# Patient Record
Sex: MALE | Race: BLACK OR AFRICAN AMERICAN | NOT HISPANIC OR LATINO | Employment: OTHER | URBAN - METROPOLITAN AREA
[De-identification: names, ages, dates, MRNs, and addresses within clinical notes are randomized per-mention and may not be internally consistent; named-entity substitution may affect disease eponyms.]

---

## 2017-01-10 ENCOUNTER — ALLSCRIPTS OFFICE VISIT (OUTPATIENT)
Dept: OTHER | Facility: OTHER | Age: 67
End: 2017-01-10

## 2017-02-03 ENCOUNTER — GENERIC CONVERSION - ENCOUNTER (OUTPATIENT)
Dept: OTHER | Facility: OTHER | Age: 67
End: 2017-02-03

## 2017-02-06 ENCOUNTER — GENERIC CONVERSION - ENCOUNTER (OUTPATIENT)
Dept: OTHER | Facility: OTHER | Age: 67
End: 2017-02-06

## 2017-02-10 ENCOUNTER — ALLSCRIPTS OFFICE VISIT (OUTPATIENT)
Dept: OTHER | Facility: OTHER | Age: 67
End: 2017-02-10

## 2017-03-23 ENCOUNTER — ALLSCRIPTS OFFICE VISIT (OUTPATIENT)
Dept: OTHER | Facility: OTHER | Age: 67
End: 2017-03-23

## 2017-04-11 ENCOUNTER — GENERIC CONVERSION - ENCOUNTER (OUTPATIENT)
Dept: OTHER | Facility: OTHER | Age: 67
End: 2017-04-11

## 2017-06-06 ENCOUNTER — ALLSCRIPTS OFFICE VISIT (OUTPATIENT)
Dept: OTHER | Facility: OTHER | Age: 67
End: 2017-06-06

## 2017-06-06 ENCOUNTER — TRANSCRIBE ORDERS (OUTPATIENT)
Dept: ADMINISTRATIVE | Facility: HOSPITAL | Age: 67
End: 2017-06-06

## 2017-06-06 DIAGNOSIS — I50.42 CHRONIC COMBINED SYSTOLIC AND DIASTOLIC HEART FAILURE (HCC): Primary | ICD-10-CM

## 2017-06-24 ENCOUNTER — HOSPITAL ENCOUNTER (EMERGENCY)
Facility: HOSPITAL | Age: 67
Discharge: HOME | End: 2017-06-24
Admitting: EMERGENCY MEDICINE
Payer: COMMERCIAL

## 2017-06-24 VITALS
TEMPERATURE: 98.2 F | DIASTOLIC BLOOD PRESSURE: 83 MMHG | BODY MASS INDEX: 35.59 KG/M2 | HEART RATE: 94 BPM | OXYGEN SATURATION: 97 % | RESPIRATION RATE: 20 BRPM | WEIGHT: 241 LBS | SYSTOLIC BLOOD PRESSURE: 160 MMHG

## 2017-06-24 DIAGNOSIS — R21 RASH AND OTHER NONSPECIFIC SKIN ERUPTION: Primary | ICD-10-CM

## 2017-06-24 PROCEDURE — 99282 EMERGENCY DEPT VISIT SF MDM: CPT

## 2017-06-28 DIAGNOSIS — G47.33 OBSTRUCTIVE SLEEP APNEA: ICD-10-CM

## 2017-09-26 ENCOUNTER — ALLSCRIPTS OFFICE VISIT (OUTPATIENT)
Dept: OTHER | Facility: OTHER | Age: 67
End: 2017-09-26

## 2017-10-03 ENCOUNTER — GENERIC CONVERSION - ENCOUNTER (OUTPATIENT)
Dept: OTHER | Facility: OTHER | Age: 67
End: 2017-10-03

## 2017-10-05 ENCOUNTER — HOSPITAL ENCOUNTER (OUTPATIENT)
Dept: RADIOLOGY | Facility: HOSPITAL | Age: 67
Discharge: HOME | End: 2017-10-05
Payer: COMMERCIAL

## 2017-10-05 ENCOUNTER — TRANSCRIBE ORDERS (OUTPATIENT)
Dept: ADMINISTRATIVE | Facility: HOSPITAL | Age: 67
End: 2017-10-05

## 2017-10-05 DIAGNOSIS — M25.561 RIGHT KNEE PAIN, UNSPECIFIED CHRONICITY: Primary | ICD-10-CM

## 2017-10-05 DIAGNOSIS — M25.512 LEFT SHOULDER PAIN, UNSPECIFIED CHRONICITY: ICD-10-CM

## 2017-10-05 DIAGNOSIS — M25.561 RIGHT KNEE PAIN, UNSPECIFIED CHRONICITY: ICD-10-CM

## 2017-10-05 PROCEDURE — 73562 X-RAY EXAM OF KNEE 3: CPT

## 2017-10-05 PROCEDURE — 73030 X-RAY EXAM OF SHOULDER: CPT

## 2017-12-12 ENCOUNTER — ALLSCRIPTS OFFICE VISIT (OUTPATIENT)
Dept: OTHER | Facility: OTHER | Age: 67
End: 2017-12-12

## 2017-12-12 LAB
BILIRUB UR QL STRIP: NORMAL
CLARITY UR: NORMAL
COLOR UR: YELLOW
GLUCOSE (HISTORICAL): 1000
HGB UR QL STRIP.AUTO: NORMAL
KETONES UR STRIP-MCNC: NORMAL MG/DL
LEUKOCYTE ESTERASE UR QL STRIP: NORMAL
NITRITE UR QL STRIP: NORMAL
PH UR STRIP.AUTO: 6 [PH]
PROT UR STRIP-MCNC: NORMAL MG/DL
SP GR UR STRIP.AUTO: 1.01
UROBILINOGEN UR QL STRIP.AUTO: 0.2

## 2017-12-22 ENCOUNTER — ALLSCRIPTS OFFICE VISIT (OUTPATIENT)
Dept: OTHER | Facility: OTHER | Age: 67
End: 2017-12-22

## 2017-12-22 ENCOUNTER — TRANSCRIBE ORDERS (OUTPATIENT)
Dept: ADMINISTRATIVE | Facility: HOSPITAL | Age: 67
End: 2017-12-22

## 2017-12-22 DIAGNOSIS — I50.42 CHRONIC COMBINED SYSTOLIC AND DIASTOLIC HEART FAILURE (HCC): Primary | ICD-10-CM

## 2017-12-23 NOTE — PROGRESS NOTES
Assessment   1  Preop cardiovascular exam (V72 81) (Z01 810)    Plan   1  ECHO COMPLETE WITH CONTRAST IF INDICATED; Status:Need Information - Financial     Authorization; Requested for:[id number];   2  Follow-up visit in 4 Months Evaluation and Treatment  Follow-up  Status: Hold For -     Scheduling  Requested for: [id number]    Discussion/Summary   Cardiology Discussion Summary Free Text Note Form [facility]:    1  Chronic systolic CHF due to NICM, Stage C, EF: 30% with grade 2 diastolic dysfunction decompensation Dec 2016- diuresed 7 lbs in hospital- weight should be around 240 lbs- weighs 254 today in situ- 9/26 Optivol crossed  coreg 25 mg BID, Entresto 97/103 mg BID, isordil 10 mg Q8 40 mg BID HTN- managed well JOE on CPAP at night DM2 CKD III- baseline Cr 1 8- 1 5 in the hospital History of prostate CAncer- s/p prostatectomy anxiety and depression hyperlipidemia- on simvastatin 40 mg    stated that since he is on isordil he should avoid PDE5's for ED for upcoming penile implant surgery  Chief Complaint   Chief Complaint Free Text Note Form: pre op clearance      History of Present Illness   Cardiology HPI Free Text Note [facility]: 79 yo male following up regarding management of chronic systolic congestive heart failure  He was in the hospital with decompensated systolic congestive heart failure in Dec 2016  An echocardiogram was done in the hospital which demonstrated an ejection fraction of 30%  Initially ACE-I removed secondary to chronic kidney disease stage III with baseline creatinine around 1 8  He has comorbidities including chronic kidney disease stage III, hypertension, type 2 diabetes mellitus, obstructive sleep apnea on C Pap at night, prostate cancer status post surgery, asthma     reports no history of MI  He states he had a defibrillator placed a long time ago he says 10-12 years ago but states he never had ICD generator changed  He is still on Benicar      He had fistula surgery for preparation for dialysis about 3 years ago but never needed dialysis  Last visit I increased his Entresto to 97/103 mg  He is taking lasix 40 mg BID  His weight last visit was 254 lbs  Wt today is    His weight is stable, no dyspnea  Has upcoming penile implant in Jan            Review of Systems   Cardiology Male ROS:         Cardiac: has swelling in the BOTH FEET, but-- no chest pain,-- no rhythm problems,-- no fainting/blackouts,-- no heart murmur present-- and-- no palpitations present  Skin: No complaints of nonhealing sores or skin rash  Genitourinary: No complaints of recurrent urinary tract infections, frequent urination at night, difficult urination, blood in urine, kidney stones, loss of bladder control, no kidney or prostate problems, no erectile dysfunction  Psychological: anxiety, but-- No complaints of feeling depressed, anxiety, panic attacks, or difficulty concentrating ,-- no depression-- and-- no palpitations present  General: trouble sleeping, but-- No complaints of trouble sleeping, lack of energy, fatigue, appetite changes, weight changes, fever, frequent infections, or night sweats  ,-- no lack of energy/fatigue-- and-- no night sweats  Respiratory: shortness of breath, but-- No complaints of shortness of breath, cough with sputum, or wheezing ,-- no cough/sputum,-- no wheezing,-- no phlegm-- and-- no hemoptysis--   Improved SOB  HEENT: No complaints of serious problems, hearing problems, nose problems, throat problems, or snoring        Gastrointestinal: No complaints of liver problems, nausea, vomiting, heartburn, constipation, bloody stools, diarrhea, problems swallowing, adbominal pain, or rectal bleeding ,-- no liver problems,-- no nausea,-- no heartburn,-- no diarrhea-- and-- no constipation      Hematologic: excessive bruising, but-- No complaints of bleeding disorders, anemia, blood clots, or excessive brusing -- and-- no anemia      Neurological: No complaints of numbness, tingling, dizziness, weakness, seizures, headaches, syncope or fainting, AM fatigue, daytime sleepiness, no witnessed apnea episodes  ,-- no tingling,-- no weakness,-- no headaches,-- no dizziness-- and-- no diplopia      Musculoskeletal: swelling/pain, but-- No complaints of arthritis, back pain, or painfull swelling ,-- no arthritis-- and-- no back pain-- SWELLING IN BOTH FEET  Active Problems   1  Chronic combined systolic and diastolic CHF, NYHA class 3 (428 42,428 0) (I50 42)   2  Erectile dysfunction of non-organic origin (302 72) (F52 21)   3  HTN (hypertension) (401 9) (I10)   4  Obstructive sleep apnea syndrome (327 23) (G47 33)    Past Medical History   1  History of Chronic systolic heart failure (202 33) (I50 22)   2  History of cardiac disorder (V12 50) (Z86 79)   3  History of hypertension (V12 59) (Z86 79)   4  History of type 2 diabetes mellitus (V12 29) (Z86 39)  Active Problems And Past Medical History Reviewed: The active problems and past medical history were reviewed and updated today  Surgical History   1  History of Cardio-defib Pulse Generator Type   2  History of Hernia Repair   3  History of Shoulder Surgery  Surgical History Reviewed: The surgical history was reviewed and updated today  Family History   Brother    1  Family history of AIDS (V18 8) (Z83 0)  Family History Reviewed: The family history was reviewed and updated today  Social History    · Former smoker (D15 85) (J62 249)   ·    · No drug use   · Social alcohol use (Z78 9)  Social History Reviewed: The social history was reviewed and updated today  Current Meds    1  Adult Aspirin EC Low Strength 81 MG Oral Tablet Delayed Release; Therapy: (Recorded:14Hva6172) to Recorded   2  ALPRAZolam 0 25 MG Oral Tablet; TAKE 1 TABLET 3 TIMES DAILY AS NEEDED; Therapy: 85LBO5176 to Recorded   3  Carvedilol 25 MG Oral Tablet; TAKE 1 TABLET 3 times daily;      Therapy: (Recorded:81Zpx3050) to Recorded   4  Cialis 20 MG Oral Tablet; TAKE AS DIRECTED; Therapy: 57CIX4609 to Recorded   5  Ciprofloxacin HCl - 500 MG Oral Tablet; Take 1 tablet twice daily; Therapy: 33Sgi1381 to (Evaluate:34Xzp5789)  Requested for: 94Ntk2004; Last     Rx:89Vik0200 Ordered   6  Entresto  MG Oral Tablet; TAKE 1 TABLET TWICE DAILY ***CALL MD FOR     APPOINTMENT  NO FURTHER REFILLS UNTIL THEN***;     Therapy: 76FNY9848 to (Evaluate:21Apr2018)  Requested for: 75Pru7779; Last     Rx:92Vvo8333 Ordered   7  Furosemide 40 MG Oral Tablet; take 1 tablet by mouth twice daily; Therapy: 65AMF6638 to (Evaluate:01Jun2018)  Requested for: 71JBF3099; Last     CH:75NEX9203 Ordered   8  Gabapentin 100 MG Oral Capsule; take 1 capsule 4 times daily; Therapy: 45XFC5551 to Recorded   9  Isosorbide Dinitrate 10 MG Oral Tablet; TAKE 1 TABLET Every 8 hours; Therapy: 32LQR2539 to (Evaluate:11Mar2018)  Requested for: 29Kgo9031; Last     Rx:58Nla6167 Ordered   10  Levitra 20 MG Oral Tablet; TAKE AS DIRECTED; Therapy: 80MNC0084 to Recorded   11  MetOLazone 5 MG Oral Tablet; TAKE 1 TABLET BY MOUTH EVERY DAY AS NEEDED      WEIGHT GAIN 3 LBS; Therapy: 49VER2617 to (Evaluate:54Ufo1662)  Requested for: 67Cgf5066; Last      Rx:12Glc8672 Ordered   12  Multi-Day Vitamins TABS; Therapy: (Recorded:06Oct2016) to Recorded   13  Potassium Chloride Kathy ER 20 MEQ Oral Tablet Extended Release; TAKE 1 TABLET      DAILY; Therapy: 22XWE1726 to (Evaluate:02Jan2018); Last Rx:06Jun2017 Ordered   14  Simvastatin 40 MG Oral Tablet; TAKE 1 TABLET DAILY; Therapy: 46BUF5813 to ([facility]) Recorded   15  Toujeo SoloStar SOPN;      Therapy: (Recorded:06Oct2016) to Recorded  Medication List Reviewed: The medication list was reviewed and updated today  Allergies   1  Ibuprofen CAPS   2   Penicillins    Vitals   Signs   Recorded: 22Dec2017 11:33AM   Heart Rate: 75  Pulse Quality: Normal, L Radial  Respiration Quality: Normal  Respiration: 16  Systolic: 007  Diastolic: 70  Height: 5 ft 9 in  Weight: 252 lb   BMI Calculated: 37 21  BSA Calculated: 2 28    Physical Exam        Constitutional      General appearance: No acute distress, well appearing and well nourished  Eyes      Conjunctiva and Sclera examination: Conjunctiva pink, sclera anicteric  Ears, Nose, Mouth, and Throat - Oropharynx: Clear, nares are clear, mucous membranes are moist       Neck      Neck and thyroid: Abnormal  -- JVP 6- 7 cm  Pulmonary      Respiratory effort: No increased work of breathing or signs of respiratory distress  Auscultation of lungs: Clear to auscultation, no rales, no rhonchi, no wheezing, good air movement  Cardiovascular      Auscultation of heart: Normal rate and rhythm, normal S1 and S2, no murmurs  Carotid pulses: Normal, 2+ bilaterally  Peripheral vascular exam: Normal pulses throughout, no tenderness, erythema or swelling  Pedal pulses: Normal, 2+ bilaterally  Examination of extremities for edema and/or varicosities: Abnormal  -- 1+ pitting edema  Abdomen      Abdomen: Non-tender and no distention  Liver and spleen: No hepatomegaly or splenomegaly  Musculoskeletal Gait and station: Normal gait  -- Digits and nails: Normal without clubbing or cyanosis  -- Inspection/palpation of joints, bones, and muscles: Normal, ROM normal        Skin - Skin and subcutaneous tissue: Normal without rashes or lesions  Skin is warm and well perfused, normal turgor  Neurologic - Cranial nerves: II - XII intact  -- Speech: Normal        Psychiatric - Orientation to person, place, and time: Normal -- Mood and affect: Normal       Results/Data   Diagnostic Studies Reviewed Cardio:      Lab Review: June 7 cr  1 7      Echocardiogram/DANIEL: 5/2: 80%, grade 2 diastolic dysfunction  ICD/ Pacer Interpretation Sept: 1% , battery ok   a few SVT episodes detected, normal optivol      Future Appointments      Date/Time Provider Specialty Site   01/15/2018 09:20 AM Wang Rivera DO Cardiology  CARDIOLOGY  [place]   12/27/2017 01:00 PM Cardiology, Device Remote   [address]   03/28/2018 08:00 AM Cardiology, Device Remote   [address]   01/19/2018 07:30 AM Heriberto Rosales MD Urology [address] [place] [address]   01/23/2018 10:00 AM Heriberto Rosales MD Urology [address]   02/15/2018 10:00 AM Heriberto Rosales MD Urology [address] [place] [address]     Signatures    Electronically signed by : Rosy Hunter DO; Dec 22 2017 11:49AM EST                       (Author)

## 2017-12-23 NOTE — PROGRESS NOTES
Surgery Scheduling Form   Pre-Operative Risk Assessment:      Date Last Seen: 12/22/17      Date of Surgery: Jan 19 2018      Hospital: [facility]      Type of Surgery: Penile implant      Surgeon Name: Ritesh Romero Number: [phone]  Pulmonary: No Pulmonary Contraindication for planned surgical procedure      Cardiac: No Cardiac Contraindication for planned surgical procedure      Vascular: No Vascular Contraindication for planned surgical procedure      Physician Comments: stable from HF standpoint      Further Testing Required: No      Anticoagulation: No      Anesthesia: Choice      Patient Approved for Surgery: Yes      Clearing Doctor: Bob SUAREZ        Signatures    Electronically signed by : Bob Novak DO; Dec 22 2017 11:51AM EST                       (Author)

## 2017-12-27 ENCOUNTER — GENERIC CONVERSION - ENCOUNTER (OUTPATIENT)
Dept: OTHER | Facility: OTHER | Age: 67
End: 2017-12-27

## 2017-12-27 ENCOUNTER — ALLSCRIPTS OFFICE VISIT (OUTPATIENT)
Dept: OTHER | Facility: OTHER | Age: 67
End: 2017-12-27

## 2018-01-02 ENCOUNTER — HOSPITAL ENCOUNTER (OUTPATIENT)
Dept: NON INVASIVE DIAGNOSTICS | Facility: CLINIC | Age: 68
Discharge: HOME | End: 2018-01-02
Payer: COMMERCIAL

## 2018-01-02 ENCOUNTER — GENERIC CONVERSION - ENCOUNTER (OUTPATIENT)
Dept: CARDIOLOGY CLINIC | Facility: CLINIC | Age: 68
End: 2018-01-02

## 2018-01-02 DIAGNOSIS — I50.42 CHRONIC COMBINED SYSTOLIC AND DIASTOLIC HEART FAILURE (HCC): ICD-10-CM

## 2018-01-02 PROCEDURE — 93306 TTE W/DOPPLER COMPLETE: CPT

## 2018-01-03 ENCOUNTER — ANESTHESIA EVENT (OUTPATIENT)
Dept: PERIOP | Facility: HOSPITAL | Age: 68
End: 2018-01-03
Payer: COMMERCIAL

## 2018-01-03 RX ORDER — SODIUM CHLORIDE 9 MG/ML
125 INJECTION, SOLUTION INTRAVENOUS CONTINUOUS
Status: CANCELLED | OUTPATIENT
Start: 2018-01-19

## 2018-01-04 ENCOUNTER — GENERIC CONVERSION - ENCOUNTER (OUTPATIENT)
Dept: UROLOGY | Facility: MEDICAL CENTER | Age: 68
End: 2018-01-04

## 2018-01-04 ENCOUNTER — HOSPITAL ENCOUNTER (OUTPATIENT)
Dept: RADIOLOGY | Facility: HOSPITAL | Age: 68
Discharge: HOME | End: 2018-01-04
Attending: UROLOGY
Payer: COMMERCIAL

## 2018-01-04 ENCOUNTER — HOSPITAL ENCOUNTER (OUTPATIENT)
Dept: NON INVASIVE DIAGNOSTICS | Facility: HOSPITAL | Age: 68
Discharge: HOME | End: 2018-01-04
Attending: UROLOGY
Payer: COMMERCIAL

## 2018-01-04 ENCOUNTER — APPOINTMENT (OUTPATIENT)
Dept: LAB | Facility: HOSPITAL | Age: 68
End: 2018-01-04
Attending: UROLOGY
Payer: COMMERCIAL

## 2018-01-04 ENCOUNTER — TRANSCRIBE ORDERS (OUTPATIENT)
Dept: ADMINISTRATIVE | Facility: HOSPITAL | Age: 68
End: 2018-01-04

## 2018-01-04 ENCOUNTER — APPOINTMENT (OUTPATIENT)
Dept: PREADMISSION TESTING | Facility: HOSPITAL | Age: 68
End: 2018-01-04
Payer: COMMERCIAL

## 2018-01-04 VITALS
TEMPERATURE: 97.9 F | WEIGHT: 246.2 LBS | HEIGHT: 69 IN | DIASTOLIC BLOOD PRESSURE: 80 MMHG | HEART RATE: 82 BPM | BODY MASS INDEX: 36.46 KG/M2 | SYSTOLIC BLOOD PRESSURE: 132 MMHG | RESPIRATION RATE: 18 BRPM

## 2018-01-04 DIAGNOSIS — E11.69 ERECTILE DYSFUNCTION ASSOCIATED WITH TYPE 2 DIABETES MELLITUS (HCC): ICD-10-CM

## 2018-01-04 DIAGNOSIS — N52.1 ERECTILE DYSFUNCTION ASSOCIATED WITH TYPE 2 DIABETES MELLITUS (HCC): ICD-10-CM

## 2018-01-04 DIAGNOSIS — Z01.818 PREOP EXAMINATION: ICD-10-CM

## 2018-01-04 DIAGNOSIS — Z01.818 PREOP EXAMINATION: Primary | ICD-10-CM

## 2018-01-04 LAB
ANION GAP SERPL CALCULATED.3IONS-SCNC: 8 MMOL/L (ref 4–13)
APTT PPP: 28 SECONDS (ref 23–35)
ATRIAL RATE: 76 BPM
BASOPHILS # BLD AUTO: 0.03 THOUSANDS/ΜL (ref 0–0.1)
BASOPHILS NFR BLD AUTO: 1 % (ref 0–1)
BUN SERPL-MCNC: 49 MG/DL (ref 5–25)
CALCIUM SERPL-MCNC: 10.1 MG/DL (ref 8.3–10.1)
CHLORIDE SERPL-SCNC: 94 MMOL/L (ref 100–108)
CO2 SERPL-SCNC: 32 MMOL/L (ref 21–32)
CREAT SERPL-MCNC: 2.54 MG/DL (ref 0.6–1.3)
EOSINOPHIL # BLD AUTO: 0.23 THOUSAND/ΜL (ref 0–0.61)
EOSINOPHIL NFR BLD AUTO: 5 % (ref 0–6)
ERYTHROCYTE [DISTWIDTH] IN BLOOD BY AUTOMATED COUNT: 14.1 % (ref 11.6–15.1)
GFR SERPL CREATININE-BSD FRML MDRD: 29 ML/MIN/1.73SQ M
GLUCOSE P FAST SERPL-MCNC: 347 MG/DL (ref 65–99)
HCT VFR BLD AUTO: 39.4 % (ref 36.5–49.3)
HGB BLD-MCNC: 13.6 G/DL (ref 12–17)
INR PPP: 0.99 (ref 0.86–1.16)
LYMPHOCYTES # BLD AUTO: 1.3 THOUSANDS/ΜL (ref 0.6–4.47)
LYMPHOCYTES NFR BLD AUTO: 27 % (ref 14–44)
MCH RBC QN AUTO: 31.5 PG (ref 26.8–34.3)
MCHC RBC AUTO-ENTMCNC: 34.5 G/DL (ref 31.4–37.4)
MCV RBC AUTO: 91 FL (ref 82–98)
MONOCYTES # BLD AUTO: 0.47 THOUSAND/ΜL (ref 0.17–1.22)
MONOCYTES NFR BLD AUTO: 10 % (ref 4–12)
NEUTROPHILS # BLD AUTO: 2.8 THOUSANDS/ΜL (ref 1.85–7.62)
NEUTS SEG NFR BLD AUTO: 57 % (ref 43–75)
NRBC BLD AUTO-RTO: 0 /100 WBCS
P AXIS: 60 DEGREES
PLATELET # BLD AUTO: 156 THOUSANDS/UL (ref 149–390)
PMV BLD AUTO: 11.9 FL (ref 8.9–12.7)
POTASSIUM SERPL-SCNC: 3.6 MMOL/L (ref 3.5–5.3)
PR INTERVAL: 206 MS
PROTHROMBIN TIME: 13.1 SECONDS (ref 12.1–14.4)
PSA SERPL-MCNC: 5.4 NG/ML (ref 0–4)
QRS AXIS: -9 DEGREES
QRSD INTERVAL: 116 MS
QT INTERVAL: 420 MS
QTC INTERVAL: 472 MS
RBC # BLD AUTO: 4.32 MILLION/UL (ref 3.88–5.62)
SODIUM SERPL-SCNC: 134 MMOL/L (ref 136–145)
T WAVE AXIS: 144 DEGREES
VENTRICULAR RATE: 76 BPM
WBC # BLD AUTO: 4.83 THOUSAND/UL (ref 4.31–10.16)

## 2018-01-04 PROCEDURE — 85730 THROMBOPLASTIN TIME PARTIAL: CPT

## 2018-01-04 PROCEDURE — 85025 COMPLETE CBC W/AUTO DIFF WBC: CPT

## 2018-01-04 PROCEDURE — 87147 CULTURE TYPE IMMUNOLOGIC: CPT

## 2018-01-04 PROCEDURE — 36415 COLL VENOUS BLD VENIPUNCTURE: CPT

## 2018-01-04 PROCEDURE — 87086 URINE CULTURE/COLONY COUNT: CPT

## 2018-01-04 PROCEDURE — 85610 PROTHROMBIN TIME: CPT

## 2018-01-04 PROCEDURE — 80048 BASIC METABOLIC PNL TOTAL CA: CPT

## 2018-01-04 PROCEDURE — 71046 X-RAY EXAM CHEST 2 VIEWS: CPT

## 2018-01-04 PROCEDURE — 93005 ELECTROCARDIOGRAM TRACING: CPT

## 2018-01-04 PROCEDURE — G0103 PSA SCREENING: HCPCS

## 2018-01-04 RX ORDER — ISOSORBIDE DINITRATE 10 MG/1
10 TABLET ORAL 3 TIMES DAILY
COMMUNITY
End: 2018-08-01 | Stop reason: SDUPTHER

## 2018-01-04 RX ORDER — MELATONIN
1 DAILY
COMMUNITY
End: 2020-01-01

## 2018-01-04 RX ORDER — METOLAZONE 5 MG/1
5 TABLET ORAL DAILY PRN
COMMUNITY
End: 2018-05-21 | Stop reason: SDUPTHER

## 2018-01-04 RX ORDER — MULTIVITAMIN
1 CAPSULE ORAL DAILY
COMMUNITY
End: 2021-01-01 | Stop reason: HOSPADM

## 2018-01-04 RX ORDER — AMOXICILLIN 500 MG
1 CAPSULE ORAL DAILY
COMMUNITY
End: 2021-01-01 | Stop reason: HOSPADM

## 2018-01-04 RX ORDER — FOLIC ACID 0.8 MG
1 TABLET ORAL DAILY
COMMUNITY
End: 2019-01-09

## 2018-01-04 RX ORDER — GABAPENTIN 400 MG/1
100 CAPSULE ORAL 4 TIMES DAILY
COMMUNITY
End: 2019-01-28 | Stop reason: ALTCHOICE

## 2018-01-04 RX ORDER — ACETAMINOPHEN AND CODEINE PHOSPHATE 120; 12 MG/5ML; MG/5ML
30 SOLUTION ORAL
COMMUNITY
End: 2018-05-10 | Stop reason: ALTCHOICE

## 2018-01-04 NOTE — PRE-PROCEDURE INSTRUCTIONS
Pre-Surgery Instructions:   Medication Instructions    aspirin (ADULT ASPIRIN EC LOW STRENGTH) 81 mg EC tablet Patient was instructed by Physician and understands   carvedilol (COREG) 25 mg tablet Instructed patient per Anesthesia Guidelines   Cholecalciferol (VITAMIN D3) 5000 units CAPS Instructed patient per Anesthesia Guidelines   CINNAMON PO Instructed patient per Anesthesia Guidelines   Coenzyme Q10 (COQ-10) 200 MG CAPS Instructed patient per Anesthesia Guidelines   Famotidine (PEPCID PO) Instructed patient per Anesthesia Guidelines   flurazepam (DALMANE) 30 MG capsule Instructed patient per Anesthesia Guidelines   furosemide (LASIX) 40 mg tablet Instructed patient per Anesthesia Guidelines   gabapentin (NEURONTIN) 400 mg capsule Instructed patient per Anesthesia Guidelines   Insulin Glargine (TOUJEO SOLOSTAR SC) Instructed patient per Anesthesia Guidelines   isosorbide dinitrate (ISORDIL) 10 mg tablet Instructed patient per Anesthesia Guidelines   Magnesium 500 MG CAPS Instructed patient per Anesthesia Guidelines   metolazone (ZAROXOLYN) 5 mg tablet Instructed patient per Anesthesia Guidelines   Multiple Vitamin (MULTIVITAMIN) capsule Instructed patient per Anesthesia Guidelines   Omega-3 Fatty Acids (FISH OIL) 1200 MG CAPS Patient was instructed by Physician and understands   potassium chloride (KLOR-CON) 20 mEq packet Instructed patient per Anesthesia Guidelines   sacubitril-valsartan (ENTRESTO)  MG TABS Instructed patient per Anesthesia Guidelines   simvastatin (ZOCOR) 20 mg tablet Instructed patient per Anesthesia Guidelines  Instructed to  Take carvedilol and entresto am of surgery with sip of water per anesthesia  Can take pepcid am of surgery if needed

## 2018-01-04 NOTE — ANESTHESIA PREPROCEDURE EVALUATION
Review of Systems/Medical History  Patient summary reviewed  Chart reviewed      Cardiovascular  Hyperlipidemia, Hypertension controlled, CHF ,    Pulmonary  COPD moderate- medication dependent , Asthma: , ,        GI/Hepatic  Negative GI/hepatic ROS   GERD well controlled,        Chronic kidney disease stage 3,        Endo/Other  Diabetes well controlled type 2 Insulin,      GYN       Hematology  Negative hematology ROS      Musculoskeletal  Obesity  morbid obesity,        Neurology  Negative neurology ROS   Diabetic neuropathy,    Psychology   Anxiety,            Physical Exam    Airway    Mallampati score: I  TM Distance: >3 FB  Neck ROM: full     Dental   Comment: Only a few teeth in mouth,     Cardiovascular  Rhythm: regular, Rate: normal,     Pulmonary  Pulmonary exam normal Breath sounds clear to auscultation,     Other Findings        Anesthesia Plan  ASA Score- 3     Anesthesia Type- general with ASA Monitors  Additional Monitors:   Airway Plan: ETT  Plan Factors-Patient not instructed to abstain from smoking on day of procedure  Patient did not smoke on day of surgery  Induction- intravenous  Postoperative Plan- Plan for postoperative opioid use  Informed Consent- Anesthetic plan and risks discussed with patient

## 2018-01-06 LAB — BACTERIA UR CULT: ABNORMAL

## 2018-01-11 NOTE — MISCELLANEOUS
History of Present Illness  A call was made to the patient/patient's family  Status Check: today's weight is 131  Patient is experiencing the following symptoms:  The patient is not experiencing signs of heart failure  Counseling provided to the patient  Topics counseled included diet, activities of daily living, medications, need for daily weights and symptoms to report  HFCC Additional Notes: Patient was able to obtain Encarnacion-Pandey today and does realize he will need to stop his other medications and he will begin taking this medication on Monday  Patient is very much engaged in his own self care        Signatures   Electronically signed by : Francie Sheehan, ; May 27 2016  3:24PM EST                       (Author)

## 2018-01-11 NOTE — RESULT NOTES
Message  Mr Dennie Patron check on 9/26/17 showed Opti vol crossed the threshold  I called Mr Annalee Brumfield to evalaute for SS of HF  He did not answer  I left a message to call our office if he is experiencing SOB, weight gain or LE edema        Signatures   Electronically signed by : Yomaira Dang, [facility]; Oct  3 2017  2:36PM EST                       (Author)

## 2018-01-12 VITALS
BODY MASS INDEX: 35.65 KG/M2 | WEIGHT: 241.38 LBS | HEART RATE: 74 BPM | SYSTOLIC BLOOD PRESSURE: 134 MMHG | DIASTOLIC BLOOD PRESSURE: 78 MMHG | RESPIRATION RATE: 19 BRPM

## 2018-01-12 NOTE — MISCELLANEOUS
History of Present Illness  Communication  A message was left on voicemail requesting a return phone call  A call was made to the patient/patient's family        Future Appointments    Date/Time Provider Specialty Site   05/24/2016 02:40 PM Matt Wills DO Cardiology [facility]  [facility]     Signatures   Electronically signed by : Vickie Bates, ; May 11 2016  3:33PM EST                       (Author)

## 2018-01-13 VITALS
HEIGHT: 69 IN | HEART RATE: 88 BPM | DIASTOLIC BLOOD PRESSURE: 88 MMHG | WEIGHT: 254 LBS | BODY MASS INDEX: 37.62 KG/M2 | SYSTOLIC BLOOD PRESSURE: 130 MMHG

## 2018-01-13 NOTE — MISCELLANEOUS
Message  Patient Holly Weiss dropped off a copy of an EKG/PAT from Urologist Dr Karyn Rossi    Patient thought he needed another cardiac clearance and also wanted an appointment  Patient already cleared by Dr Colette Yin in Feb 2017 for penile implant at last ov  Gave patient clearance/ov note and tasked Dr Colette Yin a copy of EKG for review  Active Problems    1  Chronic combined systolic and diastolic CHF, NYHA class 3 (428 42,428 0) (I50 42)   2  Erectile dysfunction of non-organic origin (302 72) (F52 21)   3  HTN (hypertension) (401 9) (I10)   4  Obstructive sleep apnea syndrome (327 23) (G47 33)    Current Meds   1  Adult Aspirin EC Low Strength 81 MG Oral Tablet Delayed Release; Therapy: (Recorded:06Oct2016) to Recorded   2  ALPRAZolam 0 25 MG Oral Tablet; TAKE 1 TABLET 3 TIMES DAILY AS NEEDED; Therapy: 67IUM2315 to Recorded   3  Carvedilol 25 MG Oral Tablet; TAKE 1 TABLET 3 times daily; Therapy: (Recorded:10Feb2017) to Recorded   4  Cyclobenzaprine HCl - 10 MG Oral Tablet; TAKE 1 TABLET 3 TIMES DAILY AS NEEDED; Therapy: 87GTB1477 to Recorded   5  Donepezil HCl - 5 MG Oral Tablet; TAKE 1 TABLET DAILY AS DIRECTED; Therapy: 84BVT7848 to (Evaluate:11May2017) Recorded   6  Entresto  MG Oral Tablet; Take 1 tablet twice daily; Therapy: 05EWM3050 to (Evaluate:27Jun2017)  Requested for: 03IHH2195; Last   Rx:29Mar2017 Ordered   7  Eszopiclone 3 MG Oral Tablet; Therapy: (Recorded:06Oct2016) to Recorded   8  Fluticasone Propionate 50 MCG/ACT Nasal Suspension; USE 2 SPRAYS IN EACH   NOSTRIL DAILY; Therapy: 01YRP4899 to (Evaluate:86Kon2999) Recorded   9  Furosemide 20 MG Oral Tablet; TAKE 1 TABLET Daily Take daily at 2 pm;   Therapy: 83LFV8188 to (Evaluate:23Jun2016)  Requested for: 54ISZ1473; Last   Rx:24May2016 Ordered   10  Furosemide 40 MG Oral Tablet; TAKE 1 TABLET DAILY; Therapy: 92SSF8301 to (Evaluate:67Fxo8117) Recorded   11   Gabapentin 100 MG Oral Capsule; take 1 capsule 4 times daily; Therapy: 34BYB4012 to Recorded   12  Lidocaine 5 % External Patch; Therapy: (Recorded:06Oct2016) to Recorded   13  Lumigan 0 01 % Ophthalmic Solution; INSTILL 1 DROP INTO BOTH EYES ONCE DAILY    IN THE EVENING; Therapy: 58NHL4957 to Recorded   14  Multi-Day Vitamins TABS; Therapy: (Recorded:06Oct2016) to Recorded   15  Potassium Chloride Kathy ER 20 MEQ Oral Tablet Extended Release; TAKE 1 TABLET    DAILY; Therapy: 97YZV0759 to (Evaluate:61Jox1497) Recorded   16  Simvastatin 40 MG Oral Tablet; TAKE 1 TABLET DAILY; Therapy: 44UIY4612 to ([facility]) Recorded   17  Tamsulosin HCl - 0 4 MG Oral Capsule; Therapy: (Recorded:06Oct2016) to Recorded   18  Toujeo SoloStar SOPN;    Therapy: (Recorded:06Oct2016) to Recorded   19  TraZODone HCl 150 MG TB24;    Therapy: (Recorded:06Oct2016) to Recorded    Allergies    1  Ibuprofen CAPS   2   Penicillins    Signatures   Electronically signed by : Conor Carlin, ; Apr 11 2017 10:57AM EST                       (Administrative)

## 2018-01-15 ENCOUNTER — GENERIC CONVERSION - ENCOUNTER (OUTPATIENT)
Dept: OTHER | Facility: OTHER | Age: 68
End: 2018-01-15

## 2018-01-15 NOTE — MISCELLANEOUS
History of Present Illness    The patient is being contacted for follow-up after hospitalization  This was not a readmission  The date of discharge was 5/3/16  He has a moderate risk for readmission  Spoke with the patient  He was discharged home with home health services  Spoke to   The name and phone number of the home health agency is [address] [place] [address] 5/4  The patient's discharge weight was 234  The patient's weight today is 232  The patient is currently asymptomatic  Counseling was provided to the patient  Topics counseled included diet, need for daily weights, activities of daily living and symptoms to report  HF Additional Notes:   CC had met patient in the hospital during patient's admission  He stated he had been seeing Dr Rolo Stapleton as his heart doctor and had been in [facility]  He would like to change his cardiologist to Dr Ryan Jeans  The [address] spoke to the patient and to the home health nurse and he did weigh himself today  HFCC did encourage him to make a f/u appt  with his PCP and [facility] him to make an OV with [facility]        Signatures   Electronically signed by : Maricarmen Gutierrez, ; May  4 2016  2:08PM EST                       (Author)

## 2018-01-15 NOTE — MISCELLANEOUS
History of Present Illness    The patient is being contacted for follow-up after hospitalization  This was not a readmission  The date of discharge was 12/22/16  He has a high risk for readmission  Spoke with the patient  He was discharged home with home health services  Counseling was provided to the patient  Topics counseled included diet, need for daily weights, activities of daily living, medications and symptoms to report  HFCC Additional Notes:   Patient has [facility]  He will need close follow up  HFCC will call home health and patient next Wednesday 12/28  Current Meds   1  Adult Aspirin EC Low Strength 81 MG Oral Tablet Delayed Release; Therapy: (Recorded:06Oct2016) to Recorded   2  ALPRAZolam 0 25 MG Oral Tablet; TAKE 1 TABLET 3 TIMES DAILY AS NEEDED; Therapy: 18CEC3793 to Recorded   3  Benicar 5 MG Oral Tablet (Olmesartan Medoxomil); Therapy: (Recorded:06Oct2016) to Recorded   4  Carvedilol 25 MG Oral Tablet; Therapy: (Recorded:06Oct2016) to Recorded   5  Cyclobenzaprine HCl - 10 MG Oral Tablet; TAKE 1 TABLET 3 TIMES DAILY AS NEEDED; Therapy: 94OJY3804 to Recorded   6  Entresto 49-51 MG Oral Tablet; TAKE 1 TABLET BY MOUTH TWICE A DAY; Therapy: 38DUI2718 to Recorded   7  Eszopiclone 3 MG Oral Tablet; Therapy: (Recorded:06Oct2016) to Recorded   8  Furosemide 20 MG Oral Tablet; TAKE 1 TABLET Daily Take daily at 2 pm;   Therapy: 98VMD4912 to (Evaluate:23Jun2016)  Requested for: 11YPM2288; Last   Rx:42Bng4651 Ordered   9  Furosemide 40 MG Oral Tablet; TAKE 1 TABLET DAILY; Therapy: 78FXL2457 to (Evaluate:00Txd6215) Recorded   10  Lidocaine 5 % External Patch; Therapy: (Recorded:06Oct2016) to Recorded   11  Lumigan 0 01 % Ophthalmic Solution; INSTILL 1 DROP INTO BOTH EYES ONCE DAILY    IN THE EVENING; Therapy: 39KJH0120 to Recorded   12  Lyrica 25 MG Oral Capsule; TAKE 1 CAPSULE DAILY; Therapy: 41ZBU4507 to Recorded   13   MetOLazone 2 5 MG Oral Tablet; TAKE 1 TABLET DAILY AS DIRECTED; Therapy: 64TXK0923 to (Evaluate:65Wcu7389) Recorded   14  Multi-Day Vitamins TABS; Therapy: (Recorded:06Oct2016) to Recorded   15  Potassium Chloride Kathy ER 20 MEQ Oral Tablet Extended Release; TAKE 1 TABLET    DAILY; Therapy: 53KAH2873 to (Evaluate:61Fdq5659) Recorded   16  Simvastatin 20 MG Oral Tablet; Take 1 tablet daily; Therapy: 13IKY4010 to (Evaluate:89Hpz3381) Recorded   17  Tamsulosin HCl - 0 4 MG Oral Capsule; Therapy: (Recorded:06Oct2016) to Recorded   18  Toujeo SoloStar SOPN;    Therapy: (Recorded:06Oct2016) to Recorded   19  TraZODone HCl 150 MG TB24;    Therapy: (Recorded:06Oct2016) to Recorded    Future Appointments    Date/Time Provider Specialty Site   03/23/2017 10:00 AM Cardiology, Device Remote  [facility]     Allergies    1  Ibuprofen CAPS   2   Penicillins    Signatures   Electronically signed by : Vickie Bates, ; Dec 23 2016  2:55PM EST                       (Author)

## 2018-01-17 NOTE — MISCELLANEOUS
History of Present Illness  A call was made to the patient/patient's family  Status Check: today's weight is 232  Patient is experiencing the following symptoms: fatigue and shortness of breath with moderate activity, but no edema  when using steps   Counseling provided to the patient  Topics counseled included diet, activities of daily living, medications, need for daily weights and symptoms to report        Future Appointments    Date/Time Provider Specialty Site   05/24/2016 02:40 PM Fahad Hayes DO Cardiology  CARDIOLOGY  [place]     Signatures   Electronically signed by : Blake Pabon, ; May 16 2016  4:21PM EST                       (Author)

## 2018-01-19 ENCOUNTER — HOSPITAL ENCOUNTER (OUTPATIENT)
Facility: HOSPITAL | Age: 68
Setting detail: OUTPATIENT SURGERY
Discharge: HOME | End: 2018-01-20
Attending: UROLOGY | Admitting: UROLOGY
Payer: COMMERCIAL

## 2018-01-19 ENCOUNTER — ANESTHESIA (OUTPATIENT)
Dept: PERIOP | Facility: HOSPITAL | Age: 68
End: 2018-01-19
Payer: COMMERCIAL

## 2018-01-19 DIAGNOSIS — E11.69 TYPE 2 DIABETES MELLITUS WITH HYPERLIPIDEMIA (HCC): Primary | ICD-10-CM

## 2018-01-19 DIAGNOSIS — E11.69 ERECTILE DYSFUNCTION ASSOCIATED WITH TYPE 2 DIABETES MELLITUS (HCC): ICD-10-CM

## 2018-01-19 DIAGNOSIS — E78.5 TYPE 2 DIABETES MELLITUS WITH HYPERLIPIDEMIA (HCC): Primary | ICD-10-CM

## 2018-01-19 DIAGNOSIS — N52.1 ERECTILE DYSFUNCTION ASSOCIATED WITH TYPE 2 DIABETES MELLITUS (HCC): ICD-10-CM

## 2018-01-19 LAB
GLUCOSE SERPL-MCNC: 174 MG/DL (ref 65–140)
GLUCOSE SERPL-MCNC: 188 MG/DL (ref 65–140)
GLUCOSE SERPL-MCNC: 192 MG/DL (ref 65–140)
GLUCOSE SERPL-MCNC: 201 MG/DL (ref 65–140)
GLUCOSE SERPL-MCNC: 251 MG/DL (ref 65–140)
PLATELET # BLD AUTO: 163 THOUSANDS/UL (ref 149–390)
PMV BLD AUTO: 11.8 FL (ref 8.9–12.7)

## 2018-01-19 PROCEDURE — 85049 AUTOMATED PLATELET COUNT: CPT | Performed by: UROLOGY

## 2018-01-19 PROCEDURE — 94760 N-INVAS EAR/PLS OXIMETRY 1: CPT

## 2018-01-19 PROCEDURE — C1813 PROSTHESIS, PENILE, INFLATAB: HCPCS | Performed by: UROLOGY

## 2018-01-19 PROCEDURE — 94660 CPAP INITIATION&MGMT: CPT

## 2018-01-19 PROCEDURE — 82948 REAGENT STRIP/BLOOD GLUCOSE: CPT

## 2018-01-19 DEVICE — NON-STACKABLE REAR TIP EXTENDERS FOR AMS 700 CX AND LGX CYLINDERS
Type: IMPLANTABLE DEVICE | Site: PENIS | Status: FUNCTIONAL
Brand: REAR TIP EXTENDER

## 2018-01-19 DEVICE — INFLATABLE PENILE PROSTHESIS, INHIBIZONE/PRECONNECTED - PENOSCROTAL 1 PUMP 2 CYLINDERS
Type: IMPLANTABLE DEVICE | Site: PENIS | Status: FUNCTIONAL
Brand: AMS 700 CX MS PUMP

## 2018-01-19 DEVICE — AMS 700 INFLATABLE PENILE PROSTHESIS, 1 SPHERICAL RESERVOIR, INHIBIZONE TREATED
Type: IMPLANTABLE DEVICE | Site: SCROTUM | Status: FUNCTIONAL
Brand: AMS 700 SPHERICAL RESERVOIR

## 2018-01-19 RX ORDER — FUROSEMIDE 40 MG/1
40 TABLET ORAL
Status: DISCONTINUED | OUTPATIENT
Start: 2018-01-20 | End: 2018-01-20 | Stop reason: HOSPADM

## 2018-01-19 RX ORDER — CHLORHEXIDINE GLUCONATE 4 G/100ML
SOLUTION TOPICAL DAILY PRN
Status: DISCONTINUED | OUTPATIENT
Start: 2018-01-19 | End: 2018-01-19

## 2018-01-19 RX ORDER — LIDOCAINE HYDROCHLORIDE 10 MG/ML
INJECTION, SOLUTION INFILTRATION; PERINEURAL AS NEEDED
Status: DISCONTINUED | OUTPATIENT
Start: 2018-01-19 | End: 2018-01-19 | Stop reason: SURG

## 2018-01-19 RX ORDER — FAMOTIDINE 20 MG/1
20 TABLET, FILM COATED ORAL DAILY PRN
Status: DISCONTINUED | OUTPATIENT
Start: 2018-01-19 | End: 2018-01-20 | Stop reason: HOSPADM

## 2018-01-19 RX ORDER — METOLAZONE 5 MG/1
5 TABLET ORAL DAILY PRN
Status: DISCONTINUED | OUTPATIENT
Start: 2018-01-19 | End: 2018-01-20 | Stop reason: HOSPADM

## 2018-01-19 RX ORDER — TEMAZEPAM 15 MG/1
15 CAPSULE ORAL
Status: DISCONTINUED | OUTPATIENT
Start: 2018-01-19 | End: 2018-01-20 | Stop reason: HOSPADM

## 2018-01-19 RX ORDER — CHOLECALCIFEROL (VITAMIN D3) 125 MCG
200 CAPSULE ORAL DAILY
Status: DISCONTINUED | OUTPATIENT
Start: 2018-01-19 | End: 2018-01-20 | Stop reason: HOSPADM

## 2018-01-19 RX ORDER — MORPHINE SULFATE 4 MG/ML
4 INJECTION, SOLUTION INTRAMUSCULAR; INTRAVENOUS EVERY 4 HOURS PRN
Status: DISCONTINUED | OUTPATIENT
Start: 2018-01-19 | End: 2018-01-20 | Stop reason: HOSPADM

## 2018-01-19 RX ORDER — PRAVASTATIN SODIUM 40 MG
40 TABLET ORAL
Status: DISCONTINUED | OUTPATIENT
Start: 2018-01-19 | End: 2018-01-20 | Stop reason: HOSPADM

## 2018-01-19 RX ORDER — LEVOFLOXACIN 500 MG/1
500 TABLET, FILM COATED ORAL EVERY 24 HOURS
Qty: 7 TABLET | Refills: 0 | Status: SHIPPED | OUTPATIENT
Start: 2018-01-19 | End: 2018-01-26

## 2018-01-19 RX ORDER — OXYCODONE HYDROCHLORIDE AND ACETAMINOPHEN 5; 325 MG/1; MG/1
1 TABLET ORAL EVERY 4 HOURS PRN
Status: DISCONTINUED | OUTPATIENT
Start: 2018-01-19 | End: 2018-01-20 | Stop reason: HOSPADM

## 2018-01-19 RX ORDER — HEPARIN SODIUM 5000 [USP'U]/ML
5000 INJECTION, SOLUTION INTRAVENOUS; SUBCUTANEOUS EVERY 12 HOURS SCHEDULED
Status: DISCONTINUED | OUTPATIENT
Start: 2018-01-19 | End: 2018-01-20 | Stop reason: HOSPADM

## 2018-01-19 RX ORDER — FENTANYL CITRATE 50 UG/ML
INJECTION, SOLUTION INTRAMUSCULAR; INTRAVENOUS AS NEEDED
Status: DISCONTINUED | OUTPATIENT
Start: 2018-01-19 | End: 2018-01-19 | Stop reason: SURG

## 2018-01-19 RX ORDER — GLYCOPYRROLATE 0.2 MG/ML
INJECTION INTRAMUSCULAR; INTRAVENOUS AS NEEDED
Status: DISCONTINUED | OUTPATIENT
Start: 2018-01-19 | End: 2018-01-19 | Stop reason: SURG

## 2018-01-19 RX ORDER — LIDOCAINE HYDROCHLORIDE 20 MG/ML
JELLY TOPICAL AS NEEDED
Status: DISCONTINUED | OUTPATIENT
Start: 2018-01-19 | End: 2018-01-19 | Stop reason: HOSPADM

## 2018-01-19 RX ORDER — GENTAMICIN SULFATE 40 MG/ML
INJECTION, SOLUTION INTRAMUSCULAR; INTRAVENOUS AS NEEDED
Status: DISCONTINUED | OUTPATIENT
Start: 2018-01-19 | End: 2018-01-19 | Stop reason: SURG

## 2018-01-19 RX ORDER — ONDANSETRON 2 MG/ML
INJECTION INTRAMUSCULAR; INTRAVENOUS AS NEEDED
Status: DISCONTINUED | OUTPATIENT
Start: 2018-01-19 | End: 2018-01-19 | Stop reason: SURG

## 2018-01-19 RX ORDER — FENTANYL CITRATE/PF 50 MCG/ML
50 SYRINGE (ML) INJECTION
Status: DISCONTINUED | OUTPATIENT
Start: 2018-01-19 | End: 2018-01-19

## 2018-01-19 RX ORDER — CARVEDILOL 25 MG/1
25 TABLET ORAL 2 TIMES DAILY WITH MEALS
Status: DISCONTINUED | OUTPATIENT
Start: 2018-01-19 | End: 2018-01-20 | Stop reason: HOSPADM

## 2018-01-19 RX ORDER — ALBUTEROL SULFATE 2.5 MG/3ML
2.5 SOLUTION RESPIRATORY (INHALATION) ONCE AS NEEDED
Status: DISCONTINUED | OUTPATIENT
Start: 2018-01-19 | End: 2018-01-19

## 2018-01-19 RX ORDER — ISOSORBIDE DINITRATE 10 MG/1
10 TABLET ORAL 3 TIMES DAILY
Status: DISCONTINUED | OUTPATIENT
Start: 2018-01-19 | End: 2018-01-20 | Stop reason: HOSPADM

## 2018-01-19 RX ORDER — INSULIN GLARGINE 100 [IU]/ML
60 INJECTION, SOLUTION SUBCUTANEOUS
Status: DISCONTINUED | OUTPATIENT
Start: 2018-01-19 | End: 2018-01-20 | Stop reason: HOSPADM

## 2018-01-19 RX ORDER — SODIUM CHLORIDE 9 MG/ML
125 INJECTION, SOLUTION INTRAVENOUS CONTINUOUS
Status: DISCONTINUED | OUTPATIENT
Start: 2018-01-19 | End: 2018-01-19

## 2018-01-19 RX ORDER — ROCURONIUM BROMIDE 10 MG/ML
INJECTION, SOLUTION INTRAVENOUS AS NEEDED
Status: DISCONTINUED | OUTPATIENT
Start: 2018-01-19 | End: 2018-01-19 | Stop reason: SURG

## 2018-01-19 RX ORDER — MEPERIDINE HYDROCHLORIDE 50 MG/ML
12.5 INJECTION INTRAMUSCULAR; INTRAVENOUS; SUBCUTANEOUS AS NEEDED
Status: DISCONTINUED | OUTPATIENT
Start: 2018-01-19 | End: 2018-01-19

## 2018-01-19 RX ORDER — PROPOFOL 10 MG/ML
INJECTION, EMULSION INTRAVENOUS AS NEEDED
Status: DISCONTINUED | OUTPATIENT
Start: 2018-01-19 | End: 2018-01-19 | Stop reason: SURG

## 2018-01-19 RX ORDER — SUCCINYLCHOLINE CHLORIDE 20 MG/ML
INJECTION INTRAMUSCULAR; INTRAVENOUS AS NEEDED
Status: DISCONTINUED | OUTPATIENT
Start: 2018-01-19 | End: 2018-01-19 | Stop reason: SURG

## 2018-01-19 RX ORDER — MIDAZOLAM HYDROCHLORIDE 1 MG/ML
INJECTION INTRAMUSCULAR; INTRAVENOUS AS NEEDED
Status: DISCONTINUED | OUTPATIENT
Start: 2018-01-19 | End: 2018-01-19 | Stop reason: SURG

## 2018-01-19 RX ORDER — CIPROFLOXACIN 500 MG/1
500 TABLET, FILM COATED ORAL EVERY 12 HOURS SCHEDULED
Status: DISCONTINUED | OUTPATIENT
Start: 2018-01-20 | End: 2018-01-20 | Stop reason: HOSPADM

## 2018-01-19 RX ORDER — EPHEDRINE SULFATE 50 MG/ML
INJECTION, SOLUTION INTRAVENOUS AS NEEDED
Status: DISCONTINUED | OUTPATIENT
Start: 2018-01-19 | End: 2018-01-19 | Stop reason: SURG

## 2018-01-19 RX ORDER — GABAPENTIN 100 MG/1
100 CAPSULE ORAL 3 TIMES DAILY
Status: DISCONTINUED | OUTPATIENT
Start: 2018-01-19 | End: 2018-01-20 | Stop reason: HOSPADM

## 2018-01-19 RX ORDER — HYDROCODONE BITARTRATE AND ACETAMINOPHEN 5; 325 MG/1; MG/1
1 TABLET ORAL EVERY 6 HOURS PRN
Qty: 20 TABLET | Refills: 0 | Status: SHIPPED | OUTPATIENT
Start: 2018-01-19 | End: 2018-01-24

## 2018-01-19 RX ADMIN — GABAPENTIN 100 MG: 100 CAPSULE ORAL at 17:39

## 2018-01-19 RX ADMIN — SACUBITRIL AND VALSARTAN 2 TABLET: 49; 51 TABLET, FILM COATED ORAL at 14:44

## 2018-01-19 RX ADMIN — INSULIN LISPRO 6 UNITS: 100 INJECTION, SOLUTION INTRAVENOUS; SUBCUTANEOUS at 18:08

## 2018-01-19 RX ADMIN — GABAPENTIN 100 MG: 100 CAPSULE ORAL at 21:10

## 2018-01-19 RX ADMIN — ROCURONIUM BROMIDE 35 MG: 10 INJECTION INTRAVENOUS at 07:53

## 2018-01-19 RX ADMIN — FENTANYL CITRATE 50 MCG: 50 INJECTION INTRAMUSCULAR; INTRAVENOUS at 07:46

## 2018-01-19 RX ADMIN — ONDANSETRON HYDROCHLORIDE 4 MG: 2 INJECTION, SOLUTION INTRAVENOUS at 10:24

## 2018-01-19 RX ADMIN — SODIUM CHLORIDE: 0.9 INJECTION, SOLUTION INTRAVENOUS at 07:39

## 2018-01-19 RX ADMIN — SODIUM CHLORIDE: 0.9 INJECTION, SOLUTION INTRAVENOUS at 10:00

## 2018-01-19 RX ADMIN — FENTANYL CITRATE 50 MCG: 50 INJECTION INTRAMUSCULAR; INTRAVENOUS at 09:30

## 2018-01-19 RX ADMIN — VANCOMYCIN HYDROCHLORIDE 1250 MG: 5 INJECTION, POWDER, LYOPHILIZED, FOR SOLUTION INTRAVENOUS at 07:30

## 2018-01-19 RX ADMIN — Medication 200 MG: at 14:44

## 2018-01-19 RX ADMIN — HEPARIN SODIUM 5000 UNITS: 5000 INJECTION, SOLUTION INTRAVENOUS; SUBCUTANEOUS at 07:10

## 2018-01-19 RX ADMIN — INSULIN LISPRO 5 UNITS: 100 INJECTION, SOLUTION INTRAVENOUS; SUBCUTANEOUS at 18:08

## 2018-01-19 RX ADMIN — GENTAMICIN SULFATE 130 MG: 40 INJECTION, SOLUTION INTRAMUSCULAR; INTRAVENOUS at 07:52

## 2018-01-19 RX ADMIN — INSULIN LISPRO 1 UNITS: 100 INJECTION, SOLUTION INTRAVENOUS; SUBCUTANEOUS at 21:19

## 2018-01-19 RX ADMIN — FENTANYL CITRATE 50 MCG: 50 INJECTION INTRAMUSCULAR; INTRAVENOUS at 09:04

## 2018-01-19 RX ADMIN — MIDAZOLAM HYDROCHLORIDE 2 MG: 1 INJECTION, SOLUTION INTRAMUSCULAR; INTRAVENOUS at 07:43

## 2018-01-19 RX ADMIN — GLYCOPYRROLATE 0.5 MG: 0.2 INJECTION, SOLUTION INTRAMUSCULAR; INTRAVENOUS at 10:38

## 2018-01-19 RX ADMIN — INSULIN GLARGINE 60 UNITS: 100 INJECTION, SOLUTION SUBCUTANEOUS at 21:18

## 2018-01-19 RX ADMIN — SUCCINYLCHOLINE CHLORIDE 120 MG: 20 INJECTION, SOLUTION INTRAMUSCULAR; INTRAVENOUS at 07:47

## 2018-01-19 RX ADMIN — NEOSTIGMINE METHYLSULFATE 3 MG: 1 INJECTION, SOLUTION INTRAMUSCULAR; INTRAVENOUS; SUBCUTANEOUS at 10:39

## 2018-01-19 RX ADMIN — VANCOMYCIN HYDROCHLORIDE 1 G: 1 INJECTION, POWDER, LYOPHILIZED, FOR SOLUTION INTRAVENOUS at 07:48

## 2018-01-19 RX ADMIN — TEMAZEPAM 15 MG: 15 CAPSULE ORAL at 21:10

## 2018-01-19 RX ADMIN — SODIUM CHLORIDE 125 ML/HR: 0.9 INJECTION, SOLUTION INTRAVENOUS at 06:30

## 2018-01-19 RX ADMIN — HEPARIN SODIUM 5000 UNITS: 5000 INJECTION, SOLUTION INTRAVENOUS; SUBCUTANEOUS at 21:10

## 2018-01-19 RX ADMIN — PROPOFOL 200 MG: 10 INJECTION, EMULSION INTRAVENOUS at 07:47

## 2018-01-19 RX ADMIN — LIDOCAINE HYDROCHLORIDE 50 MG: 10 INJECTION, SOLUTION INFILTRATION; PERINEURAL at 07:47

## 2018-01-19 RX ADMIN — VANCOMYCIN HYDROCHLORIDE 1250 MG: 5 INJECTION, POWDER, LYOPHILIZED, FOR SOLUTION INTRAVENOUS at 18:30

## 2018-01-19 RX ADMIN — EPHEDRINE SULFATE 10 MG: 50 INJECTION, SOLUTION INTRAMUSCULAR; INTRAVENOUS; SUBCUTANEOUS at 09:52

## 2018-01-19 RX ADMIN — ROCURONIUM BROMIDE 5 MG: 10 INJECTION INTRAVENOUS at 07:47

## 2018-01-19 RX ADMIN — CARVEDILOL 25 MG: 25 TABLET, FILM COATED ORAL at 17:39

## 2018-01-19 RX ADMIN — EPHEDRINE SULFATE 10 MG: 50 INJECTION, SOLUTION INTRAMUSCULAR; INTRAVENOUS; SUBCUTANEOUS at 08:36

## 2018-01-19 RX ADMIN — PRAVASTATIN SODIUM 40 MG: 40 TABLET ORAL at 17:39

## 2018-01-19 RX ADMIN — ISOSORBIDE DINITRATE 10 MG: 10 TABLET ORAL at 17:39

## 2018-01-19 RX ADMIN — FENTANYL CITRATE 100 MCG: 50 INJECTION INTRAMUSCULAR; INTRAVENOUS at 08:29

## 2018-01-19 NOTE — INTERIM OP NOTE
INSERTION 3 PART PROSTHESIS PENILE  Postoperative Note  PATIENT NAME: Opal Carpio Sr   : 1950  MRN: [id number]  [facility] OR ROOM 04    Surgery Date: 2018    Preop Diagnosis:  Erectile dysfunction due to diseases classified elsewhere [N52 1]    Post-Op Diagnosis Codes:     * Erectile dysfunction due to diseases classified elsewhere [n52 1]  Iatrogenic extraperitoneal bladder laceration    Procedure(s) (LRB):  INSERTION 3 PART PROSTHESIS PENILE (N/A)    Surgeon(s) and Role:     Estefany Padilla MD - Primary    Specimens:  * No specimens in log *    Estimated Blood Loss:   Minimal    Anesthesia Type:   General     Findings:    Normal-appearing male genitalia  Cystourethroscopy performed due to gross hematuria from the Hodge catheter after placement of a right-sided reservoir revealed a normal urethra without stricture lesion or evidence of injury  The prostatic urethra revealed bilobar enlargement of the lateral lobes of the prostate  The urinary bladder was mildly trabeculated without intrinsic lesions  Ureteral orifices were normal position and configuration bilaterally with clear efflux bilaterally  Examination of the dome of the bladder revealed a right-sided small less than 1 cm laceration in the on mucosa of the bladder  There is only minimal venous ooze from that  This obviously had taken place at the time of dissection in placement of the implant reservoir  Please note that the reservoir was removed from the right side of the space of Retzius, a new reservoir was placed on the left side of the space of Retzius and a Hodge catheter will be left in the bladder for over a week to 2 weeks to allow the bladder to heal by straight drainage  At the conclusion of the procedure the implant had been placed was completely functional with an excellent direct and flaccid results    Complications:   Small iatrogenic right anterior bladder laceration identified and treated with Hodge drainage      SIGNATURE: Daniela Jiménez MD   DATE: January 19, 2018   TIME: 7:44 AM

## 2018-01-19 NOTE — DISCHARGE SUMMARY
Discharge Summary - Woody Peña  78 y o  male MRN: [id number]    Unit/Bed#: OR POOL Encounter: [id number]    Admission Date: 1/19/2018     Discharge Date:   01/20/2018  Admitting Diagnosis: Erectile dysfunction due to diseases classified elsewhere [N52 1]    Admitting Provider: Maciel Capps MD    Discharging Provider: Maciel Capps MD    Primary Care Physician at Discharge: Wes Zhang [phone]     HPI:This is a 78 y o  old male presented with Erectile dysfunction due to diseases classified elsewhere [N52 1], the patient has a history of insulin-dependent diabetes mellitus and severe erectile dysfunction with absolutely no erectile activity despite attempted use of PDE 5 inhibitors as well as vaso active injection therapy and vacuum constriction devices  After extensive counseling with the patient understanding risks of infection erosion injury to the urinary tract need for secondary procedures possible pain possible need for explantation and the possibility of mechanical failure he agreed requested for implantation of a 3 part inflatable penile prosthesis for which he was admitted today  Allergies   Allergen Reactions    Ibuprofen Nausea Only    Penicillins Other (See Comments)     unknown       Consults   SLIM          Procedures Performed: No orders of the defined types were placed in this encounter  Hospital Course: The patient tolerated hospitalization well and was discharged in good condition the following day after surgery  The Hodge catheter will be left in place to drain the bladder allowed the bladder to seal because of a identified bladder laceration noted during surgery    The patient and his wife were made aware of this in detail    Significant Findings, Care, Treatment and Services Provided:  Implantation of 3 part inflatable penile prosthesis with cystoscopy    Complications:  Iatrogenic bladder laceration    Discharge Diagnosis:  Organic erectile dysfunction secondary to diabetes mellitus    Condition at Discharge: good     Discharge instructions/Information to patient and family:   See after visit summary for information provided to patient and family  Provisions for Follow-Up Care:  See after visit summary for information related to follow-up care and any pertinent home health orders  Disposition: Home    Planned Readmission: No    Discharge Statement   I spent 45 minutes discharging the patient  This time was spent on the day of discharge  I had direct contact with the patient on the day of discharge  Additional documentation is required if more than 30 minutes were spent on discharge  Discharge Medications:  See after visit summary for reconciled discharge medications provided to patient and family          ?  ?

## 2018-01-19 NOTE — PLAN OF CARE
METABOLIC, FLUID AND ELECTROLYTES - ADULT     Electrolytes maintained within normal limits Progressing     Glucose maintained within target range Progressing        MUSCULOSKELETAL - ADULT     Maintain or return mobility to safest level of function Progressing     Maintain proper alignment of affected body part Progressing        Potential for Falls     Patient will remain free of falls Progressing        RESPIRATORY - ADULT     Achieves optimal ventilation and oxygenation Progressing        SKIN/TISSUE INTEGRITY - ADULT     Skin integrity remains intact Progressing     Incision(s), wounds(s) or drain site(s) healing without S/S of infection Progressing

## 2018-01-19 NOTE — CONSULTS
[address]    Medhi Emerson Sr  78 y o  male   MRN: [id number]   Encounter: [id number]   Unit/Bed: E5 -01    REQUESTING PHYSICIAN:  Maciel Capps MD  PERFORMING CONSULTANT:  Nola Fajardo DO  REASON FOR CONSULTATION: Postoperative management of diabetes    ASSESSMENT  Principal Problem:    Erectile dysfunction associated with type 2 diabetes mellitus ([place] [id number] )  Active Problems:    Type 2 diabetes mellitus with hyperlipidemia ([facility])    Hypertension    COPD (chronic obstructive pulmonary disease) ([facility])    CKD (chronic kidney disease) stage 3, GFR 30-59 ml/min    Anxiety and depression    Systolic CHF, chronic ([place] [address] )      PLAN  1  Erectile dysfunction  Secondary to diabetes mellitus  Status post penile prosthetic and exertion  2  Type 2 diabetes mellitus with hyperlipidemia  Restart glargine but at lower dosing to prevent hypoglycemia  Continue lispro and add sliding scale coverage  3  Cardiomyopathy  Continue Entresto isosorbide and furosemide  4  Essential hypertension  Continue carvedilol  5  COPD without exacerbation  6  Chronic kidney disease stage 3   7  Sleep apnea  Order CPAP 10 cm water    Thank you for this consultation, we will follow along with you during this hospitalization  _____________________________________________________________________________    HPI: Mehdi Emerson Sr  is a 78y o  year old male who presents with erectile dysfunction secondary to diabetes mellitus  The patient underwent penile prosthesis  Postoperatively noted to be hyperglycemic  Denies any chest pain or shortness of breath  No penile discomfort  At home was taking glargine 72 units q h s  and 5 units lispro t i d        ALLERGIES  Allergies   Allergen Reactions    Ibuprofen Nausea Only    Penicillins Other (See Comments)     unknown     HOME MEDICATIONS  Prescriptions Prior to Admission   Medication    insulin aspart (NovoLOG) 100 units/mL injection    aspirin (ADULT ASPIRIN EC LOW STRENGTH) 81 mg EC tablet    carvedilol (COREG) 25 mg tablet    Cholecalciferol (VITAMIN D3) 5000 units CAPS    CINNAMON PO    Coenzyme Q10 (COQ-10) 200 MG CAPS    Famotidine (PEPCID PO)    flurazepam (DALMANE) 30 MG capsule    furosemide (LASIX) 40 mg tablet    gabapentin (NEURONTIN) 400 mg capsule    Insulin Glargine (TOUJEO SOLOSTAR SC)    isosorbide dinitrate (ISORDIL) 10 mg tablet    Magnesium 500 MG CAPS    metolazone (ZAROXOLYN) 5 mg tablet    Multiple Vitamin (MULTIVITAMIN) capsule    Omega-3 Fatty Acids (FISH OIL) 1200 MG CAPS    potassium chloride (KLOR-CON) 20 mEq packet    sacubitril-valsartan (ENTRESTO)  MG TABS    simvastatin (ZOCOR) 20 mg tablet     CURRENT MEDICATIONS  Current Facility-Administered Medications   Medication Dose Route Frequency Provider Last Rate Last Dose    carvedilol (COREG) tablet 25 mg  25 mg Oral BID With Meals Red Thibodeaux MD   25 mg at 01/19/18 1739    [START ON 1/20/2018] ciprofloxacin (CIPRO) tablet 500 mg  500 mg Oral Q12H Montez Mcgowan MD        co-enzyme Q-10 capsule 200 mg  200 mg Oral Daily Red Thibodeaux MD   200 mg at 01/19/18 1444    famotidine (PEPCID) tablet 20 mg  20 mg Oral Daily PRN Red Thibodeaux MD        [START ON 1/20/2018] furosemide (LASIX) tablet 40 mg  40 mg Oral Daily With Breakfast Red Thibodeaux MD        gabapentin (NEURONTIN) capsule 100 mg  100 mg Oral TID Red Thibodeaux MD   100 mg at 01/19/18 1739    heparin (porcine) subcutaneous injection 5,000 Units  5,000 Units Subcutaneous Q12H [address] Red Thibodeaux MD   5,000 Units at 01/19/18 0710    insulin glargine (LANTUS) subcutaneous injection 60 Units  60 Units Subcutaneous HS Nomi Wesley DO        insulin lispro (HumaLOG) 100 units/mL subcutaneous injection 1-6 Units  1-6 Units Subcutaneous HS Nomi Wesley DO        insulin lispro (HumaLOG) 100 units/mL subcutaneous injection 2-12 Units  2-12 Units Subcutaneous TID AC Nomi Wesley, DO   6 Units at 01/19/18 1808    insulin lispro (HumaLOG) 100 units/mL subcutaneous injection 5 Units  5 Units Subcutaneous TID With Meals Olive Ballard, DO   5 Units at 01/19/18 1808    isosorbide dinitrate (ISORDIL) tablet 10 mg  10 mg Oral TID Krystal Dempsey MD   10 mg at 01/19/18 1739    metolazone (ZAROXOLYN) tablet 5 mg  5 mg Oral Daily PRN Krystal Dempsey MD        morphine (PF) 4 mg/mL injection 4 mg  4 mg Intravenous Q4H PRN Krystal Dempsey MD        oxyCODONE-acetaminophen (PERCOCET) 5-325 mg per tablet 1 tablet  1 tablet Oral Q4H PRN Krystal Dempsey MD        pravastatin (PRAVACHOL) tablet 40 mg  40 mg Oral Daily With Doyle Guillen MD   40 mg at 01/19/18 1739    sacubitril-valsartan (ENTRESTO) 49-51 MG per tablet 2 tablet  2 tablet Oral BID Krystal Dempsey MD   2 tablet at 01/19/18 1444    temazepam (RESTORIL) capsule 15 mg  15 mg Oral HS Krystal Dempsey MD        vancomycin (VANCOCIN) 1,250 mg in sodium chloride 0 9 % 250 mL IVPB  15 mg/kg (Adjusted) Intravenous Q12H Krystal Dempsey  7 mL/hr at 01/19/18 0730 1,250 mg at 01/19/18 0730     PAST HISTORY  Past Medical History:   Diagnosis Date    AICD (automatic cardioverter/defibrillator) present     Anxiety and depression     Arthritis     Asthma     pt denies    CKD (chronic kidney disease) stage 3, GFR 30-59 ml/min     COPD (chronic obstructive pulmonary disease) ([facility] [address] )     pt denies    CPAP (continuous positive airway pressure) dependence     Erectile dysfunction     GERD (gastroesophageal reflux disease)     Hypertension     Lumbar herniated disc     low back pain    Neuropathy     bles    Prostate cancer ([address] )     2013- had radiation- had prostatectomy    Sleep apnea     Systolic CHF, chronic (HCC)     Type 2 diabetes mellitus with hyperlipidemia ([facility] [address] )     Use of cane as ambulatory aid     Wears glasses      Past Surgical History:   Procedure Laterality Date    AV FISTULA PLACEMENT      left upper arm and removal    CARDIAC DEFIBRILLATOR PLACEMENT      CATARACT EXTRACTION Bilateral     COLONOSCOPY      INGUINAL HERNIA REPAIR Bilateral     PROSTATECTOMY       SOCIAL HISTORY  Social History     Social History    Marital status: /Civil Union     Spouse name: N/A    Number of children: N/A    Years of education: N/A     Occupational History    DISABLED      Social History Main Topics    Smoking status: Former Smoker     Quit date: 1/4/1978    Smokeless tobacco: Never Used    Alcohol use Yes      Comment: glass wine daily    Drug use: No    Sexual activity: Not on file     Other Topics Concern    Not on file     Social History Narrative    No narrative on file     FAMILY HISTORY  Family History   Problem Relation Age of Onset    Family history unknown: Yes       REVIEW OF SYSTEMS  History obtained from chart review and the patient  General ROS: negative for - chills or fever  Psychological ROS: negative for - anxiety or depression  Ophthalmic ROS: negative for - dry eyes  Respiratory ROS: negative for - cough or shortness of breath  Cardiovascular ROS: negative for - chest pain  Gastrointestinal ROS: negative for - abdominal pain  Genito-Urinary ROS: positive for - erectile dysfunction  Musculoskeletal ROS: negative for - gait disturbance  Neurological ROS: negative for - seizures  Otherwise, all other 12 point review of systems normal     OBJECTIVE  Temp:  [97 5 °F (36 4 °C)-98 9 °F (37 2 °C)] 98 °F (36 7 °C)  HR:  [56-70] 70  Resp:  [15-22] 20  BP: (119-166)/(56-83) 142/63    PHYSICAL EXAM  General appearance: alert, appears stated age and cooperative  Skin: Skin color, texture, turgor normal  No rashes or lesions  Head: Normocephalic, without obvious abnormality, atraumatic  Eyes: conjunctivae/corneas clear  PERRL, EOM's intact    Lungs: diminished breath sounds  Heart: regular rate and rhythm  Abdomen: soft, non-tender; bowel sounds normal; no masses,  no organomegaly  Back: range of motion normal  Extremities: extremities normal, atraumatic, no cyanosis or edema  Neurologic: Grossly normal    Lab Results: I have personally reviewed pertinent reports  Labs:    Results from last 7 days  Lab Units 01/19/18  0628   PLATELETS Thousands/uL 163                          Results from last 7 days  Lab Units 01/19/18  1726 01/19/18  1255 01/19/18  1229 01/19/18  0633   POC GLUCOSE mg/dl 251* 201* 192* 174*       Imaging: I have personally reviewed pertinent films in PACS  Xr Chest Pa & Lateral  Result Date: 1/4/2018  Impression: No active pulmonary disease  Workstation performed: [id number]       Total Time for Visit, including Counseling / Coordination of Care: 35 mins  Greater than 50% of this total time spent on direct patient counseling and coordination of care

## 2018-01-19 NOTE — PERIOPERATIVE NURSING NOTE
Patient placed in holding bay 13  Wife present at bedside  Call bell within reach  Instructed to use call bell for assistance  Awaiting bed assignment

## 2018-01-19 NOTE — OP NOTE
OPERATIVE REPORT  PATIENT NAME: Savannah Michaud Sr     :  1950  MRN: [id number]  Pt Location: [facility] OR ROOM 04    SURGERY DATE: 2018    Surgeon(s) and Role:     * Tree Richmond MD - Primary     * Popeye Aviles PA-C - Assisting    Preop Diagnosis:  Erectile dysfunction due to diseases classified elsewhere [N52 1]    Post-Op Diagnosis Codes:     * Erectile dysfunction due to diseases classified elsewhere [N52 1]  Iatrogenic right anterior bladder laceration   Procedure(s) (LRB):  INSERTION 3 PART PROSTHESIS PENILE (N/A) and intraoperative flexible cystoscopy    Specimen(s):  * No specimens in log *    Estimated Blood Loss:   50 mL    Drains:   Fourteen Urdu Hodge catheter draining urinary bladder per urethra connected to straight drainage which will be left in place for approximately 1-2 weeks  Jimy-Chang draining scrotal contents to self suction through separate stab incision on the left side of the scrotum  Anesthesia Type:   General    Operative Indications:  Erectile dysfunction due to diseases classified elsewhere [N52 1]  Secondary to diabetes mellitus    Operative Findings:  Normal-appearing genitalia  During placement of the implant reservoir gross hematuria was identified after placement and therefore cystourethroscopy was performed  Cystourethroscopy revealed a normal bladder and urethra without stricture lesion or injury  The prostatic urethra revealed bilobar enlargement of the lateral lobes of the prostate with minimal visual occlusion of the bladder outlet  The urinary bladder was free of any intrinsic mass lesions or extrinsic mass compression however a small mucosal rent was identified at the dome on the right consistent with reservoir placement injury  This reservoir was removed and a 2nd reservoir was placed on the left side of the space of Retzius lateral to the bladder  A Hodge catheter will be left in place for bladder drainage   Postoperative pump placement was good within the scrotum postoperative cylinder placement revealed excellent direct and flaccid results  Complications:   Small right anterior bladder laceration during reservoir placement    Procedure and Technique:  The patient and I discussed this procedure and his erectile dysfunction multiple times in the office as well as in the holding area just prior to the procedure  The patient understood the possibility of being unable to adequately placed the implant because of previous abdominal surgery as well as indications for avoiding the procedure  He understood risks of infection erosion injury to the urinary tract need for secondary operations need for explantation possibility of bleeding infection pain deformity loss of size  The patient agreed to proceed with the procedure had taken preoperative ciprofloxacin orally this week and was administered vancomycin and gentamicin prior to this procedure intravenously  The patient was taken to the operating room where he was prepped and draped in the usual sterile fashion after a 15 minutes Hibiclens scrub  A general anesthesia had been induced  The patient was in the supine position  With the surgeon and assistant both with 3 pairs of gloves changing the outer gloves at any time skin contact was made on the [place] retractor was brought into the field and after appropriate time-out a 12 [place] Hodge catheter was placed per urethra into the urinary bladder  The bladder was sucked free of all urine which was clear  The outer pair or 3rd pair of the surgeon and assistant gloves were then removed and skin was maximally covered at that point using sterile towels  A 15 scalpel blade was used to make a median raphae a a midline anterior scrotal incision which was carried down to the ventral aspects of both the left and right corpora bilaterally    Two 0 Vicryl stay sutures were placed medially and laterally in each corpora a using a fresh scalpel blade freed side a 15 scalpel blade was used to make a 1 cm longitudinal ventral corporotomies  Each proximal and distal corpora were then dilated using a 14 mm Vang dilator and total corporal measurement was 21 cm  An 18 cm cylinder set with 3 cm rear-tip extenders was utilized bilaterally  An  CX prosthesis was used with a 1 touch pump and 3 cm bilateral rear tip extenders  After corporotomies were performed and the corpora dilated appropriately the Jessica tool fired Beverly needles through the distal mid glans and both cylinders were placed within the corporotomies which were then closed using the 2 0 Vicryl stay sutures  Blunt and sharp dissection created a subdartos pouch into which the pump was placed  After completion of the placement of the cylinders and pump attention was turned to placement of the reservoir  A 100 cc conceal reservoir was used and with sharp and blunt dissection at the level of the external inguinal ring transversalis fascia was perforated  There was quite a bit of scar tissue in the space of Retzius requiring blunt dissection with the suction tool in the space of Retzius allowing for placement of the reservoir  The reservoir was filled with 100 cc of saline tubing was trimmed and all connections made  At this point urine from the Hodge was checked and there was gross blood  This was read as an indication of potential bladder injury because of scar down pelvis  The Hodge catheter was removed cystourethroscopy performed which confirmed a small rent in the right anterior aspect of the bladder  The reservoir could not be seen through the rent but it was assumed that it was in close proximity  Decision was made to remove the reservoir  Tubing was controlled cut the reservoir emptied and removed  At this point attention was turned to the left external inguinal ring with the incision retracted up to that point    Transversalis fascia was again perforated and a 65 cc round reservoir placed laterally in the space of Retzius on the left well away from the bladder injury  This was filled with 65 cc of saline tubing trimmed and all connections made  Inflation and deflation of the implant went very well with excellent flaccid and erect results  At this point it was felt that treatment of the bladder laceration would be Hodge catheterization and straight drainage  At this point a 15 Portuguese Hodge catheter was placed per urethra into the urinary bladder to straight drainage  The smaller diameter Hodge was chosen because of the placement of the penile implant cylinders which were left 45% inflated with the lock unlocked  Dressings were applied and the patient was transferred to the recovery room having tolerated the procedure well and in good condition  Please note the Jimy-Chang drain was placed draining scrotal contents through a separate stab incision on the left side of the scrotum affixed to the skin using a 3 O chromic suture  Postoperatively the patient and I as well as his wife discussed the iatrogenic injury to the bladder  The patient indicated that Hodge catheter drainage was understood and will take place with hopeful ceiling of the bladder later time     I was present for the entire procedure    Patient Disposition:  PACU     SIGNATURE: Subhash Billingsley MD  DATE: January 19, 2018  TIME: 11:00 AM

## 2018-01-20 VITALS
HEART RATE: 88 BPM | SYSTOLIC BLOOD PRESSURE: 163 MMHG | BODY MASS INDEX: 35.99 KG/M2 | OXYGEN SATURATION: 95 % | HEIGHT: 69 IN | DIASTOLIC BLOOD PRESSURE: 68 MMHG | TEMPERATURE: 99.6 F | WEIGHT: 243 LBS | RESPIRATION RATE: 18 BRPM

## 2018-01-20 LAB — GLUCOSE SERPL-MCNC: 134 MG/DL (ref 65–140)

## 2018-01-20 PROCEDURE — 82948 REAGENT STRIP/BLOOD GLUCOSE: CPT

## 2018-01-20 PROCEDURE — 94660 CPAP INITIATION&MGMT: CPT

## 2018-01-20 RX ADMIN — VANCOMYCIN HYDROCHLORIDE 1250 MG: 5 INJECTION, POWDER, LYOPHILIZED, FOR SOLUTION INTRAVENOUS at 05:23

## 2018-01-20 RX ADMIN — CARVEDILOL 25 MG: 25 TABLET, FILM COATED ORAL at 08:17

## 2018-01-20 RX ADMIN — HEPARIN SODIUM 5000 UNITS: 5000 INJECTION, SOLUTION INTRAVENOUS; SUBCUTANEOUS at 08:19

## 2018-01-20 RX ADMIN — Medication 200 MG: at 08:17

## 2018-01-20 RX ADMIN — GABAPENTIN 100 MG: 100 CAPSULE ORAL at 08:17

## 2018-01-20 RX ADMIN — ISOSORBIDE DINITRATE 10 MG: 10 TABLET ORAL at 08:17

## 2018-01-20 RX ADMIN — FUROSEMIDE 40 MG: 40 TABLET ORAL at 08:17

## 2018-01-20 RX ADMIN — INSULIN LISPRO 5 UNITS: 100 INJECTION, SOLUTION INTRAVENOUS; SUBCUTANEOUS at 08:18

## 2018-01-20 RX ADMIN — SACUBITRIL AND VALSARTAN 2 TABLET: 49; 51 TABLET, FILM COATED ORAL at 08:17

## 2018-01-20 RX ADMIN — FAMOTIDINE 20 MG: 20 TABLET, FILM COATED ORAL at 08:17

## 2018-01-20 RX ADMIN — CIPROFLOXACIN 500 MG: 500 TABLET, FILM COATED ORAL at 08:17

## 2018-01-20 NOTE — PROGRESS NOTES
[facility] Internal Medicine Progress Note  Patient: Ca Jacobs  78 y o  male   MRN: [id number]  PCP: Ana Laura Adair MD  Unit/Bed#: E5 -54 Encounter: [id number]  Date Of Visit: 01/20/18    Assessment  Principal Problem:    Erectile dysfunction associated with type 2 diabetes mellitus ([facility] )  Active Problems:    Type 2 diabetes mellitus with hyperlipidemia ([facility])    Hypertension    COPD (chronic obstructive pulmonary disease) ([facility])    CKD (chronic kidney disease) stage 3, GFR 30-59 ml/min    Anxiety and depression    Systolic CHF, chronic ([facility] )  Resolved Problems:    * No resolved hospital problems  *        Plan  1  Erectile dysfunction associated with type 2 diabetes mellitus  Status post insertion of penile prosthesis  2  Type 2 diabetes mellitus with hyperglycemia  Now stable  Can resume previous dose of glargine and continue lispro 5 units t i d  with meals  3  Chronic kidney disease stage 3   4  Obstructive sleep apnea  Continue CPAP q h s   5  Essential hypertension  Stable on carvedilol  6  Cardiomyopathy  Likely ischemic  Continue Entresto isosorbide and furosemide  No signs of decompensation  7  COPD without exacerbation    VTE Pharmacologic Prophylaxis: Heparin    Patient Centered Rounds: I have performed bedside rounds with nursing staff today  Discussions with Specialists or Other Care Team Provider:  Urologist    Education and Discussions with Family / Patient:     Time Spent for Care: 30 mins  More than 50% of total time spent on counseling and coordination of care as described above  Current Length of Stay: 0 day(s)    Discharge Plan / Estimated Discharge Date:  Patient appears medically stable for discharge  ______________________________________________________________________________    Subjective:   Patient seen and examined  No new complaints  Seems excited to uses penis pump    No chest pain shortness of breath    Objective:   Vitals: Blood pressure 163/68, pulse 88, temperature 99 6 °F (37 6 °C), temperature source Temporal, resp  rate 18, height 5' 9" (1 753 m), weight 110 kg (243 lb), SpO2 95 %  Physical Exam:   General appearance: alert, appears stated age and cooperative  Head: Normocephalic, without obvious abnormality, atraumatic  Lungs: diminished breath sounds  Heart: regular rate and rhythm  Abdomen: Soft nontender nondistended  Back: negative  Extremities: edema +1 lower extremities bilaterally  Neurologic: Grossly normal    Additional Data:   Labs:    Results from last 7 days  Lab Units 01/19/18  0628   PLATELETS Thousands/uL 163                            Results from last 7 days  Lab Units 01/20/18  0801 01/19/18  2115 01/19/18  1726 01/19/18  1255 01/19/18  1229 01/19/18  0633   POC GLUCOSE mg/dl 134 188* 251* 201* 192* 174*             * I Have Reviewed All Lab Data Listed Above      Cultures:           Imaging:  Imaging Reports Reviewed Today Include:       Scheduled Meds:  carvedilol 25 mg Oral BID With Meals   ciprofloxacin 500 mg Oral Q12H [address]   co-enzyme Q-10 200 mg Oral Daily   furosemide 40 mg Oral Daily With Breakfast   gabapentin 100 mg Oral TID   heparin (porcine) 5,000 Units Subcutaneous Q12H [address]   insulin glargine 60 Units Subcutaneous HS   insulin lispro 1-6 Units Subcutaneous HS   insulin lispro 2-12 Units Subcutaneous TID AC   insulin lispro 5 Units Subcutaneous TID With Meals   isosorbide dinitrate 10 mg Oral TID   pravastatin 40 mg Oral Daily With Dinner   sacubitril-valsartan 2 tablet Oral BID   temazepam 15 mg Oral HS     Continuous Infusions:   PRN Meds:  famotidine    metolazone    morphine injection    oxyCODONE-acetaminophen     Kiel Crenshaw, DO

## 2018-01-20 NOTE — PLAN OF CARE
METABOLIC, FLUID AND ELECTROLYTES - ADULT     Electrolytes maintained within normal limits Adequate for Discharge     Glucose maintained within target range Adequate for Discharge        MUSCULOSKELETAL - ADULT     Maintain or return mobility to safest level of function Adequate for Discharge     Maintain proper alignment of affected body part Adequate for Discharge        Potential for Falls     Patient will remain free of falls Adequate for Discharge        RESPIRATORY - ADULT     Achieves optimal ventilation and oxygenation Adequate for Discharge        SKIN/TISSUE INTEGRITY - ADULT     Skin integrity remains intact Adequate for Discharge     Incision(s), wounds(s) or drain site(s) healing without S/S of infection Adequate for Discharge

## 2018-01-20 NOTE — PROGRESS NOTES
Postoperative day 1  Status post insertion of three-piece penile prosthetic complicated with right anterior bladder injury  The reservoir was then repositioned intraoperatively  A Hodge catheter has been left in place for bladder decompression and to facilitate bladder healing  No issues overnight per nursing staff  Hodge catheter draining without issue  Vital signs are stable  T-max 99 6°  Urine output overnight 600 cc   DAFNE drain 50 cc last shift  On examination he is in no acute distress  His abdomen is soft nontender nondistended  Hodge catheter is in place draining clear yellow urine  DAFNE drain in the left lower quadrant of the abdomen  Impression:  Erectile dysfunction status post insertion of three-piece penile prosthetic, bladder injury    Plan:  Hep-Lock IV fluid  Out of bed and ambulate  Hodge catheter with leg bag teaching for discharge  DAFNE drain to bulb suction for discharge secondary to output  Pain medication and antibiotics for discharge  Follow-up in the outpatient setting with Dr Rafita Membreno for drain removal   Maintain Hodge catheter for 10-14 days

## 2018-01-20 NOTE — DISCHARGE INSTRUCTIONS
Call for follow-up with Dr Agapito Almanzar for drain removal on Monday  Maintain Hodge catheter to gravity drainage  Emptied the drainage bag as needed  Empty the plastic bulb drain as needed as instructed by the nursing staff  Penile Prosthesis Implantation   WHAT YOU NEED TO KNOW:   Penile prosthesis implantation is surgery to place a penis implant  The implant makes it possible to have and maintain an erection  DISCHARGE INSTRUCTIONS:   Medicines:   · Medicines  can help decrease pain or prevent an infection  · Take your medicine as directed  Contact your healthcare provider if you think your medicine is not helping or if you have side effects  Tell him or her if you are allergic to any medicine  Keep a list of the medicines, vitamins, and herbs you take  Include the amounts, and when and why you take them  Bring the list or the pill bottles to follow-up visits  Carry your medicine list with you in case of an emergency  Follow up with your healthcare provider as directed: You may need to return to have your stitches, drain, or catheter removed  Write down your questions so you remember to ask them during your visits  Self-care:   · Care for your wound as directed  When you are allowed to bathe, carefully wash the wound with soap and water  Dry the area and put on new, clean bandages as directed  Change your bandages when they get wet or dirty  · Do not wear tight clothes  Ask your healthcare provider if you should wear a scrotal support  You may need to elevate your scrotum with a washcloth or towel  This will help decrease swelling and pain  Bladder care:  Do not let your bladder become too full before you urinate  Set regular times each day to urinate  Urinate as soon as you feel the need  Try to urinate often while awake and do not drink liquids before you go to bed  Urinate before you go to bed for the night    Sexual activity:  You may need to wait up to 6 weeks after surgery before you use the penile prosthesis  Ask your healthcare provider for more information about how to use your prosthesis  Contact your healthcare provider if:   · You have a fever or chills  · Your wound is red, swollen, or draining pus  · You have pain or swelling that does not get better with medicines  · Your urine is cloudy, bloody, or you have trouble urinating  · You have problems making your implant work  · You have questions or concerns about your condition or care  Seek care immediately or call 911 if:   · You have sudden chest pain or trouble breathing  · Blood soaks through your bandage  · You see tubing or part of an implant poking through your skin  · You cannot urinate  · You have severe pain  © 2017 [employer]  Information is for End User's use only and may not be sold, redistributed or otherwise used for commercial purposes  All illustrations and images included in CareNotes® are the copyrighted property of A D A M , Inc  or [employer]  The above information is an  only  It is not intended as medical advice for individual conditions or treatments  Talk to your doctor, nurse or pharmacist before following any medical regimen to see if it is safe and effective for you  Hodge Catheter Placement and Care   WHAT YOU NEED TO KNOW:   A Hodge catheter is a sterile tube that is inserted into your bladder to drain urine  It is also called an indwelling urinary catheter  The tip of the catheter has a small balloon filled with solution that holds the catheter inside your bladder  DISCHARGE INSTRUCTIONS:   Seek care immediately if:   · Your catheter comes out  · You suddenly have material that looks like sand in the tubing or drainage bag  · No urine is draining into the bag and you have checked the system  · You have pain in your hip, back, pelvis, or lower abdomen  · You are confused or cannot think clearly    Contact your healthcare provider if:   · You have a fever  · You have bladder spasms for more than 1 day after the catheter is placed  · You see blood in the tubing or drainage bag  · You have a rash or itching where the catheter tube is secured to your skin  · Urine leaks from or around the catheter, tubing, or drainage bag  · The closed drainage system has accidently come open or apart  · You see a layer of crystals inside the tubing  · You have questions or concerns about your condition or care  Care for your Hodge catheter:   · Clean your genital area 2 times every day  Clean your catheter and the area around where it was inserted  Use soap and water  Clean your anal opening and catheter area after every bowel movement  · Secure the catheter tube  so you do not pull or move the catheter  This helps prevent pain and bladder spasms  Healthcare providers will show you how to use medical tape or a strap to secure the catheter tube to your body  · Keep a closed drainage system  Your Hodge catheter should always be attached to the drainage bag to form a closed system  Do not disconnect any part of the closed system unless you need to change the bag  Care for your drainage bag:   · Ask if a leg bag is right for you  A leg bag can be worn under your clothes  Ask your healthcare provider for more information about a leg bag  · Keep the drainage bag below the level of your waist   This helps stop urine from moving back up the tubing and into your bladder  Do not loop or kink the tubing  This can cause urine to back up and collect in your bladder  Do not let the drainage bag touch or lie on the floor  · Empty the drainage bag when needed  The weight of a full drainage bag can be painful  Empty the drainage bag every 3 to 6 hours or when it is ? full  · Clean and change the drainage bag as directed    Ask your healthcare provider how often you should change the drainage bag and what cleaning solution to use  Wear disposable gloves when you change the bag  Do not allow the end of the catheter or tubing to touch anything  Clean the ends with an alcohol pad before you reconnect them  What to do if problems develop:   · No urine is draining into the bag:      ¨ Check for kinks in the tubing and straighten them out  ¨ Check the tape or strap used to secure the catheter tube to your skin  Make sure it is not blocking the tube  ¨ Make sure you are not sitting or lying on the tubing  ¨ Make sure the urine bag is hanging below the level of your waist     · Urine leaks from or around the catheter, tubing, or drainage bag:  Check if the closed drainage system has accidently come open or apart  Clean the catheter and tubing ends with a new alcohol pad and reconnect them  Prevent an infection:   · Wash your hands often  Wash before and after you touch your catheter, tubing, or drainage bag  Use soap and water  Wear clean disposable gloves when you care for your catheter or disconnect the drainage bag  Wash your hands before you prepare or eat food  · Drink liquids as directed  Ask your healthcare provider how much liquid to drink each day and which liquids are best for you  Liquids will help flush your kidneys and bladder to help prevent infection  Follow up with your healthcare provider as directed:  Write down your questions so you remember to ask them during your visits  © 2017 [employer] Information is for End User's use only and may not be sold, redistributed or otherwise used for commercial purposes  All illustrations and images included in CareNotes® are the copyrighted property of A D A M , Inc  or [employer]  The above information is an  only  It is not intended as medical advice for individual conditions or treatments   Talk to your doctor, nurse or pharmacist before following any medical regimen to see if it is safe and effective for you

## 2018-01-20 NOTE — NURSING NOTE
Patient DCd home no needs  All AVS instructions have been given  Extensive teaching given to wife and patient about DAFNE drain and carroll patient will be sent home with  Follow up instructions given, prescriptions given, supplies given  All questions answered

## 2018-01-22 VITALS
HEART RATE: 75 BPM | BODY MASS INDEX: 37.33 KG/M2 | DIASTOLIC BLOOD PRESSURE: 70 MMHG | HEIGHT: 69 IN | SYSTOLIC BLOOD PRESSURE: 128 MMHG | WEIGHT: 252 LBS | RESPIRATION RATE: 16 BRPM

## 2018-01-22 VITALS
SYSTOLIC BLOOD PRESSURE: 120 MMHG | DIASTOLIC BLOOD PRESSURE: 70 MMHG | WEIGHT: 250 LBS | BODY MASS INDEX: 37.03 KG/M2 | HEIGHT: 69 IN

## 2018-01-23 ENCOUNTER — ALLSCRIPTS OFFICE VISIT (OUTPATIENT)
Dept: OTHER | Facility: OTHER | Age: 68
End: 2018-01-23

## 2018-01-23 ENCOUNTER — TRANSCRIBE ORDERS (OUTPATIENT)
Dept: ADMINISTRATIVE | Facility: HOSPITAL | Age: 68
End: 2018-01-23

## 2018-01-23 DIAGNOSIS — N52.9 ERECTILE DYSFUNCTION OF ORGANIC ORIGIN: Primary | ICD-10-CM

## 2018-01-23 NOTE — MISCELLANEOUS
Message   Recorded as Task   Date: 01/12/2018 03:58 PM, Created By: Elier Dempsey   Task Name: Care Coordination   Assigned To: Ruben DIAL,TEAM   Regarding Patient: Maira Michael, Status: Active   Comment:    Elier Dempsey - 12 Jan 2018 3:58 PM     TASK CREATED  Patient called in stating he received a message from the office asking him about his insurance card, He advised he did not get a name can someone look into this and give the patient a call back please and thank you  Haylee Pressley - 12 Jan 2018 4:03 PM     TASK COMPLETED   Jacquelin Fernandez - 15 Steven 2018 1:08 PM     TASK REACTIVATED   Jacquelin Fernandez - 15 Steven 2018 1:09 PM     TASK EDITED  Patient is looking for a call back from the office  He said a nurse called him? He did not have a name  Please call him back  thank you  Alix Caceres - 15 Steven 2018 2:03 PM     TASK EDITED  MSG RELAYED TO HAYLEE PATEL  SHE WAS IN COMMUNICATION WITH PT RE: COVERAGE FOR SURGERY  Active Problems    1  Chronic combined systolic and diastolic CHF, NYHA class 3 (428 42,428 0) (I50 42)   2  Erectile dysfunction of non-organic origin (302 72) (F52 21)   3  HTN (hypertension) (401 9) (I10)   4  Obstructive sleep apnea syndrome (327 23) (G47 33)   5  Preop cardiovascular exam (V72 81) (Z01 810)    Current Meds   1  Adult Aspirin EC Low Strength 81 MG Oral Tablet Delayed Release; Therapy: (Recorded:15Bep1518) to Recorded   2  ALPRAZolam 0 25 MG Oral Tablet; TAKE 1 TABLET 3 TIMES DAILY AS NEEDED; Therapy: 23UJH5971 to Recorded   3  Carvedilol 25 MG Oral Tablet; TAKE 1 TABLET 3 times daily; Therapy: (Recorded:83Qcf1622) to Recorded   4  Cialis 20 MG Oral Tablet; TAKE AS DIRECTED; Therapy: 26AZP5153 to Recorded   5  Entresto  MG Oral Tablet; TAKE 1 TABLET TWICE DAILY ***CALL MD FOR   APPOINTMENT  NO FURTHER REFILLS UNTIL THEN***;   Therapy: 90QTM1117 to (Evaluate:21Apr2018)  Requested for: 38Gwl0872; Last   Rx:12Vev1165 Ordered   6  Furosemide 40 MG Oral Tablet; take 1 tablet by mouth twice daily; Therapy: 85FVF9833 to (Evaluate:01Jun2018)  Requested for: 34PDK3238; Last   RY:23NOD7141 Ordered   7  Gabapentin 100 MG Oral Capsule; take 1 capsule 4 times daily; Therapy: 38LHS3334 to Recorded   8  Isosorbide Dinitrate 10 MG Oral Tablet; TAKE 1 TABLET Every 8 hours; Therapy: 41UPZ3762 to (Evaluate:28Mar2018)  Requested for: 87Iof5060; Last   Rx:95Hyk9586 Ordered   9  Levitra 20 MG Oral Tablet; TAKE AS DIRECTED; Therapy: 98GAB0996 to Recorded   10  MetOLazone 5 MG Oral Tablet; TAKE 1 TABLET BY MOUTH EVERY DAY AS NEEDED    WEIGHT GAIN 3 LBS; Therapy: 00JJC7575 to (Evaluate:11Dec2017)  Requested for: 22Rfr0167; Last    Rx:70Xvs3340 Ordered   11  Multi-Day Vitamins TABS; Therapy: (Recorded:06Oct2016) to Recorded   12  Potassium Chloride Kathy ER 20 MEQ Oral Tablet Extended Release; TAKE 1 TABLET    DAILY; Therapy: 33ZTW5956 to (Evaluate:02Jan2018); Last Rx:06Jun2017 Ordered   13  Simvastatin 40 MG Oral Tablet; TAKE 1 TABLET DAILY; Therapy: 45ZOK1083 to (Rika Client) Recorded   14  Toujeo SoloStar SOPN;    Therapy: (Recorded:06Oct2016) to Recorded    Allergies    1  Ibuprofen CAPS   2   Penicillins    Signatures   Electronically signed by : Anju Kendall RN; Steven 15 2018  2:03PM EST                       (Author)

## 2018-01-29 ENCOUNTER — HOSPITAL ENCOUNTER (OUTPATIENT)
Dept: RADIOLOGY | Facility: HOSPITAL | Age: 68
Discharge: HOME | End: 2018-01-29
Attending: UROLOGY
Payer: COMMERCIAL

## 2018-01-29 DIAGNOSIS — N52.9 ERECTILE DYSFUNCTION OF ORGANIC ORIGIN: ICD-10-CM

## 2018-01-29 PROCEDURE — 74430 CONTRAST X-RAY BLADDER: CPT

## 2018-01-29 RX ADMIN — IOTHALAMATE MEGLUMINE 185 ML: 172 INJECTION URETERAL at 15:04

## 2018-02-16 DIAGNOSIS — I50.42 CHRONIC COMBINED SYSTOLIC AND DIASTOLIC HEART FAILURE, NYHA CLASS 3 (HCC): Primary | ICD-10-CM

## 2018-02-19 ENCOUNTER — TELEPHONE (OUTPATIENT)
Dept: PULMONOLOGY | Facility: CLINIC | Age: 68
End: 2018-02-19

## 2018-02-19 NOTE — TELEPHONE ENCOUNTER
Patient is out of his flurazepam  Patient was a [facility] patient and the doctor that he saw is no longer working there  Patient has a appointment with dr Yaritza Pruett 3/22  Patient would like to know if he can get a refill on his medication until he is seen   If so please send to the [facility] pharmacy

## 2018-02-27 RX ORDER — TADALAFIL 20 MG/1
TABLET ORAL
COMMUNITY
Start: 2017-12-12 | End: 2018-03-01 | Stop reason: ALTCHOICE

## 2018-02-27 RX ORDER — VARDENAFIL HYDROCHLORIDE 20 MG/1
TABLET ORAL
COMMUNITY
Start: 2017-12-12 | End: 2018-05-10 | Stop reason: ALTCHOICE

## 2018-03-01 ENCOUNTER — OFFICE VISIT (OUTPATIENT)
Dept: UROLOGY | Facility: MEDICAL CENTER | Age: 68
End: 2018-03-01
Payer: COMMERCIAL

## 2018-03-01 VITALS
BODY MASS INDEX: 35.99 KG/M2 | WEIGHT: 243 LBS | SYSTOLIC BLOOD PRESSURE: 122 MMHG | DIASTOLIC BLOOD PRESSURE: 78 MMHG | HEIGHT: 69 IN

## 2018-03-01 DIAGNOSIS — N52.1 ERECTILE DYSFUNCTION ASSOCIATED WITH TYPE 2 DIABETES MELLITUS (HCC): ICD-10-CM

## 2018-03-01 DIAGNOSIS — C61 PROSTATE CANCER (HCC): Primary | ICD-10-CM

## 2018-03-01 DIAGNOSIS — E11.69 ERECTILE DYSFUNCTION ASSOCIATED WITH TYPE 2 DIABETES MELLITUS (HCC): ICD-10-CM

## 2018-03-01 PROCEDURE — 99024 POSTOP FOLLOW-UP VISIT: CPT | Performed by: UROLOGY

## 2018-03-01 NOTE — PROGRESS NOTES
Assessment/Plan:  1  Adenocarcinoma of the prostate  The patient was previously followed by Dr Gildardo Jackson who apparently did a radical prostatectomy on him resulting in the patient's erectile failure and has moderate degree of stress urinary incontinence  The patient apparently had post radical prostatectomy radiation therapy and currently has a PSA of somewhere in the 2 range  The patient is wife and I had spent quite a while discussing potential interpretation of a measurable PSA in a patient having had a radical prostatectomy in the past   I have specifically asked the patient to provide me with pathology report from his radical prostatectomy and from his prostate biopsy as well as a copy of the operative report related to his radical prostatectomy  That will give me enough information of hopefully interpret his present urologic oncologic position  2   Erectile dysfunction status post implantation of a 3 part inflatable penile prosthesis  The patient and his wife were instructed as to inflation and deflation techniques today  They did not that practice this on the actual implant as the patient is still in the process of healing  He has no evidence of infection or fluid collection and there is no evidence of drainage from the incision  There is slight skin dehiscence to a in intact superficial dartos layer  The patient will be followed over the next month to 6 weeks prior to any manipulation of the implant  No problem-specific Assessment & Plan notes found for this encounter  Diagnoses and all orders for this visit:    Prostate cancer ([facility] )  -     PSA Total, Diagnostic; Future    Erectile dysfunction associated with type 2 diabetes mellitus ([facility] )    Other orders  -     tadalafil (CIALIS) 20 MG tablet; Take by mouth  -     vardenafil (LEVITRA) 20 MG tablet; Take by mouth          Subjective:      Patient ID: Jarret Zamora is a 78 y o  male      HPI 60-year-old Afro-American male referred to me for implantation of a penile prosthesis  The patient underwent radical prostatectomy at [facility] noting a pre treatment PSA of over 11  The patient had postoperative stress urinary incontinence of a moderate degree using approximately 3 pads per day protective undergarments  He also experienced erectile failure refractory to oral PDE 5 inhibition vacuum devices with the patient refusing injection therapy  The patient presented for implantation of a 3 part inflatable penile prosthesis which was accomplished  The patient tolerated surgery well without complication and presents today for evaluation and implant teaching  The following portions of the patient's history were reviewed and updated as appropriate: allergies, current medications, past family history, past medical history, past social history, past surgical history and problem list     Review of Systems   Respiratory: Negative  Cardiovascular: Negative  Gastrointestinal: Negative  Genitourinary: Negative  Moderate stress urinary incontinence  The patient and I as well as his wife did discuss artificial urinary sphincters and male slings  Neurological: Negative  Psychiatric/Behavioral: Negative  Objective:      /78   Ht 5' 9" (1 753 m)   Wt 110 kg (243 lb)   BMI 35 88 kg/m²          Physical Exam   Constitutional: He is oriented to person, place, and time  He appears well-nourished  HENT:   Head: Atraumatic  Eyes: EOM are normal    Neck: Neck supple  Pulmonary/Chest: Effort normal  No respiratory distress  Abdominal: Soft  He exhibits no distension  Genitourinary:   Genitourinary Comments: Penis with cylinders in good position with 3rd distal extent at the mid glans position  There is no palpable tenderness of the penis  There are no cutaneous lesions noted  There is a small incision on the penile shaft proximally on the right that is healing and intact      The scrotum is with minimal edema   The skin incision is clean dry and intact except for a superficial skin separation inferiorly that appears to be very superficial without any evidence of implant involvement  The implant pump is in good position without fluid collections around it  Neurological: He is alert and oriented to person, place, and time  Psychiatric: He has a normal mood and affect   His behavior is normal  Judgment and thought content normal

## 2018-03-01 NOTE — LETTER
March 1, 2018     Lori Thorpe, [address]     Patient: Romie Ferrell Sr  YOB: 1950   Date of Visit: 3/1/2018       Dear Dr Ponce Cortez: Thank you for referring Romie Ferrell to me for evaluation  Below are my notes for this consultation  If you have questions, please do not hesitate to call me  I look forward to following your patient along with you  Sincerely,        Munir Garcia MD        CC: No Recipients  Munir Garcia MD  3/1/2018  2:01 PM  Sign at close encounter  Assessment/Plan:  1  Adenocarcinoma of the prostate  The patient was previously followed by Dr Alyse Barrios who apparently did a radical prostatectomy on him resulting in the patient's erectile failure and has moderate degree of stress urinary incontinence  The patient apparently had post radical prostatectomy radiation therapy and currently has a PSA of somewhere in the 2 range  The patient is wife and I had spent quite a while discussing potential interpretation of a measurable PSA in a patient having had a radical prostatectomy in the past   I have specifically asked the patient to provide me with pathology report from his radical prostatectomy and from his prostate biopsy as well as a copy of the operative report related to his radical prostatectomy  That will give me enough information of hopefully interpret his present urologic oncologic position  2   Erectile dysfunction status post implantation of a 3 part inflatable penile prosthesis  The patient and his wife were instructed as to inflation and deflation techniques today  They did not that practice this on the actual implant as the patient is still in the process of healing  He has no evidence of infection or fluid collection and there is no evidence of drainage from the incision  There is slight skin dehiscence to a in intact superficial dartos layer    The patient will be followed over the next month to 6 weeks prior to any manipulation of the implant  No problem-specific Assessment & Plan notes found for this encounter  Diagnoses and all orders for this visit:    Prostate cancer ([facility] [address] )  -     PSA Total, Diagnostic; Future    Erectile dysfunction associated with type 2 diabetes mellitus ([facility] [address] )    Other orders  -     tadalafil (CIALIS) 20 MG tablet; Take by mouth  -     vardenafil (LEVITRA) 20 MG tablet; Take by mouth          Subjective:      Patient ID: Natalia Kim is a 78 y o  male  HPI 15-year-old Afro-American male referred to me for implantation of a penile prosthesis  The patient underwent radical prostatectomy at [facility] noting a pre treatment PSA of over 11  The patient had postoperative stress urinary incontinence of a moderate degree using approximately 3 pads per day protective undergarments  He also experienced erectile failure refractory to oral PDE 5 inhibition vacuum devices with the patient refusing injection therapy  The patient presented for implantation of a 3 part inflatable penile prosthesis which was accomplished  The patient tolerated surgery well without complication and presents today for evaluation and implant teaching  The following portions of the patient's history were reviewed and updated as appropriate: allergies, current medications, past family history, past medical history, past social history, past surgical history and problem list     Review of Systems   Respiratory: Negative  Cardiovascular: Negative  Gastrointestinal: Negative  Genitourinary: Negative  Moderate stress urinary incontinence  The patient and I as well as his wife did discuss artificial urinary sphincters and male slings  Neurological: Negative  Psychiatric/Behavioral: Negative            Objective:      /78   Ht 5' 9" (1 753 m)   Wt 110 kg (243 lb)   BMI 35 88 kg/m²           Physical Exam   Constitutional: He is oriented to person, place, and time  He appears well-nourished  HENT:   Head: Atraumatic  Eyes: EOM are normal    Neck: Neck supple  Pulmonary/Chest: Effort normal  No respiratory distress  Abdominal: Soft  He exhibits no distension  Genitourinary:   Genitourinary Comments: Penis with cylinders in good position with 3rd distal extent at the mid glans position  There is no palpable tenderness of the penis  There are no cutaneous lesions noted  There is a small incision on the penile shaft proximally on the right that is healing and intact  The scrotum is with minimal edema  The skin incision is clean dry and intact except for a superficial skin separation inferiorly that appears to be very superficial without any evidence of implant involvement  The implant pump is in good position without fluid collections around it  Neurological: He is alert and oriented to person, place, and time  Psychiatric: He has a normal mood and affect   His behavior is normal  Judgment and thought content normal

## 2018-03-02 ENCOUNTER — TELEPHONE (OUTPATIENT)
Dept: CARDIOLOGY CLINIC | Facility: CLINIC | Age: 68
End: 2018-03-02

## 2018-03-05 ENCOUNTER — DOCUMENTATION (OUTPATIENT)
Dept: CARDIOLOGY CLINIC | Facility: CLINIC | Age: 68
End: 2018-03-05

## 2018-03-07 NOTE — PROGRESS NOTES
"  Discussion/Summary  Normal device function      Results/Data  Cardiac Device In Clinic [id number] 02:29PM Xenith     Test Name Result Flag Reference   MISCELLANEOUS COMMENT (Report)     DEVICE INTERROGATED IN THE [place] OFFICE  BATTERY VOLTAGE ADEQUATE (2 95V - RRT=2 63V)  ALL LEAD PARAMETERS WITHIN NORMAL LIMITS  15 SVT-WAVELET EPISODES UP TO 5 8 MIN WITH RATES BETWEEN 136-154 BPM  4 AVAILABLE EGRAMS SHOWING PROBABLE ST (ONSET/TERMINATION NOT CAPTURED)  NO OTHER SIGNIFICANT HIGH RATE EPISODES  OPTI-VOL THRESHOLD WAS CROSSED BUT NOW WITHIN NORMAL LIMITS  NO PROGRAMMING CHANGES MADE TO DEVICE PARAMETERS  APPROPRIATELY FUNCTIONING ICD  PT SEEING DR ZARAGOZA TODAY  CP   Cardiac Electrophysiology Report      [id number] [id number] [id number]  pdf   DEVICE TYPE ICD       Cardiac Electrophysiology Report [id number] 02:29PM Xenith     Test Name Result Flag Reference   Cardiac Electrophysiology Report      [id number]  pdf     Signatures   Electronically signed by : Ad Remy, ; Jun 6 2017 10:23AM EST                       (Author)    Electronically signed by : LAWANDA Badillo ; Jun 6 2017 11:57AM EST                       (Author)    "

## 2018-03-07 NOTE — PROGRESS NOTES
"  Discussion/Summary  Normal device function      Results/Data  Cardiac Device Remote [id number] 09:37AM Wen Logan     Test Name Result Flag Reference   MISCELLANEOUS COMMENT      CARELINK TRANSMISSION: BATTERY VOLTAGE ADEQUATE   1%  ALL AVAILABLE LEAD PARAMETERS WITHIN NORMAL LIMITS  2 NSVT EPISODES DETECTED 2 SEC @ 207 BPM  NO THERAPIES GIVEN  OPTI-VOL WITHIN NORMAL LIMITS  NORMAL DEVICE FUNCTION  ---LANE   Cardiac Electrophysiology Report      [id number]_[id number]  pdf   DEVICE TYPE ICD       Cardiac Electrophysiology Report [id number] 09:37AM Wen Logan     Test Name Result Flag Reference   Cardiac Electrophysiology Report      [id number] pdf     Signatures   Electronically signed by : Deepak Sweet, ; Dec 23 2016  1:28PM EST                       (Author)    Electronically signed by : LAWANDA Ly ; Dec 23 2016  1:50PM EST                       (Author)    "

## 2018-03-07 NOTE — PROGRESS NOTES
"  Discussion/Summary  Normal device function      Results/Data  Cardiac Device Remote [id number] 05:24AM Yahaira Jacob     Test Name Result Flag Reference   MISCELLANEOUS COMMENT (Report)     CARELINK TRANSMISSION: BATTERY VOLTAGE ADEQUATE (2 97V-RRT=2 63V)  <0 1%  ALL AVAILABLE LEAD PARAMETERS WITHIN NORMAL LIMITS  18 SVT-WAVELET EPISODES, AVG HR-140-143 BPM, LONGEST 3 8 MINS  PT ON ASA 81MG & CARVEDILOL  OPTI-VOL WITHIN NORMAL LIMITS  NORMAL DEVICE FUNCTION  GV   Cardiac Electrophysiology Report      [id number]_[id number]  pdf   DEVICE TYPE ICD       Cardiac Electrophysiology Report [id number] 05:24AM Yahaira Jacob     Test Name Result Flag Reference   Cardiac Electrophysiology Report      [id number]  pdf     Signatures   Electronically signed by : Shani Baum RN; Mar 24 2017  4:15PM EST                       (Author)    Electronically signed by : Keegan Cole DO;  2017  7:42PM EST                       (Author)    "

## 2018-03-07 NOTE — PROGRESS NOTES
Dear Ritesh Rojo,  My name is Griselda Hung and I am a Registered Nurse and Care Coordinator for [address]  We recently  spoke on the phone regarding your hospital stay and you opted to receive follow-up phone calls from me  Because of the reason that you were in the hospital, Medicare has placed you in a program called [address]  One of  the benefits of the program is that you can have a nurse call regularly to answer any questions or concerns you may have  My phone number is listed below in case you would need to contact me      Sincerely,   Griselda Hung RN  [phone]            Electronically signed Royal Mahmood RN  Feb  3 2017 11:06AM EST Author

## 2018-03-07 NOTE — PROGRESS NOTES
"  Plan  Chronic combined systolic and diastolic CHF, NYHA class 3    · Entresto  MG Oral Tablet; TAKE 1 TABLET TWICE DAILY ***CALL MD FOR  APPOINTMENT  NO FURTHER REFILLS UNTIL THEN***   Rx By: Kian Perez; Dispense: 30 Days ; #:60 Tablet; Refill: 3; For: Chronic combined systolic and diastolic CHF, NYHA class 3; DORA = N; Verified Transmission to [facility] Last Updated By: System, [employer]ers App; 12/22/2017 9:18:14 AM    Discussion/Summary  Normal device function     Brief SVT  Results/Data  Cardiac Device Remote [id number] 06:25AM Marry Francisco     Test Name Result Flag Reference   MISCELLANEOUS COMMENT (Report)     CARELINK TRANSMISSION: BATTERY VOLTAGE ADEQUATE (2 80V-RRT=2 63V)  <0 1%  ALL AVAILABLE LEAD PARAMETERS WITHIN NORMAL LIMITS  1 SVT-WAVELET EPISODE LASTING 26 SEC @ 140 BPM  PT ON ASA 81MG & CARVEDILOL  OPTI-VOL WITHIN NORMAL LIMITS  NORMAL DEVICE FUNCTION  GV   Cardiac Electrophysiology Report      [id number]  pdf   DEVICE TYPE ICD       Cardiac Electrophysiology Report [id number] 06:25AM Marry Francisco     Test Name Result Flag Reference   Cardiac Electrophysiology Report      [id number]  pdf     Signatures   Electronically signed by : Wilbur Mo RN; Dec 27 2017  3:07PM EST                       (Author)    Electronically signed by : LAWANDA Bland ; Dec 31 2017  8:51AM EST                       (Author)    "

## 2018-03-07 NOTE — PROGRESS NOTES
"  Discussion/Summary  Normal device function      Results/Data  Cardiac Device Remote [id number] 08:24AM Parvez Penn     Test Name Result Flag Reference   MISCELLANEOUS COMMENT      CARELINK TRANSMISSION: BATTERY VOLTAGE ADEQUATE (2 88V/RRT=2 63V)   < 0 1%  ALL LEAD PARAMETERS WITHIN NORMAL LIMITS  NO SIGNIFICANT HIGH RATE EPISODES  OPTI-VOL FLUID THRESHOLD CROSSED ~ 9/19/2017  TASKED TO NP   NORMAL DEVICE FUNCTION  RG   Cardiac Electrophysiology Report      [id number]  pdf   DEVICE TYPE ICD       Cardiac Electrophysiology Report [id number] 08:24AM Parvez Penn     Test Name Result Flag Reference   Cardiac Electrophysiology Report      [id number]     Signatures   Electronically signed by : Charly Seth, ; Sep 26 2017 12:30PM EST                       (Author)    Electronically signed by : LAWANDA Victoria ; Sep 30 2017  7:19AM EST                       (Author)    "

## 2018-03-07 NOTE — PROGRESS NOTES
"  Discussion/Summary  Normal device function      Results/Data  Results   Cardiac Device In Clinic [id number] 07:54PM Durward Fuelling     Test Name Result Flag Reference   MISCELLANEOUS COMMENT (Report)     [place]; NP TO CLINIC - TRANSFER FROM [facility]; BATTERY VOLTAGE ADEQUATE (3 03V/RRT = 2 63V);  = <1%; NO NEW SIGNIFICANT HIGH RATE EPISODES RECORDED SINCE CARELINK TRANSMISSION WITH PREVIOUS CLINIC (5/18/16); OLDER EPISODES PRINTED FOR REVIEW; OPTI-VOL FLUID THRESHOLD CROSSED SINCE 1/19/16 & ONGOING; PT SEEN TODAY BY DR April Gonzalez - RESULTS REVIEWED WITH HIM; WILL RECHECK IN 31 DAYS- CARELINK TRANSFER REQUESTED; ALL LEAD PARAMETERS TEST WITHIN NORMAL LIMITS/STABLE; NO PROGRAMMING CHANGES MADE TO DEVICE PARAMETERS; NORMAL DEVICE FUNCTION  eb   Cardiac Electrophysiology Report      [id number] [id number]  pdf   DEVICE TYPE ICD       Cardiac Electrophysiology Report [id number] 07:54PM Durward Fuelling     Test Name Result Flag Reference   Cardiac Electrophysiology Report      [id number]  pdf     Signatures   Electronically signed by : Rosaline Monterroso, ; May 25 2016  8:03AM EST                       (Author)    Electronically signed by : LAWANDA Barlow ; May 25 2016  8:40AM EST                       (Author)    "

## 2018-03-07 NOTE — PROGRESS NOTES
"  Discussion/Summary  Normal device function      Results/Data  Cardiac Device Remote [id number] 07:30PM César Rojas     Test Name Result Flag Reference   MISCELLANEOUS COMMENT      CARELINK TRANSMISSION: [id number] VOLTAGE ADEQUATE   1%  ALL AVAILABLE LEAD PARAMETERS WITHIN NORMAL LIMITS  NO SIGNIFICANT HIGH RATE EPISODES  SVT EPISODES DETECTED  OPTI-VOL WITHIN NORMAL LIMITS  NORMAL DEVICE FUNCTION  ---LANE   Cardiac Electrophysiology Report      [id number]  pdf   DEVICE TYPE ICD       Cardiac Electrophysiology Report [id number] 07:30PM César Rojas     Test Name Result Flag Reference   Cardiac Electrophysiology Report      [id number]  pdf     Signatures   Electronically signed by : Joaquin Quinn, ; Sep 13 2016  3:40PM EST                       (Author)    Electronically signed by : LAWANDA Singleton ; Sep 13 2016  5:59PM EST                       (Author)    "

## 2018-03-08 ENCOUNTER — TELEPHONE (OUTPATIENT)
Dept: UROLOGY | Facility: CLINIC | Age: 68
End: 2018-03-08

## 2018-03-08 NOTE — TELEPHONE ENCOUNTER
Yes get another psa prior to next appt-history of prostate cancer s/p radical prostatectomy with rising psa----also see if we can get copy of his original psa prior to radical prostatectomy, path from rad prostatectomy and copy of op note   Julienne Ken did all of this at [facility]

## 2018-03-09 NOTE — TELEPHONE ENCOUNTER
LMOM for pt to return call  Will also need pt to come in to sign records release to send  Or Pt can contact Dr Baldemar Luevano office to sign release to have sent here

## 2018-03-20 LAB — PROSTATE SPECIFIC ANTIGEN (HISTORICAL): 5.99 NG/ML

## 2018-03-26 ENCOUNTER — TELEPHONE (OUTPATIENT)
Dept: UROLOGY | Facility: AMBULATORY SURGERY CENTER | Age: 68
End: 2018-03-26

## 2018-03-26 NOTE — TELEPHONE ENCOUNTER
Received PSA level, did not receive path or op report  Pt will call Dr Ila Alcazar to have reports sent

## 2018-03-27 RX ORDER — PEN NEEDLE, DIABETIC 32GX 5/32"
NEEDLE, DISPOSABLE MISCELLANEOUS
Refills: 3 | COMMUNITY
Start: 2018-03-12 | End: 2018-09-30 | Stop reason: SDUPTHER

## 2018-03-27 RX ORDER — INSULIN GLARGINE 300 U/ML
INJECTION, SOLUTION SUBCUTANEOUS
Refills: 5 | COMMUNITY
Start: 2018-03-01 | End: 2018-08-15 | Stop reason: SDUPTHER

## 2018-03-27 RX ORDER — HYDROCODONE BITARTRATE AND ACETAMINOPHEN 5; 300 MG/1; MG/1
1 TABLET ORAL
Refills: 0 | COMMUNITY
Start: 2018-03-16 | End: 2018-08-01

## 2018-03-28 ENCOUNTER — CLINICAL SUPPORT (OUTPATIENT)
Dept: CARDIOLOGY CLINIC | Facility: CLINIC | Age: 68
End: 2018-03-28
Payer: COMMERCIAL

## 2018-03-28 ENCOUNTER — TELEPHONE (OUTPATIENT)
Dept: UROLOGY | Facility: MEDICAL CENTER | Age: 68
End: 2018-03-28

## 2018-03-28 DIAGNOSIS — I50.22 CHRONIC SYSTOLIC HEART FAILURE (HCC): Primary | ICD-10-CM

## 2018-03-28 DIAGNOSIS — I47.2 VENTRICULAR TACHYARRHYTHMIA (HCC): ICD-10-CM

## 2018-03-28 DIAGNOSIS — Z95.810 PRESENCE OF AUTOMATIC CARDIOVERTER/DEFIBRILLATOR (AICD): ICD-10-CM

## 2018-03-28 PROCEDURE — 93295 DEV INTERROG REMOTE 1/2/MLT: CPT | Performed by: INTERNAL MEDICINE

## 2018-03-28 PROCEDURE — 93296 REM INTERROG EVL PM/IDS: CPT | Performed by: INTERNAL MEDICINE

## 2018-03-28 PROCEDURE — 93297 REM INTERROG DEV EVAL ICPMS: CPT | Performed by: INTERNAL MEDICINE

## 2018-03-28 NOTE — PROGRESS NOTES
CARELINK TRANSMISSION: (ICD)BATTERY VOLTAGE ADEQUATE   1%  ALL AVAILABLE LEAD PARAMETERS WITHIN NORMAL LIMITS  NO SIGNIFICANT HIGH RATE EPISODES  SVT EPISODES DETECTED  OPTI-VOL FLUID THRESHOLD CROSSED  NP TASKED RECHECK IN ONE MONTH  NORMAL DEVICE FUNCTION   ---LANE

## 2018-03-28 NOTE — TELEPHONE ENCOUNTER
Called office 3/26 requesting OP and Path report  Pt signed release  Called office again and left message on machine re: the same  Requested to call office if they do not have copy of medical records  Informed both times pt has appt 3/29

## 2018-03-29 ENCOUNTER — OFFICE VISIT (OUTPATIENT)
Dept: UROLOGY | Facility: MEDICAL CENTER | Age: 68
End: 2018-03-29
Payer: COMMERCIAL

## 2018-03-29 VITALS
HEIGHT: 69 IN | BODY MASS INDEX: 35.99 KG/M2 | DIASTOLIC BLOOD PRESSURE: 78 MMHG | WEIGHT: 243 LBS | SYSTOLIC BLOOD PRESSURE: 122 MMHG

## 2018-03-29 DIAGNOSIS — C61 PROSTATE CANCER (HCC): Primary | ICD-10-CM

## 2018-03-29 DIAGNOSIS — E11.69 ERECTILE DYSFUNCTION ASSOCIATED WITH TYPE 2 DIABETES MELLITUS (HCC): ICD-10-CM

## 2018-03-29 DIAGNOSIS — N52.1 ERECTILE DYSFUNCTION ASSOCIATED WITH TYPE 2 DIABETES MELLITUS (HCC): ICD-10-CM

## 2018-03-29 PROCEDURE — 99024 POSTOP FOLLOW-UP VISIT: CPT | Performed by: UROLOGY

## 2018-03-29 NOTE — PATIENT INSTRUCTIONS
Penile Prosthesis Implantation   WHAT YOU NEED TO KNOW:   What do I need to know about penile prosthesis implantation? Penile prosthesis implantation is surgery to place a penis implant  The implant makes it possible to have and maintain an erection  How do I prepare for surgery? Your healthcare provider will talk to you about how to prepare for surgery  He may tell you not to eat or drink anything after midnight on the day of your surgery  He will tell you what medicines to take or not take on the day of your surgery  What will happen during surgery? Your surgeon will make an incision in your penis and place the implant into the shaft of your penis  If you have an inflatable implant, the pump will be placed inside your scrotum and attached to the implant  A drain may be placed to remove extra blood or fluid from your surgery area  The incisions will be closed with stitches and covered with a bandage  What are the risks of surgery? You may bleed more than expected or get an infection  Your prostate or bladder may be damaged during surgery  Your penis may not look the way you thought it would  You may have trouble urinating or have swelling or numbness in your penis  The implant may move out of place or not work properly  You may still have problems having an erection  CARE AGREEMENT:   You have the right to help plan your care  Learn about your health condition and how it may be treated  Discuss treatment options with your caregivers to decide what care you want to receive  You always have the right to refuse treatment  The above information is an  only  It is not intended as medical advice for individual conditions or treatments  Talk to your doctor, nurse or pharmacist before following any medical regimen to see if it is safe and effective for you    © 2017 [facility] Information is for End User's use only and may not be sold, redistributed or otherwise used for commercial purposes  All illustrations and images included in CareNotes® are the copyrighted property of A D A M , Inc  or [employer]

## 2018-03-29 NOTE — PROGRESS NOTES
Assessment/Plan:  1  Adenocarcinoma of the prostate  I reviewed the patient's records from [facility] going back to the time of his radical prostatectomy  The patient clearly has biochemical recurrence of his prostate cancer as evidence by his most recent PSA of 5 99 after both radical prostatectomy and postoperative radiation therapy  The patient and his wife were made aware of the potential need for hormone therapy (ADT) in the future but at the present time we will continue to follow his PSA and clinical status  2   Erectile dysfunction secondary to type 2 diabetes mellitus  The patient's penile implant is completely healed at this point with an excellent result  The patient and his wife were educated as to inflation and deflation  He is cleared to use this during intercourse  No problem-specific Assessment & Plan notes found for this encounter  Diagnoses and all orders for this visit:    [facility]  -     PSA Total, Diagnostic; Future  -     Comprehensive metabolic panel; Future    Erectile dysfunction associated with type 2 diabetes mellitus [facility] )    Other orders  -     HYDROcodone-acetaminophen (XODOL) 5-300 MG per tablet; Take 1 tablet by mouth Every 4 to 6 hours as needed for pain  -     TOUJEO SOLOSTAR injection pen 300 units/mL; INJECT BY SUBCUTANEOUS ROUTE 43 UNITS IN THE AM AND 38 UNITS IN THE PM  -     BD PEN NEEDLE MARCELINO U/F 32G X 4 MM MISC; USE THREE TIMES A DAY OR AS DIRECTED BY PHYSICAN  00          Subjective:      Patient ID: Rush Bright   is a 78 y o  male      HPI 81-year-old male status post IPP with a history of adenocarcinoma of the prostate here for review of his current prostate cancer status and for further implant teaching    The following portions of the patient's history were reviewed and updated as appropriate: allergies, current medications, past family history, past medical history, past social history, past surgical history and problem list     Review of Systems   Constitutional: Negative  HENT: Negative  Respiratory: Negative  Cardiovascular: Negative  Objective:      /78   Ht 5' 9" (1 753 m)   Wt 110 kg (243 lb)   BMI 35 88 kg/m²          Physical Exam   Constitutional: He is oriented to person, place, and time  He appears well-nourished  No distress  HENT:   Head: Atraumatic  Eyes: EOM are normal    Neck: Neck supple  Pulmonary/Chest: Effort normal  No respiratory distress  Abdominal: Soft  Genitourinary:   Genitourinary Comments: Penis normal uncircumcised without cutaneous lesions  Meatus patent and normally placed  Penile cylinders in excellent position with all incisions well healed  Inflated and deflated implant gives an excellent erect and flaccid results  Scrotum without cutaneous lesions  Scrotal incision completely healed  Pump is easily palpable without any palpable fluid collections  The pump is fully functional   Patient and wife were given education taught to inflate and deflate the prosthesis   Neurological: He is alert and oriented to person, place, and time  Psychiatric: He has a normal mood and affect   His behavior is normal  Judgment and thought content normal

## 2018-03-29 NOTE — LETTER
March 29, 2018     [place], [address]     Patient: Gerald Johnston Sr  YOB: 1950   Date of Visit: 3/29/2018       Dear Dr Vickey Alanis: Thank you for referring Gerald Johnston to me for evaluation  Below are my notes for this consultation  If you have questions, please do not hesitate to call me  I look forward to following your patient along with you  Sincerely,        Kulwinder Talley MD        CC: No Recipients  Kulwinder Talley MD  3/29/2018  1:32 PM  Sign at close encounter  Assessment/Plan:  1  Adenocarcinoma of the prostate  I reviewed the patient's records from [facility] going back to the time of his radical prostatectomy  The patient clearly has biochemical recurrence of his prostate cancer as evidence by his most recent PSA of 5 99 after both radical prostatectomy and postoperative radiation therapy  The patient and his wife were made aware of the potential need for hormone therapy (ADT) in the future but at the present time we will continue to follow his PSA and clinical status  2   Erectile dysfunction secondary to type 2 diabetes mellitus  The patient's penile implant is completely healed at this point with an excellent result  The patient and his wife were educated as to inflation and deflation  He is cleared to use this during intercourse  No problem-specific Assessment & Plan notes found for this encounter  Diagnoses and all orders for this visit:    Prostate cancer ([address] )  -     PSA Total, Diagnostic; Future  -     Comprehensive metabolic panel; Future    Erectile dysfunction associated with type 2 diabetes mellitus ([address] )    Other orders  -     HYDROcodone-acetaminophen (XODOL) 5-300 MG per tablet;  Take 1 tablet by mouth Every 4 to 6 hours as needed for pain  -     TOUJEO SOLOSTAR injection pen 300 units/mL; INJECT BY SUBCUTANEOUS ROUTE 43 UNITS IN THE AM AND 38 UNITS IN THE PM  -     BD PEN NEEDLE MARCELINO U/F 32G X 4 MM MISC; USE THREE TIMES A DAY OR AS DIRECTED BY PHYSICAN  00          Subjective:      Patient ID: Reba Spencer Sr  is a 78 y o  male  HPI 21-year-old male status post IPP with a history of adenocarcinoma of the prostate here for review of his current prostate cancer status and for further implant teaching    The following portions of the patient's history were reviewed and updated as appropriate: allergies, current medications, past family history, past medical history, past social history, past surgical history and problem list     Review of Systems   Constitutional: Negative  HENT: Negative  Respiratory: Negative  Cardiovascular: Negative  Objective:      /78   Ht 5' 9" (1 753 m)   Wt 110 kg (243 lb)   BMI 35 88 kg/m²           Physical Exam   Constitutional: He is oriented to person, place, and time  He appears well-nourished  No distress  HENT:   Head: Atraumatic  Eyes: EOM are normal    Neck: Neck supple  Pulmonary/Chest: Effort normal  No respiratory distress  Abdominal: Soft  Genitourinary:   Genitourinary Comments: Penis normal uncircumcised without cutaneous lesions  Meatus patent and normally placed  Penile cylinders in excellent position with all incisions well healed  Inflated and deflated implant gives an excellent erect and flaccid results  Scrotum without cutaneous lesions  Scrotal incision completely healed  Pump is easily palpable without any palpable fluid collections  The pump is fully functional   Patient and wife were given education taught to inflate and deflate the prosthesis   Neurological: He is alert and oriented to person, place, and time  Psychiatric: He has a normal mood and affect   His behavior is normal  Judgment and thought content normal

## 2018-04-12 ENCOUNTER — TELEPHONE (OUTPATIENT)
Dept: UROLOGY | Facility: MEDICAL CENTER | Age: 68
End: 2018-04-12

## 2018-04-12 ENCOUNTER — OFFICE VISIT (OUTPATIENT)
Dept: UROLOGY | Facility: MEDICAL CENTER | Age: 68
End: 2018-04-12
Payer: COMMERCIAL

## 2018-04-12 VITALS
DIASTOLIC BLOOD PRESSURE: 84 MMHG | SYSTOLIC BLOOD PRESSURE: 156 MMHG | WEIGHT: 242.4 LBS | HEIGHT: 69 IN | BODY MASS INDEX: 35.9 KG/M2

## 2018-04-12 DIAGNOSIS — N52.31 ERECTILE DYSFUNCTION FOLLOWING RADICAL PROSTATECTOMY: Primary | ICD-10-CM

## 2018-04-12 DIAGNOSIS — C61 PROSTATE CANCER (HCC): ICD-10-CM

## 2018-04-12 PROCEDURE — 99024 POSTOP FOLLOW-UP VISIT: CPT | Performed by: UROLOGY

## 2018-04-12 RX ORDER — FUROSEMIDE 40 MG/1
40 TABLET ORAL 2 TIMES DAILY
Refills: 3 | COMMUNITY
Start: 2018-03-21 | End: 2018-06-24 | Stop reason: SDUPTHER

## 2018-04-12 NOTE — TELEPHONE ENCOUNTER
Spoke to [employer] from [employer]  Pt's IPP will not deflate  appt today at 11:30 with Dr Shubham Mcdonough

## 2018-04-12 NOTE — LETTER
April 12, 2018     [place], [address] [place] [id number] [address]    Patient: Carlos Hughes Sr  YOB: 1950   Date of Visit: 4/12/2018       Dear Dr Marion Deutsch: Thank you for referring Carlos Hughes to me for evaluation  Below are my notes for this consultation  If you have questions, please do not hesitate to call me  I look forward to following your patient along with you  Sincerely,        Mike Langley MD        CC: No Recipients  Mike Langley MD  4/12/2018 12:33 PM  Sign at close encounter  The patient returns to the office noting that he is able to inflate his penile implant  He does complain somewhat of shortening of the penis however on physical examination the implant cylinders are fully inflated and appropriately placed with a good solid straight erection with the distal portion of the cylinders being at the mid gland position  The patient had trouble deflating the prosthesis and was again instructed as to proper deflation technique  The implant was deflated and had an excellent flaccid results as well  The patient was instructed again as to inflation and deflation and will return again if he has any further problem or otherwise be seen as scheduled previously    Time of visit 35 min-face-to-face contact

## 2018-04-12 NOTE — PROGRESS NOTES
The patient returns to the office noting that he is able to inflate his penile implant  He does complain somewhat of shortening of the penis however on physical examination the implant cylinders are fully inflated and appropriately placed with a good solid straight erection with the distal portion of the cylinders being at the mid gland position  The patient had trouble deflating the prosthesis and was again instructed as to proper deflation technique  The implant was deflated and had an excellent flaccid results as well  The patient was instructed again as to inflation and deflation and will return again if he has any further problem or otherwise be seen as scheduled previously    Time of visit 35 min-face-to-face contact

## 2018-04-24 LAB
ABSOL LYMPHOCYTES (HISTORICAL): 1.3 K/UL (ref 0.5–4)
ALBUMIN SERPL BCP-MCNC: 4 G/DL (ref 3–5.2)
ALP SERPL-CCNC: 139 U/L (ref 43–122)
ALT SERPL W P-5'-P-CCNC: 43 U/L (ref 9–52)
ANION GAP SERPL CALCULATED.3IONS-SCNC: 12 MMOL/L (ref 5–14)
AST SERPL W P-5'-P-CCNC: 25 U/L (ref 17–59)
B-NP NT PRO (HISTORICAL): 474 PG/ML
BANDS (HISTORICAL): 2 % (ref 3–11)
BASOPHILS # BLD AUTO: 0 K/UL (ref 0–0.1)
BASOPHILS # BLD AUTO: 1 % (ref 0–1)
BILIRUB SERPL-MCNC: 0.3 MG/DL
BUN SERPL-MCNC: 23 MG/DL (ref 5–25)
CALCIUM SERPL-MCNC: 9.2 MG/DL (ref 8.4–10.2)
CHLORIDE SERPL-SCNC: 98 MEQ/L (ref 97–108)
CHOLEST SERPL-MCNC: 128 MG/DL
CHOLEST/HDLC SERPL: 5.6 {RATIO}
CO2 SERPL-SCNC: 28 MMOL/L (ref 22–30)
COMMENT (HISTORICAL): ABNORMAL
CREATINE, SERUM (HISTORICAL): 1.64 MG/DL (ref 0.7–1.5)
DEPRECATED RDW RBC AUTO: 15.9 %
EGFR (HISTORICAL): 42 ML/MIN/1.73 M2
EOSINOPHIL # BLD AUTO: 0.4 K/UL (ref 0–0.4)
EOSINOPHIL NFR BLD AUTO: 9 % (ref 0–6)
EST. AVERAGE GLUCOSE BLD GHB EST-MCNC: 226 MG/DL
GLUCOSE SERPL-MCNC: 238 MG/DL (ref 70–99)
HBA1C MFR BLD HPLC: 9.5 %
HCT VFR BLD AUTO: 40.1 % (ref 41–53)
HDLC SERPL-MCNC: 23 MG/DL
HGB BLD-MCNC: 13.1 G/DL (ref 13.5–17.5)
LDL/HDL RATIO (HISTORICAL): 2.6
LDLC SERPL CALC-MCNC: 59 MG/DL
LYMPHOCYTES NFR BLD AUTO: 28 % (ref 25–45)
MCH RBC QN AUTO: 28.7 PG (ref 26–34)
MCHC RBC AUTO-ENTMCNC: 32.5 % (ref 31–36)
MCV RBC AUTO: 88 FL (ref 80–100)
MONOCYTES # BLD AUTO: 0.8 K/UL (ref 0.2–0.9)
MONOCYTES NFR BLD AUTO: 17 % (ref 1–10)
NEUTROPHILS ABS COUNT (HISTORICAL): 2.1 K/UL (ref 1.8–7.8)
NEUTS SEG NFR BLD AUTO: 43 % (ref 45–65)
PLATELET # BLD AUTO: 157 K/MCL (ref 150–450)
POTASSIUM SERPL-SCNC: 3.7 MEQ/L (ref 3.6–5)
PSA (HISTORICAL): 5.35 NG/ML
RBC # BLD AUTO: 4.54 M/MCL (ref 4.5–5.9)
RBC MORPHOLOGY (HISTORICAL): ABNORMAL
SODIUM SERPL-SCNC: 137 MEQ/L (ref 137–147)
TOTAL PROTEIN (HISTORICAL): 6.9 G/DL (ref 5.9–8.4)
TRIGL SERPL-MCNC: 231 MG/DL
TSH SERPL DL<=0.05 MIU/L-ACNC: 1.55 UIU/ML (ref 0.47–4.68)
VLDLC SERPL CALC-MCNC: 46 MG/DL (ref 0–40)
WBC # BLD AUTO: 4.6 K/MCL (ref 4.5–11)

## 2018-05-10 ENCOUNTER — OFFICE VISIT (OUTPATIENT)
Dept: PULMONOLOGY | Facility: CLINIC | Age: 68
End: 2018-05-10
Payer: COMMERCIAL

## 2018-05-10 ENCOUNTER — TELEPHONE (OUTPATIENT)
Dept: PULMONOLOGY | Facility: CLINIC | Age: 68
End: 2018-05-10

## 2018-05-10 VITALS
HEART RATE: 74 BPM | WEIGHT: 240.2 LBS | HEIGHT: 69 IN | DIASTOLIC BLOOD PRESSURE: 60 MMHG | OXYGEN SATURATION: 97 % | SYSTOLIC BLOOD PRESSURE: 116 MMHG | TEMPERATURE: 98.3 F | RESPIRATION RATE: 18 BRPM | BODY MASS INDEX: 35.58 KG/M2

## 2018-05-10 DIAGNOSIS — N18.30 CKD (CHRONIC KIDNEY DISEASE) STAGE 3, GFR 30-59 ML/MIN (HCC): ICD-10-CM

## 2018-05-10 DIAGNOSIS — Z99.89 OSA ON CPAP: Primary | ICD-10-CM

## 2018-05-10 DIAGNOSIS — I50.42 CHRONIC COMBINED SYSTOLIC AND DIASTOLIC CONGESTIVE HEART FAILURE (HCC): ICD-10-CM

## 2018-05-10 DIAGNOSIS — G47.33 OSA ON CPAP: Primary | ICD-10-CM

## 2018-05-10 DIAGNOSIS — G47.09 OTHER INSOMNIA: ICD-10-CM

## 2018-05-10 PROCEDURE — 3066F NEPHROPATHY DOC TX: CPT | Performed by: INTERNAL MEDICINE

## 2018-05-10 PROCEDURE — 99204 OFFICE O/P NEW MOD 45 MIN: CPT | Performed by: INTERNAL MEDICINE

## 2018-05-10 RX ORDER — TEMAZEPAM 15 MG/1
15 CAPSULE ORAL
Qty: 30 CAPSULE | Refills: 0 | Status: SHIPPED | OUTPATIENT
Start: 2018-05-10 | End: 2018-08-01

## 2018-05-10 RX ORDER — SIMVASTATIN 40 MG
TABLET ORAL
Refills: 5 | COMMUNITY
Start: 2018-04-05 | End: 2019-01-29 | Stop reason: SDUPTHER

## 2018-05-10 RX ORDER — ALPRAZOLAM 0.25 MG/1
0.25 TABLET ORAL 4 TIMES DAILY PRN
COMMUNITY
End: 2018-05-10

## 2018-05-10 NOTE — TELEPHONE ENCOUNTER
Patient's cpap machine is not working properly  I called his [employer] company and I was told he may qualify for a new machine  I LM to patient to contact his doctor that ordered the machine  Dr Grullon is aware

## 2018-05-10 NOTE — ASSESSMENT & PLAN NOTE
Patient has CKD stage 3 with some worsening of kidney function and the most recent labs few months ago, he states having blood work recently and was not told that his kidney function is worse  Follow with primary care physician

## 2018-05-10 NOTE — ASSESSMENT & PLAN NOTE
Patient needs to continue CPAP, for now we will contact his providing company to go over issues with his current CPAP machine and replace it if needed

## 2018-05-10 NOTE — PROGRESS NOTES
Consultation - Pulmonary Medicine   Queta Escobedo  80 y o  male MRN: [id number]        Physician Requesting Consult: Deborah Robles MD  Reason for Consult:   JOE on CPAP    JOE on CPAP  Patient needs to continue CPAP, for now we will contact his providing company to go over issues with his current CPAP machine and replace it if needed    CHF (congestive heart failure) ([address] )  Currently compensated, he is on Lasix, planning to follow-up with his cardiologist in the near future  CKD (chronic kidney disease) stage 3, GFR 30-59 ml/min  Patient has CKD stage 3 with some worsening of kidney function and the most recent labs few months ago, he states having blood work recently and was not told that his kidney function is worse  Follow with primary care physician  Other insomnia  Apart from his obstructive sleep apnea patient has chronic insomnia for more than 25 years and he has been on multiple medications in the past   As much as I did not want to give him any sleeping aids but he insists that he cannot sleep at all without temazepam that was prescribed for the past year by his previous pulmonologist Dr Cookie Soliman  I will prescribe temazepam 15 mg q h s  p r n     I explained to patient that he has to take it on p r n  basis and will try to wean him off if possible otherwise he may be dependent on sleeping in the future  We also discussed other sleep hygiene measures although I felt it does not apply to this patient         ______________________________________________________________________    HPI:    Queta Escobedo  is a 80 y o  male who presents for follow-up on sleep apnea on CPAP  Patient has chronic systolic/diastolic CHF with defibrillator and follows with Cardiology Dr Abraham Nobles, and also has chronic kidney disease currently on Lasix    He has chronic dyspnea on exertion but denies any orthopnea or chest pain, he has chronic legs edema, he has intermittent nonproductive cough sometimes, denies wheezing, denies any fever or chills or night sweats, his weight is fluctuating up and down but within reasonable range, denies any history of hemoptysis  Patient has chronic insomnia for about 25 years, he has to be on Ambien for 15 years then stop working for am, he tried other medications including melatonin and other medicines that he cannot remember, currently is been on temazepam of unknown dose for the past year that was prescribed by his previous pulmonologist Dr Sherie Sheldon  He states that he goes to bed between 9 and 10 and he cannot sleep without the sleeping 8, when he takes the temazepam he sleeps until 3-4 a m  and he wakes up and then he cannot go back to sleep again  He denies taking naps during the day, he eats his dinner around 6:00 p m  He denies orthopnea or PND  He also was diagnosed with sleep apnea of unknown severity, he has been on CPAP for few years at 10 cm H2O and he is usually compliant except for the past few weeks because his machine is been condemned sating and leaking water through the mask  Patient smoked cigarettes in the past for about 10 years, about 1 pack per week, total 2-3 pack year history  He quit at age 33  He denies any occupational exposures in the past several years, 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 other chemicals  He lives at home with his wife  Review of Systems:  Review of Systems   Constitutional: Negative  HENT: Negative  Eyes: Negative  Respiratory:        As HPI   Cardiovascular:        As HPI   Gastrointestinal: Negative  Endocrine: Negative  Genitourinary: Negative  Musculoskeletal: Negative  Skin: Negative  Allergic/Immunologic: Negative  Neurological: Negative  Hematological: Negative  Psychiatric/Behavioral: Negative            Historical Information   Past Medical History:   Diagnosis Date    AICD (automatic cardioverter/defibrillator) present     Anxiety and depression  Arthritis     Asthma     pt denies    CKD (chronic kidney disease) stage 3, GFR 30-59 ml/min     COPD (chronic obstructive pulmonary disease) (HCC)     pt denies    CPAP (continuous positive airway pressure) dependence     Erectile dysfunction     GERD (gastroesophageal reflux disease)     Hypertension     Lumbar herniated disc     low back pain    Neuropathy     bles    Prostate cancer ([facility] )     2013- had radiation- had prostatectomy    Sleep apnea     Systolic CHF, chronic (HCC)     Type 2 diabetes mellitus with hyperlipidemia (HCC)     Use of cane as ambulatory aid     Wears glasses      Past Surgical History:   Procedure Laterality Date    AV FISTULA PLACEMENT      left upper arm and removal    CARDIAC DEFIBRILLATOR PLACEMENT      CATARACT EXTRACTION Bilateral     COLONOSCOPY      INGUINAL HERNIA REPAIR Bilateral     NV INSERT,INFLATABLE PENILE PROSTHESIS N/A 1/19/2018    Procedure: INSERTION 3 PART PROSTHESIS PENILE;  Surgeon: Sarah Bhardwaj MD;  Location: [facility];  Service: Urology    PROSTATECTOMY       Social History   History   Smoking Status    Former Smoker    Packs/day: 3 00    Years: 20 00    Quit date: 1976   Smokeless Tobacco    Never Used       Occupational history:  As HPI    Family History:   Family History   Problem Relation Age of Onset    Family history unknown: Yes         PhysicalExamination:  Vitals:   /60   Pulse 74   Temp 98 3 °F (36 8 °C)   Resp 18   Ht 5' 9" (1 753 m)   Wt 109 kg (240 lb 3 2 oz)   SpO2 97%   BMI 35 47 kg/m²     General: alert, not in acute distress  HEENT: PERRL, no icteric sclera or cyanosis, no thrush  Neck:  Supple, no lymphadenopathy or thyromegaly, no JVD  Lungs:  Equal breath sounds and clear auscultations bilaterally, no wheezing, minimal crackles at bases  Heart: S1S2 regular, no murmures or gallops  Abdomen: soft, non-tender, bowel sounds  present  Extrimities:  + mild pitting edema in lower extremities bilaterally, no clubbing or cyanosis  Neuro: Alert and oriented x 3, no focal neurodeficits   Skin: intact, no rashes      Diagnostic Data:  Labs:   I personally reviewed the most recent laboratory data pertinent to today's visit    Lab Results   Component Value Date    WBC 4 83 01/04/2018    HGB 13 6 01/04/2018    HCT 39 4 01/04/2018    MCV 91 01/04/2018     01/19/2018     Lab Results   Component Value Date    GLUCOSE 127 12/22/2016    CALCIUM 10 1 01/04/2018     (L) 01/04/2018    K 3 6 01/04/2018    CO2 32 01/04/2018    CL 94 (L) 01/04/2018    BUN 49 (H) 01/04/2018    CREATININE 2 54 (H) 01/04/2018     No results found for: IGE  Lab Results   Component Value Date    ALT 27 05/02/2016    AST 14 05/02/2016    ALKPHOS 95 05/02/2016    BILITOT 0 40 05/02/2016       Imaging:  I personally reviewed the images on the [facility] system pertinent to today's visit  I reviewed multiple chest x-rays on PACs, the most recent from January 2018 showed clear lungs, prior to that some of the chest x-rays showed volume overload/CHF    Other studies:  Echocardiogram:  LVEF 40%, mild to moderate increased wall thickness, mildly to moderately dilated LA, normal RV      Chelly Diaz MD

## 2018-05-10 NOTE — ASSESSMENT & PLAN NOTE
Apart from his obstructive sleep apnea patient has chronic insomnia for more than 25 years and he has been on multiple medications in the past   As much as I did not want to give him any sleeping aids but he insists that he cannot sleep at all without temazepam that was prescribed for the past year by his previous pulmonologist Dr Scarlett Pantoja  I will prescribe temazepam 15 mg q h s  p r n     I explained to patient that he has to take it on p r n  basis and will try to wean him off if possible otherwise he may be dependent on sleeping in the future  We also discussed other sleep hygiene measures although I felt it does not apply to this patient

## 2018-05-10 NOTE — ASSESSMENT & PLAN NOTE
Currently compensated, he is on Lasix, planning to follow-up with his cardiologist in the near future

## 2018-05-21 DIAGNOSIS — I50.22 CHRONIC SYSTOLIC CHF (CONGESTIVE HEART FAILURE), NYHA CLASS 2 (HCC): Primary | ICD-10-CM

## 2018-05-21 RX ORDER — METOLAZONE 5 MG/1
TABLET ORAL
Qty: 30 TABLET | Refills: 2 | Status: SHIPPED | OUTPATIENT
Start: 2018-05-21 | End: 2019-01-26 | Stop reason: SDUPTHER

## 2018-06-13 ENCOUNTER — OFFICE VISIT (OUTPATIENT)
Dept: UROLOGY | Facility: MEDICAL CENTER | Age: 68
End: 2018-06-13
Payer: COMMERCIAL

## 2018-06-13 VITALS
BODY MASS INDEX: 34.07 KG/M2 | HEIGHT: 69 IN | DIASTOLIC BLOOD PRESSURE: 78 MMHG | WEIGHT: 230 LBS | SYSTOLIC BLOOD PRESSURE: 138 MMHG

## 2018-06-13 DIAGNOSIS — C61 MALIGNANT NEOPLASM OF PROSTATE (HCC): Primary | ICD-10-CM

## 2018-06-13 DIAGNOSIS — N52.31 ERECTILE DYSFUNCTION FOLLOWING RADICAL PROSTATECTOMY: ICD-10-CM

## 2018-06-13 LAB
SL AMB  POCT GLUCOSE, UA: 100
SL AMB LEUKOCYTE ESTERASE,UA: ABNORMAL
SL AMB POCT BILIRUBIN,UA: ABNORMAL
SL AMB POCT BLOOD,UA: ABNORMAL
SL AMB POCT CLARITY,UA: CLEAR
SL AMB POCT COLOR,UA: YELLOW
SL AMB POCT KETONES,UA: ABNORMAL
SL AMB POCT NITRITE,UA: ABNORMAL
SL AMB POCT PH,UA: 6
SL AMB POCT SPECIFIC GRAVITY,UA: 1.01
SL AMB POCT URINE PROTEIN: ABNORMAL
SL AMB POCT UROBILINOGEN: 0.2

## 2018-06-13 PROCEDURE — 81003 URINALYSIS AUTO W/O SCOPE: CPT | Performed by: UROLOGY

## 2018-06-13 PROCEDURE — 99214 OFFICE O/P EST MOD 30 MIN: CPT | Performed by: UROLOGY

## 2018-06-13 RX ORDER — ACETAMINOPHEN AND CODEINE PHOSPHATE 120; 12 MG/5ML; MG/5ML
30 SOLUTION ORAL
COMMUNITY
End: 2018-11-20 | Stop reason: SDUPTHER

## 2018-06-13 NOTE — LETTER
June 13, 2018     Jareth Hammonds, [address] [place] [address]    Patient: Joanne Mauricio Sr  YOB: 1950   Date of Visit: 6/13/2018       Dear Dr Helder Chavira: Thank you for referring Joanne Mauricio to me for evaluation  Below are my notes for this consultation  If you have questions, please do not hesitate to call me  I look forward to following your patient along with you  Sincerely,        Fercho Du MD        CC: No Recipients  Fercho Du MD  6/13/2018  1:51 PM  Sign at close encounter  Assessment/Plan:  1  Adenocarcinoma of the prostate  The patient underwent radical prostatectomy through a retropubic approach by Dr Candy Medley according to the patient about 3 or 4 years ago  The patient also notes that he had IMRT afterwards  More than likely the patient either had margin positive disease or a detectable PSA after radical prostatectomy that prompted his radiation therapy  The patient has been followed at [facility] up until now for his prostate cancer  The patient requests that I take over care of his prostate cancer and I agreed  The patient does have a slowly rising PSA from 3 79 in February of 2017 to 5 4 in January of 2018 consistent with persistent or recurrent prostate cancer  The patient and I spent quite a while discussing options for treatment including continued surveillance which I recommend as well as androgen deprivation therapy which more than likely will be and inevitable treatment  At the present time however I feel that the patient is asymptomatic and should hold off intervention in this regard  Repeat PSA will be performed in about a month  2   Erectile dysfunction status post radical prostatectomy and radiation therapy  The patient underwent implantation of a 3 part inflatable penile prosthesis in January of 2018    The patient has had some difficulty learning how to inflate and deflate the prosthesis and presented somewhat worried about a red area on the glans penis and occasional episodes of discomfort in that region  Physical examination did not identify any worrisome areas on the glans penis with the implant both fully inflated and deflated in normal position without any tenderness pain or signs of erosion  The patient I did discuss the fact that he is diabetic and at times symptoms of erosion or low-grade infection may be difficult to identify and certainly at the present time the patient has very minimal discomfort which is only episodic and not related to implant function  The patient will continue to be observed  No intervention at the present time is recommended  I did inflate and deflate is implant and he has an excellent erect and flaccid results with the Hodge functioning pump  There is no palpable signs of infection or erosion  No problem-specific Assessment & Plan notes found for this encounter  Diagnoses and all orders for this visit:    Malignant neoplasm of prostate ([facility] )  -     POCT urine dip auto non-scope  -     PSA Total, Diagnostic; Future  -     Comprehensive metabolic panel; Future  -     Testosterone; Future    Erectile dysfunction following radical prostatectomy    Other orders  -     flurazepam (DALMANE) 30 MG capsule; Take 30 mg by mouth daily at bedtime as needed for sleep          Subjective:      Patient ID: Queta Escobedo  is a 80 y o  male  HPI 41-year-old Afro-American male status post implantation of a 3 part penile prosthesis in January of 2008 presents noting a reddened area on the glans penis as well as occasional episodes of discomfort at the glans penis  The patient denies chronic or persistent discomfort notes that he has had only a few episodes that were spontaneously resolved without treatment  The pain was not related inflation or deflation  He denies fever pain around the pump or change in the quality of the inflated erection      The patient also had questions concerning an elevated PSA after radiation therapy and radical prostatectomy  He currently voids adequately without obstructive symptoms but does have stress urinary incontinence using 3-4 pads per day  The following portions of the patient's history were reviewed and updated as appropriate: allergies, current medications, past family history, past medical history, past social history, past surgical history and problem list     Review of Systems   Constitutional: Negative  HENT: Negative  Eyes: Negative  Respiratory: Negative  Cardiovascular: Negative  Gastrointestinal: Negative  Endocrine: Negative  Genitourinary: Positive for penile pain  Musculoskeletal: Negative  Allergic/Immunologic: Negative  Neurological: Negative  Psychiatric/Behavioral: Negative  Objective:      /78   Ht 5' 9" (1 753 m)   Wt 104 kg (230 lb)   BMI 33 97 kg/m²           Physical Exam   Constitutional: He is oriented to person, place, and time  He appears well-developed and well-nourished  HENT:   Head: Atraumatic  Eyes: Conjunctivae and EOM are normal    Neck: Neck supple  Pulmonary/Chest: Effort normal  No respiratory distress  Abdominal: Soft  He exhibits no distension  Genitourinary:   Genitourinary Comments: Phallus uncircumcised without cutaneous lesions  The meatus is patent and normally placed without any expressible discharge or blood  There is no evidence of any intra meatal erosion  Penile cylinders both flaccid and fully inflated are distally in the mid glans position  There is no evidence of any crossover and there is a good solid erection once inflated  The scrotum is within normal limits with a well-healed incision no palpable fluid collections no tenderness with regard to the pump with the pump in good position in the midline in mid scrotal position  The pump is fully functional both with inflation and deflation    The patient has no penile or pump related pain with inflation or deflation  Glans penis is normal in pigmentation and there is no sign of erythema induration or signs of impending erosion   Neurological: He is alert and oriented to person, place, and time  Psychiatric: He has a normal mood and affect   His behavior is normal  Judgment and thought content normal

## 2018-06-13 NOTE — PROGRESS NOTES
Assessment/Plan:  1  Adenocarcinoma of the prostate  The patient underwent radical prostatectomy through a retropubic approach by Dr Joe Reina according to the patient about 3 or 4 years ago  The patient also notes that he had IMRT afterwards  More than likely the patient either had margin positive disease or a detectable PSA after radical prostatectomy that prompted his radiation therapy  The patient has been followed at [facility] up until now for his prostate cancer  The patient requests that I take over care of his prostate cancer and I agreed  The patient does have a slowly rising PSA from 3 79 in February of 2017 to 5 4 in January of 2018 consistent with persistent or recurrent prostate cancer  The patient and I spent quite a while discussing options for treatment including continued surveillance which I recommend as well as androgen deprivation therapy which more than likely will be and inevitable treatment  At the present time however I feel that the patient is asymptomatic and should hold off intervention in this regard  Repeat PSA will be performed in about a month  2   Erectile dysfunction status post radical prostatectomy and radiation therapy  The patient underwent implantation of a 3 part inflatable penile prosthesis in January of 2018  The patient has had some difficulty learning how to inflate and deflate the prosthesis and presented somewhat worried about a red area on the glans penis and occasional episodes of discomfort in that region  Physical examination did not identify any worrisome areas on the glans penis with the implant both fully inflated and deflated in normal position without any tenderness pain or signs of erosion    The patient I did discuss the fact that he is diabetic and at times symptoms of erosion or low-grade infection may be difficult to identify and certainly at the present time the patient has very minimal discomfort which is only episodic and not related to implant function  The patient will continue to be observed  No intervention at the present time is recommended  I did inflate and deflate is implant and he has an excellent erect and flaccid results with the Hodge functioning pump  There is no palpable signs of infection or erosion  No problem-specific Assessment & Plan notes found for this encounter  Diagnoses and all orders for this visit:    Malignant neoplasm of prostate ([address] )  -     POCT urine dip auto non-scope  -     PSA Total, Diagnostic; Future  -     Comprehensive metabolic panel; Future  -     Testosterone; Future    Erectile dysfunction following radical prostatectomy    Other orders  -     flurazepam (DALMANE) 30 MG capsule; Take 30 mg by mouth daily at bedtime as needed for sleep          Subjective:      Patient ID: Seun Vazquez  is a 80 y o  male  HPI 36-year-old Afro-American male status post implantation of a 3 part penile prosthesis in January of 2008 presents noting a reddened area on the glans penis as well as occasional episodes of discomfort at the glans penis  The patient denies chronic or persistent discomfort notes that he has had only a few episodes that were spontaneously resolved without treatment  The pain was not related inflation or deflation  He denies fever pain around the pump or change in the quality of the inflated erection  The patient also had questions concerning an elevated PSA after radiation therapy and radical prostatectomy  He currently voids adequately without obstructive symptoms but does have stress urinary incontinence using 3-4 pads per day  The following portions of the patient's history were reviewed and updated as appropriate: allergies, current medications, past family history, past medical history, past social history, past surgical history and problem list     Review of Systems   Constitutional: Negative  HENT: Negative  Eyes: Negative  Respiratory: Negative  Cardiovascular: Negative  Gastrointestinal: Negative  Endocrine: Negative  Genitourinary: Positive for penile pain  Musculoskeletal: Negative  Allergic/Immunologic: Negative  Neurological: Negative  Psychiatric/Behavioral: Negative  Objective:      /78   Ht 5' 9" (1 753 m)   Wt 104 kg (230 lb)   BMI 33 97 kg/m²          Physical Exam   Constitutional: He is oriented to person, place, and time  He appears well-developed and well-nourished  HENT:   Head: Atraumatic  Eyes: Conjunctivae and EOM are normal    Neck: Neck supple  Pulmonary/Chest: Effort normal  No respiratory distress  Abdominal: Soft  He exhibits no distension  Genitourinary:   Genitourinary Comments: Phallus uncircumcised without cutaneous lesions  The meatus is patent and normally placed without any expressible discharge or blood  There is no evidence of any intra meatal erosion  Penile cylinders both flaccid and fully inflated are distally in the mid glans position  There is no evidence of any crossover and there is a good solid erection once inflated  The scrotum is within normal limits with a well-healed incision no palpable fluid collections no tenderness with regard to the pump with the pump in good position in the midline in mid scrotal position  The pump is fully functional both with inflation and deflation  The patient has no penile or pump related pain with inflation or deflation  Glans penis is normal in pigmentation and there is no sign of erythema induration or signs of impending erosion   Neurological: He is alert and oriented to person, place, and time  Psychiatric: He has a normal mood and affect   His behavior is normal  Judgment and thought content normal

## 2018-06-24 DIAGNOSIS — I50.22 CHRONIC SYSTOLIC CHF (CONGESTIVE HEART FAILURE), NYHA CLASS 2 (HCC): Primary | ICD-10-CM

## 2018-06-24 RX ORDER — FUROSEMIDE 40 MG/1
TABLET ORAL
Qty: 180 TABLET | Refills: 3 | Status: SHIPPED | OUTPATIENT
Start: 2018-06-24 | End: 2018-10-03 | Stop reason: SDUPTHER

## 2018-07-10 ENCOUNTER — APPOINTMENT (OUTPATIENT)
Dept: LAB | Facility: HOSPITAL | Age: 68
End: 2018-07-10
Payer: COMMERCIAL

## 2018-07-10 ENCOUNTER — TRANSCRIBE ORDERS (OUTPATIENT)
Dept: ADMINISTRATIVE | Facility: HOSPITAL | Age: 68
End: 2018-07-10

## 2018-07-10 DIAGNOSIS — E11.8 UNCONTROLLED TYPE 2 DIABETES MELLITUS WITH COMPLICATION, UNSPECIFIED WHETHER LONG TERM INSULIN USE: Primary | ICD-10-CM

## 2018-07-10 DIAGNOSIS — E11.8 UNCONTROLLED TYPE 2 DIABETES MELLITUS WITH COMPLICATION, UNSPECIFIED WHETHER LONG TERM INSULIN USE: ICD-10-CM

## 2018-07-10 DIAGNOSIS — I10 ESSENTIAL HYPERTENSION, MALIGNANT: ICD-10-CM

## 2018-07-10 DIAGNOSIS — C61 MALIGNANT NEOPLASM OF PROSTATE (HCC): ICD-10-CM

## 2018-07-10 DIAGNOSIS — E11.65 UNCONTROLLED TYPE 2 DIABETES MELLITUS WITH COMPLICATION, UNSPECIFIED WHETHER LONG TERM INSULIN USE: ICD-10-CM

## 2018-07-10 DIAGNOSIS — R97.21 INCREASING PROSTATE SPECIFIC ANTIGEN (PSA) LEVEL AFTER TREATMENT FOR MALIGNANT NEOPLASM OF PROSTATE: ICD-10-CM

## 2018-07-10 DIAGNOSIS — E11.65 UNCONTROLLED TYPE 2 DIABETES MELLITUS WITH COMPLICATION, UNSPECIFIED WHETHER LONG TERM INSULIN USE: Primary | ICD-10-CM

## 2018-07-10 LAB
ALBUMIN SERPL BCP-MCNC: 3.9 G/DL (ref 3–5.2)
ALP SERPL-CCNC: 146 U/L (ref 43–122)
ALT SERPL W P-5'-P-CCNC: 32 U/L (ref 9–52)
ANION GAP SERPL CALCULATED.3IONS-SCNC: 9 MMOL/L (ref 5–14)
AST SERPL W P-5'-P-CCNC: 20 U/L (ref 17–59)
BILIRUB SERPL-MCNC: 0.3 MG/DL
BUN SERPL-MCNC: 39 MG/DL (ref 5–25)
CALCIUM SERPL-MCNC: 9.3 MG/DL (ref 8.4–10.2)
CHLORIDE SERPL-SCNC: 96 MMOL/L (ref 97–108)
CHOLEST SERPL-MCNC: 126 MG/DL
CO2 SERPL-SCNC: 33 MMOL/L (ref 22–30)
CREAT SERPL-MCNC: 1.94 MG/DL (ref 0.7–1.5)
EOSINOPHIL # BLD AUTO: 0.28 THOUSAND/UL (ref 0–0.4)
EOSINOPHIL NFR BLD MANUAL: 6 % (ref 0–6)
ERYTHROCYTE [DISTWIDTH] IN BLOOD BY AUTOMATED COUNT: 15.2 %
GFR SERPL CREATININE-BSD FRML MDRD: 40 ML/MIN/1.73SQ M
GLUCOSE P FAST SERPL-MCNC: 269 MG/DL (ref 70–99)
HCT VFR BLD AUTO: 40.6 % (ref 41–53)
HDLC SERPL-MCNC: 22 MG/DL (ref 40–59)
HGB BLD-MCNC: 13.4 G/DL (ref 13.5–17.5)
LDLC SERPL CALC-MCNC: 45 MG/DL
LYMPHOCYTES # BLD AUTO: 1.6 THOUSAND/UL (ref 0.5–4)
LYMPHOCYTES # BLD AUTO: 34 % (ref 20–50)
MCH RBC QN AUTO: 29.1 PG (ref 26–34)
MCHC RBC AUTO-ENTMCNC: 33 G/DL (ref 31–36)
MCV RBC AUTO: 88 FL (ref 80–100)
MONOCYTES # BLD AUTO: 0.42 THOUSAND/UL (ref 0.2–0.9)
MONOCYTES NFR BLD AUTO: 9 % (ref 1–10)
NEUTS SEG # BLD: 2.4 THOUSAND/UL (ref 1.8–7.8)
NEUTS SEG NFR BLD AUTO: 51 %
NONHDLC SERPL-MCNC: 104 MG/DL
PLATELET # BLD AUTO: 149 THOUSANDS/UL (ref 150–450)
PLATELET BLD QL SMEAR: ABNORMAL
PMV BLD AUTO: 9.9 FL (ref 8.9–12.7)
POTASSIUM SERPL-SCNC: 3.9 MMOL/L (ref 3.6–5)
PROT SERPL-MCNC: 7.5 G/DL (ref 5.9–8.4)
PSA SERPL-MCNC: 5 NG/ML (ref 0–4)
RBC # BLD AUTO: 4.61 MILLION/UL (ref 4.5–5.9)
RBC MORPH BLD: NORMAL
SODIUM SERPL-SCNC: 138 MMOL/L (ref 137–147)
TESTOST SERPL-MCNC: 141 NG/DL (ref 95–948)
TOTAL CELLS COUNTED SPEC: 100
TRIGL SERPL-MCNC: 294 MG/DL
TSH SERPL DL<=0.05 MIU/L-ACNC: 1.8 UIU/ML (ref 0.47–4.68)
WBC # BLD AUTO: 4.7 THOUSAND/UL (ref 4.5–11)

## 2018-07-10 PROCEDURE — 84153 ASSAY OF PSA TOTAL: CPT

## 2018-07-10 PROCEDURE — 36415 COLL VENOUS BLD VENIPUNCTURE: CPT | Performed by: FAMILY MEDICINE

## 2018-07-10 PROCEDURE — 84403 ASSAY OF TOTAL TESTOSTERONE: CPT

## 2018-07-10 PROCEDURE — 83036 HEMOGLOBIN GLYCOSYLATED A1C: CPT | Performed by: FAMILY MEDICINE

## 2018-07-10 PROCEDURE — 85027 COMPLETE CBC AUTOMATED: CPT

## 2018-07-10 PROCEDURE — 80053 COMPREHEN METABOLIC PANEL: CPT

## 2018-07-10 PROCEDURE — 80061 LIPID PANEL: CPT

## 2018-07-10 PROCEDURE — 84443 ASSAY THYROID STIM HORMONE: CPT

## 2018-07-10 PROCEDURE — 85007 BL SMEAR W/DIFF WBC COUNT: CPT

## 2018-07-11 LAB
EST. AVERAGE GLUCOSE BLD GHB EST-MCNC: 275 MG/DL
HBA1C MFR BLD: 11.2 % (ref 4.2–6.3)

## 2018-07-13 ENCOUNTER — IN-CLINIC DEVICE VISIT (OUTPATIENT)
Dept: CARDIOLOGY CLINIC | Facility: CLINIC | Age: 68
End: 2018-07-13
Payer: COMMERCIAL

## 2018-07-13 DIAGNOSIS — Z95.810 PRESENCE OF AUTOMATIC CARDIOVERTER/DEFIBRILLATOR (AICD): ICD-10-CM

## 2018-07-13 DIAGNOSIS — I42.9 CARDIOMYOPATHY, UNSPECIFIED TYPE (HCC): ICD-10-CM

## 2018-07-13 DIAGNOSIS — I47.2 VENTRICULAR TACHYCARDIA (HCC): ICD-10-CM

## 2018-07-13 DIAGNOSIS — I50.22 CHRONIC SYSTOLIC CONGESTIVE HEART FAILURE (HCC): Primary | ICD-10-CM

## 2018-07-13 DIAGNOSIS — I50.42 CHRONIC COMBINED SYSTOLIC AND DIASTOLIC CONGESTIVE HEART FAILURE (HCC): Primary | ICD-10-CM

## 2018-07-13 PROCEDURE — 93282 PRGRMG EVAL IMPLANTABLE DFB: CPT | Performed by: INTERNAL MEDICINE

## 2018-07-13 NOTE — TELEPHONE ENCOUNTER
Patient called and left a voice message for a refill request on Carbedilol 25mg  He stated that he is completely out of the medication, however he NOT leave a pharmacy name  Please advise the patient [phone]

## 2018-07-13 NOTE — PROGRESS NOTES
Results for orders placed or performed in visit on 07/13/18   Cardiac EP device report    Narrative    MDT SINGLE ICD  DEVICE INTERROGATED IN THE [place] OFFICE  BATTERY VOLTAGE ADEQUATE (2 69V - RRT=2 63V) CHARGE TIME 13 5 SECS  : <0 1%  ALL LEAD PARAMETERS WITHIN NORMAL LIMITS  NO SIGNIFICANT HIGH RATE EPISODES  OPTI-VOL WITHIN NORMAL LIMITS  INCREASE MADE TO AMPLITUDE TO PROVIDE ADEQUATE SAFETY MARGIN  NO OTHER PROGRAMMING CHANGES MADE TO DEVICE PARAMETERS  APPROPRIATELY FUNCTIONING ICD   [address] [place] [address]

## 2018-07-16 ENCOUNTER — TELEPHONE (OUTPATIENT)
Dept: FAMILY MEDICINE CLINIC | Facility: CLINIC | Age: 68
End: 2018-07-16

## 2018-07-16 NOTE — TELEPHONE ENCOUNTER
Patient left message on voice mail requesting a refill on Carveliol 25mg (take 2 tablets by mouth 2 times a day with meals) called into [facility] [phone]

## 2018-07-17 RX ORDER — CARVEDILOL 25 MG/1
TABLET ORAL
Qty: 60 TABLET | Refills: 5 | OUTPATIENT
Start: 2018-07-17

## 2018-07-17 RX ORDER — CARVEDILOL 25 MG/1
25 TABLET ORAL 2 TIMES DAILY WITH MEALS
Qty: 60 TABLET | Refills: 3 | Status: SHIPPED | OUTPATIENT
Start: 2018-07-17 | End: 2018-07-18 | Stop reason: SDUPTHER

## 2018-07-18 ENCOUNTER — OFFICE VISIT (OUTPATIENT)
Dept: FAMILY MEDICINE CLINIC | Facility: CLINIC | Age: 68
End: 2018-07-18
Payer: COMMERCIAL

## 2018-07-18 VITALS
OXYGEN SATURATION: 98 % | SYSTOLIC BLOOD PRESSURE: 134 MMHG | WEIGHT: 232 LBS | RESPIRATION RATE: 18 BRPM | BODY MASS INDEX: 34.36 KG/M2 | HEART RATE: 78 BPM | TEMPERATURE: 96.4 F | HEIGHT: 69 IN | DIASTOLIC BLOOD PRESSURE: 76 MMHG

## 2018-07-18 DIAGNOSIS — Z79.4 TYPE 2 DIABETES MELLITUS WITH HYPERGLYCEMIA, WITH LONG-TERM CURRENT USE OF INSULIN (HCC): ICD-10-CM

## 2018-07-18 DIAGNOSIS — I10 ESSENTIAL HYPERTENSION: ICD-10-CM

## 2018-07-18 DIAGNOSIS — N18.30 CHRONIC RENAL INSUFFICIENCY, STAGE III (MODERATE) (HCC): ICD-10-CM

## 2018-07-18 DIAGNOSIS — I50.42 CHRONIC COMBINED SYSTOLIC AND DIASTOLIC CONGESTIVE HEART FAILURE (HCC): ICD-10-CM

## 2018-07-18 DIAGNOSIS — IMO0001 UNCONTROLLED TYPE 2 DIABETES MELLITUS WITHOUT COMPLICATION, WITH LONG-TERM CURRENT USE OF INSULIN: Primary | ICD-10-CM

## 2018-07-18 DIAGNOSIS — E11.65 TYPE 2 DIABETES MELLITUS WITH HYPERGLYCEMIA, WITH LONG-TERM CURRENT USE OF INSULIN (HCC): ICD-10-CM

## 2018-07-18 PROBLEM — G47.00 PERSISTENT INSOMNIA: Status: ACTIVE | Noted: 2018-05-10

## 2018-07-18 PROCEDURE — 99214 OFFICE O/P EST MOD 30 MIN: CPT | Performed by: FAMILY MEDICINE

## 2018-07-18 RX ORDER — CARVEDILOL 25 MG/1
25 TABLET ORAL 2 TIMES DAILY WITH MEALS
Qty: 60 TABLET | Refills: 3 | Status: SHIPPED | OUTPATIENT
Start: 2018-07-18 | End: 2018-11-09 | Stop reason: SDUPTHER

## 2018-07-18 NOTE — ASSESSMENT & PLAN NOTE
Lab Results   Component Value Date    HGBA1C 11 2 (H) 07/10/2018     Poorly controlled  Patient states he was on Victoza in the past and had done well without any side effects    He is interested in restarting this medication

## 2018-07-18 NOTE — PATIENT INSTRUCTIONS
Foot Care for People with Diabetes   AMBULATORY CARE:   What you need to know about foot care:   · Foot care helps protect your feet and prevent foot ulcers or sores  Long-term high blood sugar levels can damage the blood vessels and nerves in your legs and feet  This damage makes it hard to feel pressure, pain, temperature, and touch  You may not be able to feel a cut or sore, or shoes that are too tight  Foot care is needed to prevent serious problems, such as an infection or amputation  · Diabetes may cause your toes to become crooked or curved under  These changes may affect the way you walk and can lead to increased pressure on your foot  The pressure can decrease blood flow to your feet  Lack of blood flow increases your risk for a foot ulcer  Do not ignore small problems, such as dry skin or small wounds  These can become life-threatening over time without proper care  Contact your healthcare provider if:   · Your feet become numb, weak, or hard to move  · You have pus draining from a sore on your foot  · You have a wound on your foot that gets bigger, deeper, or does not heal      · You see blisters, cuts, scratches, calluses, or sores on your foot  · You have a fever, and your feet become red, warm, and swollen  · Your toenails become thick, curled, or yellow  · You find it hard to check your feet because your vision is poor  · You have questions or concerns about your condition or care  How to care for your feet:   · Check your feet each day  Look at your whole foot, including the bottom, and between and under your toes  Check for wounds, corns, and calluses  Use a mirror to see the bottom of your feet  The skin on your feet may be shiny, tight, or darker than normal  Your feet may also be cold and pale  Feel your feet by running your hands along the tops, bottoms, sides, and between your toes   Redness, swelling, and warmth are signs of blood flow problems that can lead to a foot ulcer  Do not try to remove corns or calluses yourself  · Wash your feet each day with soap and warm water  Do not use hot water, because this can injure your foot  Dry your feet gently with a towel after you wash them  Dry between and under your toes  · Apply lotion or a moisturizer on your dry feet  Ask your healthcare provider what lotions are best to use  Do not put lotion or moisturizer between your toes  · Cut your toenails correctly  File or cut your toenails straight across  Use a soft brush to clean around your toenails  If your toenails are very thick, you may need to have a healthcare provider or specialist cut them  · Protect your feet  Do not walk barefoot or wear your shoes without socks  Check your shoes for rocks or other objects that can hurt your feet  Wear cotton socks to help keep your feet dry  Wear socks without toe seams, or wear them with the seams inside out  Change your socks each day  Do not wear socks that are dirty or damp  · Wear shoes that fit well  Wear shoes that do not rub against any area of your feet  Your shoes should be ½ to ¾ inch (1 to 2 centimeters) longer than your feet  Your shoes should also have extra space around the widest part of your feet  Walking or athletic shoes with laces or straps that adjust are best  Ask your healthcare provider for help to choose shoes that fit you best  Ask him if you need to wear an insert, orthotic, or bandage on your feet  · Go to your follow-up visits  Your healthcare provider will do a foot exam at least once a year  You may need a foot exam more often if you have nerve damage, foot deformities, or ulcers  He will check for nerve damage and how well you can feel your feet  He will check your shoes to see if they fit well  Follow up with your healthcare provider or foot specialist as directed: You will need to have your feet checked at least once a year   You may need a foot exam more often if you have nerve damage, foot deformities, or ulcers  Write down your questions so you remember to ask them during your visits  © 2017 [employer] Information is for End User's use only and may not be sold, redistributed or otherwise used for commercial purposes  All illustrations and images included in CareNotes® are the copyrighted property of [employer]  or [employer]  The above information is an  only  It is not intended as medical advice for individual conditions or treatments  Talk to your doctor, nurse or pharmacist before following any medical regimen to see if it is safe and effective for you

## 2018-07-18 NOTE — ASSESSMENT & PLAN NOTE
Will monitor labs  Discussed importance of controlling his blood sugars and hypertension to prevent worsening renal function

## 2018-07-18 NOTE — ASSESSMENT & PLAN NOTE
The current medical regimen is effective;  continue present plan and medications    F/u with cardiology

## 2018-07-18 NOTE — PROGRESS NOTES
Assessment/Plan:    1  Start Victoza at 0 6 mg daily x1 week and increase by 0 6 mg weekly until reaching 1 8 mg   Side effects of medication were reviewed extensively and he is to call me if he develops any nausea or vomiting  2   Discussed importance of keeping a blood glucose log and bring this in at his next visit in 4 weeks  3   He will continue to show as prescribed  4   Advised he should follow up with his cardiologist since he has not seen him in almost 1 year  5   Follow-up with ophthalmologist for yearly retinal exams and Podiatry every 8 weeks for foot checks  6   Discussed importance of adequate hydration, blood glucose regulation, blood pressure management and prevention worsening renal disease  7   Obtain a basic metabolic panel 1 week prior to his next visit    No problem-specific Assessment & Plan notes found for this encounter  Diagnoses and all orders for this visit:    Uncontrolled type 2 diabetes mellitus without complication, with long-term current use of insulin (HCC)  -     liraglutide (VICTOZA) injection; Inject 0 3 mL (1 8 mg total) under the skin daily    Chronic combined systolic and diastolic congestive heart failure (HCC)  -     carvedilol (COREG) 25 mg tablet; Take 1 tablet (25 mg total) by mouth 2 (two) times a day with meals  -     Basic metabolic panel; Future    Type 2 diabetes mellitus with hyperglycemia, with long-term current use of insulin (HCC)    Chronic renal insufficiency, stage III (moderate)    Essential hypertension          Subjective:      Patient ID: Celine Avery   is a 80 y o  male  52-year-old male with past medical history of congestive heart failure, hypertension, CAD, CKD stage 3, type 2 diabetes with complications presents today with concerns of his elevated blood sugars          The following portions of the patient's history were reviewed and updated as appropriate: allergies, current medications, past family history, past medical history, past social history, past surgical history and problem list     Review of Systems   Constitutional: Negative for appetite change and unexpected weight change  Eyes: Negative for visual disturbance  Respiratory: Negative for chest tightness and shortness of breath  Cardiovascular: Negative for chest pain, palpitations and leg swelling  Genitourinary: Negative for frequency  Skin: Negative for color change  Neurological: Negative for dizziness  Objective:      /76 (BP Location: Right arm, Patient Position: Sitting, Cuff Size: Standard)   Pulse 78   Temp (!) 96 4 °F (35 8 °C)   Resp 18   Ht 5' 9" (1 753 m)   Wt 105 kg (232 lb)   SpO2 98%   BMI 34 26 kg/m²          Physical Exam   Constitutional: He appears well-developed and well-nourished  No distress  HENT:   Head: Normocephalic and atraumatic  Neck: Normal range of motion  Neck supple  Carotid bruit is not present  No edema present  Cardiovascular: Normal rate, regular rhythm and normal heart sounds  Pulmonary/Chest: Effort normal and breath sounds normal  He has no wheezes  He has no rales  Neurological: He is alert  Psychiatric: He has a normal mood and affect   His behavior is normal  Judgment and thought content normal

## 2018-07-23 ENCOUNTER — TELEPHONE (OUTPATIENT)
Dept: FAMILY MEDICINE CLINIC | Facility: CLINIC | Age: 68
End: 2018-07-23

## 2018-07-23 DIAGNOSIS — Z79.4 TYPE 2 DIABETES MELLITUS WITH HYPERGLYCEMIA, WITH LONG-TERM CURRENT USE OF INSULIN (HCC): Primary | ICD-10-CM

## 2018-07-23 DIAGNOSIS — E11.65 TYPE 2 DIABETES MELLITUS WITH HYPERGLYCEMIA, WITH LONG-TERM CURRENT USE OF INSULIN (HCC): Primary | ICD-10-CM

## 2018-07-23 NOTE — TELEPHONE ENCOUNTER
Insurance will not cover victoza  Please inform  I added on tradjenta 5mg tab once a day and eprescribed this to [facility]  Also suggest a referral to endocrinology for uncontrolled diabetes  Please help schedule this appt within 2-3 weeks   thanks

## 2018-07-24 NOTE — TELEPHONE ENCOUNTER
I called and informed the wife as Srinath Garnett was sleeping  I informed her about the medication change and that he needs to see endocrine for dm  I offered to make appt with [facility] for her, and she declined stated that she see's endocrine herself and she will just make appt at same place

## 2018-07-25 ENCOUNTER — OFFICE VISIT (OUTPATIENT)
Dept: UROLOGY | Facility: MEDICAL CENTER | Age: 68
End: 2018-07-25
Payer: COMMERCIAL

## 2018-07-25 ENCOUNTER — TELEPHONE (OUTPATIENT)
Dept: FAMILY MEDICINE CLINIC | Facility: CLINIC | Age: 68
End: 2018-07-25

## 2018-07-25 VITALS
SYSTOLIC BLOOD PRESSURE: 130 MMHG | BODY MASS INDEX: 34.36 KG/M2 | WEIGHT: 232 LBS | HEIGHT: 69 IN | DIASTOLIC BLOOD PRESSURE: 76 MMHG

## 2018-07-25 DIAGNOSIS — C61 PROSTATE CA (HCC): Primary | ICD-10-CM

## 2018-07-25 DIAGNOSIS — N52.31 ERECTILE DYSFUNCTION AFTER RADICAL PROSTATECTOMY: ICD-10-CM

## 2018-07-25 LAB
SL AMB  POCT GLUCOSE, UA: ABNORMAL
SL AMB LEUKOCYTE ESTERASE,UA: ABNORMAL
SL AMB POCT BILIRUBIN,UA: ABNORMAL
SL AMB POCT BLOOD,UA: ABNORMAL
SL AMB POCT CLARITY,UA: CLEAR
SL AMB POCT COLOR,UA: YELLOW
SL AMB POCT KETONES,UA: ABNORMAL
SL AMB POCT NITRITE,UA: ABNORMAL
SL AMB POCT PH,UA: 5
SL AMB POCT SPECIFIC GRAVITY,UA: 1.01
SL AMB POCT URINE PROTEIN: ABNORMAL
SL AMB POCT UROBILINOGEN: 0.2

## 2018-07-25 PROCEDURE — 99214 OFFICE O/P EST MOD 30 MIN: CPT | Performed by: UROLOGY

## 2018-07-25 PROCEDURE — 81003 URINALYSIS AUTO W/O SCOPE: CPT | Performed by: UROLOGY

## 2018-07-25 NOTE — TELEPHONE ENCOUNTER
[facility] pharmacy called and stated the patient medication   Victoza needs a prior authorization from his insurance  Please advise the pharmacy at [phone]

## 2018-07-25 NOTE — LETTER
July 25, 2018     Deborah Robles, [address] [place] [address]    Patient: Eli Yarbrough Sr  YOB: 1950   Date of Visit: 7/25/2018       Dear Dr Abraham Nobles: Thank you for referring Eli Yarbrough to me for evaluation  Below are my notes for this consultation  If you have questions, please do not hesitate to call me  I look forward to following your patient along with you  Sincerely,        Lew Nair MD        CC: No Recipients  Lew Nair MD  7/25/2018 11:18 AM  Sign at close encounter  Assessment/Plan:  1  Adenocarcinoma of the prostate  -repeat PSA in 6 months along with comprehensive metabolic panel CBC  2   Erectile dysfunction  -IPP working adequately  Diagnoses and all orders for this visit:    Prostate CA ([address] )  -     POCT urine dip auto non-scope  -     PSA Total, Diagnostic; Future  -     Comprehensive metabolic panel; Future  -     CBC and differential; Future    Erectile dysfunction after radical prostatectomy          Subjective:     Patient ID: Eli Yarbrough Sr  is a 80 y o  male  Chief complaint:  Prostate cancer-my PSA went down    History of present illness:  27-year-old male well known to me with a history of radical prostatectomy and radiation therapy with a detectable PSA that jill to over 5 in April and now the patient presents for follow-up  He currently notes that he is voiding well without obstructive irritative symptoms gross hematuria dysuria frequency urgency or significant incontinence  Patient's PSA is now down to 5 slightly lower than prior  In addition the patient notes that he has trouble with penile sensation consistent with diabetic neuropathy  His penile implant is functioning adequately however because of this neuropathy he finds intercourse less than satisfactory  He however has no problems related to his implant  Review of Systems   Constitutional: Positive for activity change     HENT: Negative  Respiratory: Negative  Cardiovascular: Positive for leg swelling  Gastrointestinal: Negative  Genitourinary: Negative  Neurological: Negative  Psychiatric/Behavioral: Negative  Objective:     Physical Exam   Constitutional: He is oriented to person, place, and time  He appears well-nourished  No distress  HENT:   Head: Atraumatic  Eyes: EOM are normal    Neck: Neck supple  Pulmonary/Chest: Effort normal  No respiratory distress  Abdominal: Soft  He exhibits no distension  Neurological: He is alert and oriented to person, place, and time  Psychiatric: He has a normal mood and affect  His behavior is normal  Judgment and thought content normal    Vitals reviewed

## 2018-07-25 NOTE — PROGRESS NOTES
Assessment/Plan:  1  Adenocarcinoma of the prostate  -repeat PSA in 6 months along with comprehensive metabolic panel CBC  2   Erectile dysfunction  -IPP working adequately  Diagnoses and all orders for this visit:    Prostate CA ([address] )  -     POCT urine dip auto non-scope  -     PSA Total, Diagnostic; Future  -     Comprehensive metabolic panel; Future  -     CBC and differential; Future    Erectile dysfunction after radical prostatectomy          Subjective:     Patient ID: Barbara Reich Sr  is a 80 y o  male  Chief complaint:  Prostate cancer-my PSA went down    History of present illness:  60-year-old male well known to me with a history of radical prostatectomy and radiation therapy with a detectable PSA that jill to over 5 in April and now the patient presents for follow-up  He currently notes that he is voiding well without obstructive irritative symptoms gross hematuria dysuria frequency urgency or significant incontinence  Patient's PSA is now down to 5 slightly lower than prior  In addition the patient notes that he has trouble with penile sensation consistent with diabetic neuropathy  His penile implant is functioning adequately however because of this neuropathy he finds intercourse less than satisfactory  He however has no problems related to his implant  Review of Systems   Constitutional: Positive for activity change  HENT: Negative  Respiratory: Negative  Cardiovascular: Positive for leg swelling  Gastrointestinal: Negative  Genitourinary: Negative  Neurological: Negative  Psychiatric/Behavioral: Negative  Objective:     Physical Exam   Constitutional: He is oriented to person, place, and time  He appears well-nourished  No distress  HENT:   Head: Atraumatic  Eyes: EOM are normal    Neck: Neck supple  Pulmonary/Chest: Effort normal  No respiratory distress  Abdominal: Soft  He exhibits no distension     Neurological: He is alert and oriented to person, place, and time  Psychiatric: He has a normal mood and affect  His behavior is normal  Judgment and thought content normal    Vitals reviewed

## 2018-07-26 DIAGNOSIS — E87.6 HYPOKALEMIA: Primary | ICD-10-CM

## 2018-07-26 RX ORDER — POTASSIUM CHLORIDE 1.5 G/1.77G
20 POWDER, FOR SOLUTION ORAL DAILY
Qty: 90 TABLET | Refills: 3 | Status: SHIPPED | OUTPATIENT
Start: 2018-07-26 | End: 2018-10-03 | Stop reason: SDUPTHER

## 2018-07-26 NOTE — TELEPHONE ENCOUNTER
The pt called because he said Dr Slim Castle was suppose to start him on Victoza but he is not sure what dose and I do not see it on the med list     Also, he will need precert for that medication  I will do precert after you tell me mg and starting dose  Thanks!

## 2018-07-26 NOTE — TELEPHONE ENCOUNTER
I called the pt and left a voice mail to call us back  Re the pharmacy says he needs Victoza precert but he is not on Victoza on his chart  I have a feeling his endocrinologist ordered the meds  Please clarify if he calls back

## 2018-07-26 NOTE — TELEPHONE ENCOUNTER
Yes I spoke to the wife and read Ever Randall that message when he called, but he was insistent that his wife said he was suppose to take the pills with the [facility], which is why he was confused  I explained that it is just the pills, and he understood

## 2018-07-26 NOTE — TELEPHONE ENCOUNTER
Please see my message regarding this on July 23rd at 5:26 p m  You actually spoke to this patient's wife on July 24th at 10:26 a m  Did you tell Lin Luh or his wife that I called in tradjenta per my message?

## 2018-08-01 ENCOUNTER — OFFICE VISIT (OUTPATIENT)
Dept: CARDIOLOGY CLINIC | Facility: CLINIC | Age: 68
End: 2018-08-01
Payer: COMMERCIAL

## 2018-08-01 VITALS
DIASTOLIC BLOOD PRESSURE: 68 MMHG | OXYGEN SATURATION: 97 % | BODY MASS INDEX: 19.55 KG/M2 | WEIGHT: 132 LBS | HEART RATE: 64 BPM | SYSTOLIC BLOOD PRESSURE: 136 MMHG | HEIGHT: 69 IN

## 2018-08-01 DIAGNOSIS — I42.8 NICM (NONISCHEMIC CARDIOMYOPATHY) (HCC): Primary | ICD-10-CM

## 2018-08-01 DIAGNOSIS — I10 HTN (HYPERTENSION), BENIGN: ICD-10-CM

## 2018-08-01 DIAGNOSIS — E78.00 PURE HYPERCHOLESTEROLEMIA: ICD-10-CM

## 2018-08-01 PROCEDURE — 99214 OFFICE O/P EST MOD 30 MIN: CPT | Performed by: INTERNAL MEDICINE

## 2018-08-01 RX ORDER — ISOSORBIDE DINITRATE 10 MG/1
10 TABLET ORAL 3 TIMES DAILY
Qty: 270 TABLET | Refills: 3 | Status: SHIPPED | OUTPATIENT
Start: 2018-08-01 | End: 2019-09-14 | Stop reason: SDUPTHER

## 2018-08-01 NOTE — PROGRESS NOTES
Heart Failure Outpatient Progress Note - Gustavo Pandey Sr  80 y o  male MRN: [id number]    @ Encounter: [id number]      Assessment/Plan:    Patient Active Problem List    Diagnosis Date Noted    JOE (obstructive sleep apnea) 05/10/2018    Persistent insomnia 05/10/2018    Erectile dysfunction following radical prostatectomy 04/12/2018    Erectile dysfunction of non-organic origin 10/06/2016    Acute on chronic combined systolic and diastolic CHF (congestive heart failure) ([facility] [address] ) 05/01/2016    Type 2 diabetes mellitus (HCC)     Essential hypertension     Chronic obstructive pulmonary disease (HCC)     CHF (congestive heart failure) ([facility])     Chronic renal insufficiency, stage III (moderate)     Prostate cancer ([facility])     Anxiety and depression     Chronic combined systolic and diastolic CHF, NYHA class 3 ([facility] )     Coronary atherosclerosis 12/19/2014    Peripheral neuropathy 12/19/2014    Obesity with body mass index 30 or greater 10/15/2014    Generalized anxiety disorder 08/12/2014    Hyperlipidemia 08/12/2014     # Chronic Systolic CHF, Stage C  Etiology: NIC  Weight: 232 lbs- at basline    TODAY'S PLAN:  No change in medical therapy  --2g sodium diet  - Daily weights    Echocardiogram 1/2/18  LVEF: 40%, high filling pressures  LVIDd: 6 8 cm  RV:  MR: mild  PASP:  RVOT:   Other:    Neurohormonal Blockade:  --Beta-Blocker: coreg 25 mg BID  --ACEi, ARB or ARNi: Entresto 97/103 mg BID, isordil 10 mg TID,     (or SVR reduction)  --Aldosterone Receptor Blocker:  --Diuretic: lasix 40 mg BID with metolazone prn    Sudden Cardiac Death Risk Reduction:  --ICD:   Interrogation: MDT single ICD,7/13/18: no events    Electrical Resynchronization:  --  Interrogation:     Advanced Therapies (If appropriate):   --Inotrope:  --LVAD/Transplant Candidacy:    # HTN- managed well  # JOE on CPAP at night  # DM2  # CKD3, cr 1 94 on labs 7/10/18  # Hx of prostate CA  # anxiety and depression  # hyperlipidemia- 7/10/18: LDL 45, HDL 22, Tri 294  Rx: simvastatin 40 mg daily  # ED- has penile prosthesis, regrets as he lost feeling    HPI:   81 yo male following up regarding management of chronic systolic congestive heart failure  He was in the hospital with decompensated systolic congestive heart failure in Dec 2016  An echocardiogram was done in the hospital which demonstrated an ejection fraction of 30%  Initially ACE-I removed secondary to chronic kidney disease stage III with baseline creatinine around 1 8  He has comorbidities including chronic kidney disease stage III, hypertension, type 2 diabetes mellitus, obstructive sleep apnea on C Pap at night, prostate cancer status post surgery, asthma     reports no history of MI  He states he had a defibrillator placed a long time ago he says 10-12 years ago but states he never had ICD generator changed  He is still on Benicar  He had fistula surgery for preparation for dialysis about 3 years ago but never needed dialysis  Previously I increased his Entresto to 97/103 mg  He is taking lasix 40 mg BID  His weight last visit was 254 lbs  Wt today is    His weight is stable, no dyspnea   Has upcoming penile implant in Jan       Interval History:  Had penis pump  Stair lift put in house  No hospitalizations    Past Medical History:   Diagnosis Date    AICD (automatic cardioverter/defibrillator) present     Anxiety and depression     Arthritis     Asthma     pt denies    CAD (coronary artery disease) 10/10/2014    CKD (chronic kidney disease) stage 3, GFR 30-59 ml/min     COPD [facility] pulmonary [facility]) [facility])     pt denies    CPAP (continuous positive airway pressure) dependence     Depression (emotion)     affective disorder    Erectile dysfunction     GERD (gastroesophageal reflux disease)     Hypertension     Iron deficiency anemia     Latent tuberculosis     Lumbar herniated disc     low back pain    Neuropathy     bles    Prostate cancer ([facility] [address] ) 2013- had radiation- had prostatectomy    Sleep apnea     Systolic CHF, chronic (HCC)     Type 2 diabetes mellitus with hyperlipidemia (HCC)     Use of cane as ambulatory aid     Vitamin D deficiency     Wears glasses        Review of Systems - Negative except no dyspnea, no chest pain, no edema    Allergies   Allergen Reactions    Ibuprofen Nausea Only    Penicillins Other (See Comments)     unknown           Current Outpatient Prescriptions:     aspirin (ADULT ASPIRIN EC LOW STRENGTH) 81 mg EC tablet, Take 81 mg by mouth daily  , Disp: , Rfl:     BD PEN NEEDLE MARCELINO U/F 32G X 4 MM MISC, USE THREE TIMES A DAY OR AS DIRECTED BY PHYSICAN  00, Disp: , Rfl: 3    carvedilol (COREG) 25 mg tablet, Take 1 tablet (25 mg total) by mouth 2 (two) times a day with meals, Disp: 60 tablet, Rfl: 3    Cholecalciferol (VITAMIN D3) 5000 units CAPS, Take 1 capsule by mouth daily, Disp: , Rfl:     CINNAMON PO, Take 500 mg by mouth daily, Disp: , Rfl:     Coenzyme Q10 (COQ-10) 200 MG CAPS, Take 1 capsule by mouth daily  , Disp: , Rfl:     flurazepam (DALMANE) 30 MG capsule, Take 30 mg by mouth daily at bedtime as needed for sleep, Disp: , Rfl:     furosemide (LASIX) 40 mg tablet, TAKE 1 TABLET BY MOUTH TWICE DAILY, Disp: 180 tablet, Rfl: 3    gabapentin (NEURONTIN) 400 mg capsule, Take 100 mg by mouth 4 (four) times a day, Disp: , Rfl:     isosorbide dinitrate (ISORDIL) 10 mg tablet, Take 10 mg by mouth 3 (three) times a day, Disp: , Rfl:     Magnesium 500 MG CAPS, Take 1 capsule by mouth daily, Disp: , Rfl:     metolazone (ZAROXOLYN) 5 mg tablet, TAKE 1 TABLET BY MOUTH EVERY DAY AS NEEDED WEIGHT GAIN 3 LBS, Disp: 30 tablet, Rfl: 2    Multiple Vitamin (MULTIVITAMIN) capsule, Take 1 capsule by mouth daily  , Disp: , Rfl:     Omega-3 Fatty Acids (FISH OIL) 1200 MG CAPS, Take 1 capsule by mouth daily, Disp: , Rfl:     potassium chloride (KLOR-CON) 20 mEq packet, Take 20 mEq by mouth daily, Disp: 90 tablet, Rfl: 3    sacubitril-valsartan (ENTRESTO)  MG TABS, Take 1 tablet by mouth 2 (two) times a day, Disp: 60 tablet, Rfl: 4    simvastatin (ZOCOR) 40 mg tablet, TAKE 1 TABLET BY ORAL ROUTE EVERY DAY IN THE EVENING, Disp: , Rfl: 5    TOUJEO SOLOSTAR injection pen 300 units/mL, INJECT BY SUBCUTANEOUS ROUTE 43 UNITS IN THE AM AND 38 UNITS IN THE PM, Disp: , Rfl: 5    Social History     Social History    Marital status: /Civil Union     Spouse name: N/A    Number of children: N/A    Years of education: N/A     Occupational History    DISABLED      Social History Main Topics    Smoking status: Former Smoker     Packs/day: 3 00     Years: 20 00     Quit date: 1976    Smokeless tobacco: Never Used      Comment: never a smoker as per [employer]    Alcohol use Yes      Comment: glass wine daily    Drug use: No    Sexual activity: Not on file     Other Topics Concern    Not on file     Social History Narrative    No narrative on file       Family History   Problem Relation Age of Onset    Dementia Mother        Physical Exam:    Vitals:   Vitals:    08/01/18 1007   BP: 136/68   Pulse: 64   SpO2: 97%     Wt Readings from Last 3 Encounters:   08/01/18 59 9 kg (132 lb)   07/25/18 105 kg (232 lb)   07/18/18 105 kg (232 lb)     Physical Exam:    GEN: Derek Keene Sr  appears well, alert and oriented x 3, pleasant and cooperative   HEENT: pupils equal, round, and reactive to light; extraocular muscles intact  NECK: supple, no carotid bruits   HEART: regular rhythm, normal S1 and S2, no murmurs, clicks, gallops or rubs, JVP is  down  LUNGS: clear to auscultation bilaterally; no wheezes, rales, or rhonchi   ABDOMEN: normal bowel sounds, soft, no tenderness, no distention  EXTREMITIES: peripheral pulses normal; no clubbing, cyanosis, trace edema  NEURO: no focal findings   SKIN: normal without suspicious lesions on exposed skin    Labs & Results:    Lab Results   Component Value Date    GLUCOSE 238 (H) 04/24/2018 CALCIUM 9 3 07/10/2018     07/10/2018    K 3 9 07/10/2018    CO2 33 (H) 07/10/2018    CL 96 (L) 07/10/2018    BUN 39 (H) 07/10/2018    CREATININE 1 94 (H) 07/10/2018     Lab Results   Component Value Date    WBC 4 70 07/10/2018    HGB 13 4 (L) 07/10/2018    HCT 40 6 (L) 07/10/2018    MCV 88 07/10/2018     (L) 07/10/2018     Lab Results   Component Value Date    NTBNP 2,701 (H) 12/20/2016      Lab Results   Component Value Date    CHOL 126 07/10/2018    CHOL 128 04/24/2018    CHOL 192 12/21/2016     Lab Results   Component Value Date    HDL 22 (L) 07/10/2018    HDL 23 (L) 04/24/2018    HDL 48 12/21/2016     Lab Results   Component Value Date    LDLCALC 45 07/10/2018    LDLCALC 59 04/24/2018    LDLCALC 103 (H) 12/21/2016     Lab Results   Component Value Date    TRIG 294 (H) 07/10/2018    TRIG 231 (H) 04/24/2018    TRIG 203 (H) 12/21/2016     No components found for: CHOLHDL    EKG personally reviewed by Odalis Jean Baptiste, DO  Counseling / Coordination of Care  Total floor / unit time spent today 25 minutes  Greater than 50% of total time was spent with the patient and / or family counseling and / or coordination of care  A description of the counseling / coordination of care: 15      Thank you for the opportunity to participate in the care of this patient    REYNOLD Ward

## 2018-08-10 DIAGNOSIS — I50.42 CHRONIC COMBINED SYSTOLIC AND DIASTOLIC HEART FAILURE, NYHA CLASS 3 (HCC): ICD-10-CM

## 2018-08-10 RX ORDER — SACUBITRIL AND VALSARTAN 97; 103 MG/1; MG/1
1 TABLET, FILM COATED ORAL 2 TIMES DAILY
Qty: 60 TABLET | Refills: 4 | Status: SHIPPED | OUTPATIENT
Start: 2018-08-10 | End: 2019-03-24 | Stop reason: SDUPTHER

## 2018-08-15 ENCOUNTER — APPOINTMENT (OUTPATIENT)
Dept: LAB | Facility: HOSPITAL | Age: 68
End: 2018-08-15
Payer: COMMERCIAL

## 2018-08-15 ENCOUNTER — TRANSCRIBE ORDERS (OUTPATIENT)
Dept: ADMINISTRATIVE | Facility: HOSPITAL | Age: 68
End: 2018-08-15

## 2018-08-15 DIAGNOSIS — E11.9 TYPE 2 DIABETES MELLITUS WITHOUT COMPLICATION, WITH LONG-TERM CURRENT USE OF INSULIN (HCC): Primary | ICD-10-CM

## 2018-08-15 DIAGNOSIS — Z79.4 TYPE 2 DIABETES MELLITUS WITHOUT COMPLICATION, WITH LONG-TERM CURRENT USE OF INSULIN (HCC): Primary | ICD-10-CM

## 2018-08-15 DIAGNOSIS — C61 PROSTATE CA (HCC): ICD-10-CM

## 2018-08-15 DIAGNOSIS — I50.42 CHRONIC COMBINED SYSTOLIC AND DIASTOLIC CONGESTIVE HEART FAILURE (HCC): ICD-10-CM

## 2018-08-15 LAB
ALBUMIN SERPL BCP-MCNC: 3.9 G/DL (ref 3–5.2)
ALP SERPL-CCNC: 129 U/L (ref 43–122)
ALT SERPL W P-5'-P-CCNC: 38 U/L (ref 9–52)
ANION GAP SERPL CALCULATED.3IONS-SCNC: 12 MMOL/L (ref 5–14)
AST SERPL W P-5'-P-CCNC: 27 U/L (ref 17–59)
BILIRUB SERPL-MCNC: 0.3 MG/DL
BUN SERPL-MCNC: 28 MG/DL (ref 5–25)
CALCIUM SERPL-MCNC: 9 MG/DL (ref 8.4–10.2)
CHLORIDE SERPL-SCNC: 97 MMOL/L (ref 97–108)
CO2 SERPL-SCNC: 32 MMOL/L (ref 22–30)
CREAT SERPL-MCNC: 1.97 MG/DL (ref 0.7–1.5)
EOSINOPHIL # BLD AUTO: 0.2 THOUSAND/UL (ref 0–0.4)
EOSINOPHIL NFR BLD MANUAL: 4 % (ref 0–6)
ERYTHROCYTE [DISTWIDTH] IN BLOOD BY AUTOMATED COUNT: 16.2 %
GFR SERPL CREATININE-BSD FRML MDRD: 39 ML/MIN/1.73SQ M
GLUCOSE P FAST SERPL-MCNC: 209 MG/DL (ref 70–99)
HCT VFR BLD AUTO: 38.7 % (ref 41–53)
HGB BLD-MCNC: 12.9 G/DL (ref 13.5–17.5)
LYMPHOCYTES # BLD AUTO: 0.5 THOUSAND/UL (ref 0.5–4)
LYMPHOCYTES # BLD AUTO: 10 % (ref 20–50)
MCH RBC QN AUTO: 30.2 PG (ref 26–34)
MCHC RBC AUTO-ENTMCNC: 33.3 G/DL (ref 31–36)
MCV RBC AUTO: 91 FL (ref 80–100)
MONOCYTES # BLD AUTO: 0.5 THOUSAND/UL (ref 0.2–0.9)
MONOCYTES NFR BLD AUTO: 10 % (ref 1–10)
NEUTS BAND NFR BLD MANUAL: 2 % (ref 0–8)
NEUTS SEG # BLD: 3.8 THOUSAND/UL (ref 1.8–7.8)
NEUTS SEG NFR BLD AUTO: 74 %
PLATELET # BLD AUTO: 144 THOUSANDS/UL (ref 150–450)
PLATELET BLD QL SMEAR: ABNORMAL
PMV BLD AUTO: 9.9 FL (ref 8.9–12.7)
POTASSIUM SERPL-SCNC: 3.7 MMOL/L (ref 3.6–5)
PROT SERPL-MCNC: 7.6 G/DL (ref 5.9–8.4)
PSA SERPL-MCNC: 6.3 NG/ML (ref 0–4)
RBC # BLD AUTO: 4.26 MILLION/UL (ref 4.5–5.9)
RBC MORPH BLD: NORMAL
SODIUM SERPL-SCNC: 141 MMOL/L (ref 137–147)
TOTAL CELLS COUNTED SPEC: 100
WBC # BLD AUTO: 5 THOUSAND/UL (ref 4.5–11)

## 2018-08-15 PROCEDURE — 36415 COLL VENOUS BLD VENIPUNCTURE: CPT

## 2018-08-15 PROCEDURE — 80053 COMPREHEN METABOLIC PANEL: CPT

## 2018-08-15 PROCEDURE — 85027 COMPLETE CBC AUTOMATED: CPT

## 2018-08-15 PROCEDURE — 84153 ASSAY OF PSA TOTAL: CPT

## 2018-08-15 PROCEDURE — 85007 BL SMEAR W/DIFF WBC COUNT: CPT

## 2018-08-15 RX ORDER — INSULIN GLARGINE 300 U/ML
INJECTION, SOLUTION SUBCUTANEOUS
Qty: 9 PEN | Refills: 5 | Status: SHIPPED | OUTPATIENT
Start: 2018-08-15 | End: 2019-04-03 | Stop reason: SDUPTHER

## 2018-08-20 DIAGNOSIS — E11.8 TYPE 2 DIABETES MELLITUS WITH COMPLICATION, WITH LONG-TERM CURRENT USE OF INSULIN (HCC): Primary | ICD-10-CM

## 2018-08-20 DIAGNOSIS — Z79.4 TYPE 2 DIABETES MELLITUS WITH COMPLICATION, WITH LONG-TERM CURRENT USE OF INSULIN (HCC): Primary | ICD-10-CM

## 2018-08-27 DIAGNOSIS — E11.8 TYPE 2 DIABETES MELLITUS WITH COMPLICATION, WITH LONG-TERM CURRENT USE OF INSULIN (HCC): ICD-10-CM

## 2018-08-27 DIAGNOSIS — Z79.4 TYPE 2 DIABETES MELLITUS WITH COMPLICATION, WITH LONG-TERM CURRENT USE OF INSULIN (HCC): ICD-10-CM

## 2018-08-27 NOTE — TELEPHONE ENCOUNTER
Patient's wife called and requested a medication refill for One Touch Ultra Test Strips and she would like them called into [facility] on [address] [phone]  He needs to test 3 times a day

## 2018-09-07 ENCOUNTER — OFFICE VISIT (OUTPATIENT)
Dept: FAMILY MEDICINE CLINIC | Facility: CLINIC | Age: 68
End: 2018-09-07
Payer: COMMERCIAL

## 2018-09-07 VITALS
WEIGHT: 141.2 LBS | DIASTOLIC BLOOD PRESSURE: 72 MMHG | HEIGHT: 69 IN | SYSTOLIC BLOOD PRESSURE: 132 MMHG | BODY MASS INDEX: 20.91 KG/M2

## 2018-09-07 DIAGNOSIS — E11.65 TYPE 2 DIABETES MELLITUS WITH HYPERGLYCEMIA, WITH LONG-TERM CURRENT USE OF INSULIN (HCC): Chronic | ICD-10-CM

## 2018-09-07 DIAGNOSIS — I50.22 CHRONIC SYSTOLIC CONGESTIVE HEART FAILURE (HCC): ICD-10-CM

## 2018-09-07 DIAGNOSIS — E78.2 MIXED HYPERLIPIDEMIA: Chronic | ICD-10-CM

## 2018-09-07 DIAGNOSIS — Z79.4 TYPE 2 DIABETES MELLITUS WITH HYPERGLYCEMIA, WITH LONG-TERM CURRENT USE OF INSULIN (HCC): Chronic | ICD-10-CM

## 2018-09-07 DIAGNOSIS — I10 ESSENTIAL HYPERTENSION: Chronic | ICD-10-CM

## 2018-09-07 DIAGNOSIS — N18.30 CHRONIC RENAL INSUFFICIENCY, STAGE III (MODERATE) (HCC): Primary | Chronic | ICD-10-CM

## 2018-09-07 PROCEDURE — 3008F BODY MASS INDEX DOCD: CPT | Performed by: FAMILY MEDICINE

## 2018-09-07 PROCEDURE — 99214 OFFICE O/P EST MOD 30 MIN: CPT | Performed by: FAMILY MEDICINE

## 2018-09-10 PROBLEM — I10 BENIGN HYPERTENSION: Status: ACTIVE | Noted: 2017-05-01

## 2018-09-10 PROBLEM — M19.019 LOCALIZED, PRIMARY OSTEOARTHRITIS OF SHOULDER REGION: Status: ACTIVE | Noted: 2018-08-13

## 2018-09-10 PROBLEM — M25.512 PAIN IN JOINT OF LEFT SHOULDER: Status: ACTIVE | Noted: 2018-08-13

## 2018-09-10 NOTE — PROGRESS NOTES
Assessment/Plan:    CHF (congestive heart failure) ([facility] [address] )  The current medical regimen is effective;  continue present plan and medications  F/u with cardiology    Hyperlipidemia  The current medical regimen is effective;  continue present plan and medications  Problem List Items Addressed This Visit     Type 2 diabetes mellitus (HCC)    Relevant Medications    Linagliptin (TRADJENTA) 5 MG TABS    Other Relevant Orders    Hemoglobin A1C    Comprehensive metabolic panel    Essential hypertension    Relevant Orders    Comprehensive metabolic panel    Lipid panel    CBC and differential    CHF (congestive heart failure) (HCC)     The current medical regimen is effective;  continue present plan and medications  F/u with cardiology         Chronic renal insufficiency, stage III (moderate) - Primary    Relevant Orders    Ambulatory referral to Nephrology    CBC and differential    Hyperlipidemia (Chronic)     The current medical regimen is effective;  continue present plan and medications  Subjective:      Patient ID: Magaly Gordon  is a 80 y o  male  71-year-old male with past medical history of type 2 diabetes requiring insulin (uncontrolled), coronary artery disease, hypertension, chronic systolic heart failure, CKD stage 3, and dyslipidemia presents today for follow-up of his chronic conditions  Patient states that he has been overall feeling well  His diabetes numbers are improving since the addition of Tradjenta  He is trying to eat better  He denies any recent hospitalizations or emergency room visits  He overall has been feeling well and has not had any chest pain, shortness of breath, palpitations, dizziness, vision change, lower extremity edema, nausea, abdominal pain, bowel changes, rashes          The following portions of the patient's history were reviewed and updated as appropriate: allergies, current medications, past family history, past medical history, past social history, past surgical history and problem list     Review of Systems   Constitutional: Negative for appetite change and unexpected weight change  Eyes: Negative for visual disturbance  Respiratory: Negative for chest tightness and shortness of breath  Cardiovascular: Negative for chest pain, palpitations and leg swelling  Genitourinary: Negative for frequency  Skin: Negative for color change  Neurological: Negative for dizziness  Objective:      /72 (BP Location: Right arm, Patient Position: Sitting, Cuff Size: Standard)   Ht 5' 9" (1 753 m)   Wt 64 kg (141 lb 3 2 oz)   BMI 20 85 kg/m²          Physical Exam   Constitutional: He appears well-developed and well-nourished  No distress  HENT:   Head: Normocephalic and atraumatic  Neck: Normal range of motion  Neck supple  Carotid bruit is not present  No edema present  Cardiovascular: Normal rate, regular rhythm and normal heart sounds  Pulmonary/Chest: Effort normal and breath sounds normal  He has no wheezes  He has no rales  Neurological: He is alert  Psychiatric: He has a normal mood and affect   His behavior is normal  Judgment and thought content normal

## 2018-09-26 ENCOUNTER — TELEPHONE (OUTPATIENT)
Dept: FAMILY MEDICINE CLINIC | Facility: CLINIC | Age: 68
End: 2018-09-26

## 2018-09-26 NOTE — TELEPHONE ENCOUNTER
The process that I recommend for patient to qualify for a scooter is to schedule an appt with the following and they will document the necessity and best type of assistive device for his mobility  Marlena Shen for [address] [address] Emely Starr [address] [facility]  For more information on the [employer] [facility] [employer] [facility] contact us today, call [phone]

## 2018-09-26 NOTE — TELEPHONE ENCOUNTER
Patient called and would like to know if Dr Noni Sarabia could do a letter for Cynthia Jay to get a scooter to get around with  He said that he could get the scooter at [address] [place] [address]   Please advise patient [phone]

## 2018-09-30 DIAGNOSIS — E11.65 TYPE 2 DIABETES MELLITUS WITH HYPERGLYCEMIA, WITH LONG-TERM CURRENT USE OF INSULIN (HCC): Primary | ICD-10-CM

## 2018-09-30 DIAGNOSIS — Z79.4 TYPE 2 DIABETES MELLITUS WITH HYPERGLYCEMIA, WITH LONG-TERM CURRENT USE OF INSULIN (HCC): Primary | ICD-10-CM

## 2018-10-01 RX ORDER — PEN NEEDLE, DIABETIC 32GX 5/32"
NEEDLE, DISPOSABLE MISCELLANEOUS
Qty: 100 EACH | Refills: 3 | Status: SHIPPED | OUTPATIENT
Start: 2018-10-01 | End: 2019-03-04 | Stop reason: SDUPTHER

## 2018-10-02 ENCOUNTER — TELEPHONE (OUTPATIENT)
Dept: FAMILY MEDICINE CLINIC | Facility: CLINIC | Age: 68
End: 2018-10-02

## 2018-10-02 ENCOUNTER — PATIENT OUTREACH (OUTPATIENT)
Dept: FAMILY MEDICINE CLINIC | Facility: CLINIC | Age: 68
End: 2018-10-02

## 2018-10-02 NOTE — TELEPHONE ENCOUNTER
Patient called and stated that Dr Charles Og needs to do a script for the patient to take to [facility] for his scooter  He asked if the script could be put in the mail for him  If there are any questions the patient can be reached at [phone]

## 2018-10-02 NOTE — PROGRESS NOTES
Outpatient Care Management Note:  The following is the care coordination plan of care for the patient  Please review and sign off within 2 weeks  Disclaimer: Failure to reply within 2 weeks will be considered an automatic sign off of the plan of care by the primary care provider

## 2018-10-03 ENCOUNTER — OFFICE VISIT (OUTPATIENT)
Dept: NEPHROLOGY | Facility: CLINIC | Age: 68
End: 2018-10-03
Payer: COMMERCIAL

## 2018-10-03 ENCOUNTER — TELEPHONE (OUTPATIENT)
Dept: FAMILY MEDICINE CLINIC | Facility: CLINIC | Age: 68
End: 2018-10-03

## 2018-10-03 VITALS
WEIGHT: 147.2 LBS | SYSTOLIC BLOOD PRESSURE: 170 MMHG | BODY MASS INDEX: 21.8 KG/M2 | HEIGHT: 69 IN | HEART RATE: 68 BPM | DIASTOLIC BLOOD PRESSURE: 96 MMHG

## 2018-10-03 DIAGNOSIS — Z79.4 TYPE 2 DIABETES MELLITUS WITH COMPLICATION, WITH LONG-TERM CURRENT USE OF INSULIN (HCC): Primary | ICD-10-CM

## 2018-10-03 DIAGNOSIS — G63 POLYNEUROPATHY ASSOCIATED WITH UNDERLYING DISEASE (HCC): ICD-10-CM

## 2018-10-03 DIAGNOSIS — C61 PROSTATE CANCER (HCC): ICD-10-CM

## 2018-10-03 DIAGNOSIS — N18.30 CHRONIC RENAL INSUFFICIENCY, STAGE III (MODERATE) (HCC): Primary | Chronic | ICD-10-CM

## 2018-10-03 DIAGNOSIS — M19.90 ARTHRITIS: ICD-10-CM

## 2018-10-03 DIAGNOSIS — E87.6 HYPOKALEMIA: ICD-10-CM

## 2018-10-03 DIAGNOSIS — G47.33 OSA (OBSTRUCTIVE SLEEP APNEA): ICD-10-CM

## 2018-10-03 DIAGNOSIS — E11.21 TYPE 2 DIABETES MELLITUS WITH DIABETIC NEPHROPATHY, WITH LONG-TERM CURRENT USE OF INSULIN (HCC): ICD-10-CM

## 2018-10-03 DIAGNOSIS — I50.22 CHRONIC SYSTOLIC CONGESTIVE HEART FAILURE (HCC): ICD-10-CM

## 2018-10-03 DIAGNOSIS — I25.10 ATHEROSCLEROSIS OF NATIVE CORONARY ARTERY OF NATIVE HEART WITHOUT ANGINA PECTORIS: ICD-10-CM

## 2018-10-03 DIAGNOSIS — R26.9 ABNORMALITY OF GAIT: ICD-10-CM

## 2018-10-03 DIAGNOSIS — Z79.4 TYPE 2 DIABETES MELLITUS WITH DIABETIC NEPHROPATHY, WITH LONG-TERM CURRENT USE OF INSULIN (HCC): ICD-10-CM

## 2018-10-03 DIAGNOSIS — I10 ESSENTIAL HYPERTENSION: ICD-10-CM

## 2018-10-03 DIAGNOSIS — I50.22 CHRONIC SYSTOLIC CHF (CONGESTIVE HEART FAILURE), NYHA CLASS 2 (HCC): ICD-10-CM

## 2018-10-03 DIAGNOSIS — E11.8 TYPE 2 DIABETES MELLITUS WITH COMPLICATION, WITH LONG-TERM CURRENT USE OF INSULIN (HCC): Primary | ICD-10-CM

## 2018-10-03 PROCEDURE — 99204 OFFICE O/P NEW MOD 45 MIN: CPT | Performed by: INTERNAL MEDICINE

## 2018-10-03 RX ORDER — FUROSEMIDE 40 MG/1
60 TABLET ORAL 2 TIMES DAILY
Qty: 270 TABLET | Refills: 3 | Status: SHIPPED | OUTPATIENT
Start: 2018-10-03 | End: 2019-11-23 | Stop reason: SDUPTHER

## 2018-10-03 RX ORDER — POTASSIUM CHLORIDE 1.5 G/1.77G
20 POWDER, FOR SOLUTION ORAL 2 TIMES DAILY
Qty: 180 TABLET | Refills: 3 | Status: SHIPPED | OUTPATIENT
Start: 2018-10-03 | End: 2019-07-30 | Stop reason: SDUPTHER

## 2018-10-03 NOTE — PROGRESS NOTES
Nephrology Initial Consultation  Amarjit Love Sr  80 y o  male MRN: [id number]            REASON FOR CONSULTATION:  Ca Jacobs  is a 80 y o male who was referred by [facility] for evaluation of Chronic Kidney Disease    ASSESSMENT / PLAN:   80 y o   male with pmh of multiple co-morbidities including HTN, DM and CKD stage IIIB presented to the clinic for evaluation and management of abnormal renal parameters  1  CKD stage III B:  Patient has a baseline creatinine of 1 8-1 9mg/dL  Most recent labs show a Creatinine of 1 97mg/dL  Renal function remains stable  Likely has underlying CKD secondary to diabetic nodularo glomerular sclerosis plus hypertensive nephrosclerosis plus cardiorenal syndrome plus age-related nephron loss  Will still rule out paraproteinemia and cystic disease  Spep and renal ultrasound ordered  Will check renal ultrasound to evaluate kidney size, echogenicity and r/o cysts  Proteinuria - Will check UA, spot urine protein and creatinine  Acid base and lytes stable except borderline hypokalemia patient remains of potassium supplementation of 20 mEq p o  Q day, on magnesium supplements no recent magnesium level present  Magnesium level ordered with next blood draw  Clinically the patient appears to be minimally hypervolemic-will adjust diuretics as below  Recommend to avoid use of NSAIDs, nephrotoxins  Caution advised with regards to exposure to IV contrast dye  Patient has nonfunctional AV fistula in place in the left arm    2  Hypertension:  Patient is on Coreg 25 mg p o  B i d ,entresto 97/103mg p o  Q day, Lasix 40 mg p o  B i d , Isordil 10 mg p o  T i d , metolazone 5 mg p o  P r n (2 times per week), K-Dur 20 meq p o  Q day, magnesium supplements  Change Lasix to 60 mg p o  B i d , and K-Dur to 20 mEq p o  B i d   Blood work to be done in about 1 week  Patient advised to call our message with blood pressure and volume status in about 10 days    Goal BP of < 140/90 based on his comorbidities  Instructed to follow low sodium (2gm)diet  Advised to hold ACEI/ARBs if patient suffers from dehydration due to gastrointestinal losses due to risk of GAGE secondary to failure to autoregulate  If patient's blood pressure remains elevated may consider stopping potassium supplementation and starting patient on Aldactone as it may help  3  Hemoglobin:  Goal Hb of 10-12 g/dL  Most recent labs suggestive of 12 9gm/dL  No role for iron supplementation at this time  Check CBC with next visit  4 CKD-MBD(Mineral Bone Disease):  Based on patients CKD stage following is the goal of therapy  Maintain calcium phosphorus product of < 55  Stage 3 CKD - Goal Ca 8 5-10 mg/dL , goal Phos 2 7-4 6 mg/dL  , goal iPTH 30-70 pg/mL  Continue patient on vitamin-D 5000 units p o  Q day  Vitamin D intact PTH levels ordered with next blood draw    5  Lipids: On fish oil, Zocor  Goal LDL less than 70  Management as per PCP    6  Nutrition:  Encouraged patient to follow a renal diet comprising of moderate potassium, low phosphorus and protein restriction to 0 8gm/kg  7  DM:  Advised patient of tight glycemic control to obtain A1c closer to 7%  This is needed to help decrease progression of CKD  Remains own tried mindi and Yady Novak  Also on Neurontin for neuropathy  Dose of Neurontin may need to be adjusted as renal function declines over the time    8  Prostate cancer:  Status post prostatectomy  Patient has an appointment for follow-up with Urology for recent elevated PSA levels  9  Followup:  Patient is to follow-up in 3 months, with lab work to be performed a few days prior to the visit    Blood work and urine testing to be done in about a week    Renato Hayes MD, [facility], 10/3/2018, 10:39 AM             HISTORY OF PRESENT ILLNESS: 80 y o  male with past medical history of hypertension (x15yrs), diabetes (x25yrs, no DR), obesity, CHF systolic dysfunction, CAD status post AICD/pacemaker, obstructive sleep apnea on CPAP, prostate cancer status post surgical removal and CKD stage 3 (baseline creatinine 1 8-1 9mg/dl) presented to the clinic for evaluation and management of abnormal renal parameters  Patient reports she is well currently has no acute complaints reports that his blood pressure is usually around 135-145 range  Does not consume excessive salt or preservatives in his diet  Patient reports no new medication changes in the last 2-3 months  Patient denies any recent hospitalizations  Patient reports that few years ago Um he was told that he may end up needing dialysis hence an AV fistula was placed in his left arm  He denies ever seeing a nephrologist   He reports AV fistula was placed by a physician at [facility]   Patient reports that he does not feel any thrill in his AV fistula  Patient denies ever having received a dialysis treatment  Patient reports that his recent PSA level was elevated and he has to follow up with Urology on October 5th  Patient reports that he has been on and Crest 0 for about 2 years and is stable on it  Patient reports he had 2 ends up taking metolazone twice a week usually patient reports he is aware that his last hemoglobin A1c was elevated at 11 5%  Patient is aware that he needs to control his diabetes better however he has a hard time with his diet  Patient is happy with all the information that he has received today and all the care that he has gotten  ROS: Denies headache, chest pain, shortness of breath, abdominal pain, nausea, vomiting, diarrhea, constipation, hematuria, dysuria, change in weight, NSAID use  No recent hospitalizations  All other systems reviewed and negative      PAST MEDICAL HISTORY:  Past Medical History:   Diagnosis Date    AICD (automatic cardioverter/defibrillator) present     Anxiety and depression     Arthritis     Asthma     pt denies    CAD (coronary artery disease) 10/10/2014    CKD (chronic kidney disease) stage 3, GFR 30-59 ml/min [facility]     COPD (chronic obstructive pulmonary disease) ([facility]     pt denies    CPAP (continuous positive airway pressure) dependence     Depression (emotion)     affective disorder    Diabetic nephropathy (HCC)     Erectile dysfunction     GERD (gastroesophageal reflux disease)     Hypertension     Iron deficiency anemia     Latent tuberculosis     Lumbar herniated disc     low back pain    Neuropathy     bles    Prostate cancer ([facility] )     2013- had radiation- had prostatectomy    Sleep apnea     Systolic CHF, chronic ([facility])     Type 2 diabetes mellitus with hyperlipidemia ([facility])     Use of cane as ambulatory aid     Vitamin D deficiency     Wears glasses        PROBLEM LIST    Patient Active Problem List   Diagnosis    Type 2 diabetes mellitus ([facility] )    Essential hypertension    Chronic obstructive pulmonary disease ([facility]    CHF (congestive heart failure) ([facility]    Chronic renal insufficiency, stage III (moderate) ([facility])    Prostate cancer ([facility])    Anxiety and depression    Chronic combined systolic and diastolic CHF, NYHA class 3 ([facility])    Acute on chronic combined systolic and diastolic CHF (congestive heart failure) ([place] [address] )    Erectile dysfunction following radical prostatectomy    JOE (obstructive sleep apnea)    Persistent insomnia    Obesity with body mass index 30 or greater    Coronary atherosclerosis    Erectile dysfunction of non-organic origin    Generalized anxiety disorder    Hyperlipidemia    Peripheral neuropathy    Benign hypertension    Localized, primary osteoarthritis of shoulder region    Pain in joint of left shoulder       PAST SURGICAL HISTORY:  Past Surgical History:   Procedure Laterality Date    AV FISTULA PLACEMENT      left upper arm and removal    CARDIAC DEFIBRILLATOR PLACEMENT      CATARACT EXTRACTION Bilateral 10/09/2014    COLONOSCOPY      INGUINAL HERNIA REPAIR Bilateral     OTHER SURGICAL HISTORY Left 10/10/2014 AV Shunt    OH INSERT,INFLATABLE PENILE PROSTHESIS N/A 1/19/2018    Procedure: INSERTION 3 PART PROSTHESIS PENILE;  Surgeon: Rde Thibodeaux MD;  Location: [facility];  Service: Urology    PROSTATECTOMY         SOCIAL HISTORY :   reports that he quit smoking about 42 years ago  He has a 60 00 pack-year smoking history  He has never used smokeless tobacco  He reports that he drinks alcohol  He reports that he does not use drugs  FAMILY HISTORY:  Family History   Problem Relation Age of Onset    Dementia Mother     Does not know any history of fall about his father    ALLERGIES:  Allergies   Allergen Reactions    Ibuprofen Nausea Only    Penicillins Other (See Comments)     unknown           PHYSICAL EXAM:  Vitals:    10/03/18 0943   BP: 170/96   BP Location: Right arm   Patient Position: Sitting   Cuff Size: Standard   Pulse: 68   Weight: 66 8 kg (147 lb 3 2 oz)   Height: 5' 9" (1 753 m)     Body mass index is 21 74 kg/m²  Repeat blood pressure 144/84  General:  NAD, AAOx3, male, obese,   HEENT:  EOMI, MMM, No pallor, no icterus  NECK: No increased JVD  HEART: Normal S1 and S2  Regular rhythm  No murmurs or rubs  +LACW aicd/pm  LUNGS: Clear to auscultation bilateral  No wheezes, rales, or rhonchi  ABDOMEN: +BS, Soft, nontender and nondistended  No rebound or guarding present  : No CVA tenderness  EXTREMITIES: + 1 bilateral lower extremity edema  No cyanosis or clubbing  No asterixis, + left arm AV fistula with no thrill or bruit  NEURO: moving all extremities     SKIN: no obvious rashes    LABORATORY DATA:       WBC   Date Value Ref Range Status   08/15/2018 5 00 4 50 - 11 00 Thousand/uL Final   07/10/2018 4 70 4 50 - 11 00 Thousand/uL Final   04/24/2018 4 6 4 5 - 11 0 K/MCL Final     Hemoglobin   Date Value Ref Range Status   08/15/2018 12 9 (L) 13 5 - 17 5 g/dL Final   07/10/2018 13 4 (L) 13 5 - 17 5 g/dL Final   04/24/2018 13 1 (L) 13 5 - 17 5 G/DL Final     Sodium   Date Value Ref Range Status   08/15/2018 141 137 - 147 mmol/L Final   07/10/2018 138 137 - 147 mmol/L Final   04/24/2018 137 137 - 147 MEQ/L Final     Potassium   Date Value Ref Range Status   08/15/2018 3 7 3 6 - 5 0 mmol/L Final   07/10/2018 3 9 3 6 - 5 0 mmol/L Final   04/24/2018 3 7 3 6 - 5 0 MEQ/L Final     BUN   Date Value Ref Range Status   08/15/2018 28 (H) 5 - 25 mg/dL Final   07/10/2018 39 (H) 5 - 25 mg/dL Final   04/24/2018 23 5 - 25 MG/DL Final     Creatinine   Date Value Ref Range Status   08/15/2018 1 97 (H) 0 70 - 1 50 mg/dL Final     Comment:     Standardized to IDMS reference method   07/10/2018 1 94 (H) 0 70 - 1 50 mg/dL Final     Comment:     Standardized to IDMS reference method   01/04/2018 2 54 (H) 0 60 - 1 30 mg/dL Final     Comment:     Standardized to IDMS reference method      rest all reviewed    RADIOLOGY:  US retroperitoneal complete    (Results Pending)     Rest all reviewed        MEDICATIONS:    Current Outpatient Prescriptions:     aspirin (ADULT ASPIRIN EC LOW STRENGTH) 81 mg EC tablet, Take 81 mg by mouth daily  , Disp: , Rfl:     BD PEN NEEDLE MARCELINO U/F 32G X 4 MM MISC, USE THREE TIMES A DAY OR AS DIRECTED BY PHYSICAN  00, Disp: 100 each, Rfl: 3    carvedilol (COREG) 25 mg tablet, Take 1 tablet (25 mg total) by mouth 2 (two) times a day with meals, Disp: 60 tablet, Rfl: 3    Cholecalciferol (VITAMIN D3) 5000 units CAPS, Take 1 capsule by mouth daily, Disp: , Rfl:     CINNAMON PO, Take 500 mg by mouth daily, Disp: , Rfl:     Coenzyme Q10 (COQ-10) 200 MG CAPS, Take 1 capsule by mouth daily  , Disp: , Rfl:     ENTRESTO  MG TABS, TAKE 1 TABLET BY MOUTH 2 (TWO) TIMES A DAY, Disp: 60 tablet, Rfl: 4    flurazepam (DALMANE) 30 MG capsule, Take 30 mg by mouth daily at bedtime as needed for sleep, Disp: , Rfl:     furosemide (LASIX) 40 mg tablet, Take 1 5 tablets (60 mg total) by mouth 2 (two) times a day, Disp: 270 tablet, Rfl: 3    gabapentin (NEURONTIN) 400 mg capsule, Take 100 mg by mouth 4 (four) times a day, Disp: , Rfl:     glucose blood (ONE TOUCH ULTRA TEST) test strip, 1 each by Other route 3 (three) times a day Use as instructed, Disp: 100 each, Rfl: 11    isosorbide dinitrate (ISORDIL) 10 mg tablet, Take 1 tablet (10 mg total) by mouth 3 (three) times a day, Disp: 270 tablet, Rfl: 3    Linagliptin (TRADJENTA) 5 MG TABS, Take 5 mg by mouth daily, Disp: 30 tablet, Rfl: 5    Magnesium 500 MG CAPS, Take 1 capsule by mouth daily, Disp: , Rfl:     metolazone (ZAROXOLYN) 5 mg tablet, TAKE 1 TABLET BY MOUTH EVERY DAY AS NEEDED WEIGHT GAIN 3 LBS, Disp: 30 tablet, Rfl: 2    Multiple Vitamin (MULTIVITAMIN) capsule, Take 1 capsule by mouth daily  , Disp: , Rfl:     Omega-3 Fatty Acids (FISH OIL) 1200 MG CAPS, Take 1 capsule by mouth daily, Disp: , Rfl:     potassium chloride (KLOR-CON) 20 mEq packet, Take 20 mEq by mouth 2 (two) times a day, Disp: 180 tablet, Rfl: 3    simvastatin (ZOCOR) 40 mg tablet, TAKE 1 TABLET BY ORAL ROUTE EVERY DAY IN THE EVENING, Disp: , Rfl: 5    TOUJEO SOLOSTAR 300 units/mL CONCETRATED U-300 injection pen, INJECT BY SUBCUTANEOUS ROUTE 43 UNITS IN THE AM AND 38 UNITS IN THE PM, Disp: 9 pen, Rfl: 5        Portions of the record may have been created with voice recognition software  Occasional wrong word or "sound a like" substitutions may have occurred due to the inherent limitations of voice recognition software  Read the chart carefully and recognize, using context, where substitutions have occurred  If you have any questions, please contact the dictating provider

## 2018-10-03 NOTE — PATIENT INSTRUCTIONS
Change lasix to 60mg twice a day  Change potassium to 20meq twice a day   Get blood work and urine test in 1 week  Get blood work before next visit  Call or message me in 7-10 days about blood pressure and swelling  Try to talk to PCP and work to control diabetes and bring A1c down closer to 7%    ~ Please call if the blood pressure top number is greater than 150 or less than 110 consistently   ~ Please call if you are gaining more than 2lbs in 2 days for adjustment of water pills  Things to do to reduce your blood pressure include working with all your physician to do the following:  ~ stop smoking if you smoke  ~ increase cardiovascular exercise like walking and swimming    ~ modify your diet to decrease fat and salt intake  ~ reduce your weight if you are overweight or obese   ~ increase the consumption of fruits, vegetables and whole grains  ~ decrease alcohol consumption if you consume alcohol    ~ try to minimize stress in your life with lifestyle modifications  ~ be compliant with your anti-hypertensive medications  ~ adjust your medications to help improve your vascular stiffness and decrease risks for heart attacks and strokes  Phosphorus diet  Follow a low phosphorus diet      Avoid these higher phosphorus foods: Choose these lower phosphorus foods:   Milk, pudding or yogurt (from animals and from many soy varieties) Rice milk (unfortified), nondairy creamer (if it doesn't have terms in the ingredients list that contain the letters "phos")   Hard cheeses, ricotta or cottage cheese, fat-free cream cheese Regular and low-fat cream cheese   Ice cream or frozen yogurt Sherbet or frozen fruit pops   Soups made with higher phosphorus ingredients (milk, dried peas, beans, lentils) Soups made with lower phosphorus ingredients (broth- or water-based with other lower phosphorus ingredients)   Whole grains, including whole-grain breads, crackers, cereal, rice and pasta Refined grains, including white bread, crackers, cereals, rice and pasta   Quick breads, biscuits, cornbread, muffins, pancakes or waffles Homemade refined (white) dinner rolls, bagels or English muffins   Dried peas (split, black-eyed), beans (black, garbanzo, lima, kidney, navy, lopez) or lentils Green peas (canned, frozen), green beans or wax beans   Organ meats, walleye, pollock or sardines Lean beef, pork, lamb, poultry or other fish   Nuts and seeds Popcorn   Peanut butter and other nut butters Jam, jelly or honey   Chocolate, including chocolate drinks Carob (chocolate-flavored) candy, hard candy or gumdrops   Stephanie and pepper-type sodas, flavored montilla, bottled teas (if a term in the ingredients list contains the letters "phos") Lemon-lime soda, ginger ale or root beer, plain water     Follow a low potassium diet         ~ Please AVOID the following pain medications    LIST OF NSAIDS (NONSTEROIDAL ANTI-INFLAMMATORY DRUGS) AND REYES-2 INHIBITORS    DIFLUNISAL (DOLOBID)  IBUPROFEN (MOTRIN, ADVIL)  FLURBIPROFEN (ANSAID)  KETOPROFEN (ORUDIS, ORUVAIL)  FENOPROFEN (NALFON)  NABUMETONE (RELAFEN)  PIROXICAM (FELDENE)  NAPROXEN (ALEVE, NAPROSYN, NAPRELAN, ANAPROX)  DICLOFENAC (VOLTAREN, CATAFLAM)  INDOMETHACIN (INDOCIN)  SULINDAC (CLINORIL)  TOLMETIN (TOLETIN)  ETODOLAC (LODINE)  MELOXICAM (MOBIC)  KETOROLAC (TORADOL)  OXAPROZIN (DAYPRO)  CELECOXIB (CELEBREX)

## 2018-10-03 NOTE — TELEPHONE ENCOUNTER
Pt called requesting a script for a scooter  Pt states that orders were sent for dr Natalia Angeles to sign   pls call pt

## 2018-10-03 NOTE — TELEPHONE ENCOUNTER
Wants Rx for Scooter  [facility] Rx by the 24th Dx Arthritis, DM Type 2    Please mail to the pt's home

## 2018-10-03 NOTE — LETTER
October 3, 2018     Iván Sanchez, [address]    Patient: Charisse Eckert Sr  YOB: 1950   Date of Visit: 10/3/2018       Dear Dr Marquita Rangel: Thank you for referring Charisse Eckert to me for evaluation  Below are my notes for this consultation  If you have questions, please do not hesitate to call me  I look forward to following your patient along with you  Sincerely,        Walker Rainey MD        CC: No Recipients  Walker Rainey MD  10/3/2018 10:50 AM  Incomplete    Nephrology Initial Consultation  Charisse Eckert Sr  80 y o  male MRN: [id number]            REASON FOR CONSULTATION:  Oscar Dyer  is a 80 y o male who was referred by Kourtney Oshea for evaluation of Chronic Kidney Disease    ASSESSMENT / PLAN:   80 y o   male with pmh of multiple co-morbidities including HTN, DM and CKD stage IIIB presented to the clinic for evaluation and management of abnormal renal parameters  1  CKD stage III B:  Patient has a baseline creatinine of 1 8-1 9mg/dL  Most recent labs show a Creatinine of 1 97mg/dL  Renal function remains stable  Likely has underlying CKD secondary to diabetic nodularo glomerular sclerosis plus hypertensive nephrosclerosis plus cardiorenal syndrome plus age-related nephron loss  Will still rule out paraproteinemia and cystic disease  Spep and renal ultrasound ordered  Will check renal ultrasound to evaluate kidney size, echogenicity and r/o cysts  Proteinuria - Will check UA, spot urine protein and creatinine  Acid base and lytes stable except borderline hypokalemia patient remains of potassium supplementation of 20 mEq p o  Q day, on magnesium supplements no recent magnesium level present  Magnesium level ordered with next blood draw  Clinically the patient appears to be minimally hypervolemic-will adjust diuretics as below  Recommend to avoid use of NSAIDs, nephrotoxins   Caution advised with regards to exposure to IV contrast dye    Patient has nonfunctional AV fistula in place in the left arm    2  Hypertension:  Patient is on Coreg 25 mg p o  B i d ,entresto 97/103mg p o  Q day, Lasix 40 mg p o  B i d , Isordil 10 mg p o  T i d , metolazone 5 mg p o  P r n (2 times per week), K-Dur 20 meq p o  Q day, magnesium supplements  Change Lasix to 60 mg p o  B i d , and K-Dur to 20 mEq p o  B i d   Blood work to be done in about 1 week  Patient advised to call our message with blood pressure and volume status in about 10 days  Goal BP of < 140/90 based on his comorbidities  Instructed to follow low sodium (2gm)diet  Advised to hold ACEI/ARBs if patient suffers from dehydration due to gastrointestinal losses due to risk of GAGE secondary to failure to autoregulate  If patient's blood pressure remains elevated may consider  3  Hemoglobin:  Goal Hb of 10-12 g/dL  Most recent labs suggestive of 12 9gm/dL  No role for iron supplementation at this time  Check CBC with next visit  4 CKD-MBD(Mineral Bone Disease):  Based on patients CKD stage following is the goal of therapy  Maintain calcium phosphorus product of < 55  Stage 3 CKD - Goal Ca 8 5-10 mg/dL , goal Phos 2 7-4 6 mg/dL  , goal iPTH 30-70 pg/mL  Continue patient on vitamin-D 5000 units p o  Q day  Vitamin D intact PTH levels ordered with next blood draw    5  Lipids: On fish oil, Zocor  Goal LDL less than 70  Management as per PCP    6  Nutrition:  Encouraged patient to follow a renal diet comprising of moderate potassium, low phosphorus and protein restriction to 0 8gm/kg  7  DM:  Advised patient of tight glycemic control to obtain A1c closer to 7%  This is needed to help decrease progression of CKD  Remains own tried mindi and Melissa Peter  Also on Neurontin for neuropathy  Dose of Neurontin may need to be adjusted as renal function declines over the time    8  Followup:  Patient is to follow-up in 3 months, with lab work to be performed a few days prior to the visit    Blood work and urine testing to be done in about a week    Miguel Payan MD, [facility], 10/3/2018, 10:39 AM             HISTORY OF PRESENT ILLNESS: 80 y o  male with past medical history of hypertension (x15yrs), diabetes (x25yrs, no DR), obesity, CHF systolic dysfunction, CAD status post AICD/pacemaker, obstructive sleep apnea on CPAP, prostate cancer status post surgical removal and CKD stage 3 (baseline creatinine 1 8-1 9mg/dl) presented to the clinic for evaluation and management of abnormal renal parameters  Patient reports she is well currently has no acute complaints reports that his blood pressure is usually around 135-145 range  Does not consume excessive salt or preservatives in his diet  Patient reports no new medication changes in the last 2-3 months  Patient denies any recent hospitalizations  Patient reports that few years ago Um he was told that he may end up needing dialysis hence an AV fistula was placed in his left arm  He denies ever seeing a nephrologist   He reports AV fistula was placed by a physician at [facility]   Patient reports that he does not feel any thrill in his AV fistula  Patient denies ever having received a dialysis treatment  Patient reports that his recent PSA level was elevated and he has to follow up with Urology on October 5th  Patient reports that he has been on and Crest 0 for about 2 years and is stable on it  Patient reports he had 2 ends up taking metolazone twice a week usually patient reports he is aware that his last hemoglobin A1c was elevated at 11 5%  Patient is aware that he needs to control his diabetes better however he has a hard time with his diet  Patient is happy with all the information that he has received today and all the care that he has gotten  ROS: Denies headache, chest pain, shortness of breath, abdominal pain, nausea, vomiting, diarrhea, constipation, hematuria, dysuria, change in weight, NSAID use  No recent hospitalizations    All other systems reviewed and negative      PAST MEDICAL HISTORY:  Past Medical History:   Diagnosis Date    AICD (automatic cardioverter/defibrillator) present     Anxiety and depression     Arthritis     Asthma     pt denies    CAD (coronary artery disease) 10/10/2014    CKD (chronic kidney disease) stage 3, GFR 30-59 ml/min ([facility])     COPD (chronic obstructive pulmonary disease) ([facility])     pt denies    CPAP (continuous positive airway pressure) dependence     Depression (emotion)     affective disorder    Diabetic nephropathy ([facility])     Erectile dysfunction     GERD (gastroesophageal reflux disease)     Hypertension     Iron deficiency anemia     Latent tuberculosis     Lumbar herniated disc     low back pain    Neuropathy     bles    Prostate cancer ([facility] )     2013- had radiation- had prostatectomy    Sleep apnea     Systolic CHF, chronic ([facility])     Type 2 diabetes mellitus with hyperlipidemia ([facility])     Use of cane as ambulatory aid     Vitamin D deficiency     Wears glasses        PROBLEM LIST    Patient Active Problem List   Diagnosis    Type 2 diabetes mellitus ([facility] )    Essential hypertension    Chronic obstructive pulmonary disease ([facility])    CHF (congestive heart failure) ([facility])    Chronic renal insufficiency, stage III (moderate) ([facility])    Prostate cancer ([facility])    Anxiety and depression    Chronic combined systolic and diastolic CHF, NYHA class 3 ([facility])    Acute on chronic combined systolic and diastolic CHF (congestive heart failure) [facility] )    Erectile dysfunction following radical prostatectomy    JOE (obstructive sleep apnea)    Persistent insomnia    Obesity with body mass index 30 or greater    Coronary atherosclerosis    Erectile dysfunction of non-organic origin    Generalized anxiety disorder    Hyperlipidemia    Peripheral neuropathy    Benign hypertension    Localized, primary osteoarthritis of shoulder region    Pain in joint of left shoulder       PAST SURGICAL HISTORY:  Past Surgical History:   Procedure Laterality Date    AV FISTULA PLACEMENT      left upper arm and removal    CARDIAC DEFIBRILLATOR PLACEMENT      CATARACT EXTRACTION Bilateral 10/09/2014    COLONOSCOPY      INGUINAL HERNIA REPAIR Bilateral     OTHER SURGICAL HISTORY Left 10/10/2014    AV Shunt    HI INSERT,INFLATABLE PENILE PROSTHESIS N/A 1/19/2018    Procedure: INSERTION 3 PART PROSTHESIS PENILE;  Surgeon: Parisa Nieto MD;  [facility];  Service: Urology    PROSTATECTOMY         SOCIAL HISTORY :   reports that he quit smoking about 42 years ago  He has a 60 00 pack-year smoking history  He has never used smokeless tobacco  He reports that he drinks alcohol  He reports that he does not use drugs  FAMILY HISTORY:  Family History   Problem Relation Age of Onset    Dementia Mother     Does not know any history of fall about his father    ALLERGIES:  Allergies   Allergen Reactions    Ibuprofen Nausea Only    Penicillins Other (See Comments)     unknown           PHYSICAL EXAM:  Vitals:    10/03/18 0943   BP: 170/96   BP Location: Right arm   Patient Position: Sitting   Cuff Size: Standard   Pulse: 68   Weight: 66 8 kg (147 lb 3 2 oz)   Height: 5' 9" (1 753 m)     Body mass index is 21 74 kg/m²  General:  NAD, AAOx3, male, obese,   HEENT:  EOMI, MMM, No pallor, no icterus  NECK: No increased JVD  HEART: Normal S1 and S2  Regular rhythm  No murmurs or rubs  +LACW aicd/pm  LUNGS: Clear to auscultation bilateral  No wheezes, rales, or rhonchi  ABDOMEN: +BS, Soft, nontender and nondistended  No rebound or guarding present  : No CVA tenderness  EXTREMITIES: + 1 bilateral lower extremity edema  No cyanosis or clubbing  No asterixis, + left arm AV fistula with no thrill or bruit  NEURO: moving all extremities     SKIN: no obvious rashes    LABORATORY DATA:       WBC   Date Value Ref Range Status   08/15/2018 5 00 4 50 - 11 00 Thousand/uL Final   07/10/2018 4 70 4 50 - 11 00 Thousand/uL Final   04/24/2018 4 6 4 5 - 11 0 K/MCL Final     Hemoglobin   Date Value Ref Range Status   08/15/2018 12 9 (L) 13 5 - 17 5 g/dL Final   07/10/2018 13 4 (L) 13 5 - 17 5 g/dL Final   04/24/2018 13 1 (L) 13 5 - 17 5 G/DL Final     Sodium   Date Value Ref Range Status   08/15/2018 141 137 - 147 mmol/L Final   07/10/2018 138 137 - 147 mmol/L Final   04/24/2018 137 137 - 147 MEQ/L Final     Potassium   Date Value Ref Range Status   08/15/2018 3 7 3 6 - 5 0 mmol/L Final   07/10/2018 3 9 3 6 - 5 0 mmol/L Final   04/24/2018 3 7 3 6 - 5 0 MEQ/L Final     BUN   Date Value Ref Range Status   08/15/2018 28 (H) 5 - 25 mg/dL Final   07/10/2018 39 (H) 5 - 25 mg/dL Final   04/24/2018 23 5 - 25 MG/DL Final     Creatinine   Date Value Ref Range Status   08/15/2018 1 97 (H) 0 70 - 1 50 mg/dL Final     Comment:     Standardized to IDMS reference method   07/10/2018 1 94 (H) 0 70 - 1 50 mg/dL Final     Comment:     Standardized to IDMS reference method   01/04/2018 2 54 (H) 0 60 - 1 30 mg/dL Final     Comment:     Standardized to IDMS reference method      rest all reviewed    RADIOLOGY:  US retroperitoneal complete    (Results Pending)     Rest all reviewed        MEDICATIONS:    Current Outpatient Prescriptions:     aspirin (ADULT ASPIRIN EC LOW STRENGTH) 81 mg EC tablet, Take 81 mg by mouth daily  , Disp: , Rfl:     BD PEN NEEDLE MARCELINO U/F 32G X 4 MM MISC, USE THREE TIMES A DAY OR AS DIRECTED BY PHYSICAN  00, Disp: 100 each, Rfl: 3    carvedilol (COREG) 25 mg tablet, Take 1 tablet (25 mg total) by mouth 2 (two) times a day with meals, Disp: 60 tablet, Rfl: 3    Cholecalciferol (VITAMIN D3) 5000 units CAPS, Take 1 capsule by mouth daily, Disp: , Rfl:     CINNAMON PO, Take 500 mg by mouth daily, Disp: , Rfl:     Coenzyme Q10 (COQ-10) 200 MG CAPS, Take 1 capsule by mouth daily  , Disp: , Rfl:     ENTRESTO  MG TABS, TAKE 1 TABLET BY MOUTH 2 (TWO) TIMES A DAY, Disp: 60 tablet, Rfl: 4   flurazepam (DALMANE) 30 MG capsule, Take 30 mg by mouth daily at bedtime as needed for sleep, Disp: , Rfl:     furosemide (LASIX) 40 mg tablet, Take 1 5 tablets (60 mg total) by mouth 2 (two) times a day, Disp: 270 tablet, Rfl: 3    gabapentin (NEURONTIN) 400 mg capsule, Take 100 mg by mouth 4 (four) times a day, Disp: , Rfl:     glucose blood (ONE TOUCH ULTRA TEST) test strip, 1 each by Other route 3 (three) times a day Use as instructed, Disp: 100 each, Rfl: 11    isosorbide dinitrate (ISORDIL) 10 mg tablet, Take 1 tablet (10 mg total) by mouth 3 (three) times a day, Disp: 270 tablet, Rfl: 3    Linagliptin (TRADJENTA) 5 MG TABS, Take 5 mg by mouth daily, Disp: 30 tablet, Rfl: 5    Magnesium 500 MG CAPS, Take 1 capsule by mouth daily, Disp: , Rfl:     metolazone (ZAROXOLYN) 5 mg tablet, TAKE 1 TABLET BY MOUTH EVERY DAY AS NEEDED WEIGHT GAIN 3 LBS, Disp: 30 tablet, Rfl: 2    Multiple Vitamin (MULTIVITAMIN) capsule, Take 1 capsule by mouth daily  , Disp: , Rfl:     Omega-3 Fatty Acids (FISH OIL) 1200 MG CAPS, Take 1 capsule by mouth daily, Disp: , Rfl:     potassium chloride (KLOR-CON) 20 mEq packet, Take 20 mEq by mouth 2 (two) times a day, Disp: 180 tablet, Rfl: 3    simvastatin (ZOCOR) 40 mg tablet, TAKE 1 TABLET BY ORAL ROUTE EVERY DAY IN THE EVENING, Disp: , Rfl: 5    TOUJEO SOLOSTAR 300 units/mL CONCETRATED U-300 injection pen, INJECT BY SUBCUTANEOUS ROUTE 43 UNITS IN THE AM AND 38 UNITS IN THE PM, Disp: 9 pen, Rfl: 5        Portions of the record may have been created with voice recognition software  Occasional wrong word or "sound a like" substitutions may have occurred due to the inherent limitations of voice recognition software  Read the chart carefully and recognize, using context, where substitutions have occurred  If you have any questions, please contact the dictating provider            Walker Rainey MD  10/3/2018  9:39 AM  Sign at close encounter

## 2018-10-09 ENCOUNTER — TELEPHONE (OUTPATIENT)
Dept: FAMILY MEDICINE CLINIC | Facility: CLINIC | Age: 68
End: 2018-10-09

## 2018-10-10 ENCOUNTER — APPOINTMENT (OUTPATIENT)
Dept: LAB | Facility: HOSPITAL | Age: 68
End: 2018-10-10
Payer: COMMERCIAL

## 2018-10-10 DIAGNOSIS — Z79.4 TYPE 2 DIABETES MELLITUS WITH HYPERGLYCEMIA, WITH LONG-TERM CURRENT USE OF INSULIN (HCC): Chronic | ICD-10-CM

## 2018-10-10 DIAGNOSIS — I10 ESSENTIAL HYPERTENSION: ICD-10-CM

## 2018-10-10 DIAGNOSIS — E11.65 TYPE 2 DIABETES MELLITUS WITH HYPERGLYCEMIA, WITH LONG-TERM CURRENT USE OF INSULIN (HCC): Chronic | ICD-10-CM

## 2018-10-10 DIAGNOSIS — N18.30 CHRONIC RENAL INSUFFICIENCY, STAGE III (MODERATE) (HCC): ICD-10-CM

## 2018-10-10 DIAGNOSIS — C61 PROSTATE CANCER (HCC): ICD-10-CM

## 2018-10-10 LAB
25(OH)D3 SERPL-MCNC: 43.5 NG/ML (ref 30–100)
ALBUMIN SERPL BCP-MCNC: 4.1 G/DL (ref 3–5.2)
ALP SERPL-CCNC: 111 U/L (ref 43–122)
ALT SERPL W P-5'-P-CCNC: 26 U/L (ref 9–52)
ANION GAP SERPL CALCULATED.3IONS-SCNC: 11 MMOL/L (ref 5–14)
AST SERPL W P-5'-P-CCNC: 52 U/L (ref 17–59)
BILIRUB SERPL-MCNC: 0.5 MG/DL
BUN SERPL-MCNC: 23 MG/DL (ref 5–25)
CALCIUM SERPL-MCNC: 9.1 MG/DL (ref 8.4–10.2)
CHLORIDE SERPL-SCNC: 95 MMOL/L (ref 97–108)
CHOLEST SERPL-MCNC: 111 MG/DL
CO2 SERPL-SCNC: 33 MMOL/L (ref 22–30)
CREAT SERPL-MCNC: 1.81 MG/DL (ref 0.7–1.5)
CREAT UR-MCNC: 66.9 MG/DL
EOSINOPHIL # BLD AUTO: 0.16 THOUSAND/UL (ref 0–0.4)
EOSINOPHIL NFR BLD MANUAL: 4 % (ref 0–6)
ERYTHROCYTE [DISTWIDTH] IN BLOOD BY AUTOMATED COUNT: 15.7 %
EST. AVERAGE GLUCOSE BLD GHB EST-MCNC: 217 MG/DL
GFR SERPL CREATININE-BSD FRML MDRD: 44 ML/MIN/1.73SQ M
GLUCOSE P FAST SERPL-MCNC: 141 MG/DL (ref 70–99)
HBA1C MFR BLD: 9.2 % (ref 4.2–6.3)
HCT VFR BLD AUTO: 38.4 % (ref 41–53)
HDLC SERPL-MCNC: 21 MG/DL (ref 40–59)
HGB BLD-MCNC: 12.7 G/DL (ref 13.5–17.5)
LDLC SERPL CALC-MCNC: 48 MG/DL
LYMPHOCYTES # BLD AUTO: 1.68 THOUSAND/UL (ref 0.5–4)
LYMPHOCYTES # BLD AUTO: 42 % (ref 20–50)
MAGNESIUM SERPL-MCNC: 1.9 MG/DL (ref 1.6–2.3)
MCH RBC QN AUTO: 30.5 PG (ref 26–34)
MCHC RBC AUTO-ENTMCNC: 33.2 G/DL (ref 31–36)
MCV RBC AUTO: 92 FL (ref 80–100)
MONOCYTES # BLD AUTO: 0.24 THOUSAND/UL (ref 0.2–0.9)
MONOCYTES NFR BLD AUTO: 6 % (ref 1–10)
MYELOCYTES NFR BLD MANUAL: 4 % (ref 0–1)
NEUTS BAND NFR BLD MANUAL: 2 % (ref 0–8)
NEUTS SEG # BLD: 1.76 THOUSAND/UL (ref 1.8–7.8)
NEUTS SEG NFR BLD AUTO: 42 %
NONHDLC SERPL-MCNC: 90 MG/DL
PHOSPHATE SERPL-MCNC: 3.9 MG/DL (ref 2.5–4.8)
PLATELET # BLD AUTO: 154 THOUSANDS/UL (ref 150–450)
PLATELET BLD QL SMEAR: ADEQUATE
PMV BLD AUTO: 9.8 FL (ref 8.9–12.7)
POTASSIUM SERPL-SCNC: 4 MMOL/L (ref 3.6–5)
PROT SERPL-MCNC: 7.5 G/DL (ref 5.9–8.4)
PROT UR-MCNC: 19 MG/DL
PROT/CREAT UR: 0.28 MG/G{CREAT} (ref 0–0.1)
PSA SERPL-MCNC: 4.9 NG/ML (ref 0–4)
PTH-INTACT SERPL-MCNC: 121.6 PG/ML (ref 16.7–78.9)
RBC # BLD AUTO: 4.18 MILLION/UL (ref 4.5–5.9)
RBC MORPH BLD: NORMAL
SODIUM SERPL-SCNC: 139 MMOL/L (ref 137–147)
TOTAL CELLS COUNTED SPEC: 100
TRIGL SERPL-MCNC: 208 MG/DL
WBC # BLD AUTO: 4 THOUSAND/UL (ref 4.5–11)

## 2018-10-10 PROCEDURE — 82306 VITAMIN D 25 HYDROXY: CPT

## 2018-10-10 PROCEDURE — 82570 ASSAY OF URINE CREATININE: CPT | Performed by: INTERNAL MEDICINE

## 2018-10-10 PROCEDURE — 85007 BL SMEAR W/DIFF WBC COUNT: CPT

## 2018-10-10 PROCEDURE — 85027 COMPLETE CBC AUTOMATED: CPT

## 2018-10-10 PROCEDURE — 80061 LIPID PANEL: CPT

## 2018-10-10 PROCEDURE — 83036 HEMOGLOBIN GLYCOSYLATED A1C: CPT

## 2018-10-10 PROCEDURE — 83735 ASSAY OF MAGNESIUM: CPT | Performed by: INTERNAL MEDICINE

## 2018-10-10 PROCEDURE — 36415 COLL VENOUS BLD VENIPUNCTURE: CPT

## 2018-10-10 PROCEDURE — 84153 ASSAY OF PSA TOTAL: CPT

## 2018-10-10 PROCEDURE — 84156 ASSAY OF PROTEIN URINE: CPT | Performed by: INTERNAL MEDICINE

## 2018-10-10 PROCEDURE — 84100 ASSAY OF PHOSPHORUS: CPT | Performed by: INTERNAL MEDICINE

## 2018-10-10 PROCEDURE — 83970 ASSAY OF PARATHORMONE: CPT | Performed by: INTERNAL MEDICINE

## 2018-10-10 PROCEDURE — 80053 COMPREHEN METABOLIC PANEL: CPT

## 2018-10-15 ENCOUNTER — OFFICE VISIT (OUTPATIENT)
Dept: FAMILY MEDICINE CLINIC | Facility: CLINIC | Age: 68
End: 2018-10-15
Payer: COMMERCIAL

## 2018-10-15 VITALS
OXYGEN SATURATION: 98 % | DIASTOLIC BLOOD PRESSURE: 70 MMHG | TEMPERATURE: 96.8 F | SYSTOLIC BLOOD PRESSURE: 126 MMHG | RESPIRATION RATE: 16 BRPM | HEART RATE: 68 BPM | WEIGHT: 248 LBS | BODY MASS INDEX: 36.73 KG/M2 | HEIGHT: 69 IN

## 2018-10-15 DIAGNOSIS — I25.10 ATHEROSCLEROSIS OF NATIVE CORONARY ARTERY OF NATIVE HEART WITHOUT ANGINA PECTORIS: ICD-10-CM

## 2018-10-15 DIAGNOSIS — E11.21 TYPE 2 DIABETES MELLITUS WITH DIABETIC NEPHROPATHY, WITH LONG-TERM CURRENT USE OF INSULIN (HCC): Primary | ICD-10-CM

## 2018-10-15 DIAGNOSIS — Z79.4 TYPE 2 DIABETES MELLITUS WITH DIABETIC NEPHROPATHY, WITH LONG-TERM CURRENT USE OF INSULIN (HCC): Primary | ICD-10-CM

## 2018-10-15 DIAGNOSIS — I50.42 CHRONIC COMBINED SYSTOLIC AND DIASTOLIC CHF, NYHA CLASS 3 (HCC): ICD-10-CM

## 2018-10-15 DIAGNOSIS — J44.1 CHRONIC OBSTRUCTIVE PULMONARY DISEASE WITH ACUTE EXACERBATION (HCC): ICD-10-CM

## 2018-10-15 DIAGNOSIS — I10 ESSENTIAL HYPERTENSION: ICD-10-CM

## 2018-10-15 PROCEDURE — 3008F BODY MASS INDEX DOCD: CPT | Performed by: FAMILY MEDICINE

## 2018-10-15 PROCEDURE — 1036F TOBACCO NON-USER: CPT | Performed by: FAMILY MEDICINE

## 2018-10-15 PROCEDURE — 99214 OFFICE O/P EST MOD 30 MIN: CPT | Performed by: FAMILY MEDICINE

## 2018-10-15 PROCEDURE — 3078F DIAST BP <80 MM HG: CPT | Performed by: FAMILY MEDICINE

## 2018-10-15 PROCEDURE — 3074F SYST BP LT 130 MM HG: CPT | Performed by: FAMILY MEDICINE

## 2018-10-15 NOTE — PATIENT INSTRUCTIONS
DASH Eating Plan   AMBULATORY CARE:   The DASH (Dietary Approaches to Stop Hypertension) Eating Plan  is designed to help prevent or lower high blood pressure  It can also help to lower LDL (bad) cholesterol and decrease your risk for heart disease  The plan is low in sodium, sugar, unhealthy fats, and total fat  It is high in potassium, calcium, magnesium, and fiber  These nutrients are added when you eat more fruits, vegetables, and whole grains  Your sodium limit each day: Your dietitian will tell you how much sodium is safe for you to have each day  People with high blood pressure should have no more than 1,500 to 2,300 mg of sodium in a day  A teaspoon (tsp) of salt has 2,300 mg of sodium  This may seem like a difficult goal, but small changes to the foods you eat can make a big difference  Your healthcare provider or dietitian can help you create a meal plan that follows your sodium limit  How to limit sodium:   · Read food labels  Food labels can help you choose foods that are low in sodium  The amount of sodium is listed in milligrams (mg)  The % Daily Value (DV) column tells you how much of your daily needs are met by 1 serving of the food for each nutrient listed  Choose foods that have less than 5% of the DV of sodium  These foods are considered low in sodium  Foods that have 20% or more of the DV of sodium are considered high in sodium  Avoid foods that have more than 300 mg of sodium in each serving  Choose foods that say low-sodium, reduced-sodium, or no salt added on the food label  · Avoid salt  Do not salt food at the table, and add very little salt to foods during cooking  Use herbs and spices, such as onions, garlic, and salt-free seasonings to add flavor to foods  Try lemon or lime juice or vinegar to give foods a tart flavor  Use hot peppers or a small amount of hot pepper sauce to add a spicy flavor to foods  · Ask about salt substitutes    Ask your healthcare provider if you may use salt substitutes  Some salt substitutes have ingredients that can be harmful if you have certain health conditions  · Choose foods carefully at restaurants  Meals from restaurants, especially fast food restaurants, are often high in sodium  Some restaurants have nutrition information that tells you the amount of sodium in their foods  Ask to have your food prepared with less, or no salt  What you need to know about fats:   · Include healthy fats  Examples are unsaturated fats and omega-3 fatty acids  Unsaturated fats are found in soybean, canola, olive, or sunflower oil, and liquid and soft tub margarines  Omega-3 fatty acids are found in fatty fish, such as salmon, tuna, mackerel, and sardines  It is also found in flaxseed oil and ground flaxseed  · Avoid unhealthy fats  Do not eat unhealthy fats, such as saturated fats and trans fats  Saturated fats are found in foods that contain fat from animals  Examples are fatty meats, whole milk, butter, cream, and other dairy foods  It is also found in shortening, stick margarine, palm oil, and coconut oil  Trans fats are found in fried foods, crackers, chips, and baked goods made with margarine or shortening  Foods to include: With the DASH eating plan, you need to eat a certain number of servings from each food group  This will help you get enough of certain nutrients and limit others  The amount of servings you should eat depends on how many calories you need  Your dietitian can tell you how many calories you need  The number of servings listed next to the food groups below are for people who need about 2,000 calories each day    · Grains:  6 to 8 servings (3 of these servings should be whole-grain foods)    ¨ 1 slice of whole-grain bread     ¨ 1 ounce of dry cereal    ¨ ½ cup of cooked cereal, pasta, or brown rice    · Vegetables and fruits:  4 to 5 servings of fruits and 4 to 5 servings of vegetables    ¨ 1 medium fruit    ¨ ½ cup of frozen, canned (no added salt), or chopped fresh vegetables     ¨ ½ cup of fresh, frozen, dried, or canned fruit (canned in light syrup or fruit juice)    ¨ ½ cup of vegetable or fruit juice    · Dairy:  2 to 3 servings    ¨ 1 cup of nonfat (skim) or 1% milk    ¨ 1½ ounces of fat-free or low-fat cheese    ¨ 6 ounces of nonfat or low-fat yogurt    · Lean meat, poultry, and fish:  6 ounces or less    [employer] (chicken, turkey) with no skin    ¨ Fish (especially fatty fish, such as salmon, fresh tuna, or mackerel)    ¨ Lean beef and pork (loin, round, extra lean hamburger)    ¨ Egg whites and egg substitutes    · Nuts, seeds, and legumes:  4 to 5 servings each week    ¨ ½ cup of cooked beans and peas    ¨ 1½ ounces of unsalted nuts    ¨ 2 tablespoons of peanut butter or seeds    · Sweets and added sugars:  5 or less each week    ¨ 1 tablespoon of sugar, jelly, or jam    ¨ ½ cup of sorbet or gelatin    ¨ 1 cup of lemonade    · Fats:  2 to 3 servings each week    ¨ 1 teaspoon of soft margarine or vegetable oil    ¨ 1 tablespoon of mayonnaise    ¨ 2 tablespoons of salad dressing  Foods to avoid:   · Grains:      [employer], such as doughnuts, pastries, cookies, and biscuits (high in fat and sugar)    ¨ Mixes for cornbread and biscuits, packaged foods, such as bread stuffing, rice and pasta mixes, macaroni and cheese, and instant cereals (high in sodium)    · Fruits and vegetables:      ¨ Regular, canned vegetables (high in sodium)    ¨ Sauerkraut, pickled vegetables, and other foods prepared in brine (high in sodium)    ¨ Fried vegetables or vegetables in butter or high-fat sauces    ¨ Fruit in cream or butter sauce (high in fat)    · Dairy:      ¨ Whole milk, 2% milk, and cream (high in fat)    ¨ Regular cheese and processed cheese (high in fat and sodium)    · Meats and protein foods:      ¨ Smoked or cured meat, such as corned beef, brand, ham, hot dogs, and sausage (high in fat and sodium)    ¨ Canned beans and canned meats or spreads, such as potted meats, sardines, anchovies, and imitation seafood (high in sodium)    ¨ Deli or lunch meats, such as bologna, ham, turkey, and roast beef (high in sodium)    ¨ High-fat meat (T-bone steak, regular hamburger, and ribs)    ¨ Whole eggs and egg yolks (high in fat)    · Other:      ¨ Seasonings made with salt, such as garlic salt, celery salt, onion salt, seasoned salt, meat tenderizers, and monosodium glutamate (MSG)    ¨ Miso soup and canned or dried soup mixes (high in sodium)    ¨ Regular soy sauce, barbecue sauce, teriyaki sauce, steak sauce, Worcestershire sauce, and most flavored vinegars (high in sodium)    ¨ Regular condiments, such as mustard, ketchup, and salad dressings (high in sodium)    ¨ Gravy and sauces, such as Olman or cheese sauces (high in sodium and fat)    ¨ Drinks high in sugar, such as soda or fruit drinks    [employer] foods, such as salted chips, popcorn, pretzels, pork rinds, salted crackers, and salted nuts    ¨ Frozen foods, such as dinners, entrees, vegetables with sauces, and breaded meats (high in sodium)  Other guidelines to follow:   · Maintain a healthy weight  Your risk for heart disease is higher if you are overweight  Your healthcare provider may suggest that you lose weight if you are overweight  You can lose weight by eating fewer calories and foods that have added sugars and fat  The DASH meal plan can help you do this  Decrease calories by eating smaller portions at each meal and fewer snacks  Ask your healthcare provider for more information about how to lose weight  · Exercise regularly  Regular exercise can help you reach or maintain a healthy weight  Regular exercise can also help decrease your blood pressure and improve your cholesterol levels  Get 30 minutes or more of moderate exercise each day of the week  To lose weight, get at least 60 minutes of exercise  Talk to your healthcare provider about the best exercise program for you      · Limit alcohol  Women should limit alcohol to 1 drink a day  Men should limit alcohol to 2 drinks a day  A drink of alcohol is 12 ounces of beer, 5 ounces of wine, or 1½ ounces of liquor  © 2017 [address] Information is for End User's use only and may not be sold, redistributed or otherwise used for commercial purposes  All illustrations and images included in CareNotes® are the copyrighted property of [employer] , [employer]  or [employer]ston  The above information is an  only  It is not intended as medical advice for individual conditions or treatments  Talk to your doctor, nurse or pharmacist before following any medical regimen to see if it is safe and effective for you

## 2018-10-15 NOTE — PROGRESS NOTES
Assessment/Plan:    1  Overall patient's labs are improving  I encouraged him to continue to work on his diet and monitor his blood sugars  He should follow up with a new PCP in 6 weeks  2   Continue all his present medications as prescribed  3   Continue close follow-up with Cardiology and Nephrology  4   Patient respectfully declines flu and pneumonia vaccines despite my recommendations on its benefits  Patient verbalized understanding of the risks and benefits of the vaccine but still decline  Problem List Items Addressed This Visit     Type 2 diabetes mellitus ([facility] [address] ) - Primary    Essential hypertension    Chronic obstructive pulmonary disease (HCC)    Chronic combined systolic and diastolic CHF, NYHA class 3 (HCC)    Coronary atherosclerosis            Subjective:      Patient ID: Citlalli Myers Sr  is a 80 y o  male  13-year-old male with past medical history of uncontrolled type 2 diabetes on insulin, CKD stage 4, hypertension, congestive heart failure (class 3), COPD, coronary artery disease, and prostate cancer history history presents today for follow-up of his chronic conditions  Patient states that he has been trying much harder with controlling his blood sugar and he is getting readings around 120-140 fasting  He is taking his medications as prescribed  He also follows with his cardiologist, nephrologist, pulmonologist, and urologist         The following portions of the patient's history were reviewed and updated as appropriate: allergies, current medications, past family history, past medical history, past social history, past surgical history and problem list     Review of Systems   Constitutional: Negative for appetite change and unexpected weight change  Eyes: Negative for visual disturbance  Respiratory: Negative for chest tightness and shortness of breath  Cardiovascular: Negative for chest pain, palpitations and leg swelling  Genitourinary: Negative for frequency  Skin: Negative for color change  Neurological: Negative for dizziness  Objective:      /70 (Cuff Size: Large)   Pulse 68   Temp (!) 96 8 °F (36 °C) (Tympanic)   Resp 16   Ht 5' 9" (1 753 m)   Wt 112 kg (248 lb)   SpO2 98%   BMI 36 62 kg/m²          Physical Exam   Constitutional: He appears well-developed and well-nourished  No distress  HENT:   Head: Normocephalic and atraumatic  Neck: Normal range of motion  Neck supple  Carotid bruit is not present  No edema present  Cardiovascular: Normal rate and regular rhythm  Murmur heard  Pulmonary/Chest: Effort normal and breath sounds normal  He has no wheezes  He has no rales  Neurological: He is alert  Psychiatric: He has a normal mood and affect   His behavior is normal  Judgment and thought content normal

## 2018-10-16 ENCOUNTER — REMOTE DEVICE CLINIC VISIT (OUTPATIENT)
Dept: CARDIOLOGY CLINIC | Facility: CLINIC | Age: 68
End: 2018-10-16
Payer: COMMERCIAL

## 2018-10-16 ENCOUNTER — APPOINTMENT (OUTPATIENT)
Dept: LAB | Facility: HOSPITAL | Age: 68
End: 2018-10-16
Attending: INTERNAL MEDICINE
Payer: COMMERCIAL

## 2018-10-16 ENCOUNTER — HOSPITAL ENCOUNTER (OUTPATIENT)
Dept: ULTRASOUND IMAGING | Facility: HOSPITAL | Age: 68
Discharge: HOME | End: 2018-10-16
Attending: INTERNAL MEDICINE
Payer: COMMERCIAL

## 2018-10-16 DIAGNOSIS — N18.30 CHRONIC RENAL INSUFFICIENCY, STAGE III (MODERATE) (HCC): Chronic | ICD-10-CM

## 2018-10-16 DIAGNOSIS — Z79.4 TYPE 2 DIABETES MELLITUS WITH DIABETIC NEPHROPATHY, WITH LONG-TERM CURRENT USE OF INSULIN (HCC): ICD-10-CM

## 2018-10-16 DIAGNOSIS — I10 ESSENTIAL HYPERTENSION: ICD-10-CM

## 2018-10-16 DIAGNOSIS — I42.8 OTHER CARDIOMYOPATHY (HCC): ICD-10-CM

## 2018-10-16 DIAGNOSIS — E87.6 HYPOKALEMIA: ICD-10-CM

## 2018-10-16 DIAGNOSIS — Z95.810 PRESENCE OF AUTOMATIC CARDIOVERTER/DEFIBRILLATOR (AICD): ICD-10-CM

## 2018-10-16 DIAGNOSIS — E11.21 TYPE 2 DIABETES MELLITUS WITH DIABETIC NEPHROPATHY, WITH LONG-TERM CURRENT USE OF INSULIN (HCC): ICD-10-CM

## 2018-10-16 DIAGNOSIS — G47.33 OSA (OBSTRUCTIVE SLEEP APNEA): ICD-10-CM

## 2018-10-16 DIAGNOSIS — I47.2 VENTRICULAR TACHYCARDIA (HCC): ICD-10-CM

## 2018-10-16 DIAGNOSIS — I50.22 CHRONIC SYSTOLIC CONGESTIVE HEART FAILURE (HCC): Primary | ICD-10-CM

## 2018-10-16 LAB
25(OH)D3 SERPL-MCNC: 40.3 NG/ML (ref 30–100)
ANION GAP SERPL CALCULATED.3IONS-SCNC: 8 MMOL/L (ref 4–13)
BUN SERPL-MCNC: 23 MG/DL (ref 5–25)
CALCIUM SERPL-MCNC: 8.7 MG/DL (ref 8.3–10.1)
CHLORIDE SERPL-SCNC: 98 MMOL/L (ref 100–108)
CO2 SERPL-SCNC: 31 MMOL/L (ref 21–32)
CREAT SERPL-MCNC: 1.98 MG/DL (ref 0.6–1.3)
CREAT UR-MCNC: 49.1 MG/DL
ERYTHROCYTE [DISTWIDTH] IN BLOOD BY AUTOMATED COUNT: 14.2 % (ref 11.6–15.1)
GFR SERPL CREATININE-BSD FRML MDRD: 39 ML/MIN/1.73SQ M
GLUCOSE P FAST SERPL-MCNC: 108 MG/DL (ref 65–99)
HCT VFR BLD AUTO: 39.8 % (ref 36.5–49.3)
HGB BLD-MCNC: 13.1 G/DL (ref 12–17)
MAGNESIUM SERPL-MCNC: 2.3 MG/DL (ref 1.6–2.6)
MCH RBC QN AUTO: 30.4 PG (ref 26.8–34.3)
MCHC RBC AUTO-ENTMCNC: 32.9 G/DL (ref 31.4–37.4)
MCV RBC AUTO: 92 FL (ref 82–98)
PHOSPHATE SERPL-MCNC: 3.6 MG/DL (ref 2.3–4.1)
PLATELET # BLD AUTO: 158 THOUSANDS/UL (ref 149–390)
PMV BLD AUTO: 11.2 FL (ref 8.9–12.7)
POTASSIUM SERPL-SCNC: 3.9 MMOL/L (ref 3.5–5.3)
PROT UR-MCNC: 25 MG/DL
PROT/CREAT UR: 0.51 MG/G{CREAT} (ref 0–0.1)
PTH-INTACT SERPL-MCNC: 186 PG/ML (ref 18.4–80.1)
RBC # BLD AUTO: 4.31 MILLION/UL (ref 3.88–5.62)
SODIUM SERPL-SCNC: 137 MMOL/L (ref 136–145)
WBC # BLD AUTO: 4.62 THOUSAND/UL (ref 4.31–10.16)

## 2018-10-16 PROCEDURE — 85027 COMPLETE CBC AUTOMATED: CPT

## 2018-10-16 PROCEDURE — 84165 PROTEIN E-PHORESIS SERUM: CPT | Performed by: PATHOLOGY

## 2018-10-16 PROCEDURE — 84165 PROTEIN E-PHORESIS SERUM: CPT

## 2018-10-16 PROCEDURE — 82570 ASSAY OF URINE CREATININE: CPT

## 2018-10-16 PROCEDURE — 84100 ASSAY OF PHOSPHORUS: CPT

## 2018-10-16 PROCEDURE — 83735 ASSAY OF MAGNESIUM: CPT

## 2018-10-16 PROCEDURE — 80048 BASIC METABOLIC PNL TOTAL CA: CPT

## 2018-10-16 PROCEDURE — 93295 DEV INTERROG REMOTE 1/2/MLT: CPT | Performed by: INTERNAL MEDICINE

## 2018-10-16 PROCEDURE — 83970 ASSAY OF PARATHORMONE: CPT

## 2018-10-16 PROCEDURE — 93296 REM INTERROG EVL PM/IDS: CPT | Performed by: INTERNAL MEDICINE

## 2018-10-16 PROCEDURE — 36415 COLL VENOUS BLD VENIPUNCTURE: CPT

## 2018-10-16 PROCEDURE — 76770 US EXAM ABDO BACK WALL COMP: CPT

## 2018-10-16 PROCEDURE — 84156 ASSAY OF PROTEIN URINE: CPT

## 2018-10-16 PROCEDURE — 82306 VITAMIN D 25 HYDROXY: CPT

## 2018-10-16 NOTE — PROGRESS NOTES
Results for orders placed or performed in visit on 10/16/18   Cardiac EP device report    Narrative    MDT SINGLE ICD  CARELINK TRANSMISSION: BATTERY VOLTAGE ADEQUATE (2 65V RRT 2 63V CHARGE TIME 13 5 SEC)  : 0 2%  ALL AVAILABLE LEAD PARAMETERS WITHIN NORMAL LIMITS  NO SIGNIFICANT HIGH RATE EPISODES  PACEMAKER FUNCTIONING APPROPRIATELY   [address] [place] [address]

## 2018-10-17 ENCOUNTER — TELEPHONE (OUTPATIENT)
Dept: NEPHROLOGY | Facility: CLINIC | Age: 68
End: 2018-10-17

## 2018-10-17 LAB
ALBUMIN SERPL ELPH-MCNC: 4.42 G/DL (ref 3.5–5)
ALBUMIN SERPL ELPH-MCNC: 58.1 % (ref 52–65)
ALPHA1 GLOB SERPL ELPH-MCNC: 0.3 G/DL (ref 0.1–0.4)
ALPHA1 GLOB SERPL ELPH-MCNC: 3.9 % (ref 2.5–5)
ALPHA2 GLOB SERPL ELPH-MCNC: 0.74 G/DL (ref 0.4–1.2)
ALPHA2 GLOB SERPL ELPH-MCNC: 9.7 % (ref 7–13)
BETA GLOB ABNORMAL SERPL ELPH-MCNC: 0.42 G/DL (ref 0.4–0.8)
BETA1 GLOB SERPL ELPH-MCNC: 5.5 % (ref 5–13)
BETA2 GLOB SERPL ELPH-MCNC: 5.4 % (ref 2–8)
BETA2+GAMMA GLOB SERPL ELPH-MCNC: 0.41 G/DL (ref 0.2–0.5)
GAMMA GLOB ABNORMAL SERPL ELPH-MCNC: 1.32 G/DL (ref 0.5–1.6)
GAMMA GLOB SERPL ELPH-MCNC: 17.4 % (ref 12–22)
IGG/ALB SER: 1.39 {RATIO} (ref 1.1–1.8)
PROT PATTERN SERPL ELPH-IMP: NORMAL
PROT SERPL-MCNC: 7.6 G/DL (ref 6.4–8.2)

## 2018-10-18 ENCOUNTER — TELEPHONE (OUTPATIENT)
Dept: NEPHROLOGY | Facility: CLINIC | Age: 68
End: 2018-10-18

## 2018-10-18 NOTE — TELEPHONE ENCOUNTER
Patient would like to know what is the procedure to follow up on the cyst and what is to be done  ?        ----- Message from Andre Moncada MD sent at 10/18/2018  1:25 PM EDT -----  Please call the patient and inform him that the renal ultrasound was normal  Had a simple cyst which is normal for his age     Thanks  Dr jane cade

## 2018-10-19 NOTE — TELEPHONE ENCOUNTER
Called and spoke with the patient and discussed that the cysts are benign and dont need further follow up or evaluation

## 2018-10-29 ENCOUNTER — TELEPHONE (OUTPATIENT)
Dept: UROLOGY | Facility: MEDICAL CENTER | Age: 68
End: 2018-10-29

## 2018-10-29 NOTE — TELEPHONE ENCOUNTER
Spoke to pt  Stated he wanted to speak to the Dr  Directly  It's "a personal matter"  Pt did not want to disclose issue  Aware Dr Jimena Bundy will return to the office 11/5  Pt instructed to call Tues if he has not heard from the office

## 2018-11-06 ENCOUNTER — TELEPHONE (OUTPATIENT)
Dept: FAMILY MEDICINE CLINIC | Facility: CLINIC | Age: 68
End: 2018-11-06

## 2018-11-06 NOTE — TELEPHONE ENCOUNTER
Quincy Everett from [facility] for to speak to Emilyashokbrii Granadojosé miguel regarding patient and getting his scooter  She said that they need office notes for the patient regarding why he need this scooter  Please call Quincy Everett at [phone]

## 2018-11-08 NOTE — TELEPHONE ENCOUNTER
You need to call the Co and find out what info you need to document for him to qualify and send the appropriate notes from chart or have him come for appt and document what they need  Thanks

## 2018-11-09 DIAGNOSIS — I50.42 CHRONIC COMBINED SYSTOLIC AND DIASTOLIC CONGESTIVE HEART FAILURE (HCC): ICD-10-CM

## 2018-11-09 RX ORDER — CARVEDILOL 25 MG/1
25 TABLET ORAL 2 TIMES DAILY WITH MEALS
Qty: 180 TABLET | Refills: 3 | Status: SHIPPED | OUTPATIENT
Start: 2018-11-09 | End: 2019-11-22 | Stop reason: SDUPTHER

## 2018-11-09 NOTE — TELEPHONE ENCOUNTER
Please let Pt know he needs an appointment with Dr Becky Waggoner to establish care and go over medical history on why he needs this scooter

## 2018-11-14 ENCOUNTER — TELEPHONE (OUTPATIENT)
Dept: CARDIOLOGY CLINIC | Facility: CLINIC | Age: 68
End: 2018-11-14

## 2018-11-14 ENCOUNTER — TRANSCRIBE ORDERS (OUTPATIENT)
Dept: ADMINISTRATIVE | Facility: HOSPITAL | Age: 68
End: 2018-11-14

## 2018-11-14 DIAGNOSIS — E11.49 OTHER DIABETIC NEUROLOGICAL COMPLICATION ASSOCIATED WITH TYPE 2 DIABETES MELLITUS (HCC): Primary | ICD-10-CM

## 2018-11-14 DIAGNOSIS — L97.519 ULCER OF RIGHT FOOT, UNSPECIFIED ULCER STAGE (HCC): ICD-10-CM

## 2018-11-14 NOTE — TELEPHONE ENCOUNTER
Received a call from Dr Saint Clair, a podiatrist in [facility], who saw patient in her office today  Dr Saint Clair wanted to let you know that Patricia Ye has a few dark discolored dark spots on the bottom of his right foot  No open sores but dark spots  She is concerned that these may be caused by thromboemboli  She ordered arterial dopplers and wanted him seen by you  Only goes to [facility] office and pt was given an appt there on 12/3      FYI

## 2018-11-20 ENCOUNTER — OFFICE VISIT (OUTPATIENT)
Dept: FAMILY MEDICINE CLINIC | Facility: CLINIC | Age: 68
End: 2018-11-20
Payer: COMMERCIAL

## 2018-11-20 ENCOUNTER — TELEPHONE (OUTPATIENT)
Dept: OTHER | Facility: OTHER | Age: 68
End: 2018-11-20

## 2018-11-20 VITALS
HEART RATE: 74 BPM | TEMPERATURE: 97.6 F | OXYGEN SATURATION: 98 % | SYSTOLIC BLOOD PRESSURE: 150 MMHG | BODY MASS INDEX: 36.92 KG/M2 | DIASTOLIC BLOOD PRESSURE: 92 MMHG | RESPIRATION RATE: 18 BRPM | WEIGHT: 250 LBS

## 2018-11-20 DIAGNOSIS — J44.1 CHRONIC OBSTRUCTIVE PULMONARY DISEASE WITH ACUTE EXACERBATION (HCC): ICD-10-CM

## 2018-11-20 DIAGNOSIS — G47.00 INSOMNIA, UNSPECIFIED TYPE: ICD-10-CM

## 2018-11-20 DIAGNOSIS — I50.42 CHRONIC COMBINED SYSTOLIC AND DIASTOLIC CHF, NYHA CLASS 3 (HCC): Primary | ICD-10-CM

## 2018-11-20 DIAGNOSIS — E11.21 TYPE 2 DIABETES MELLITUS WITH DIABETIC NEPHROPATHY, WITH LONG-TERM CURRENT USE OF INSULIN (HCC): ICD-10-CM

## 2018-11-20 DIAGNOSIS — Z79.4 TYPE 2 DIABETES MELLITUS WITH DIABETIC NEPHROPATHY, WITH LONG-TERM CURRENT USE OF INSULIN (HCC): ICD-10-CM

## 2018-11-20 DIAGNOSIS — M17.0 PRIMARY OSTEOARTHRITIS OF BOTH KNEES: ICD-10-CM

## 2018-11-20 PROCEDURE — 3725F SCREEN DEPRESSION PERFORMED: CPT | Performed by: FAMILY MEDICINE

## 2018-11-20 PROCEDURE — 99214 OFFICE O/P EST MOD 30 MIN: CPT | Performed by: FAMILY MEDICINE

## 2018-11-20 PROCEDURE — 1160F RVW MEDS BY RX/DR IN RCRD: CPT | Performed by: FAMILY MEDICINE

## 2018-11-20 RX ORDER — ACETAMINOPHEN AND CODEINE PHOSPHATE 120; 12 MG/5ML; MG/5ML
30 SOLUTION ORAL
Qty: 30 CAPSULE | Refills: 0 | Status: SHIPPED | OUTPATIENT
Start: 2018-11-20 | End: 2019-01-09 | Stop reason: SDUPTHER

## 2018-11-20 NOTE — PROGRESS NOTES
Assessment/Plan:    60-year-old male with:  CHF, COPD, bilateral knee osteoarthritis, type 2 diabetes and insomnia  Discussed supportive care return parameters  Continue current medications  Will send office note to evaluate for getting a scooter  Patient declines adjusting his insulin or his blood pressure medicine or other medications at this time being but advised him to closely watch his parameters and follow-up in 3 months and if not improving that we should adjust his medicines  No problem-specific Assessment & Plan notes found for this encounter  Diagnoses and all orders for this visit:    Chronic combined systolic and diastolic CHF, NYHA class 3 (HCC)    Chronic obstructive pulmonary disease with acute exacerbation ([facility])    Primary osteoarthritis of both knees    Insomnia, unspecified type  -     flurazepam (DALMANE) 30 MG capsule; Take 1 capsule (30 mg total) by mouth daily at bedtime as needed for sleep    Type 2 diabetes mellitus with diabetic nephropathy, with long-term current use of insulin ([facility])          Subjective:   Chief Complaint   Patient presents with    Diabetes     chronic    Medication Problem     discuss meds, wants scooter          Patient ID: Fabi Fuentes  is a 80 y o  male  Patient is a 60-year-old male who presents for follow-up on CHF, COPD, bilateral knee osteoarthritis, type 2 diabetes and insomnia  Patient admits being stable his medications and denies new complaints at this time  No fevers chills nausea vomiting  No chest pain or shortness of breath worse than his baseline  Patient declines adjusting the medications at the time being except he would like to continue his sleeping pill        Diabetes         The following portions of the patient's history were reviewed and updated as appropriate: allergies, current medications, past family history, past medical history, past social history, past surgical history and problem list     Review of Systems Constitutional: Negative  HENT: Negative  Eyes: Negative  Respiratory: Negative  Cardiovascular: Negative  Gastrointestinal: Negative  Endocrine: Negative  Genitourinary: Negative  Musculoskeletal: Positive for arthralgias  Allergic/Immunologic: Negative  Neurological: Negative  Hematological: Negative  Psychiatric/Behavioral: Negative  All other systems reviewed and are negative  Objective:      /92   Pulse 74   Temp 97 6 °F (36 4 °C) (Tympanic)   Resp 18   Wt 113 kg (250 lb)   SpO2 98%   BMI 36 92 kg/m²          Physical Exam   Constitutional: He is oriented to person, place, and time  He appears well-developed and well-nourished  HENT:   Head: Atraumatic  Right Ear: External ear normal    Left Ear: External ear normal    Eyes: Pupils are equal, round, and reactive to light  Conjunctivae and EOM are normal    Neck: Normal range of motion  Cardiovascular: Normal rate, regular rhythm and normal heart sounds  Pulmonary/Chest: Effort normal and breath sounds normal  No respiratory distress  Abdominal: Soft  He exhibits no distension  There is no tenderness  There is no rebound and no guarding  Musculoskeletal: Normal range of motion  Neurological: He is alert and oriented to person, place, and time  No cranial nerve deficit  Skin: Skin is warm and dry  Psychiatric: He has a normal mood and affect   His behavior is normal  Judgment and thought content normal

## 2018-11-21 ENCOUNTER — TELEPHONE (OUTPATIENT)
Dept: FAMILY MEDICINE CLINIC | Facility: CLINIC | Age: 68
End: 2018-11-21

## 2018-11-21 ENCOUNTER — TELEPHONE (OUTPATIENT)
Dept: OTHER | Facility: OTHER | Age: 68
End: 2018-11-21

## 2018-11-21 NOTE — TELEPHONE ENCOUNTER
Leona Biggs wants you to hold the letters until he come in in the morning he would liked them faxed instead and he has that information

## 2018-11-21 NOTE — TELEPHONE ENCOUNTER
Please fax a letter for lenak that you are working on to [employer] at [phone] with [employer]  Please let me know if I can help

## 2018-11-23 NOTE — TELEPHONE ENCOUNTER
Patient told me that he just needed his office note sent in to the company  Please send them the most recent office note and if he needs anything else let me know   Thanks

## 2018-11-27 ENCOUNTER — TELEPHONE (OUTPATIENT)
Dept: UROLOGY | Facility: MEDICAL CENTER | Age: 68
End: 2018-11-27

## 2018-11-27 ENCOUNTER — HOSPITAL ENCOUNTER (OUTPATIENT)
Dept: NON INVASIVE DIAGNOSTICS | Facility: HOSPITAL | Age: 68
Discharge: HOME | End: 2018-11-27
Attending: PODIATRIST
Payer: COMMERCIAL

## 2018-11-27 DIAGNOSIS — L97.519 ULCER OF RIGHT FOOT, UNSPECIFIED ULCER STAGE (HCC): ICD-10-CM

## 2018-11-27 DIAGNOSIS — E11.49 OTHER DIABETIC NEUROLOGICAL COMPLICATION ASSOCIATED WITH TYPE 2 DIABETES MELLITUS (HCC): ICD-10-CM

## 2018-11-27 PROCEDURE — 93922 UPR/L XTREMITY ART 2 LEVELS: CPT | Performed by: SURGERY

## 2018-11-27 PROCEDURE — 93925 LOWER EXTREMITY STUDY: CPT

## 2018-11-27 PROCEDURE — 93923 UPR/LXTR ART STDY 3+ LVLS: CPT

## 2018-11-27 PROCEDURE — 93925 LOWER EXTREMITY STUDY: CPT | Performed by: SURGERY

## 2018-11-27 NOTE — TELEPHONE ENCOUNTER
Per Dr Janell Millard pt to keep 1/28/19 appt  Inability to climax may be due to other health issues or medications  Pt notified

## 2018-11-27 NOTE — TELEPHONE ENCOUNTER
Spoke to pt  Concerned because he has no sensation of having orgasm  Questioning if this is normal with implant  No other problems with IPP  Will direct to Dr Edna De Oliveira

## 2018-11-27 NOTE — TELEPHONE ENCOUNTER
Patient wants to speak to doctor about his penile pump  Did not wish to speak to nurse, only doctor  Please call him at [phone]

## 2018-12-04 ENCOUNTER — PATIENT OUTREACH (OUTPATIENT)
Dept: OTHER | Facility: HOSPITAL | Age: 68
End: 2018-12-04

## 2018-12-06 ENCOUNTER — OFFICE VISIT (OUTPATIENT)
Dept: FAMILY MEDICINE CLINIC | Facility: CLINIC | Age: 68
End: 2018-12-06
Payer: COMMERCIAL

## 2018-12-06 VITALS
OXYGEN SATURATION: 99 % | HEART RATE: 70 BPM | BODY MASS INDEX: 37.07 KG/M2 | RESPIRATION RATE: 18 BRPM | SYSTOLIC BLOOD PRESSURE: 150 MMHG | TEMPERATURE: 98.2 F | DIASTOLIC BLOOD PRESSURE: 84 MMHG | WEIGHT: 251 LBS

## 2018-12-06 DIAGNOSIS — G89.29 CHRONIC LOW BACK PAIN, UNSPECIFIED BACK PAIN LATERALITY, WITH SCIATICA PRESENCE UNSPECIFIED: Primary | ICD-10-CM

## 2018-12-06 DIAGNOSIS — M54.5 CHRONIC LOW BACK PAIN, UNSPECIFIED BACK PAIN LATERALITY, WITH SCIATICA PRESENCE UNSPECIFIED: Primary | ICD-10-CM

## 2018-12-06 PROCEDURE — 99213 OFFICE O/P EST LOW 20 MIN: CPT | Performed by: FAMILY MEDICINE

## 2018-12-06 PROCEDURE — 1160F RVW MEDS BY RX/DR IN RCRD: CPT | Performed by: FAMILY MEDICINE

## 2018-12-06 RX ORDER — GABAPENTIN 300 MG/1
300 CAPSULE ORAL 2 TIMES DAILY
Qty: 180 CAPSULE | Refills: 1 | Status: SHIPPED | OUTPATIENT
Start: 2018-12-06 | End: 2019-05-25 | Stop reason: SDUPTHER

## 2018-12-07 ENCOUNTER — TELEPHONE (OUTPATIENT)
Dept: FAMILY MEDICINE CLINIC | Facility: CLINIC | Age: 68
End: 2018-12-07

## 2018-12-07 NOTE — PROGRESS NOTES
Assessment/Plan:    27-year-old male with:  Chronic low back pain  Discussed workup and treatment options with risks and benefits  Will increase patient's gabapentin refer to physical therapy  Advised if he is not improving that he should consider pain management  No problem-specific Assessment & Plan notes found for this encounter  Diagnoses and all orders for this visit:    Chronic low back pain, unspecified back pain laterality, with sciatica presence unspecified  -     gabapentin (NEURONTIN) 300 mg capsule; Take 1 capsule (300 mg total) by mouth 2 (two) times a day  -     Ambulatory referral to Pain Management; Future  -     Ambulatory referral to Physical Therapy; Future          Subjective:     Chief Complaint   Patient presents with    Back Pain     wants to discuss laser surgery options        Patient ID: Silvia Burgos Sr  is a 80 y o  male  Patient is a 27-year-old male who presents for follow-up on chronic low back pain which has been flare over the past several weeks  No weakness numbness or tingling  No loss of bowel or bladder control  Patient has been using heat and cold and stretching and has not improved  Back Pain         The following portions of the patient's history were reviewed and updated as appropriate: allergies, current medications, past family history, past medical history, past social history, past surgical history and problem list     Review of Systems   Constitutional: Negative  HENT: Negative  Eyes: Negative  Respiratory: Negative  Cardiovascular: Negative  Gastrointestinal: Negative  Endocrine: Negative  Genitourinary: Negative  Musculoskeletal: Positive for back pain  Allergic/Immunologic: Negative  Neurological: Negative  Hematological: Negative  Psychiatric/Behavioral: Negative  All other systems reviewed and are negative          Objective:      /84   Pulse 70   Temp 98 2 °F (36 8 °C) (Tympanic)   Resp 18   Wt 114 kg (251 lb)   SpO2 99%   BMI 37 07 kg/m²          Physical Exam   Constitutional: He is oriented to person, place, and time  He appears well-developed and well-nourished  HENT:   Head: Atraumatic  Right Ear: External ear normal    Left Ear: External ear normal    Eyes: Pupils are equal, round, and reactive to light  Conjunctivae and EOM are normal    Neck: Normal range of motion  Cardiovascular: Normal rate, regular rhythm and normal heart sounds  Pulmonary/Chest: Effort normal and breath sounds normal  No respiratory distress  Abdominal: Soft  He exhibits no distension  There is no tenderness  There is no rebound and no guarding  Musculoskeletal: Normal range of motion  Neurological: He is alert and oriented to person, place, and time  No cranial nerve deficit  Skin: Skin is warm and dry  Psychiatric: He has a normal mood and affect   His behavior is normal  Judgment and thought content normal

## 2018-12-28 DIAGNOSIS — E11.8 TYPE 2 DIABETES MELLITUS WITH COMPLICATION, UNSPECIFIED WHETHER LONG TERM INSULIN USE: Primary | ICD-10-CM

## 2018-12-28 RX ORDER — PEN NEEDLE, DIABETIC 31 GX5/16"
NEEDLE, DISPOSABLE MISCELLANEOUS
Qty: 200 EACH | Refills: 0 | Status: SHIPPED | OUTPATIENT
Start: 2018-12-28

## 2019-01-03 ENCOUNTER — TELEPHONE (OUTPATIENT)
Dept: NEPHROLOGY | Facility: CLINIC | Age: 69
End: 2019-01-03

## 2019-01-03 NOTE — TELEPHONE ENCOUNTER
Left message to remind pt about blood work that he will need to have done before his appt next week

## 2019-01-04 ENCOUNTER — TRANSCRIBE ORDERS (OUTPATIENT)
Dept: ADMINISTRATIVE | Facility: HOSPITAL | Age: 69
End: 2019-01-04

## 2019-01-04 ENCOUNTER — APPOINTMENT (OUTPATIENT)
Dept: LAB | Facility: HOSPITAL | Age: 69
End: 2019-01-04
Attending: INTERNAL MEDICINE
Payer: COMMERCIAL

## 2019-01-04 DIAGNOSIS — N18.30 CHRONIC RENAL INSUFFICIENCY, STAGE III (MODERATE) (HCC): Chronic | ICD-10-CM

## 2019-01-04 DIAGNOSIS — I10 ESSENTIAL HYPERTENSION: ICD-10-CM

## 2019-01-04 DIAGNOSIS — E11.21 TYPE 2 DIABETES MELLITUS WITH DIABETIC NEPHROPATHY, WITH LONG-TERM CURRENT USE OF INSULIN (HCC): ICD-10-CM

## 2019-01-04 DIAGNOSIS — E87.6 HYPOKALEMIA: ICD-10-CM

## 2019-01-04 DIAGNOSIS — Z79.4 TYPE 2 DIABETES MELLITUS WITH DIABETIC NEPHROPATHY, WITH LONG-TERM CURRENT USE OF INSULIN (HCC): ICD-10-CM

## 2019-01-04 DIAGNOSIS — G47.33 OSA (OBSTRUCTIVE SLEEP APNEA): ICD-10-CM

## 2019-01-04 LAB
25(OH)D3 SERPL-MCNC: 41 NG/ML (ref 30–100)
ANION GAP SERPL CALCULATED.3IONS-SCNC: 10 MMOL/L (ref 5–14)
BUN SERPL-MCNC: 28 MG/DL (ref 5–25)
CALCIUM SERPL-MCNC: 8.7 MG/DL (ref 8.4–10.2)
CHLORIDE SERPL-SCNC: 101 MMOL/L (ref 97–108)
CO2 SERPL-SCNC: 30 MMOL/L (ref 22–30)
CREAT SERPL-MCNC: 1.6 MG/DL (ref 0.7–1.5)
CREAT UR-MCNC: 99.3 MG/DL
ERYTHROCYTE [DISTWIDTH] IN BLOOD BY AUTOMATED COUNT: 15.4 %
GFR SERPL CREATININE-BSD FRML MDRD: 51 ML/MIN/1.73SQ M
GLUCOSE P FAST SERPL-MCNC: 113 MG/DL (ref 70–99)
HCT VFR BLD AUTO: 41.8 % (ref 41–53)
HGB BLD-MCNC: 13.5 G/DL (ref 13.5–17.5)
MAGNESIUM SERPL-MCNC: 2.9 MG/DL (ref 1.6–2.3)
MCH RBC QN AUTO: 30 PG (ref 26–34)
MCHC RBC AUTO-ENTMCNC: 32.3 G/DL (ref 31–36)
MCV RBC AUTO: 93 FL (ref 80–100)
PHOSPHATE SERPL-MCNC: 3.3 MG/DL (ref 2.5–4.8)
PLATELET # BLD AUTO: 153 THOUSANDS/UL (ref 150–450)
PMV BLD AUTO: 9.8 FL (ref 8.9–12.7)
POTASSIUM SERPL-SCNC: 4.2 MMOL/L (ref 3.6–5)
PROT UR-MCNC: 62 MG/DL
PROT/CREAT UR: 0.62 MG/G{CREAT} (ref 0–0.1)
PTH-INTACT SERPL-MCNC: 283 PG/ML (ref 16.7–78.9)
RBC # BLD AUTO: 4.49 MILLION/UL (ref 4.5–5.9)
SODIUM SERPL-SCNC: 141 MMOL/L (ref 137–147)
WBC # BLD AUTO: 5.7 THOUSAND/UL (ref 4.5–11)

## 2019-01-04 PROCEDURE — 82306 VITAMIN D 25 HYDROXY: CPT

## 2019-01-04 PROCEDURE — 80048 BASIC METABOLIC PNL TOTAL CA: CPT

## 2019-01-04 PROCEDURE — 83970 ASSAY OF PARATHORMONE: CPT

## 2019-01-04 PROCEDURE — 82570 ASSAY OF URINE CREATININE: CPT

## 2019-01-04 PROCEDURE — 84100 ASSAY OF PHOSPHORUS: CPT

## 2019-01-04 PROCEDURE — 84156 ASSAY OF PROTEIN URINE: CPT

## 2019-01-04 PROCEDURE — 36415 COLL VENOUS BLD VENIPUNCTURE: CPT

## 2019-01-04 PROCEDURE — 83735 ASSAY OF MAGNESIUM: CPT

## 2019-01-04 PROCEDURE — 85027 COMPLETE CBC AUTOMATED: CPT

## 2019-01-06 DIAGNOSIS — G47.09 OTHER INSOMNIA: ICD-10-CM

## 2019-01-07 ENCOUNTER — TELEPHONE (OUTPATIENT)
Dept: FAMILY MEDICINE CLINIC | Facility: CLINIC | Age: 69
End: 2019-01-07

## 2019-01-07 RX ORDER — TEMAZEPAM 15 MG/1
15 CAPSULE ORAL
Qty: 30 CAPSULE | OUTPATIENT
Start: 2019-01-07

## 2019-01-07 NOTE — TELEPHONE ENCOUNTER
Two calls from [facility] about prior auth for this patient and his Dalmane  Was not able to give much information from his last note    She said they will "consider" this and get back to us

## 2019-01-09 ENCOUNTER — OFFICE VISIT (OUTPATIENT)
Dept: NEPHROLOGY | Facility: CLINIC | Age: 69
End: 2019-01-09
Payer: COMMERCIAL

## 2019-01-09 VITALS
WEIGHT: 250 LBS | BODY MASS INDEX: 37.03 KG/M2 | HEART RATE: 82 BPM | HEIGHT: 69 IN | DIASTOLIC BLOOD PRESSURE: 72 MMHG | SYSTOLIC BLOOD PRESSURE: 142 MMHG

## 2019-01-09 DIAGNOSIS — I50.42 CHRONIC COMBINED SYSTOLIC AND DIASTOLIC CHF, NYHA CLASS 3 (HCC): ICD-10-CM

## 2019-01-09 DIAGNOSIS — G47.00 INSOMNIA, UNSPECIFIED TYPE: ICD-10-CM

## 2019-01-09 DIAGNOSIS — E11.21 TYPE 2 DIABETES MELLITUS WITH DIABETIC NEPHROPATHY, WITH LONG-TERM CURRENT USE OF INSULIN (HCC): ICD-10-CM

## 2019-01-09 DIAGNOSIS — N25.81 SECONDARY HYPERPARATHYROIDISM OF RENAL ORIGIN (HCC): ICD-10-CM

## 2019-01-09 DIAGNOSIS — G63 POLYNEUROPATHY ASSOCIATED WITH UNDERLYING DISEASE (HCC): ICD-10-CM

## 2019-01-09 DIAGNOSIS — J44.1 CHRONIC OBSTRUCTIVE PULMONARY DISEASE WITH ACUTE EXACERBATION (HCC): ICD-10-CM

## 2019-01-09 DIAGNOSIS — Z79.4 TYPE 2 DIABETES MELLITUS WITH DIABETIC NEPHROPATHY, WITH LONG-TERM CURRENT USE OF INSULIN (HCC): ICD-10-CM

## 2019-01-09 DIAGNOSIS — I10 ESSENTIAL HYPERTENSION: ICD-10-CM

## 2019-01-09 DIAGNOSIS — I25.10 ATHEROSCLEROSIS OF NATIVE CORONARY ARTERY OF NATIVE HEART WITHOUT ANGINA PECTORIS: ICD-10-CM

## 2019-01-09 DIAGNOSIS — N18.30 CHRONIC RENAL INSUFFICIENCY, STAGE III (MODERATE) (HCC): Primary | ICD-10-CM

## 2019-01-09 PROCEDURE — 99214 OFFICE O/P EST MOD 30 MIN: CPT | Performed by: INTERNAL MEDICINE

## 2019-01-09 RX ORDER — ACETAMINOPHEN AND CODEINE PHOSPHATE 120; 12 MG/5ML; MG/5ML
30 SOLUTION ORAL
Qty: 30 CAPSULE | Refills: 0 | Status: SHIPPED | OUTPATIENT
Start: 2019-01-09 | End: 2019-02-04 | Stop reason: SDUPTHER

## 2019-01-09 RX ORDER — CALCITRIOL 0.5 UG/1
0.5 CAPSULE, LIQUID FILLED ORAL DAILY
Qty: 60 CAPSULE | Refills: 4 | Status: SHIPPED | OUTPATIENT
Start: 2019-01-09 | End: 2019-05-02 | Stop reason: SDUPTHER

## 2019-01-09 NOTE — PATIENT INSTRUCTIONS
Stop magnesium pills  Start calcitriol 0 5mcg once a day  ~ blood work prior to the next visit   ~ Please call if the blood pressure top number is greater than 150 or less than 110 consistently   ~ Please call if you are gaining more than 2lbs in 2 days for adjustment of water pills   ~ Please AVOID the following pain medications  LIST OF NSAIDS (NONSTEROIDAL ANTI-INFLAMMATORY DRUGS) AND REYES-2 INHIBITORS    DIFLUNISAL (DOLOBID)  IBUPROFEN (MOTRIN, ADVIL)  FLURBIPROFEN (ANSAID)  KETOPROFEN (ORUDIS, ORUVAIL)  FENOPROFEN (NALFON)  NABUMETONE (RELAFEN)  PIROXICAM (FELDENE)  NAPROXEN (ALEVE, NAPROSYN, NAPRELAN, ANAPROX)  DICLOFENAC (VOLTAREN, CATAFLAM)  INDOMETHACIN (INDOCIN)  SULINDAC (CLINORIL)  TOLMETIN (TOLETIN)  ETODOLAC (LODINE)  MELOXICAM (MOBIC)  KETOROLAC (TORADOL)  OXAPROZIN (DAYPRO)  CELECOXIB (CELEBREX)    Follow a moderate potassium diet  Phosphorus diet  Follow a low phosphorus diet      Avoid these higher phosphorus foods: Choose these lower phosphorus foods:   Milk, pudding or yogurt (from animals and from many soy varieties) Rice milk (unfortified), nondairy creamer (if it doesn't have terms in the ingredients list that contain the letters "phos")   Hard cheeses, ricotta or cottage cheese, fat-free cream cheese Regular and low-fat cream cheese   Ice cream or frozen yogurt Sherbet or frozen fruit pops   Soups made with higher phosphorus ingredients (milk, dried peas, beans, lentils) Soups made with lower phosphorus ingredients (broth- or water-based with other lower phosphorus ingredients)   Whole grains, including whole-grain breads, crackers, cereal, rice and pasta Refined grains, including white bread, crackers, cereals, rice and pasta   Quick breads, biscuits, cornbread, muffins, pancakes or waffles Homemade refined (white) dinner rolls, bagels or English muffins   Dried peas (split, black-eyed), beans (black, garbanzo, lima, kidney, navy, lopez) or lentils Green peas (canned, frozen), green beans or wax beans   Organ meats, walleye, pollock or sardines Lean beef, pork, lamb, poultry or other fish   Nuts and seeds Popcorn   Peanut butter and other nut butters Jam, jelly or honey   Chocolate, including chocolate drinks Carob (chocolate-flavored) candy, hard candy or gumdrops   Stephanie and pepper-type sodas, flavored montilla, bottled teas (if a term in the ingredients list contains the letters "phos") Lemon-lime soda, ginger ale or root beer, plain water

## 2019-01-09 NOTE — PROGRESS NOTES
Nephrology Follow up Consultation  Elin Jean Baptiste  80 y o  male MRN: [id number]            BACKGROUND:  Elin Jean Baptiste  is a 80 y o male who was referred by Ana Matute MD for evaluation of Chronic Kidney Disease and Hypertension    ASSESSMENT / PLAN:   80 y o   male with pmh of multiple co-morbidities including hypertension (x15yrs), diabetes (x25yrs, no DR), obesity, CHF systolic dysfunction, CAD status post AICD/pacemaker, obstructive sleep apnea on CPAP, prostate cancer status post surgical removal  and CKD stage IIIB presented to the office for routine follow-up  1  CKD stage III B:  -Patient has a baseline creatinine of 1 8-1 9mg/dL  Most recent labs show a Creatinine of 1 60mg/dL  Renal function remains stable  - Likely has underlying CKD secondary to diabetic nodularo glomerular sclerosis plus hypertensive nephrosclerosis plus cardiorenal syndrome plus age-related nephron loss  - SPEP negative  - renal ultrasound from October 16, 2018 shows right kidney 10 7 cm left kidney 11 cm, few simple cysts on the right kidney  - Proteinuria - PCr of 62mg  - Acid base and lytes stable  Except hypermagnesemia- stop magnesium supplements for now  - Clinically the patient appears to be euvoelmic- cont current lasix dose  - Recommend to avoid use of NSAIDs, nephrotoxins  Caution advised with regards to exposure to IV contrast dye    - Patient has nonfunctional AV fistula in place in the left arm that as per him was placed many years ago due to concern for needing dialysis  - Discussed with the patient in depth his renal status, including the possible etiologies for CKD  - Advised the patient that when his GFR is close to 20mL/min then will start discussing about RRT(renal replacement therapy) options such as renal transplant, peritoneal dialysis and hemodialysis  - Informed the patient about the various options for Renal Replacement therapy    - Discussed with the patient how we need to work together to delay the progression of CKD with optimal BP control based on their age and co-morbidities, optimal BS control with HbA1c of <7% and trying to reduce proteinuria by the use of anti-proteinuric agents  2  Hypertension:  - Patient is on Coreg 25 mg p o  B i d ,entresto 97/103mg p o  Q day, Lasix 60 mg p o  B i d , Isordil 10 mg p o  T i d , metolazone 5 mg p o  P r n (2 times per week), K-Dur 20 meq p o  Q day, magnesium supplements  - will stop the magnesium   - Goal BP of < 140/90 based on his comorbidities  - Instructed to follow low sodium (2gm)diet   - Advised to hold ACEI/ARBs if patient suffers from dehydration due to gastrointestinal losses due to risk of GAGE secondary to failure to autoregulate  - if patient's blood pressure remains elevated may consider stopping potassium supplementation and starting patient on Aldactone as it may help  3  Hemoglobin:  - Goal Hb of 10-12 g/dL  - Most recent labs suggestive of 13 5gm/dL  - No role for iron supplementation at this time  4 CKD-MBD(Mineral Bone Disease): - Based on patients CKD stage following is the goal of therapy  - Maintain calcium phosphorus product of < 55   - Stage 3 CKD - Goal Ca 8 5-10 mg/dL , goal Phos 2 7-4 6 mg/dL  , goal iPTH 30-70 pg/mL  - Continue patient on vitamin-D 5000 units p o  Q day  - most recent vitamin-D level of 41, intact PTH level of 283  - start on calcitriol 0 5mcg po Q24  - check intact PTH vitamin-D a next visit    5  Lipids:  - On fish oil, Zocor  - Goal LDL less than 70  - Management as per PCP    6  Nutrition:  - Encouraged patient to follow a renal diet comprising of moderate potassium, low phosphorus and protein restriction to 0 8gm/kg  7  DM:  - Advised patient of tight glycemic control to obtain A1c closer to 7%  - This is needed to help decrease progression of CKD    - Also on Neurontin for neuropathy   - Dose of Neurontin may need to be adjusted as renal function declines over the time  - on toujeo only    8  Prostate cancer:  - Status post prostatectomy  - Patient has an appointment for follow-up with Urology for recent elevated PSA levels  9  Followup:  - Patient is to follow-up in 4 months, with lab work to be performed a few days prior to the visit  Andre Moncada MD, [facility], 1/9/2019, 10:26 AM             SUBJECTIVE: 80 y o  male seen in the office, reports no hospitalizations since last visit, has been doing well, feels well, not checking BP at home, but at the podiatrist initially was elevated but on recheck was normal  BS are about 110-175 range  Happy with the care he is getting, reports edema is resolved  Still taking metolazone 2 times a week      Review of Systems   Constitutional: Negative for chills, fatigue and fever  HENT: Negative for congestion and sore throat  Respiratory: Negative for cough, shortness of breath and wheezing  Cardiovascular: Negative for chest pain, palpitations and leg swelling  Gastrointestinal: Negative for abdominal pain, constipation, diarrhea, nausea and vomiting  Genitourinary: Negative for difficulty urinating, flank pain and hematuria  Musculoskeletal: Negative for back pain  Skin: Negative for pallor  Neurological: Negative for dizziness and headaches  Psychiatric/Behavioral: Negative for agitation and confusion  All other systems reviewed and are negative        PAST MEDICAL HISTORY:  Past Medical History:   Diagnosis Date    AICD (automatic cardioverter/defibrillator) present     Anxiety and depression     Arthritis     Asthma     pt denies    CAD (coronary artery disease) 10/10/2014    CKD (chronic kidney disease) stage 3, GFR 30-59 ml/min ([facility])     COPD (chronic obstructive pulmonary disease) ([facility])     pt denies    CPAP (continuous positive airway pressure) dependence     Depression (emotion)     affective disorder    Diabetic nephropathy ([facility])     Erectile dysfunction     GERD (gastroesophageal reflux disease)     Hypertension     Iron deficiency anemia     Latent tuberculosis     Lumbar herniated disc     low back pain    Neuropathy     bles    Prostate cancer ([facility] )     2013- had radiation- had prostatectomy    Sleep apnea     Systolic CHF, chronic (HCC)     Type 2 diabetes mellitus with hyperlipidemia ([facility])     Use of cane as ambulatory aid     Vitamin D deficiency     Wears glasses        PROBLEM LIST    Patient Active Problem List   Diagnosis    Type 2 diabetes mellitus ([facility] )    Essential hypertension    Chronic obstructive pulmonary disease (HCC)    Chronic renal insufficiency, stage III (moderate) ([facility])    Prostate cancer ([facility])    Anxiety and depression    Chronic combined systolic and diastolic CHF, NYHA class 3 (HCC)    Acute on chronic combined systolic and diastolic CHF (congestive heart failure) ([facility] )    Erectile dysfunction following radical prostatectomy    JOE (obstructive sleep apnea)    Persistent insomnia    Obesity with body mass index 30 or greater    Coronary atherosclerosis    Erectile dysfunction of non-organic origin    Generalized anxiety disorder    Hyperlipidemia    Peripheral neuropathy    Localized, primary osteoarthritis of shoulder region    Pain in joint of left shoulder    Primary osteoarthritis of both knees       PAST SURGICAL HISTORY:  Past Surgical History:   Procedure Laterality Date    AV FISTULA PLACEMENT      left upper arm and removal    CARDIAC DEFIBRILLATOR PLACEMENT      CATARACT EXTRACTION Bilateral 10/09/2014    COLONOSCOPY      INGUINAL HERNIA REPAIR Bilateral     OTHER SURGICAL HISTORY Left 10/10/2014    AV Shunt    AR INSERT,INFLATABLE PENILE PROSTHESIS N/A 1/19/2018    Procedure: INSERTION 3 PART PROSTHESIS PENILE;  Surgeon: Krystal Dempsey MD;  Location: [facility];  Service: Urology    PROSTATECTOMY         SOCIAL HISTORY :   reports that he quit smoking about 43 years ago  He has a 60 00 pack-year smoking history   He has never used smokeless tobacco  He reports that he drinks alcohol  He reports that he does not use drugs  FAMILY HISTORY:  Family History   Problem Relation Age of Onset    Dementia Mother        ALLERGIES:  Allergies   Allergen Reactions    Ibuprofen Nausea Only    Penicillins Other (See Comments)     unknown           PHYSICAL EXAM:  Vitals:    01/09/19 0923   BP: 142/72   Pulse: 82   Weight: 113 kg (250 lb)   Height: 5' 9" (1 753 m)     Body mass index is 36 92 kg/m²  Physical Exam   Constitutional: He is oriented to person, place, and time  He appears well-developed and well-nourished  No distress  HENT:   Head: Normocephalic and atraumatic  Mouth/Throat: Oropharynx is clear and moist  No oropharyngeal exudate  Eyes: No scleral icterus  Neck: Neck supple  No JVD present  No tracheal deviation present  No thyromegaly present  Cardiovascular: Normal rate and normal heart sounds  Exam reveals no friction rub  No murmur heard  Pulmonary/Chest: He has no wheezes  He has no rales  Abdominal: Soft  He exhibits no mass  There is no tenderness  There is no rebound  Musculoskeletal: He exhibits no edema  Arm AVF with no thrill or bruit   Neurological: He is alert and oriented to person, place, and time  Skin: Skin is warm  No rash noted  He is not diaphoretic  No erythema  Psychiatric: He has a normal mood and affect  His behavior is normal    Vitals reviewed        LABORATORY DATA:       Results from last 6 Months  Lab Units 01/04/19  0656 10/16/18  1056 10/10/18  0820   WBC Thousand/uL 5 70 4 62 4 00*   HEMOGLOBIN g/dL 13 5 13 1 12 7*   HEMATOCRIT % 41 8 39 8 38 4*   PLATELETS Thousands/uL 153 158 154   POTASSIUM mmol/L 4 2 3 9 4 0   CHLORIDE mmol/L 101 98* 95*   CO2 mmol/L 30 31 33*   BUN mg/dL 28* 23 23   CREATININE mg/dL 1 60* 1 98* 1 81*   CALCIUM mg/dL 8 7 8 7 9 1   MAGNESIUM mg/dL 2 9* 2 3 1 9   PHOSPHORUS mg/dL 3 3 3 6 3 9        rest all reviewed    RADIOLOGY:  No orders to display     Rest all reviewed        MEDICATIONS:    Current Outpatient Prescriptions:     Alcohol Swabs (ALCOHOL PREP) PADS, by Does not apply route 2 (two) times a day, Disp: 200 each, Rfl: 0    aspirin (ADULT ASPIRIN EC LOW STRENGTH) 81 mg EC tablet, Take 81 mg by mouth daily  , Disp: , Rfl:     BD PEN NEEDLE MARCELINO U/F 32G X 4 MM MISC, USE THREE TIMES A DAY OR AS DIRECTED BY PHYSICAN  00, Disp: 100 each, Rfl: 3    carvedilol (COREG) 25 mg tablet, Take 1 tablet (25 mg total) by mouth 2 (two) times a day with meals, Disp: 180 tablet, Rfl: 3    Cholecalciferol (VITAMIN D3) 5000 units CAPS, Take 1 capsule by mouth daily, Disp: , Rfl:     CINNAMON PO, Take 500 mg by mouth daily, Disp: , Rfl:     Coenzyme Q10 (COQ-10) 200 MG CAPS, Take 1 capsule by mouth daily  , Disp: , Rfl:     ENTRESTO  MG TABS, TAKE 1 TABLET BY MOUTH 2 (TWO) TIMES A DAY, Disp: 60 tablet, Rfl: 4    flurazepam (DALMANE) 30 MG capsule, Take 1 capsule (30 mg total) by mouth daily at bedtime as needed for sleep, Disp: 30 capsule, Rfl: 0    furosemide (LASIX) 40 mg tablet, Take 1 5 tablets (60 mg total) by mouth 2 (two) times a day, Disp: 270 tablet, Rfl: 3    gabapentin (NEURONTIN) 400 mg capsule, Take 100 mg by mouth 4 (four) times a day, Disp: , Rfl:     glucose blood (ONE TOUCH ULTRA TEST) test strip, 1 each by Other route 3 (three) times a day Use as instructed, Disp: 100 each, Rfl: 11    isosorbide dinitrate (ISORDIL) 10 mg tablet, Take 1 tablet (10 mg total) by mouth 3 (three) times a day, Disp: 270 tablet, Rfl: 3    metolazone (ZAROXOLYN) 5 mg tablet, TAKE 1 TABLET BY MOUTH EVERY DAY AS NEEDED WEIGHT GAIN 3 LBS, Disp: 30 tablet, Rfl: 2    Multiple Vitamin (MULTIVITAMIN) capsule, Take 1 capsule by mouth daily  , Disp: , Rfl:     Omega-3 Fatty Acids (FISH OIL) 1200 MG CAPS, Take 1 capsule by mouth daily, Disp: , Rfl:     potassium chloride (KLOR-CON) 20 mEq packet, Take 20 mEq by mouth 2 (two) times a day, Disp: 180 tablet, Rfl: 3    simvastatin (ZOCOR) 40 mg tablet, TAKE 1 TABLET BY ORAL ROUTE EVERY DAY IN THE EVENING, Disp: , Rfl: 5    TOUJEO SOLOSTAR 300 units/mL CONCETRATED U-300 injection pen, INJECT BY SUBCUTANEOUS ROUTE 43 UNITS IN THE AM AND 38 UNITS IN THE PM, Disp: 9 pen, Rfl: 5    calcitriol (ROCALTROL) 0 5 MCG capsule, Take 1 capsule (0 5 mcg total) by mouth daily, Disp: 60 capsule, Rfl: 4    gabapentin (NEURONTIN) 300 mg capsule, Take 1 capsule (300 mg total) by mouth 2 (two) times a day (Patient not taking: Reported on 1/9/2019 ), Disp: 180 capsule, Rfl: 1          Portions of the record may have been created with voice recognition software  Occasional wrong word or "sound a like" substitutions may have occurred due to the inherent limitations of voice recognition software  Read the chart carefully and recognize, using context, where substitutions have occurred  If you have any questions, please contact the dictating provider

## 2019-01-09 NOTE — LETTER
January 9, 2019     Ana Matute MD  [facility] [address]  [place] [address]    Patient: Olga Benavides Sr  YOB: 1950   Date of Visit: 1/9/2019       Dear Dr Christian Leger: Thank you for referring Olga Benavides to me for evaluation  Below are my notes for this consultation  If you have questions, please do not hesitate to call me  I look forward to following your patient along with you  Sincerely,        Tanya Whittington MD        CC: No Recipients  Tanya Whittington MD  1/9/2019 10:33 AM  Sign at close encounter  Nephrology Follow up Consultation  Elin Jean Baptiste  80 y o  male MRN: [id number]            BACKGROUND:  Elin Jean Baptiste  is a 80 y o male who was referred by Ana Matute MD for evaluation of Chronic Kidney Disease and Hypertension    ASSESSMENT / PLAN:   80 y o   male with pmh of multiple co-morbidities including hypertension (x15yrs), diabetes (x25yrs, no DR), obesity, CHF systolic dysfunction, CAD status post AICD/pacemaker, obstructive sleep apnea on CPAP, prostate cancer status post surgical removal  and CKD stage IIIB presented to the office for routine follow-up  1  CKD stage III B:  -Patient has a baseline creatinine of 1 8-1 9mg/dL  Most recent labs show a Creatinine of 1 60mg/dL  Renal function remains stable  - Likely has underlying CKD secondary to diabetic nodularo glomerular sclerosis plus hypertensive nephrosclerosis plus cardiorenal syndrome plus age-related nephron loss  - SPEP negative  - renal ultrasound from October 16, 2018 shows right kidney 10 7 cm left kidney 11 cm, few simple cysts on the right kidney  - Proteinuria - PCr of 62mg  - Acid base and lytes stable  Except hypermagnesemia- stop magnesium supplements for now  - Clinically the patient appears to be euvoelmic- cont current lasix dose  - Recommend to avoid use of NSAIDs, nephrotoxins   Caution advised with regards to exposure to IV contrast dye    - Patient has nonfunctional AV fistula in place in the left arm that as per him was placed many years ago due to concern for needing dialysis  - Discussed with the patient in depth his renal status, including the possible etiologies for CKD  - Advised the patient that when his GFR is close to 20mL/min then will start discussing about RRT(renal replacement therapy) options such as renal transplant, peritoneal dialysis and hemodialysis  - Informed the patient about the various options for Renal Replacement therapy  - Discussed with the patient how we need to work together to delay the progression of CKD with optimal BP control based on their age and co-morbidities, optimal BS control with HbA1c of <7% and trying to reduce proteinuria by the use of anti-proteinuric agents  2  Hypertension:  - Patient is on Coreg 25 mg p o  B i d ,entresto 97/103mg p o  Q day, Lasix 60 mg p o  B i d , Isordil 10 mg p o  T i d , metolazone 5 mg p o  P r n (2 times per week), K-Dur 20 meq p o  Q day, magnesium supplements  - will stop the magnesium   - Goal BP of < 140/90 based on his comorbidities  - Instructed to follow low sodium (2gm)diet   - Advised to hold ACEI/ARBs if patient suffers from dehydration due to gastrointestinal losses due to risk of GAGE secondary to failure to autoregulate  - if patient's blood pressure remains elevated may consider stopping potassium supplementation and starting patient on Aldactone as it may help  3  Hemoglobin:  - Goal Hb of 10-12 g/dL  - Most recent labs suggestive of 13 5gm/dL  - No role for iron supplementation at this time  4 CKD-MBD(Mineral Bone Disease): - Based on patients CKD stage following is the goal of therapy  - Maintain calcium phosphorus product of < 55   - Stage 3 CKD - Goal Ca 8 5-10 mg/dL , goal Phos 2 7-4 6 mg/dL  , goal iPTH 30-70 pg/mL  - Continue patient on vitamin-D 5000 units p o   Q day  - most recent vitamin-D level of 41, intact PTH level of 283  - start on calcitriol 0 5mcg po Q24  - check intact PTH vitamin-D a next visit    5  Lipids:  - On fish oil, Zocor  - Goal LDL less than 70  - Management as per PCP    6  Nutrition:  - Encouraged patient to follow a renal diet comprising of moderate potassium, low phosphorus and protein restriction to 0 8gm/kg  7  DM:  - Advised patient of tight glycemic control to obtain A1c closer to 7%  - This is needed to help decrease progression of CKD  - Also on Neurontin for neuropathy   - Dose of Neurontin may need to be adjusted as renal function declines over the time  - on toujeo only    8  Prostate cancer:  - Status post prostatectomy  - Patient has an appointment for follow-up with Urology for recent elevated PSA levels  9  Followup:  - Patient is to follow-up in 4 months, with lab work to be performed a few days prior to the visit  Quique Luque MD, [facility], 1/9/2019, 10:26 AM             SUBJECTIVE: 80 y o  male seen in the office, reports no hospitalizations since last visit, has been doing well, feels well, not checking BP at home, but at the podiatrist initially was elevated but on recheck was normal  BS are about 110-175 range  Happy with the care he is getting, reports edema is resolved  Still taking metolazone 2 times a week      Review of Systems   Constitutional: Negative for chills, fatigue and fever  HENT: Negative for congestion and sore throat  Respiratory: Negative for cough, shortness of breath and wheezing  Cardiovascular: Negative for chest pain, palpitations and leg swelling  Gastrointestinal: Negative for abdominal pain, constipation, diarrhea, nausea and vomiting  Genitourinary: Negative for difficulty urinating, flank pain and hematuria  Musculoskeletal: Negative for back pain  Skin: Negative for pallor  Neurological: Negative for dizziness and headaches  Psychiatric/Behavioral: Negative for agitation and confusion  All other systems reviewed and are negative        PAST MEDICAL HISTORY:  Past Medical History:   Diagnosis Date    AICD (automatic cardioverter/defibrillator) present     Anxiety and depression     Arthritis     Asthma     pt denies    CAD (coronary artery disease) 10/10/2014    CKD (chronic kidney disease) stage 3, GFR 30-59 ml/min ([facility])     COPD (chronic obstructive pulmonary disease) ([facility])     pt denies    CPAP (continuous positive airway pressure) dependence     Depression (emotion)     affective disorder    Diabetic nephropathy ([facility])     Erectile dysfunction     GERD (gastroesophageal reflux disease)     Hypertension     Iron deficiency anemia     Latent tuberculosis     Lumbar herniated disc     low back pain    Neuropathy     bles    Prostate cancer ([facility] )     2013- had radiation- had prostatectomy    Sleep apnea     Systolic CHF, chronic ([facility])     Type 2 diabetes mellitus with hyperlipidemia ([facility])     Use of cane as ambulatory aid     Vitamin D deficiency     Wears glasses        PROBLEM LIST    Patient Active Problem List   Diagnosis    Type 2 diabetes mellitus ([facility] )    Essential hypertension    Chronic obstructive pulmonary disease ([facility])    Chronic renal insufficiency, stage III (moderate) ([facility])    Prostate cancer ([facility])    Anxiety and depression    Chronic combined systolic and diastolic CHF, NYHA class 3 ([facility])    Acute on chronic combined systolic and diastolic CHF (congestive heart failure) ([facility] )    Erectile dysfunction following radical prostatectomy    JOE (obstructive sleep apnea)    Persistent insomnia    Obesity with body mass index 30 or greater    Coronary atherosclerosis    Erectile dysfunction of non-organic origin    Generalized anxiety disorder    Hyperlipidemia    Peripheral neuropathy    Localized, primary osteoarthritis of shoulder region    Pain in joint of left shoulder    Primary osteoarthritis of both knees       PAST SURGICAL HISTORY:  Past Surgical History:   Procedure Laterality Date    AV FISTULA PLACEMENT      left upper arm and removal    CARDIAC DEFIBRILLATOR PLACEMENT      CATARACT EXTRACTION Bilateral 10/09/2014    COLONOSCOPY      INGUINAL HERNIA REPAIR Bilateral     OTHER SURGICAL HISTORY Left 10/10/2014    AV Shunt    KY INSERT,INFLATABLE PENILE PROSTHESIS N/A 1/19/2018    Procedure: INSERTION 3 PART PROSTHESIS PENILE;  Surgeon: Krystal Dempsey MD;  Location[facility];  Service: Urology    PROSTATECTOMY         SOCIAL HISTORY :   reports that he quit smoking about 43 years ago  He has a 60 00 pack-year smoking history  He has never used smokeless tobacco  He reports that he drinks alcohol  He reports that he does not use drugs  FAMILY HISTORY:  Family History   Problem Relation Age of Onset    Dementia Mother        ALLERGIES:  Allergies   Allergen Reactions    Ibuprofen Nausea Only    Penicillins Other (See Comments)     unknown           PHYSICAL EXAM:  Vitals:    01/09/19 0923   BP: 142/72   Pulse: 82   Weight: 113 kg (250 lb)   Height: 5' 9" (1 753 m)     Body mass index is 36 92 kg/m²  Physical Exam   Constitutional: He is oriented to person, place, and time  He appears well-developed and well-nourished  No distress  HENT:   Head: Normocephalic and atraumatic  Mouth/Throat: Oropharynx is clear and moist  No oropharyngeal exudate  Eyes: No scleral icterus  Neck: Neck supple  No JVD present  No tracheal deviation present  No thyromegaly present  Cardiovascular: Normal rate and normal heart sounds  Exam reveals no friction rub  No murmur heard  Pulmonary/Chest: He has no wheezes  He has no rales  Abdominal: Soft  He exhibits no mass  There is no tenderness  There is no rebound  Musculoskeletal: He exhibits no edema  Arm AVF with no thrill or bruit   Neurological: He is alert and oriented to person, place, and time  Skin: Skin is warm  No rash noted  He is not diaphoretic  No erythema  Psychiatric: He has a normal mood and affect   His behavior is normal    Vitals reviewed        LABORATORY DATA:       Results from last 6 Months  Lab Units 01/04/19  0656 10/16/18  1056 10/10/18  0820   WBC Thousand/uL 5 70 4 62 4 00*   HEMOGLOBIN g/dL 13 5 13 1 12 7*   HEMATOCRIT % 41 8 39 8 38 4*   PLATELETS Thousands/uL 153 158 154   POTASSIUM mmol/L 4 2 3 9 4 0   CHLORIDE mmol/L 101 98* 95*   CO2 mmol/L 30 31 33*   BUN mg/dL 28* 23 23   CREATININE mg/dL 1 60* 1 98* 1 81*   CALCIUM mg/dL 8 7 8 7 9 1   MAGNESIUM mg/dL 2 9* 2 3 1 9   PHOSPHORUS mg/dL 3 3 3 6 3 9        rest all reviewed    RADIOLOGY:  No orders to display     Rest all reviewed        MEDICATIONS:    Current Outpatient Prescriptions:     Alcohol Swabs (ALCOHOL PREP) PADS, by Does not apply route 2 (two) times a day, Disp: 200 each, Rfl: 0    aspirin (ADULT ASPIRIN EC LOW STRENGTH) 81 mg EC tablet, Take 81 mg by mouth daily  , Disp: , Rfl:     BD PEN NEEDLE MARCELINO U/F 32G X 4 MM MISC, USE THREE TIMES A DAY OR AS DIRECTED BY PHYSICAN  00, Disp: 100 each, Rfl: 3    carvedilol (COREG) 25 mg tablet, Take 1 tablet (25 mg total) by mouth 2 (two) times a day with meals, Disp: 180 tablet, Rfl: 3    Cholecalciferol (VITAMIN D3) 5000 units CAPS, Take 1 capsule by mouth daily, Disp: , Rfl:     CINNAMON PO, Take 500 mg by mouth daily, Disp: , Rfl:     Coenzyme Q10 (COQ-10) 200 MG CAPS, Take 1 capsule by mouth daily  , Disp: , Rfl:     ENTRESTO  MG TABS, TAKE 1 TABLET BY MOUTH 2 (TWO) TIMES A DAY, Disp: 60 tablet, Rfl: 4    flurazepam (DALMANE) 30 MG capsule, Take 1 capsule (30 mg total) by mouth daily at bedtime as needed for sleep, Disp: 30 capsule, Rfl: 0    furosemide (LASIX) 40 mg tablet, Take 1 5 tablets (60 mg total) by mouth 2 (two) times a day, Disp: 270 tablet, Rfl: 3    gabapentin (NEURONTIN) 400 mg capsule, Take 100 mg by mouth 4 (four) times a day, Disp: , Rfl:     glucose blood (ONE TOUCH ULTRA TEST) test strip, 1 each by Other route 3 (three) times a day Use as instructed, Disp: 100 each, Rfl: 11    isosorbide dinitrate (ISORDIL) 10 mg tablet, Take 1 tablet (10 mg total) by mouth 3 (three) times a day, Disp: 270 tablet, Rfl: 3    metolazone (ZAROXOLYN) 5 mg tablet, TAKE 1 TABLET BY MOUTH EVERY DAY AS NEEDED WEIGHT GAIN 3 LBS, Disp: 30 tablet, Rfl: 2    Multiple Vitamin (MULTIVITAMIN) capsule, Take 1 capsule by mouth daily  , Disp: , Rfl:     Omega-3 Fatty Acids (FISH OIL) 1200 MG CAPS, Take 1 capsule by mouth daily, Disp: , Rfl:     potassium chloride (KLOR-CON) 20 mEq packet, Take 20 mEq by mouth 2 (two) times a day, Disp: 180 tablet, Rfl: 3    simvastatin (ZOCOR) 40 mg tablet, TAKE 1 TABLET BY ORAL ROUTE EVERY DAY IN THE EVENING, Disp: , Rfl: 5    TOUJEO SOLOSTAR 300 units/mL CONCETRATED U-300 injection pen, INJECT BY SUBCUTANEOUS ROUTE 43 UNITS IN THE AM AND 38 UNITS IN THE PM, Disp: 9 pen, Rfl: 5    calcitriol (ROCALTROL) 0 5 MCG capsule, Take 1 capsule (0 5 mcg total) by mouth daily, Disp: 60 capsule, Rfl: 4    gabapentin (NEURONTIN) 300 mg capsule, Take 1 capsule (300 mg total) by mouth 2 (two) times a day (Patient not taking: Reported on 1/9/2019 ), Disp: 180 capsule, Rfl: 1          Portions of the record may have been created with voice recognition software  Occasional wrong word or "sound a like" substitutions may have occurred due to the inherent limitations of voice recognition software  Read the chart carefully and recognize, using context, where substitutions have occurred  If you have any questions, please contact the dictating provider

## 2019-01-15 ENCOUNTER — REMOTE DEVICE CLINIC VISIT (OUTPATIENT)
Dept: CARDIOLOGY CLINIC | Facility: CLINIC | Age: 69
End: 2019-01-15
Payer: COMMERCIAL

## 2019-01-15 DIAGNOSIS — I47.2 VENTRICULAR TACHYCARDIA (HCC): ICD-10-CM

## 2019-01-15 DIAGNOSIS — Z95.810 ICD (IMPLANTABLE CARDIOVERTER-DEFIBRILLATOR) IN PLACE: ICD-10-CM

## 2019-01-15 DIAGNOSIS — I50.42 CHRONIC COMBINED SYSTOLIC AND DIASTOLIC CONGESTIVE HEART FAILURE (HCC): Primary | ICD-10-CM

## 2019-01-15 DIAGNOSIS — I42.9 CARDIOMYOPATHY, UNSPECIFIED TYPE (HCC): ICD-10-CM

## 2019-01-15 PROCEDURE — 93295 DEV INTERROG REMOTE 1/2/MLT: CPT | Performed by: INTERNAL MEDICINE

## 2019-01-15 PROCEDURE — 93296 REM INTERROG EVL PM/IDS: CPT | Performed by: INTERNAL MEDICINE

## 2019-01-15 NOTE — PROGRESS NOTES
Results for orders placed or performed in visit on 01/15/19   Cardiac EP device report    Narrative    MDT SINGLE ICD  CARELINK TRANSMISSION: BATTERY VOLTAGE NEARING JUAQUIN  (2 64V/RRT-2 63V)  WILL SCHEDULE MONTHLY BATTERY CHECKS  - 00 1%  ALL AVAILABLE LEAD PARAMETERS WITHIN NORMAL LIMITS  NO SIGNIFICANT HIGH RATE EPISODES  NORMAL DEVICE FUNCTION      EB

## 2019-01-22 RX ORDER — CALCITRIOL 0.5 UG/1
CAPSULE, LIQUID FILLED ORAL
Refills: 4 | COMMUNITY
Start: 2019-01-09 | End: 2019-05-29

## 2019-01-26 DIAGNOSIS — I50.22 CHRONIC SYSTOLIC CHF (CONGESTIVE HEART FAILURE), NYHA CLASS 2 (HCC): ICD-10-CM

## 2019-01-26 RX ORDER — METOLAZONE 5 MG/1
TABLET ORAL
Qty: 30 TABLET | Refills: 2 | Status: SHIPPED | OUTPATIENT
Start: 2019-01-26 | End: 2020-01-01 | Stop reason: SDUPTHER

## 2019-01-27 DIAGNOSIS — E78.5 HYPERLIPIDEMIA, UNSPECIFIED HYPERLIPIDEMIA TYPE: Primary | ICD-10-CM

## 2019-01-28 ENCOUNTER — OFFICE VISIT (OUTPATIENT)
Dept: UROLOGY | Facility: MEDICAL CENTER | Age: 69
End: 2019-01-28
Payer: COMMERCIAL

## 2019-01-28 VITALS
HEART RATE: 85 BPM | DIASTOLIC BLOOD PRESSURE: 70 MMHG | HEIGHT: 69 IN | WEIGHT: 250 LBS | BODY MASS INDEX: 37.03 KG/M2 | SYSTOLIC BLOOD PRESSURE: 122 MMHG

## 2019-01-28 DIAGNOSIS — C61 PROSTATE CANCER (HCC): Primary | ICD-10-CM

## 2019-01-28 LAB
SL AMB  POCT GLUCOSE, UA: ABNORMAL
SL AMB LEUKOCYTE ESTERASE,UA: ABNORMAL
SL AMB POCT BILIRUBIN,UA: ABNORMAL
SL AMB POCT BLOOD,UA: ABNORMAL
SL AMB POCT CLARITY,UA: CLEAR
SL AMB POCT COLOR,UA: YELLOW
SL AMB POCT KETONES,UA: ABNORMAL
SL AMB POCT NITRITE,UA: ABNORMAL
SL AMB POCT PH,UA: 6.5
SL AMB POCT SPECIFIC GRAVITY,UA: 1.01
SL AMB POCT URINE PROTEIN: ABNORMAL
SL AMB POCT UROBILINOGEN: 0.2

## 2019-01-28 PROCEDURE — 81003 URINALYSIS AUTO W/O SCOPE: CPT | Performed by: UROLOGY

## 2019-01-28 PROCEDURE — 99214 OFFICE O/P EST MOD 30 MIN: CPT | Performed by: UROLOGY

## 2019-01-28 RX ORDER — SIMVASTATIN 40 MG
TABLET ORAL
Qty: 30 TABLET | Refills: 5 | OUTPATIENT
Start: 2019-01-28

## 2019-01-28 NOTE — LETTER
January 28, 2019     Rah Duncan MD  [address] [address]  [place] [address]    Patient: Melonie Vergara Sr  YOB: 1950   Date of Visit: 1/28/2019       Dear Dr Dagoberto Sears: Thank you for referring Melonie Vergara to me for evaluation  Below are my notes for this consultation  If you have questions, please do not hesitate to call me  I look forward to following your patient along with you  Sincerely,        Casey Lee MD        CC: No Recipients  Casey Lee MD  1/28/2019 10:18 AM  Sign at close encounter  Assessment/Plan:  1  Adenocarcinoma of the prostate  The patient underwent radical prostatectomy through a retropubic approach by Dr Pastor Riddle according to the patient about 3 or 4 years ago  The patient also notes that he had IMRT afterwards  More than likely the patient either had margin positive disease or a detectable PSA after radical prostatectomy that prompted his radiation therapy  The patient has been followed at [facility] up until now for his prostate cancer  The patient requests that I take over care of his prostate cancer and I agreed  The patient does have a slowly rising PSA from 3 79 in February of 2017 to 5 4 in January of 2018 consistent with persistent or recurrent prostate cancer  The patient's PSA then went to 5 99 in March of 2018, 5 0 in July of 2018, up to 6 3 in August of 2018 and most recently 4 9 in October of 2018  The patient is asymptomatic and voiding adequately  He has no bony pain complaints and no spontaneous loss of weight  Continued surveillance is recommended  If PSA continues to rise androgen deprivation therapy will be considered  2  Erectile dysfunction secondary to radical prostatectomy and radiation therapy  The patient's penile implant is functioning adequately however the patient notes that he is disappointed that he has less than a satisfactory orgasm    No problem-specific Assessment & Plan notes found for this encounter  There are no diagnoses linked to this encounter  Subjective:      Patient ID: Kaylah Bertrand  is a 80 y o  male  Chief complaint:  Adenocarcinoma of the prostate, erectile dysfunction with penile implant    History of present illness:  67-year-old male who underwent radical prostatectomy followed by radiation therapy for adenocarcinoma of the prostate with a measurable detectable PSA that had been going up until recently when the last value had come down somewhat  The patient is asymptomatic in regard to voiding denying dysuria frequency urgency incontinence or gross hematuria  He denies bony pain  The patient notes that his penile implant works to give him an adequate erection however he feels as if his lack of climax and a lack of orgasm and ejaculation is inhibitory to enjoyment  The following portions of the patient's history were reviewed and updated as appropriate: allergies, current medications, past family history, past medical history, past social history, past surgical history and problem list     Review of Systems   Constitutional: Negative  HENT: Negative  Respiratory: Negative  Cardiovascular: Negative  Gastrointestinal: Negative  Musculoskeletal: Negative  Neurological: Negative  Psychiatric/Behavioral: Negative  Objective:      /70   Pulse 85   Ht 5' 9" (1 753 m)   Wt 113 kg (250 lb)   BMI 36 92 kg/m²           Physical Exam   Constitutional: He is oriented to person, place, and time  He appears well-developed and well-nourished  HENT:   Head: Normocephalic and atraumatic  Eyes: Conjunctivae and EOM are normal    Pulmonary/Chest: Effort normal    Abdominal: Soft  He exhibits no distension  Genitourinary:   Genitourinary Comments: Phallus normal with penile implant cylinders inflated and deflated without problem and with excellent results both erect and flaccid    Scrotal pump testes and adnexa all within normal limits fully functional    Neurological: He is alert and oriented to person, place, and time  Psychiatric: He has a normal mood and affect  His behavior is normal  Judgment and thought content normal    Vitals reviewed

## 2019-01-28 NOTE — PROGRESS NOTES
Assessment/Plan:  1  Adenocarcinoma of the prostate  The patient underwent radical prostatectomy through a retropubic approach by Dr Sheila Karimi according to the patient about 3 or 4 years ago  The patient also notes that he had IMRT afterwards  More than likely the patient either had margin positive disease or a detectable PSA after radical prostatectomy that prompted his radiation therapy  The patient has been followed at [facility] up until now for his prostate cancer  The patient requests that I take over care of his prostate cancer and I agreed  The patient does have a slowly rising PSA from 3 79 in February of 2017 to 5 4 in January of 2018 consistent with persistent or recurrent prostate cancer  The patient's PSA then went to 5 99 in March of 2018, 5 0 in July of 2018, up to 6 3 in August of 2018 and most recently 4 9 in October of 2018  The patient is asymptomatic and voiding adequately  He has no bony pain complaints and no spontaneous loss of weight  Continued surveillance is recommended  If PSA continues to rise androgen deprivation therapy will be considered  2  Erectile dysfunction secondary to radical prostatectomy and radiation therapy  The patient's penile implant is functioning adequately however the patient notes that he is disappointed that he has less than a satisfactory orgasm  No problem-specific Assessment & Plan notes found for this encounter  There are no diagnoses linked to this encounter  Subjective:      Patient ID: Jimmy Pierce  is a 80 y o  male  Chief complaint:  Adenocarcinoma of the prostate, erectile dysfunction with penile implant    History of present illness:  52-year-old male who underwent radical prostatectomy followed by radiation therapy for adenocarcinoma of the prostate with a measurable detectable PSA that had been going up until recently when the last value had come down somewhat    The patient is asymptomatic in regard to voiding denying dysuria frequency urgency incontinence or gross hematuria  He denies bony pain  The patient notes that his penile implant works to give him an adequate erection however he feels as if his lack of climax and a lack of orgasm and ejaculation is inhibitory to enjoyment  The following portions of the patient's history were reviewed and updated as appropriate: allergies, current medications, past family history, past medical history, past social history, past surgical history and problem list     Review of Systems   Constitutional: Negative  HENT: Negative  Respiratory: Negative  Cardiovascular: Negative  Gastrointestinal: Negative  Musculoskeletal: Negative  Neurological: Negative  Psychiatric/Behavioral: Negative  Objective:      /70   Pulse 85   Ht 5' 9" (1 753 m)   Wt 113 kg (250 lb)   BMI 36 92 kg/m²          Physical Exam   Constitutional: He is oriented to person, place, and time  He appears well-developed and well-nourished  HENT:   Head: Normocephalic and atraumatic  Eyes: Conjunctivae and EOM are normal    Pulmonary/Chest: Effort normal    Abdominal: Soft  He exhibits no distension  Genitourinary:   Genitourinary Comments: Phallus normal with penile implant cylinders inflated and deflated without problem and with excellent results both erect and flaccid  Scrotal pump testes and adnexa all within normal limits fully functional    Neurological: He is alert and oriented to person, place, and time  Psychiatric: He has a normal mood and affect  His behavior is normal  Judgment and thought content normal    Vitals reviewed

## 2019-01-29 RX ORDER — SIMVASTATIN 40 MG
40 TABLET ORAL EVERY EVENING
Qty: 30 TABLET | Refills: 5 | Status: SHIPPED | OUTPATIENT
Start: 2019-01-29 | End: 2019-06-16 | Stop reason: SDUPTHER

## 2019-02-04 ENCOUNTER — OFFICE VISIT (OUTPATIENT)
Dept: OBGYN CLINIC | Facility: MEDICAL CENTER | Age: 69
End: 2019-02-04
Payer: COMMERCIAL

## 2019-02-04 ENCOUNTER — APPOINTMENT (OUTPATIENT)
Dept: RADIOLOGY | Facility: CLINIC | Age: 69
End: 2019-02-04
Payer: COMMERCIAL

## 2019-02-04 VITALS
WEIGHT: 252 LBS | HEART RATE: 76 BPM | BODY MASS INDEX: 38.19 KG/M2 | HEIGHT: 68 IN | DIASTOLIC BLOOD PRESSURE: 75 MMHG | SYSTOLIC BLOOD PRESSURE: 159 MMHG

## 2019-02-04 DIAGNOSIS — M54.5 LOW BACK PAIN, UNSPECIFIED BACK PAIN LATERALITY, UNSPECIFIED CHRONICITY, WITH SCIATICA PRESENCE UNSPECIFIED: ICD-10-CM

## 2019-02-04 DIAGNOSIS — G47.00 INSOMNIA, UNSPECIFIED TYPE: ICD-10-CM

## 2019-02-04 DIAGNOSIS — G89.29 CHRONIC MIDLINE LOW BACK PAIN WITHOUT SCIATICA: ICD-10-CM

## 2019-02-04 DIAGNOSIS — M54.5 LOW BACK PAIN, UNSPECIFIED BACK PAIN LATERALITY, UNSPECIFIED CHRONICITY, WITH SCIATICA PRESENCE UNSPECIFIED: Primary | ICD-10-CM

## 2019-02-04 DIAGNOSIS — M54.50 CHRONIC MIDLINE LOW BACK PAIN WITHOUT SCIATICA: ICD-10-CM

## 2019-02-04 PROCEDURE — 99204 OFFICE O/P NEW MOD 45 MIN: CPT | Performed by: FAMILY MEDICINE

## 2019-02-04 PROCEDURE — 72100 X-RAY EXAM L-S SPINE 2/3 VWS: CPT

## 2019-02-04 NOTE — TELEPHONE ENCOUNTER
Patient called and wanted to know if his paperwork for his scooter was sent to [employer]  Please call the patient to advise [phone]    Patient also needs a refills flurazepam (DALMANE) 30 MG capsule-  TAKE 1 CAPSULE (30 MG TOTAL) BY MOUTH DAILY AT BEDTIME AS NEEDED FOR SLEEP #30    He would like this called into [facility]   [phone]

## 2019-02-04 NOTE — PATIENT INSTRUCTIONS
Explained the patient I recommend advanced imaging with CT scan of his lumbar vertebra to evaluate for possible evidence of prostate cancer due to his history of prostate cancer status post radical prostatectomy  Patient does tell me that 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 and is contraindicated for MR I  I explained that MRI would be better and he is to confirm that he is not able to have MRI with his primary care physician  The interim will order CT scan of his lumbar vertebra  I recommend he continue physical therapy

## 2019-02-04 NOTE — PROGRESS NOTES
1  Chronic midline low back pain without sciatica  CT spine lumbar wo contrast     Orders Placed This Encounter   Procedures    CT spine lumbar wo contrast        Imaging Studies (I personally reviewed images in PACS and report):  X-ray lumbar vertebra 02/04/2019:  No acute osseous abnormality  Diffuse degenerative disc disease worst L4-L5-L5-S1    Past diagnostics:  CT lumbar spine August 2016:  Conclusion:  1   Discogenic  narrowing,  spurring,  endplate  erosions  with  a  vacuum  cleft  and  minimal  retrolisthesis  at  L4-5   These  findings  result  in  mild  to  moderate  spinal  stenosis   The  differential  diagnosis  includes  degenerative  changes  versus  discitis   Advise  a  MRI  exam  of  the  lumbar  spine  pre-and  post  contrast  administration-  for  further  evaluation  2   Slight  narrowing  of  bilateral  foramen  at  L5-S1  3   Mild  annular  bulges  at  L2-3,  L3-4  and  L5-S1        IMPRESSION:  Axial low back pain non radicular chronic worsening without injury    PMH:    Prostate cancer status post radical prostatectomy  Pacemaker defibrillator-place 2010- avoid MRI    SOC:   disability for mental illness    Return for Follow-up after CT scan lumbar vertebra  Patient Instructions   Explained the patient I recommend advanced imaging with CT scan of his lumbar vertebra to evaluate for possible evidence of prostate cancer due to his history of prostate cancer status post radical prostatectomy  Patient does tell me that 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 and is contraindicated for MR I  I explained that MRI would be better and he is to confirm that he is not able to have MRI with his primary care physician  The interim will order CT scan of his lumbar vertebra  I recommend he continue physical therapy            CHIEF COMPLAINT:  Low back pain    HPI:  Jarvis Perez Sr  is a 80 y o  male  who presents for   Past medical history significant for prostate cancer status post radical prostatectomy, mental illness, social cure disability for mental illness    Visit 02/04/2019:  Evaluation of chronic back pain ongoing intermittently for years in worse in the last 1-2 months  Patient denies any recent injury  He tells me is currently disabled from a psychiatric disorder for approximately 20-25 years and does not work  He does tell me he has strenuous occupation the past prior to going on social cure disability  Recently he did not have any injury and cannot remember any strenuous activity performed that caused worsening of his back pain  Patient describes his pain as being located midline at the beltline in just below  Denies any tailbone pain  Throbbing intermittent mild to moderate intensity pain  He denies any radiation of pain down his legs  He denies any numbness or tingling  He tells me that he uses a cane to ambulate due to lack of balance chronically for 2-3 years now  Patient did see chiropractor which did not offer any significant relief  He has adverse reaction ibuprofen including nausea  Patient evaluation by Urology 01/28/2019 for follow-up of his prostate cancer which did reveal slowly rising PSA from 3 79 in February 2017 up to 5 4 in January 2018  Recently his PSA was noted to be 4 9 October of 2018  Review of note 12/06/2018 reveals that patient was referred by primary care office to orthopedic and pain management for further evaluation  Patient was started on gabapentin at that time and also given prescription for physical therapy  Patient tells me that he did not see pain management  Review of Systems   Constitutional: Negative for chills, diaphoresis, fever and unexpected weight change  HENT: Negative for hearing loss  Eyes: Negative for visual disturbance  Respiratory: Negative for shortness of breath  Cardiovascular: Negative for chest pain  Gastrointestinal: Negative for abdominal pain, constipation and diarrhea  Genitourinary: Negative for difficulty urinating, dysuria and urgency  Neurological: Negative for weakness and numbness  Psychiatric/Behavioral: Negative for suicidal ideas           Following history reviewed and update:    Past Medical History:   Diagnosis Date    AICD (automatic cardioverter/defibrillator) present     Anxiety and depression     Arthritis     Asthma     pt denies    CAD (coronary artery disease) 10/10/2014    CKD (chronic kidney disease) stage 3, GFR 30-59 ml/min (HCC)     COPD (chronic obstructive pulmonary disease) (HCC)     pt denies    CPAP (continuous positive airway pressure) dependence     Depression (emotion)     affective disorder    Diabetic nephropathy ([facility])     Erectile dysfunction     GERD (gastroesophageal reflux disease)     Hypertension     Iron deficiency anemia     Latent tuberculosis     Lumbar herniated disc     low back pain    Neuropathy     bles    Prostate cancer ([facility] [address] )     2013- had radiation- had prostatectomy    Sleep apnea     Systolic CHF, chronic ([facility])     Type 2 diabetes mellitus with hyperlipidemia ([facility])     Use of cane as ambulatory aid     Vitamin D deficiency     Wears glasses      Past Surgical History:   Procedure Laterality Date    AV FISTULA PLACEMENT      left upper arm and removal    CARDIAC DEFIBRILLATOR PLACEMENT      CATARACT EXTRACTION Bilateral 10/09/2014    COLONOSCOPY      INGUINAL HERNIA REPAIR Bilateral     OTHER SURGICAL HISTORY Left 10/10/2014    AV Shunt    OH INSERT,INFLATABLE PENILE PROSTHESIS N/A 1/19/2018    Procedure: INSERTION 3 PART PROSTHESIS PENILE;  Surgeon: Tatyana Ritter MD;  Location: [facility];  Service: Urology    PROSTATECTOMY       Social History   History   Alcohol Use    Yes     Comment: glass wine daily     History   Drug Use No     History   Smoking Status    Former Smoker    Packs/day: 3 00    Years: 20 00    Quit date: 1976   Smokeless Tobacco    Never Used     Comment: never a smoker as per [employer]     Family History   Problem Relation Age of Onset    Dementia Mother      Allergies   Allergen Reactions    Ibuprofen Nausea Only    Penicillins Other (See Comments)     unknown          Physical Exam  /75   Pulse 76   Ht 5' 8" (1 727 m)   Wt 114 kg (252 lb)   BMI 38 32 kg/m²     Constitutional:  see vital signs  Gen: well-developed, normocephalic/atraumatic, well-groomed  Eyes: No inflammation or discharge of conjunctiva or lids; sclera clear   Pharynx: no inflammation, lesion, or mass of lips  Neck: supple, no masses, non-distended  MSK: no inflammation, lesion, mass, or clubbing of nails and digits except for other than mentioned below  SKIN: no visible rashes or skin lesions  Pulmonary/Chest: Effort normal  No respiratory distress     NEURO: cranial nerves grossly intact  PSYCH:  Alert and oriented to person, place, and time; recent and remote memory intact; mood normal, no depression, anxiety, or agitation, judgment and insight good and intact     Ortho Exam    BACK EXAM:  Gait: normal, no trendelenberg gait, no antalgic gait    BACK TENDERNESS:  Spinous Processes: + midline lower lumbar reproduces patient's chief complaint of pain  Paraspinal Muscles: + left lower lumbar  SI Joint: no  Sacrum: no    ROM:  Flexion: intact  Extension: intact  Sidebending: intact    DERMATOMAL SENSATION:  L1: normal   L2: normal   L3: normal   L4: normal   L5: normal   S1: normal    STRENGTH (bilateral):  Knee Extension: 5/5  Knee Flexion: 5/5  Foot Dorsiflexion: 5/5  Great Toe Extension: 5/5  Foot Plantarflexion: 5/5  Hip Flexion: 5/5  Hip Abduction: 5/5    REFLEXES:  Patellar: 2+ bilateral  Achilles: 2+ bilateral  Clonus: negative bilateral    BACK:   SUPINE STRAIGHT LEG: negative  PRONE STRAIGHT LEG:  SLUMP: negative    HIP:  LOG ROLL: negative  ELIJAH: negative  FADIR: negative    Procedures

## 2019-02-05 RX ORDER — ACETAMINOPHEN AND CODEINE PHOSPHATE 120; 12 MG/5ML; MG/5ML
30 SOLUTION ORAL
Qty: 30 CAPSULE | Refills: 0 | Status: SHIPPED | OUTPATIENT
Start: 2019-02-05 | End: 2019-02-26 | Stop reason: SDUPTHER

## 2019-02-09 ENCOUNTER — HOSPITAL ENCOUNTER (OUTPATIENT)
Dept: CT IMAGING | Facility: HOSPITAL | Age: 69
Discharge: HOME | End: 2019-02-09
Attending: FAMILY MEDICINE
Payer: COMMERCIAL

## 2019-02-09 DIAGNOSIS — M54.50 CHRONIC MIDLINE LOW BACK PAIN WITHOUT SCIATICA: ICD-10-CM

## 2019-02-09 DIAGNOSIS — G89.29 CHRONIC MIDLINE LOW BACK PAIN WITHOUT SCIATICA: ICD-10-CM

## 2019-02-09 PROCEDURE — 72131 CT LUMBAR SPINE W/O DYE: CPT

## 2019-02-14 DIAGNOSIS — N39.45 CONTINUOUS URINE LEAKAGE: Primary | ICD-10-CM

## 2019-02-14 PROBLEM — I50.42 HYPERTENSIVE HEART AND KIDNEY DISEASE WITH CHRONIC COMBINED SYSTOLIC AND DIASTOLIC CONGESTIVE HEART FAILURE AND STAGE 3 CHRONIC KIDNEY DISEASE (HCC): Status: ACTIVE | Noted: 2019-02-14

## 2019-02-14 PROBLEM — N18.30 HYPERTENSIVE HEART AND KIDNEY DISEASE WITH CHRONIC COMBINED SYSTOLIC AND DIASTOLIC CONGESTIVE HEART FAILURE AND STAGE 3 CHRONIC KIDNEY DISEASE (HCC): Status: ACTIVE | Noted: 2019-02-14

## 2019-02-14 PROBLEM — I13.0 HYPERTENSIVE HEART AND KIDNEY DISEASE WITH CHRONIC COMBINED SYSTOLIC AND DIASTOLIC CONGESTIVE HEART FAILURE AND STAGE 3 CHRONIC KIDNEY DISEASE (HCC): Status: ACTIVE | Noted: 2019-02-14

## 2019-02-14 RX ORDER — BROMPHENIRAMIN/PSEUDOEPHEDRINE 1-15MG/5ML
LIQUID (ML) ORAL 4 TIMES DAILY
Qty: 98 EACH | Refills: 3 | Status: SHIPPED | OUTPATIENT
Start: 2019-02-14 | End: 2019-09-19 | Stop reason: SDUPTHER

## 2019-02-14 NOTE — TELEPHONE ENCOUNTER
Adult pull up 4 per day qty [id number] [facility] , -F , requires Dr Char Mars  on Rx    Spoke with  pharmacy Mira

## 2019-02-15 ENCOUNTER — REMOTE DEVICE CLINIC VISIT (OUTPATIENT)
Dept: CARDIOLOGY CLINIC | Facility: CLINIC | Age: 69
End: 2019-02-15

## 2019-02-15 DIAGNOSIS — Z95.810 PRESENCE OF AUTOMATIC CARDIOVERTER/DEFIBRILLATOR (AICD): ICD-10-CM

## 2019-02-15 DIAGNOSIS — I47.2 VENTRICULAR TACHYCARDIA (HCC): Primary | ICD-10-CM

## 2019-02-15 PROCEDURE — 99024 POSTOP FOLLOW-UP VISIT: CPT | Performed by: INTERNAL MEDICINE

## 2019-02-15 NOTE — PROGRESS NOTES
Results for orders placed or performed in visit on 02/15/19   Cardiac EP device report    Narrative    MDT-SINGLE [facility] ICD  CARELINK TRANSMISSION: BATTERY CHECK  BATTERY VOLTAGE NEARING JUAQUIN (2 63V/RRT=2 63V-INDICATOR NOT TRIGGERED YET)  WILL SCHEDULE MONTHLY BATTERY CHECKS  ALL OTHER TESTING/TRENDS ARE STABLE & ADEQUATE  NO EPISODES  NORMAL DEVICE FUNCTION   PL

## 2019-02-18 ENCOUNTER — OFFICE VISIT (OUTPATIENT)
Dept: OBGYN CLINIC | Facility: MEDICAL CENTER | Age: 69
End: 2019-02-18
Payer: COMMERCIAL

## 2019-02-18 VITALS
HEART RATE: 78 BPM | WEIGHT: 239 LBS | BODY MASS INDEX: 36.22 KG/M2 | HEIGHT: 68 IN | SYSTOLIC BLOOD PRESSURE: 153 MMHG | DIASTOLIC BLOOD PRESSURE: 84 MMHG

## 2019-02-18 DIAGNOSIS — M54.50 CHRONIC MIDLINE LOW BACK PAIN WITHOUT SCIATICA: Primary | ICD-10-CM

## 2019-02-18 DIAGNOSIS — G89.29 CHRONIC MIDLINE LOW BACK PAIN WITHOUT SCIATICA: Primary | ICD-10-CM

## 2019-02-18 PROCEDURE — 99213 OFFICE O/P EST LOW 20 MIN: CPT | Performed by: FAMILY MEDICINE

## 2019-02-18 NOTE — PROGRESS NOTES
1  Chronic midline low back pain without sciatica   Physical Therapy     Orders Placed This Encounter   Procedures     Physical Therapy        Imaging Studies (I personally reviewed images in PACS and report):  CT lumbar spine 02/09/2019:  Lumbar degenerative disc disease most pronounced at the L4-5 level where there are chronic endplate changes resulting in mild canal stenosis and moderate bilateral foraminal narrowing, unchanged    Mild degenerative change at the L3-4 and L5-S1 levels  These appear unchanged as well  Past diagnostics:  X-ray lumbar vertebra 02/04/2019:  No acute osseous abnormality  Diffuse degenerative disc disease worst L4-L5-L5-S1  CT lumbar spine August 2016:  Conclusion:  1   Discogenic  narrowing,  spurring,  endplate  erosions  with  a  vacuum  cleft  and  minimal  retrolisthesis  at  L4-5   These  findings  result  in  mild  to  moderate  spinal  stenosis   The  differential  diagnosis  includes  degenerative  changes  versus  discitis   Advise  a  MRI  exam  of  the  lumbar  spine  pre-and  post  contrast  administration-  for  further  evaluation  2   Slight  narrowing  of  bilateral  foramen  at  L5-S1  3   Mild  annular  bulges  at  L2-3,  L3-4  and  L5-S1        IMPRESSION:  Axial low back pain non radicular chronic worsening without injury  CT old lumbar 02/09/2019 consistent with degenerative disc disease, disc bulging, mild foraminal narrowing    PMH:    Prostate cancer status post radical prostatectomy  Pacemaker defibrillator-place 2010- avoid MRI    SOC:   disability for mental illness    Return in about 6 weeks (around 4/1/2019)        Patient Instructions   Back pain can often be caused by a spectrum of issues ranging from back strain which is a muscle tear in the back and heals with time to spasm associated with chronic back arthritis (degenerative disc disease) to Sciatica which is radiation of pain down the leg with or without numbness and tingling that is caused by a pinch of a nerve in the spine from bony arthritis or herniated disc or pinching of a nerve due to buttock muscle spasm known as piriformis syndrome  Mainstay of treatment is physical therapy coupled with pharmacologic management of pain  Bed rest is no longer recommended as it results in stiffness and delayed recovery  Physical therapy to toleration at first is recommended and proceeded by progression to strengthening  Drugs often used to treat back pain are anti-inflammatories (NSAIDs), Tylenol (acetaminophen), muscle relaxers  In recent years, study have shown that Tylenol does not offer much relief however it is used along with nsaids for increased relief especially when patients cannot take other medicines such as muscle relaxers  Occassionally, for refractory pain, steroid packs such as Medrol are used but studies show that these medications do not offer long term relief  Opiods/pain killers (Percocet, oxycodone, hydrocodone) carry a significant addictive and dependence risk and as such are used only in cases of severe pain and per the CDC limited to a few days of use to avoid dependence  Mris are reserved for patients with red flags including fevers, chills, unintentional weight loss, dropped foot or severe weakness, bowel or bladder incontinence which are signs of medical emergencies including cancer, infection, severely pinched nerve, and a medical complication known as cauda equina syndrome  If you ever have any of these symptoms then please notify physician immediately and present to the ER  If you continue to have symptoms then we will pursue MRI of your back after failing 4 weeks of physical therapy  Unless there are red flags as above, most insurances do not cover MRIs to be performed until after a trial of physical therapy  This is because most back pain resolves within 1-3 months and rarely requires invasive intervention       If back pain fails to improve with conservative measures (therapy, medicine, time) then I will order MRI and if appropriate refer to pain management to consider injections and other invasive intervention  Surgery is rarely warranted early on in back pain unless there is a significant red flag  I recommend against taking anti-inflammatory medications also known NSAIDs due to your medical problems unless directed by your primary care physician  These medications include but not limited to Motrin, ibuprofen, Advil, Aleve, naproxen, meloxicam   These medications should not be used in the setting of known high blood pressure as well as kidney issues, stomach ulcers, gastrointestinal bleeding from the rectum or the stomach, or simultaneously with blood thinners  Risks of taking NSAIDs include severely elevated blood pressure that can lead to stroke and heart attack, kidney failure, and severe internal bleeding  If you have no liver problems, you may take Tylenol (also known as acetaminophen) at a  maximum of 1,000  Mg per dose every 6 hours as needed for pain but no more 3 doses or 3,000 mg per day  If you are taking other medications which include tylenol (acetaminophen) such as hydrocodone-acetaminophen then you must factor in the amount of tylenol (acetamionphen) into your 3,000mg maximum dose  Patient expressed understanding and agreed to plan  CHIEF COMPLAINT:  Follow-up Low back pain    HPI:  Blas Mao Sr  is a 80 y o  male  who presents for   Past medical history significant for prostate cancer status post radical prostatectomy, mental illness, social cure disability for mental illness    Visit 02/04/2019:  Evaluation of chronic back pain ongoing intermittently for years in worse in the last 1-2 months  Patient denies any recent injury  He tells me is currently disabled from a psychiatric disorder for approximately 20-25 years and does not work  He does tell me he has strenuous occupation the past prior to going on social cure disability  Recently he did not have any injury and cannot remember any strenuous activity performed that caused worsening of his back pain  Patient describes his pain as being located midline at the beltline in just below  Denies any tailbone pain  Throbbing intermittent mild to moderate intensity pain  He denies any radiation of pain down his legs  He denies any numbness or tingling  He tells me that he uses a cane to ambulate due to lack of balance chronically for 2-3 years now  Patient did see chiropractor which did not offer any significant relief  He has adverse reaction ibuprofen including nausea  Patient evaluation by Urology 01/28/2019 for follow-up of his prostate cancer which did reveal slowly rising PSA from 3 79 in February 2017 up to 5 4 in January 2018  Recently his PSA was noted to be 4 9 October of 2018  Review of note 12/06/2018 reveals that patient was referred by primary care office to orthopedic and pain management for further evaluation  Patient was started on gabapentin at that time and also given prescription for physical therapy  Patient tells me that he did not see pain management  02/18/2019: Follow-up low back pain:  Controlled  Overall patient feels 25% of his normal baseline  Last visit he had CT scan ordered due to history of prostate cancer  CT scan did show arthritis degenerative disc disease as well as mild to moderate bilateral foraminal narrowing  Patient denies any pain radiating down his legs  Denies any numbness or tingling  Denies any weakness  Today reveals that he has tried injections in the past which did not result in persistent significant relief  He inquires about laser physical therapy today  Review of Systems   Constitutional: Negative for chills, diaphoresis, fever and unexpected weight change  Gastrointestinal: Negative for constipation and diarrhea  Genitourinary: Negative for difficulty urinating and urgency     Neurological: Negative for weakness           Following history reviewed and update:    Past Medical History:   Diagnosis Date    AICD (automatic cardioverter/defibrillator) present     Anxiety and depression     Arthritis     Asthma     pt denies    CAD (coronary artery disease) 10/10/2014    CKD (chronic kidney disease) stage 3, GFR 30-59 ml/min ([facility]     COPD (chronic obstructive pulmonary disease) ([facility])     pt denies    CPAP (continuous positive airway pressure) dependence     Depression (emotion)     affective disorder    Diabetic nephropathy (HCC)     Erectile dysfunction     GERD (gastroesophageal reflux disease)     Hypertension     Iron deficiency anemia     Latent tuberculosis     Lumbar herniated disc     low back pain    Neuropathy     bles    Prostate cancer ([facility] [address] )     2013- had radiation- had prostatectomy    Sleep apnea     Systolic CHF, chronic (HCC)     Type 2 diabetes mellitus with hyperlipidemia ([facility])     Use of cane as ambulatory aid     Vitamin D deficiency     Wears glasses      Past Surgical History:   Procedure Laterality Date    AV FISTULA PLACEMENT      left upper arm and removal    CARDIAC DEFIBRILLATOR PLACEMENT      CATARACT EXTRACTION Bilateral 10/09/2014    COLONOSCOPY      INGUINAL HERNIA REPAIR Bilateral     OTHER SURGICAL HISTORY Left 10/10/2014    AV Shunt    [place] INSERT,INFLATABLE PENILE PROSTHESIS N/A 2018    Procedure: INSERTION 3 PART PROSTHESIS PENILE;  Surgeon: Batsheva Mcneil MD;  Location: [facility] OR;  Service: Urology    PROSTATECTOMY       Social History   Social History     Substance and Sexual Activity   Alcohol Use Yes    Comment: glass wine daily     Social History     Substance and Sexual Activity   Drug Use No     Social History     Tobacco Use   Smoking Status Former Smoker    Packs/day: 3 00    Years: 20 00    Pack years: 60 00    Last attempt to quit: [employer] Years since quittin 1   Smokeless Tobacco Never Used   Tobacco Comment    never a smoker as per [employer]     Family History   Problem Relation Age of Onset    Dementia Mother      Allergies   Allergen Reactions    Ibuprofen Nausea Only    Penicillins Other (See Comments)     unknown          Physical Exam  /84   Pulse 78   Ht 5' 8" (1 727 m)   Wt 108 kg (239 lb)   BMI 36 34 kg/m²     Constitutional:  see vital signs  Gen: well-developed, normocephalic/atraumatic, well-groomed  Eyes: No inflammation or discharge of conjunctiva or lids; sclera clear   Pharynx: no inflammation, lesion, or mass of lips  Neck: supple, no masses, non-distended  MSK: no inflammation, lesion, mass, or clubbing of nails and digits except for other than mentioned below  SKIN: no visible rashes or skin lesions  Pulmonary/Chest: Effort normal  No respiratory distress     NEURO: cranial nerves grossly intact  PSYCH:  Alert and oriented to person, place, and time; recent and remote memory intact; mood normal, no depression, anxiety, or agitation, judgment and insight good and intact        Ortho Exam     BACK EXAM:  Gait:  Ambulates with a cane without difficulty    BACK TENDERNESS:  Spinous Processes: no  Paraspinal Muscles: no  SI Joint: no  Sacrum: no    DERMATOMAL SENSATION:  L1: normal   L2: normal   L3: normal   L4: normal   L5: normal   S1: normal    STRENGTH (bilateral):  Knee Extension: 5/5  Knee Flexion: 5/5  Foot Dorsiflexion: 5/5  Great Toe Extension: 5/5  Foot Plantarflexion: 5/5  Hip Flexion: 5/5  Hip Abduction: 5/5    REFLEXES:  Patellar: 2+ bilateral  Achilles: 2+ bilateral    BACK:   SLUMP: negative       Procedures

## 2019-02-18 NOTE — PATIENT INSTRUCTIONS
Back pain can often be caused by a spectrum of issues ranging from back strain which is a muscle tear in the back and heals with time to spasm associated with chronic back arthritis (degenerative disc disease) to Sciatica which is radiation of pain down the leg with or without numbness and tingling that is caused by a pinch of a nerve in the spine from bony arthritis or herniated disc or pinching of a nerve due to buttock muscle spasm known as piriformis syndrome  Mainstay of treatment is physical therapy coupled with pharmacologic management of pain  Bed rest is no longer recommended as it results in stiffness and delayed recovery  Physical therapy to toleration at first is recommended and proceeded by progression to strengthening  Drugs often used to treat back pain are anti-inflammatories (NSAIDs), Tylenol (acetaminophen), muscle relaxers  In recent years, study have shown that Tylenol does not offer much relief however it is used along with nsaids for increased relief especially when patients cannot take other medicines such as muscle relaxers  Occassionally, for refractory pain, steroid packs such as Medrol are used but studies show that these medications do not offer long term relief  Opiods/pain killers (Percocet, oxycodone, hydrocodone) carry a significant addictive and dependence risk and as such are used only in cases of severe pain and per the CDC limited to a few days of use to avoid dependence  Mris are reserved for patients with red flags including fevers, chills, unintentional weight loss, dropped foot or severe weakness, bowel or bladder incontinence which are signs of medical emergencies including cancer, infection, severely pinched nerve, and a medical complication known as cauda equina syndrome  If you ever have any of these symptoms then please notify physician immediately and present to the ER    If you continue to have symptoms then we will pursue MRI of your back after failing 4 weeks of physical therapy  Unless there are red flags as above, most insurances do not cover MRIs to be performed until after a trial of physical therapy  This is because most back pain resolves within 1-3 months and rarely requires invasive intervention  If back pain fails to improve with conservative measures (therapy, medicine, time) then I will order MRI and if appropriate refer to pain management to consider injections and other invasive intervention  Surgery is rarely warranted early on in back pain unless there is a significant red flag  I recommend against taking anti-inflammatory medications also known NSAIDs due to your medical problems unless directed by your primary care physician  These medications include but not limited to Motrin, ibuprofen, Advil, Aleve, naproxen, meloxicam   These medications should not be used in the setting of known high blood pressure as well as kidney issues, stomach ulcers, gastrointestinal bleeding from the rectum or the stomach, or simultaneously with blood thinners  Risks of taking NSAIDs include severely elevated blood pressure that can lead to stroke and heart attack, kidney failure, and severe internal bleeding  If you have no liver problems, you may take Tylenol (also known as acetaminophen) at a  maximum of 1,000  Mg per dose every 6 hours as needed for pain but no more 3 doses or 3,000 mg per day  If you are taking other medications which include tylenol (acetaminophen) such as hydrocodone-acetaminophen then you must factor in the amount of tylenol (acetamionphen) into your 3,000mg maximum dose  Patient expressed understanding and agreed to plan

## 2019-02-21 ENCOUNTER — TELEPHONE (OUTPATIENT)
Dept: FAMILY MEDICINE CLINIC | Facility: CLINIC | Age: 69
End: 2019-02-21

## 2019-02-21 ENCOUNTER — PATIENT OUTREACH (OUTPATIENT)
Dept: FAMILY MEDICINE CLINIC | Facility: CLINIC | Age: 69
End: 2019-02-21

## 2019-02-21 NOTE — TELEPHONE ENCOUNTER
Mira from [facility] called and requested an updated script for adult pull-ups  The patient uses 4 per day for a total of 98, Mira stated that this is 2nd request for this  Please fax [phone], any other questions you can reach Mira [phone]

## 2019-02-22 ENCOUNTER — TELEPHONE (OUTPATIENT)
Dept: FAMILY MEDICINE CLINIC | Facility: CLINIC | Age: 69
End: 2019-02-22

## 2019-02-22 DIAGNOSIS — M17.0 PRIMARY OSTEOARTHRITIS OF BOTH KNEES: Primary | ICD-10-CM

## 2019-02-26 ENCOUNTER — OFFICE VISIT (OUTPATIENT)
Dept: FAMILY MEDICINE CLINIC | Facility: CLINIC | Age: 69
End: 2019-02-26
Payer: COMMERCIAL

## 2019-02-26 VITALS
SYSTOLIC BLOOD PRESSURE: 146 MMHG | HEART RATE: 81 BPM | BODY MASS INDEX: 38.62 KG/M2 | OXYGEN SATURATION: 96 % | TEMPERATURE: 97.6 F | DIASTOLIC BLOOD PRESSURE: 84 MMHG | WEIGHT: 254 LBS

## 2019-02-26 DIAGNOSIS — Z79.4 TYPE 2 DIABETES MELLITUS WITH STAGE 3 CHRONIC KIDNEY DISEASE, WITH LONG-TERM CURRENT USE OF INSULIN (HCC): ICD-10-CM

## 2019-02-26 DIAGNOSIS — G47.00 INSOMNIA, UNSPECIFIED TYPE: ICD-10-CM

## 2019-02-26 DIAGNOSIS — J06.9 ACUTE UPPER RESPIRATORY INFECTION: ICD-10-CM

## 2019-02-26 DIAGNOSIS — E66.9 OBESITY WITH BODY MASS INDEX 30 OR GREATER: ICD-10-CM

## 2019-02-26 DIAGNOSIS — N18.30 TYPE 2 DIABETES MELLITUS WITH STAGE 3 CHRONIC KIDNEY DISEASE, WITH LONG-TERM CURRENT USE OF INSULIN (HCC): ICD-10-CM

## 2019-02-26 DIAGNOSIS — C61 PROSTATE CANCER (HCC): ICD-10-CM

## 2019-02-26 DIAGNOSIS — Z23 ENCOUNTER FOR VACCINATION: Primary | ICD-10-CM

## 2019-02-26 DIAGNOSIS — E11.22 TYPE 2 DIABETES MELLITUS WITH STAGE 3 CHRONIC KIDNEY DISEASE, WITH LONG-TERM CURRENT USE OF INSULIN (HCC): ICD-10-CM

## 2019-02-26 DIAGNOSIS — Z12.11 COLON CANCER SCREENING: ICD-10-CM

## 2019-02-26 DIAGNOSIS — Z11.59 NEED FOR HEPATITIS C SCREENING TEST: ICD-10-CM

## 2019-02-26 LAB — SL AMB POCT HEMOGLOBIN AIC: 7.8 (ref ?–6.5)

## 2019-02-26 PROCEDURE — 3045F PR MOST RECENT HEMOGLOBIN A1C LEVEL 7.0-9.0%: CPT | Performed by: FAMILY MEDICINE

## 2019-02-26 PROCEDURE — 99214 OFFICE O/P EST MOD 30 MIN: CPT | Performed by: FAMILY MEDICINE

## 2019-02-26 RX ORDER — ACETAMINOPHEN AND CODEINE PHOSPHATE 120; 12 MG/5ML; MG/5ML
30 SOLUTION ORAL
Qty: 30 CAPSULE | Refills: 1 | Status: SHIPPED | OUTPATIENT
Start: 2019-02-26 | End: 2019-04-16 | Stop reason: SDUPTHER

## 2019-02-26 RX ORDER — CLINDAMYCIN HYDROCHLORIDE 300 MG/1
300 CAPSULE ORAL 3 TIMES DAILY
Qty: 21 CAPSULE | Refills: 0 | Status: SHIPPED | OUTPATIENT
Start: 2019-02-26 | End: 2019-03-06

## 2019-02-26 NOTE — PROGRESS NOTES
Assessment/Plan:    78-year-old male with:  Type 2 diabetes, acute URI, low back pain, prostate cancer and insomnia  Discussed workup and treatment options with risks and benefits  Encouraged follow-up with pain management, physical therapy, Urology  Discussed the results of patient's A1c and encouraged titrating up his insulin  Patient declines this for the time being but plans to closely watch his diet and his sugars and follow-up in 3 months  Continue medications otherwise  Discussed supportive care return parameters  No problem-specific Assessment & Plan notes found for this encounter  Diagnoses and all orders for this visit:    Encounter for vaccination  -     PNEUMOCOCCAL CONJUGATE VACCINE 13-VALENT GREATER THAN 6 MONTHS    Need for hepatitis C screening test  -     Hepatitis C antibody; Future    Colon cancer screening  -     Ambulatory referral to Gastroenterology; Future  -     Occult Blood, Fecal Immunochemical; Future    Type 2 diabetes mellitus with stage 3 chronic kidney disease, with long-term current use of insulin (HCC)  -     Microalbumin / creatinine urine ratio  -     POCT hemoglobin A1c    Acute upper respiratory infection  -     clindamycin (CLEOCIN) 300 MG capsule; Take 1 capsule (300 mg total) by mouth 3 (three) times a day for 7 days    Insomnia, unspecified type  -     flurazepam (DALMANE) 30 MG capsule; Take 1 capsule (30 mg total) by mouth daily at bedtime as needed for sleep          Subjective:     Chief Complaint   Patient presents with    Back Pain    Hypertension    Diabetes        Patient ID: Eladia Dc  is a 80 y o  male  Patient is a 78-year-old male who presents for follow-up on several issues  He admits that he sugars have been fairly stable with regards to his diabetes  He sees his podiatrist   He admits that his back pain has been stable as well and continues to follow with pain doctor and is doing physical therapy    No fevers chills nausea vomiting  He is admitting coughing congestion for several weeks that is not improving  Patient also has prostate cancer and continues to follow with Urology and admits that he is stable on his medication for insomnia  No other complaints at this time      The following portions of the patient's history were reviewed and updated as appropriate: allergies, current medications, past family history, past medical history, past social history, past surgical history and problem list     Review of Systems   Constitutional: Negative  HENT: Positive for congestion  Eyes: Negative  Respiratory: Positive for cough  Cardiovascular: Negative  Gastrointestinal: Negative  Endocrine: Negative  Genitourinary: Negative  Musculoskeletal: Positive for back pain  Allergic/Immunologic: Negative  Neurological: Negative  Hematological: Negative  Psychiatric/Behavioral: Negative  All other systems reviewed and are negative  Objective:      /84   Pulse 81   Temp 97 6 °F (36 4 °C) (Tympanic)   Wt 115 kg (254 lb)   SpO2 96%   BMI 38 62 kg/m²          Physical Exam   Constitutional: He is oriented to person, place, and time  He appears well-developed and well-nourished  HENT:   Head: Atraumatic  Right Ear: External ear normal    Left Ear: External ear normal    Eyes: Pupils are equal, round, and reactive to light  Conjunctivae and EOM are normal    Neck: Normal range of motion  Cardiovascular: Normal rate, regular rhythm and normal heart sounds  Pulses are no weak pulses  Pulses:       Dorsalis pedis pulses are 2+ on the right side, and 2+ on the left side  Posterior tibial pulses are 2+ on the right side, and 2+ on the left side  Pulmonary/Chest: Effort normal and breath sounds normal  No respiratory distress  Abdominal: Soft  He exhibits no distension  There is no tenderness  There is no rebound and no guarding  Musculoskeletal: Normal range of motion     Feet: Right Foot:   Skin Integrity: Negative for ulcer, skin breakdown, erythema, warmth, callus or dry skin  Left Foot:   Skin Integrity: Negative for ulcer, skin breakdown, erythema, warmth, callus or dry skin  Neurological: He is alert and oriented to person, place, and time  No cranial nerve deficit  Skin: Skin is warm and dry  Psychiatric: He has a normal mood and affect  His behavior is normal  Judgment and thought content normal        Patient's shoes and socks removed  Right Foot/Ankle   Right Foot Inspection  Skin Exam: skin normal and skin intact no dry skin, no warmth, no callus, no erythema, no maceration, no abnormal color, no pre-ulcer, no ulcer and no callus                          Toe Exam: ROM and strength within normal limits  Sensory       Monofilament testing: intact  Vascular    The right DP pulse is 2+  The right PT pulse is 2+  Left Foot/Ankle  Left Foot Inspection  Skin Exam: skin normal and skin intactno dry skin, no warmth, no erythema, no maceration, normal color, no pre-ulcer, no ulcer and no callus                         Toe Exam: ROM and strength within normal limits                   Sensory       Monofilament: intact  Vascular    The left DP pulse is 2+  The left PT pulse is 2+  Assign Risk Category:  No deformity present; No loss of protective sensation;  No weak pulses       Risk: 0

## 2019-03-01 ENCOUNTER — PATIENT OUTREACH (OUTPATIENT)
Dept: FAMILY MEDICINE CLINIC | Facility: CLINIC | Age: 69
End: 2019-03-01

## 2019-03-04 DIAGNOSIS — Z79.4 TYPE 2 DIABETES MELLITUS WITH HYPERGLYCEMIA, WITH LONG-TERM CURRENT USE OF INSULIN (HCC): ICD-10-CM

## 2019-03-04 DIAGNOSIS — E11.65 TYPE 2 DIABETES MELLITUS WITH HYPERGLYCEMIA, WITH LONG-TERM CURRENT USE OF INSULIN (HCC): ICD-10-CM

## 2019-03-04 NOTE — TELEPHONE ENCOUNTER
Patient called and [facility] [id number] to call his BD PEN NEEDLE MARCELINO U/F 32G X 4 MM MISC -use three times a day or as directed by physican #[id number] with 3 refills   Please call this into [phone]

## 2019-03-05 RX ORDER — PEN NEEDLE, DIABETIC 32GX 5/32"
NEEDLE, DISPOSABLE MISCELLANEOUS
Qty: 100 EACH | Refills: 3 | Status: SHIPPED | OUTPATIENT
Start: 2019-03-05 | End: 2019-04-01 | Stop reason: SDUPTHER

## 2019-03-06 ENCOUNTER — TELEPHONE (OUTPATIENT)
Dept: FAMILY MEDICINE CLINIC | Facility: CLINIC | Age: 69
End: 2019-03-06

## 2019-03-06 ENCOUNTER — OFFICE VISIT (OUTPATIENT)
Dept: FAMILY MEDICINE CLINIC | Facility: CLINIC | Age: 69
End: 2019-03-06
Payer: COMMERCIAL

## 2019-03-06 VITALS
BODY MASS INDEX: 38.19 KG/M2 | HEART RATE: 78 BPM | WEIGHT: 252 LBS | SYSTOLIC BLOOD PRESSURE: 124 MMHG | DIASTOLIC BLOOD PRESSURE: 72 MMHG | TEMPERATURE: 98 F | OXYGEN SATURATION: 98 % | HEIGHT: 68 IN | RESPIRATION RATE: 20 BRPM

## 2019-03-06 DIAGNOSIS — R10.9 FLANK PAIN: Primary | ICD-10-CM

## 2019-03-06 PROBLEM — M54.50 LUMBAR PAIN: Status: ACTIVE | Noted: 2019-03-06

## 2019-03-06 LAB
SL AMB  POCT GLUCOSE, UA: NORMAL
SL AMB LEUKOCYTE ESTERASE,UA: NORMAL
SL AMB POCT BILIRUBIN,UA: NORMAL
SL AMB POCT BLOOD,UA: NORMAL
SL AMB POCT CLARITY,UA: CLEAR
SL AMB POCT COLOR,UA: YELLOW
SL AMB POCT KETONES,UA: NORMAL
SL AMB POCT NITRITE,UA: NORMAL
SL AMB POCT PH,UA: 1.02
SL AMB POCT SPECIFIC GRAVITY,UA: 1
SL AMB POCT URINE PROTEIN: NORMAL
SL AMB POCT UROBILINOGEN: NORMAL

## 2019-03-06 PROCEDURE — 1160F RVW MEDS BY RX/DR IN RCRD: CPT | Performed by: FAMILY MEDICINE

## 2019-03-06 PROCEDURE — 99213 OFFICE O/P EST LOW 20 MIN: CPT | Performed by: FAMILY MEDICINE

## 2019-03-06 PROCEDURE — 81002 URINALYSIS NONAUTO W/O SCOPE: CPT | Performed by: FAMILY MEDICINE

## 2019-03-06 PROCEDURE — 1036F TOBACCO NON-USER: CPT | Performed by: FAMILY MEDICINE

## 2019-03-06 PROCEDURE — 87086 URINE CULTURE/COLONY COUNT: CPT | Performed by: FAMILY MEDICINE

## 2019-03-06 PROCEDURE — 3008F BODY MASS INDEX DOCD: CPT | Performed by: FAMILY MEDICINE

## 2019-03-06 RX ORDER — METHOCARBAMOL 500 MG/1
500 TABLET, FILM COATED ORAL 3 TIMES DAILY
Qty: 30 TABLET | Refills: 0 | Status: SHIPPED | OUTPATIENT
Start: 2019-03-06 | End: 2019-05-07 | Stop reason: ALTCHOICE

## 2019-03-06 NOTE — TELEPHONE ENCOUNTER
Patient having kidney pain and wants to see dr Eunice Hearn is at other location so I gave phone number to try and get a same day with dr Eunice Hearn

## 2019-03-06 NOTE — PROGRESS NOTES
Assessment/Plan:  Labs reviewed and urine dip looked at  Patient use ice 10 minutes at a time 3 times a day 1  Patient use Tylenol as directed  Patient will use  Robaxin 3 times daily as needed     Diagnoses and all orders for this visit:    Flank pain  -     POCT urine dip          Subjective:      Patient ID: Marilyn Wynn Sr  is a 80 y o  male  Patient is here with bilateral flank pain beginning last night  No dysuria or hematuria  No fevers or vomiting  No abdominal pain  No trauma noted  Patient did use Tylenol  The following portions of the patient's history were reviewed and updated as appropriate: allergies, current medications, past family history, past medical history, past social history, past surgical history and problem list     Review of Systems   Constitutional: Negative  HENT: Negative  Eyes: Negative  Respiratory: Negative  Cardiovascular: Negative  Gastrointestinal: Negative  Endocrine: Negative  Genitourinary: Negative  Musculoskeletal: Positive for arthralgias and back pain  Skin: Negative  Allergic/Immunologic: Negative  Neurological: Negative  Hematological: Negative  Psychiatric/Behavioral: Negative  Objective:      /72 (BP Location: Right arm, Patient Position: Sitting, Cuff Size: Adult)   Pulse 78   Temp 98 °F (36 7 °C) (Tympanic)   Resp 20   Ht 5' 8" (1 727 m)   Wt 114 kg (252 lb)   SpO2 98%   BMI 38 32 kg/m²          Physical Exam   Constitutional: He appears well-developed and well-nourished  Cardiovascular: Normal rate, regular rhythm and normal heart sounds  Pulmonary/Chest: Effort normal and breath sounds normal    Musculoskeletal: He exhibits tenderness  Pain with palpation over the lower lumbar spine midline  No CVA tenderness  Nursing note and vitals reviewed

## 2019-03-07 LAB — BACTERIA UR CULT: NORMAL

## 2019-03-12 ENCOUNTER — PATIENT OUTREACH (OUTPATIENT)
Dept: FAMILY MEDICINE CLINIC | Facility: CLINIC | Age: 69
End: 2019-03-12

## 2019-03-12 NOTE — PROGRESS NOTES
Dunia Harkins is managing at home and denies additional caregiver assistance  His wife assists at home when needed  He is doing glucometer checks twice daily and his blood sugar today was 125  He was checking right after eating and I instructed him to check before he eats or 2 hours following a meal   He is taking Toujeo 43 units in am and 38 units in pm   He denies any hypoglycemic episodes  He is watching his diet closely  Weight today was 239 and has remained stable  We reviewed weight parameters to call cardiology  He is taking metolozone daily  I instructed him to take the medication only when he experiences a 3 lb weight gain in a day  He verbalized understanding  He denies chest pain, sob or lower extremity edema  Follow-up appointments are scheduled  Nutritional intake adequate  I provided him with my contact information and he is agreeable to further outreach

## 2019-03-18 ENCOUNTER — REMOTE DEVICE CLINIC VISIT (OUTPATIENT)
Dept: CARDIOLOGY CLINIC | Facility: CLINIC | Age: 69
End: 2019-03-18
Payer: COMMERCIAL

## 2019-03-18 DIAGNOSIS — Z79.4 TYPE 2 DIABETES MELLITUS WITH COMPLICATION, WITH LONG-TERM CURRENT USE OF INSULIN (HCC): ICD-10-CM

## 2019-03-18 DIAGNOSIS — Z95.810 PRESENCE OF AUTOMATIC CARDIOVERTER/DEFIBRILLATOR (AICD): ICD-10-CM

## 2019-03-18 DIAGNOSIS — I47.2 VENTRICULAR TACHYCARDIA (HCC): ICD-10-CM

## 2019-03-18 DIAGNOSIS — E11.8 TYPE 2 DIABETES MELLITUS WITH COMPLICATION, WITH LONG-TERM CURRENT USE OF INSULIN (HCC): ICD-10-CM

## 2019-03-18 DIAGNOSIS — I50.22 CHRONIC SYSTOLIC CONGESTIVE HEART FAILURE (HCC): Primary | ICD-10-CM

## 2019-03-18 DIAGNOSIS — I42.9 CARDIOMYOPATHY, UNSPECIFIED TYPE (HCC): ICD-10-CM

## 2019-03-18 PROCEDURE — 93296 REM INTERROG EVL PM/IDS: CPT | Performed by: INTERNAL MEDICINE

## 2019-03-18 PROCEDURE — 99024 POSTOP FOLLOW-UP VISIT: CPT | Performed by: INTERNAL MEDICINE

## 2019-03-18 NOTE — PROGRESS NOTES
Results for orders placed or performed in visit on 03/18/19   Cardiac EP device report    Narrative    MDT-SINGLE CHAMBER ICD  CARELINK TRANSMISSION: 1 MO BATTERY STATUS  BATTERY VOLTAGE NEARING JUAQUIN 2 63V/RRT 2 63V CHARGE TIME 14 3 SECS, HAS NOT TRIGGERED JUAQUIN @ THIS TIME)  WILL SCHEDULE MONTHLY BATTERY CHECKS  : <0 1%  ALL AVAILABLE LEAD PARAMETERS WITHIN NORMAL LIMITS  2 VT-NS EPISODES W  EGRAMS SHOWING NSVT 4 BEATS @ 182 BPM & 5 BEATS @ 240 BPM  PT TAKES COREG, ASA 81MG  EF: 40% (ECHO 01/02/18)  TASK TO DR Diego Naranjo   OPTI-VOL WITHIN NORMAL LIMITS  APPROPRIATELY FUNCTIONING ICD    [address]

## 2019-03-19 ENCOUNTER — TELEPHONE (OUTPATIENT)
Dept: FAMILY MEDICINE CLINIC | Facility: CLINIC | Age: 69
End: 2019-03-19

## 2019-03-19 DIAGNOSIS — G47.00 INSOMNIA, UNSPECIFIED TYPE: Primary | ICD-10-CM

## 2019-03-19 RX ORDER — CLONAZEPAM 0.5 MG/1
0.5 TABLET ORAL
Qty: 30 TABLET | Refills: 0 | Status: SHIPPED | OUTPATIENT
Start: 2019-03-19 | End: 2020-01-01

## 2019-03-19 NOTE — TELEPHONE ENCOUNTER
I called Katelin Mercado, he said it is fine and he is aware they do not cover this medication, he said they cover nothing, so he pays cash for this  I asked him if he wanted to try the Clonazepam, and he declined, he states it works very well for him,so he will continue to pay cash  Just fyi

## 2019-03-19 NOTE — TELEPHONE ENCOUNTER
Insurance will not cover the Flurazepam  Medicare Part D denied the script    Either they can pay cash or we cant ry changing the medication? They can try Loyda Aburto

## 2019-03-19 NOTE — TELEPHONE ENCOUNTER
Please call patient    I am worried about abruptly stopping the flurazepam   I can send Clonazepam to see if that will be covered

## 2019-03-20 ENCOUNTER — PATIENT OUTREACH (OUTPATIENT)
Dept: FAMILY MEDICINE CLINIC | Facility: CLINIC | Age: 69
End: 2019-03-20

## 2019-03-20 NOTE — PROGRESS NOTES
Received call from Chandan Crane concerned that he is bringing up green mucus  He said it started over a week ago, he denies fever, chills, body aches  He denies a cough, states he just brings up mucus which is sometimes green other times clear  I instructed him to monitor it and if he develops any fever, chills or he notices an increase in the amount  to come in for appointment  He was agreeable  His nutritional intake is adequate  Blood sugars are in the 120 range  His weight was down to 234 today  He stopped taking metolazone daily and will only take when he has a weight gain of 3 lbs in a day  He is ordered Lasix 40 mg BID but is only taking daily due to it keeps him up all night urinating  I encouraged him to discuss this with his cardiologist at his next visit and to monitor his weight closely  He is watching his sodium intake and aware of s/s to report  He is agreeable to further outreach

## 2019-03-22 LAB
LEFT EYE DIABETIC RETINOPATHY: NORMAL
RIGHT EYE DIABETIC RETINOPATHY: NORMAL

## 2019-03-22 PROCEDURE — 2022F DILAT RTA XM EVC RTNOPTHY: CPT | Performed by: FAMILY MEDICINE

## 2019-03-24 DIAGNOSIS — I50.42 CHRONIC COMBINED SYSTOLIC AND DIASTOLIC HEART FAILURE, NYHA CLASS 3 (HCC): ICD-10-CM

## 2019-03-24 RX ORDER — SACUBITRIL AND VALSARTAN 97; 103 MG/1; MG/1
TABLET, FILM COATED ORAL
Qty: 60 TABLET | Refills: 4 | Status: SHIPPED | OUTPATIENT
Start: 2019-03-24 | End: 2019-08-10 | Stop reason: SDUPTHER

## 2019-03-26 ENCOUNTER — TELEPHONE (OUTPATIENT)
Dept: NEPHROLOGY | Facility: CLINIC | Age: 69
End: 2019-03-26

## 2019-04-01 DIAGNOSIS — Z79.4 TYPE 2 DIABETES MELLITUS WITH HYPERGLYCEMIA, WITH LONG-TERM CURRENT USE OF INSULIN (HCC): ICD-10-CM

## 2019-04-01 DIAGNOSIS — E11.65 TYPE 2 DIABETES MELLITUS WITH HYPERGLYCEMIA, WITH LONG-TERM CURRENT USE OF INSULIN (HCC): ICD-10-CM

## 2019-04-03 ENCOUNTER — PATIENT OUTREACH (OUTPATIENT)
Dept: FAMILY MEDICINE CLINIC | Facility: CLINIC | Age: 69
End: 2019-04-03

## 2019-04-03 DIAGNOSIS — E11.9 TYPE 2 DIABETES MELLITUS WITHOUT COMPLICATION, WITH LONG-TERM CURRENT USE OF INSULIN (HCC): ICD-10-CM

## 2019-04-03 DIAGNOSIS — Z79.4 TYPE 2 DIABETES MELLITUS WITHOUT COMPLICATION, WITH LONG-TERM CURRENT USE OF INSULIN (HCC): ICD-10-CM

## 2019-04-10 ENCOUNTER — TELEPHONE (OUTPATIENT)
Dept: FAMILY MEDICINE CLINIC | Facility: CLINIC | Age: 69
End: 2019-04-10

## 2019-04-16 ENCOUNTER — REMOTE DEVICE CLINIC VISIT (OUTPATIENT)
Dept: CARDIOLOGY CLINIC | Facility: CLINIC | Age: 69
End: 2019-04-16
Payer: COMMERCIAL

## 2019-04-16 DIAGNOSIS — G47.00 INSOMNIA, UNSPECIFIED TYPE: ICD-10-CM

## 2019-04-16 DIAGNOSIS — Z95.810 PRESENCE OF AUTOMATIC CARDIOVERTER/DEFIBRILLATOR (AICD): ICD-10-CM

## 2019-04-16 DIAGNOSIS — I49.5 SSS (SICK SINUS SYNDROME) (HCC): Primary | ICD-10-CM

## 2019-04-16 DIAGNOSIS — I50.42 CHRONIC COMBINED SYSTOLIC AND DIASTOLIC CHF, NYHA CLASS 3 (HCC): ICD-10-CM

## 2019-04-16 PROCEDURE — 93296 REM INTERROG EVL PM/IDS: CPT | Performed by: INTERNAL MEDICINE

## 2019-04-16 PROCEDURE — 93297 REM INTERROG DEV EVAL ICPMS: CPT | Performed by: INTERNAL MEDICINE

## 2019-04-16 PROCEDURE — 93295 DEV INTERROG REMOTE 1/2/MLT: CPT | Performed by: INTERNAL MEDICINE

## 2019-04-16 RX ORDER — ACETAMINOPHEN AND CODEINE PHOSPHATE 120; 12 MG/5ML; MG/5ML
30 SOLUTION ORAL
Qty: 30 CAPSULE | Refills: 1 | Status: SHIPPED | OUTPATIENT
Start: 2019-04-16 | End: 2019-06-07 | Stop reason: SDUPTHER

## 2019-05-07 ENCOUNTER — OFFICE VISIT (OUTPATIENT)
Dept: FAMILY MEDICINE CLINIC | Facility: CLINIC | Age: 69
End: 2019-05-07
Payer: COMMERCIAL

## 2019-05-07 VITALS
RESPIRATION RATE: 16 BRPM | BODY MASS INDEX: 38.25 KG/M2 | DIASTOLIC BLOOD PRESSURE: 62 MMHG | HEIGHT: 68 IN | HEART RATE: 65 BPM | OXYGEN SATURATION: 96 % | WEIGHT: 252.4 LBS | TEMPERATURE: 96 F | SYSTOLIC BLOOD PRESSURE: 140 MMHG

## 2019-05-07 DIAGNOSIS — I87.2 VENOUS INSUFFICIENCY: ICD-10-CM

## 2019-05-07 DIAGNOSIS — Z23 ENCOUNTER FOR VACCINATION: Primary | ICD-10-CM

## 2019-05-07 DIAGNOSIS — I50.42 HYPERTENSIVE HEART AND KIDNEY DISEASE WITH CHRONIC COMBINED SYSTOLIC AND DIASTOLIC CONGESTIVE HEART FAILURE AND STAGE 3 CHRONIC KIDNEY DISEASE (HCC): ICD-10-CM

## 2019-05-07 DIAGNOSIS — N18.30 HYPERTENSIVE HEART AND KIDNEY DISEASE WITH CHRONIC COMBINED SYSTOLIC AND DIASTOLIC CONGESTIVE HEART FAILURE AND STAGE 3 CHRONIC KIDNEY DISEASE (HCC): ICD-10-CM

## 2019-05-07 DIAGNOSIS — E11.43 TYPE 2 DIABETES MELLITUS WITH DIABETIC AUTONOMIC NEUROPATHY, WITHOUT LONG-TERM CURRENT USE OF INSULIN (HCC): ICD-10-CM

## 2019-05-07 DIAGNOSIS — I13.0 HYPERTENSIVE HEART AND KIDNEY DISEASE WITH CHRONIC COMBINED SYSTOLIC AND DIASTOLIC CONGESTIVE HEART FAILURE AND STAGE 3 CHRONIC KIDNEY DISEASE (HCC): ICD-10-CM

## 2019-05-07 LAB
CREAT UR-MCNC: 96.7 MG/DL
MICROALBUMIN UR-MCNC: 198 MG/L (ref 0–20)
MICROALBUMIN/CREAT 24H UR: 205 MG/G CREATININE (ref 0–30)

## 2019-05-07 PROCEDURE — 99214 OFFICE O/P EST MOD 30 MIN: CPT | Performed by: FAMILY MEDICINE

## 2019-05-07 PROCEDURE — 82043 UR ALBUMIN QUANTITATIVE: CPT | Performed by: FAMILY MEDICINE

## 2019-05-07 PROCEDURE — 82570 ASSAY OF URINE CREATININE: CPT | Performed by: FAMILY MEDICINE

## 2019-05-07 PROCEDURE — 3060F POS MICROALBUMINURIA REV: CPT | Performed by: FAMILY MEDICINE

## 2019-05-08 ENCOUNTER — OFFICE VISIT (OUTPATIENT)
Dept: UROLOGY | Facility: MEDICAL CENTER | Age: 69
End: 2019-05-08
Payer: COMMERCIAL

## 2019-05-08 VITALS
SYSTOLIC BLOOD PRESSURE: 118 MMHG | BODY MASS INDEX: 36.53 KG/M2 | WEIGHT: 241 LBS | HEIGHT: 68 IN | DIASTOLIC BLOOD PRESSURE: 62 MMHG

## 2019-05-08 DIAGNOSIS — C61 PROSTATE CANCER (HCC): Primary | ICD-10-CM

## 2019-05-08 LAB
SL AMB  POCT GLUCOSE, UA: ABNORMAL
SL AMB LEUKOCYTE ESTERASE,UA: ABNORMAL
SL AMB POCT BILIRUBIN,UA: ABNORMAL
SL AMB POCT BLOOD,UA: ABNORMAL
SL AMB POCT CLARITY,UA: ABNORMAL
SL AMB POCT COLOR,UA: ABNORMAL
SL AMB POCT KETONES,UA: ABNORMAL
SL AMB POCT NITRITE,UA: ABNORMAL
SL AMB POCT PH,UA: 5.5
SL AMB POCT SPECIFIC GRAVITY,UA: 1.02
SL AMB POCT URINE PROTEIN: >=300
SL AMB POCT UROBILINOGEN: 0.2

## 2019-05-08 PROCEDURE — 81003 URINALYSIS AUTO W/O SCOPE: CPT | Performed by: UROLOGY

## 2019-05-08 PROCEDURE — 99214 OFFICE O/P EST MOD 30 MIN: CPT | Performed by: UROLOGY

## 2019-05-08 RX ORDER — TEMAZEPAM 30 MG/1
15 CAPSULE ORAL
COMMUNITY
End: 2019-09-06 | Stop reason: ALTCHOICE

## 2019-05-08 RX ORDER — ALPRAZOLAM 0.25 MG/1
0.25 TABLET ORAL
COMMUNITY
End: 2019-09-06 | Stop reason: ALTCHOICE

## 2019-05-14 ENCOUNTER — PATIENT OUTREACH (OUTPATIENT)
Dept: FAMILY MEDICINE CLINIC | Facility: CLINIC | Age: 69
End: 2019-05-14

## 2019-05-14 ENCOUNTER — TELEPHONE (OUTPATIENT)
Dept: NEPHROLOGY | Facility: CLINIC | Age: 69
End: 2019-05-14

## 2019-05-15 LAB
LEFT EYE DIABETIC RETINOPATHY: NORMAL
RIGHT EYE DIABETIC RETINOPATHY: NORMAL

## 2019-05-15 PROCEDURE — 2022F DILAT RTA XM EVC RTNOPTHY: CPT | Performed by: FAMILY MEDICINE

## 2019-05-21 ENCOUNTER — REMOTE DEVICE CLINIC VISIT (OUTPATIENT)
Dept: CARDIOLOGY CLINIC | Facility: CLINIC | Age: 69
End: 2019-05-21
Payer: COMMERCIAL

## 2019-05-21 DIAGNOSIS — Z95.810 ICD (IMPLANTABLE CARDIOVERTER-DEFIBRILLATOR) IN PLACE: Primary | ICD-10-CM

## 2019-05-21 PROCEDURE — 93299 PR REM INTERROG ICPMS/SCRMS <30 D TECH REVIEW: CPT | Performed by: INTERNAL MEDICINE

## 2019-05-21 PROCEDURE — 93297 REM INTERROG DEV EVAL ICPMS: CPT | Performed by: INTERNAL MEDICINE

## 2019-05-23 ENCOUNTER — APPOINTMENT (OUTPATIENT)
Dept: LAB | Facility: HOSPITAL | Age: 69
End: 2019-05-23
Attending: INTERNAL MEDICINE
Payer: COMMERCIAL

## 2019-05-23 ENCOUNTER — TELEPHONE (OUTPATIENT)
Dept: NEPHROLOGY | Facility: CLINIC | Age: 69
End: 2019-05-23

## 2019-05-23 ENCOUNTER — APPOINTMENT (OUTPATIENT)
Dept: LAB | Facility: HOSPITAL | Age: 69
End: 2019-05-23
Payer: COMMERCIAL

## 2019-05-23 DIAGNOSIS — C61 PROSTATE CANCER (HCC): ICD-10-CM

## 2019-05-23 DIAGNOSIS — Z79.4 TYPE 2 DIABETES MELLITUS WITH DIABETIC NEPHROPATHY, WITH LONG-TERM CURRENT USE OF INSULIN (HCC): ICD-10-CM

## 2019-05-23 DIAGNOSIS — I10 ESSENTIAL HYPERTENSION: ICD-10-CM

## 2019-05-23 DIAGNOSIS — Z11.59 NEED FOR HEPATITIS C SCREENING TEST: ICD-10-CM

## 2019-05-23 DIAGNOSIS — N18.30 CHRONIC RENAL INSUFFICIENCY, STAGE III (MODERATE) (HCC): ICD-10-CM

## 2019-05-23 DIAGNOSIS — E11.21 TYPE 2 DIABETES MELLITUS WITH DIABETIC NEPHROPATHY, WITH LONG-TERM CURRENT USE OF INSULIN (HCC): ICD-10-CM

## 2019-05-23 DIAGNOSIS — E11.21 TYPE 2 DIABETES MELLITUS WITH DIABETIC NEPHROPATHY, WITH LONG-TERM CURRENT USE OF INSULIN (HCC): Primary | ICD-10-CM

## 2019-05-23 DIAGNOSIS — Z79.4 TYPE 2 DIABETES MELLITUS WITH DIABETIC NEPHROPATHY, WITH LONG-TERM CURRENT USE OF INSULIN (HCC): Primary | ICD-10-CM

## 2019-05-23 LAB
25(OH)D3 SERPL-MCNC: 41.3 NG/ML (ref 30–100)
ALBUMIN SERPL BCP-MCNC: 4.1 G/DL (ref 3–5.2)
ALP SERPL-CCNC: 102 U/L (ref 43–122)
ALT SERPL W P-5'-P-CCNC: 30 U/L (ref 9–52)
ANION GAP SERPL CALCULATED.3IONS-SCNC: 8 MMOL/L (ref 5–14)
AST SERPL W P-5'-P-CCNC: 28 U/L (ref 17–59)
BASOPHILS # BLD AUTO: 0.05 THOUSAND/UL (ref 0–0.1)
BASOPHILS NFR MAR MANUAL: 1 % (ref 0–1)
BILIRUB SERPL-MCNC: 0.3 MG/DL
BUN SERPL-MCNC: 19 MG/DL (ref 5–25)
CALCIUM SERPL-MCNC: 9.6 MG/DL (ref 8.4–10.2)
CHLORIDE SERPL-SCNC: 99 MMOL/L (ref 97–108)
CO2 SERPL-SCNC: 29 MMOL/L (ref 22–30)
CREAT SERPL-MCNC: 1.52 MG/DL (ref 0.7–1.5)
CREAT UR-MCNC: 34.6 MG/DL
EOSINOPHIL # BLD AUTO: 0.28 THOUSAND/UL (ref 0–0.4)
EOSINOPHIL NFR BLD MANUAL: 6 % (ref 0–6)
ERYTHROCYTE [DISTWIDTH] IN BLOOD BY AUTOMATED COUNT: 15.6 %
GFR SERPL CREATININE-BSD FRML MDRD: 54 ML/MIN/1.73SQ M
GLUCOSE SERPL-MCNC: 196 MG/DL (ref 70–99)
HCT VFR BLD AUTO: 38.3 % (ref 41–53)
HGB BLD-MCNC: 12.8 G/DL (ref 13.5–17.5)
LYMPHOCYTES # BLD AUTO: 1.56 THOUSAND/UL (ref 0.5–4)
LYMPHOCYTES # BLD AUTO: 34 % (ref 25–45)
MAGNESIUM SERPL-MCNC: 1.7 MG/DL (ref 1.6–2.3)
MCH RBC QN AUTO: 31.6 PG (ref 26–34)
MCHC RBC AUTO-ENTMCNC: 33.5 G/DL (ref 31–36)
MCV RBC AUTO: 94 FL (ref 80–100)
MICROALBUMIN UR-MCNC: 157 MG/L (ref 0–20)
MICROALBUMIN/CREAT 24H UR: 454 MG/G CREATININE (ref 0–30)
MONOCYTES # BLD AUTO: 0.64 THOUSAND/UL (ref 0.2–0.9)
MONOCYTES NFR BLD AUTO: 14 % (ref 1–10)
NEUTS SEG # BLD: 2.07 THOUSAND/UL (ref 1.8–7.8)
NEUTS SEG NFR BLD AUTO: 45 %
PHOSPHATE SERPL-MCNC: 3.5 MG/DL (ref 2.5–4.8)
PLATELET # BLD AUTO: 134 THOUSANDS/UL (ref 150–450)
PLATELET BLD QL SMEAR: ADEQUATE
PMV BLD AUTO: 9.6 FL (ref 8.9–12.7)
POTASSIUM SERPL-SCNC: 4 MMOL/L (ref 3.6–5)
PROT SERPL-MCNC: 7.3 G/DL (ref 5.9–8.4)
PSA SERPL-MCNC: 6.9 NG/ML (ref 0–4)
PTH-INTACT SERPL-MCNC: 43.9 PG/ML (ref 16.7–78.9)
RBC # BLD AUTO: 4.06 MILLION/UL (ref 4.5–5.9)
RBC MORPH BLD: NORMAL
SODIUM SERPL-SCNC: 136 MMOL/L (ref 137–147)
TOTAL CELLS COUNTED SPEC: 100
WBC # BLD AUTO: 4.6 THOUSAND/UL (ref 4.5–11)

## 2019-05-23 PROCEDURE — 83970 ASSAY OF PARATHORMONE: CPT | Performed by: INTERNAL MEDICINE

## 2019-05-23 PROCEDURE — 36415 COLL VENOUS BLD VENIPUNCTURE: CPT | Performed by: INTERNAL MEDICINE

## 2019-05-23 PROCEDURE — 85007 BL SMEAR W/DIFF WBC COUNT: CPT

## 2019-05-23 PROCEDURE — 82570 ASSAY OF URINE CREATININE: CPT | Performed by: FAMILY MEDICINE

## 2019-05-23 PROCEDURE — 82043 UR ALBUMIN QUANTITATIVE: CPT | Performed by: FAMILY MEDICINE

## 2019-05-23 PROCEDURE — 86803 HEPATITIS C AB TEST: CPT

## 2019-05-23 PROCEDURE — 85027 COMPLETE CBC AUTOMATED: CPT

## 2019-05-23 PROCEDURE — 83735 ASSAY OF MAGNESIUM: CPT | Performed by: INTERNAL MEDICINE

## 2019-05-23 PROCEDURE — 82306 VITAMIN D 25 HYDROXY: CPT

## 2019-05-23 PROCEDURE — 3062F POS MACROALBUMINURIA REV: CPT | Performed by: FAMILY MEDICINE

## 2019-05-23 PROCEDURE — 80053 COMPREHEN METABOLIC PANEL: CPT

## 2019-05-23 PROCEDURE — 84153 ASSAY OF PSA TOTAL: CPT

## 2019-05-23 PROCEDURE — 84100 ASSAY OF PHOSPHORUS: CPT | Performed by: INTERNAL MEDICINE

## 2019-05-24 ENCOUNTER — TELEPHONE (OUTPATIENT)
Dept: NEPHROLOGY | Facility: CLINIC | Age: 69
End: 2019-05-24

## 2019-05-24 LAB — HCV AB SER QL: NORMAL

## 2019-05-25 DIAGNOSIS — G89.29 CHRONIC LOW BACK PAIN, UNSPECIFIED BACK PAIN LATERALITY, WITH SCIATICA PRESENCE UNSPECIFIED: ICD-10-CM

## 2019-05-25 DIAGNOSIS — M54.5 CHRONIC LOW BACK PAIN, UNSPECIFIED BACK PAIN LATERALITY, WITH SCIATICA PRESENCE UNSPECIFIED: ICD-10-CM

## 2019-05-28 RX ORDER — GABAPENTIN 400 MG/1
400 CAPSULE ORAL 4 TIMES DAILY
Qty: 180 CAPSULE | Refills: 1 | Status: SHIPPED | OUTPATIENT
Start: 2019-05-28 | End: 2020-01-16

## 2019-05-29 ENCOUNTER — OFFICE VISIT (OUTPATIENT)
Dept: NEPHROLOGY | Facility: CLINIC | Age: 69
End: 2019-05-29
Payer: COMMERCIAL

## 2019-05-29 VITALS
DIASTOLIC BLOOD PRESSURE: 78 MMHG | HEART RATE: 72 BPM | SYSTOLIC BLOOD PRESSURE: 162 MMHG | WEIGHT: 250 LBS | HEIGHT: 68 IN | RESPIRATION RATE: 18 BRPM | BODY MASS INDEX: 37.89 KG/M2

## 2019-05-29 DIAGNOSIS — E11.21 TYPE 2 DIABETES MELLITUS WITH DIABETIC NEPHROPATHY, WITH LONG-TERM CURRENT USE OF INSULIN (HCC): ICD-10-CM

## 2019-05-29 DIAGNOSIS — C61 PROSTATE CANCER (HCC): ICD-10-CM

## 2019-05-29 DIAGNOSIS — I50.42 HYPERTENSIVE HEART AND KIDNEY DISEASE WITH CHRONIC COMBINED SYSTOLIC AND DIASTOLIC CONGESTIVE HEART FAILURE AND STAGE 3 CHRONIC KIDNEY DISEASE (HCC): ICD-10-CM

## 2019-05-29 DIAGNOSIS — E78.2 MIXED HYPERLIPIDEMIA: Chronic | ICD-10-CM

## 2019-05-29 DIAGNOSIS — N18.30 HYPERTENSIVE HEART AND KIDNEY DISEASE WITH CHRONIC COMBINED SYSTOLIC AND DIASTOLIC CONGESTIVE HEART FAILURE AND STAGE 3 CHRONIC KIDNEY DISEASE (HCC): ICD-10-CM

## 2019-05-29 DIAGNOSIS — Z79.4 TYPE 2 DIABETES MELLITUS WITH DIABETIC NEPHROPATHY, WITH LONG-TERM CURRENT USE OF INSULIN (HCC): ICD-10-CM

## 2019-05-29 DIAGNOSIS — G47.33 OSA (OBSTRUCTIVE SLEEP APNEA): ICD-10-CM

## 2019-05-29 DIAGNOSIS — I13.0 HYPERTENSIVE HEART AND KIDNEY DISEASE WITH CHRONIC COMBINED SYSTOLIC AND DIASTOLIC CONGESTIVE HEART FAILURE AND STAGE 3 CHRONIC KIDNEY DISEASE (HCC): ICD-10-CM

## 2019-05-29 DIAGNOSIS — I25.10 ATHEROSCLEROSIS OF NATIVE CORONARY ARTERY OF NATIVE HEART WITHOUT ANGINA PECTORIS: ICD-10-CM

## 2019-05-29 DIAGNOSIS — I50.43 ACUTE ON CHRONIC COMBINED SYSTOLIC AND DIASTOLIC CHF (CONGESTIVE HEART FAILURE) (HCC): ICD-10-CM

## 2019-05-29 DIAGNOSIS — N18.30 CHRONIC RENAL INSUFFICIENCY, STAGE III (MODERATE) (HCC): Primary | ICD-10-CM

## 2019-05-29 DIAGNOSIS — I10 ESSENTIAL HYPERTENSION: ICD-10-CM

## 2019-05-29 DIAGNOSIS — E66.9 OBESITY WITH BODY MASS INDEX 30 OR GREATER: ICD-10-CM

## 2019-05-29 PROCEDURE — 99214 OFFICE O/P EST MOD 30 MIN: CPT | Performed by: INTERNAL MEDICINE

## 2019-06-06 ENCOUNTER — OFFICE VISIT (OUTPATIENT)
Dept: CARDIOLOGY CLINIC | Facility: CLINIC | Age: 69
End: 2019-06-06
Payer: COMMERCIAL

## 2019-06-06 VITALS
SYSTOLIC BLOOD PRESSURE: 110 MMHG | HEIGHT: 68 IN | WEIGHT: 249.8 LBS | HEART RATE: 78 BPM | DIASTOLIC BLOOD PRESSURE: 62 MMHG | OXYGEN SATURATION: 96 % | BODY MASS INDEX: 37.86 KG/M2

## 2019-06-06 DIAGNOSIS — I10 HTN (HYPERTENSION), BENIGN: ICD-10-CM

## 2019-06-06 DIAGNOSIS — I42.8 NICM (NONISCHEMIC CARDIOMYOPATHY) (HCC): ICD-10-CM

## 2019-06-06 DIAGNOSIS — I50.22 CHRONIC SYSTOLIC CHF (CONGESTIVE HEART FAILURE), NYHA CLASS 2 (HCC): Primary | ICD-10-CM

## 2019-06-06 DIAGNOSIS — Z99.89 OSA ON CPAP: ICD-10-CM

## 2019-06-06 DIAGNOSIS — G47.33 OSA ON CPAP: ICD-10-CM

## 2019-06-06 PROCEDURE — 99214 OFFICE O/P EST MOD 30 MIN: CPT | Performed by: INTERNAL MEDICINE

## 2019-06-07 ENCOUNTER — PATIENT OUTREACH (OUTPATIENT)
Dept: FAMILY MEDICINE CLINIC | Facility: CLINIC | Age: 69
End: 2019-06-07

## 2019-06-07 DIAGNOSIS — G47.00 INSOMNIA, UNSPECIFIED TYPE: ICD-10-CM

## 2019-06-07 DIAGNOSIS — E11.8 TYPE 2 DIABETES MELLITUS WITH COMPLICATION, WITH LONG-TERM CURRENT USE OF INSULIN (HCC): ICD-10-CM

## 2019-06-07 DIAGNOSIS — Z79.4 TYPE 2 DIABETES MELLITUS WITH COMPLICATION, WITH LONG-TERM CURRENT USE OF INSULIN (HCC): ICD-10-CM

## 2019-06-07 RX ORDER — ACETAMINOPHEN AND CODEINE PHOSPHATE 120; 12 MG/5ML; MG/5ML
30 SOLUTION ORAL
Qty: 30 CAPSULE | Refills: 1 | Status: SHIPPED | OUTPATIENT
Start: 2019-06-07 | End: 2019-07-09 | Stop reason: SDUPTHER

## 2019-06-16 DIAGNOSIS — E78.5 HYPERLIPIDEMIA, UNSPECIFIED HYPERLIPIDEMIA TYPE: ICD-10-CM

## 2019-06-17 ENCOUNTER — OFFICE VISIT (OUTPATIENT)
Dept: UROLOGY | Facility: MEDICAL CENTER | Age: 69
End: 2019-06-17
Payer: COMMERCIAL

## 2019-06-17 VITALS
BODY MASS INDEX: 37.74 KG/M2 | SYSTOLIC BLOOD PRESSURE: 120 MMHG | WEIGHT: 249 LBS | DIASTOLIC BLOOD PRESSURE: 70 MMHG | HEART RATE: 72 BPM | HEIGHT: 68 IN

## 2019-06-17 DIAGNOSIS — C61 PROSTATE CANCER (HCC): Primary | ICD-10-CM

## 2019-06-17 PROCEDURE — 99214 OFFICE O/P EST MOD 30 MIN: CPT | Performed by: UROLOGY

## 2019-06-18 RX ORDER — SIMVASTATIN 40 MG
TABLET ORAL
Qty: 30 TABLET | Refills: 4 | Status: SHIPPED | OUTPATIENT
Start: 2019-06-18 | End: 2019-10-07 | Stop reason: SDUPTHER

## 2019-06-21 ENCOUNTER — REMOTE DEVICE CLINIC VISIT (OUTPATIENT)
Dept: CARDIOLOGY CLINIC | Facility: CLINIC | Age: 69
End: 2019-06-21
Payer: COMMERCIAL

## 2019-06-21 ENCOUNTER — TELEPHONE (OUTPATIENT)
Dept: UROLOGY | Facility: MEDICAL CENTER | Age: 69
End: 2019-06-21

## 2019-06-21 DIAGNOSIS — Z95.810 AICD (AUTOMATIC CARDIOVERTER/DEFIBRILLATOR) PRESENT: Primary | ICD-10-CM

## 2019-06-21 PROCEDURE — 93299 PR REM INTERROG ICPMS/SCRMS <30 D TECH REVIEW: CPT | Performed by: INTERNAL MEDICINE

## 2019-06-21 PROCEDURE — 93297 REM INTERROG DEV EVAL ICPMS: CPT | Performed by: INTERNAL MEDICINE

## 2019-06-26 ENCOUNTER — TELEPHONE (OUTPATIENT)
Dept: FAMILY MEDICINE CLINIC | Facility: CLINIC | Age: 69
End: 2019-06-26

## 2019-06-27 ENCOUNTER — TRANSCRIBE ORDERS (OUTPATIENT)
Dept: ADMINISTRATIVE | Facility: HOSPITAL | Age: 69
End: 2019-06-27

## 2019-06-27 DIAGNOSIS — C61 PROSTATE CA (HCC): Primary | ICD-10-CM

## 2019-07-01 ENCOUNTER — TELEPHONE (OUTPATIENT)
Dept: NEPHROLOGY | Facility: CLINIC | Age: 69
End: 2019-07-01

## 2019-07-08 ENCOUNTER — PATIENT OUTREACH (OUTPATIENT)
Dept: FAMILY MEDICINE CLINIC | Facility: CLINIC | Age: 69
End: 2019-07-08

## 2019-07-08 DIAGNOSIS — G89.29 CHRONIC LOW BACK PAIN, UNSPECIFIED BACK PAIN LATERALITY, WITH SCIATICA PRESENCE UNSPECIFIED: ICD-10-CM

## 2019-07-08 DIAGNOSIS — G47.00 INSOMNIA, UNSPECIFIED TYPE: ICD-10-CM

## 2019-07-08 DIAGNOSIS — M54.5 CHRONIC LOW BACK PAIN, UNSPECIFIED BACK PAIN LATERALITY, WITH SCIATICA PRESENCE UNSPECIFIED: ICD-10-CM

## 2019-07-08 NOTE — PROGRESS NOTES
Augie Awan contacted me to inform his PSA was elevated and he is scheduled for bone scan and ultrasound on Wednesday  He denies any issues with voiding  N/c pain  He did not weigh today  He denies chest pain, sob or LE edema  He was told he needs to lose weight and we discussed portion control and to make healthy food choices  He is doing glucometer checks daily but is checking immediately after eating breakfast   Explained he should check a fasting before breakfast or wait 2 hrs after meal   He verbalized understanding  He is taking all medications as prescribed  Follow up appointments scheduled  He is scheduled for PCP follow up tomorrow  Will meet with him following his appointment

## 2019-07-08 NOTE — TELEPHONE ENCOUNTER
Patient called and requested a medication refill for flurazepam (DALMANE) 30 MG capsule-take 1 capsule (30 mg total) by mouth daily at bedtime as needed for sleep #30    Please call this request into [facility] [phone]

## 2019-07-09 ENCOUNTER — OFFICE VISIT (OUTPATIENT)
Dept: FAMILY MEDICINE CLINIC | Facility: CLINIC | Age: 69
End: 2019-07-09
Payer: COMMERCIAL

## 2019-07-09 ENCOUNTER — PATIENT OUTREACH (OUTPATIENT)
Dept: FAMILY MEDICINE CLINIC | Facility: CLINIC | Age: 69
End: 2019-07-09

## 2019-07-09 VITALS
TEMPERATURE: 97.6 F | BODY MASS INDEX: 37.28 KG/M2 | HEART RATE: 84 BPM | HEIGHT: 68 IN | WEIGHT: 246 LBS | OXYGEN SATURATION: 99 % | DIASTOLIC BLOOD PRESSURE: 82 MMHG | SYSTOLIC BLOOD PRESSURE: 140 MMHG

## 2019-07-09 DIAGNOSIS — G47.00 INSOMNIA, UNSPECIFIED TYPE: ICD-10-CM

## 2019-07-09 DIAGNOSIS — C61 PROSTATE CANCER (HCC): ICD-10-CM

## 2019-07-09 DIAGNOSIS — E66.9 OBESITY (BMI 30-39.9): ICD-10-CM

## 2019-07-09 DIAGNOSIS — E11.9 TYPE 2 DIABETES MELLITUS WITHOUT COMPLICATION, WITH LONG-TERM CURRENT USE OF INSULIN (HCC): ICD-10-CM

## 2019-07-09 DIAGNOSIS — Z79.4 TYPE 2 DIABETES MELLITUS WITHOUT COMPLICATION, WITH LONG-TERM CURRENT USE OF INSULIN (HCC): ICD-10-CM

## 2019-07-09 DIAGNOSIS — Z00.00 MEDICARE ANNUAL WELLNESS VISIT, SUBSEQUENT: ICD-10-CM

## 2019-07-09 DIAGNOSIS — Z79.4 TYPE 2 DIABETES MELLITUS WITH COMPLICATION, WITH LONG-TERM CURRENT USE OF INSULIN (HCC): Primary | ICD-10-CM

## 2019-07-09 DIAGNOSIS — E11.8 TYPE 2 DIABETES MELLITUS WITH COMPLICATION, WITH LONG-TERM CURRENT USE OF INSULIN (HCC): Primary | ICD-10-CM

## 2019-07-09 LAB — SL AMB POCT HEMOGLOBIN AIC: 8.4 (ref ?–6.5)

## 2019-07-09 PROCEDURE — 99214 OFFICE O/P EST MOD 30 MIN: CPT | Performed by: FAMILY MEDICINE

## 2019-07-09 PROCEDURE — G0439 PPPS, SUBSEQ VISIT: HCPCS | Performed by: FAMILY MEDICINE

## 2019-07-09 PROCEDURE — 1125F AMNT PAIN NOTED PAIN PRSNT: CPT | Performed by: FAMILY MEDICINE

## 2019-07-09 PROCEDURE — 83036 HEMOGLOBIN GLYCOSYLATED A1C: CPT | Performed by: FAMILY MEDICINE

## 2019-07-09 PROCEDURE — 1170F FXNL STATUS ASSESSED: CPT | Performed by: FAMILY MEDICINE

## 2019-07-09 RX ORDER — ACETAMINOPHEN AND CODEINE PHOSPHATE 120; 12 MG/5ML; MG/5ML
30 SOLUTION ORAL
Qty: 30 CAPSULE | Refills: 2 | Status: SHIPPED | OUTPATIENT
Start: 2019-07-09 | End: 2019-08-06

## 2019-07-09 RX ORDER — ACETAMINOPHEN AND CODEINE PHOSPHATE 120; 12 MG/5ML; MG/5ML
30 SOLUTION ORAL
Qty: 30 CAPSULE | Refills: 1 | Status: SHIPPED | OUTPATIENT
Start: 2019-07-09 | End: 2020-01-16

## 2019-07-09 RX ORDER — GABAPENTIN 400 MG/1
400 CAPSULE ORAL 4 TIMES DAILY
Qty: 180 CAPSULE | Refills: 1 | Status: SHIPPED | OUTPATIENT
Start: 2019-07-09 | End: 2019-08-06

## 2019-07-09 NOTE — PROGRESS NOTES
Assessment and Plan:     Problem List Items Addressed This Visit        Endocrine    Type 2 diabetes mellitus ([facility] ) - Primary    Relevant Medications    Insulin Glargine (TOUJEO SOLOSTAR) 300 units/mL CONCETRATED U-300 injection pen    Other Relevant Orders    POCT hemoglobin A1c (Completed)       Genitourinary    Prostate cancer (HCC)       Other    Insomnia    Relevant Medications    flurazepam (DALMANE) 30 MG capsule    Obesity (BMI 30-39  9)    Medicare annual wellness visit, subsequent         History of Present Illness:     Patient presents for Welcome to Medicare visit  Patient Care Team:  Rangel Davis MD as PCP - General (Family Medicine)  MD Sonja Verde MD Meriel Dupont, DO Bianca Naegeli RN as Lead OP Care Mgr (Care Coordination)     Review of Systems:     Review of Systems   Constitutional: Negative  HENT: Negative  Eyes: Negative  Respiratory: Negative  Cardiovascular: Negative  Gastrointestinal: Negative  Negative for bowel incontinence  Endocrine: Negative  Genitourinary: Negative  Musculoskeletal: Positive for arthralgias, back pain, gait problem and joint swelling  Skin: Negative  Allergic/Immunologic: Negative  Hematological: Negative  Psychiatric/Behavioral: Negative  The patient is not nervous/anxious  All other systems reviewed and are negative         Problem List:     Patient Active Problem List   Diagnosis    Type 2 diabetes mellitus ([facility]    Essential hypertension    Chronic obstructive pulmonary disease (HCC)    Chronic renal insufficiency, stage III (moderate) (HCC)    Prostate cancer (HCC)    Anxiety and depression    Chronic combined systolic and diastolic CHF, NYHA class 3 ([facility] )    Acute on chronic combined systolic and diastolic CHF (congestive heart failure) (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 stage 3 chronic kidney disease (HCC)    Acute upper respiratory infection    Lumbar pain    Venous insufficiency    Obesity (BMI 30-39  9)    Medicare annual wellness visit, subsequent      Past Medical and Surgical History:     Past Medical History:   Diagnosis Date    AICD (automatic cardioverter/defibrillator) present     Anxiety and depression     Arthritis     Asthma     pt denies    CAD (coronary artery disease) 10/10/2014    CKD (chronic kidney disease) stage 3, GFR 30-59 ml/min ([facility])     COPD (chronic obstructive pulmonary disease) ([facility] )     pt denies    CPAP (continuous positive airway pressure) dependence     Depression (emotion)     affective disorder    Diabetic nephropathy ([facility])     Erectile dysfunction     GERD (gastroesophageal reflux disease)     Hypertension     Iron deficiency anemia     Latent tuberculosis     Lumbar herniated disc     low back pain    Neuropathy     bles    Obesity (BMI 30-39  9) 7/9/2019    Prostate cancer ([facility] )     2013- had radiation- had prostatectomy    Sleep apnea     Systolic CHF, chronic ([facility])     Type 2 diabetes mellitus with hyperlipidemia ([facility])     Use of cane as ambulatory aid     Vitamin D deficiency     Wears glasses      Past Surgical History:   Procedure Laterality Date    AV FISTULA PLACEMENT      left upper arm and removal    CARDIAC DEFIBRILLATOR PLACEMENT      CATARACT EXTRACTION Bilateral 10/09/2014    COLONOSCOPY      INGUINAL HERNIA REPAIR Bilateral     OTHER SURGICAL HISTORY Left 10/10/2014    AV Shunt    MN INSERT,INFLATABLE PENILE PROSTHESIS N/A 2018    Procedure: INSERTION 3 PART PROSTHESIS PENILE;  Surgeon: Julien Quintanilla MD;  Location: [facility];  Service: Urology    PROSTATECTOMY        Family History:     Family History   Problem Relation Age of Onset    Dementia Mother       Social History:     Social History     Tobacco Use   Smoking Status Former Smoker    Packs/day: 3 00    Years: 20 00    Pack years: 60 00    Last attempt to quit:     Years since quittin 5   Smokeless Tobacco Never Used   Tobacco Comment    never a smoker as per [employer]     Social History     Substance and Sexual Activity   Alcohol Use Yes    Comment: glass wine daily     Social History     Substance and Sexual Activity   Drug Use No      Medications and Allergies:     Current Outpatient Medications   Medication Sig Dispense Refill    Alcohol Swabs (ALCOHOL PREP) PADS by Does not apply route 2 (two) times a day 200 each 0    ALPRAZolam (XANAX) 0 25 mg tablet Take by mouth daily at bedtime as needed for anxiety      aspirin (ADULT ASPIRIN EC LOW STRENGTH) 81 mg EC tablet Take 81 mg by mouth daily        carvedilol (COREG) 25 mg tablet Take 1 tablet (25 mg total) by mouth 2 (two) times a day with meals 180 tablet 3    Cholecalciferol (VITAMIN D3) 5000 units CAPS Take 1 capsule by mouth daily      CINNAMON PO Take 500 mg by mouth daily      clonazePAM (KlonoPIN) 0 5 mg tablet Take 1 tablet (0 5 mg total) by mouth daily at bedtime 30 tablet 0    Coenzyme Q10 (COQ-10) 200 MG CAPS Take 1 capsule by mouth daily        ENTRESTO  MG TABS TAKE 1 TABLET BY MOUTH TWICE A DAY 60 tablet 4    flurazepam (DALMANE) 30 MG capsule Take 1 capsule (30 mg total) by mouth daily at bedtime as needed for sleep 30 capsule 2    furosemide (LASIX) 40 mg tablet Take 1 5 tablets (60 mg total) by mouth 2 (two) times a day 270 tablet 3    gabapentin (NEURONTIN) 400 mg capsule Take 1 capsule (400 mg total) by mouth 4 (four) times a day 180 capsule 1    gabapentin (NEURONTIN) 400 mg capsule Take 1 capsule (400 mg total) by mouth 4 (four) times a day 180 capsule 1    glucose blood (ONE TOUCH ULTRA TEST) test strip 1 each by Other route 3 (three) times a day Test 100 each 11    Incontinence Supply Disposable (BRIEF OVERNIGHT LARGE) MISC by Does not apply route 4 (four) times a day 98 each 3    Insulin Glargine (TOUJEO SOLOSTAR) 300 units/mL CONCETRATED U-300 injection pen INJECT BY SUBCUTANEOUS ROUTE 45 UNITS BID 9 pen 5    Insulin Pen Needle (BD PEN NEEDLE MARCELINO U/F) 32G X 4 MM MISC Use to inject Insulin  each 3    isosorbide dinitrate (ISORDIL) 10 mg tablet Take 1 tablet (10 mg total) by mouth 3 (three) times a day 270 tablet 3    metolazone (ZAROXOLYN) 5 mg tablet TAKE 1 TABLET BY MOUTH EVERY DAY AS NEEDED WEIGHT GAIN 3 LBS 30 tablet 2    Multiple Vitamin (MULTIVITAMIN) capsule Take 1 capsule by mouth daily        Omega-3 Fatty Acids (FISH OIL) 1200 MG CAPS Take 1 capsule by mouth daily      potassium chloride (KLOR-CON) 20 mEq packet Take 20 mEq by mouth 2 (two) times a day 180 tablet 3    simvastatin (ZOCOR) 40 mg tablet TAKE 1 TABLET BY MOUTH EVERY DAY IN THE EVENING 30 tablet 4    temazepam (RESTORIL) 30 mg capsule Take 15 mg by mouth daily at bedtime      flurazepam (DALMANE) 30 MG capsule Take 1 capsule (30 mg total) by mouth daily at bedtime as needed for sleep 30 capsule 1     No current facility-administered medications for this visit  Allergies   Allergen Reactions    Ibuprofen Nausea Only    Penicillins Other (See Comments)     unknown      Immunizations: There is no immunization history for the selected administration types on file for this patient  Medicare Screening Tests and Risk Assessments:     Sera Mckay is here for his Subsequent Wellness visit  Health Risk Assessment:  Patient rates overall health as good  Patient feels that their physical health rating is Same  Eyesight was rated as Same  Hearing was rated as Same  Patient feels that their emotional and mental health rating is Same  Pain experienced by patient in the last 7 days has been A lot  Patient's pain rating has been 7/10  Patient states that he has experienced no weight loss or gain in last 6 months  (Additional comments: Health diet and exercise as tolerated)    Emotional/Mental Health:  Patient has not been feeling nervous/anxious  PHQ-9 Depression Screening:    Frequency of the following problems over the past two weeks:      1  Little interest or pleasure in doing things: 0 - not at all      2  Feeling down, depressed, or hopeless: 0 - not at all  PHQ-2 Score: 0          Broken Bones/Falls: Fall Risk Assessment:    In the past year, patient has experienced: No history of falling in past year          Bladder/Bowel:  Patient has not leaked urine accidently in the last six months  Patient reports no loss of bowel control  (Additional Comments: Uses cane for assistance)    Immunizations:  Patient has not had a flu vaccination within the last year  Patient has not received a pneumonia shot  Patient has not received a shingles shot  Patient has not received tetanus/diphtheria shot  (Additional Comments: Does not want vaccines)    Home Safety:  Patient does not have trouble with stairs inside or outside of their home  Patient currently reports that there are no safety hazards present in home, working smoke alarms, working carbon monoxide detectors    (Additional Comments: Has  in shower and bath tub with stool)    Preventative Screenings:   prostate cancer screen performed, no colon cancer screen completed, cholesterol screen completed, glaucoma eye exam completed, (Additional Comments: Eye dr last exam April 2019)    Nutrition:  Current diet: Diabetic, Limited junk food, Low Cholesterol, Low Saturated Fat, Low Carb and No Added Salt with servings of the following:    Medications:  Patient is currently taking over-the-counter supplements  Patient is able to manage medications  Lifestyle Choices:  Patient reports no tobacco use  Patient has smoked or used tobacco in the past   Patient has stopped his tobacco use  Tobacco use quit date: June 1st 1976  Patient reports no alcohol use  Patient drives a vehicle  Patient wears seat belt  Current level of exercise of physical activity described by patient as: sedentary  (Additional Comments: Increase exercise as tolerated)    Activities of Daily Living:  Can get out of bed by his or her self, able to dress self, able to make own meals, able to do own shopping, able to bathe self, can do own laundry/housekeeping, can manage own money, pay bills and track expensesAdditional Comments: Lives wife and grandson    Previous Hospitalizations:  No hospitalization or ED visit in past 12 months        Advanced Directives:  Patient has not decided on power of   Patient has not completed advanced directive  Preventative Screening/Counseling:      Cardiovascular:      General: Risks and Benefits Discussed and Screening Current      Counseling: Healthy Diet, Healthy Weight and Improve Blood Pressure          Diabetes:      General: Risks and Benefits Discussed and Screening Current          Colorectal Cancer:      General: Risks and Benefits Discussed and Screening Current          Prostate Cancer:      General: Risks and Benefits Discussed and Screening Current          Osteoporosis:      General: Screening Not Indicated          AAA:      General: Screening Not Indicated          Glaucoma:      General: Screening Not Indicated          HIV:      General: Screening Not Indicated          Hepatitis C:      General: Screening Not Indicated        Advanced Directives:   Patient has no living will for healthcare, does not have durable POA for healthcare, patient does not have an advanced directive         No exam data present     Physical Exam:     /82   Pulse 84   Temp 97 6 °F (36 4 °C)   Ht 5' 8" (1 727 m)   Wt 112 kg (246 lb)   SpO2 99%   BMI 37 40 kg/m²     Physical Exam

## 2019-07-09 NOTE — PROGRESS NOTES
Met with patient following office visit  His A1c was 8 4  Toujeo was increased to 45 units BID  He is doing glucometer checks twice daily, fasting and bedtime  We discussed making changes to his diet and he should record what he is eating for a few days so we can review  He is interested in receiving diabetic diet sheets, will send him information in the mail  He does not exercise, states he has pain to both knees which makes it difficult to walk  We discussed  doing exercises while seated on a chair and he was agreeable  Will follow up with him in the next 1-2 weeks

## 2019-07-09 NOTE — PROGRESS NOTES
Assessment/Plan:    71-year-old male with: type 2 diabetes, insomnia, obesity and prostate cancer  Discussed supportive care return parameters  Continue current medication but will titrate up his to skilled nursing to 45 units twice daily  Discussed healthy diet exercise and weight loss strategies  Continue medications otherwise and follow-up in 3 months  No problem-specific Assessment & Plan notes found for this encounter  Diagnoses and all orders for this visit:    Type 2 diabetes mellitus with complication, with long-term current use of insulin ([address] )  -     POCT hemoglobin A1c    Insomnia, unspecified type  -     flurazepam (DALMANE) 30 MG capsule; Take 1 capsule (30 mg total) by mouth daily at bedtime as needed for sleep    Obesity (BMI 30-39  9)    Type 2 diabetes mellitus without complication, with long-term current use of insulin ([facility])  -     Insulin Glargine (TOUJEO SOLOSTAR) 300 units/mL CONCETRATED U-300 injection pen; INJECT BY SUBCUTANEOUS ROUTE 45 UNITS BID    Prostate cancer ([facility] )          Subjective:     Chief Complaint   Patient presents with    Medicare Wellness Visit     AWV        Patient ID: Kisha Stapleton  is a 77 y o  male  Patient is a 71-year-old male who presents for follow-up on type 2 diabetes, insomnia, obesity and prostate cancer  He admits being stable his medications and denies acute complaints at this time  No fevers chills nausea vomiting  Tolerating p o  intake  No other complaints at this time      The following portions of the patient's history were reviewed and updated as appropriate: allergies, current medications, past family history, past medical history, past social history, past surgical history and problem list     Review of Systems   Constitutional: Negative  HENT: Negative  Eyes: Negative  Respiratory: Negative  Cardiovascular: Negative  Gastrointestinal: Negative  Endocrine: Negative  Genitourinary: Negative      Musculoskeletal: Negative  Allergic/Immunologic: Negative  Neurological: Negative  Hematological: Negative  Psychiatric/Behavioral: Negative  All other systems reviewed and are negative  Objective:      /82   Pulse 84   Temp 97 6 °F (36 4 °C)   Ht 5' 8" (1 727 m)   Wt 112 kg (246 lb)   SpO2 99%   BMI 37 40 kg/m²          Physical Exam   Constitutional: He is oriented to person, place, and time  He appears well-developed and well-nourished  HENT:   Head: Atraumatic  Right Ear: External ear normal    Left Ear: External ear normal    Eyes: Pupils are equal, round, and reactive to light  Conjunctivae and EOM are normal    Neck: Normal range of motion  Cardiovascular: Normal rate, regular rhythm and normal heart sounds  Pulmonary/Chest: Effort normal and breath sounds normal  No respiratory distress  Abdominal: Soft  He exhibits no distension  There is no tenderness  There is no rebound and no guarding  Musculoskeletal: Normal range of motion  Neurological: He is alert and oriented to person, place, and time  No cranial nerve deficit  Skin: Skin is warm and dry  Psychiatric: He has a normal mood and affect  His behavior is normal  Judgment and thought content normal          BMI Counseling: Body mass index is 37 4 kg/m²  Discussed the patient's BMI with him  The BMI is above average  BMI counseling and education was provided to the patient  Nutrition recommendations include 3-5 servings of fruits/vegetables daily  Exercise recommendations include moderate aerobic physical activity for 150 minutes/week

## 2019-07-10 ENCOUNTER — HOSPITAL ENCOUNTER (OUTPATIENT)
Dept: NUCLEAR MEDICINE | Facility: HOSPITAL | Age: 69
Discharge: HOME | End: 2019-07-10
Payer: COMMERCIAL

## 2019-07-10 ENCOUNTER — HOSPITAL ENCOUNTER (OUTPATIENT)
Dept: ULTRASOUND IMAGING | Facility: HOSPITAL | Age: 69
Discharge: HOME | End: 2019-07-10
Payer: COMMERCIAL

## 2019-07-10 DIAGNOSIS — C61 PROSTATE CA (HCC): ICD-10-CM

## 2019-07-10 PROCEDURE — 76700 US EXAM ABDOM COMPLETE: CPT

## 2019-07-10 PROCEDURE — A9503 TC99M MEDRONATE: HCPCS

## 2019-07-10 PROCEDURE — 78306 BONE IMAGING WHOLE BODY: CPT

## 2019-07-22 ENCOUNTER — REMOTE DEVICE CLINIC VISIT (OUTPATIENT)
Dept: CARDIOLOGY CLINIC | Facility: CLINIC | Age: 69
End: 2019-07-22
Payer: COMMERCIAL

## 2019-07-22 DIAGNOSIS — M54.5 CHRONIC LOW BACK PAIN, UNSPECIFIED BACK PAIN LATERALITY, WITH SCIATICA PRESENCE UNSPECIFIED: Primary | ICD-10-CM

## 2019-07-22 DIAGNOSIS — G89.29 CHRONIC LOW BACK PAIN, UNSPECIFIED BACK PAIN LATERALITY, WITH SCIATICA PRESENCE UNSPECIFIED: Primary | ICD-10-CM

## 2019-07-22 DIAGNOSIS — Z95.810 ICD (IMPLANTABLE CARDIOVERTER-DEFIBRILLATOR) IN PLACE: Primary | ICD-10-CM

## 2019-07-22 PROCEDURE — 93296 REM INTERROG EVL PM/IDS: CPT | Performed by: INTERNAL MEDICINE

## 2019-07-22 PROCEDURE — 93295 DEV INTERROG REMOTE 1/2/MLT: CPT | Performed by: INTERNAL MEDICINE

## 2019-07-22 NOTE — PROGRESS NOTES
Results for orders placed or performed in visit on 07/22/19   Cardiac EP device report    Narrative    MDT-SINGLE CHAMBER ICD  CARELINK TRANSMISSION: BATTERY VOLTAGE @ JUAQUIN BUT NOT YET INDICATED BY DEVICE (2 61V/ RRT - 2 63V)  WILL CONTINUE MONTHLY BATTERY CHECKS   - 2%  ALL AVAILABLE LEAD PARAMETERS WITHIN NORMAL LIMITS  NO SIGNIFICANT HIGH RATE EPISODES  OPTI-VOL WITHIN NORMAL LIMITS  NORMAL DEVICE FUNCTION     EB

## 2019-07-23 RX ORDER — METHOCARBAMOL 500 MG/1
500 TABLET, FILM COATED ORAL 3 TIMES DAILY
Qty: 90 TABLET | Refills: 0 | Status: SHIPPED | OUTPATIENT
Start: 2019-07-23 | End: 2019-08-18 | Stop reason: SDUPTHER

## 2019-07-29 ENCOUNTER — TELEPHONE (OUTPATIENT)
Dept: CARDIOLOGY CLINIC | Facility: CLINIC | Age: 69
End: 2019-07-29

## 2019-07-29 ENCOUNTER — PATIENT OUTREACH (OUTPATIENT)
Dept: FAMILY MEDICINE CLINIC | Facility: CLINIC | Age: 69
End: 2019-07-29

## 2019-07-29 NOTE — PROGRESS NOTES
Fabi Roldan is having a reoccurance with L flank pain  He takes methocarbamol three times daily  He states the pain started 4 months ago and was managed but recently pain has worsened  He states he was out at a store over the weekend and lost his balance and fell against a window  He did not fall to the floor  He states he hit his R side  He uses a cane for ambulation and states it's been harder for him to walk  I suggested maybe he would feel more secure using a walker  He plans to schedule appointment with PCP to have pain evaluated and discuss other options for assistive device  He does glucometer checks twice daily and blood sugars are in the range of 137-187  He denies hypoglycemic episodes  He has been taking the wrong dose of Toujeo  He is to be on 45 units and he is taking 43 units  Instructed him to increase to 45 units and keep record of blood sugar  He verbalized understanding  No respiratory issues present  He weighs daily and weight today was 247  He states his weight last week was 236  I instructed him to take metolazone daily when he has a 3 lb weight gain and we reviewed weight parameters  He denies chest pain, sob or LE edema  Nutritional intake adequate, he is actively trying to lose weight by decreasing portion size and making healthier choices  He has difficulty exercising due to bilateral knee arthritis  He is taking all medications and follow up appointments scheduled  He is agreeable to further outreach

## 2019-07-29 NOTE — TELEPHONE ENCOUNTER
Phone call from patient, states he was told to call to report stinging left sided chest pain at defibrillator site  This only lasts seconds, comes  and goes over the past 2 weeks  Non radiating pain  Not associated with sob, sweating, radiation, nausea, palpations, or lightheadedness or passing out  Told patient, will contact device clinic to check remote testing on device, if pain gets worse or becomes more frequent or associated with any of the above symptoms , he should go to er  Pt agreed,  [facility]ed to [facility], spoke with lloyd, she will contact patient   Will notify dr Michelle Garcia

## 2019-07-30 DIAGNOSIS — E87.6 HYPOKALEMIA: ICD-10-CM

## 2019-07-30 RX ORDER — POTASSIUM CHLORIDE 1.5 G/1.77G
20 POWDER, FOR SOLUTION ORAL 2 TIMES DAILY
Qty: 150 PACKET | Refills: 4 | Status: SHIPPED | OUTPATIENT
Start: 2019-07-30 | End: 2020-01-01

## 2019-07-30 NOTE — TELEPHONE ENCOUNTER
Phone call back from patient, relayed message from dr Regine Canchola, he understood same  Pt will call if any further problems

## 2019-08-06 ENCOUNTER — OFFICE VISIT (OUTPATIENT)
Dept: FAMILY MEDICINE CLINIC | Facility: CLINIC | Age: 69
End: 2019-08-06
Payer: COMMERCIAL

## 2019-08-06 VITALS
WEIGHT: 245 LBS | OXYGEN SATURATION: 98 % | SYSTOLIC BLOOD PRESSURE: 140 MMHG | DIASTOLIC BLOOD PRESSURE: 80 MMHG | HEART RATE: 74 BPM | TEMPERATURE: 98.2 F | BODY MASS INDEX: 37.25 KG/M2

## 2019-08-06 DIAGNOSIS — M25.552 BILATERAL HIP PAIN: Primary | ICD-10-CM

## 2019-08-06 DIAGNOSIS — R10.9 ABDOMINAL PAIN, UNSPECIFIED ABDOMINAL LOCATION: ICD-10-CM

## 2019-08-06 DIAGNOSIS — M25.551 BILATERAL HIP PAIN: Primary | ICD-10-CM

## 2019-08-06 DIAGNOSIS — M17.0 PRIMARY OSTEOARTHRITIS OF BOTH KNEES: ICD-10-CM

## 2019-08-06 PROCEDURE — 99214 OFFICE O/P EST MOD 30 MIN: CPT | Performed by: FAMILY MEDICINE

## 2019-08-06 NOTE — PROGRESS NOTES
Assessment/Plan:    78 yo male with: Bilateral hip and knee pain along with abdominal pain and ambulatory dysfunction  Discussed workup and treatment options with risks and benefits  Will refer to GI and PT and check Xrays and give script for walker  Discussed supportive care and return parameters  No problem-specific Assessment & Plan notes found for this encounter  Diagnoses and all orders for this visit:    Bilateral hip pain  -     Ambulatory referral to Physical Therapy; Future  -     XR hips bilateral 5+ w pelvis if performed; Future  -     Walker    Abdominal pain, unspecified abdominal location  -     Ambulatory referral to Gastroenterology; Future    Primary osteoarthritis of both knees          Subjective:     Chief Complaint   Patient presents with    Abdominal Pain     both sides of abdomen, about 3 weeks        Patient ID: Magaly Higgins  is a 77 y o  male  Patient is a 56-year-old male who presents for follow-up on arthritis of his knees and balance issues along with abdominal pain/hip pain that is not improving over the past several months  He admits that it is generally constant throughout the day  No nausea vomiting  No effective food  No constipation or diarrhea or blood in his stool  No unexplained weight loss or night sweats  Tolerating P o  intake  No other complaints at this time points at this time and tolerating p o  intake  The following portions of the patient's history were reviewed and updated as appropriate: allergies, current medications, past family history, past medical history, past social history, past surgical history and problem list     Review of Systems   Constitutional: Negative  HENT: Negative  Eyes: Negative  Respiratory: Negative  Cardiovascular: Negative  Gastrointestinal: Positive for abdominal pain  Endocrine: Negative  Genitourinary: Negative  Musculoskeletal: Positive for arthralgias and myalgias  Allergic/Immunologic: Negative  Neurological: Negative  Hematological: Negative  Psychiatric/Behavioral: Negative  All other systems reviewed and are negative  Objective:      /80   Pulse 74   Temp 98 2 °F (36 8 °C)   Wt 111 kg (245 lb)   SpO2 98%   BMI 37 25 kg/m²          Physical Exam   Constitutional: He is oriented to person, place, and time  He appears well-developed and well-nourished  HENT:   Head: Atraumatic  Right Ear: External ear normal    Left Ear: External ear normal    Eyes: Pupils are equal, round, and reactive to light  Conjunctivae and EOM are normal    Neck: Normal range of motion  Cardiovascular: Normal rate, regular rhythm and normal heart sounds  Pulmonary/Chest: Effort normal and breath sounds normal  No respiratory distress  Abdominal: Soft  Bowel sounds are normal  He exhibits no distension  There is no tenderness  There is no rebound and no guarding  Musculoskeletal: Normal range of motion  Decreased flexion at the hips bilaterally   Neurological: He is alert and oriented to person, place, and time  No cranial nerve deficit  Skin: Skin is warm and dry  Psychiatric: He has a normal mood and affect   His behavior is normal  Judgment and thought content normal

## 2019-08-10 DIAGNOSIS — I50.42 CHRONIC COMBINED SYSTOLIC AND DIASTOLIC HEART FAILURE, NYHA CLASS 3 (HCC): ICD-10-CM

## 2019-08-10 RX ORDER — SACUBITRIL AND VALSARTAN 97; 103 MG/1; MG/1
TABLET, FILM COATED ORAL
Qty: 60 TABLET | Refills: 4 | Status: SHIPPED | OUTPATIENT
Start: 2019-08-10 | End: 2019-12-17 | Stop reason: SDUPTHER

## 2019-08-13 ENCOUNTER — OFFICE VISIT (OUTPATIENT)
Dept: CARDIOLOGY CLINIC | Facility: CLINIC | Age: 69
End: 2019-08-13
Payer: COMMERCIAL

## 2019-08-13 ENCOUNTER — PATIENT OUTREACH (OUTPATIENT)
Dept: FAMILY MEDICINE CLINIC | Facility: CLINIC | Age: 69
End: 2019-08-13

## 2019-08-13 VITALS
WEIGHT: 244 LBS | HEIGHT: 68 IN | DIASTOLIC BLOOD PRESSURE: 70 MMHG | BODY MASS INDEX: 36.98 KG/M2 | SYSTOLIC BLOOD PRESSURE: 144 MMHG | HEART RATE: 63 BPM

## 2019-08-13 DIAGNOSIS — E66.9 OBESITY (BMI 30-39.9): ICD-10-CM

## 2019-08-13 DIAGNOSIS — Z95.810 ICD (IMPLANTABLE CARDIOVERTER-DEFIBRILLATOR), DUAL, IN SITU: ICD-10-CM

## 2019-08-13 DIAGNOSIS — I50.43 ACUTE ON CHRONIC COMBINED SYSTOLIC AND DIASTOLIC CHF (CONGESTIVE HEART FAILURE) (HCC): ICD-10-CM

## 2019-08-13 DIAGNOSIS — I48.91 ATRIAL FIBRILLATION, UNSPECIFIED TYPE (HCC): ICD-10-CM

## 2019-08-13 DIAGNOSIS — I50.9 CONGESTIVE HEART FAILURE, UNSPECIFIED HF CHRONICITY, UNSPECIFIED HEART FAILURE TYPE (HCC): Primary | ICD-10-CM

## 2019-08-13 DIAGNOSIS — I50.42 CHRONIC COMBINED SYSTOLIC AND DIASTOLIC CHF, NYHA CLASS 3 (HCC): ICD-10-CM

## 2019-08-13 DIAGNOSIS — I25.10 ATHEROSCLEROSIS OF NATIVE CORONARY ARTERY OF NATIVE HEART WITHOUT ANGINA PECTORIS: ICD-10-CM

## 2019-08-13 DIAGNOSIS — N18.30 CHRONIC RENAL INSUFFICIENCY, STAGE III (MODERATE) (HCC): ICD-10-CM

## 2019-08-13 PROCEDURE — 99204 OFFICE O/P NEW MOD 45 MIN: CPT | Performed by: INTERNAL MEDICINE

## 2019-08-13 PROCEDURE — 3066F NEPHROPATHY DOC TX: CPT | Performed by: INTERNAL MEDICINE

## 2019-08-13 PROCEDURE — 93000 ELECTROCARDIOGRAM COMPLETE: CPT | Performed by: INTERNAL MEDICINE

## 2019-08-13 NOTE — PROGRESS NOTES
EPS Progress Note - Holly Weiss Sr  77 y o  male MRN: [id number]           ASSESSMENT:  1  Congestive heart failure, unspecified HF chronicity, unspecified heart failure type ([facility] )  POCT ECG   2  Atrial fibrillation, unspecified type ([facility] )  CANCELED: POCT ECG   3  Chronic combined systolic and diastolic CHF, NYHA class 3 ([facility] )     4  Acute on chronic combined systolic and diastolic CHF (congestive heart failure) ([facility] )     5  Atherosclerosis of native coronary artery of native heart without angina pectoris     6  Obesity (BMI 30-39 9)     7  ICD (implantable cardioverter-defibrillator), dual, in situ     8  Chronic renal insufficiency, stage III (moderate) ([facility])             PLAN:  1) ICD JUAQUIN explained risk of infection  This will be increased given comorbidities  2) ischemic CM on appropriate neurohormonal blockade beta blocker and entresto  3) atherosclerosis - on stating  4) CKD stage III will increase risk of infection with icd gen change  5) IDDM also will increase risk of infection with gen change HGAIC 9 2   6) arthritis functional class severely limited by back and knee arthritis walks with a cane    HPI:   Interim history presents for H and P for ICD generator change  Previously placed by Dr Kirstin Pickard [facility]  Followed by Dr Colette Yin of CHF           ROS: positive for severe knee and back pain, SOB with exertion no chest pain or palps  Positive for ED  All other 12 point ROS negative       Objective:     Vitals: Blood pressure 144/70, pulse 63, height 5' 8" (1 727 m), weight 111 kg (244 lb)  , Body mass index is 37 1 kg/m²  ,        Physical Exam:    GEN: Holly Weiss Sr  appears well, alert and oriented x 3, pleasant and cooperative   HEENT: pupils equal, round, and reactive to light; extraocular muscles intact  NECK: supple, no carotid bruits   HEART: regular rhythm, normal S1 and S2, no murmurs, clicks, gallops or rubs   LUNGS: clear to auscultation bilaterally; no wheezes, rales, or rhonchi   ABDOMEN: normal bowel sounds, soft, no tenderness, no distention  EXTREMITIES: peripheral pulses normal; no clubbing, cyanosis, or edema  NEURO: no focal findings   SKIN: normal without suspicious lesions on exposed skin    Medications:      Current Outpatient Medications:     ALPRAZolam (XANAX) 0 25 mg tablet, Take by mouth daily at bedtime as needed for anxiety, Disp: , Rfl:     aspirin (ADULT ASPIRIN EC LOW STRENGTH) 81 mg EC tablet, Take 81 mg by mouth daily  , Disp: , Rfl:     carvedilol (COREG) 25 mg tablet, Take 1 tablet (25 mg total) by mouth 2 (two) times a day with meals, Disp: 180 tablet, Rfl: 3    Cholecalciferol (VITAMIN D3) 5000 units CAPS, Take 1 capsule by mouth daily, Disp: , Rfl:     CINNAMON PO, Take 500 mg by mouth daily, Disp: , Rfl:     clonazePAM (KlonoPIN) 0 5 mg tablet, Take 1 tablet (0 5 mg total) by mouth daily at bedtime, Disp: 30 tablet, Rfl: 0    Coenzyme Q10 (COQ-10) 200 MG CAPS, Take 1 capsule by mouth daily  , Disp: , Rfl:     ENTRESTO  MG TABS, TAKE 1 TABLET BY MOUTH TWICE A DAY, Disp: 60 tablet, Rfl: 4    flurazepam (DALMANE) 30 MG capsule, Take 1 capsule (30 mg total) by mouth daily at bedtime as needed for sleep, Disp: 30 capsule, Rfl: 1    furosemide (LASIX) 40 mg tablet, Take 1 5 tablets (60 mg total) by mouth 2 (two) times a day (Patient taking differently: Take 40 mg by mouth 2 (two) times a day ), Disp: 270 tablet, Rfl: 3    gabapentin (NEURONTIN) 400 mg capsule, Take 1 capsule (400 mg total) by mouth 4 (four) times a day, Disp: 180 capsule, Rfl: 1    Incontinence Supply Disposable (BRIEF OVERNIGHT LARGE) MISC, by Does not apply route 4 (four) times a day, Disp: 98 each, Rfl: 3    Insulin Glargine (TOUJEO SOLOSTAR) 300 units/mL CONCETRATED U-300 injection pen, INJECT BY SUBCUTANEOUS ROUTE 45 UNITS BID, Disp: 9 pen, Rfl: 5    isosorbide dinitrate (ISORDIL) 10 mg tablet, Take 1 tablet (10 mg total) by mouth 3 (three) times a day, Disp: 270 tablet, Rfl: 3   metolazone (ZAROXOLYN) 5 mg tablet, TAKE 1 TABLET BY MOUTH EVERY DAY AS NEEDED WEIGHT GAIN 3 LBS, Disp: 30 tablet, Rfl: 2    Multiple Vitamin (MULTIVITAMIN) capsule, Take 1 capsule by mouth daily  , Disp: , Rfl:     Omega-3 Fatty Acids (FISH OIL) 1200 MG CAPS, Take 1 capsule by mouth daily, Disp: , Rfl:     potassium chloride (KLOR-CON) 20 mEq packet, TAKE 20 MEQ BY MOUTH 2 (TWO) TIMES A DAY, Disp: 150 packet, Rfl: 4    simvastatin (ZOCOR) 40 mg tablet, TAKE 1 TABLET BY MOUTH EVERY DAY IN THE EVENING, Disp: 30 tablet, Rfl: 4    Alcohol Swabs (ALCOHOL PREP) PADS, by Does not apply route 2 (two) times a day, Disp: 200 each, Rfl: 0    glucose blood (ONE TOUCH ULTRA TEST) test strip, 1 each by Other route 3 (three) times a day Test, Disp: 100 each, Rfl: 11    Insulin Pen Needle (BD PEN NEEDLE MARCELINO U/F) 32G X 4 MM MISC, Use to inject Insulin TID, Disp: 100 each, Rfl: 3    methocarbamol (ROBAXIN) 500 mg tablet, Take 1 tablet (500 mg total) by mouth 3 (three) times a day, Disp: 90 tablet, Rfl: 0    temazepam (RESTORIL) 30 mg capsule, Take 15 mg by mouth daily at bedtime, Disp: , Rfl:      Family History   Problem Relation Age of Onset    Dementia Mother      Social History     Socioeconomic History    Marital status: /Civil Union     Spouse name: Not on file    Number of children: Not on file    Years of education: Not on file    Highest education level: Not on file   Occupational History    Occupation: DISABLED   Social Needs    Financial resource strain: Not on file    Food insecurity:     Worry: Not on file     Inability: Not on file    Transportation needs:     Medical: Not on file     Non-medical: Not on file   Tobacco Use    Smoking status: Former Smoker     Packs/day: 3 00     Years: 20 00     Pack years: 60 00     Last attempt to quit:      Years since quittin 6    Smokeless tobacco: Never Used    Tobacco comment: never a smoker as per [employer]   Substance and Sexual Activity    Alcohol use: Yes     Comment: glass wine daily    Drug use: No    Sexual activity: Not on file   Lifestyle    Physical activity:     Days per week: Not on file     Minutes per session: Not on file    Stress: Not on file   Relationships    Social connections:     Talks on phone: Not on file     Gets together: Not on file     Attends Church service: Not on file     Active member of club or organization: Not on file     Attends meetings of clubs or organizations: Not on file     Relationship status: Not on file    Intimate partner violence:     Fear of current or ex partner: Not on file     Emotionally abused: Not on file     Physically abused: Not on file     Forced sexual activity: Not on file   Other Topics Concern    Not on file   Social History Narrative    Not on file     Social History     Tobacco Use   Smoking Status Former Smoker    Packs/day: 3 00    Years: 20 00    Pack years: 60 00    Last attempt to quit: Noman Jose Years since quittin 6   Smokeless Tobacco Never Used   Tobacco Comment    never a smoker as per [employer]     Social History     Substance and Sexual Activity   Alcohol Use Yes    Comment: glass wine daily       Labs & Results:  Below is the patient's most recent value for Albumin, ALT, AST, BUN, Calcium, Chloride, Cholesterol, CO2, Creatinine, GFR, Glucose, HDL, Hematocrit, Hemoglobin, Hemoglobin A1C, LDL, Magnesium, Phosphorus, Platelets, Potassium, PSA, Sodium, Triglycerides, and WBC     Lab Results   Component Value Date    ALT 30 2019    AST 28 2019    BUN 19 2019    CALCIUM 9 6 2019    CL 99 2019    CHOL 128 2018    CO2 29 2019    CREATININE 1 52 (H) 2019    HDL 21 (L) 10/10/2018    HCT 38 3 (L) 2019    HGB 12 8 (L) 2019    HGBA1C 8 4 (A) 2019    MG 1 7 2019    PHOS 3 5 2019     (L) 2019    K 4 0 2019    PSA 6 9 (H) 2019     2018    TRIG 208 (H) 10/10/2018 WBC 4 60 2019     Note: for a comprehensive list of the patient's lab results, access the Results Review activity  Cardiac testing:   Results for orders placed in visit on 18   Echo complete with contrast if indicated    Narrative [address] [place] [address] 35  [place], [address]   [phone]     Transthoracic Echocardiogram   2D, M-mode, Doppler, and Color Doppler     Study date:  2018     Patient: Jamesetta Romberg   MR number: [id number]   Account number: [de-identified]   : 1950   Age: 78 years   Gender: Male   Status: Outpatient   Location: [address] [place] [address] [facility] [place]   Height: 69 in   Weight: 252 lb   BP: 128/ 70 mmHg     Indications: Assess congestive heart failure  Diagnoses: I50 9 - Heart failure, unspecified     Sonographer:  ANTHONY Lucas   Primary Physician:  Ashely Harkins MD   Referring Physician:  Hero Richter DO   Group:  [id number]   Interpreting Physician:  Alexa Friend MD     SUMMARY     LEFT VENTRICLE:   The ventricle was moderately to markedly dilated  Systolic function was mildly to moderately reduced  Ejection fraction was estimated to be 40 %  There was mild to moderate diffuse hypokinesis  Wall thickness was mildly to moderately increased  Doppler parameters were consistent with high ventricular filling pressure  LEFT ATRIUM:   The atrium was mildly to moderately dilated  MITRAL VALVE:   There was mild annular calcification  TRICUSPID VALVE:   There was mild regurgitation  PULMONIC VALVE:   There was mild regurgitation  HISTORY: PRIOR HISTORY: depression/ anxiety Congestive heart failure  Nonischemic cardiomyopathy  Risk factors: hypertension, diabetes, renal failure, and medication-treated hypercholesterolemia  Chronic lung disease  PRIOR PROCEDURES: ICD   implantation  PROCEDURE: The study was performed in the [address] [place] [address]   This was a routine study  The transthoracic approach was used  The study included complete 2D imaging, M-mode, complete spectral Doppler, and color Doppler  The   heart rate was 65 bpm, at the start of the study  Echocardiographic views were limited due to poor acoustic window availability, decreased penetration, and lung interference  This was a technically difficult study  LEFT VENTRICLE: The ventricle was moderately to markedly dilated  Systolic function was mildly to moderately reduced  Ejection fraction was estimated to be 40 %  There was mild to moderate diffuse hypokinesis  Wall thickness was mildly to   moderately increased  DOPPLER: The ratio of early ventricular filling to atrial contraction velocities was within the normal range  The deceleration time of the early transmitral flow velocity was increased  Doppler parameters were   consistent with high ventricular filling pressure  RIGHT VENTRICLE: The size was normal  Systolic function was normal  Wall thickness was normal      LEFT ATRIUM: The atrium was mildly to moderately dilated  RIGHT ATRIUM: Size was at the upper limits of normal  A pacing wire was present  MITRAL VALVE: There was mild annular calcification  DOPPLER: There was no evidence for stenosis  There was no regurgitation  AORTIC VALVE: The valve was trileaflet  Leaflets exhibited mildly increased thickness  DOPPLER: There was no evidence for stenosis  There was no regurgitation  TRICUSPID VALVE: The valve structure was normal  There was normal leaflet separation  DOPPLER: The transtricuspid velocity was within the normal range  There was no evidence for stenosis  There was mild regurgitation  The tricuspid jet   envelope definition was inadequate for estimation of RV systolic pressure  PULMONIC VALVE: Leaflets exhibited normal thickness, no calcification, and normal cuspal separation  DOPPLER: The transpulmonic velocity was within the normal range   There was mild regurgitation  PERICARDIUM: There was no pericardial effusion  The pericardium was normal in appearance  AORTA: The root exhibited normal size  SYSTEMIC VEINS: IVC: The inferior vena cava was normal in size  Respirophasic changes were normal      SYSTEM MEASUREMENT TABLES     2D   Ao Diam: 3 2 cm   IVSd: 1 3 cm   LA Diam: 4 5 cm   LVIDd: 6 8 cm   LVIDs: 5 4 cm   LVPWd: 1 3 cm     PW   E': 0 m/s   E/E': 18 4   MV A Jack: 0 8 m/s   MV Dec [place]: 2 4 m/s2   MV DecT: 229 6 ms   MV E Jack: 0 6 m/s   MV E/A Ratio: 0 7     [employer] Accredited Echocardiography Laboratory     Prepared and electronically signed by     Vanessa Nava MD   Signed 02-Jan-2018 17:12:30     No results found for this or any previous visit  No results found for this or any previous visit  No results found for this or any previous visit  MDT-SINGLE CHAMBER ICD  NON-BILLABLE CARELINK TRANSMISSION: BATTERY VOLTAGE REACHED ERT ON 8/3/19 (2 61/RRT-2 63V)  WILL SCHEDULE H&P  CHARGE TIME - 16 5 SECS   - 1 1%  ALL AVAILABLE LEAD PARAMETERS WITHIN NORMAL LIMITS  NO SIGNIFICANT HIGH RATE EPISODES  OPTI-VOL WITHIN NORMAL LIMITS   NORMAL DEVICE FUNCTION @ JUAQUIN BATTERY STATUS   EB

## 2019-08-14 ENCOUNTER — TELEPHONE (OUTPATIENT)
Dept: CARDIOLOGY CLINIC | Facility: CLINIC | Age: 69
End: 2019-08-14

## 2019-08-18 DIAGNOSIS — G89.29 CHRONIC LOW BACK PAIN, UNSPECIFIED BACK PAIN LATERALITY, WITH SCIATICA PRESENCE UNSPECIFIED: ICD-10-CM

## 2019-08-18 DIAGNOSIS — M54.5 CHRONIC LOW BACK PAIN, UNSPECIFIED BACK PAIN LATERALITY, WITH SCIATICA PRESENCE UNSPECIFIED: ICD-10-CM

## 2019-08-19 ENCOUNTER — PATIENT OUTREACH (OUTPATIENT)
Dept: FAMILY MEDICINE CLINIC | Facility: CLINIC | Age: 69
End: 2019-08-19

## 2019-08-19 NOTE — PROGRESS NOTES
Received voicemail from patient, returned call  Julia Birmingham reported he developed dime size sore to bilateral legs  He states they are above his ankles and he does not remember bumping his legs  They did drain initially but now are open to air without drainage  He states there is some redness around the sore but no discomfort  His wife applied antibiotic ointment  His blood sugars have been elevated for the last week, ranging from 200 up to 400  Today FBS was 223  He states his diet has remained the same so he is concerned that his sugars are running so high  I suggested he  schedule appointment with PCP to discuss blood sugars and to have sores evaluated  He was agreeable  Weight has remained consistent  He denies chest pain, sob  He does report some ankle swelling  Nutritional intake adequate  He is taking all medications as prescribed  He is agreeable to further outreach

## 2019-08-20 ENCOUNTER — OFFICE VISIT (OUTPATIENT)
Dept: FAMILY MEDICINE CLINIC | Facility: CLINIC | Age: 69
End: 2019-08-20
Payer: COMMERCIAL

## 2019-08-20 VITALS
DIASTOLIC BLOOD PRESSURE: 70 MMHG | HEART RATE: 74 BPM | WEIGHT: 243 LBS | BODY MASS INDEX: 36.95 KG/M2 | TEMPERATURE: 97.6 F | SYSTOLIC BLOOD PRESSURE: 130 MMHG | OXYGEN SATURATION: 98 %

## 2019-08-20 DIAGNOSIS — E11.9 TYPE 2 DIABETES MELLITUS WITHOUT COMPLICATION, WITH LONG-TERM CURRENT USE OF INSULIN (HCC): ICD-10-CM

## 2019-08-20 DIAGNOSIS — T14.8XXA SKIN ABRASION: ICD-10-CM

## 2019-08-20 DIAGNOSIS — R10.13 EPIGASTRIC PAIN: Primary | ICD-10-CM

## 2019-08-20 DIAGNOSIS — Z79.4 TYPE 2 DIABETES MELLITUS WITHOUT COMPLICATION, WITH LONG-TERM CURRENT USE OF INSULIN (HCC): ICD-10-CM

## 2019-08-20 PROCEDURE — 1160F RVW MEDS BY RX/DR IN RCRD: CPT | Performed by: FAMILY MEDICINE

## 2019-08-20 PROCEDURE — 1036F TOBACCO NON-USER: CPT | Performed by: FAMILY MEDICINE

## 2019-08-20 PROCEDURE — 99214 OFFICE O/P EST MOD 30 MIN: CPT | Performed by: FAMILY MEDICINE

## 2019-08-20 RX ORDER — METHOCARBAMOL 500 MG/1
TABLET, FILM COATED ORAL
Qty: 90 TABLET | Refills: 0 | Status: SHIPPED | OUTPATIENT
Start: 2019-08-20 | End: 2019-09-06 | Stop reason: ALTCHOICE

## 2019-08-20 NOTE — PROGRESS NOTES
Assessment/Plan:    23-year-old male with:  Epigastric pain, type 2 diabetes and skin abrasion  Discussed workup and treatment options with risks and benefits  Will titrate up Toujeo and discussed local care to his abrasion and if it is not improving in a week or 2 he should call back and we can refer him to the [address]  Encouraged him to call back and let us know which acid medicine he is on and we can either try increasing the dose or switching to something stronger  Discussed supportive care return parameters otherwise and follow-up when due for his next A1c    No problem-specific Assessment & Plan notes found for this encounter  Diagnoses and all orders for this visit:    Epigastric pain    Type 2 diabetes mellitus without complication, with long-term current use of insulin (HCC)  -     Insulin Glargine (TOUJEO SOLOSTAR) 300 units/mL CONCETRATED U-300 injection pen; INJECT BY SUBCUTANEOUS ROUTE 50 UNITS BID    Skin abrasion          Subjective:     Chief Complaint   Patient presents with    Foot Pain     right food pain, irritated, new shoes    Diabetes     blood sugar high,329        Patient ID: Yue Fernandez Sr  is a 77 y o  male  Patient is a 23-year-old male who presents for follow-up on several issues  He admits that his sugars are particularly high  Patient also admits a wound his right leg and he would like looked at  No fevers chills nausea vomiting  He denies specific trauma to the area  He also has some breakthrough epigastric pain but it is helped by his over-the-counter acid medicine      The following portions of the patient's history were reviewed and updated as appropriate: allergies, current medications, past family history, past medical history, past social history, past surgical history and problem list     Review of Systems   Constitutional: Negative  HENT: Negative  Eyes: Negative  Respiratory: Negative  Cardiovascular: Negative  Gastrointestinal: Negative  Endocrine: Negative  Genitourinary: Negative  Musculoskeletal: Negative  Skin: Positive for wound  Allergic/Immunologic: Negative  Neurological: Negative  Hematological: Negative  Psychiatric/Behavioral: Negative  All other systems reviewed and are negative  Objective:      /70   Pulse 74   Temp 97 6 °F (36 4 °C)   Wt 110 kg (243 lb)   SpO2 98%   BMI 36 95 kg/m²          Physical Exam   Constitutional: He is oriented to person, place, and time  He appears well-developed and well-nourished  HENT:   Head: Atraumatic  Right Ear: External ear normal    Left Ear: External ear normal    Eyes: Pupils are equal, round, and reactive to light  Conjunctivae and EOM are normal    Neck: Normal range of motion  Cardiovascular: Normal rate, regular rhythm and normal heart sounds  Pulmonary/Chest: Effort normal and breath sounds normal  No respiratory distress  Abdominal: Soft  He exhibits no distension  There is no tenderness  There is no rebound and no guarding  Musculoskeletal: Normal range of motion  Neurological: He is alert and oriented to person, place, and time  No cranial nerve deficit  Skin: Skin is warm and dry  1 cm skin abrasion the lateral edge of the right ankle   Psychiatric: He has a normal mood and affect   His behavior is normal  Judgment and thought content normal

## 2019-08-22 ENCOUNTER — TELEPHONE (OUTPATIENT)
Dept: FAMILY MEDICINE CLINIC | Facility: CLINIC | Age: 69
End: 2019-08-22

## 2019-08-22 NOTE — TELEPHONE ENCOUNTER
Patient returned call to be advised on any recommendations  Insulin toujeo upped Tuesday after Monday appointment with PCP 50 morning and 50 at night  Establishing with Endo end of September

## 2019-08-22 NOTE — TELEPHONE ENCOUNTER
Patient called just to let Dr Cece Ricks know his fasting BS was 449  He reported having fish cakes, 1/2 cup of brown rice, string beans for dinner last night

## 2019-08-28 ENCOUNTER — TELEPHONE (OUTPATIENT)
Dept: CARDIOLOGY CLINIC | Facility: CLINIC | Age: 69
End: 2019-08-28

## 2019-08-28 NOTE — TELEPHONE ENCOUNTER
Pt is scheduled on 09/06/19 for a ICD Gen change at [facility] with Dr Gael Holter  Pt aware of instructions  Can we get preauth please

## 2019-08-29 ENCOUNTER — TELEPHONE (OUTPATIENT)
Dept: CARDIOLOGY CLINIC | Facility: CLINIC | Age: 69
End: 2019-08-29

## 2019-08-29 NOTE — TELEPHONE ENCOUNTER
ICD Gen change - no auth required, covered service based on medical necessity per [facility] ref# [id number]   [phone]

## 2019-09-03 ENCOUNTER — HOSPITAL ENCOUNTER (OUTPATIENT)
Dept: RADIOLOGY | Facility: HOSPITAL | Age: 69
Discharge: HOME | End: 2019-09-03
Payer: COMMERCIAL

## 2019-09-03 ENCOUNTER — APPOINTMENT (OUTPATIENT)
Dept: LAB | Facility: HOSPITAL | Age: 69
End: 2019-09-03
Attending: INTERNAL MEDICINE
Payer: COMMERCIAL

## 2019-09-03 ENCOUNTER — TELEPHONE (OUTPATIENT)
Dept: NEPHROLOGY | Facility: CLINIC | Age: 69
End: 2019-09-03

## 2019-09-03 ENCOUNTER — TELEPHONE (OUTPATIENT)
Dept: CARDIOLOGY CLINIC | Facility: CLINIC | Age: 69
End: 2019-09-03

## 2019-09-03 DIAGNOSIS — I10 ESSENTIAL HYPERTENSION: Primary | ICD-10-CM

## 2019-09-03 DIAGNOSIS — M25.551 BILATERAL HIP PAIN: ICD-10-CM

## 2019-09-03 DIAGNOSIS — N18.30 CHRONIC KIDNEY DISEASE, STAGE III (MODERATE) (HCC): ICD-10-CM

## 2019-09-03 DIAGNOSIS — E11.21 DIABETIC GLOMERULOPATHY (HCC): ICD-10-CM

## 2019-09-03 DIAGNOSIS — N18.30 CHRONIC RENAL INSUFFICIENCY, STAGE III (MODERATE) (HCC): ICD-10-CM

## 2019-09-03 DIAGNOSIS — Z95.810 ICD (IMPLANTABLE CARDIOVERTER-DEFIBRILLATOR), DUAL, IN SITU: Primary | ICD-10-CM

## 2019-09-03 DIAGNOSIS — M25.552 BILATERAL HIP PAIN: ICD-10-CM

## 2019-09-03 LAB
25(OH)D3 SERPL-MCNC: 42.5 NG/ML (ref 30–100)
ALBUMIN SERPL BCP-MCNC: 3.8 G/DL (ref 3–5.2)
ALP SERPL-CCNC: 90 U/L (ref 43–122)
ALT SERPL W P-5'-P-CCNC: 26 U/L (ref 9–52)
ANION GAP SERPL CALCULATED.3IONS-SCNC: 8 MMOL/L (ref 5–14)
ANISOCYTOSIS BLD QL SMEAR: PRESENT
AST SERPL W P-5'-P-CCNC: 23 U/L (ref 17–59)
BILIRUB SERPL-MCNC: 0.3 MG/DL
BUN SERPL-MCNC: 21 MG/DL (ref 5–25)
CALCIUM SERPL-MCNC: 9.3 MG/DL (ref 8.4–10.2)
CHLORIDE SERPL-SCNC: 99 MMOL/L (ref 97–108)
CO2 SERPL-SCNC: 31 MMOL/L (ref 22–30)
CREAT SERPL-MCNC: 1.53 MG/DL (ref 0.7–1.5)
EOSINOPHIL # BLD AUTO: 0.09 THOUSAND/UL (ref 0–0.4)
EOSINOPHIL NFR BLD MANUAL: 2 % (ref 0–6)
ERYTHROCYTE [DISTWIDTH] IN BLOOD BY AUTOMATED COUNT: 16 %
GFR SERPL CREATININE-BSD FRML MDRD: 53 ML/MIN/1.73SQ M
GLUCOSE SERPL-MCNC: 221 MG/DL (ref 70–99)
HCT VFR BLD AUTO: 37.3 % (ref 41–53)
HGB BLD-MCNC: 12.6 G/DL (ref 13.5–17.5)
INR PPP: 0.95 (ref 0.91–1.09)
LYMPHOCYTES # BLD AUTO: 1.38 THOUSAND/UL (ref 0.5–4)
LYMPHOCYTES # BLD AUTO: 30 % (ref 25–45)
MCH RBC QN AUTO: 31.6 PG (ref 26–34)
MCHC RBC AUTO-ENTMCNC: 33.8 G/DL (ref 31–36)
MCV RBC AUTO: 94 FL (ref 80–100)
MONOCYTES # BLD AUTO: 0.64 THOUSAND/UL (ref 0.2–0.9)
MONOCYTES NFR BLD AUTO: 14 % (ref 1–10)
NEUTS SEG # BLD: 2.48 THOUSAND/UL (ref 1.8–7.8)
NEUTS SEG NFR BLD AUTO: 54 %
PLATELET # BLD AUTO: 147 THOUSANDS/UL (ref 150–450)
PLATELET BLD QL SMEAR: ABNORMAL
PMV BLD AUTO: 10.6 FL (ref 8.9–12.7)
POTASSIUM SERPL-SCNC: 4.2 MMOL/L (ref 3.6–5)
PROT SERPL-MCNC: 6.9 G/DL (ref 5.9–8.4)
PROTHROMBIN TIME: 10.5 SECONDS (ref 9.8–12)
RBC # BLD AUTO: 3.99 MILLION/UL (ref 4.5–5.9)
RBC MORPH BLD: ABNORMAL
SODIUM SERPL-SCNC: 138 MMOL/L (ref 137–147)
TOTAL CELLS COUNTED SPEC: 100
WBC # BLD AUTO: 4.6 THOUSAND/UL (ref 4.5–11)

## 2019-09-03 PROCEDURE — 85007 BL SMEAR W/DIFF WBC COUNT: CPT

## 2019-09-03 PROCEDURE — 85027 COMPLETE CBC AUTOMATED: CPT

## 2019-09-03 PROCEDURE — 36415 COLL VENOUS BLD VENIPUNCTURE: CPT

## 2019-09-03 PROCEDURE — 73523 X-RAY EXAM HIPS BI 5/> VIEWS: CPT

## 2019-09-03 PROCEDURE — 82306 VITAMIN D 25 HYDROXY: CPT

## 2019-09-03 PROCEDURE — 80053 COMPREHEN METABOLIC PANEL: CPT

## 2019-09-03 PROCEDURE — 85610 PROTHROMBIN TIME: CPT

## 2019-09-03 NOTE — TELEPHONE ENCOUNTER
Attempted to call pt- no answer and I was not able to leave a message  Will try again at a later time

## 2019-09-03 NOTE — TELEPHONE ENCOUNTER
Spoke with patient giving him a courtesy call regarding lab work needed for his upcoming procedure scheduled with Dr Chris You on September 6, 2019  Patient stated he is able to go this afternoon around noon at our [facility] heart location

## 2019-09-03 NOTE — TELEPHONE ENCOUNTER
----- Message from Souleymane Alatorre MD sent at 9/3/2019  4:09 PM EDT -----  Please let patient know creatinine stable at baseline with results today  Will discuss further at his upcoming visit    Thanks

## 2019-09-05 ENCOUNTER — TELEPHONE (OUTPATIENT)
Dept: SURGERY | Facility: HOSPITAL | Age: 69
End: 2019-09-05

## 2019-09-05 RX ORDER — SODIUM CHLORIDE 9 MG/ML
75 INJECTION, SOLUTION INTRAVENOUS ONCE
Status: CANCELLED | OUTPATIENT
Start: 2019-09-06 | End: 2019-09-06

## 2019-09-06 ENCOUNTER — ANESTHESIA (OUTPATIENT)
Dept: NON INVASIVE DIAGNOSTICS | Facility: HOSPITAL | Age: 69
End: 2019-09-06

## 2019-09-06 ENCOUNTER — HOSPITAL ENCOUNTER (OUTPATIENT)
Dept: NON INVASIVE DIAGNOSTICS | Facility: HOSPITAL | Age: 69
Discharge: HOME | End: 2019-09-06
Attending: INTERNAL MEDICINE | Admitting: INTERNAL MEDICINE
Payer: COMMERCIAL

## 2019-09-06 ENCOUNTER — ANESTHESIA EVENT (OUTPATIENT)
Dept: NON INVASIVE DIAGNOSTICS | Facility: HOSPITAL | Age: 69
End: 2019-09-06

## 2019-09-06 VITALS
HEART RATE: 61 BPM | TEMPERATURE: 97.3 F | OXYGEN SATURATION: 98 % | DIASTOLIC BLOOD PRESSURE: 63 MMHG | RESPIRATION RATE: 16 BRPM | SYSTOLIC BLOOD PRESSURE: 136 MMHG

## 2019-09-06 DIAGNOSIS — G89.18 POST-OPERATIVE PAIN: Primary | ICD-10-CM

## 2019-09-06 DIAGNOSIS — I50.9 HEART FAILURE, UNSPECIFIED (HCC): ICD-10-CM

## 2019-09-06 LAB
GLUCOSE SERPL-MCNC: 61 MG/DL (ref 65–140)
GLUCOSE SERPL-MCNC: 98 MG/DL (ref 65–140)

## 2019-09-06 PROCEDURE — C1722 AICD, SINGLE CHAMBER: HCPCS

## 2019-09-06 PROCEDURE — 33262 RMVL& REPLC PULSE GEN 1 LEAD: CPT | Performed by: INTERNAL MEDICINE

## 2019-09-06 PROCEDURE — 82948 REAGENT STRIP/BLOOD GLUCOSE: CPT

## 2019-09-06 RX ORDER — NEOSTIGMINE METHYLSULFATE 1 MG/ML
INJECTION INTRAVENOUS AS NEEDED
Status: DISCONTINUED | OUTPATIENT
Start: 2019-09-06 | End: 2019-09-06 | Stop reason: SURG

## 2019-09-06 RX ORDER — MEPERIDINE HYDROCHLORIDE 50 MG/ML
12.5 INJECTION INTRAMUSCULAR; INTRAVENOUS; SUBCUTANEOUS ONCE AS NEEDED
Status: DISCONTINUED | OUTPATIENT
Start: 2019-09-06 | End: 2019-09-06 | Stop reason: HOSPADM

## 2019-09-06 RX ORDER — OXYCODONE HYDROCHLORIDE AND ACETAMINOPHEN 5; 325 MG/1; MG/1
1 TABLET ORAL EVERY 6 HOURS PRN
Qty: 20 TABLET | Refills: 0
Start: 2019-09-06 | End: 2019-09-16

## 2019-09-06 RX ORDER — MIDAZOLAM HYDROCHLORIDE 1 MG/ML
INJECTION INTRAMUSCULAR; INTRAVENOUS AS NEEDED
Status: DISCONTINUED | OUTPATIENT
Start: 2019-09-06 | End: 2019-09-06 | Stop reason: SURG

## 2019-09-06 RX ORDER — ONDANSETRON 2 MG/ML
INJECTION INTRAMUSCULAR; INTRAVENOUS AS NEEDED
Status: DISCONTINUED | OUTPATIENT
Start: 2019-09-06 | End: 2019-09-06 | Stop reason: SURG

## 2019-09-06 RX ORDER — EPHEDRINE SULFATE 50 MG/ML
INJECTION INTRAVENOUS AS NEEDED
Status: DISCONTINUED | OUTPATIENT
Start: 2019-09-06 | End: 2019-09-06 | Stop reason: SURG

## 2019-09-06 RX ORDER — GLYCOPYRROLATE 0.2 MG/ML
INJECTION INTRAMUSCULAR; INTRAVENOUS AS NEEDED
Status: DISCONTINUED | OUTPATIENT
Start: 2019-09-06 | End: 2019-09-06 | Stop reason: SURG

## 2019-09-06 RX ORDER — PROPOFOL 10 MG/ML
INJECTION, EMULSION INTRAVENOUS AS NEEDED
Status: DISCONTINUED | OUTPATIENT
Start: 2019-09-06 | End: 2019-09-06 | Stop reason: SURG

## 2019-09-06 RX ORDER — LIDOCAINE HYDROCHLORIDE 10 MG/ML
INJECTION, SOLUTION INFILTRATION; PERINEURAL CODE/TRAUMA/SEDATION MEDICATION
Status: COMPLETED | OUTPATIENT
Start: 2019-09-06 | End: 2019-09-06

## 2019-09-06 RX ORDER — SODIUM CHLORIDE 9 MG/ML
75 INJECTION, SOLUTION INTRAVENOUS ONCE
Status: COMPLETED | OUTPATIENT
Start: 2019-09-06 | End: 2019-09-06

## 2019-09-06 RX ORDER — HYDROMORPHONE HCL/PF 1 MG/ML
0.5 SYRINGE (ML) INJECTION
Status: DISCONTINUED | OUTPATIENT
Start: 2019-09-06 | End: 2019-09-06 | Stop reason: HOSPADM

## 2019-09-06 RX ORDER — DEXAMETHASONE SODIUM PHOSPHATE 4 MG/ML
INJECTION, SOLUTION INTRA-ARTICULAR; INTRALESIONAL; INTRAMUSCULAR; INTRAVENOUS; SOFT TISSUE AS NEEDED
Status: DISCONTINUED | OUTPATIENT
Start: 2019-09-06 | End: 2019-09-06 | Stop reason: SURG

## 2019-09-06 RX ORDER — FENTANYL CITRATE 50 UG/ML
INJECTION, SOLUTION INTRAMUSCULAR; INTRAVENOUS AS NEEDED
Status: DISCONTINUED | OUTPATIENT
Start: 2019-09-06 | End: 2019-09-06 | Stop reason: SURG

## 2019-09-06 RX ORDER — ONDANSETRON 2 MG/ML
4 INJECTION INTRAMUSCULAR; INTRAVENOUS ONCE AS NEEDED
Status: DISCONTINUED | OUTPATIENT
Start: 2019-09-06 | End: 2019-09-06 | Stop reason: HOSPADM

## 2019-09-06 RX ORDER — ROCURONIUM BROMIDE 10 MG/ML
INJECTION, SOLUTION INTRAVENOUS AS NEEDED
Status: DISCONTINUED | OUTPATIENT
Start: 2019-09-06 | End: 2019-09-06 | Stop reason: SURG

## 2019-09-06 RX ORDER — FENTANYL CITRATE/PF 50 MCG/ML
50 SYRINGE (ML) INJECTION
Status: DISCONTINUED | OUTPATIENT
Start: 2019-09-06 | End: 2019-09-06 | Stop reason: HOSPADM

## 2019-09-06 RX ORDER — GENTAMICIN SULFATE 40 MG/ML
INJECTION, SOLUTION INTRAMUSCULAR; INTRAVENOUS CODE/TRAUMA/SEDATION MEDICATION
Status: COMPLETED | OUTPATIENT
Start: 2019-09-06 | End: 2019-09-06

## 2019-09-06 RX ADMIN — PROPOFOL 130 MG: 10 INJECTION, EMULSION INTRAVENOUS at 09:39

## 2019-09-06 RX ADMIN — FENTANYL CITRATE 100 MCG: 50 INJECTION, SOLUTION INTRAMUSCULAR; INTRAVENOUS at 09:38

## 2019-09-06 RX ADMIN — EPHEDRINE SULFATE 10 MG: 50 INJECTION, SOLUTION INTRAVENOUS at 10:16

## 2019-09-06 RX ADMIN — ONDANSETRON 4 MG: 2 INJECTION INTRAMUSCULAR; INTRAVENOUS at 10:26

## 2019-09-06 RX ADMIN — DEXAMETHASONE SODIUM PHOSPHATE 4 MG: 4 INJECTION, SOLUTION INTRAMUSCULAR; INTRAVENOUS at 09:49

## 2019-09-06 RX ADMIN — LIDOCAINE HYDROCHLORIDE 50 MG: 20 INJECTION, SOLUTION INTRAVENOUS at 09:39

## 2019-09-06 RX ADMIN — LIDOCAINE HYDROCHLORIDE 20 ML: 10 INJECTION, SOLUTION INFILTRATION; PERINEURAL at 09:54

## 2019-09-06 RX ADMIN — ROCURONIUM BROMIDE 30 MG: 50 INJECTION, SOLUTION INTRAVENOUS at 09:40

## 2019-09-06 RX ADMIN — GLYCOPYRROLATE 0.4 MG: 0.2 INJECTION INTRAMUSCULAR; INTRAVENOUS at 10:26

## 2019-09-06 RX ADMIN — VANCOMYCIN HYDROCHLORIDE 1750 MG: 5 INJECTION, POWDER, LYOPHILIZED, FOR SOLUTION INTRAVENOUS at 09:19

## 2019-09-06 RX ADMIN — PHENYLEPHRINE HYDROCHLORIDE 100 MCG: 10 INJECTION INTRAVENOUS at 10:16

## 2019-09-06 RX ADMIN — NEOSTIGMINE METHYLSULFATE 3 MG: 1 INJECTION, SOLUTION INTRAVENOUS at 10:26

## 2019-09-06 RX ADMIN — GENTAMICIN SULFATE 80 MG: 40 INJECTION, SOLUTION INTRAMUSCULAR; INTRAVENOUS at 10:08

## 2019-09-06 RX ADMIN — MIDAZOLAM 2 MG: 1 INJECTION INTRAMUSCULAR; INTRAVENOUS at 09:27

## 2019-09-06 RX ADMIN — SODIUM CHLORIDE 75 ML/HR: 0.9 INJECTION, SOLUTION INTRAVENOUS at 09:16

## 2019-09-06 NOTE — H&P
Addendum:  Patient presents for his ICD generator changes there are no changes in his history physical exam assessment and plan from below    EPS Progress Note - Sergio Herrmann Sr  77 y o  male MRN: [id number]              ASSESSMENT:  1  Congestive heart failure, unspecified HF chronicity, unspecified heart failure type ([address] )  POCT ECG   2  Atrial fibrillation, unspecified type ([address] )  CANCELED: POCT ECG   3  Chronic combined systolic and diastolic CHF, NYHA class 3 ([facility])      4  Acute on chronic combined systolic and diastolic CHF (congestive heart failure) ([facility])      5  Atherosclerosis of native coronary artery of native heart without angina pectoris      6  Obesity (BMI 30-39  9)      7  ICD (implantable cardioverter-defibrillator), dual, in situ      8  Chronic renal insufficiency, stage III (moderate) ([facility])                  PLAN:  1) ICD JUAQUIN explained risk of infection  This will be increased given comorbidities  2) ischemic CM on appropriate neurohormonal blockade beta blocker and entresto  3) atherosclerosis - on stating  4) CKD stage III will increase risk of infection with icd gen change  5) IDDM also will increase risk of infection with gen change HGAIC 9 2   6) arthritis functional class severely limited by back and knee arthritis walks with a cane     HPI:   Interim history presents for H and P for ICD generator change  Previously placed by Dr Josseline Gamble [facility]  Followed by Dr Bishop Lopez of [facility]           ROS: positive for severe knee and back pain, SOB with exertion no chest pain or palps  Positive for ED  All other 12 point ROS negative        Objective:      Vitals: Blood pressure 144/70, pulse 63, height 5' 8" (1 727 m), weight 111 kg (244 lb)  , Body mass index is 37 1 kg/m²  ,         Physical Exam:     GEN: Sergio Herrmann Sr  appears well, alert and oriented x 3, pleasant and cooperative   HEENT: pupils equal, round, and reactive to light; extraocular muscles intact  NECK: supple, no carotid bruits HEART: regular rhythm, normal S1 and S2, no murmurs, clicks, gallops or rubs   LUNGS: clear to auscultation bilaterally; no wheezes, rales, or rhonchi   ABDOMEN: normal bowel sounds, soft, no tenderness, no distention  EXTREMITIES: peripheral pulses normal; no clubbing, cyanosis, or edema  NEURO: no focal findings   SKIN: normal without suspicious lesions on exposed skin     Medications:       Current Outpatient Medications:     ALPRAZolam (XANAX) 0 25 mg tablet, Take by mouth daily at bedtime as needed for anxiety, Disp: , Rfl:     aspirin (ADULT ASPIRIN EC LOW STRENGTH) 81 mg EC tablet, Take 81 mg by mouth daily  , Disp: , Rfl:     carvedilol (COREG) 25 mg tablet, Take 1 tablet (25 mg total) by mouth 2 (two) times a day with meals, Disp: 180 tablet, Rfl: 3    Cholecalciferol (VITAMIN D3) 5000 units CAPS, Take 1 capsule by mouth daily, Disp: , Rfl:     CINNAMON PO, Take 500 mg by mouth daily, Disp: , Rfl:     clonazePAM (KlonoPIN) 0 5 mg tablet, Take 1 tablet (0 5 mg total) by mouth daily at bedtime, Disp: 30 tablet, Rfl: 0    Coenzyme Q10 (COQ-10) 200 MG CAPS, Take 1 capsule by mouth daily  , Disp: , Rfl:     ENTRESTO  MG TABS, TAKE 1 TABLET BY MOUTH TWICE A DAY, Disp: 60 tablet, Rfl: 4    flurazepam (DALMANE) 30 MG capsule, Take 1 capsule (30 mg total) by mouth daily at bedtime as needed for sleep, Disp: 30 capsule, Rfl: 1    furosemide (LASIX) 40 mg tablet, Take 1 5 tablets (60 mg total) by mouth 2 (two) times a day (Patient taking differently: Take 40 mg by mouth 2 (two) times a day ), Disp: 270 tablet, Rfl: 3    gabapentin (NEURONTIN) 400 mg capsule, Take 1 capsule (400 mg total) by mouth 4 (four) times a day, Disp: 180 capsule, Rfl: 1    Incontinence Supply Disposable (BRIEF OVERNIGHT LARGE) MISC, by Does not apply route 4 (four) times a day, Disp: 98 each, Rfl: 3    Insulin Glargine (TOUJEO SOLOSTAR) 300 units/mL CONCETRATED U-300 injection pen, INJECT BY SUBCUTANEOUS ROUTE 45 UNITS BID, Disp: 9 pen, Rfl: 5    isosorbide dinitrate (ISORDIL) 10 mg tablet, Take 1 tablet (10 mg total) by mouth 3 (three) times a day, Disp: 270 tablet, Rfl: 3    metolazone (ZAROXOLYN) 5 mg tablet, TAKE 1 TABLET BY MOUTH EVERY DAY AS NEEDED WEIGHT GAIN 3 LBS, Disp: 30 tablet, Rfl: 2    Multiple Vitamin (MULTIVITAMIN) capsule, Take 1 capsule by mouth daily  , Disp: , Rfl:     Omega-3 Fatty Acids (FISH OIL) 1200 MG CAPS, Take 1 capsule by mouth daily, Disp: , Rfl:     potassium chloride (KLOR-CON) 20 mEq packet, TAKE 20 MEQ BY MOUTH 2 (TWO) TIMES A DAY, Disp: 150 packet, Rfl: 4    simvastatin (ZOCOR) 40 mg tablet, TAKE 1 TABLET BY MOUTH EVERY DAY IN THE EVENING, Disp: 30 tablet, Rfl: 4    Alcohol Swabs (ALCOHOL PREP) PADS, by Does not apply route 2 (two) times a day, Disp: 200 each, Rfl: 0    glucose blood (ONE TOUCH ULTRA TEST) test strip, 1 each by Other route 3 (three) times a day Test, Disp: 100 each, Rfl: 11    Insulin Pen Needle (BD PEN NEEDLE MARCELINO U/F) 32G X 4 MM MISC, Use to inject Insulin TID, Disp: 100 each, Rfl: 3    methocarbamol (ROBAXIN) 500 mg tablet, Take 1 tablet (500 mg total) by mouth 3 (three) times a day, Disp: 90 tablet, Rfl: 0    temazepam (RESTORIL) 30 mg capsule, Take 15 mg by mouth daily at bedtime, Disp: , Rfl:             Family History   Problem Relation Age of Onset    Dementia Mother        Social History               Socioeconomic History    Marital status: /Civil Union       Spouse name: Not on file    Number of children: Not on file    Years of education: Not on file    Highest education level: Not on file   Occupational History    Occupation: DISABLED   Social Needs    Financial resource strain: Not on file    Food insecurity:       Worry: Not on file       Inability: Not on file    Transportation needs:       Medical: Not on file       Non-medical: Not on file   Tobacco Use    Smoking status: Former Smoker       Packs/day: 3 00       Years: 20 00       Pack years: 60 00       Last attempt to quit:        Years since quittin 6    Smokeless tobacco: Never Used    Tobacco comment: never a smoker as per [employer]   Substance and Sexual Activity    Alcohol use: Yes       Comment: glass wine daily    Drug use: No    Sexual activity: Not on file   Lifestyle    Physical activity:       Days per week: Not on file       Minutes per session: Not on file    Stress: Not on file   Relationships    Social connections:       Talks on phone: Not on file       Gets together: Not on file       Attends [employer] service: Not on file       Active member of club or organization: Not on file       Attends meetings of clubs or organizations: Not on file       Relationship status: Not on file    Intimate partner violence:       Fear of current or ex partner: Not on file       Emotionally abused: Not on file       Physically abused: Not on file       Forced sexual activity: Not on file   Other Topics Concern    Not on file   Social History Narrative    Not on file         Social History           Tobacco Use   Smoking Status Former Smoker    Packs/day: 3 00    Years: 20 00    Pack years: 60 00    Last attempt to quit: Arvin Woodall Years since quittin 6   Smokeless Tobacco Never Used   Tobacco Comment     never a smoker as per [employer]      Social History           Substance and Sexual Activity   Alcohol Use Yes     Comment: glass wine daily         Labs & Results:  Below is the patient's most recent value for Albumin, ALT, AST, BUN, Calcium, Chloride, Cholesterol, CO2, Creatinine, GFR, Glucose, HDL, Hematocrit, Hemoglobin, Hemoglobin A1C, LDL, Magnesium, Phosphorus, Platelets, Potassium, PSA, Sodium, Triglycerides, and WBC     Lab Results   Component Value Date     ALT 30 2019     AST 28 2019     BUN 19 2019     CALCIUM 9 6 2019     CL 99 2019     CHOL 128 2018     CO2 29 2019     CREATININE 1 52 (H) 2019     HDL 21 (L) 10/10/2018     HCT 38 3 (L) 2019     HGB 12 8 (L) 2019     HGBA1C 8 4 (A) 2019     MG 1 7 2019     PHOS 3 5 2019      (L) 2019     K 4 0 2019     PSA 6 9 (H) 2019      2018     TRIG 208 (H) 10/10/2018     WBC 4 60 2019      Note: for a comprehensive list of the patient's lab results, access the Results Review activity  Cardiac testing:        Results for orders placed in visit on 18   Echo complete with contrast if indicated     Narrative [address] [place] [address] 35  [facility], [address]   [phone]      Transthoracic Echocardiogram   2D, M-mode, Doppler, and Color Doppler      Study date:  2018      Patient: Esha Hein   MR number: [id number]   Account number: [de-identified]   : 1950   Age: 78 years   Gender: Male   Status: Outpatient   Location: [facility]   Height: 69 in   Weight: 252 lb   BP: 128/ 70 mmHg      Indications: Assess congestive heart failure       Diagnoses: I50 9 - Heart failure, unspecified      Sonographer:  ANTHONY Wong   Primary Physician:  Vincenzo Wilson MD   Referring Physician:  Trisha Duncan DO   Group:  [facility]   Interpreting Physician:  Lupe Zambrano MD      SUMMARY      LEFT VENTRICLE:   The ventricle was moderately to markedly dilated  Systolic function was mildly to moderately reduced  Ejection fraction was estimated to be 40 %  There was mild to moderate diffuse hypokinesis  Wall thickness was mildly to moderately increased     Doppler parameters were consistent with high ventricular filling pressure       LEFT ATRIUM:   The atrium was mildly to moderately dilated       MITRAL VALVE:   There was mild annular calcification       TRICUSPID VALVE:   There was mild regurgitation       PULMONIC VALVE:   There was mild regurgitation       HISTORY: PRIOR HISTORY: depression/ anxiety Congestive heart failure  Nonischemic cardiomyopathy  Risk factors: hypertension, diabetes, renal failure, and medication-treated hypercholesterolemia  Chronic lung disease  PRIOR PROCEDURES: ICD   implantation       PROCEDURE: The study was performed in the [address] [place] [address] [facility] [place]  This was a routine study  The transthoracic approach was used  The study included complete 2D imaging, M-mode, complete spectral Doppler, and color Doppler  The   heart rate was 65 bpm, at the start of the study  Echocardiographic views were limited due to poor acoustic window availability, decreased penetration, and lung interference  This was a technically difficult study       LEFT VENTRICLE: The ventricle was moderately to markedly dilated  Systolic function was mildly to moderately reduced  Ejection fraction was estimated to be 40 %  There was mild to moderate diffuse hypokinesis  Wall thickness was mildly to   moderately increased  DOPPLER: The ratio of early ventricular filling to atrial contraction velocities was within the normal range  The deceleration time of the early transmitral flow velocity was increased  Doppler parameters were   consistent with high ventricular filling pressure       RIGHT VENTRICLE: The size was normal  Systolic function was normal  Wall thickness was normal       LEFT ATRIUM: The atrium was mildly to moderately dilated       RIGHT ATRIUM: Size was at the upper limits of normal  A pacing wire was present       MITRAL VALVE: There was mild annular calcification  DOPPLER: There was no evidence for stenosis  There was no regurgitation       AORTIC VALVE: The valve was trileaflet  Leaflets exhibited mildly increased thickness  DOPPLER: There was no evidence for stenosis  There was no regurgitation       TRICUSPID VALVE: The valve structure was normal  There was normal leaflet separation  DOPPLER: The transtricuspid velocity was within the normal range  There was no evidence for stenosis   There was mild regurgitation  The tricuspid jet   envelope definition was inadequate for estimation of RV systolic pressure       PULMONIC VALVE: Leaflets exhibited normal thickness, no calcification, and normal cuspal separation  DOPPLER: The transpulmonic velocity was within the normal range  There was mild regurgitation       PERICARDIUM: There was no pericardial effusion  The pericardium was normal in appearance       AORTA: The root exhibited normal size       SYSTEMIC VEINS: IVC: The inferior vena cava was normal in size  Respirophasic changes were normal       SYSTEM MEASUREMENT TABLES      2D   Ao Diam: 3 2 cm   IVSd: 1 3 cm   LA Diam: 4 5 cm   LVIDd: 6 8 cm   LVIDs: 5 4 cm   LVPWd: 1 3 cm      PW   E': 0 m/s   E/E': 18 4   MV A Jack: 0 8 m/s   MV Dec Nicol[place]: 2 4 m/s2   MV DecT: 229 6 ms   MV E Jack: 0 6 m/s   MV E/A Ratio: 0 7      [employer] Accredited Echocardiography Laboratory      Prepared and electronically signed by  Helio Medrano MD   Signed 02-Jan-2018 17:12:30      No results found for this or any previous visit  No results found for this or any previous visit  No results found for this or any previous visit          MDT-SINGLE CHAMBER ICD  NON-BILLABLE CARELINK TRANSMISSION: BATTERY VOLTAGE REACHED ERT ON 8/3/19 (2 61/RRT-2 63V)  WILL SCHEDULE H&P  CHARGE TIME - 16 5 SECS   - 1 1%  ALL AVAILABLE LEAD PARAMETERS WITHIN NORMAL LIMITS  NO SIGNIFICANT HIGH RATE EPISODES  OPTI-VOL WITHIN NORMAL LIMITS   NORMAL DEVICE FUNCTION @ JUAQUIN BATTERY STATUS   EB

## 2019-09-06 NOTE — ANESTHESIA PREPROCEDURE EVALUATION
Review of Systems/Medical History  Patient summary reviewed  Chart reviewed  History of anesthetic complications (intubated for urologic procedure) difficult airway    Cardiovascular  Pacemaker/AICD, Hyperlipidemia, Hypertension controlled, CHF ,    Pulmonary  COPD moderate- medication dependent , Asthma , Sleep apnea ,        GI/Hepatic  Negative GI/hepatic ROS   GERD well controlled,        Chronic kidney disease stage 3,        Endo/Other  Diabetes well controlled type 2 Insulin,   Obesity  morbid obesity   GYN       Hematology  Negative hematology ROS      Musculoskeletal    Arthritis     Neurology  Negative neurology ROS   Diabetic neuropathy,    Psychology   Anxiety,              Physical Exam    Airway    Mallampati score: I  TM Distance: >3 FB  Neck ROM: full     Dental   Comment: Only a few teeth in mouth,     Cardiovascular  Rhythm: regular, Rate: normal,     Pulmonary  Pulmonary exam normal Breath sounds clear to auscultation,     Other Findings        Anesthesia Plan  ASA Score- 3     Anesthesia Type- general with ASA Monitors  Additional Monitors:   Airway Plan: ETT  Plan Factors-    Induction- intravenous  Postoperative Plan- Plan for postoperative opioid use  Informed Consent- Anesthetic plan and risks discussed with patient  I personally reviewed this patient with the CRNA  Discussed and agreed on the Anesthesia Plan with the CRNA  Maxine Lopez

## 2019-09-06 NOTE — ANESTHESIA POSTPROCEDURE EVALUATION
Post-Op Assessment Note    CV Status:  Stable    Pain management: adequate     Mental Status:  Alert and awake   Hydration Status:  Euvolemic   PONV Controlled:  Controlled   Airway Patency:  Patent   Post Op Vitals Reviewed: Yes      Staff: Anesthesiologist           /65 (09/06/19 1112)    Temp      Pulse 58 (09/06/19 1112)   Resp 19 (09/06/19 1112)    SpO2 92 % (09/06/19 1112)

## 2019-09-06 NOTE — DISCHARGE INSTRUCTIONS
-Leave outer bandage in place for 1 week - it is water proof, and as long as it is fully adhered to your skin you may shower with it  If it appears as though the bandage is coming off and/or there is any communication to the area of the incision, please then keep the whole area dry for the remaining week  -Blot dry or use blow dryer after bathing    -After 1 week, please remove by pulling all edges away from the center of the bandage (see below)  -Do not use lotions/powders/creams on incision  Please call the office if you notice redness, swelling, bleeding, or drainage from incision or if you develop fevers  If you have any questions, please call [phone] to speak with a nurse (8:30am-4pm, or [phone] after hours)  To remove your dressin  Wash your hands with soap and water  2  Gently press down on the corner of the [address] [place] [address] dressing with one hand  3  Use the other hand to slowly lift up an edge of the dressing  4  Stretch the edge of the dressing down and out to break the seal between your skin and the tape of the dressing  Do not pull the dressing up  5  Slowly work your way around the dressing, repeating steps 2 to 4, until the dressing is loose  Remove the dressing

## 2019-09-06 NOTE — TELEPHONE ENCOUNTER
Left detailed message on pt's voicemail regarding results  Advised him to call if any questions/concerns

## 2019-09-06 NOTE — OP NOTE
ELECTROPHYSIOLOGY  OPERATIVE REPORT  PATIENT NAME: Derek Keene Sr   :  1950  MRN: [id number]  Date of surgery: 19  Surgeon: Owen Rodrigez DO Pt Location: Cath Lab    PROCEDURE PERFORMED:   1) single-chamber AICD generator change         Preoperative Medications:  Vancomycin  ANESTHESIA:  General anesthesia by anesthesia service    PREOPERATIVE DIAGNOSIS:   ICD battery depletion  Ischemic cardiomyopathy with history of left ventricular ejection fraction as low as 30%  [place] heart Association class three congestive heart failure symptoms        POSTOPERATIVE DIAGNOSIS: Successful single-chamber AICD generator change    NCDR ICD REGISTRY INFO    Heart Failure: Yes  NYHA Functional Classification: Class III  Ischemic Cardiomyopathy: Yes  Timeframe: 0 >= 3 Months  Guideline Directed Medical Therapy Maximum Dose - Yes (for 3 Months)  Non-Ischemic Cardiomyopathy: No   Ventricular Tachycardia: No   Indications for Permanent Pacemaker: No   ICD Indication: Primary Prevention  A formal shared decision making encounter has occurred with the patient using an evidence-based decision tool on ICDs prior to initial ICD implantation: Yes  Generator changes only- Patients clinical condition is unchanged and the LVEF will not be assessed: Yes    Informed Consent: Risks, benefits, and alternatives discussed with patient and any family present  He understands risks, which include but are not limited to life threatening  bleeding, infection, air around lungs, blood around the heart and reoperation dislodged or malfunctioning device  Procedure Description:  After informed consent was obtained, the patient was brought to the electrophysiology laboratory and was NPO  A time out was called and the patient was properly identified  The patient was pre-medicated as above  The left infraclavicular area was prepped and draped in the usual sterile fashion   After local anesthetic infiltration with 1% lidocaine, an incision was made below above the ICD and the generator was removed with blunt dissection and electocautery  There was significant amount of scar tissue over the device with significant calcification of the scar tissue  The device was difficult to removed from the body however it was successfully removed the lead was tested and working appropriately  Lead was connected to the new device  Device and antibiotic pouch were placed into the pocket  A partial capsulectomy was performed  Bleeding was well controlled with electrocautery  The lead and pulse generator were placed in the pre-pectoral pocket  The pocket was then closed with 3 layers of 2-0, 3-0, 4-0 -Vicryl suture was used to close the skin  Staples were used and then Aquacel dressing  A Medtronic absorbable Antibiotic Pouch was used to prevent infection  EBL: Minimal  Complications: None    Findings:  Heavily calcified and scarred an ICD pocket  Generator change successful    The patient tolerated the procedure well  Plan: Routine postoperative care  Follow-up in 2 weeks with incision check and interrogation

## 2019-09-09 ENCOUNTER — PATIENT OUTREACH (OUTPATIENT)
Dept: FAMILY MEDICINE CLINIC | Facility: CLINIC | Age: 69
End: 2019-09-09

## 2019-09-10 ENCOUNTER — TELEPHONE (OUTPATIENT)
Dept: CARDIOLOGY CLINIC | Facility: CLINIC | Age: 69
End: 2019-09-10

## 2019-09-10 NOTE — TELEPHONE ENCOUNTER
Pt calling, recently had ICD Generator change  Pt states he has blood/ drainage coming down through surgical dressings, and also hears a "bubbling sound"  He does has some concerns  Pt is currently at home by himself and in bed  Pt has not removed the dressing  He would like to speak with someone from device clinic

## 2019-09-10 NOTE — TELEPHONE ENCOUNTER
S/W pt and wife  Aquacel is still on  Wife claims their is what appears to be dry bld around bandage  Swelling is status quo  Pt claims he hears "bubbling" from area  Advised to do transmission  Offered to be seen in [facility] however no transportation  Told him should he feel bad or drainage is noted from area go to ER or call us back

## 2019-09-11 ENCOUNTER — OFFICE VISIT (OUTPATIENT)
Dept: NEPHROLOGY | Facility: CLINIC | Age: 69
End: 2019-09-11
Payer: COMMERCIAL

## 2019-09-11 VITALS
RESPIRATION RATE: 18 BRPM | SYSTOLIC BLOOD PRESSURE: 144 MMHG | DIASTOLIC BLOOD PRESSURE: 62 MMHG | WEIGHT: 248 LBS | BODY MASS INDEX: 37.59 KG/M2 | HEART RATE: 70 BPM | HEIGHT: 68 IN

## 2019-09-11 DIAGNOSIS — Z95.810 ICD (IMPLANTABLE CARDIOVERTER-DEFIBRILLATOR), DUAL, IN SITU: ICD-10-CM

## 2019-09-11 DIAGNOSIS — I25.10 ATHEROSCLEROSIS OF NATIVE CORONARY ARTERY OF NATIVE HEART WITHOUT ANGINA PECTORIS: ICD-10-CM

## 2019-09-11 DIAGNOSIS — I10 ESSENTIAL HYPERTENSION: ICD-10-CM

## 2019-09-11 DIAGNOSIS — E66.9 OBESITY (BMI 30-39.9): ICD-10-CM

## 2019-09-11 DIAGNOSIS — I50.42 CHRONIC COMBINED SYSTOLIC AND DIASTOLIC CHF, NYHA CLASS 3 (HCC): ICD-10-CM

## 2019-09-11 DIAGNOSIS — E11.21 TYPE 2 DIABETES MELLITUS WITH DIABETIC NEPHROPATHY, WITH LONG-TERM CURRENT USE OF INSULIN (HCC): ICD-10-CM

## 2019-09-11 DIAGNOSIS — N18.32 CKD STAGE G3B/A3, GFR 30-44 AND ALBUMIN CREATININE RATIO >300 MG/G (HCC): Primary | ICD-10-CM

## 2019-09-11 DIAGNOSIS — Z79.4 TYPE 2 DIABETES MELLITUS WITH DIABETIC NEPHROPATHY, WITH LONG-TERM CURRENT USE OF INSULIN (HCC): ICD-10-CM

## 2019-09-11 PROCEDURE — 99214 OFFICE O/P EST MOD 30 MIN: CPT | Performed by: INTERNAL MEDICINE

## 2019-09-11 NOTE — PATIENT INSTRUCTIONS
- restart lasix at 60mg twice a day   - get blood work in 2 weeks and call me with updates and see the cardiologist  - change vit D to 1000 units daily    - Please call me in 10 days after having your blood work done to review the results if you do not hear back from me or my office, as I may have not received the results  - please remember to perform blood work prior to the next visit  - Please call if the blood pressure top number is greater than 150 or less than 110 consistently  - Please call if you are gaining more than 2lbs in 2 days for adjustment of water pills   ~ Please AVOID the following pain medications  LIST OF NSAIDS (NONSTEROIDAL ANTI-INFLAMMATORY DRUGS) AND REYES-2 INHIBITORS    DIFLUNISAL (DOLOBID)  IBUPROFEN (MOTRIN, ADVIL)  FLURBIPROFEN (ANSAID)  KETOPROFEN (ORUDIS, ORUVAIL)  FENOPROFEN (NALFON)  NABUMETONE (RELAFEN)  PIROXICAM (FELDENE)  NAPROXEN (ALEVE, NAPROSYN, NAPRELAN, ANAPROX)  DICLOFENAC (VOLTAREN, CATAFLAM)  INDOMETHACIN (INDOCIN)  SULINDAC (CLINORIL)  TOLMETIN (TOLETIN)  ETODOLAC (LODINE)  MELOXICAM (MOBIC)  KETOROLAC (TORADOL)  OXAPROZIN (DAYPRO)  CELECOXIB (CELEBREX)    Phosphorus diet  Follow a low phosphorus diet      Avoid these higher phosphorus foods: Choose these lower phosphorus foods:   Milk, pudding or yogurt (from animals and from many soy varieties) Rice milk (unfortified), nondairy creamer (if it doesn't have terms in the ingredients list that contain the letters "phos")   Hard cheeses, ricotta or cottage cheese, fat-free cream cheese Regular and low-fat cream cheese   Ice cream or frozen yogurt Sherbet or frozen fruit pops   Soups made with higher phosphorus ingredients (milk, dried peas, beans, lentils) Soups made with lower phosphorus ingredients (broth- or water-based with other lower phosphorus ingredients)   Whole grains, including whole-grain breads, crackers, cereal, rice and pasta Refined grains, including white bread, crackers, cereals, rice and pasta   Quick breads, biscuits, cornbread, muffins, pancakes or waffles Homemade refined (white) dinner rolls, bagels or English muffins   Dried peas (split, black-eyed), beans (black, garbanzo, lima, kidney, navy, lopez) or lentils Green peas (canned, frozen), green beans or wax beans   Organ meats, walleye, pollock or sardines Lean beef, pork, lamb, poultry or other fish   Nuts and seeds Popcorn   Peanut butter and other nut butters Jam, jelly or honey   Chocolate, including chocolate drinks Carob (chocolate-flavored) candy, hard candy or gumdrops   Stephanie and pepper-type sodas, flavored montilla, bottled teas (if a term in the ingredients list contains the letters "phos") Lemon-lime soda, ginger ale or root beer, plain water     Follow a moderate potassium diet

## 2019-09-11 NOTE — PROGRESS NOTES
Nephrology Follow up Consultation  Krystle Post  77 y o  male MRN: [id number]            BACKGROUND:  Krystle Post  is a 77 y o male who was referred by Austin Merino MD for evaluation of Follow-up and Chronic Kidney Disease    ASSESSMENT / PLAN:   77 y o   male with pmh of multiple co-morbidities including hypertension (x15yrs), diabetes (x25yrs, no DR), obesity, CHF systolic dysfunction, CAD status post AICD/pacemaker, obstructive sleep apnea on CPAP, prostate cancer status post surgical removal  and CKD stage IIIB presented to the office for routine follow-up  1  CKD stage III BA3:  -Patient has a baseline creatinine of 1 8-1 9mg/dL  Most recent labs show a Creatinine of 1 53mg/dL  Renal function remains stable  Slightly below baseline   - Likely has underlying CKD secondary to diabetic nodularo glomerular sclerosis plus hypertensive nephrosclerosis plus cardiorenal syndrome plus age-related nephron loss  - SPEP negative  - renal ultrasound from October 16, 2018 shows right kidney 10 7 cm left kidney 11 cm, few simple cysts on the right kidney  - Proteinuria -microalbumin to creatinine ratio of 454 mg  - Acid base and lytes stable  - Clinically the patient appears to be minimally hypervolemic- advised patient to go back to Lasix 60 mg p o  B i d  And continue with the metolazone p r n  Weekly check BMP in 2 weeks at which time we will review blood work and also he will be seen by Cardiology who could help assess his volume status to see if his diuretics need adjustments, advised him that he increases more than 2 1-1/2 lb in 2 days then he needs to take an additional metolazone  There is also concern slight edema could be secondary to his worsening A1c  However will watch his renal parameters and weights after a increasing Lasix dose to his usual   - Recommend to avoid use of NSAIDs, nephrotoxins   Caution advised with regards to exposure to IV contrast dye    - Patient has nonfunctional AV fistula in place in the left arm that as per him was placed many years ago due to concern for needing dialysis by his previous nephrologist   - Discussed with the patient in depth his renal status, including the possible etiologies for CKD  - Advised the patient that when his GFR is close to 20mL/min then will start discussing about RRT(renal replacement therapy) options such as renal transplant, peritoneal dialysis and hemodialysis  - Informed the patient about the various options for Renal Replacement therapy  - Discussed with the patient how we need to work together to delay the progression of CKD with optimal BP control based on their age and co-morbidities, optimal BS control with HbA1c of <7% and trying to reduce proteinuria by the use of anti-proteinuric agents  2  Hypertension:  - Patient is on Coreg 25 mg p o  B i d ,entresto 97/103mg p o  Q day, Lasix 40 mg p o  B i d , Isordil 10 mg p o  T i d , metolazone 5 mg p o  P r n (1 time per week), K-Dur 20 meq p o  Q 12,  - advised patient to increase his Lasix back to 60 mg p o  B i d  Check blood work in 2 weeks and call me with updates in be seen by Cardiology so that we can reassess his volume status and his labs  Slight degree of edema could be due to his diabetes  He may need to take his metolazone twice weekly if he continues to gain weight   - Goal BP of < 140/90 based on his comorbidities  - Instructed to follow low sodium (2gm)diet   - Advised to hold ACEI/ARBs if patient suffers from dehydration due to gastrointestinal losses due to risk of GAGE secondary to failure to autoregulate  - if patient's blood pressure remains elevated may consider stopping potassium supplementation and starting patient on Aldactone as it may help  3  Hemoglobin:  - Goal Hb of 10-12 g/dL  - Most recent labs suggestive of 12 6gm/dL  - No role for iron supplementation at this time  4 CKD-MBD(Mineral Bone Disease):   - Based on patients CKD stage following is the goal of therapy  - Maintain calcium phosphorus product of < 55   - Stage 3 CKD - Goal Ca 8 5-10 mg/dL , goal Phos 2 7-4 6 mg/dL  , goal iPTH 30-70 pg/mL  -  patient on vitamin-D 5000 units p o  Q day change to 1000 units po Q24  - currently patient is at goal  - most recent vitamin-D level of 42 5, intact PTH level of 46 1 improved  - check intact PTH vitamin-D at next visit    5  Lipids:  - On fish oil, Zocor  - Goal LDL less than 70  - Management as per PCP    6  Nutrition:  - Encouraged patient to follow a renal diet comprising of moderate potassium, low phosphorus and protein restriction to 0 8gm/kg  7  DM:  - Advised patient of tight glycemic control to obtain A1c closer to 7%  - This is needed to help decrease progression of CKD  - Also on Neurontin for neuropathy   - Dose of Neurontin may need to be adjusted as renal function declines over the time  - on toujeo only  - A1c of 8 4% in July 2019 higher than before  8  Prostate cancer:  - Status post prostatectomy  - last seen by Urology on 06/17/2019 and to start on androgen deprivation therapy if his PSA starts to climb more rapidly or goes over 10 as per their note  9  Followup:  - Patient is to follow-up in 4 months, with lab work to be performed a few days prior to the visit  Advised patient to call me in 10 days to review the results if they do not hear back from me, as I may have not received the results  Ceferino Hooker MD, [facility], 9/11/2019, 10:51 AM             SUBJECTIVE: 77 y o  male seen in the office for routine follow-up  Since last visit he was seen by Cardiology for ICD generator change as well as by his PCP  New ICD placed last week  Now only doing metolazone once a Week and lasix was decreased by cardio in June to 40mg po bid  Reports for a while now he has been having edema of the bilateral lower extremities    His agreeable to increasing the Lasix dose and giving it a trial   Reports he is working on his diet since he is aware that his A1c is elevated  Has no complaints at this time has an appointment with Cardiology in 2 weeks  Review of Systems   Constitutional: Negative for appetite change, fatigue and fever  HENT: Negative for congestion and sore throat  Respiratory: Negative for cough, shortness of breath and wheezing  Cardiovascular: Positive for leg swelling  Negative for chest pain and palpitations  Gastrointestinal: Negative for abdominal pain, constipation, diarrhea, nausea and vomiting  Genitourinary: Negative for difficulty urinating, dysuria and hematuria  Musculoskeletal: Negative for back pain  Skin: Negative for rash  Neurological: Negative for dizziness, light-headedness and headaches  Psychiatric/Behavioral: Negative for confusion  All other systems reviewed and are negative  PAST MEDICAL HISTORY:  Past Medical History:   Diagnosis Date    AICD (automatic cardioverter/defibrillator) present     Anxiety and depression     Arthritis     Asthma     pt denies    CAD (coronary artery disease) 10/10/2014    CKD (chronic kidney disease) stage 3, GFR 30-59 ml/min ([facility])     COPD (chronic obstructive pulmonary disease) ([facility])     pt denies    CPAP (continuous positive airway pressure) dependence     Depression (emotion)     affective disorder    Diabetic nephropathy ([facility])     Erectile dysfunction     GERD (gastroesophageal reflux disease)     Hypertension     Iron deficiency anemia     Latent tuberculosis     Lumbar herniated disc     low back pain    Neuropathy     bles    Obesity (BMI 30-39  9) 7/9/2019    Prostate cancer ([facility] )     2013- had radiation- had prostatectomy    Sleep apnea     Systolic CHF, chronic ([facility])     Type 2 diabetes mellitus with hyperlipidemia ([facility])     Use of cane as ambulatory aid     Vitamin D deficiency     Wears glasses        PROBLEM LIST    Patient Active Problem List   Diagnosis    Type 2 diabetes mellitus ([facility] )    Essential hypertension    Chronic obstructive pulmonary disease (HCC)    Chronic renal insufficiency, stage III (moderate) (HCC)    Prostate cancer (HCC)    Anxiety and depression    Chronic combined systolic and diastolic CHF, NYHA class 3 (HCC)    Acute on chronic combined systolic and diastolic CHF (congestive heart failure) ([address]    Erectile dysfunction following radical prostatectomy    JOE (obstructive sleep apnea)    Insomnia    Obesity with body mass index 30 or greater    Coronary atherosclerosis    Erectile dysfunction of non-organic origin    Generalized anxiety disorder    Hyperlipidemia    Peripheral neuropathy    Localized, primary osteoarthritis of shoulder region    Pain in joint of left shoulder    Primary osteoarthritis of both knees    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 stage 3 chronic kidney disease (HCC)    Acute upper respiratory infection    Lumbar pain    Venous insufficiency    Obesity (BMI 30-39  9)    Medicare annual wellness visit, subsequent    Bilateral hip pain    ICD (implantable cardioverter-defibrillator), dual, in situ    Skin abrasion    Epigastric pain       PAST SURGICAL HISTORY:  Past Surgical History:   Procedure Laterality Date    AV FISTULA PLACEMENT      left upper arm and removal    CARDIAC DEFIBRILLATOR PLACEMENT      CARDIAC ICD GENERATOR CHANGE  9/6/2019    CATARACT EXTRACTION Bilateral 10/09/2014    COLONOSCOPY      INGUINAL HERNIA REPAIR Bilateral     OTHER SURGICAL HISTORY Left 10/10/2014    AV Shunt    LA INSERT,INFLATABLE PENILE PROSTHESIS N/A 1/19/2018    Procedure: INSERTION 3 PART PROSTHESIS PENILE;  Surgeon: Zain Madrid MD;  Location: [facility];  Service: Urology    PROSTATECTOMY         SOCIAL HISTORY :   reports that he quit smoking about 43 years ago  He has a 60 00 pack-year smoking history   He has never used smokeless tobacco  He reports that he drinks alcohol  He reports that he does not use drugs  FAMILY HISTORY:  Family History   Problem Relation Age of Onset    Dementia Mother        ALLERGIES:  Allergies   Allergen Reactions    Ibuprofen Nausea Only    Penicillins Other (See Comments)     Pt unsure of reaction           PHYSICAL EXAM:  Vitals:    09/11/19 1003   BP: 144/62   BP Location: Right arm   Patient Position: Sitting   Cuff Size: Large   Pulse: 70   Resp: 18   Weight: 112 kg (248 lb)   Height: 5' 8" (1 727 m)     Body mass index is 37 71 kg/m²  Physical Exam   Constitutional: He is oriented to person, place, and time  He appears well-developed and well-nourished  No distress  Obese male   HENT:   Mouth/Throat: Oropharynx is clear and moist  No oropharyngeal exudate  Eyes: No scleral icterus  Neck: Neck supple  No JVD present  Positive defibrillator   Cardiovascular: Exam reveals no friction rub  No murmur heard  Pulmonary/Chest: He has no wheezes  He has no rales  Abdominal: Soft  There is no tenderness  There is no rebound  Musculoskeletal: He exhibits edema  +1 edema bilateral lower extremity   Neurological: He is alert and oriented to person, place, and time  Skin: Skin is warm  No rash noted  He is not diaphoretic  Psychiatric: He has a normal mood and affect  His behavior is normal    Vitals reviewed        LABORATORY DATA:     Results from last 6 Months   Lab Units 09/03/19  1213 05/23/19  1313   WBC Thousand/uL 4 60 4 60   HEMOGLOBIN g/dL 12 6* 12 8*   HEMATOCRIT % 37 3* 38 3*   PLATELETS Thousands/uL 147* 134*   POTASSIUM mmol/L 4 2 4 0   CHLORIDE mmol/L 99 99   CO2 mmol/L 31* 29   BUN mg/dL 21 19   CREATININE mg/dL 1 53* 1 52*   CALCIUM mg/dL 9 3 9 6   MAGNESIUM mg/dL 1 9 1 7   PHOSPHORUS mg/dL 4 2 3 5        rest all reviewed    RADIOLOGY:  No orders to display     Rest all reviewed        MEDICATIONS:    Current Outpatient Medications:     Alcohol Swabs (ALCOHOL PREP) PADS, by Does not apply route 2 (two) times a day, Disp: 200 each, Rfl: 0    aspirin (ADULT ASPIRIN EC LOW STRENGTH) 81 mg EC tablet, Take 81 mg by mouth daily  , Disp: , Rfl:     carvedilol (COREG) 25 mg tablet, Take 1 tablet (25 mg total) by mouth 2 (two) times a day with meals, Disp: 180 tablet, Rfl: 3    Cholecalciferol (VITAMIN D3) 5000 units CAPS, Take 1 capsule by mouth daily, Disp: , Rfl:     CINNAMON PO, Take 500 mg by mouth daily, Disp: , Rfl:     clonazePAM (KlonoPIN) 0 5 mg tablet, Take 1 tablet (0 5 mg total) by mouth daily at bedtime, Disp: 30 tablet, Rfl: 0    Coenzyme Q10 (COQ-10) 200 MG CAPS, Take 1 capsule by mouth daily  , Disp: , Rfl:     ENTRESTO  MG TABS, TAKE 1 TABLET BY MOUTH TWICE A DAY, Disp: 60 tablet, Rfl: 4    flurazepam (DALMANE) 30 MG capsule, Take 1 capsule (30 mg total) by mouth daily at bedtime as needed for sleep, Disp: 30 capsule, Rfl: 1    furosemide (LASIX) 40 mg tablet, Take 1 5 tablets (60 mg total) by mouth 2 (two) times a day (Patient taking differently: Take 40 mg by mouth 2 (two) times a day ), Disp: 270 tablet, Rfl: 3    gabapentin (NEURONTIN) 400 mg capsule, Take 1 capsule (400 mg total) by mouth 4 (four) times a day, Disp: 180 capsule, Rfl: 1    glucose blood (ONE TOUCH ULTRA TEST) test strip, 1 each by Other route 3 (three) times a day Test, Disp: 100 each, Rfl: 11    Incontinence Supply Disposable (BRIEF OVERNIGHT LARGE) MISC, by Does not apply route 4 (four) times a day, Disp: 98 each, Rfl: 3    Insulin Glargine (TOUJEO SOLOSTAR) 300 units/mL CONCETRATED U-300 injection pen, INJECT BY SUBCUTANEOUS ROUTE 50 UNITS BID (Patient taking differently: Inject 30 Units under the skin every 12 (twelve) hours INJECT BY SUBCUTANEOUS ROUTE 50 UNITS BID), Disp: 9 pen, Rfl: 5    Insulin Pen Needle (BD PEN NEEDLE MARCELINO U/F) 32G X 4 MM MISC, Use to inject Insulin TID, Disp: 100 each, Rfl: 3    isosorbide dinitrate (ISORDIL) 10 mg tablet, Take 1 tablet (10 mg total) by mouth 3 (three) times a day, Disp: 270 tablet, Rfl: 3    metolazone (ZAROXOLYN) 5 mg tablet, TAKE 1 TABLET BY MOUTH EVERY DAY AS NEEDED WEIGHT GAIN 3 LBS, Disp: 30 tablet, Rfl: 2    Multiple Vitamin (MULTIVITAMIN) capsule, Take 1 capsule by mouth daily  , Disp: , Rfl:     Omega-3 Fatty Acids (FISH OIL) 1200 MG CAPS, Take 1 capsule by mouth daily, Disp: , Rfl:     oxyCODONE-acetaminophen (PERCOCET) 5-325 mg per tablet, Take 1 tablet by mouth every 6 (six) hours as needed for moderate pain for up to 10 daysMax Daily Amount: 4 tablets, Disp: 20 tablet, Rfl: 0    potassium chloride (KLOR-CON) 20 mEq packet, TAKE 20 MEQ BY MOUTH 2 (TWO) TIMES A DAY, Disp: 150 packet, Rfl: 4    simvastatin (ZOCOR) 40 mg tablet, TAKE 1 TABLET BY MOUTH EVERY DAY IN THE EVENING, Disp: 30 tablet, Rfl: 4          Portions of the record may have been created with voice recognition software  Occasional wrong word or "sound a like" substitutions may have occurred due to the inherent limitations of voice recognition software  Read the chart carefully and recognize, using context, where substitutions have occurred  If you have any questions, please contact the dictating provider

## 2019-09-13 ENCOUNTER — IN-CLINIC DEVICE VISIT (OUTPATIENT)
Dept: CARDIOLOGY CLINIC | Facility: CLINIC | Age: 69
End: 2019-09-13

## 2019-09-13 DIAGNOSIS — I50.42 CHRONIC COMBINED SYSTOLIC AND DIASTOLIC CHF, NYHA CLASS 3 (HCC): Primary | ICD-10-CM

## 2019-09-13 DIAGNOSIS — I42.8 NICM (NONISCHEMIC CARDIOMYOPATHY) (HCC): ICD-10-CM

## 2019-09-13 DIAGNOSIS — Z95.810 ICD (IMPLANTABLE CARDIOVERTER-DEFIBRILLATOR) IN PLACE: ICD-10-CM

## 2019-09-13 DIAGNOSIS — I47.2 VENTRICULAR TACHYCARDIA (HCC): ICD-10-CM

## 2019-09-13 PROCEDURE — 99024 POSTOP FOLLOW-UP VISIT: CPT | Performed by: INTERNAL MEDICINE

## 2019-09-13 NOTE — PROGRESS NOTES
Results for orders placed or performed in visit on 09/13/19   Cardiac EP device report    Narrative    MDT-SINGLE CHAMBER ICD  NON-BILLABLE; WOUND CHECK/DEVICE INTERROGATED IN THE [place] OFFICE PER PATIENT CALL /DR LAROSE  AQUACEL DRESSING REMOVED  INCISION CLEAN AND DRY WITH EDGES APPROXIMATED; JULISA INTACT; WOUND CARE AND RESTRICTIONS REVIEWED WITH PATIENT  BATTERY VOLTAGE ADEQUATE (WILMER 11 3 YRS)   - 1 2%  ALL LEAD PARAMETERS WITHIN NORMAL LIMITS  ALL OTHER TESTING WITHIN NORMAL LIMITS  NO SIGNIFICANT HIGH RATE EPISODES  NO PROGRAMMING CHANGES MADE TO DEVICE PARAMETERS  DR Pati Orr NOTIFIED VIA TIGER TEXT  [address]  9/20/19 FOR STAPLE REMOVAL, REPEAT DEVICE TESTING  APPROPRIATELY FUNCTIONING ICD     EB

## 2019-09-14 DIAGNOSIS — I42.8 NICM (NONISCHEMIC CARDIOMYOPATHY) (HCC): ICD-10-CM

## 2019-09-14 RX ORDER — ISOSORBIDE DINITRATE 10 MG/1
TABLET ORAL
Qty: 270 TABLET | Refills: 3 | Status: SHIPPED | OUTPATIENT
Start: 2019-09-14 | End: 2019-10-24 | Stop reason: SDUPTHER

## 2019-09-17 ENCOUNTER — TELEPHONE (OUTPATIENT)
Dept: OTHER | Facility: HOSPITAL | Age: 69
End: 2019-09-17

## 2019-09-17 NOTE — TELEPHONE ENCOUNTER
FYI    Patient called and canceled appointment for today at 3pm  Patient advised that he had been urinating fine now

## 2019-09-17 NOTE — TELEPHONE ENCOUNTER
Spoke to pt  Having issues with voiding  At times feels he has to go and is unable  Eventually then voids throughout the day  appt today at 3 pm with Dr Danny Mcelroy to be evaluated

## 2019-09-17 NOTE — TELEPHONE ENCOUNTER
Crispin Frazier called with reported issues voiding  Feels he needs to urinate in the morning, but unable to void  Voids throughout the day, but has trouble urinating in the morning  His urine fluctuates with feeling like he can't void  Feels the need to void urgently, but then cannot void  Voids throughout the day  Is trying to stay hydrated and drink water  No fever, no hematuria or dysuria  No suprapubic pain or flank pain  Reports compliance with his flomax  Reports regular bowel movements  Getting normal amount of activity, which is limited  Is scheduled to see Dr Jesus Rivera 12/17/2019  Will request a sooner appointment for evaluation for voiding dysfunction  Reviewed ER precautions  Patient "feels his PSA is rising again" in the presence of hx of adenocarcinoma of the prostate s/p retropubic radical prostatectomy by Dr Chucho Garcia in 2012  Last PSA was 6 9 in 5/2019  Ordered and due for repeat PSA in November 2019  Plan for possible ADT if PSA increases sharply or over 10 on recheck  Can we schedule him for evaluation for retention sooner? Sounds intermittent and is able to void throughout the day, but with some occasional retention in the morning  Can be with a provider other than Dr Jesus Rivera, if he is away       Ana Rivera PA-C  Date: 9/17/2019 Time: 8:25 AM

## 2019-09-19 DIAGNOSIS — N39.45 CONTINUOUS URINE LEAKAGE: ICD-10-CM

## 2019-09-19 RX ORDER — BROMPHENIRAMIN/PSEUDOEPHEDRINE 1-15MG/5ML
LIQUID (ML) ORAL 4 TIMES DAILY
Qty: 98 EACH | Refills: 3 | Status: SHIPPED | OUTPATIENT
Start: 2019-09-19

## 2019-09-19 NOTE — TELEPHONE ENCOUNTER
Insurance company called requesting patient have a script sent to [facility] for Devin Dunham   Patient sign and II will fax over to [facility]

## 2019-09-20 ENCOUNTER — IN-CLINIC DEVICE VISIT (OUTPATIENT)
Dept: CARDIOLOGY CLINIC | Facility: CLINIC | Age: 69
End: 2019-09-20

## 2019-09-20 DIAGNOSIS — Z95.810 PRESENCE OF AUTOMATIC CARDIOVERTER/DEFIBRILLATOR (AICD): ICD-10-CM

## 2019-09-20 DIAGNOSIS — I47.2 VENTRICULAR TACHYCARDIA (HCC): Primary | ICD-10-CM

## 2019-09-20 PROCEDURE — 99024 POSTOP FOLLOW-UP VISIT: CPT | Performed by: INTERNAL MEDICINE

## 2019-09-20 NOTE — PROGRESS NOTES
Results for orders placed or performed in visit on 09/20/19   Cardiac EP device report    Narrative    MDT-SINGLE CHAMBER ICD  DEVICE INTERROGATED IN THE [facility] OFFICE  WOUND CHECK: INCISION CLEAN AND DRY WITH EDGES APPROXIMATED; STAPLES REMOVED; WOUND CARE AND RESTRICTIONS REVIEWED WITH PATIENT  BATTERY VOLTAGE ADEQUATE (11 3 YRS)  : <0 1%  ALL LEAD PARAMETERS WITHIN NORMAL LIMITS  NO SIGNIFICANT HIGH RATE EPISODES  NO PROGRAMMING CHANGES MADE TO DEVICE PARAMETERS  APPROPRIATELY FUNCTIONING ICD    [address] [place] [address]

## 2019-09-25 ENCOUNTER — PATIENT OUTREACH (OUTPATIENT)
Dept: FAMILY MEDICINE CLINIC | Facility: CLINIC | Age: 69
End: 2019-09-25

## 2019-09-25 NOTE — PROGRESS NOTES
Spoke with Candida Reynoso at the office and left message for Suze Travis to call me back regarding script for depends

## 2019-09-25 NOTE — PROGRESS NOTES
Radha Kraft is managing at home and denies needing additional assistance  He had his ICD changed on 9/6  No current issues  Weight has remained stable, we reviewed weight parameters to report and to take metolazone for weight gain greater than 3 lbs  He denies chest pain, sob or swelling to LE  Nutritional intake adequate  He does glucometer checks daily, today's FBS was 270  He states his readings have been in the 200's  He does admit to some dietary indiscretions  He is taking all medications as prescribed  He is ambulatory with walker  Follow up appointments scheduled  He ran out of depends and needs a script faxed to pharmacy  One was sent on 9/19 but he called pharmacy and they stated they did not receive script  Will contact office to follow up  He is agreeable to further outreach

## 2019-09-26 ENCOUNTER — TELEPHONE (OUTPATIENT)
Dept: FAMILY MEDICINE CLINIC | Facility: CLINIC | Age: 69
End: 2019-09-26

## 2019-09-26 NOTE — TELEPHONE ENCOUNTER
----- Message from Henrietta Amaro, RN sent at 9/25/2019  4:05 PM EDT -----  Regarding: depends  Thom Dunlap,  I spoke with patient today and he contacted pharmacy and they said they did not receive script for depends  Could you check on it and re fax if necessary?   Thank you,  Brian Morales

## 2019-09-26 NOTE — TELEPHONE ENCOUNTER
Julia Birmingham is not a patient of dr Yamil Kenny but I did faxed this over again   I spoke with the pharmacist the other day and gave a verbal

## 2019-09-27 ENCOUNTER — PATIENT OUTREACH (OUTPATIENT)
Dept: FAMILY MEDICINE CLINIC | Facility: CLINIC | Age: 69
End: 2019-09-27

## 2019-09-27 NOTE — TELEPHONE ENCOUNTER
I sent this to you because I saw in the chart you had faxed over the initial prescription  Thank you for sending it again

## 2019-10-01 ENCOUNTER — PATIENT OUTREACH (OUTPATIENT)
Dept: FAMILY MEDICINE CLINIC | Facility: CLINIC | Age: 69
End: 2019-10-01

## 2019-10-01 LAB
LEFT EYE DIABETIC RETINOPATHY: NORMAL
RIGHT EYE DIABETIC RETINOPATHY: NORMAL

## 2019-10-01 NOTE — PROGRESS NOTES
Received vm message from patient requesting call back  Returned call and left vm message with contact information

## 2019-10-01 NOTE — PROGRESS NOTES
Received call from Omid  He had initial appointment at endocrinology yesterday and will be starting diabetic classes next Monday  His blood sugars have been high, last week his FBS one morning was in the 400's  Today's FBS was 271  He will now be doing glucometer checks 4 times a day and keep a log  He is scheduled to see ophthalmologist today due to blurred vision in R eye  He reports a good nutritional intake, following a diabetic diet and actively trying to lose weigh  Weight today was 236 and he has lost a few pounds  No c/o chest pain, sob or LE edema  He has issues with pain to bilateral knees making it difficult to exercise or walk long distances  He is taking all medications as prescribed  Follow up with PCP next week  He is agreeable to further outreach

## 2019-10-02 ENCOUNTER — APPOINTMENT (OUTPATIENT)
Dept: LAB | Facility: HOSPITAL | Age: 69
End: 2019-10-02
Attending: INTERNAL MEDICINE
Payer: COMMERCIAL

## 2019-10-02 ENCOUNTER — TRANSCRIBE ORDERS (OUTPATIENT)
Dept: ADMINISTRATIVE | Facility: HOSPITAL | Age: 69
End: 2019-10-02

## 2019-10-02 DIAGNOSIS — E11.22 TYPE 2 DIABETES MELLITUS WITH ESRD (END-STAGE RENAL DISEASE) (HCC): Primary | ICD-10-CM

## 2019-10-02 DIAGNOSIS — N18.6 TYPE 2 DIABETES MELLITUS WITH ESRD (END-STAGE RENAL DISEASE) (HCC): ICD-10-CM

## 2019-10-02 DIAGNOSIS — Z79.4 ENCOUNTER FOR LONG-TERM (CURRENT) USE OF INSULIN (HCC): ICD-10-CM

## 2019-10-02 DIAGNOSIS — E11.22 TYPE 2 DIABETES MELLITUS WITH ESRD (END-STAGE RENAL DISEASE) (HCC): ICD-10-CM

## 2019-10-02 DIAGNOSIS — Z79.4 TYPE 2 DIABETES MELLITUS WITH DIABETIC NEPHROPATHY, WITH LONG-TERM CURRENT USE OF INSULIN (HCC): ICD-10-CM

## 2019-10-02 DIAGNOSIS — N18.30 CHRONIC KIDNEY DISEASE, STAGE III (MODERATE) (HCC): ICD-10-CM

## 2019-10-02 DIAGNOSIS — N18.6 TYPE 2 DIABETES MELLITUS WITH ESRD (END-STAGE RENAL DISEASE) (HCC): Primary | ICD-10-CM

## 2019-10-02 DIAGNOSIS — E66.9 OBESITY (BMI 30-39.9): ICD-10-CM

## 2019-10-02 DIAGNOSIS — N18.32 CKD STAGE G3B/A3, GFR 30-44 AND ALBUMIN CREATININE RATIO >300 MG/G (HCC): ICD-10-CM

## 2019-10-02 DIAGNOSIS — E11.21 TYPE 2 DIABETES MELLITUS WITH DIABETIC NEPHROPATHY, WITH LONG-TERM CURRENT USE OF INSULIN (HCC): ICD-10-CM

## 2019-10-02 LAB
ANION GAP SERPL CALCULATED.3IONS-SCNC: 8 MMOL/L (ref 5–14)
BUN SERPL-MCNC: 30 MG/DL (ref 5–25)
CALCIUM SERPL-MCNC: 10 MG/DL (ref 8.4–10.2)
CHLORIDE SERPL-SCNC: 98 MMOL/L (ref 97–108)
CO2 SERPL-SCNC: 32 MMOL/L (ref 22–30)
CREAT SERPL-MCNC: 1.85 MG/DL (ref 0.7–1.5)
EST. AVERAGE GLUCOSE BLD GHB EST-MCNC: 197 MG/DL
GFR SERPL CREATININE-BSD FRML MDRD: 42 ML/MIN/1.73SQ M
GLUCOSE SERPL-MCNC: 160 MG/DL (ref 70–99)
HBA1C MFR BLD: 8.5 % (ref 4.2–6.3)
POTASSIUM SERPL-SCNC: 3.7 MMOL/L (ref 3.6–5)
SODIUM SERPL-SCNC: 138 MMOL/L (ref 137–147)

## 2019-10-02 PROCEDURE — 80048 BASIC METABOLIC PNL TOTAL CA: CPT

## 2019-10-02 PROCEDURE — 83036 HEMOGLOBIN GLYCOSYLATED A1C: CPT | Performed by: INTERNAL MEDICINE

## 2019-10-02 PROCEDURE — 82985 ASSAY OF GLYCATED PROTEIN: CPT

## 2019-10-02 PROCEDURE — 36415 COLL VENOUS BLD VENIPUNCTURE: CPT | Performed by: INTERNAL MEDICINE

## 2019-10-03 LAB — FRUCTOSAMINE SERPL-SCNC: 382 UMOL/L (ref 0–285)

## 2019-10-04 ENCOUNTER — TELEPHONE (OUTPATIENT)
Dept: FAMILY MEDICINE CLINIC | Facility: CLINIC | Age: 69
End: 2019-10-04

## 2019-10-07 DIAGNOSIS — E78.5 HYPERLIPIDEMIA, UNSPECIFIED HYPERLIPIDEMIA TYPE: ICD-10-CM

## 2019-10-08 RX ORDER — SIMVASTATIN 40 MG
TABLET ORAL
Qty: 90 TABLET | Refills: 1 | Status: SHIPPED | OUTPATIENT
Start: 2019-10-08 | End: 2020-03-31

## 2019-10-09 ENCOUNTER — TELEPHONE (OUTPATIENT)
Dept: UROLOGY | Facility: MEDICAL CENTER | Age: 69
End: 2019-10-09

## 2019-10-09 DIAGNOSIS — C61 PROSTATE CANCER (HCC): Primary | ICD-10-CM

## 2019-10-09 NOTE — TELEPHONE ENCOUNTER
Spoke to pt  Has history of Cap and rising PSA  Very anxious and wants to move appt up to discuss  Appt 10/30 with Dr Derrick Stewart  New PSA order placed to be done prior to appt

## 2019-10-11 DIAGNOSIS — Z79.4 TYPE 2 DIABETES MELLITUS WITH HYPERGLYCEMIA, WITH LONG-TERM CURRENT USE OF INSULIN (HCC): ICD-10-CM

## 2019-10-11 DIAGNOSIS — E11.65 TYPE 2 DIABETES MELLITUS WITH HYPERGLYCEMIA, WITH LONG-TERM CURRENT USE OF INSULIN (HCC): ICD-10-CM

## 2019-10-15 ENCOUNTER — PATIENT OUTREACH (OUTPATIENT)
Dept: FAMILY MEDICINE CLINIC | Facility: CLINIC | Age: 69
End: 2019-10-15

## 2019-10-15 DIAGNOSIS — G89.29 CHRONIC LOW BACK PAIN: ICD-10-CM

## 2019-10-15 DIAGNOSIS — M54.50 CHRONIC LOW BACK PAIN: ICD-10-CM

## 2019-10-15 RX ORDER — METHOCARBAMOL 500 MG/1
TABLET, FILM COATED ORAL
Qty: 90 TABLET | Refills: 0 | Status: SHIPPED | OUTPATIENT
Start: 2019-10-15 | End: 2021-01-01 | Stop reason: HOSPADM

## 2019-10-15 NOTE — PROGRESS NOTES
Rik Olguin is monitoring his blood sugars three times a day and keeping a log to send to endocrinology  He remains on Toujeo 50 units BID  His fasting blood sugars are in the range of  and before meals 150-240  No hypoglycemic episodes noted  Weight has remained stable at 238  He denies chest pain, sob or LE edema  Nutritional intake adequate, he is actively trying to lose weight  He is ambulatory with cane, no falls reported  He is having issues with blurry vision in his eye  He cancelled diabetic classes and will reschedule when his vision issues have resolved  I reminded him to reschedule PCP appointment for November  Other follow up appointments scheduled  He is taking all medications as prescribed  He denies needing any additional assistance at home  He has my contact information and is agreeable to further outreach

## 2019-10-23 ENCOUNTER — APPOINTMENT (OUTPATIENT)
Dept: LAB | Facility: HOSPITAL | Age: 69
End: 2019-10-23
Payer: COMMERCIAL

## 2019-10-23 ENCOUNTER — TELEPHONE (OUTPATIENT)
Dept: FAMILY MEDICINE CLINIC | Facility: CLINIC | Age: 69
End: 2019-10-23

## 2019-10-23 DIAGNOSIS — C61 PROSTATE CANCER (HCC): ICD-10-CM

## 2019-10-23 LAB — PSA SERPL-MCNC: 7.3 NG/ML (ref 0–4)

## 2019-10-23 PROCEDURE — 36415 COLL VENOUS BLD VENIPUNCTURE: CPT

## 2019-10-23 PROCEDURE — 84153 ASSAY OF PSA TOTAL: CPT

## 2019-10-24 ENCOUNTER — OFFICE VISIT (OUTPATIENT)
Dept: CARDIOLOGY CLINIC | Facility: CLINIC | Age: 69
End: 2019-10-24
Payer: COMMERCIAL

## 2019-10-24 VITALS
BODY MASS INDEX: 38.23 KG/M2 | SYSTOLIC BLOOD PRESSURE: 150 MMHG | WEIGHT: 251.4 LBS | HEART RATE: 65 BPM | DIASTOLIC BLOOD PRESSURE: 70 MMHG

## 2019-10-24 DIAGNOSIS — I10 HTN (HYPERTENSION), BENIGN: ICD-10-CM

## 2019-10-24 DIAGNOSIS — I50.22 CHRONIC SYSTOLIC CHF (CONGESTIVE HEART FAILURE), NYHA CLASS 2 (HCC): Primary | ICD-10-CM

## 2019-10-24 DIAGNOSIS — I42.8 NICM (NONISCHEMIC CARDIOMYOPATHY) (HCC): ICD-10-CM

## 2019-10-24 DIAGNOSIS — Z99.89 OSA ON CPAP: ICD-10-CM

## 2019-10-24 DIAGNOSIS — G47.33 OSA ON CPAP: ICD-10-CM

## 2019-10-24 PROCEDURE — 99214 OFFICE O/P EST MOD 30 MIN: CPT | Performed by: INTERNAL MEDICINE

## 2019-10-24 RX ORDER — ISOSORBIDE DINITRATE 20 MG/1
20 TABLET ORAL 3 TIMES DAILY
Qty: 90 TABLET | Refills: 3 | Status: SHIPPED | OUTPATIENT
Start: 2019-10-24 | End: 2020-01-17

## 2019-10-24 NOTE — PROGRESS NOTES
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 stage 3 chronic kidney disease ([facility] ) 02/14/2019    Primary osteoarthritis of both knees 11/20/2018    Localized, primary osteoarthritis of shoulder region 08/13/2018    Pain in joint of left shoulder 08/13/2018    JOE (obstructive sleep apnea) 05/10/2018    Insomnia 05/10/2018    Erectile dysfunction following radical prostatectomy 04/12/2018    Erectile dysfunction of non-organic origin 10/06/2016    Acute on chronic combined systolic and diastolic CHF (congestive heart failure) ([facility] ) 05/01/2016    Type 2 diabetes mellitus (HCC)     Essential hypertension     Chronic obstructive pulmonary disease (HCC)     Chronic renal insufficiency, stage III (moderate) (HCC)     Prostate cancer ([facility] )     Anxiety and depression     Chronic combined systolic and diastolic CH[facility], NYHA class 3 [facility]     Coronary atherosclerosis 12/19/2014    Peripheral neuropathy 12/19/2014    Obesity with body mass index 30 or greater 10/15/2014    Generalized anxiety disorder 08/12/2014    Hyperlipidemia 08/12/2014     # Chronic Systolic CHF, Stage C  Etiology: NICM  Weight: 249 lbs- this is up from 232 lbs last time I saw him    Echocardiogram 1/2/18  LVEF: 40%, high filling pressures  LVIDd: 6 8 cm  RV: normal  MR: mild  PASP:      Neurohormonal Blockade:  --Beta-Blocker: coreg 25 mg BID  --ACEi, ARB or ARNi: Entresto 97/103 mg BID, isordil 10 mg TID,     (or SVR reduction)  --Aldosterone Receptor Blocker:  --Diuretic: lasix 40 mg BID with metolazone prn    Sudden Cardiac Death Risk Reduction:  --ICD: MDT single Chamber ICD- Generator change 9/6/19  Interrogation: 9/20/19:  < 0 1%    Electrical Resynchronization:  --narrow QRS    Advanced Therapies (If appropriate): --Inotrope:  --LVAD/Transplant Candidacy:    # HTN- managed well  # JOE on CPAP at night  # DM2- HgA1C 8 5%  On 10/2/19  # CKD3- diabetic glumerolar sclerosis and HTN, cr 1 85 on labs 10/2/19  # Hx of prostate CA  # anxiety and depression  # hyperlipidemia- 7/10/18: LDL 45, HDL 22, Tri 294  Rx: simvastatin 40 mg daily  # ED- has penile prosthesis, regrets as he lost feeling    TODAY'S PLAN:  Increase isordil to 20 mg TID  --2g sodium diet  - Daily weights    HPI:   76 yo male following up regarding management of chronic systolic congestive heart failure  He was in the hospital with decompensated systolic congestive heart failure in Dec 2016  An echocardiogram was done in the hospital which demonstrated an ejection fraction of 30%  Initially ACE-I removed secondary to chronic kidney disease stage III with baseline creatinine around 1 8  He has comorbidities including chronic kidney disease stage III, hypertension, type 2 diabetes mellitus, obstructive sleep apnea on C Pap at night, prostate cancer status post surgery, asthma     reports no history of MI  He states he had a defibrillator placed a long time ago he says 10-12 years ago but states he never had ICD generator changed  He is still on Benicar  He had fistula surgery for preparation for dialysis about 3 years ago but never needed dialysis  Previously I increased his Entresto to 97/103 mg  He is taking lasix 40 mg BID  His weight last visit was 254 lbs  Wt today is    His weight is stable, no dyspnea   Has upcoming penile implant in [place]       Interval History:  ICD Generator change 9/6/19- Darren Smoke  No new symptoms  Chicken noodle soup    Review of Systems   Constitutional: Positive for unexpected weight change (weight is up 17 lbs from my last visit)  Negative for activity change, appetite change and fatigue  HENT: Negative for congestion and nosebleeds  Eyes: Negative  Respiratory: Negative for cough, chest tightness and shortness of breath  Cardiovascular: Negative for chest pain, palpitations and leg swelling  Gastrointestinal: Negative for abdominal distention  Endocrine: Negative  Genitourinary: Negative  Musculoskeletal: Negative  Skin: Negative  Neurological: Negative for dizziness, syncope and weakness  Hematological: Negative  Psychiatric/Behavioral: Negative  Past Medical History:   Diagnosis Date    AICD (automatic cardioverter/defibrillator) present     Anxiety and depression     Arthritis     Asthma     pt denies    CAD (coronary artery disease) 10/10/2014    CKD (chronic kidney disease) stage 3, GFR 30-59 ml/min ([facility])     COPD (chronic obstructive pulmonary disease) ([facility])     pt denies    CPAP (continuous positive airway pressure) dependence     Depression (emotion)     affective disorder    Diabetic nephropathy ([facility])     Erectile dysfunction     GERD (gastroesophageal reflux disease)     Hypertension     Iron deficiency anemia     Latent tuberculosis     Lumbar herniated disc     low back pain    Neuropathy     bles    Obesity (BMI 30-39  9) 7/9/2019    Prostate cancer ([facility] [address] )     2013- had radiation- had prostatectomy    Sleep apnea     Systolic CHF, chronic (HCC)     Type 2 diabetes mellitus with hyperlipidemia ([facility])     Use of cane as ambulatory aid     Vitamin D deficiency     Wears glasses        Allergies   Allergen Reactions    Ibuprofen Nausea Only    Penicillins Other (See Comments)     Pt unsure of reaction           Current Outpatient Medications:     aspirin (ADULT ASPIRIN EC LOW STRENGTH) 81 mg EC tablet, Take 81 mg by mouth daily  , Disp: , Rfl:     carvedilol (COREG) 25 mg tablet, Take 1 tablet (25 mg total) by mouth 2 (two) times a day with meals, Disp: 180 tablet, Rfl: 3    cholecalciferol (VITAMIN D3) 1,000 units tablet, Take 1 capsule by mouth daily , Disp: , Rfl:     CINNAMON PO, Take 500 mg by mouth daily, Disp: , Rfl:     clonazePAM (KlonoPIN) 0 5 mg tablet, Take 1 tablet (0 5 mg total) by mouth daily at bedtime, Disp: 30 tablet, Rfl: 0    Coenzyme Q10 (COQ-10) 200 MG CAPS, Take 1 capsule by mouth daily  , Disp: , Rfl:     ENTRESTO  MG TABS, TAKE 1 TABLET BY MOUTH TWICE A DAY, Disp: 60 tablet, Rfl: 4    furosemide (LASIX) 40 mg tablet, Take 1 5 tablets (60 mg total) by mouth 2 (two) times a day (Patient taking differently: Take 40 mg by mouth 2 (two) times a day ), Disp: 270 tablet, Rfl: 3    gabapentin (NEURONTIN) 400 mg capsule, Take 1 capsule (400 mg total) by mouth 4 (four) times a day, Disp: 180 capsule, Rfl: 1    Insulin Glargine (TOUJEO SOLOSTAR) 300 units/mL CONCETRATED U-300 injection pen, INJECT BY SUBCUTANEOUS ROUTE 50 UNITS BID (Patient taking differently: Inject 30 Units under the skin every 12 (twelve) hours INJECT BY SUBCUTANEOUS ROUTE 50 UNITS BID), Disp: 9 pen, Rfl: 5    isosorbide dinitrate (ISORDIL) 10 mg tablet, TAKE 1 TABLET BY MOUTH THREE TIMES A DAY, Disp: 270 tablet, Rfl: 3    methocarbamol (ROBAXIN) 500 mg tablet, TAKE 1 TABLET BY MOUTH THREE TIMES A DAY, Disp: 90 tablet, Rfl: 0    metolazone (ZAROXOLYN) 5 mg tablet, TAKE 1 TABLET BY MOUTH EVERY DAY AS NEEDED WEIGHT GAIN 3 LBS, Disp: 30 tablet, Rfl: 2    Multiple Vitamin (MULTIVITAMIN) capsule, Take 1 capsule by mouth daily  , Disp: , Rfl:     Omega-3 Fatty Acids (FISH OIL) 1200 MG CAPS, Take 1 capsule by mouth daily, Disp: , Rfl:     potassium chloride (KLOR-CON) 20 mEq packet, TAKE 20 MEQ BY MOUTH 2 (TWO) TIMES A DAY, Disp: 150 packet, Rfl: 4    simvastatin (ZOCOR) 40 mg tablet, TAKE 1 TABLET BY MOUTH EVERY DAY IN THE EVENING, Disp: 90 tablet, Rfl: 1    Alcohol Swabs (ALCOHOL PREP) PADS, by Does not apply route 2 (two) times a day, Disp: 200 each, Rfl: 0    flurazepam (DALMANE) 30 MG capsule, Take 1 capsule (30 mg total) by mouth daily at bedtime as needed for sleep (Patient not taking: Reported on 10/24/2019), Disp: 30 capsule, Rfl: 1    glucose blood (ONE TOUCH ULTRA TEST) test strip, 1 each by Other route 3 (three) times a day Test, Disp: 100 each, Rfl: 11    Incontinence Supply Disposable (BRIEF OVERNIGHT LARGE) MISC, by Does not apply route 4 (four) times a day, Disp: 98 each, Rfl: 3    Insulin Pen Needle (BD PEN NEEDLE MARCELINO U/F) 32G X 4 MM MISC, USE TO INJECT INSULIN 3 TIMES DAILY, Disp: 100 each, Rfl: 3    Social History     Socioeconomic History    Marital status: /Civil Union     Spouse name: Not on file    Number of children: Not on file    Years of education: Not on file    Highest education level: Not on file   Occupational History    Occupation: DISABLED   Social Needs    Financial resource strain: Not on file    Food insecurity:     Worry: Not on file     Inability: Not on file    Transportation needs:     Medical: Not on file     Non-medical: Not on file   Tobacco Use    Smoking status: Former Smoker     Packs/day: 3 00     Years: 20 00     Pack years: 60 00     Last attempt to quit:      Years since quittin 8    Smokeless tobacco: Never Used    Tobacco comment: never a smoker as per [employer]   Substance and Sexual Activity    Alcohol use: Yes     Comment: glass wine daily    Drug use: No    Sexual activity: Not on file   Lifestyle    Physical activity:     Days per week: Not on file     Minutes per session: Not on file    Stress: Not on file   Relationships    Social connections:     Talks on phone: Not on file     Gets together: Not on file     Attends Holiness service: Not on file     Active member of club or organization: Not on file     Attends meetings of clubs or organizations: Not on file     Relationship status: Not on file    Intimate partner violence:     Fear of current or ex partner: Not on file     Emotionally abused: Not on file     Physically abused: Not on file     Forced sexual activity: Not on file   Other Topics Concern    Not on file   Social History Narrative    Not on file       Family History   Problem Relation Age of Onset    Dementia Mother        Physical Exam:    Vitals:   Vitals:    10/24/19 1432   BP: 150/70   Pulse: 65     Wt Readings from Last 3 Encounters:   10/24/19 114 kg (251 lb 6 4 oz)   09/11/19 112 kg (248 lb)   08/20/19 110 kg (243 lb)     Physical Exam:    Physical Exam   Constitutional: He is oriented to person, place, and time  He appears well-developed and well-nourished  HENT:   Head: Normocephalic and atraumatic  Eyes: Pupils are equal, round, and reactive to light  EOM are normal    Neck: Normal range of motion  No JVD present  Cardiovascular: Normal rate, regular rhythm and normal heart sounds  No murmur heard  Pulmonary/Chest: Effort normal and breath sounds normal  He has no rales  Abdominal: Soft  Bowel sounds are normal  He exhibits no distension  Musculoskeletal: Normal range of motion  He exhibits no edema  Neurological: He is alert and oriented to person, place, and time  Skin: Skin is warm and dry  He is not diaphoretic  Psychiatric: He has a normal mood and affect         Labs & Results:    Lab Results   Component Value Date    GLUCOSE 238 (H) 04/24/2018    CALCIUM 10 0 10/02/2019     04/24/2018    K 3 7 10/02/2019    CO2 32 (H) 10/02/2019    CL 98 10/02/2019    BUN 30 (H) 10/02/2019    CREATININE 1 85 (H) 10/02/2019     Lab Results   Component Value Date    WBC 4 60 09/03/2019    HGB 12 6 (L) 09/03/2019    HCT 37 3 (L) 09/03/2019    MCV 94 09/03/2019     (L) 09/03/2019     Lab Results   Component Value Date    NTBNP 2,701 (H) 12/20/2016      Lab Results   Component Value Date    CHOL 128 04/24/2018     Lab Results   Component Value Date    HDL 21 (L) 10/10/2018    HDL 22 (L) 07/10/2018    HDL 23 (L) 04/24/2018     Lab Results   Component Value Date    LDLCALC 48 10/10/2018    LDLCALC 45 07/10/2018    LDLCALC 59 04/24/2018     Lab Results   Component Value Date    TRIG 208 (H) 10/10/2018    TRIG 294 (H) 07/10/2018    TRIG 231 (H) 04/24/2018     No results found for: CHOLHDL    EKG personally reviewed by Vidal Shelton, DO  Counseling / Coordination of Care  Total floor / unit time spent today 25 minutes  Greater than 50% of total time was spent with the patient and / or family counseling and / or coordination of care  A description of the counseling / coordination of care: 15      Thank you for the opportunity to participate in the care of this patient    REYNOLD ZARAGOZA [address]

## 2019-10-24 NOTE — PATIENT INSTRUCTIONS
Increase isordil to 20 mg three times daily    Please check daily weights and contact the heart failure program at  if you gain 3 lb in one day or 5 lb in one week  Please limit daily sodium intake to 2000 mg daily  Please limit daily fluid intake to 2000 ml daily  Bring complete list of medications to your follow up appointment

## 2019-10-30 ENCOUNTER — OFFICE VISIT (OUTPATIENT)
Dept: UROLOGY | Facility: MEDICAL CENTER | Age: 69
End: 2019-10-30
Payer: COMMERCIAL

## 2019-10-30 VITALS
DIASTOLIC BLOOD PRESSURE: 56 MMHG | HEIGHT: 68 IN | SYSTOLIC BLOOD PRESSURE: 140 MMHG | BODY MASS INDEX: 36.83 KG/M2 | WEIGHT: 243 LBS | HEART RATE: 64 BPM

## 2019-10-30 DIAGNOSIS — C61 PROSTATE CANCER (HCC): Primary | ICD-10-CM

## 2019-10-30 LAB
SL AMB  POCT GLUCOSE, UA: ABNORMAL
SL AMB LEUKOCYTE ESTERASE,UA: ABNORMAL
SL AMB POCT BILIRUBIN,UA: ABNORMAL
SL AMB POCT BLOOD,UA: ABNORMAL
SL AMB POCT CLARITY,UA: CLEAR
SL AMB POCT COLOR,UA: YELLOW
SL AMB POCT KETONES,UA: ABNORMAL
SL AMB POCT NITRITE,UA: ABNORMAL
SL AMB POCT PH,UA: 5.5
SL AMB POCT SPECIFIC GRAVITY,UA: 1.01
SL AMB POCT URINE PROTEIN: ABNORMAL
SL AMB POCT UROBILINOGEN: 0.2

## 2019-10-30 PROCEDURE — 99214 OFFICE O/P EST MOD 30 MIN: CPT | Performed by: UROLOGY

## 2019-10-30 PROCEDURE — 81003 URINALYSIS AUTO W/O SCOPE: CPT | Performed by: UROLOGY

## 2019-10-30 NOTE — PROGRESS NOTES
Progress Notes          Assessment/Plan:  1  Adenocarcinoma of the prostate   The patient underwent radical prostatectomy through a retropubic approach by Dr Joao Og in June of 2012 with margin positive disease being identified and Andrés pattern score 8 of 10 being noted with bilateral disease   The patient also notes that he had IMRT afterwards      The patient has been followed at [facility] up until now for his prostate cancer   The patient requests that I take over care of his prostate cancer and I agreed  Nga Edward patient does have a slowly rising PSA from 3 79 in February of 2017 to 5 4 in January of 2018 consistent with persistent or recurrent prostate cancer   The patient's PSA then went to 5 99 in March of 2018, 5 0 in July of 2018, up to 6 3 in August of 2018 and   4 9 in October of 2018  The patient is asymptomatic and voiding adequately  Melba Hagen has no bony pain complaints and no spontaneous loss of weight   Continued surveillance is recommended   If PSA continues to rise androgen deprivation therapy will be considered  The PSA dated 05/23/2019 is 6 9 and current PSA is up to 7 3  Repeat PSA in 6 months will take place and the patient will start androgen deprivation therapy if his PSA starts to climb more rapidly or goes over 10     2  Erectile dysfunction secondary to radical prostatectomy   The patient has a penile implant that functions well   The patient notes that his wife gets sexual satisfaction from intercourse however he does not the lack of ejaculation which is secondary to his radical prostatectomy  History of present illness    77year-old male who underwent radical prostatectomy followed by radiation therapy for adenocarcinoma of the prostate with a measurable detectable PSA that had been going up until recently when the last value had come down somewhat   The patient is asymptomatic in regard to voiding denying dysuria frequency urgency incontinence or gross hematuria   He denies bony pain  Allergies medications past medical and surgical history social history and family history unchanged from last visit and were reviewed     Review of Systems   Constitutional: Negative  HENT: Negative  Respiratory: Negative  Cardiovascular: Negative  Gastrointestinal: Negative  Genitourinary: Negative  Musculoskeletal: Positive for arthralgias, gait problem and myalgias  Psychiatric/Behavioral: Negative  Objective  Physical Exam   Constitutional: He is oriented to person, place, and time  He appears well-developed and well-nourished  HENT:   Head: Normocephalic and atraumatic  Eyes: EOM are normal    Neck: Neck supple  Pulmonary/Chest: Effort normal    Abdominal: Soft  Neurological: He is alert and oriented to person, place, and time  Psychiatric: He has a normal mood and affect  His behavior is normal  Judgment and thought content normal    Vitals reviewed

## 2019-11-05 ENCOUNTER — TELEPHONE (OUTPATIENT)
Dept: NEPHROLOGY | Facility: CLINIC | Age: 69
End: 2019-11-05

## 2019-11-08 ENCOUNTER — TELEPHONE (OUTPATIENT)
Dept: FAMILY MEDICINE CLINIC | Facility: CLINIC | Age: 69
End: 2019-11-08

## 2019-11-08 NOTE — TELEPHONE ENCOUNTER
Okay with me to send the script    Patient should return for follow-up visit to discuss other options for sleep

## 2019-11-08 NOTE — TELEPHONE ENCOUNTER
Pharmacy called re    Patient is requesting a script for a Rollator Macie Erickson be faxed to [facility]  [phone]  Second,  [address] called patient has been out of Flurazepam due to being on back order, and they do not know if they will be restocking it at all  He will need another sleep medication provided  Please send to [facility]  Thanks!

## 2019-11-11 DIAGNOSIS — J44.9 CHRONIC OBSTRUCTIVE PULMONARY DISEASE, UNSPECIFIED COPD TYPE (HCC): ICD-10-CM

## 2019-11-11 DIAGNOSIS — M54.50 CHRONIC LOW BACK PAIN, UNSPECIFIED BACK PAIN LATERALITY, UNSPECIFIED WHETHER SCIATICA PRESENT: ICD-10-CM

## 2019-11-11 DIAGNOSIS — G89.29 CHRONIC LOW BACK PAIN, UNSPECIFIED BACK PAIN LATERALITY, UNSPECIFIED WHETHER SCIATICA PRESENT: ICD-10-CM

## 2019-11-11 DIAGNOSIS — I50.9 CONGESTIVE HEART FAILURE, UNSPECIFIED HF CHRONICITY, UNSPECIFIED HEART FAILURE TYPE (HCC): Primary | ICD-10-CM

## 2019-11-14 ENCOUNTER — OFFICE VISIT (OUTPATIENT)
Dept: FAMILY MEDICINE CLINIC | Facility: CLINIC | Age: 69
End: 2019-11-14
Payer: COMMERCIAL

## 2019-11-14 VITALS
HEIGHT: 68 IN | OXYGEN SATURATION: 97 % | HEART RATE: 63 BPM | TEMPERATURE: 97 F | RESPIRATION RATE: 18 BRPM | SYSTOLIC BLOOD PRESSURE: 140 MMHG | BODY MASS INDEX: 37.68 KG/M2 | DIASTOLIC BLOOD PRESSURE: 70 MMHG | WEIGHT: 248.6 LBS

## 2019-11-14 DIAGNOSIS — I50.42 HYPERTENSIVE HEART AND KIDNEY DISEASE WITH CHRONIC COMBINED SYSTOLIC AND DIASTOLIC CONGESTIVE HEART FAILURE AND STAGE 3 CHRONIC KIDNEY DISEASE (HCC): ICD-10-CM

## 2019-11-14 DIAGNOSIS — M17.0 PRIMARY OSTEOARTHRITIS OF BOTH KNEES: ICD-10-CM

## 2019-11-14 DIAGNOSIS — I13.0 HYPERTENSIVE HEART AND KIDNEY DISEASE WITH CHRONIC COMBINED SYSTOLIC AND DIASTOLIC CONGESTIVE HEART FAILURE AND STAGE 3 CHRONIC KIDNEY DISEASE (HCC): ICD-10-CM

## 2019-11-14 DIAGNOSIS — E11.21 TYPE 2 DIABETES MELLITUS WITH DIABETIC NEPHROPATHY, WITH LONG-TERM CURRENT USE OF INSULIN (HCC): ICD-10-CM

## 2019-11-14 DIAGNOSIS — C61 PROSTATE CANCER (HCC): ICD-10-CM

## 2019-11-14 DIAGNOSIS — N18.30 HYPERTENSIVE HEART AND KIDNEY DISEASE WITH CHRONIC COMBINED SYSTOLIC AND DIASTOLIC CONGESTIVE HEART FAILURE AND STAGE 3 CHRONIC KIDNEY DISEASE (HCC): ICD-10-CM

## 2019-11-14 DIAGNOSIS — Z79.4 TYPE 2 DIABETES MELLITUS WITH DIABETIC NEPHROPATHY, WITH LONG-TERM CURRENT USE OF INSULIN (HCC): ICD-10-CM

## 2019-11-14 DIAGNOSIS — G47.00 INSOMNIA, UNSPECIFIED TYPE: Primary | ICD-10-CM

## 2019-11-14 DIAGNOSIS — I74.3 EMBOLISM AND THROMBOSIS OF ARTERIES OF THE LOWER EXTREMITIES (HCC): ICD-10-CM

## 2019-11-14 PROBLEM — L97.519 ULCER OF RIGHT FOOT (HCC): Status: ACTIVE | Noted: 2019-11-14

## 2019-11-14 PROCEDURE — 99214 OFFICE O/P EST MOD 30 MIN: CPT | Performed by: FAMILY MEDICINE

## 2019-11-14 PROCEDURE — 1160F RVW MEDS BY RX/DR IN RCRD: CPT | Performed by: FAMILY MEDICINE

## 2019-11-14 PROCEDURE — 1036F TOBACCO NON-USER: CPT | Performed by: FAMILY MEDICINE

## 2019-11-14 RX ORDER — TRAZODONE HYDROCHLORIDE 50 MG/1
50 TABLET ORAL
Qty: 30 TABLET | Refills: 0 | Status: SHIPPED | OUTPATIENT
Start: 2019-11-14 | End: 2019-12-11 | Stop reason: SDUPTHER

## 2019-11-14 NOTE — PROGRESS NOTES
Assessment/Plan:    43-year-old male with: type 2 diabetes, hypertension, CKD 3, prostate cancer, history of lower extremity thrombosis and ulcers related to peripheral vascular disease and insomnia  Discussed workup and treatment options at length with risks and benefits  Will continue current medications and discussed follow-up closely with rest of his specialist   Will begin trial of trazodone but discuss the patient should slowly wean down his dose of temazepam to ensure that he is not having side effects  Discuss that he should not stop abruptly  Discussed his recent blood work and additional supportive care return parameters and will reassess in 3-4 months  No problem-specific Assessment & Plan notes found for this encounter  Diagnoses and all orders for this visit:    Insomnia, unspecified type  -     traZODone (DESYREL) 50 mg tablet; Take 1 tablet (50 mg total) by mouth daily at bedtime    Ulcer of right foot, unspecified ulcer stage (HCC)    Embolism and thrombosis of arteries of the lower extremities (HCC)    Type 2 diabetes mellitus with diabetic nephropathy, with long-term current use of insulin (HCC)    Prostate cancer (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 stage 3 chronic kidney disease (HCC)          Subjective:     Chief Complaint   Patient presents with    Follow-up        Patient ID: Branden Blount Sr  is a 77 y o  male  Patient is a 43-year-old male who presents for follow-up on type 2 diabetes, hypertension, CKD 3, prostate cancer, history of lower extremity thrombosis and ulcers related to peripheral vascular disease and insomnia  Patient admits that his biggest concern is his insomnia which is not improving  He admits that he is unable to get the flurazepam that he was previously prescribed and the other medications prescribed have not been effective  No fevers chills nausea vomiting    No chest pain or shortness of breath  No dizziness lightheadedness or near-syncope tolerating p o  intake  Patient continues to follow with Cardiology, pulmonology, Nephrology and Endocrinology  No other complaints at this time      The following portions of the patient's history were reviewed and updated as appropriate: allergies, current medications, past family history, past medical history, past social history, past surgical history and problem list     Review of Systems   Constitutional: Negative  HENT: Negative  Eyes: Negative  Respiratory: Negative  Cardiovascular: Negative  Gastrointestinal: Negative  Endocrine: Negative  Genitourinary: Negative  Musculoskeletal: Negative  Allergic/Immunologic: Negative  Neurological: Negative  Hematological: Negative  Psychiatric/Behavioral: Negative  All other systems reviewed and are negative  Objective:      /70   Pulse 63   Temp (!) 97 °F (36 1 °C)   Resp 18   Ht 5' 8" (1 727 m)   Wt 113 kg (248 lb 9 6 oz)   SpO2 97%   BMI 37 80 kg/m²          Physical Exam   Constitutional: He is oriented to person, place, and time  He appears well-developed and well-nourished  HENT:   Head: Atraumatic  Right Ear: External ear normal    Left Ear: External ear normal    Eyes: Pupils are equal, round, and reactive to light  Conjunctivae and EOM are normal    Neck: Normal range of motion  Cardiovascular: Normal rate, regular rhythm and normal heart sounds  Pulmonary/Chest: Effort normal and breath sounds normal  No respiratory distress  Abdominal: Soft  Bowel sounds are normal  He exhibits no distension  There is no tenderness  There is no rebound and no guarding  Musculoskeletal: Normal range of motion  Neurological: He is alert and oriented to person, place, and time  No cranial nerve deficit  Skin: Skin is warm and dry  Psychiatric: He has a normal mood and affect   His behavior is normal  Judgment and thought content normal

## 2019-11-15 ENCOUNTER — PATIENT OUTREACH (OUTPATIENT)
Dept: FAMILY MEDICINE CLINIC | Facility: CLINIC | Age: 69
End: 2019-11-15

## 2019-11-15 ENCOUNTER — TELEPHONE (OUTPATIENT)
Dept: FAMILY MEDICINE CLINIC | Facility: CLINIC | Age: 69
End: 2019-11-15

## 2019-11-15 NOTE — PROGRESS NOTES
Received call from patient  He contacted [facility] and will receive his walker on Tuesday  He asked about diabetic socks and they do not supply them  He called [address] [place] [address] and they carry the socks  He is requesting script be sent and it needs to include the level of compression  Will contact office to have script sent

## 2019-11-15 NOTE — PROGRESS NOTES
Darcy Merino is managing well at home and denies needing additional assistance  He has issues with insomnia and is currently taking temazeapam which is not effective  He will start tapering off temazepam and then will start trazadone  Reviewed side effects to watch for and reminded him to not abruptly stop medication  He verbalized understanding  Weight has remained consistent  He denies chest pain, sob or LE edema  Nutritional intake adequate  Glucometer checks performed four times daily  Blood sugar ranges: fasting , before lunch , before dinner 140-270, bedtime 212-270  He denies hypoglycemic episodes  He has not received walker, script sent to [facility] last week  Instructed him to contact [facility] and let me know if there are issues  He is taking all medications as prescribed  Follow up appointments scheduled  Will continue to follow

## 2019-11-18 ENCOUNTER — PATIENT OUTREACH (OUTPATIENT)
Dept: FAMILY MEDICINE CLINIC | Facility: CLINIC | Age: 69
End: 2019-11-18

## 2019-11-18 NOTE — PROGRESS NOTES
Received voicemail message from patient  Returned call  Rik Olguin was concerned that his legs were more swollen today after waking up  He did not show a weight gain this morning and he denies chest pain, sob or abdominal fullness  Encouraged him to elevate his legs and avoid salty foods  Instructed him to monitor the edema and call if he see it's worsening or he is having other symptoms  Informed him the script for diabetic socks was approved by PCP and will be faxed to [facility]

## 2019-11-19 DIAGNOSIS — M54.50 CHRONIC LOW BACK PAIN: ICD-10-CM

## 2019-11-19 DIAGNOSIS — G89.29 CHRONIC LOW BACK PAIN: ICD-10-CM

## 2019-11-19 RX ORDER — GABAPENTIN 400 MG/1
400 CAPSULE ORAL 4 TIMES DAILY
Qty: 180 CAPSULE | Refills: 1 | Status: SHIPPED | OUTPATIENT
Start: 2019-11-19 | End: 2020-02-11

## 2019-11-20 DIAGNOSIS — I87.2 VENOUS INSUFFICIENCY: ICD-10-CM

## 2019-11-20 DIAGNOSIS — E11.21 TYPE 2 DIABETES MELLITUS WITH DIABETIC NEPHROPATHY, WITH LONG-TERM CURRENT USE OF INSULIN (HCC): Primary | ICD-10-CM

## 2019-11-20 DIAGNOSIS — Z79.4 TYPE 2 DIABETES MELLITUS WITH DIABETIC NEPHROPATHY, WITH LONG-TERM CURRENT USE OF INSULIN (HCC): Primary | ICD-10-CM

## 2019-11-22 DIAGNOSIS — I50.42 CHRONIC COMBINED SYSTOLIC AND DIASTOLIC CONGESTIVE HEART FAILURE (HCC): ICD-10-CM

## 2019-11-22 RX ORDER — CARVEDILOL 25 MG/1
TABLET ORAL
Qty: 180 TABLET | Refills: 3 | Status: SHIPPED | OUTPATIENT
Start: 2019-11-22 | End: 2020-01-01

## 2019-11-23 DIAGNOSIS — I50.22 CHRONIC SYSTOLIC CHF (CONGESTIVE HEART FAILURE), NYHA CLASS 2 (HCC): ICD-10-CM

## 2019-11-25 RX ORDER — FUROSEMIDE 40 MG/1
TABLET ORAL
Qty: 270 TABLET | Refills: 3 | Status: SHIPPED | OUTPATIENT
Start: 2019-11-25 | End: 2020-01-01

## 2019-12-02 ENCOUNTER — TELEPHONE (OUTPATIENT)
Dept: NEPHROLOGY | Facility: CLINIC | Age: 69
End: 2019-12-02

## 2019-12-02 NOTE — TELEPHONE ENCOUNTER
Maryjane Mcelroy called in wanting to know if you would like for him to continue taking Calcitrol 0 5mg daily  I did not see this medication on his med list  I do see per office visit in May, he was not taking Calcitriol at that time  Please advise - thanks!

## 2019-12-03 LAB
LEFT EYE DIABETIC RETINOPATHY: NORMAL
RIGHT EYE DIABETIC RETINOPATHY: NORMAL

## 2019-12-03 PROCEDURE — 2022F DILAT RTA XM EVC RTNOPTHY: CPT | Performed by: FAMILY MEDICINE

## 2019-12-03 NOTE — TELEPHONE ENCOUNTER
Please call and let him know not to take it for now  We will repeat blood work prior to his next visit and then decide whether he needs it

## 2019-12-04 ENCOUNTER — TRANSCRIBE ORDERS (OUTPATIENT)
Dept: ADMINISTRATIVE | Facility: HOSPITAL | Age: 69
End: 2019-12-04

## 2019-12-04 ENCOUNTER — OFFICE VISIT (OUTPATIENT)
Dept: FAMILY MEDICINE CLINIC | Facility: CLINIC | Age: 69
End: 2019-12-04
Payer: COMMERCIAL

## 2019-12-04 ENCOUNTER — TELEPHONE (OUTPATIENT)
Dept: NEPHROLOGY | Facility: CLINIC | Age: 69
End: 2019-12-04

## 2019-12-04 ENCOUNTER — APPOINTMENT (OUTPATIENT)
Dept: LAB | Facility: HOSPITAL | Age: 69
End: 2019-12-04
Attending: OPHTHALMOLOGY
Payer: COMMERCIAL

## 2019-12-04 VITALS
TEMPERATURE: 98.4 F | WEIGHT: 244 LBS | BODY MASS INDEX: 36.98 KG/M2 | SYSTOLIC BLOOD PRESSURE: 142 MMHG | HEIGHT: 68 IN | DIASTOLIC BLOOD PRESSURE: 78 MMHG

## 2019-12-04 DIAGNOSIS — E11.3551 STABLE PROLIFERATIVE DIABETIC RETINOPATHY OF RIGHT EYE ASSOCIATED WITH TYPE 2 DIABETES MELLITUS (HCC): ICD-10-CM

## 2019-12-04 DIAGNOSIS — Z79.4 TYPE 2 DIABETES MELLITUS WITH STABLE PROLIFERATIVE RETINOPATHY OF RIGHT EYE, WITH LONG-TERM CURRENT USE OF INSULIN (HCC): Primary | ICD-10-CM

## 2019-12-04 DIAGNOSIS — Z01.818 PREOPERATIVE CLEARANCE: ICD-10-CM

## 2019-12-04 DIAGNOSIS — E11.3551 TYPE 2 DIABETES MELLITUS WITH STABLE PROLIFERATIVE RETINOPATHY OF RIGHT EYE, WITH LONG-TERM CURRENT USE OF INSULIN (HCC): Primary | ICD-10-CM

## 2019-12-04 DIAGNOSIS — E13.69 OTHER SPECIFIED DIABETES MELLITUS WITH OTHER SPECIFIED COMPLICATION, UNSPECIFIED WHETHER LONG TERM INSULIN USE (HCC): Primary | ICD-10-CM

## 2019-12-04 DIAGNOSIS — E13.69 OTHER SPECIFIED DIABETES MELLITUS WITH OTHER SPECIFIED COMPLICATION, UNSPECIFIED WHETHER LONG TERM INSULIN USE (HCC): ICD-10-CM

## 2019-12-04 LAB
ALBUMIN SERPL BCP-MCNC: 4.2 G/DL (ref 3–5.2)
ANION GAP SERPL CALCULATED.3IONS-SCNC: 8 MMOL/L (ref 5–14)
ANION GAP SERPL CALCULATED.3IONS-SCNC: 9 MMOL/L (ref 5–14)
BUN SERPL-MCNC: 21 MG/DL (ref 5–25)
BUN SERPL-MCNC: 21 MG/DL (ref 5–25)
CALCIUM SERPL-MCNC: 9.4 MG/DL (ref 8.4–10.2)
CALCIUM SERPL-MCNC: 9.4 MG/DL (ref 8.4–10.2)
CHLORIDE SERPL-SCNC: 102 MMOL/L (ref 97–108)
CHLORIDE SERPL-SCNC: 102 MMOL/L (ref 97–108)
CO2 SERPL-SCNC: 30 MMOL/L (ref 22–30)
CO2 SERPL-SCNC: 31 MMOL/L (ref 22–30)
CREAT SERPL-MCNC: 1.54 MG/DL (ref 0.7–1.5)
CREAT SERPL-MCNC: 1.55 MG/DL (ref 0.7–1.5)
GFR SERPL CREATININE-BSD FRML MDRD: 52 ML/MIN/1.73SQ M
GFR SERPL CREATININE-BSD FRML MDRD: 53 ML/MIN/1.73SQ M
GLUCOSE P FAST SERPL-MCNC: 83 MG/DL (ref 70–99)
GLUCOSE P FAST SERPL-MCNC: 84 MG/DL (ref 70–99)
MAGNESIUM SERPL-MCNC: 2.3 MG/DL (ref 1.6–2.3)
PHOSPHATE SERPL-MCNC: 3.2 MG/DL (ref 2.5–4.8)
POTASSIUM SERPL-SCNC: 3.7 MMOL/L (ref 3.6–5)
POTASSIUM SERPL-SCNC: 3.8 MMOL/L (ref 3.6–5)
SODIUM SERPL-SCNC: 141 MMOL/L (ref 137–147)
SODIUM SERPL-SCNC: 141 MMOL/L (ref 137–147)

## 2019-12-04 PROCEDURE — 36415 COLL VENOUS BLD VENIPUNCTURE: CPT

## 2019-12-04 PROCEDURE — 83735 ASSAY OF MAGNESIUM: CPT

## 2019-12-04 PROCEDURE — 80069 RENAL FUNCTION PANEL: CPT

## 2019-12-04 PROCEDURE — 99213 OFFICE O/P EST LOW 20 MIN: CPT | Performed by: FAMILY MEDICINE

## 2019-12-04 PROCEDURE — 1160F RVW MEDS BY RX/DR IN RCRD: CPT | Performed by: FAMILY MEDICINE

## 2019-12-04 PROCEDURE — 3008F BODY MASS INDEX DOCD: CPT | Performed by: FAMILY MEDICINE

## 2019-12-04 NOTE — TELEPHONE ENCOUNTER
----- Message from Arielle Sanchez MD sent at 12/4/2019 10:34 AM EST -----  Please let the patient know that most recent lab work results are stable  Will discuss further at the upcoming visit, let me know if they have any questions or concerns      Thanks

## 2019-12-05 PROBLEM — Z01.818 PREOPERATIVE CLEARANCE: Status: ACTIVE | Noted: 2019-12-05

## 2019-12-05 PROBLEM — E11.3599 PROLIFERATIVE DIABETIC RETINOPATHY ASSOCIATED WITH TYPE 2 DIABETES MELLITUS (HCC): Status: ACTIVE | Noted: 2019-12-05

## 2019-12-05 PROBLEM — E11.3551 STABLE PROLIFERATIVE DIABETIC RETINOPATHY OF RIGHT EYE ASSOCIATED WITH TYPE 2 DIABETES MELLITUS (HCC): Status: ACTIVE | Noted: 2019-12-05

## 2019-12-05 NOTE — PROGRESS NOTES
Assessment/Plan:    81-year-old male with:  Type 2 diabetes with diabetic retinopathy being planned for upcoming eye procedure on the right side  Procedures low risk and discuss the risks and benefits and patient may proceed to the OR with acceptable risk and encouraged close follow-up with his cardiologist as well  Discussed supportive care return parameters otherwise  No problem-specific Assessment & Plan notes found for this encounter  Diagnoses and all orders for this visit:    Type 2 diabetes mellitus with stable proliferative retinopathy of right eye, with long-term current use of insulin ([facility])    Stable proliferative diabetic retinopathy of right eye associated with type 2 diabetes mellitus ([address] )    Preoperative clearance          Subjective:     Chief Complaint   Patient presents with    Pre-op Exam     with EKG    Abnormal ECG        Patient ID: Denisha Rich Sr  is a 77 y o  male  Patient is a 81-year-old male who presents for preop clearance for upcoming eye procedure  Patient has diabetic retinopathy is being planned for laser procedure in his right eye with some conscious sedation  No fevers chills nausea vomiting  Tolerating p o  intake  No chest pain shortness of breath  Patient admits good functional capacity no other complaints at this time      The following portions of the patient's history were reviewed and updated as appropriate: allergies, current medications, past family history, past medical history, past social history, past surgical history and problem list     Review of Systems   Constitutional: Negative  HENT: Negative  Eyes: Negative  Respiratory: Negative  Cardiovascular: Negative  Gastrointestinal: Negative  Endocrine: Negative  Genitourinary: Negative  Musculoskeletal: Negative  Allergic/Immunologic: Negative  Neurological: Negative  Hematological: Negative  Psychiatric/Behavioral: Negative      All other systems reviewed and are negative  Objective:      /78 (BP Location: Left arm, Patient Position: Sitting, Cuff Size: Large)   Temp 98 4 °F (36 9 °C) (Tympanic)   Ht 5' 8" (1 727 m)   Wt 111 kg (244 lb)   BMI 37 10 kg/m²          Physical Exam   Constitutional: He is oriented to person, place, and time  He appears well-developed and well-nourished  HENT:   Head: Atraumatic  Right Ear: External ear normal    Left Ear: External ear normal    Eyes: Pupils are equal, round, and reactive to light  Conjunctivae and EOM are normal    Neck: Normal range of motion  Cardiovascular: Normal rate, regular rhythm and normal heart sounds  Pulmonary/Chest: Effort normal and breath sounds normal  No respiratory distress  Abdominal: Soft  He exhibits no distension  There is no tenderness  There is no rebound and no guarding  Musculoskeletal: Normal range of motion  Neurological: He is alert and oriented to person, place, and time  No cranial nerve deficit  Skin: Skin is warm and dry  Psychiatric: He has a normal mood and affect   His behavior is normal  Judgment and thought content normal

## 2019-12-06 ENCOUNTER — PATIENT OUTREACH (OUTPATIENT)
Dept: FAMILY MEDICINE CLINIC | Facility: CLINIC | Age: 69
End: 2019-12-06

## 2019-12-06 NOTE — PROGRESS NOTES
Maryjane Mcelroy had laser procedure to R eye yesterday and is currently wearing patch  He denies pain or discomfort and has f/u appointment today with eye surgeon  He reports edema to LE has diminished  Insurance denied covering compression stockings  He is currently wearing diabetic socks and looking into affordable options for compression stockings  He does elevate LE when able  Weight today 234 which is down  He is actively trying to lose weight  He denies chest pain, sob or LE edema  Nutritional intake adequate  He performs glucometer checks four times a day  Blood sugars remain elevated ( in the 200's) after lunch and at bedtime  He is keeping log and sending to endocrinology  Next endo appointment 12/30  He denies hypoglycemic episodes  He is taking all medications as prescribed  Follow up appointments scheduled  He denies needing additional assistance at home  He is agreeable to further outreach

## 2019-12-11 DIAGNOSIS — G47.00 INSOMNIA, UNSPECIFIED TYPE: ICD-10-CM

## 2019-12-11 RX ORDER — TRAZODONE HYDROCHLORIDE 50 MG/1
TABLET ORAL
Qty: 30 TABLET | Refills: 0 | Status: SHIPPED | OUTPATIENT
Start: 2019-12-11 | End: 2020-01-03

## 2019-12-16 DIAGNOSIS — E11.3551 TYPE 2 DIABETES MELLITUS WITH STABLE PROLIFERATIVE RETINOPATHY OF RIGHT EYE, WITH LONG-TERM CURRENT USE OF INSULIN (HCC): Primary | ICD-10-CM

## 2019-12-16 DIAGNOSIS — I87.2 VENOUS INSUFFICIENCY: ICD-10-CM

## 2019-12-16 DIAGNOSIS — Z79.4 TYPE 2 DIABETES MELLITUS WITH STABLE PROLIFERATIVE RETINOPATHY OF RIGHT EYE, WITH LONG-TERM CURRENT USE OF INSULIN (HCC): Primary | ICD-10-CM

## 2019-12-16 DIAGNOSIS — E11.3551 STABLE PROLIFERATIVE DIABETIC RETINOPATHY OF RIGHT EYE ASSOCIATED WITH TYPE 2 DIABETES MELLITUS (HCC): ICD-10-CM

## 2019-12-16 DIAGNOSIS — I74.3 EMBOLISM AND THROMBOSIS OF ARTERIES OF THE LOWER EXTREMITIES (HCC): ICD-10-CM

## 2019-12-17 DIAGNOSIS — I50.42 CHRONIC COMBINED SYSTOLIC AND DIASTOLIC HEART FAILURE, NYHA CLASS 3 (HCC): ICD-10-CM

## 2019-12-17 RX ORDER — SACUBITRIL AND VALSARTAN 97; 103 MG/1; MG/1
TABLET, FILM COATED ORAL
Qty: 60 TABLET | Refills: 4 | Status: SHIPPED | OUTPATIENT
Start: 2019-12-17 | End: 2020-05-17

## 2019-12-30 ENCOUNTER — PATIENT OUTREACH (OUTPATIENT)
Dept: FAMILY MEDICINE CLINIC | Facility: CLINIC | Age: 69
End: 2019-12-30

## 2019-12-30 NOTE — PROGRESS NOTES
Rose Tidwell is managing well at home and denies needing additional assistance at this time  Weight has been stable and he denies chest pain, sob  He does at times have LE edema  He plans to purchase compression stockings  He does elevate when able  Nutritional intake adequate, he is watching sodium intake and following a diabetic diet  He performs glucometer checks four times per day  Blood sugars are below 200 at fasting and post prandial   His bedtime blood sugars are usually over 200, no hypoglycemic episodes noted  He is currently taking toujeo 50 units in am and 25 units in pm   He has f/u appointment with deb in January  Other f/u appointments scheduled  He is taking all medications as prescribed  Informed him he will now be followed by [facility]  He has my contact information for any issues or concerns  Will close to case management

## 2020-01-01 ENCOUNTER — PATIENT OUTREACH (OUTPATIENT)
Dept: FAMILY MEDICINE CLINIC | Facility: CLINIC | Age: 70
End: 2020-01-01

## 2020-01-01 ENCOUNTER — TRANSCRIBE ORDERS (OUTPATIENT)
Dept: LAB | Facility: HOSPITAL | Age: 70
End: 2020-01-01

## 2020-01-01 ENCOUNTER — OFFICE VISIT (OUTPATIENT)
Dept: NEPHROLOGY | Facility: CLINIC | Age: 70
End: 2020-01-01
Payer: COMMERCIAL

## 2020-01-01 ENCOUNTER — TELEPHONE (OUTPATIENT)
Dept: FAMILY MEDICINE CLINIC | Facility: CLINIC | Age: 70
End: 2020-01-01

## 2020-01-01 ENCOUNTER — REMOTE DEVICE CLINIC VISIT (OUTPATIENT)
Dept: CARDIOLOGY CLINIC | Facility: CLINIC | Age: 70
End: 2020-01-01
Payer: COMMERCIAL

## 2020-01-01 ENCOUNTER — APPOINTMENT (OUTPATIENT)
Dept: LAB | Facility: HOSPITAL | Age: 70
End: 2020-01-01
Payer: COMMERCIAL

## 2020-01-01 ENCOUNTER — TELEPHONE (OUTPATIENT)
Dept: NEPHROLOGY | Facility: CLINIC | Age: 70
End: 2020-01-01

## 2020-01-01 ENCOUNTER — OFFICE VISIT (OUTPATIENT)
Dept: FAMILY MEDICINE CLINIC | Facility: CLINIC | Age: 70
End: 2020-01-01
Payer: COMMERCIAL

## 2020-01-01 ENCOUNTER — HOSPITAL ENCOUNTER (OUTPATIENT)
Dept: RADIOLOGY | Facility: HOSPITAL | Age: 70
Discharge: HOME | End: 2020-06-17
Payer: COMMERCIAL

## 2020-01-01 ENCOUNTER — APPOINTMENT (OUTPATIENT)
Dept: LAB | Facility: HOSPITAL | Age: 70
End: 2020-01-01
Attending: INTERNAL MEDICINE
Payer: COMMERCIAL

## 2020-01-01 ENCOUNTER — IN-CLINIC DEVICE VISIT (OUTPATIENT)
Dept: CARDIOLOGY CLINIC | Facility: CLINIC | Age: 70
End: 2020-01-01
Payer: COMMERCIAL

## 2020-01-01 ENCOUNTER — TELEPHONE (OUTPATIENT)
Dept: DERMATOLOGY | Facility: CLINIC | Age: 70
End: 2020-01-01

## 2020-01-01 ENCOUNTER — TELEPHONE (OUTPATIENT)
Dept: CARDIOLOGY CLINIC | Facility: CLINIC | Age: 70
End: 2020-01-01

## 2020-01-01 VITALS
SYSTOLIC BLOOD PRESSURE: 150 MMHG | WEIGHT: 256 LBS | BODY MASS INDEX: 38.8 KG/M2 | HEIGHT: 68 IN | HEART RATE: 84 BPM | TEMPERATURE: 98.4 F | DIASTOLIC BLOOD PRESSURE: 82 MMHG

## 2020-01-01 VITALS
BODY MASS INDEX: 38.19 KG/M2 | WEIGHT: 252 LBS | DIASTOLIC BLOOD PRESSURE: 70 MMHG | HEART RATE: 74 BPM | SYSTOLIC BLOOD PRESSURE: 118 MMHG | TEMPERATURE: 98.3 F | OXYGEN SATURATION: 96 % | HEIGHT: 68 IN

## 2020-01-01 VITALS
SYSTOLIC BLOOD PRESSURE: 122 MMHG | BODY MASS INDEX: 38.34 KG/M2 | HEIGHT: 68 IN | DIASTOLIC BLOOD PRESSURE: 64 MMHG | HEART RATE: 70 BPM | TEMPERATURE: 97.6 F | WEIGHT: 253 LBS

## 2020-01-01 VITALS
BODY MASS INDEX: 38.49 KG/M2 | HEART RATE: 96 BPM | HEIGHT: 68 IN | OXYGEN SATURATION: 88 % | SYSTOLIC BLOOD PRESSURE: 142 MMHG | TEMPERATURE: 97.4 F | WEIGHT: 254 LBS | DIASTOLIC BLOOD PRESSURE: 70 MMHG

## 2020-01-01 VITALS
TEMPERATURE: 97.4 F | HEIGHT: 68 IN | BODY MASS INDEX: 39.71 KG/M2 | SYSTOLIC BLOOD PRESSURE: 140 MMHG | DIASTOLIC BLOOD PRESSURE: 74 MMHG | WEIGHT: 262 LBS

## 2020-01-01 VITALS
OXYGEN SATURATION: 98 % | HEIGHT: 68 IN | DIASTOLIC BLOOD PRESSURE: 80 MMHG | SYSTOLIC BLOOD PRESSURE: 130 MMHG | BODY MASS INDEX: 38.95 KG/M2 | TEMPERATURE: 97.2 F | HEART RATE: 72 BPM | WEIGHT: 257 LBS

## 2020-01-01 DIAGNOSIS — E11.69 TYPE 2 DIABETES MELLITUS WITH OTHER SPECIFIED COMPLICATION, UNSPECIFIED WHETHER LONG TERM INSULIN USE (HCC): ICD-10-CM

## 2020-01-01 DIAGNOSIS — Z00.00 MEDICARE ANNUAL WELLNESS VISIT, SUBSEQUENT: ICD-10-CM

## 2020-01-01 DIAGNOSIS — E87.6 HYPOKALEMIA: ICD-10-CM

## 2020-01-01 DIAGNOSIS — I50.42 CHRONIC COMBINED SYSTOLIC AND DIASTOLIC CHF, NYHA CLASS 3 (HCC): ICD-10-CM

## 2020-01-01 DIAGNOSIS — E66.9 OBESITY (BMI 30-39.9): ICD-10-CM

## 2020-01-01 DIAGNOSIS — G47.00 INSOMNIA, UNSPECIFIED TYPE: ICD-10-CM

## 2020-01-01 DIAGNOSIS — R06.02 SOB (SHORTNESS OF BREATH): ICD-10-CM

## 2020-01-01 DIAGNOSIS — E11.8 TYPE 2 DIABETES MELLITUS WITH COMPLICATION, WITH LONG-TERM CURRENT USE OF INSULIN (HCC): ICD-10-CM

## 2020-01-01 DIAGNOSIS — J30.1 ALLERGIC RHINITIS DUE TO POLLEN, UNSPECIFIED SEASONALITY: ICD-10-CM

## 2020-01-01 DIAGNOSIS — R10.13 EPIGASTRIC PAIN: ICD-10-CM

## 2020-01-01 DIAGNOSIS — I50.43 ACUTE ON CHRONIC COMBINED SYSTOLIC AND DIASTOLIC CHF (CONGESTIVE HEART FAILURE) (HCC): ICD-10-CM

## 2020-01-01 DIAGNOSIS — I50.22 CHRONIC SYSTOLIC CHF (CONGESTIVE HEART FAILURE), NYHA CLASS 2 (HCC): ICD-10-CM

## 2020-01-01 DIAGNOSIS — E11.22 TYPE 2 DIABETES MELLITUS WITH ESRD (END-STAGE RENAL DISEASE) (HCC): ICD-10-CM

## 2020-01-01 DIAGNOSIS — N18.6 TYPE 2 DIABETES MELLITUS WITH ESRD (END-STAGE RENAL DISEASE) (HCC): ICD-10-CM

## 2020-01-01 DIAGNOSIS — C61 PROSTATE CANCER (HCC): ICD-10-CM

## 2020-01-01 DIAGNOSIS — E87.6 HYPOKALEMIA: Primary | ICD-10-CM

## 2020-01-01 DIAGNOSIS — N18.6 TYPE 2 DIABETES MELLITUS WITH ESRD (END-STAGE RENAL DISEASE) (HCC): Primary | ICD-10-CM

## 2020-01-01 DIAGNOSIS — N17.9 AKI (ACUTE KIDNEY INJURY) (HCC): Primary | ICD-10-CM

## 2020-01-01 DIAGNOSIS — I50.42 CHRONIC COMBINED SYSTOLIC AND DIASTOLIC CONGESTIVE HEART FAILURE (HCC): ICD-10-CM

## 2020-01-01 DIAGNOSIS — N18.32 CKD STAGE G3B/A3, GFR 30-44 AND ALBUMIN CREATININE RATIO >300 MG/G (HCC): ICD-10-CM

## 2020-01-01 DIAGNOSIS — Z79.4 TYPE 2 DIABETES MELLITUS WITH COMPLICATION, WITH LONG-TERM CURRENT USE OF INSULIN (HCC): ICD-10-CM

## 2020-01-01 DIAGNOSIS — Z79.4 ENCOUNTER FOR LONG-TERM (CURRENT) USE OF INSULIN (HCC): ICD-10-CM

## 2020-01-01 DIAGNOSIS — Z23 ENCOUNTER FOR IMMUNIZATION: ICD-10-CM

## 2020-01-01 DIAGNOSIS — Z79.4 TYPE 2 DIABETES MELLITUS WITHOUT COMPLICATION, WITH LONG-TERM CURRENT USE OF INSULIN (HCC): ICD-10-CM

## 2020-01-01 DIAGNOSIS — E78.5 HYPERLIPIDEMIA, UNSPECIFIED HYPERLIPIDEMIA TYPE: ICD-10-CM

## 2020-01-01 DIAGNOSIS — E66.9 OBESITY WITH BODY MASS INDEX 30 OR GREATER: ICD-10-CM

## 2020-01-01 DIAGNOSIS — E11.22 TYPE 2 DIABETES MELLITUS WITH ESRD (END-STAGE RENAL DISEASE) (HCC): Primary | ICD-10-CM

## 2020-01-01 DIAGNOSIS — I10 ESSENTIAL HYPERTENSION: ICD-10-CM

## 2020-01-01 DIAGNOSIS — E11.9 TYPE 2 DIABETES MELLITUS WITHOUT COMPLICATION, WITH LONG-TERM CURRENT USE OF INSULIN (HCC): ICD-10-CM

## 2020-01-01 DIAGNOSIS — E78.00 PURE HYPERCHOLESTEROLEMIA: Chronic | ICD-10-CM

## 2020-01-01 DIAGNOSIS — Z79.4 TYPE 2 DIABETES MELLITUS WITH STABLE PROLIFERATIVE RETINOPATHY OF RIGHT EYE, WITH LONG-TERM CURRENT USE OF INSULIN (HCC): ICD-10-CM

## 2020-01-01 DIAGNOSIS — E11.8 TYPE 2 DIABETES MELLITUS WITH COMPLICATION, WITH LONG-TERM CURRENT USE OF INSULIN (HCC): Primary | ICD-10-CM

## 2020-01-01 DIAGNOSIS — L30.9 DERMATITIS: Primary | ICD-10-CM

## 2020-01-01 DIAGNOSIS — E55.9 VITAMIN D DEFICIENCY: ICD-10-CM

## 2020-01-01 DIAGNOSIS — E66.01 OBESITY, MORBID (HCC): ICD-10-CM

## 2020-01-01 DIAGNOSIS — Z12.12 SCREENING FOR COLORECTAL CANCER: ICD-10-CM

## 2020-01-01 DIAGNOSIS — N18.32 CKD STAGE G3B/A3, GFR 30-44 AND ALBUMIN CREATININE RATIO >300 MG/G (HCC): Primary | ICD-10-CM

## 2020-01-01 DIAGNOSIS — M54.50 CHRONIC LOW BACK PAIN: ICD-10-CM

## 2020-01-01 DIAGNOSIS — Z95.810 PRESENCE OF AUTOMATIC CARDIOVERTER/DEFIBRILLATOR (AICD): Primary | ICD-10-CM

## 2020-01-01 DIAGNOSIS — D69.6 PLATELETS DECREASED (HCC): ICD-10-CM

## 2020-01-01 DIAGNOSIS — J44.1 CHRONIC OBSTRUCTIVE PULMONARY DISEASE WITH ACUTE EXACERBATION (HCC): ICD-10-CM

## 2020-01-01 DIAGNOSIS — I50.42 CHRONIC COMBINED SYSTOLIC AND DIASTOLIC HEART FAILURE, NYHA CLASS 3 (HCC): ICD-10-CM

## 2020-01-01 DIAGNOSIS — G89.29 CHRONIC LOW BACK PAIN: ICD-10-CM

## 2020-01-01 DIAGNOSIS — Z95.810 AICD (AUTOMATIC CARDIOVERTER/DEFIBRILLATOR) PRESENT: Primary | ICD-10-CM

## 2020-01-01 DIAGNOSIS — N25.81 SECONDARY HYPERPARATHYROIDISM OF RENAL ORIGIN (HCC): ICD-10-CM

## 2020-01-01 DIAGNOSIS — Z12.11 SCREENING FOR COLORECTAL CANCER: ICD-10-CM

## 2020-01-01 DIAGNOSIS — Z79.4 TYPE 2 DIABETES MELLITUS WITH COMPLICATION, WITH LONG-TERM CURRENT USE OF INSULIN (HCC): Primary | ICD-10-CM

## 2020-01-01 DIAGNOSIS — N18.30 CHRONIC KIDNEY DISEASE, STAGE III (MODERATE) (HCC): ICD-10-CM

## 2020-01-01 DIAGNOSIS — E11.3551 TYPE 2 DIABETES MELLITUS WITH STABLE PROLIFERATIVE RETINOPATHY OF RIGHT EYE, WITH LONG-TERM CURRENT USE OF INSULIN (HCC): ICD-10-CM

## 2020-01-01 DIAGNOSIS — N17.9 AKI (ACUTE KIDNEY INJURY) (HCC): ICD-10-CM

## 2020-01-01 DIAGNOSIS — Z12.11 COLON CANCER SCREENING: Primary | ICD-10-CM

## 2020-01-01 DIAGNOSIS — G63 POLYNEUROPATHY ASSOCIATED WITH UNDERLYING DISEASE (HCC): ICD-10-CM

## 2020-01-01 LAB
25(OH)D3 SERPL-MCNC: 34.9 NG/ML (ref 30–100)
25(OH)D3 SERPL-MCNC: 41.2 NG/ML (ref 30–100)
25(OH)D3 SERPL-MCNC: 54 NG/ML (ref 30–100)
ALBUMIN SERPL BCP-MCNC: 4 G/DL (ref 3–5.2)
ALBUMIN SERPL BCP-MCNC: 4.2 G/DL (ref 3–5.2)
ALP SERPL-CCNC: 141 U/L (ref 43–122)
ALP SERPL-CCNC: 96 U/L (ref 43–122)
ALT SERPL W P-5'-P-CCNC: 17 U/L (ref 9–52)
ALT SERPL W P-5'-P-CCNC: 23 U/L (ref 9–52)
ANION GAP SERPL CALCULATED.3IONS-SCNC: 11 MMOL/L (ref 5–14)
ANION GAP SERPL CALCULATED.3IONS-SCNC: 13 MMOL/L (ref 5–14)
ANION GAP SERPL CALCULATED.3IONS-SCNC: 7 MMOL/L (ref 5–14)
ANION GAP SERPL CALCULATED.3IONS-SCNC: 7 MMOL/L (ref 5–14)
AST SERPL W P-5'-P-CCNC: 23 U/L (ref 17–59)
AST SERPL W P-5'-P-CCNC: 26 U/L (ref 17–59)
BILIRUB SERPL-MCNC: 0.5 MG/DL
BILIRUB SERPL-MCNC: 0.9 MG/DL
BUN SERPL-MCNC: 20 MG/DL (ref 5–25)
BUN SERPL-MCNC: 30 MG/DL (ref 5–25)
BUN SERPL-MCNC: 37 MG/DL (ref 5–25)
BUN SERPL-MCNC: 49 MG/DL (ref 5–25)
CALCIUM SERPL-MCNC: 9 MG/DL (ref 8.4–10.2)
CALCIUM SERPL-MCNC: 9.2 MG/DL (ref 8.4–10.2)
CALCIUM SERPL-MCNC: 9.4 MG/DL (ref 8.4–10.2)
CALCIUM SERPL-MCNC: 9.7 MG/DL (ref 8.4–10.2)
CHLORIDE SERPL-SCNC: 105 MMOL/L (ref 97–108)
CHLORIDE SERPL-SCNC: 85 MMOL/L (ref 97–108)
CHLORIDE SERPL-SCNC: 88 MMOL/L (ref 97–108)
CHLORIDE SERPL-SCNC: 98 MMOL/L (ref 97–108)
CHOLEST SERPL-MCNC: 126 MG/DL
CO2 SERPL-SCNC: 25 MMOL/L (ref 22–30)
CO2 SERPL-SCNC: 27 MMOL/L (ref 22–30)
CO2 SERPL-SCNC: 28 MMOL/L (ref 22–30)
CO2 SERPL-SCNC: 29 MMOL/L (ref 22–30)
CREAT SERPL-MCNC: 1.65 MG/DL (ref 0.7–1.5)
CREAT SERPL-MCNC: 1.87 MG/DL (ref 0.7–1.5)
CREAT SERPL-MCNC: 1.97 MG/DL (ref 0.7–1.5)
CREAT SERPL-MCNC: 2.38 MG/DL (ref 0.7–1.5)
CREAT UR-MCNC: 56.3 MG/DL
CRP SERPL QL: 28.3 MG/L
EOSINOPHIL # BLD AUTO: 0.21 THOUSAND/UL (ref 0–0.4)
EOSINOPHIL NFR BLD MANUAL: 4 % (ref 0–6)
ERYTHROCYTE [DISTWIDTH] IN BLOOD BY AUTOMATED COUNT: 15.6 %
EST. AVERAGE GLUCOSE BLD GHB EST-MCNC: 206 MG/DL
GFR SERPL CREATININE-BSD FRML MDRD: 31 ML/MIN/1.73SQ M
GFR SERPL CREATININE-BSD FRML MDRD: 39 ML/MIN/1.73SQ M
GFR SERPL CREATININE-BSD FRML MDRD: 41 ML/MIN/1.73SQ M
GFR SERPL CREATININE-BSD FRML MDRD: 48 ML/MIN/1.73SQ M
GLUCOSE P FAST SERPL-MCNC: 116 MG/DL (ref 70–99)
GLUCOSE P FAST SERPL-MCNC: 160 MG/DL (ref 70–99)
GLUCOSE P FAST SERPL-MCNC: 164 MG/DL (ref 70–99)
GLUCOSE P FAST SERPL-MCNC: 186 MG/DL (ref 70–99)
HBA1C MFR BLD: 8.8 %
HCT VFR BLD AUTO: 38.1 % (ref 41–53)
HDLC SERPL-MCNC: 19 MG/DL
HGB BLD-MCNC: 12.9 G/DL (ref 13.5–17.5)
LDLC SERPL CALC-MCNC: 49 MG/DL
LYMPHOCYTES # BLD AUTO: 1.2 THOUSAND/UL (ref 0.5–4)
LYMPHOCYTES # BLD AUTO: 23 % (ref 25–45)
MAGNESIUM SERPL-MCNC: 2 MG/DL (ref 1.6–2.3)
MAGNESIUM SERPL-MCNC: 2.3 MG/DL (ref 1.6–2.3)
MCH RBC QN AUTO: 30.5 PG (ref 26–34)
MCHC RBC AUTO-ENTMCNC: 33.9 G/DL (ref 31–36)
MCV RBC AUTO: 90 FL (ref 80–100)
MONOCYTES # BLD AUTO: 0.1 THOUSAND/UL (ref 0.2–0.9)
MONOCYTES NFR BLD AUTO: 2 % (ref 1–10)
NEUTS SEG # BLD: 3.69 THOUSAND/UL (ref 1.8–7.8)
NEUTS SEG NFR BLD AUTO: 71 %
NONHDLC SERPL-MCNC: 107 MG/DL
NT-PROBNP SERPL-MCNC: 1840 PG/ML (ref 0–299)
PHOSPHATE SERPL-MCNC: 3.6 MG/DL (ref 2.5–4.8)
PHOSPHATE SERPL-MCNC: 4 MG/DL (ref 2.5–4.8)
PHOSPHATE SERPL-MCNC: 4.7 MG/DL (ref 2.5–4.8)
PLATELET # BLD AUTO: 130 THOUSANDS/UL (ref 150–450)
PLATELET BLD QL SMEAR: ABNORMAL
PMV BLD AUTO: 10 FL (ref 8.9–12.7)
POTASSIUM SERPL-SCNC: 3.3 MMOL/L (ref 3.6–5)
POTASSIUM SERPL-SCNC: 3.5 MMOL/L (ref 3.6–5)
POTASSIUM SERPL-SCNC: 4.1 MMOL/L (ref 3.6–5)
POTASSIUM SERPL-SCNC: 4.2 MMOL/L (ref 3.6–5)
PROT SERPL-MCNC: 7.4 G/DL (ref 5.9–8.4)
PROT SERPL-MCNC: 7.7 G/DL (ref 5.9–8.4)
PROT UR-MCNC: 44 MG/DL
PROT/CREAT UR: 0.78 MG/G{CREAT} (ref 0–0.1)
PSA SERPL-MCNC: 7.4 NG/ML (ref 0–4)
PSA SERPL-MCNC: 7.4 NG/ML (ref 0–4)
PTH-INTACT SERPL-MCNC: 123.4 PG/ML (ref 16.7–78.9)
PTH-INTACT SERPL-MCNC: 84.6 PG/ML (ref 16.7–78.9)
PTH-INTACT SERPL-MCNC: 92.9 PG/ML (ref 16.7–78.9)
RBC # BLD AUTO: 4.23 MILLION/UL (ref 4.5–5.9)
RBC MORPH BLD: NORMAL
SL AMB POCT GLUCOSE BLD: 358
SL AMB POCT HEMOGLOBIN AIC: 7.7 (ref ?–6.5)
SL AMB POCT HEMOGLOBIN AIC: 8 (ref ?–6.5)
SODIUM SERPL-SCNC: 127 MMOL/L (ref 137–147)
SODIUM SERPL-SCNC: 127 MMOL/L (ref 137–147)
SODIUM SERPL-SCNC: 132 MMOL/L (ref 137–147)
SODIUM SERPL-SCNC: 137 MMOL/L (ref 137–147)
TOTAL CELLS COUNTED SPEC: 100
TRIGL SERPL-MCNC: 288 MG/DL
TSH SERPL DL<=0.05 MIU/L-ACNC: 0.95 UIU/ML (ref 0.47–4.68)
TSH SERPL DL<=0.05 MIU/L-ACNC: 1.87 UIU/ML (ref 0.47–4.68)
WBC # BLD AUTO: 5.2 THOUSAND/UL (ref 4.5–11)

## 2020-01-01 PROCEDURE — 83735 ASSAY OF MAGNESIUM: CPT

## 2020-01-01 PROCEDURE — 3077F SYST BP >= 140 MM HG: CPT | Performed by: FAMILY MEDICINE

## 2020-01-01 PROCEDURE — 3066F NEPHROPATHY DOC TX: CPT | Performed by: FAMILY MEDICINE

## 2020-01-01 PROCEDURE — 3074F SYST BP LT 130 MM HG: CPT | Performed by: INTERNAL MEDICINE

## 2020-01-01 PROCEDURE — 3008F BODY MASS INDEX DOCD: CPT | Performed by: INTERNAL MEDICINE

## 2020-01-01 PROCEDURE — 3052F HG A1C>EQUAL 8.0%<EQUAL 9.0%: CPT | Performed by: INTERNAL MEDICINE

## 2020-01-01 PROCEDURE — 1160F RVW MEDS BY RX/DR IN RCRD: CPT | Performed by: INTERNAL MEDICINE

## 2020-01-01 PROCEDURE — 93282 PRGRMG EVAL IMPLANTABLE DFB: CPT | Performed by: INTERNAL MEDICINE

## 2020-01-01 PROCEDURE — 80061 LIPID PANEL: CPT

## 2020-01-01 PROCEDURE — 82306 VITAMIN D 25 HYDROXY: CPT

## 2020-01-01 PROCEDURE — 84153 ASSAY OF PSA TOTAL: CPT

## 2020-01-01 PROCEDURE — 1036F TOBACCO NON-USER: CPT | Performed by: FAMILY MEDICINE

## 2020-01-01 PROCEDURE — 83036 HEMOGLOBIN GLYCOSYLATED A1C: CPT

## 2020-01-01 PROCEDURE — 84443 ASSAY THYROID STIM HORMONE: CPT

## 2020-01-01 PROCEDURE — 3078F DIAST BP <80 MM HG: CPT | Performed by: INTERNAL MEDICINE

## 2020-01-01 PROCEDURE — 3052F HG A1C>EQUAL 8.0%<EQUAL 9.0%: CPT | Performed by: FAMILY MEDICINE

## 2020-01-01 PROCEDURE — 3060F POS MICROALBUMINURIA REV: CPT | Performed by: FAMILY MEDICINE

## 2020-01-01 PROCEDURE — 3066F NEPHROPATHY DOC TX: CPT | Performed by: INTERNAL MEDICINE

## 2020-01-01 PROCEDURE — G0439 PPPS, SUBSEQ VISIT: HCPCS | Performed by: FAMILY MEDICINE

## 2020-01-01 PROCEDURE — 99214 OFFICE O/P EST MOD 30 MIN: CPT | Performed by: FAMILY MEDICINE

## 2020-01-01 PROCEDURE — 80069 RENAL FUNCTION PANEL: CPT

## 2020-01-01 PROCEDURE — 84156 ASSAY OF PROTEIN URINE: CPT

## 2020-01-01 PROCEDURE — 80053 COMPREHEN METABOLIC PANEL: CPT

## 2020-01-01 PROCEDURE — 93296 REM INTERROG EVL PM/IDS: CPT | Performed by: INTERNAL MEDICINE

## 2020-01-01 PROCEDURE — 3051F HG A1C>EQUAL 7.0%<8.0%: CPT | Performed by: FAMILY MEDICINE

## 2020-01-01 PROCEDURE — 82570 ASSAY OF URINE CREATININE: CPT

## 2020-01-01 PROCEDURE — 86140 C-REACTIVE PROTEIN: CPT

## 2020-01-01 PROCEDURE — 3008F BODY MASS INDEX DOCD: CPT | Performed by: FAMILY MEDICINE

## 2020-01-01 PROCEDURE — 3078F DIAST BP <80 MM HG: CPT | Performed by: FAMILY MEDICINE

## 2020-01-01 PROCEDURE — 1036F TOBACCO NON-USER: CPT | Performed by: INTERNAL MEDICINE

## 2020-01-01 PROCEDURE — 83970 ASSAY OF PARATHORMONE: CPT

## 2020-01-01 PROCEDURE — 71046 X-RAY EXAM CHEST 2 VIEWS: CPT

## 2020-01-01 PROCEDURE — 36415 COLL VENOUS BLD VENIPUNCTURE: CPT

## 2020-01-01 PROCEDURE — 99214 OFFICE O/P EST MOD 30 MIN: CPT | Performed by: INTERNAL MEDICINE

## 2020-01-01 PROCEDURE — 93295 DEV INTERROG REMOTE 1/2/MLT: CPT | Performed by: INTERNAL MEDICINE

## 2020-01-01 PROCEDURE — 1160F RVW MEDS BY RX/DR IN RCRD: CPT | Performed by: FAMILY MEDICINE

## 2020-01-01 PROCEDURE — 3725F SCREEN DEPRESSION PERFORMED: CPT | Performed by: FAMILY MEDICINE

## 2020-01-01 PROCEDURE — 85027 COMPLETE CBC AUTOMATED: CPT

## 2020-01-01 PROCEDURE — 84100 ASSAY OF PHOSPHORUS: CPT

## 2020-01-01 PROCEDURE — 82948 REAGENT STRIP/BLOOD GLUCOSE: CPT | Performed by: FAMILY MEDICINE

## 2020-01-01 PROCEDURE — 3060F POS MICROALBUMINURIA REV: CPT | Performed by: INTERNAL MEDICINE

## 2020-01-01 PROCEDURE — 83036 HEMOGLOBIN GLYCOSYLATED A1C: CPT | Performed by: FAMILY MEDICINE

## 2020-01-01 PROCEDURE — 1170F FXNL STATUS ASSESSED: CPT | Performed by: FAMILY MEDICINE

## 2020-01-01 PROCEDURE — 1125F AMNT PAIN NOTED PAIN PRSNT: CPT | Performed by: FAMILY MEDICINE

## 2020-01-01 PROCEDURE — 85007 BL SMEAR W/DIFF WBC COUNT: CPT

## 2020-01-01 PROCEDURE — 83880 ASSAY OF NATRIURETIC PEPTIDE: CPT

## 2020-01-01 RX ORDER — CALCITRIOL 0.25 UG/1
0.25 CAPSULE, LIQUID FILLED ORAL 3 TIMES WEEKLY
Qty: 36 CAPSULE | Refills: 3 | Status: SHIPPED | OUTPATIENT
Start: 2020-01-01 | End: 2021-01-01

## 2020-01-01 RX ORDER — TRAZODONE HYDROCHLORIDE 50 MG/1
TABLET ORAL
Qty: 30 TABLET | Refills: 0 | Status: SHIPPED | OUTPATIENT
Start: 2020-01-01 | End: 2020-01-01

## 2020-01-01 RX ORDER — INSULIN ASPART 100 [IU]/ML
15 INJECTION, SOLUTION INTRAVENOUS; SUBCUTANEOUS
COMMUNITY
Start: 2020-01-01 | End: 2021-01-01 | Stop reason: HOSPADM

## 2020-01-01 RX ORDER — PANTOPRAZOLE SODIUM 40 MG/1
TABLET, DELAYED RELEASE ORAL
Qty: 30 TABLET | Refills: 0 | Status: SHIPPED | OUTPATIENT
Start: 2020-01-01 | End: 2020-01-01

## 2020-01-01 RX ORDER — SACUBITRIL AND VALSARTAN 97; 103 MG/1; MG/1
TABLET, FILM COATED ORAL
Qty: 60 TABLET | Refills: 5 | Status: SHIPPED | OUTPATIENT
Start: 2020-01-01 | End: 2021-01-01 | Stop reason: SDUPTHER

## 2020-01-01 RX ORDER — CARVEDILOL 25 MG/1
TABLET ORAL
Qty: 180 TABLET | Refills: 3 | Status: SHIPPED | OUTPATIENT
Start: 2020-01-01 | End: 2021-01-01 | Stop reason: HOSPADM

## 2020-01-01 RX ORDER — GABAPENTIN 400 MG/1
400 CAPSULE ORAL 4 TIMES DAILY
Qty: 360 CAPSULE | Refills: 0 | Status: SHIPPED | OUTPATIENT
Start: 2020-01-01 | End: 2021-01-01

## 2020-01-01 RX ORDER — TRIAMCINOLONE ACETONIDE 1 MG/G
CREAM TOPICAL 2 TIMES DAILY
Qty: 30 G | Refills: 0 | Status: SHIPPED | OUTPATIENT
Start: 2020-01-01 | End: 2021-01-01

## 2020-01-01 RX ORDER — FLUTICASONE PROPIONATE 50 MCG
SPRAY, SUSPENSION (ML) NASAL
Qty: 16 ML | Refills: 0 | Status: SHIPPED | OUTPATIENT
Start: 2020-01-01 | End: 2020-01-01 | Stop reason: SDUPTHER

## 2020-01-01 RX ORDER — FLUTICASONE PROPIONATE 50 MCG
SPRAY, SUSPENSION (ML) NASAL
Qty: 16 ML | Refills: 0 | Status: SHIPPED | OUTPATIENT
Start: 2020-01-01 | End: 2020-01-01

## 2020-01-01 RX ORDER — FLUTICASONE PROPIONATE 50 MCG
SPRAY, SUSPENSION (ML) NASAL
Qty: 48 ML | Refills: 1 | Status: SHIPPED | OUTPATIENT
Start: 2020-01-01 | End: 2021-01-01

## 2020-01-01 RX ORDER — METOLAZONE 5 MG/1
5 TABLET ORAL DAILY PRN
Qty: 90 TABLET | Refills: 1 | Status: SHIPPED | OUTPATIENT
Start: 2020-01-01 | End: 2021-01-01 | Stop reason: HOSPADM

## 2020-01-01 RX ORDER — FLUTICASONE PROPIONATE 50 MCG
2 SPRAY, SUSPENSION (ML) NASAL DAILY
Qty: 16 ML | Refills: 3 | Status: SHIPPED | OUTPATIENT
Start: 2020-01-01 | End: 2020-01-01

## 2020-01-01 RX ORDER — PANTOPRAZOLE SODIUM 40 MG/1
TABLET, DELAYED RELEASE ORAL
Qty: 30 TABLET | Refills: 0 | Status: SHIPPED | OUTPATIENT
Start: 2020-01-01 | End: 2021-01-01

## 2020-01-01 RX ORDER — FUROSEMIDE 40 MG/1
TABLET ORAL
Qty: 270 TABLET | Refills: 3 | Status: SHIPPED | OUTPATIENT
Start: 2020-01-01 | End: 2021-01-01 | Stop reason: HOSPADM

## 2020-01-01 RX ORDER — SIMVASTATIN 40 MG
TABLET ORAL
Qty: 90 TABLET | Refills: 1 | Status: SHIPPED | OUTPATIENT
Start: 2020-01-01 | End: 2021-01-01

## 2020-01-01 RX ORDER — POTASSIUM CHLORIDE 1.5 G/1.77G
20 POWDER, FOR SOLUTION ORAL 2 TIMES DAILY
Qty: 150 PACKET | Refills: 4 | Status: SHIPPED | OUTPATIENT
Start: 2020-01-01 | End: 2021-01-01 | Stop reason: HOSPADM

## 2020-01-01 RX ORDER — METOLAZONE 5 MG/1
5 TABLET ORAL DAILY PRN
Qty: 90 TABLET | Refills: 1 | Status: SHIPPED | OUTPATIENT
Start: 2020-01-01 | End: 2020-01-01

## 2020-01-01 RX ORDER — TRAZODONE HYDROCHLORIDE 50 MG/1
TABLET ORAL
Qty: 30 TABLET | Refills: 0 | Status: SHIPPED | OUTPATIENT
Start: 2020-01-01 | End: 2021-01-01

## 2020-01-03 DIAGNOSIS — G47.00 INSOMNIA, UNSPECIFIED TYPE: ICD-10-CM

## 2020-01-03 RX ORDER — TRAZODONE HYDROCHLORIDE 50 MG/1
TABLET ORAL
Qty: 30 TABLET | Refills: 0 | Status: SHIPPED | OUTPATIENT
Start: 2020-01-03 | End: 2020-01-16

## 2020-01-07 ENCOUNTER — REMOTE DEVICE CLINIC VISIT (OUTPATIENT)
Dept: CARDIOLOGY CLINIC | Facility: CLINIC | Age: 70
End: 2020-01-07
Payer: COMMERCIAL

## 2020-01-07 DIAGNOSIS — Z95.810 PRESENCE OF AUTOMATIC CARDIOVERTER/DEFIBRILLATOR (AICD): Primary | ICD-10-CM

## 2020-01-07 PROCEDURE — 93296 REM INTERROG EVL PM/IDS: CPT | Performed by: INTERNAL MEDICINE

## 2020-01-07 PROCEDURE — 93297 REM INTERROG DEV EVAL ICPMS: CPT | Performed by: INTERNAL MEDICINE

## 2020-01-07 PROCEDURE — 93295 DEV INTERROG REMOTE 1/2/MLT: CPT | Performed by: INTERNAL MEDICINE

## 2020-01-07 NOTE — PROGRESS NOTES
Results for orders placed or performed in visit on 01/07/20   Cardiac EP device report    Narrative    MDT-SINGLE CHAMBER ICD  CARELINK TRANSMISSION: BATTERY VOLTAGE ADEQUATE  (11 2 YRS)  <1%  ALL AVAILABLE LEAD PARAMETERS WITHIN NORMAL LIMITS  NO SIGNIFICANT HIGH RATE EPISODES  OPTI-VOL FLUID THRESHOLD IS ELEVATED HOWEVER WNL  NORMAL DEVICE FUNCTION  ---LANE

## 2020-01-14 ENCOUNTER — APPOINTMENT (OUTPATIENT)
Dept: LAB | Facility: HOSPITAL | Age: 70
End: 2020-01-14
Attending: INTERNAL MEDICINE
Payer: COMMERCIAL

## 2020-01-14 ENCOUNTER — TRANSCRIBE ORDERS (OUTPATIENT)
Dept: ADMINISTRATIVE | Facility: HOSPITAL | Age: 70
End: 2020-01-14

## 2020-01-14 DIAGNOSIS — E66.9 LIFELONG OBESITY: ICD-10-CM

## 2020-01-14 DIAGNOSIS — N18.30 CHRONIC KIDNEY DISEASE, STAGE III (MODERATE) (HCC): ICD-10-CM

## 2020-01-14 DIAGNOSIS — N18.30 CHRONIC KIDNEY DISEASE, STAGE III (MODERATE) (HCC): Primary | ICD-10-CM

## 2020-01-14 LAB
ALBUMIN SERPL BCP-MCNC: 3.9 G/DL (ref 3–5.2)
ANION GAP SERPL CALCULATED.3IONS-SCNC: 8 MMOL/L (ref 5–14)
BUN SERPL-MCNC: 24 MG/DL (ref 5–25)
CALCIUM SERPL-MCNC: 9 MG/DL (ref 8.4–10.2)
CHLORIDE SERPL-SCNC: 100 MMOL/L (ref 97–108)
CO2 SERPL-SCNC: 31 MMOL/L (ref 22–30)
CREAT SERPL-MCNC: 1.71 MG/DL (ref 0.7–1.5)
CREAT UR-MCNC: 87.5 MG/DL
GFR SERPL CREATININE-BSD FRML MDRD: 47 ML/MIN/1.73SQ M
GLUCOSE P FAST SERPL-MCNC: 75 MG/DL (ref 70–99)
MAGNESIUM SERPL-MCNC: 2.3 MG/DL (ref 1.6–2.3)
MICROALBUMIN UR-MCNC: 690 MG/L (ref 0–20)
MICROALBUMIN/CREAT 24H UR: 789 MG/G CREATININE (ref 0–30)
PHOSPHATE SERPL-MCNC: 3.8 MG/DL (ref 2.5–4.8)
POTASSIUM SERPL-SCNC: 3.7 MMOL/L (ref 3.6–5)
SODIUM SERPL-SCNC: 139 MMOL/L (ref 137–147)

## 2020-01-14 PROCEDURE — 82043 UR ALBUMIN QUANTITATIVE: CPT

## 2020-01-14 PROCEDURE — 80069 RENAL FUNCTION PANEL: CPT

## 2020-01-14 PROCEDURE — 36415 COLL VENOUS BLD VENIPUNCTURE: CPT

## 2020-01-14 PROCEDURE — 3066F NEPHROPATHY DOC TX: CPT | Performed by: FAMILY MEDICINE

## 2020-01-14 PROCEDURE — 82570 ASSAY OF URINE CREATININE: CPT

## 2020-01-14 PROCEDURE — 83735 ASSAY OF MAGNESIUM: CPT

## 2020-01-16 ENCOUNTER — OFFICE VISIT (OUTPATIENT)
Dept: NEPHROLOGY | Facility: CLINIC | Age: 70
End: 2020-01-16
Payer: COMMERCIAL

## 2020-01-16 VITALS
SYSTOLIC BLOOD PRESSURE: 144 MMHG | BODY MASS INDEX: 37.13 KG/M2 | DIASTOLIC BLOOD PRESSURE: 76 MMHG | HEART RATE: 66 BPM | WEIGHT: 245 LBS | HEIGHT: 68 IN

## 2020-01-16 DIAGNOSIS — C61 PROSTATE CANCER (HCC): ICD-10-CM

## 2020-01-16 DIAGNOSIS — E78.2 MIXED HYPERLIPIDEMIA: Chronic | ICD-10-CM

## 2020-01-16 DIAGNOSIS — E66.9 OBESITY WITH BODY MASS INDEX 30 OR GREATER: ICD-10-CM

## 2020-01-16 DIAGNOSIS — Z79.4 TYPE 2 DIABETES MELLITUS WITH STABLE PROLIFERATIVE RETINOPATHY OF RIGHT EYE, WITH LONG-TERM CURRENT USE OF INSULIN (HCC): ICD-10-CM

## 2020-01-16 DIAGNOSIS — E11.3551 TYPE 2 DIABETES MELLITUS WITH STABLE PROLIFERATIVE RETINOPATHY OF RIGHT EYE, WITH LONG-TERM CURRENT USE OF INSULIN (HCC): ICD-10-CM

## 2020-01-16 DIAGNOSIS — Z95.810 ICD (IMPLANTABLE CARDIOVERTER-DEFIBRILLATOR), DUAL, IN SITU: ICD-10-CM

## 2020-01-16 DIAGNOSIS — N18.32 CKD STAGE G3B/A3, GFR 30-44 AND ALBUMIN CREATININE RATIO >300 MG/G (HCC): Primary | ICD-10-CM

## 2020-01-16 PROBLEM — E55.9 VITAMIN D DEFICIENCY: Status: ACTIVE | Noted: 2020-01-16

## 2020-01-16 PROCEDURE — 1160F RVW MEDS BY RX/DR IN RCRD: CPT | Performed by: INTERNAL MEDICINE

## 2020-01-16 PROCEDURE — 99214 OFFICE O/P EST MOD 30 MIN: CPT | Performed by: INTERNAL MEDICINE

## 2020-01-16 RX ORDER — TEMAZEPAM 30 MG/1
30 CAPSULE ORAL
COMMUNITY
End: 2021-01-01 | Stop reason: HOSPADM

## 2020-01-16 NOTE — PROGRESS NOTES
Nephrology Follow up Consultation  Eliseo Quintanilla  77 y o  male MRN: [id number]            BACKGROUND:  Eliseo Quintanilla  is a 77 y o male who was referred by Sofi Long MD for evaluation of Follow-up and Chronic Kidney Disease    ASSESSMENT / PLAN:   77 y o   male with pmh of multiple co-morbidities including hypertension (x15yrs), diabetes (x25yrs, no DR), obesity, CHF systolic dysfunction, CAD status post AICD/pacemaker, obstructive sleep apnea on CPAP, prostate cancer status post surgical removal  and CKD stage IIIB presented to the office for routine follow-up  1  CKD stage III BA3:  -Patient has a baseline creatinine of 1 8-1 9mg/dL  Most recent labs show a Creatinine of 1 71mg/dL  Renal function remains stable at baseline  - Likely has underlying CKD secondary to diabetic nodularo glomerular sclerosis plus hypertensive nephrosclerosis plus cardiorenal syndrome plus age-related nephron loss  - SPEP negative  - renal ultrasound from October 16, 2018 shows right kidney 10 7 cm left kidney 11 cm, few simple cysts on the right kidney  - Proteinuria -microalbumin to creatinine ratio of 789 mg  - Acid base and lytes stable  - Clinically the patient appears to be euvolemic  - Recommend to avoid use of NSAIDs, nephrotoxins  Caution advised with regards to exposure to IV contrast dye    - Patient has nonfunctional AV fistula in place in the left arm that as per him was placed many years ago due to concern for needing dialysis by his previous nephrologist   - Discussed with the patient in depth his renal status, including the possible etiologies for CKD  - Advised the patient that when his GFR is close to 20mL/min then will start discussing about RRT(renal replacement therapy) options such as renal transplant, peritoneal dialysis and hemodialysis  - Informed the patient about the various options for Renal Replacement therapy    - Discussed with the patient how we need to work together to delay the progression of CKD with optimal BP control based on their age and co-morbidities, optimal BS control with HbA1c of <7% and trying to reduce proteinuria by the use of anti-proteinuric agents  2  Hypertension:  - Patient is on Coreg 25 mg p o  B i d ,entresto 97/103mg p o  Q day, Lasix 60 mg p o  B i d , Isordil 20 mg p o  T i d , metolazone 5 mg p o  P r n (1 time per week), K-Dur 20 meq p o  Q 12,  - Goal BP of < 140/90 based on his comorbidities  - Instructed to follow low sodium (2gm)diet   - Advised to hold ACEI/ARBs if patient suffers from dehydration due to gastrointestinal losses due to risk of GAGE secondary to failure to autoregulate  - if patient's blood pressure remains elevated may consider stopping potassium supplementation and starting patient on Aldactone as it may help  3  Hemoglobin:  - Goal Hb of 10-12 g/dL  - Most recent labs suggestive of 12 6gm/dL  - No role for iron supplementation at this time  - check CBC prior to next visit    4  CKD-MBD(Mineral Bone Disease): - Based on patients CKD stage following is the goal of therapy  - Maintain calcium phosphorus product of < 55   - Stage 3 CKD - Goal Ca 8 5-10 mg/dL , goal Phos 2 7-4 6 mg/dL  , goal iPTH 30-70 pg/mL  -  patient on 1000 units po Q24  - currently patient is at goal  - most recent vitamin-D level of 42 5, intact PTH level of 46 1 improved, no recent results  - check intact PTH vitamin-D at next visit    5  Lipids:  - On fish oil, Zocor  - Goal LDL less than 70  - Management as per PCP    6  Nutrition/obesity:  - Encouraged patient to follow a renal diet comprising of moderate potassium, low phosphorus and protein restriction to 0 8gm/kg  7  DM:  - Advised patient of tight glycemic control to obtain A1c closer to 7%  - This is needed to help decrease progression of CKD    - Also on Neurontin for neuropathy   - Dose of Neurontin may need to be adjusted as renal function declines over the time  - on toujeo only  - A1c of 8 5% in October 2019  - positive fluid referred of retinopathy on eye exam on 12/03/2019    8  Prostate cancer:  - Status post prostatectomy  - last seen by Urology on 06/17/2019 and not starting any therapy at this time, has upcoming appt    9  Followup:  - Patient is to follow-up in 4 months, with lab work to be performed a few days prior to the visit  Advised patient to call me in 10 days to review the results if they do not hear back from me, as I may have not received the results  Yaniv Posadas MD, FASN, 1/16/2020, 9:48 AM             SUBJECTIVE: 77 y o  male seen in the office for routine follow-up  Feels well, has no complains  Home SBP is about 120-135, -220  Occasional high depending on what he eats  No hospitalizations  No nsaid use  Happy with all the care information that he has received today  No issues with edema anymore  No recent illnesses  Weight is been stable, as compression stockings in place      Review of Systems   Constitutional: Negative for appetite change, chills, fatigue and fever  HENT: Negative for congestion and sore throat  Respiratory: Negative for cough, shortness of breath and wheezing  Cardiovascular: Negative for chest pain and leg swelling  Gastrointestinal: Negative for abdominal pain, constipation, diarrhea, nausea and vomiting  Genitourinary: Negative for difficulty urinating, dysuria and hematuria  Musculoskeletal: Negative for back pain  Skin: Negative for rash and wound  Neurological: Negative for dizziness, light-headedness and headaches  Psychiatric/Behavioral: Negative for agitation and confusion  All other systems reviewed and are negative        PAST MEDICAL HISTORY:  Past Medical History:   Diagnosis Date    AICD (automatic cardioverter/defibrillator) present     Anxiety and depression     Arthritis     Asthma     pt denies    CAD (coronary artery disease) 10/10/2014    CKD (chronic kidney disease) stage 3, GFR 30-59 ml/min ([facility] )     COPD (chronic obstructive pulmonary disease) (HCC)     pt denies    CPAP (continuous positive airway pressure) dependence     Depression (emotion)     affective disorder    Diabetic nephropathy (HCC)     Erectile dysfunction     GERD (gastroesophageal reflux disease)     Hypertension     Iron deficiency anemia     Latent tuberculosis     Lumbar herniated disc     low back pain    Neuropathy     bles    Obesity (BMI 30-39  9) 7/9/2019    Prostate cancer ([facility] )     2013- had radiation- had prostatectomy    Sleep apnea     Systolic CHF, chronic ([facility])     Type 2 diabetes mellitus with hyperlipidemia ([facility])     Use of cane as ambulatory aid     Vitamin D deficiency     Wears glasses        PROBLEM LIST    Patient Active Problem List   Diagnosis    Type 2 diabetes mellitus with stable proliferative retinopathy of right eye, with long-term current use of insulin ([facility])    Essential hypertension    Chronic obstructive pulmonary disease ([facility])    Chronic renal insufficiency, stage III (moderate) ([facility])    Prostate cancer ([facility])    Anxiety and depression    Chronic combined systolic and diastolic CHF, NYHA class 3 ([facility])    Acute on chronic combined systolic and diastolic CHF (congestive heart failure) ([facility] )    Erectile dysfunction following radical prostatectomy    JOE (obstructive sleep apnea)    Insomnia    Obesity with body mass index 30 or greater    Coronary atherosclerosis    Erectile dysfunction of non-organic origin    Generalized anxiety disorder    Hyperlipidemia    Peripheral neuropathy    Localized, primary osteoarthritis of shoulder region    Pain in joint of left shoulder    Primary osteoarthritis of both knees    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 stage 3 chronic kidney disease (HCC)    Acute upper respiratory infection    Lumbar pain    Venous insufficiency    Obesity (BMI 30-39  9)  Medicare annual wellness visit, subsequent    Bilateral hip pain    ICD (implantable cardioverter-defibrillator), dual, in situ    Skin abrasion    Epigastric pain    Ulcer of right foot (HCC)    Embolism and thrombosis of arteries of the lower extremities (HCC)    Stable proliferative diabetic retinopathy of right eye associated with type 2 diabetes mellitus ([address] )    Preoperative clearance    Vitamin D deficiency       PAST SURGICAL HISTORY:  Past Surgical History:   Procedure Laterality Date    AV FISTULA PLACEMENT      left upper arm and removal    CARDIAC DEFIBRILLATOR PLACEMENT      CARDIAC ICD GENERATOR CHANGE  9/6/2019    CATARACT EXTRACTION Bilateral 10/09/2014    COLONOSCOPY      INGUINAL HERNIA REPAIR Bilateral     OTHER SURGICAL HISTORY Left 10/10/2014    AV Shunt    [place] INSERT,INFLATABLE PENILE PROSTHESIS N/A 1/19/2018    Procedure: INSERTION 3 PART PROSTHESIS PENILE;  Surgeon: Trent Sharif MD;  Location: [facility];  Service: Urology    PROSTATECTOMY         SOCIAL HISTORY :   reports that he quit smoking about 44 years ago  He has a 60 00 pack-year smoking history  He has never used smokeless tobacco  He reports that he drinks alcohol  He reports that he does not use drugs  FAMILY HISTORY:  Family History   Problem Relation Age of Onset    Dementia Mother     No Known Problems Father        ALLERGIES:  Allergies   Allergen Reactions    Ibuprofen Nausea Only    Penicillins Other (See Comments)     Pt unsure of reaction           PHYSICAL EXAM:  Vitals:    01/16/20 0926 01/16/20 0942   BP: 156/78 144/76   BP Location: Right arm    Patient Position: Sitting    Cuff Size: Adult    Pulse: 66    Weight: 111 kg (245 lb)    Height: 5' 8" (1 727 m)      Body mass index is 37 25 kg/m²  Physical Exam   Constitutional: He is oriented to person, place, and time  He appears well-developed and well-nourished  No distress  HENT:   Head: Normocephalic and atraumatic  Mouth/Throat: Oropharynx is clear and moist  No oropharyngeal exudate  Eyes: No scleral icterus  Neck: Neck supple  No JVD present  No tracheal deviation present  No thyromegaly present  Cardiovascular: Normal heart sounds  Exam reveals no friction rub  Pulmonary/Chest: Effort normal  He has no wheezes  He has no rales  Abdominal: Soft  He exhibits no distension  There is no tenderness  Musculoskeletal: He exhibits no edema  Neurological: He is alert and oriented to person, place, and time  Skin: Skin is warm  He is not diaphoretic  Psychiatric: He has a normal mood and affect  His behavior is normal    Vitals reviewed  LABORATORY DATA:     Results from last 6 Months   Lab Units 01/14/20  0754 12/04/19  0639 12/04/19  0638  09/03/19  1213   WBC Thousand/uL  --   --   --   --  4 60   HEMOGLOBIN g/dL  --   --   --   --  12 6*   HEMATOCRIT %  --   --   --   --  37 3*   PLATELETS Thousands/uL  --   --   --   --  147*   POTASSIUM mmol/L 3 7 3 8 3 7   < > 4 2   CHLORIDE mmol/L 100 102 102   < > 99   CO2 mmol/L 31* 31* 30   < > 31*   BUN mg/dL 24 21 21   < > 21   CREATININE mg/dL 1 71* 1 55* 1 54*   < > 1 53*   CALCIUM mg/dL 9 0 9 4 9 4   < > 9 3   MAGNESIUM mg/dL 2 3 2 3  --   --  1 9   PHOSPHORUS mg/dL 3 8 3 2  --   --  4 2    < > = values in this interval not displayed          rest all reviewed    RADIOLOGY:  No orders to display     Rest all reviewed        MEDICATIONS:    Current Outpatient Medications:     Alcohol Swabs (ALCOHOL PREP) PADS, by Does not apply route 2 (two) times a day, Disp: 200 each, Rfl: 0    aspirin (ADULT ASPIRIN EC LOW STRENGTH) 81 mg EC tablet, Take 81 mg by mouth daily  , Disp: , Rfl:     carvedilol (COREG) 25 mg tablet, TAKE 1 TABLET BY MOUTH TWICE A DAY WITH FOOD, Disp: 180 tablet, Rfl: 3    cholecalciferol (VITAMIN D3) 1,000 units tablet, Take 1 capsule by mouth daily , Disp: , Rfl:     CINNAMON PO, Take 500 mg by mouth daily, Disp: , Rfl:     clonazePAM (KlonoPIN) 0 5 mg tablet, Take 1 tablet (0 5 mg total) by mouth daily at bedtime, Disp: 30 tablet, Rfl: 0    Coenzyme Q10 (COQ-10) 200 MG CAPS, Take 1 capsule by mouth daily  , Disp: , Rfl:     ENTRESTO  MG TABS, TAKE 1 TABLET BY MOUTH TWICE A DAY, Disp: 60 tablet, Rfl: 4    furosemide (LASIX) 40 mg tablet, TAKE 1 & 1/2 TABLETS (60 MG TOTAL) BY MOUTH 2 (TWO) TIMES A DAY, Disp: 270 tablet, Rfl: 3    gabapentin (NEURONTIN) 400 mg capsule, TAKE 1 CAPSULE (400 MG TOTAL) BY MOUTH 4 (FOUR) TIMES A DAY, Disp: 180 capsule, Rfl: 1    glucose blood (ONE TOUCH ULTRA TEST) test strip, 1 each by Other route 3 (three) times a day Test, Disp: 100 each, Rfl: 11    Incontinence Supply Disposable (BRIEF OVERNIGHT LARGE) MISC, by Does not apply route 4 (four) times a day, Disp: 98 each, Rfl: 3    Insulin Glargine (TOUJEO SOLOSTAR) 300 units/mL CONCETRATED U-300 injection pen, INJECT BY SUBCUTANEOUS ROUTE 50 UNITS BID (Patient taking differently: 50 units in the morning and 25 units at night ), Disp: 9 pen, Rfl: 5    Insulin Pen Needle (BD PEN NEEDLE MARCELINO U/F) 32G X 4 MM MISC, USE TO INJECT INSULIN 3 TIMES DAILY, Disp: 100 each, Rfl: 3    isosorbide dinitrate (ISORDIL) 20 mg tablet, Take 1 tablet (20 mg total) by mouth 3 (three) times a day, Disp: 90 tablet, Rfl: 3    metolazone (ZAROXOLYN) 5 mg tablet, TAKE 1 TABLET BY MOUTH EVERY DAY AS NEEDED WEIGHT GAIN 3 LBS, Disp: 30 tablet, Rfl: 2    Multiple Vitamin (MULTIVITAMIN) capsule, Take 1 capsule by mouth daily  , Disp: , Rfl:     Omega-3 Fatty Acids (FISH OIL) 1200 MG CAPS, Take 1 capsule by mouth daily, Disp: , Rfl:     potassium chloride (KLOR-CON) 20 mEq packet, TAKE 20 MEQ BY MOUTH 2 (TWO) TIMES A DAY, Disp: 150 packet, Rfl: 4    simvastatin (ZOCOR) 40 mg tablet, TAKE 1 TABLET BY MOUTH EVERY DAY IN THE EVENING, Disp: 90 tablet, Rfl: 1    temazepam (RESTORIL) 30 mg capsule, Take 60 mg by mouth daily at bedtime as needed for sleep, Disp: , Rfl:     methocarbamol (ROBAXIN) 500 mg tablet, TAKE 1 TABLET BY MOUTH THREE TIMES A DAY (Patient not taking: Reported on 1/16/2020), Disp: 90 tablet, Rfl: 0          Portions of the record may have been created with voice recognition software  Occasional wrong word or "sound a like" substitutions may have occurred due to the inherent limitations of voice recognition software  Read the chart carefully and recognize, using context, where substitutions have occurred  If you have any questions, please contact the dictating provider

## 2020-01-16 NOTE — PATIENT INSTRUCTIONS
- Please call me in 10 days after having your blood work done to review the results if you do not hear back from me or my office, as I may have not received the results  - please remember to perform blood work prior to the next visit  - Please call if the blood pressure top number is greater than 150 or less than 110 consistently  - Please call if you are gaining more than 2lbs in 2 days for adjustment of water pills   ~ Please AVOID the following pain medications  LIST OF NSAIDS (NONSTEROIDAL ANTI-INFLAMMATORY DRUGS) AND REYES-2 INHIBITORS    DIFLUNISAL (DOLOBID)  IBUPROFEN (MOTRIN, ADVIL)  FLURBIPROFEN (ANSAID)  KETOPROFEN (ORUDIS, ORUVAIL)  FENOPROFEN (NALFON)  NABUMETONE (RELAFEN)  PIROXICAM (FELDENE)  NAPROXEN (ALEVE, NAPROSYN, NAPRELAN, ANAPROX)  DICLOFENAC (VOLTAREN, CATAFLAM)  INDOMETHACIN (INDOCIN)  SULINDAC (CLINORIL)  TOLMETIN (TOLETIN)  ETODOLAC (LODINE)  MELOXICAM (MOBIC)  KETOROLAC (TORADOL)  OXAPROZIN (DAYPRO)  CELECOXIB (CELEBREX)    Phosphorus diet  Follow a low phosphorus diet      Avoid these higher phosphorus foods: Choose these lower phosphorus foods:   Milk, pudding or yogurt (from animals and from many soy varieties) Rice milk (unfortified), nondairy creamer (if it doesn't have terms in the ingredients list that contain the letters "phos")   Hard cheeses, ricotta or cottage cheese, fat-free cream cheese Regular and low-fat cream cheese   Ice cream or frozen yogurt Sherbet or frozen fruit pops   Soups made with higher phosphorus ingredients (milk, dried peas, beans, lentils) Soups made with lower phosphorus ingredients (broth- or water-based with other lower phosphorus ingredients)   Whole grains, including whole-grain breads, crackers, cereal, rice and pasta Refined grains, including white bread, crackers, cereals, rice and pasta   Quick breads, biscuits, cornbread, muffins, pancakes or waffles Homemade refined (white) dinner rolls, bagels or English muffins   Dried peas (split, black-eyed), beans (black, garbanzo, lima, kidney, navy, lopez) or lentils Green peas (canned, frozen), green beans or wax beans   Organ meats, walleye, pollock or sardines Lean beef, pork, lamb, poultry or other fish   Nuts and seeds Popcorn   Peanut butter and other nut butters Jam, jelly or honey   Chocolate, including chocolate drinks Carob (chocolate-flavored) candy, hard candy or gumdrops   Stephanie and pepper-type sodas, flavored montilla, bottled teas (if a term in the ingredients list contains the letters "phos") Lemon-lime soda, ginger ale or root beer, plain water     Follow a moderate potassium diet

## 2020-01-17 DIAGNOSIS — I42.8 NICM (NONISCHEMIC CARDIOMYOPATHY) (HCC): ICD-10-CM

## 2020-01-17 DIAGNOSIS — I50.22 CHRONIC SYSTOLIC CHF (CONGESTIVE HEART FAILURE), NYHA CLASS 2 (HCC): ICD-10-CM

## 2020-01-17 RX ORDER — ISOSORBIDE DINITRATE 20 MG/1
20 TABLET ORAL 3 TIMES DAILY
Qty: 270 TABLET | Refills: 4 | Status: SHIPPED | OUTPATIENT
Start: 2020-01-17 | End: 2020-02-13 | Stop reason: SDUPTHER

## 2020-01-25 DIAGNOSIS — G47.00 INSOMNIA, UNSPECIFIED TYPE: ICD-10-CM

## 2020-01-28 RX ORDER — TRAZODONE HYDROCHLORIDE 50 MG/1
TABLET ORAL
Qty: 30 TABLET | Refills: 0 | Status: SHIPPED | OUTPATIENT
Start: 2020-01-28 | End: 2020-02-20

## 2020-02-10 DIAGNOSIS — M54.50 CHRONIC LOW BACK PAIN: ICD-10-CM

## 2020-02-10 DIAGNOSIS — G89.29 CHRONIC LOW BACK PAIN: ICD-10-CM

## 2020-02-11 RX ORDER — GABAPENTIN 400 MG/1
400 CAPSULE ORAL 4 TIMES DAILY
Qty: 360 CAPSULE | Refills: 0 | Status: SHIPPED | OUTPATIENT
Start: 2020-02-11 | End: 2020-05-12

## 2020-02-12 NOTE — PROGRESS NOTES
Heart Failure Outpatient Progress Note - Hoa Davis Sr  77 y o  male MRN: [id number]    @ Encounter: [id number]      Assessment/Plan:    Patient Active Problem List    Diagnosis Date Noted    Vitamin D deficiency 01/16/2020    Stable proliferative diabetic retinopathy of right eye associated with type 2 diabetes mellitus ([facility] ) 12/05/2019    Preoperative clearance 12/05/2019    Ulcer of right foot ([facility] ) 11/14/2019    Embolism and thrombosis of arteries of the lower extremities ([facility] ) 11/14/2019    Skin abrasion 08/20/2019    Epigastric pain 08/20/2019    ICD (implantable cardioverter-defibrillator), dual, in situ 08/13/2019    Bilateral hip pain 08/06/2019    Obesity (BMI 30-39 9) 07/09/2019    Medicare annual wellness visit, subsequent 07/09/2019    Venous insufficiency 05/07/2019    Lumbar pain 03/06/2019    Acute upper respiratory infection 02/26/2019    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 stage 3 chronic kidney disease ([facility] ) 02/14/2019    Primary osteoarthritis of both knees 11/20/2018    Localized, primary osteoarthritis of shoulder region 08/13/2018    Pain in joint of left shoulder 08/13/2018    JOE (obstructive sleep apnea) 05/10/2018    Insomnia 05/10/2018    Erectile dysfunction following radical prostatectomy 04/12/2018    Erectile dysfunction of non-organic origin 10/06/2016    Acute on chronic combined systolic and diastolic CHF (congestive heart failure) ([facility] ) 05/01/2016    Type 2 diabetes mellitus with stable proliferative retinopathy of right eye, with long-term current use of insulin ([facility])     Essential hypertension     Chronic obstructive pulmonary disease (HCC)     Chronic renal insufficiency, stage III (moderate) ([facility])     Prostate cancer (HCC)     Anxiety and depression     Chronic combined systolic and diastolic CHF, NYHA class 3 ([facility] )     Coronary atherosclerosis 12/19/2014    Peripheral neuropathy 12/19/2014    Obesity with body mass index 30 or greater 10/15/2014    Generalized anxiety disorder 08/12/2014    Hyperlipidemia 08/12/2014     # Chronic Systolic CHF, Stage C  Etiology: JOURDAN  Weight: 249 lbs- this is up from 232 lbs last time I saw him    Echocardiogram 1/2/18  LVEF: 40%, high filling pressures  LVIDd: 6 8 cm  RV: normal  MR: mild  PASP:    Neurohormonal Blockade:  --Beta-Blocker: coreg 25 mg BID  --ACEi, ARB or ARNi: Entresto 97/103 mg BID, isordil 20 mg TID,     (or SVR reduction)  --Aldosterone Receptor Blocker:  --Diuretic: lasix 40 mg BID with metolazone prn    Sudden Cardiac Death Risk Reduction:  --ICD: MDT single Chamber ICD- Generator change 9/6/19  Interrogation: 1/7/20:  < 0 1%    Electrical Resynchronization:  --narrow QRS    Advanced Therapies (If appropriate): --Inotrope:  --LVAD/Transplant Candidacy:    # HTN- managed well  # JOE on CPAP at night  # DM2- HgA1C 8 5%  On 10/2/19  # CKD3- diabetic glumerolar sclerosis and HTN, cr 1 7 on labs 1/14/20  # Hx of prostate CA  # anxiety and depression  # hyperlipidemia- 7/10/18: LDL 45, HDL 22, Tri 294  Rx: simvastatin 40 mg daily  # ED- has penile prosthesis, regrets as he lost feeling    TODAY'S PLAN:  Further SVR reduction by increasing isordil to 30 mg TID  Will hold off on repeating echo as still up titrating meds  --2g sodium diet  - Daily weights    HPI:   78 yo male following up regarding management of chronic systolic congestive heart failure  He was in the hospital with decompensated systolic congestive heart failure in Dec 2016  An echocardiogram was done in the hospital which demonstrated an ejection fraction of 30%  Initially ACE-I removed secondary to chronic kidney disease stage III with baseline creatinine around 1 8   He has comorbidities including chronic kidney disease stage III, hypertension, type 2 diabetes mellitus, obstructive sleep apnea on C Pap at night, prostate cancer status post surgery, asthma     reports no history of MI  He states he had a defibrillator placed a long time ago he says 10-12 years ago but states he never had ICD generator changed  He is still on Benicar  He had fistula surgery for preparation for dialysis about 3 years ago but never needed dialysis  Previously I increased his Entresto to 97/103 mg  He is taking lasix 40 mg BID  Interval History:  Last time increased isordil to 20 mg TID  Sharp focal pain in chest- atypical    Review of Systems   Constitutional: Positive for unexpected weight change (weight is up 17 lbs from my last visit)  Negative for activity change, appetite change and fatigue  HENT: Negative for congestion and nosebleeds  Eyes: Negative  Respiratory: Negative for cough, chest tightness and shortness of breath  Cardiovascular: Negative for chest pain, palpitations and leg swelling  Gastrointestinal: Negative for abdominal distention  Endocrine: Negative  Genitourinary: Negative  Musculoskeletal: Negative  Skin: Negative  Neurological: Negative for dizziness, syncope and weakness  Hematological: Negative  Psychiatric/Behavioral: Negative  Past Medical History:   Diagnosis Date    AICD (automatic cardioverter/defibrillator) present     Anxiety and depression     Arthritis     Asthma     pt denies    CAD (coronary artery disease) 10/10/2014    CKD (chronic kidney disease) stage 3, GFR 30-59 ml/min ([facility])     COPD (chronic obstructive pulmonary disease) ([facility])     pt denies    CPAP (continuous positive airway pressure) dependence     Depression (emotion)     affective disorder    Diabetic nephropathy ([facility])     Erectile dysfunction     GERD (gastroesophageal reflux disease)     Hypertension     Iron deficiency anemia     Latent tuberculosis     Lumbar herniated disc     low back pain    Neuropathy     bles    Obesity (BMI 30-39  9) 7/9/2019    Prostate cancer ([facility] )     2013- had radiation- had prostatectomy    Sleep apnea     Systolic CHF, chronic (HCC)     Type 2 diabetes mellitus with hyperlipidemia (HCC)     Use of cane as ambulatory aid     Vitamin D deficiency     Wears glasses        Allergies   Allergen Reactions    Ibuprofen Nausea Only    Penicillins Other (See Comments)     Pt unsure of reaction           Current Outpatient Medications:     Alcohol Swabs (ALCOHOL PREP) PADS, by Does not apply route 2 (two) times a day, Disp: 200 each, Rfl: 0    aspirin (ADULT ASPIRIN EC LOW STRENGTH) 81 mg EC tablet, Take 81 mg by mouth daily  , Disp: , Rfl:     carvedilol (COREG) 25 mg tablet, TAKE 1 TABLET BY MOUTH TWICE A DAY WITH FOOD, Disp: 180 tablet, Rfl: 3    cholecalciferol (VITAMIN D3) 1,000 units tablet, Take 1 capsule by mouth daily , Disp: , Rfl:     CINNAMON PO, Take 500 mg by mouth daily, Disp: , Rfl:     Coenzyme Q10 (COQ-10) 200 MG CAPS, Take 1 capsule by mouth daily  , Disp: , Rfl:     ENTRESTO  MG TABS, TAKE 1 TABLET BY MOUTH TWICE A DAY, Disp: 60 tablet, Rfl: 4    furosemide (LASIX) 40 mg tablet, TAKE 1 & 1/2 TABLETS (60 MG TOTAL) BY MOUTH 2 (TWO) TIMES A DAY, Disp: 270 tablet, Rfl: 3    glucose blood (ONE TOUCH ULTRA TEST) test strip, 1 each by Other route 3 (three) times a day Test, Disp: 100 each, Rfl: 11    Incontinence Supply Disposable (BRIEF OVERNIGHT LARGE) MISC, by Does not apply route 4 (four) times a day, Disp: 98 each, Rfl: 3    Insulin Glargine (TOUJEO SOLOSTAR) 300 units/mL CONCETRATED U-300 injection pen, INJECT BY SUBCUTANEOUS ROUTE 50 UNITS BID (Patient taking differently: 50 units in the morning and 25 units at night ), Disp: 9 pen, Rfl: 5    Insulin Pen Needle (BD PEN NEEDLE MARCELINO U/F) 32G X 4 MM MISC, USE TO INJECT INSULIN 3 TIMES DAILY, Disp: 100 each, Rfl: 3    isosorbide dinitrate (ISORDIL) 20 mg tablet, TAKE 1 TABLET (20 MG TOTAL) BY MOUTH 3 (THREE) TIMES A DAY, Disp: 270 tablet, Rfl: 4    metolazone (ZAROXOLYN) 5 mg tablet, TAKE 1 TABLET BY MOUTH EVERY DAY AS NEEDED WEIGHT GAIN 3 LBS, Disp: 30 tablet, Rfl: 2    Multiple Vitamin (MULTIVITAMIN) capsule, Take 1 capsule by mouth daily  , Disp: , Rfl:     Omega-3 Fatty Acids (FISH OIL) 1200 MG CAPS, Take 1 capsule by mouth daily, Disp: , Rfl:     potassium chloride (KLOR-CON) 20 mEq packet, TAKE 20 MEQ BY MOUTH 2 (TWO) TIMES A DAY, Disp: 150 packet, Rfl: 4    pregabalin (LYRICA) 25 mg capsule, Take 25 mg by mouth 2 (two) times a day, Disp: , Rfl:     simvastatin (ZOCOR) 40 mg tablet, TAKE 1 TABLET BY MOUTH EVERY DAY IN THE EVENING, Disp: 90 tablet, Rfl: 1    temazepam (RESTORIL) 30 mg capsule, Take 60 mg by mouth daily at bedtime as needed for sleep, Disp: , Rfl:     TRADJENTA 5 MG TABS, Take 5 mg by mouth daily, Disp: , Rfl:     clonazePAM (KlonoPIN) 0 5 mg tablet, Take 1 tablet (0 5 mg total) by mouth daily at bedtime (Patient not taking: Reported on 2/13/2020), Disp: 30 tablet, Rfl: 0    gabapentin (NEURONTIN) 400 mg capsule, TAKE 1 CAPSULE (400 MG TOTAL) BY MOUTH 4 (FOUR) TIMES A DAY (Patient not taking: Reported on 2/13/2020), Disp: 360 capsule, Rfl: 0    methocarbamol (ROBAXIN) 500 mg tablet, TAKE 1 TABLET BY MOUTH THREE TIMES A DAY (Patient not taking: Reported on 1/16/2020), Disp: 90 tablet, Rfl: 0    traZODone (DESYREL) 50 mg tablet, TAKE 1 TABLET BY MOUTH EVERYDAY AT BEDTIME (Patient not taking: Reported on 2/13/2020), Disp: 30 tablet, Rfl: 0    Social History     Socioeconomic History    Marital status: /Civil Union     Spouse name: Not on file    Number of children: Not on file    Years of education: Not on file    Highest education level: Not on file   Occupational History    Occupation: DISABLED   Social Needs    Financial resource strain: Not on file    Food insecurity:     Worry: Not on file     Inability: Not on file    Transportation needs:     Medical: Not on file     Non-medical: Not on file   Tobacco Use    Smoking status: Former Smoker     Packs/day: 3 00 Years: 20 00     Pack years: 60 00     Last attempt to quit:      Years since quittin 1    Smokeless tobacco: Never Used    Tobacco comment: never a smoker as per [employer]   Substance and Sexual Activity    Alcohol use: Yes     Comment: glass wine daily    Drug use: No    Sexual activity: Not Currently   Lifestyle    Physical activity:     Days per week: Not on file     Minutes per session: Not on file    Stress: Not on file   Relationships    Social connections:     Talks on phone: Not on file     Gets together: Not on file     Attends Voodoo service: Not on file     Active member of club or organization: Not on file     Attends meetings of clubs or organizations: Not on file     Relationship status: Not on file    Intimate partner violence:     Fear of current or ex partner: Not on file     Emotionally abused: Not on file     Physically abused: Not on file     Forced sexual activity: Not on file   Other Topics Concern    Not on file   Social History Narrative    Not on file       Family History   Problem Relation Age of Onset    Dementia Mother     No Known Problems Father        Physical Exam:    Vitals:   Vitals:    20 0841   BP: 140/82   Pulse: 80   SpO2: 98%     Wt Readings from Last 3 Encounters:   20 113 kg (249 lb)   20 111 kg (245 lb)   19 111 kg (244 lb)     Physical Exam:    Physical Exam   Constitutional: He is oriented to person, place, and time  He appears well-developed and well-nourished  HENT:   Head: Normocephalic and atraumatic  Eyes: Pupils are equal, round, and reactive to light  EOM are normal    Neck: Normal range of motion  No JVD present  Cardiovascular: Normal rate, regular rhythm and normal heart sounds  No murmur heard  Pulmonary/Chest: Effort normal and breath sounds normal  He has no rales  Abdominal: Soft  Bowel sounds are normal  He exhibits no distension  Musculoskeletal: Normal range of motion  He exhibits no edema  Neurological: He is alert and oriented to person, place, and time  Skin: Skin is warm and dry  He is not diaphoretic  Psychiatric: He has a normal mood and affect  Labs & Results:    Lab Results   Component Value Date    GLUCOSE 238 (H) 04/24/2018    CALCIUM 9 0 01/14/2020     04/24/2018    K 3 7 01/14/2020    CO2 31 (H) 01/14/2020     01/14/2020    BUN 24 01/14/2020    CREATININE 1 71 (H) 01/14/2020     Lab Results   Component Value Date    WBC 4 60 09/03/2019    HGB 12 6 (L) 09/03/2019    HCT 37 3 (L) 09/03/2019    MCV 94 09/03/2019     (L) 09/03/2019     Lab Results   Component Value Date    NTBNP 2,701 (H) 12/20/2016      Lab Results   Component Value Date    CHOL 128 04/24/2018     Lab Results   Component Value Date    HDL 21 (L) 10/10/2018    HDL 22 (L) 07/10/2018    HDL 23 (L) 04/24/2018     Lab Results   Component Value Date    LDLCALC 48 10/10/2018    LDLCALC 45 07/10/2018    LDLCALC 59 04/24/2018     Lab Results   Component Value Date    TRIG 208 (H) 10/10/2018    TRIG 294 (H) 07/10/2018    TRIG 231 (H) 04/24/2018     No results found for: [place], [place]    EKG personally reviewed by Cyndy Packer DO  Counseling / Coordination of Care  Total floor / unit time spent today 25 minutes  Greater than 50% of total time was spent with the patient and / or family counseling and / or coordination of care  A description of the counseling / coordination of care: 15      Thank you for the opportunity to participate in the care of this patient    REYNOLD ZARAGOZA [address]

## 2020-02-13 ENCOUNTER — OFFICE VISIT (OUTPATIENT)
Dept: CARDIOLOGY CLINIC | Facility: CLINIC | Age: 70
End: 2020-02-13
Payer: COMMERCIAL

## 2020-02-13 VITALS
WEIGHT: 249 LBS | DIASTOLIC BLOOD PRESSURE: 82 MMHG | HEART RATE: 80 BPM | OXYGEN SATURATION: 98 % | SYSTOLIC BLOOD PRESSURE: 140 MMHG | BODY MASS INDEX: 37.74 KG/M2 | HEIGHT: 68 IN

## 2020-02-13 DIAGNOSIS — I50.42 HYPERTENSIVE HEART AND KIDNEY DISEASE WITH CHRONIC COMBINED SYSTOLIC AND DIASTOLIC CONGESTIVE HEART FAILURE AND STAGE 3 CHRONIC KIDNEY DISEASE (HCC): ICD-10-CM

## 2020-02-13 DIAGNOSIS — I42.8 NICM (NONISCHEMIC CARDIOMYOPATHY) (HCC): ICD-10-CM

## 2020-02-13 DIAGNOSIS — I50.22 CHRONIC SYSTOLIC CHF (CONGESTIVE HEART FAILURE), NYHA CLASS 2 (HCC): ICD-10-CM

## 2020-02-13 DIAGNOSIS — N18.30 HYPERTENSIVE HEART AND KIDNEY DISEASE WITH CHRONIC COMBINED SYSTOLIC AND DIASTOLIC CONGESTIVE HEART FAILURE AND STAGE 3 CHRONIC KIDNEY DISEASE (HCC): ICD-10-CM

## 2020-02-13 DIAGNOSIS — I25.10 ATHEROSCLEROSIS OF NATIVE CORONARY ARTERY WITHOUT ANGINA PECTORIS, UNSPECIFIED WHETHER NATIVE OR TRANSPLANTED HEART: ICD-10-CM

## 2020-02-13 DIAGNOSIS — I13.0 HYPERTENSIVE HEART AND KIDNEY DISEASE WITH CHRONIC COMBINED SYSTOLIC AND DIASTOLIC CONGESTIVE HEART FAILURE AND STAGE 3 CHRONIC KIDNEY DISEASE (HCC): ICD-10-CM

## 2020-02-13 DIAGNOSIS — E78.00 PURE HYPERCHOLESTEROLEMIA: ICD-10-CM

## 2020-02-13 DIAGNOSIS — I50.42 CHRONIC COMBINED SYSTOLIC AND DIASTOLIC CHF, NYHA CLASS 3 (HCC): Primary | ICD-10-CM

## 2020-02-13 PROCEDURE — 99214 OFFICE O/P EST MOD 30 MIN: CPT | Performed by: INTERNAL MEDICINE

## 2020-02-13 PROCEDURE — 3060F POS MICROALBUMINURIA REV: CPT | Performed by: INTERNAL MEDICINE

## 2020-02-13 PROCEDURE — 3066F NEPHROPATHY DOC TX: CPT | Performed by: INTERNAL MEDICINE

## 2020-02-13 PROCEDURE — 3008F BODY MASS INDEX DOCD: CPT | Performed by: INTERNAL MEDICINE

## 2020-02-13 PROCEDURE — 3079F DIAST BP 80-89 MM HG: CPT | Performed by: INTERNAL MEDICINE

## 2020-02-13 PROCEDURE — 3077F SYST BP >= 140 MM HG: CPT | Performed by: INTERNAL MEDICINE

## 2020-02-13 PROCEDURE — 1160F RVW MEDS BY RX/DR IN RCRD: CPT | Performed by: INTERNAL MEDICINE

## 2020-02-13 PROCEDURE — 1036F TOBACCO NON-USER: CPT | Performed by: INTERNAL MEDICINE

## 2020-02-13 RX ORDER — PREGABALIN 25 MG/1
25 CAPSULE ORAL 2 TIMES DAILY
COMMUNITY
Start: 2020-01-29 | End: 2020-01-01

## 2020-02-13 RX ORDER — LINAGLIPTIN 5 MG/1
5 TABLET, FILM COATED ORAL DAILY
COMMUNITY
Start: 2020-02-03 | End: 2020-01-01

## 2020-02-13 RX ORDER — ISOSORBIDE DINITRATE 20 MG/1
30 TABLET ORAL 3 TIMES DAILY
Qty: 270 TABLET | Refills: 4 | Status: SHIPPED | OUTPATIENT
Start: 2020-02-13 | End: 2021-01-01

## 2020-02-13 NOTE — PATIENT INSTRUCTIONS
I am increasing your isordil to 30 mg three times daily- this is not only to lower your blood pressure and help your heart function    Please check daily weights and contact the heart failure program at  if you gain 3 lb in one day or 5 lb in one week  Please limit daily sodium intake to 2000 mg daily  Please limit daily fluid intake to 2000 ml daily  Bring complete list of medications to your follow up appointment

## 2020-02-20 DIAGNOSIS — G47.00 INSOMNIA, UNSPECIFIED TYPE: ICD-10-CM

## 2020-02-20 RX ORDER — TRAZODONE HYDROCHLORIDE 50 MG/1
TABLET ORAL
Qty: 30 TABLET | Refills: 0 | Status: SHIPPED | OUTPATIENT
Start: 2020-02-20 | End: 2020-03-17

## 2020-03-03 ENCOUNTER — OFFICE VISIT (OUTPATIENT)
Dept: FAMILY MEDICINE CLINIC | Facility: CLINIC | Age: 70
End: 2020-03-03
Payer: COMMERCIAL

## 2020-03-03 ENCOUNTER — TELEPHONE (OUTPATIENT)
Dept: FAMILY MEDICINE CLINIC | Facility: CLINIC | Age: 70
End: 2020-03-03

## 2020-03-03 VITALS
TEMPERATURE: 98.2 F | SYSTOLIC BLOOD PRESSURE: 130 MMHG | OXYGEN SATURATION: 97 % | RESPIRATION RATE: 16 BRPM | WEIGHT: 252 LBS | BODY MASS INDEX: 38.32 KG/M2 | DIASTOLIC BLOOD PRESSURE: 74 MMHG | HEART RATE: 84 BPM

## 2020-03-03 DIAGNOSIS — J44.9 CHRONIC OBSTRUCTIVE PULMONARY DISEASE, UNSPECIFIED COPD TYPE (HCC): ICD-10-CM

## 2020-03-03 DIAGNOSIS — I74.3 EMBOLISM AND THROMBOSIS OF ARTERIES OF THE LOWER EXTREMITIES (HCC): ICD-10-CM

## 2020-03-03 DIAGNOSIS — N18.6 TYPE 2 DIABETES MELLITUS WITH ESRD (END-STAGE RENAL DISEASE) (HCC): ICD-10-CM

## 2020-03-03 DIAGNOSIS — E11.22 TYPE 2 DIABETES MELLITUS WITH ESRD (END-STAGE RENAL DISEASE) (HCC): ICD-10-CM

## 2020-03-03 DIAGNOSIS — J30.1 ALLERGIC RHINITIS DUE TO POLLEN, UNSPECIFIED SEASONALITY: ICD-10-CM

## 2020-03-03 DIAGNOSIS — I25.10 ATHEROSCLEROSIS OF NATIVE CORONARY ARTERY WITHOUT ANGINA PECTORIS, UNSPECIFIED WHETHER NATIVE OR TRANSPLANTED HEART: ICD-10-CM

## 2020-03-03 DIAGNOSIS — R10.13 EPIGASTRIC PAIN: Primary | ICD-10-CM

## 2020-03-03 DIAGNOSIS — I47.2 VENTRICULAR TACHYCARDIA (HCC): ICD-10-CM

## 2020-03-03 DIAGNOSIS — N25.81 SECONDARY HYPERPARATHYROIDISM OF RENAL ORIGIN (HCC): ICD-10-CM

## 2020-03-03 PROCEDURE — 1036F TOBACCO NON-USER: CPT | Performed by: FAMILY MEDICINE

## 2020-03-03 PROCEDURE — 1160F RVW MEDS BY RX/DR IN RCRD: CPT | Performed by: FAMILY MEDICINE

## 2020-03-03 PROCEDURE — 99214 OFFICE O/P EST MOD 30 MIN: CPT | Performed by: FAMILY MEDICINE

## 2020-03-03 PROCEDURE — 3066F NEPHROPATHY DOC TX: CPT | Performed by: FAMILY MEDICINE

## 2020-03-03 PROCEDURE — 3060F POS MICROALBUMINURIA REV: CPT | Performed by: FAMILY MEDICINE

## 2020-03-03 RX ORDER — PANTOPRAZOLE SODIUM 40 MG/1
40 TABLET, DELAYED RELEASE ORAL DAILY
Qty: 30 TABLET | Refills: 0 | Status: SHIPPED | OUTPATIENT
Start: 2020-03-03 | End: 2020-03-26

## 2020-03-03 RX ORDER — FLUTICASONE PROPIONATE 50 MCG
2 SPRAY, SUSPENSION (ML) NASAL DAILY
Qty: 1 BOTTLE | Refills: 0 | Status: SHIPPED | OUTPATIENT
Start: 2020-03-03 | End: 2020-03-26

## 2020-03-03 NOTE — TELEPHONE ENCOUNTER
Patient wanted to know if he could have something for his anxiety as it is daily, he wants prn pill or maintenance pill  Please let me know if we can escribe something in  Thanks!

## 2020-03-06 NOTE — TELEPHONE ENCOUNTER
Patient is on a number of different medications and many the once that we would want to use for anxiety can interfere with his current regimen so I would prefer him to use stress reduction strategies and we can refer to a talking therapy    We can discuss more fully at his next follow-up visit if he feels his symptoms are worsening he can come back sooner

## 2020-03-10 ENCOUNTER — PATIENT OUTREACH (OUTPATIENT)
Dept: FAMILY MEDICINE CLINIC | Facility: CLINIC | Age: 70
End: 2020-03-10

## 2020-03-10 DIAGNOSIS — C61 PROSTATE CANCER (HCC): ICD-10-CM

## 2020-03-10 DIAGNOSIS — E11.22 TYPE 2 DIABETES MELLITUS WITH ESRD (END-STAGE RENAL DISEASE) (HCC): Primary | ICD-10-CM

## 2020-03-10 DIAGNOSIS — N18.6 TYPE 2 DIABETES MELLITUS WITH ESRD (END-STAGE RENAL DISEASE) (HCC): Primary | ICD-10-CM

## 2020-03-10 DIAGNOSIS — I50.42 CHRONIC COMBINED SYSTOLIC AND DIASTOLIC CONGESTIVE HEART FAILURE (HCC): ICD-10-CM

## 2020-03-10 DIAGNOSIS — I25.10 ATHEROSCLEROSIS OF NATIVE CORONARY ARTERY WITHOUT ANGINA PECTORIS, UNSPECIFIED WHETHER NATIVE OR TRANSPLANTED HEART: ICD-10-CM

## 2020-03-10 NOTE — PROGRESS NOTES
Contacted patient to inform he can obtain lab work, scripts were sent  He will go tomorrow since lab work is fasting and he already ate this morning  Instructed him to resume tradjenta, he verbalized understanding

## 2020-03-10 NOTE — PROGRESS NOTES
Spoke with staff at [facility]  Inquired if scripts for lab work were sent to lab, they did send scripts to [place] this morning  Reported patient did stop tradjenta for the last two days due to chest discomfort  Also informed them patient was not given script for prandin at last office visit as documented in note  They instructed to have patient resume tradjenta and staff will contact him to review medications after receiving blood work results

## 2020-03-10 NOTE — PROGRESS NOTES
Vik Dickinson is managing at home and denies needing additional assistance  He is weighing daily, weight has been stable  He denies chest pain, sob or LE edema  He wears compression stockings  He is ambulatory with cane  He relayed a recent mix up with his diabetic medication  He was last seen by [facility] in January and was mixed up with instructions and started taking tradjenta three times a day  When he ran out of his medication, he realized he was taking it incorrectly  He did contact deb who ordered lab work  He contacted lab and scripts were not sent  Will contact endo to inquire  He reports he stopped taking tradjenta for the last two days due to chest discomfort  He is performing glucometer checks three times a day  Blood sugars are in the range of FBS , before lunch  and bedtime 157-231  He is following a low sodium, diabetic diet but would like information regarding carb counting  Will send education materials  He  c/o blurred vision, states it developed when he started lyrica  He was taken off lyrica and started back on gabapentin  He continues with blurred vision and will f/u with ophthalmologist   His wife is transporting him to appointments  Follow up appointments scheduled  He has my contact information, agreeable to further outreach

## 2020-03-11 ENCOUNTER — APPOINTMENT (OUTPATIENT)
Dept: LAB | Facility: HOSPITAL | Age: 70
End: 2020-03-11
Payer: COMMERCIAL

## 2020-03-11 ENCOUNTER — TRANSCRIBE ORDERS (OUTPATIENT)
Dept: ADMINISTRATIVE | Facility: HOSPITAL | Age: 70
End: 2020-03-11

## 2020-03-11 DIAGNOSIS — E11.22 TYPE 2 DIABETES MELLITUS WITH ESRD (END-STAGE RENAL DISEASE) (HCC): Primary | ICD-10-CM

## 2020-03-11 DIAGNOSIS — N18.6 TYPE 2 DIABETES MELLITUS WITH ESRD (END-STAGE RENAL DISEASE) (HCC): ICD-10-CM

## 2020-03-11 DIAGNOSIS — Z79.4 ENCOUNTER FOR LONG-TERM (CURRENT) USE OF INSULIN (HCC): ICD-10-CM

## 2020-03-11 DIAGNOSIS — N18.30 CHRONIC KIDNEY DISEASE, STAGE III (MODERATE) (HCC): ICD-10-CM

## 2020-03-11 DIAGNOSIS — E11.22 TYPE 2 DIABETES MELLITUS WITH ESRD (END-STAGE RENAL DISEASE) (HCC): ICD-10-CM

## 2020-03-11 DIAGNOSIS — N18.6 TYPE 2 DIABETES MELLITUS WITH ESRD (END-STAGE RENAL DISEASE) (HCC): Primary | ICD-10-CM

## 2020-03-11 LAB
ALBUMIN SERPL BCP-MCNC: 3.8 G/DL (ref 3–5.2)
ALP SERPL-CCNC: 105 U/L (ref 43–122)
ALT SERPL W P-5'-P-CCNC: 30 U/L (ref 9–52)
ANION GAP SERPL CALCULATED.3IONS-SCNC: 7 MMOL/L (ref 5–14)
AST SERPL W P-5'-P-CCNC: 28 U/L (ref 17–59)
BILIRUB SERPL-MCNC: 0.3 MG/DL
BUN SERPL-MCNC: 21 MG/DL (ref 5–25)
CALCIUM SERPL-MCNC: 9 MG/DL (ref 8.4–10.2)
CHLORIDE SERPL-SCNC: 103 MMOL/L (ref 97–108)
CHOLEST SERPL-MCNC: 144 MG/DL
CO2 SERPL-SCNC: 29 MMOL/L (ref 22–30)
CREAT SERPL-MCNC: 1.74 MG/DL (ref 0.7–1.5)
CREAT UR-MCNC: 152 MG/DL
GFR SERPL CREATININE-BSD FRML MDRD: 46 ML/MIN/1.73SQ M
GLUCOSE P FAST SERPL-MCNC: 126 MG/DL (ref 70–99)
HDLC SERPL-MCNC: 30 MG/DL
LDLC SERPL CALC-MCNC: 62 MG/DL
MICROALBUMIN UR-MCNC: 1410 MG/L (ref 0–20)
MICROALBUMIN/CREAT 24H UR: 928 MG/G CREATININE (ref 0–30)
NONHDLC SERPL-MCNC: 114 MG/DL
POTASSIUM SERPL-SCNC: 3.9 MMOL/L (ref 3.6–5)
PROT SERPL-MCNC: 7.2 G/DL (ref 5.9–8.4)
SODIUM SERPL-SCNC: 139 MMOL/L (ref 137–147)
TRIGL SERPL-MCNC: 259 MG/DL

## 2020-03-11 PROCEDURE — 80053 COMPREHEN METABOLIC PANEL: CPT

## 2020-03-11 PROCEDURE — 80061 LIPID PANEL: CPT

## 2020-03-11 PROCEDURE — 82043 UR ALBUMIN QUANTITATIVE: CPT | Performed by: INTERNAL MEDICINE

## 2020-03-11 PROCEDURE — 36415 COLL VENOUS BLD VENIPUNCTURE: CPT

## 2020-03-11 PROCEDURE — 82570 ASSAY OF URINE CREATININE: CPT | Performed by: INTERNAL MEDICINE

## 2020-03-15 DIAGNOSIS — G47.00 INSOMNIA, UNSPECIFIED TYPE: ICD-10-CM

## 2020-03-17 RX ORDER — TRAZODONE HYDROCHLORIDE 50 MG/1
TABLET ORAL
Qty: 30 TABLET | Refills: 0 | Status: SHIPPED | OUTPATIENT
Start: 2020-03-17 | End: 2020-04-08

## 2020-03-19 ENCOUNTER — PATIENT OUTREACH (OUTPATIENT)
Dept: FAMILY MEDICINE CLINIC | Facility: CLINIC | Age: 70
End: 2020-03-19

## 2020-03-19 NOTE — PROGRESS NOTES
Received vm message from patient  Returned call  Stephanietello Carmonael informed me he was getting briefs from [employer] and they will not longer accept his health choice card  He wanted to know what pharmacies accept the health choice card  I informed him I wasn't sure but he should call the number on the back of the card to inquire  He was agreeable  He is managing well at home  He did have blood work done last week but has not heard back regarding results  Instructed him to contact endocrinology to inquire  He is taking tradjenta as prescribed and monitoring blood sugars  FBS today was elevated at 215, normally he is running around 115  Nutritional intake adequate  Weight stable  He denies chest pain, sob or LE edema  No other issues or concerns at this time  Agreeable to further outreach

## 2020-03-20 ENCOUNTER — TELEPHONE (OUTPATIENT)
Dept: FAMILY MEDICINE CLINIC | Facility: CLINIC | Age: 70
End: 2020-03-20

## 2020-03-20 DIAGNOSIS — N39.45 CONTINUOUS URINE LEAKAGE: ICD-10-CM

## 2020-03-25 DIAGNOSIS — R10.13 EPIGASTRIC PAIN: ICD-10-CM

## 2020-03-25 DIAGNOSIS — J30.1 ALLERGIC RHINITIS DUE TO POLLEN, UNSPECIFIED SEASONALITY: ICD-10-CM

## 2020-03-26 RX ORDER — FLUTICASONE PROPIONATE 50 MCG
SPRAY, SUSPENSION (ML) NASAL
Qty: 16 ML | Refills: 0 | Status: SHIPPED | OUTPATIENT
Start: 2020-03-26 | End: 2020-04-20

## 2020-03-26 RX ORDER — PANTOPRAZOLE SODIUM 40 MG/1
TABLET, DELAYED RELEASE ORAL
Qty: 30 TABLET | Refills: 0 | Status: SHIPPED | OUTPATIENT
Start: 2020-03-26 | End: 2020-04-20

## 2020-03-28 DIAGNOSIS — E78.5 HYPERLIPIDEMIA, UNSPECIFIED HYPERLIPIDEMIA TYPE: ICD-10-CM

## 2020-03-30 ENCOUNTER — DOCUMENTATION (OUTPATIENT)
Dept: FAMILY MEDICINE CLINIC | Facility: CLINIC | Age: 70
End: 2020-03-30

## 2020-03-31 RX ORDER — SIMVASTATIN 40 MG
TABLET ORAL
Qty: 90 TABLET | Refills: 1 | Status: SHIPPED | OUTPATIENT
Start: 2020-03-31 | End: 2020-01-01

## 2020-04-06 ENCOUNTER — PATIENT OUTREACH (OUTPATIENT)
Dept: FAMILY MEDICINE CLINIC | Facility: CLINIC | Age: 70
End: 2020-04-06

## 2020-04-07 ENCOUNTER — REMOTE DEVICE CLINIC VISIT (OUTPATIENT)
Dept: CARDIOLOGY CLINIC | Facility: CLINIC | Age: 70
End: 2020-04-07
Payer: COMMERCIAL

## 2020-04-07 DIAGNOSIS — Z95.810 AICD (AUTOMATIC CARDIOVERTER/DEFIBRILLATOR) PRESENT: Primary | ICD-10-CM

## 2020-04-07 PROCEDURE — 93296 REM INTERROG EVL PM/IDS: CPT | Performed by: INTERNAL MEDICINE

## 2020-04-07 PROCEDURE — 93295 DEV INTERROG REMOTE 1/2/MLT: CPT | Performed by: INTERNAL MEDICINE

## 2020-04-08 ENCOUNTER — PATIENT OUTREACH (OUTPATIENT)
Dept: FAMILY MEDICINE CLINIC | Facility: CLINIC | Age: 70
End: 2020-04-08

## 2020-04-08 DIAGNOSIS — G47.00 INSOMNIA, UNSPECIFIED TYPE: ICD-10-CM

## 2020-04-08 RX ORDER — TRAZODONE HYDROCHLORIDE 50 MG/1
TABLET ORAL
Qty: 30 TABLET | Refills: 0 | Status: SHIPPED | OUTPATIENT
Start: 2020-04-08 | End: 2020-05-01

## 2020-04-19 DIAGNOSIS — J30.1 ALLERGIC RHINITIS DUE TO POLLEN, UNSPECIFIED SEASONALITY: ICD-10-CM

## 2020-04-19 DIAGNOSIS — R10.13 EPIGASTRIC PAIN: ICD-10-CM

## 2020-04-20 RX ORDER — PANTOPRAZOLE SODIUM 40 MG/1
TABLET, DELAYED RELEASE ORAL
Qty: 30 TABLET | Refills: 0 | Status: SHIPPED | OUTPATIENT
Start: 2020-04-20 | End: 2020-05-13

## 2020-04-20 RX ORDER — FLUTICASONE PROPIONATE 50 MCG
SPRAY, SUSPENSION (ML) NASAL
Qty: 16 ML | Refills: 0 | Status: SHIPPED | OUTPATIENT
Start: 2020-04-20 | End: 2020-05-13

## 2020-05-01 DIAGNOSIS — G47.00 INSOMNIA, UNSPECIFIED TYPE: ICD-10-CM

## 2020-05-01 RX ORDER — TRAZODONE HYDROCHLORIDE 50 MG/1
TABLET ORAL
Qty: 30 TABLET | Refills: 0 | Status: SHIPPED | OUTPATIENT
Start: 2020-05-01 | End: 2020-05-26

## 2020-05-09 DIAGNOSIS — G89.29 CHRONIC LOW BACK PAIN: ICD-10-CM

## 2020-05-09 DIAGNOSIS — M54.50 CHRONIC LOW BACK PAIN: ICD-10-CM

## 2020-05-12 RX ORDER — GABAPENTIN 400 MG/1
400 CAPSULE ORAL 4 TIMES DAILY
Qty: 360 CAPSULE | Refills: 0 | Status: SHIPPED | OUTPATIENT
Start: 2020-05-12 | End: 2020-01-01

## 2020-05-13 DIAGNOSIS — J30.1 ALLERGIC RHINITIS DUE TO POLLEN, UNSPECIFIED SEASONALITY: ICD-10-CM

## 2020-05-13 DIAGNOSIS — R10.13 EPIGASTRIC PAIN: ICD-10-CM

## 2020-05-13 RX ORDER — FLUTICASONE PROPIONATE 50 MCG
SPRAY, SUSPENSION (ML) NASAL
Qty: 16 ML | Refills: 0 | Status: SHIPPED | OUTPATIENT
Start: 2020-05-13 | End: 2020-01-01

## 2020-05-13 RX ORDER — PANTOPRAZOLE SODIUM 40 MG/1
TABLET, DELAYED RELEASE ORAL
Qty: 30 TABLET | Refills: 0 | Status: SHIPPED | OUTPATIENT
Start: 2020-05-13 | End: 2020-01-01

## 2020-05-17 DIAGNOSIS — I50.42 CHRONIC COMBINED SYSTOLIC AND DIASTOLIC HEART FAILURE, NYHA CLASS 3 (HCC): ICD-10-CM

## 2020-05-17 RX ORDER — SACUBITRIL AND VALSARTAN 97; 103 MG/1; MG/1
TABLET, FILM COATED ORAL
Qty: 60 TABLET | Refills: 4 | Status: SHIPPED | OUTPATIENT
Start: 2020-05-17 | End: 2020-01-01

## 2020-05-19 ENCOUNTER — TELEPHONE (OUTPATIENT)
Dept: FAMILY MEDICINE CLINIC | Facility: CLINIC | Age: 70
End: 2020-05-19

## 2020-05-24 DIAGNOSIS — G47.00 INSOMNIA, UNSPECIFIED TYPE: ICD-10-CM

## 2020-05-26 RX ORDER — TRAZODONE HYDROCHLORIDE 50 MG/1
TABLET ORAL
Qty: 30 TABLET | Refills: 0 | Status: SHIPPED | OUTPATIENT
Start: 2020-05-26 | End: 2020-01-01

## 2020-05-28 ENCOUNTER — PATIENT OUTREACH (OUTPATIENT)
Dept: FAMILY MEDICINE CLINIC | Facility: CLINIC | Age: 70
End: 2020-05-28

## 2020-06-17 PROBLEM — R06.02 SOB (SHORTNESS OF BREATH): Status: ACTIVE | Noted: 2020-01-01

## 2020-07-07 PROBLEM — N17.9 AKI (ACUTE KIDNEY INJURY) (HCC): Status: ACTIVE | Noted: 2020-01-01

## 2020-07-07 NOTE — PATIENT INSTRUCTIONS
- stop vit D  - get blood work next week  - attend the kidney education class    - Please call me in 10 days after having your blood work done to review the results if you do not hear back from me or my office, as I may have not received the results  - please remember to perform blood work prior to the next visit  - Please call if the blood pressure top number is greater than 150 or less than 110 consistently  - Please call if you are gaining more than 2lbs in 2 days for adjustment of water pills   ~ Please AVOID the following pain medications  LIST OF NSAIDS (NONSTEROIDAL ANTI-INFLAMMATORY DRUGS) AND REYES-2 INHIBITORS    DIFLUNISAL (DOLOBID)  IBUPROFEN (MOTRIN, ADVIL)  FLURBIPROFEN (ANSAID)  KETOPROFEN (ORUDIS, ORUVAIL)  FENOPROFEN (NALFON)  NABUMETONE (RELAFEN)  PIROXICAM (FELDENE)  NAPROXEN (ALEVE, NAPROSYN, NAPRELAN, ANAPROX)  DICLOFENAC (VOLTAREN, CATAFLAM)  INDOMETHACIN (INDOCIN)  SULINDAC (CLINORIL)  TOLMETIN (TOLETIN)  ETODOLAC (LODINE)  MELOXICAM (MOBIC)  KETOROLAC (TORADOL)  OXAPROZIN (DAYPRO)  CELECOXIB (CELEBREX)    Phosphorus diet  Follow a low phosphorus diet      Avoid these higher phosphorus foods: Choose these lower phosphorus foods:   Milk, pudding or yogurt (from animals and from many soy varieties) Rice milk (unfortified), nondairy creamer (if it doesn't have terms in the ingredients list that contain the letters "phos")   Hard cheeses, ricotta or cottage cheese, fat-free cream cheese Regular and low-fat cream cheese   Ice cream or frozen yogurt Sherbet or frozen fruit pops   Soups made with higher phosphorus ingredients (milk, dried peas, beans, lentils) Soups made with lower phosphorus ingredients (broth- or water-based with other lower phosphorus ingredients)   Whole grains, including whole-grain breads, crackers, cereal, rice and pasta Refined grains, including white bread, crackers, cereals, rice and pasta   Quick breads, biscuits, cornbread, muffins, pancakes or waffles Homemade refined (white) dinner rolls, bagels or English muffins   Dried peas (split, black-eyed), beans (black, garbanzo, lima, kidney, navy, lopez) or lentils Green peas (canned, frozen), green beans or wax beans   Organ meats, walleye, pollock or sardines Lean beef, pork, lamb, poultry or other fish   Nuts and seeds Popcorn   Peanut butter and other nut butters Jam, jelly or honey   Chocolate, including chocolate drinks Carob (chocolate-flavored) candy, hard candy or gumdrops   Stephanie and pepper-type sodas, flavored montilla, bottled teas (if a term in the ingredients list contains the letters "phos") Lemon-lime soda, ginger ale or root beer, plain water     Follow a moderate potassium diet

## 2020-07-07 NOTE — PROGRESS NOTES
Nephrology Follow up Consultation  Eva Centeno  72 y o  male MRN: [id number]            BACKGROUND:  Eva Centeno is a 72 y o male who was referred by Jagdish Arias MD for evaluation of Follow-up and Chronic Kidney Disease    ASSESSMENT / PLAN:   72 y o   male with pmh of multiple co-morbidities including hypertension (x15yrs), diabetes (x25yrs, no DR), obesity, CHF systolic dysfunction, CAD status post AICD/pacemaker, obstructive sleep apnea on CPAP, prostate cancer status post surgical removal  and CKD stage IIIB presented to the office for routine follow-up  1  Acute kidney injury on CKD stage III BA3:  -Patient has a baseline creatinine of 1 8-1 9mg/dL  Most recent labs show a Creatinine of 2 38 mg/dL  Renal function elevated  - unclear etiology of acute kidney injury at this time, likely secondary to over-diuresis along with some component of progression of CKD prior to his previous blood draw  Will have patient repeat blood work next week  If creatinine continues to worsen then will talk to Cardiology with regards to possible discontinuation of entresto  - Likely has underlying CKD secondary to diabetic nodularo glomerular sclerosis plus hypertensive nephrosclerosis plus cardiorenal syndrome plus age-related nephron loss  - SPEP negative  - renal ultrasound from October 16, 2018 shows right kidney 10 7 cm left kidney 11 cm, few simple cysts on the right kidney  - Proteinuria -microalbumin to creatinine ratio of 928 mg as of March 2020  - Acid base and lytes stable except for hyponatremia, hypokalemia, alkalosis  - Clinically the patient appears to be euvolemic  Advised patient not to increase his diuretics  - Recommend to avoid use of NSAIDs, nephrotoxins   Caution advised with regards to exposure to IV contrast dye    - Patient has nonfunctional AV fistula in place in the left arm that as per him was placed many years ago due to concern for needing dialysis by his previous nephrologist   - Discussed with the patient in depth his renal status, including the possible etiologies for CKD  - Advised the patient that when his GFR is close to 20mL/min then will start discussing about RRT(renal replacement therapy) options such as renal transplant, peritoneal dialysis and hemodialysis  - Informed the patient about the various options for Renal Replacement therapy  - Discussed with the patient how we need to work together to delay the progression of CKD with optimal BP control based on their age and co-morbidities, optimal BS control with HbA1c of <7% and trying to reduce proteinuria by the use of anti-proteinuric agents  - referral for CKD education placed on 07/07/2020    2  Hypertension:  - Patient is on Coreg 25 mg p o  B i d ,entresto 97/103mg p o  Q day, Lasix 60 mg p o  B i d , Isordil 30 mg p o  T i d , metolazone 5 mg p o  P r n (1 time per week), K-Dur 20 meq p o  Q 12,  - Goal BP of < 140/90 based on his comorbidities  - Instructed to follow low sodium (2gm)diet   - Advised to hold ACEI/ARBs if patient suffers from dehydration due to gastrointestinal losses due to risk of GAGE secondary to failure to autoregulate  - if patient's creatinine continues to worsen then will talk to Cardiology with regards to discontinuation of entresto    3  Hemoglobin:  - Goal Hb of 10-12 g/dL  - Most recent labs suggestive of 12 9 gm/dL  - No role for iron supplementation at this time  4 CKD-MBD(Mineral Bone Disease)/secondary hyperparathyroidism of renal origin:  - Based on patients CKD stage following is the goal of therapy  - Maintain calcium phosphorus product of < 55   - Stage 3 CKD - Goal Ca 8 5-10 mg/dL , goal Phos 2 7-4 6 mg/dL  , goal iPTH 30-70 pg/mL  - hold p o  Vitamin-D for now  - currently patient is not at goal, will recheck level next visit prior to treating   - most recent vitamin-D level of 54 and intact PTH of 92 9  - check intact PTH vitamin-D at next visit    5  Lipids:  - On fish oil, Zocor  - Goal LDL less than 70  - Management as per PCP    6  Nutrition/obesity:  - Encouraged patient to follow a renal diet comprising of moderate potassium, low phosphorus and protein restriction to 0 8gm/kg  7  DM:  - Advised patient of tight glycemic control to obtain A1c closer to 7%  - This is needed to help decrease progression of CKD  - Also on Neurontin for neuropathy   - Dose of Neurontin may need to be adjusted as renal function declines over the time  - on toujeo only  - A1c of 8 8% in June 2020  - positive evidence of retinopathy on eye exam on 12/03/2019    8  Prostate cancer:  - Status post prostatectomy  - last seen by Urology on 06/17/2019 and not starting any therapy at this time, has upcoming appt    9  Followup:  - Patient is to follow-up in 3 months, with lab work to be performed next week and then again in a few days prior to the visit  Advised patient to call me in 10 days to review the results if they do not hear back from me, as I may have not received the results  Cecil Musa MD, [facility], 7/7/2020, 11:42 AM             SUBJECTIVE: 72 y o  male seen in the office for routine follow-up  Records reveal patient has chest x-ray on 06/17/2020 showing mild pulmonary interstitial edema small bilateral effusions patient was advised to increase diuretics as per Cardiology  Records reveal patient has had been out of diabetic medications for a few weeks his sugars were running in the high 300s as per endocrine note from July 2nd  Took metolazone last week  Overall feels well has no complaints  Disappointed to hear renal parameters are worse  Is not checking his blood pressures at home  Reports his blood sugars are improved currently  Does not have any issues with breathing at this time or edema  No NSAID use  Review of Systems   Constitutional: Negative for chills, fatigue and fever  HENT: Negative for congestion and sore throat      Respiratory: Negative for cough, shortness of breath and wheezing  Cardiovascular: Negative for leg swelling  Gastrointestinal: Negative for abdominal pain, constipation, diarrhea and nausea  Genitourinary: Negative for difficulty urinating and dysuria  Skin: Negative for rash  Neurological: Negative for dizziness and headaches  Psychiatric/Behavioral: Negative for agitation and confusion  All other systems reviewed and are negative  PAST MEDICAL HISTORY:  Past Medical History:   Diagnosis Date    AICD (automatic cardioverter/defibrillator) present     Anxiety and depression     Arthritis     Asthma     pt denies    CAD (coronary artery disease) 10/10/2014    CKD (chronic kidney disease) stage 3, GFR 30-59 ml/min ([facility])     COPD (chronic obstructive pulmonary disease) ([facility])     pt denies    CPAP (continuous positive airway pressure) dependence     Depression (emotion)     affective disorder    Diabetic nephropathy ([facility])     Erectile dysfunction     GERD (gastroesophageal reflux disease)     Hypertension     Iron deficiency anemia     Latent tuberculosis     Lumbar herniated disc     low back pain    Neuropathy     bles    Obesity (BMI 30-39  9) 7/9/2019    Prostate cancer ([facility] )     2013- had radiation- had prostatectomy    Sleep apnea     Systolic CHF, chronic ([facility])     Type 2 diabetes mellitus with hyperlipidemia ([facility])     Use of cane as ambulatory aid     Vitamin D deficiency     Wears glasses        PROBLEM LIST    Patient Active Problem List   Diagnosis    Type 2 diabetes mellitus with ESRD (end-stage renal disease) ([facility] )    Essential hypertension    Chronic obstructive pulmonary disease ([facility])    CKD stage G3b/A3, GFR 30-44 and albumin creatinine ratio >300 mg/g ([facility])    Prostate cancer ([facility])    Anxiety and depression    Chronic combined systolic and diastolic CHF, NYHA class 3 ([facility])    Acute on chronic combined systolic and diastolic CHF (congestive heart failure) ([facility] )    Erectile dysfunction following radical prostatectomy    JOE (obstructive sleep apnea)    Insomnia    Obesity with body mass index 30 or greater    Coronary atherosclerosis    Erectile dysfunction of non-organic origin    Generalized anxiety disorder    Hyperlipidemia    Peripheral neuropathy    Localized, primary osteoarthritis of shoulder region    Pain in joint of left shoulder    Primary osteoarthritis of both knees    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 stage 3 chronic kidney disease (HCC)    Acute upper respiratory infection    Lumbar pain    Venous insufficiency    Obesity (BMI 30-39  9)    Medicare annual wellness visit, subsequent    Bilateral hip pain    ICD (implantable cardioverter-defibrillator), dual, in situ    Skin abrasion    Epigastric pain    Ulcer of right foot ([facility])    Embolism and thrombosis of arteries of the lower extremities ([facility])    Stable proliferative diabetic retinopathy of right eye associated with type 2 diabetes mellitus ([facility]    Preoperative clearance    Vitamin D deficiency    Ventricular tachycardia ([address]    Secondary hyperparathyroidism of renal origin ([address]    SOB (shortness of breath)    GAGE (acute kidney injury) ([facility]       PAST SURGICAL HISTORY:  Past Surgical History:   Procedure Laterality Date    AV FISTULA PLACEMENT      left upper arm and removal    CARDIAC DEFIBRILLATOR PLACEMENT      CARDIAC ICD GENERATOR CHANGE  9/6/2019    CATARACT EXTRACTION Bilateral 10/09/2014    COLONOSCOPY      INGUINAL HERNIA REPAIR Bilateral     OTHER SURGICAL HISTORY Left 10/10/2014    AV Shunt    [place] INSERT,INFLATABLE PENILE PROSTHESIS N/A 1/19/2018    Procedure: INSERTION 3 PART PROSTHESIS PENILE;  Surgeon: Alexsandra Masterson MD;  [facility];  Service: Urology    PROSTATECTOMY         SOCIAL HISTORY :   reports that he quit smoking about 44 years ago   He has a 60 00 pack-year smoking history  He has never used smokeless tobacco  He reports that he drinks alcohol  He reports that he does not use drugs  FAMILY HISTORY:  Family History   Problem Relation Age of Onset    Dementia Mother     No Known Problems Father        ALLERGIES:  Allergies   Allergen Reactions    Ibuprofen Nausea Only    Penicillins Other (See Comments)     Pt unsure of reaction           PHYSICAL EXAM:  Vitals:    07/07/20 1057   BP: 150/82   BP Location: Right arm   Patient Position: Sitting   Cuff Size: Large   Pulse: 84   Temp: 98 4 °F (36 9 °C)   TempSrc: Oral   Weight: 116 kg (256 lb)   Height: 5' 8" (1 727 m)     Body mass index is 38 92 kg/m²  Physical Exam   Constitutional: He is oriented to person, place, and time  He appears well-developed and well-nourished  No distress  HENT:   Head: Normocephalic and atraumatic  Mouth/Throat: Oropharynx is clear and moist  No oropharyngeal exudate  Eyes: No scleral icterus  Neck: Normal range of motion  Neck supple  No JVD present  Cardiovascular: Normal heart sounds  No murmur heard  Pulmonary/Chest: He has no wheezes  He has no rales  Abdominal: Soft  He exhibits no distension  There is no tenderness  Musculoskeletal: He exhibits no edema  Neurological: He is alert and oriented to person, place, and time  Skin: Skin is warm  He is not diaphoretic  Psychiatric: He has a normal mood and affect  His behavior is normal    Vitals reviewed        LABORATORY DATA:     Results from last 6 Months   Lab Units 06/30/20  1100 06/17/20  0949 03/11/20  0736 01/14/20  0754   WBC Thousand/uL  --  5 20  --   --    HEMOGLOBIN g/dL  --  12 9*  --   --    HEMATOCRIT %  --  38 1*  --   --    PLATELETS Thousands/uL  --  130*  --   --    POTASSIUM mmol/L 3 3* 4 1 3 9 3 7   CHLORIDE mmol/L 85* 105 103 100   CO2 mmol/L 29 25 29 31*   BUN mg/dL 49* 30* 21 24   CREATININE mg/dL 2 38* 1 65* 1 74* 1 71*   CALCIUM mg/dL 9 2 9 4 9 0 9 0   MAGNESIUM mg/dL  -- 2 3  --  2 3   PHOSPHORUS mg/dL  --  4 0  --  3 8        rest all reviewed    RADIOLOGY:  No orders to display     Rest all reviewed        MEDICATIONS:    Current Outpatient Medications:     Alcohol Swabs (ALCOHOL PREP) PADS, by Does not apply route 2 (two) times a day, Disp: 200 each, Rfl: 0    aspirin (ADULT ASPIRIN EC LOW STRENGTH) 81 mg EC tablet, Take 81 mg by mouth daily  , Disp: , Rfl:     carvedilol (COREG) 25 mg tablet, TAKE 1 TABLET BY MOUTH TWICE A DAY WITH FOOD, Disp: 180 tablet, Rfl: 3    CINNAMON PO, Take 500 mg by mouth daily, Disp: , Rfl:     clonazePAM (KlonoPIN) 0 5 mg tablet, Take 1 tablet (0 5 mg total) by mouth daily at bedtime, Disp: 30 tablet, Rfl: 0    Coenzyme Q10 (COQ-10) 200 MG CAPS, Take 1 capsule by mouth daily  , Disp: , Rfl:     ENTRESTO  MG TABS, TAKE 1 TABLET BY MOUTH TWICE A DAY, Disp: 60 tablet, Rfl: 4    fluticasone (FLONASE) 50 mcg/act nasal spray, SPRAY 2 SPRAYS INTO EACH NOSTRIL EVERY DAY, Disp: 16 mL, Rfl: 0    furosemide (LASIX) 40 mg tablet, TAKE 1 & 1/2 TABLETS (60 MG TOTAL) BY MOUTH 2 (TWO) TIMES A DAY, Disp: 270 tablet, Rfl: 3    gabapentin (NEURONTIN) 400 mg capsule, TAKE 1 CAPSULE (400 MG TOTAL) BY MOUTH 4 (FOUR) TIMES A DAY, Disp: 360 capsule, Rfl: 0    glucose blood test strip, 1 EACH BY OTHER ROUTE 3 (THREE) TIMES A DAY TEST, Disp: 100 each, Rfl: 11    Incontinence Supply Disposable (BRIEF OVERNIGHT LARGE) MISC, by Does not apply route 4 (four) times a day, Disp: 98 each, Rfl: 3    Insulin Glargine (TOUJEO SOLOSTAR) 300 units/mL CONCETRATED U-300 injection pen, INJECT BY SUBCUTANEOUS ROUTE 50 UNITS BID (Patient taking differently: 50 units in the morning and 25 units at night ), Disp: 9 pen, Rfl: 5    Insulin Pen Needle (BD PEN NEEDLE MARCELINO U/F) 32G X 4 MM MISC, USE TO INJECT INSULIN 3 TIMES DAILY, Disp: 100 each, Rfl: 3    isosorbide dinitrate (ISORDIL) 20 mg tablet, Take 1 5 tablets (30 mg total) by mouth 3 (three) times a day, Disp: 270 tablet, Rfl: 4    methocarbamol (ROBAXIN) 500 mg tablet, TAKE 1 TABLET BY MOUTH THREE TIMES A DAY, Disp: 90 tablet, Rfl: 0    metolazone (ZAROXOLYN) 5 mg tablet, Take 1 tablet (5 mg total) by mouth daily as needed (weight gain) Weight gain 3 lbs, Disp: 90 tablet, Rfl: 1    Multiple Vitamin (MULTIVITAMIN) capsule, Take 1 capsule by mouth daily  , Disp: , Rfl:     Omega-3 Fatty Acids (FISH OIL) 1200 MG CAPS, Take 1 capsule by mouth daily, Disp: , Rfl:     pantoprazole (PROTONIX) 40 mg tablet, TAKE 1 TABLET BY MOUTH EVERY DAY, Disp: 30 tablet, Rfl: 0    potassium chloride (KLOR-CON) 20 mEq packet, TAKE 20 MEQ BY MOUTH 2 (TWO) TIMES A DAY, Disp: 150 packet, Rfl: 4    simvastatin (ZOCOR) 40 mg tablet, TAKE 1 TABLET BY MOUTH EVERY DAY IN THE EVENING, Disp: 90 tablet, Rfl: 1    temazepam (RESTORIL) 30 mg capsule, Take 30 mg by mouth daily at bedtime as needed for sleep , Disp: , Rfl:     TRADJENTA 5 MG TABS, Take 5 mg by mouth daily, Disp: , Rfl:     traZODone (DESYREL) 50 mg tablet, TAKE 1 TABLET BY MOUTH EVERYDAY AT BEDTIME (Patient not taking: Reported on 7/7/2020), Disp: 30 tablet, Rfl: 0          Portions of the record may have been created with voice recognition software  Occasional wrong word or "sound a like" substitutions may have occurred due to the inherent limitations of voice recognition software  Read the chart carefully and recognize, using context, where substitutions have occurred  If you have any questions, please contact the dictating provider

## 2020-07-15 NOTE — PROGRESS NOTES
Assessment and Plan:     Problem List Items Addressed This Visit        Other    Medicare annual wellness visit, subsequent      Other Visit Diagnoses     Colon cancer screening    -  Primary    Relevant Orders    Cologuard        BMI Counseling: Body mass index is 39 08 kg/m²  The BMI is above normal  Nutrition recommendations include decreasing portion sizes, encouraging healthy choices of fruits and vegetables and moderation in carbohydrate intake  Exercise recommendations include exercising 3-5 times per week  Preventive health issues were discussed with patient, and age appropriate screening tests were ordered as noted in patient's After Visit Summary  Personalized health advice and appropriate referrals for health education or preventive services given if needed, as noted in patient's After Visit Summary       History of Present Illness:     Patient presents for Medicare Annual Wellness visit    Patient Care Team:  Alfredo Joseph MD as PCP - General (Family Medicine)  Alfredo Joseph MD as PCP - [address] [place] [address] ([facility])  MD Mariana Davidson MD Lynden Greenspan, DO Davied Ill, RN as Lead OP Care Mgr (Care Coordination)     Problem List:     Patient Active Problem List   Diagnosis    Type 2 diabetes mellitus with ESRD (end-stage renal disease) ([facility] )    Essential hypertension    Chronic obstructive pulmonary disease ([facility] )    CKD stage G3b/A3, GFR 30-44 and albumin creatinine ratio >300 mg/g (HCC)    Prostate cancer ([facility] )    Anxiety and depression    Chronic combined systolic and diastolic CHF, NYHA class 3 ([facility] )    Acute on chronic combined systolic and diastolic CHF (congestive heart failure) ([facility] )    Erectile dysfunction following radical prostatectomy    JOE (obstructive sleep apnea)    Insomnia    Obesity with body mass index 30 or greater    Coronary atherosclerosis    Erectile dysfunction of non-organic origin    Generalized anxiety disorder    Hyperlipidemia    Peripheral neuropathy    Localized, primary osteoarthritis of shoulder region    Pain in joint of left shoulder    Primary osteoarthritis of both knees    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 stage 3 chronic kidney disease (HCC)    Acute upper respiratory infection    Lumbar pain    Venous insufficiency    Obesity (BMI 30-39  9)    Medicare annual wellness visit, subsequent    Bilateral hip pain    ICD (implantable cardioverter-defibrillator), dual, in situ    Skin abrasion    Epigastric pain    Ulcer of right foot ([facility] )    Embolism and thrombosis of arteries of the lower extremities ([facility])    Stable proliferative diabetic retinopathy of right eye associated with type 2 diabetes mellitus ([facility] )    Preoperative clearance    Vitamin D deficiency    Ventricular tachycardia ([place] [address] )    Secondary hyperparathyroidism of renal origin ([place] [address] )    SOB (shortness of breath)    GAGE (acute kidney injury) ([place] [address] )      Past Medical and Surgical History:     Past Medical History:   Diagnosis Date    AICD (automatic cardioverter/defibrillator) present     Anxiety and depression     Arthritis     Asthma     pt denies    CAD (coronary artery disease) 10/10/2014    CKD (chronic kidney disease) stage 3, GFR 30-59 ml/min ([facility])     COPD (chronic obstructive pulmonary disease) ([facility] )     pt denies    CPAP (continuous positive airway pressure) dependence     Depression (emotion)     affective disorder    Diabetic nephropathy ([facility])     Erectile dysfunction     GERD (gastroesophageal reflux disease)     Hypertension     Iron deficiency anemia     Latent tuberculosis     Lumbar herniated disc     low back pain    Neuropathy     bles    Obesity (BMI 30-39  9) 7/9/2019    Prostate cancer ([facility] )     2013- had radiation- had prostatectomy    Sleep apnea     Systolic CHF, chronic ([facility])     Type 2 diabetes mellitus with hyperlipidemia ([facility] )     Use of cane as ambulatory aid     Vitamin D deficiency     Wears glasses      Past Surgical History:   Procedure Laterality Date    AV FISTULA PLACEMENT      left upper arm and removal    CARDIAC DEFIBRILLATOR PLACEMENT      CARDIAC ICD GENERATOR CHANGE  2019    CATARACT EXTRACTION Bilateral 10/09/2014    COLONOSCOPY      INGUINAL HERNIA REPAIR Bilateral     OTHER SURGICAL HISTORY Left 10/10/2014    AV Shunt    TX INSERT,INFLATABLE PENILE PROSTHESIS N/A 2018    Procedure: INSERTION 3 PART PROSTHESIS PENILE;  Surgeon: Kenia Ramirez MD;  Location: [facility];  Service: Urology    PROSTATECTOMY        Family History:     Family History   Problem Relation Age of Onset    Dementia Mother     No Known Problems Father       Social History:     E-Cigarette/Vaping    E-Cigarette Use Never User      E-Cigarette/Vaping Substances    Nicotine No     THC No     CBD No     Flavoring No     Other No     Unknown No      Social History     Socioeconomic History    Marital status: /Civil Union     Spouse name: None    Number of children: None    Years of education: None    Highest education level: None   Occupational History    Occupation: DISABLED   Social Needs    Financial resource strain: Not hard at all   Sh[place] insecurity:     Worry: Never true     Inability: Never true    Transportation needs:     Medical: No     Non-medical: No   Tobacco Use    Smoking status: Former Smoker     Packs/day: 3 00     Years: 20 00     Pack years: 60 00     Last attempt to quit:      Years since quittin 5    Smokeless tobacco: Never Used    Tobacco comment: never a smoker as per [employer]   Substance and Sexual Activity    Alcohol use: Yes     Comment: glass wine daily    Drug use: No    Sexual activity: Not Currently   Lifestyle    Physical activity:     Days per week: 0 days     Minutes per session: None    Stress:  Only a little   Relationships    Social connections:     Talks on phone: More than three times a week     Gets together: More than three times a week     Attends Islam service: 1 to 4 times per year     Active member of club or organization: No     Attends meetings of clubs or organizations: Never     Relationship status:     Intimate partner violence:     Fear of current or ex partner: No     Emotionally abused: No     Physically abused: No     Forced sexual activity: No   Other Topics Concern    None   Social History Narrative    None      Medications and Allergies:     Current Outpatient Medications   Medication Sig Dispense Refill    Alcohol Swabs (ALCOHOL PREP) PADS by Does not apply route 2 (two) times a day 200 each 0    aspirin (ADULT ASPIRIN EC LOW STRENGTH) 81 mg EC tablet Take 81 mg by mouth daily        carvedilol (COREG) 25 mg tablet TAKE 1 TABLET BY MOUTH TWICE A DAY WITH FOOD 180 tablet 3    CINNAMON PO Take 500 mg by mouth daily      clonazePAM (KlonoPIN) 0 5 mg tablet Take 1 tablet (0 5 mg total) by mouth daily at bedtime 30 tablet 0    Coenzyme Q10 (COQ-10) 200 MG CAPS Take 1 capsule by mouth daily        ENTRESTO  MG TABS TAKE 1 TABLET BY MOUTH TWICE A DAY 60 tablet 4    fluticasone (FLONASE) 50 mcg/act nasal spray SPRAY 2 SPRAYS INTO EACH NOSTRIL EVERY DAY 16 mL 0    furosemide (LASIX) 40 mg tablet TAKE 1 & 1/2 TABLETS (60 MG TOTAL) BY MOUTH 2 (TWO) TIMES A  tablet 3    gabapentin (NEURONTIN) 400 mg capsule TAKE 1 CAPSULE (400 MG TOTAL) BY MOUTH 4 (FOUR) TIMES A  capsule 0    glucose blood test strip 1 EACH BY OTHER ROUTE 3 (THREE) TIMES A DAY TEST 100 each 11    Incontinence Supply Disposable (BRIEF OVERNIGHT LARGE) MISC by Does not apply route 4 (four) times a day 98 each 3    Insulin Glargine (TOUJEO SOLOSTAR) 300 units/mL CONCETRATED U-300 injection pen INJECT BY SUBCUTANEOUS ROUTE 50 UNITS BID (Patient taking differently: 50 units in the morning and 25 units at night ) 9 pen 5    Insulin Pen Needle (BD PEN NEEDLE MARCELINO U/F) 32G X 4 MM MISC USE TO INJECT INSULIN 3 TIMES DAILY 100 each 3    isosorbide dinitrate (ISORDIL) 20 mg tablet Take 1 5 tablets (30 mg total) by mouth 3 (three) times a day 270 tablet 4    methocarbamol (ROBAXIN) 500 mg tablet TAKE 1 TABLET BY MOUTH THREE TIMES A DAY 90 tablet 0    metolazone (ZAROXOLYN) 5 mg tablet Take 1 tablet (5 mg total) by mouth daily as needed (weight gain) Weight gain 3 lbs 90 tablet 1    Multiple Vitamin (MULTIVITAMIN) capsule Take 1 capsule by mouth daily        Omega-3 Fatty Acids (FISH OIL) 1200 MG CAPS Take 1 capsule by mouth daily      pantoprazole (PROTONIX) 40 mg tablet TAKE 1 TABLET BY MOUTH EVERY DAY 30 tablet 0    potassium chloride (KLOR-CON) 20 mEq packet TAKE 20 MEQ BY MOUTH 2 (TWO) TIMES A  packet 4    simvastatin (ZOCOR) 40 mg tablet TAKE 1 TABLET BY MOUTH EVERY DAY IN THE EVENING 90 tablet 1    temazepam (RESTORIL) 30 mg capsule Take 30 mg by mouth daily at bedtime as needed for sleep       TRADJENTA 5 MG TABS Take 5 mg by mouth daily      traZODone (DESYREL) 50 mg tablet TAKE 1 TABLET BY MOUTH EVERYDAY AT BEDTIME 30 tablet 0     No current facility-administered medications for this visit  Allergies   Allergen Reactions    Ibuprofen Nausea Only    Penicillins Other (See Comments)     Pt unsure of reaction      Immunizations: There is no immunization history for the selected administration types on file for this patient     Health Maintenance:         Topic Date Due    CRC Screening: Colonoscopy  1950    Hepatitis C Screening  Completed         Topic Date Due    DTaP,Tdap,and Td Vaccines (1 - Tdap) 05/10/1962    Pneumococcal Vaccine: 65+ Years (1 of 2 - PCV13) 05/10/2016    Influenza Vaccine  07/01/2020      Medicare Health Risk Assessment:     /80 (BP Location: Left arm, Patient Position: Sitting, Cuff Size: Large)   Pulse 72   Temp (!) 97 2 °F (36 2 °C) (Tympanic)   Ht 5' 8" (1 727 m) Wt 117 kg (257 lb)   SpO2 98%   BMI 39 08 kg/m²      Chandan Crane is here for his Subsequent Wellness visit  Last Medicare Wellness visit information reviewed, patient interviewed and updates made to the record today  Health Risk Assessment:   Patient rates overall health as good  Patient feels that their physical health rating is same  Eyesight was rated as same  Hearing was rated as same  Patient feels that their emotional and mental health rating is same  Pain experienced in the last 7 days has been none  Patient states that he has experienced no weight loss or gain in last 6 months  Depression Screening:   PHQ-2 Score: 0  PHQ-9 Score: 2      Fall Risk Screening: In the past year, patient has experienced: no history of falling in past year      Home Safety:  Patient has trouble with stairs inside or outside of their home  Patient has working smoke alarms and has working carbon monoxide detector  Home safety hazards include: none  Nutrition:   Current diet is Regular  Medications:   Patient is not currently taking any over-the-counter supplements  Patient is not able to manage medications  Patient's wife handles all medications     Activities of Daily Living (ADLs)/Instrumental Activities of Daily Living (IADLs):   Walk and transfer into and out of bed and chair?: Yes  Dress and groom yourself?: Yes    Bathe or shower yourself?: Yes    Feed yourself?  Yes  Do your laundry/housekeeping?: No  Manage your money, pay your bills and track your expenses?: No  Make your own meals?: No    Do your own shopping?: No    ADL comments: Patient's wife assistant him with some of his ADL's      Previous Hospitalizations:   Any hospitalizations or ED visits within the last 12 months?: No      Advance Care Planning:   Living will: No    Durable POA for healthcare: No    Advanced directive: No    Advanced directive counseling given: No    Five wishes given: Yes    End of Life Decisions reviewed with patient: Yes Provider agrees with end of life decisions: No      Cognitive Screening:   Provider or family/friend/caregiver concerned regarding cognition?: No    PREVENTIVE SCREENINGS      Cardiovascular Screening:    General: Screening Not Indicated, History Lipid Disorder, Risks and Benefits Discussed and Screening Current      Diabetes Screening:     General: Screening Not Indicated, History Diabetes, Screening Current and Risks and Benefits Discussed      Colorectal Cancer Screening:     General: Risks and Benefits Discussed      Prostate Cancer Screening:    General: History Prostate Cancer, Risks and Benefits Discussed and Screening Current      Osteoporosis Screening:    General: Risks and Benefits Discussed      Abdominal Aortic Aneurysm (AAA) Screening:    Risk factors include: age between 73-67 yo and tobacco use        General: Risks and Benefits Discussed and Screening Not Indicated      Lung Cancer Screening:     General: Screening Not Indicated and Risks and Benefits Discussed      Hepatitis C Screening:    General: Screening Current and Risks and Benefits Discussed    Hep C Screening Accepted: Yes      Other Counseling Topics:   Car/seat belt/driving safety, skin self-exam, sunscreen and calcium and vitamin D intake and regular weightbearing exercise         Frederico Sacks, MD

## 2020-07-29 NOTE — PROGRESS NOTES
Bobby Janiya returned my call  He is managing well at home and denies needing additional assistance  His weight has remained stable  He is actively trying to lose weight and has lost 10 lbs  No c/o chest pain, sob or LE edema  Nutritional intake adequate, he is following a diabetic diet  He performs glucometer checks three timesper day  Blood sugars are lower,  in the range of   He denies hypoglycemic episodes  He was taking daily walks, but has stopped due to the hot weather  Encouraged him to continue to exercise if possible  He is taking all medications as prescribed  Follow up appointments scheduled  He is agreeable to further outreach

## 2020-08-31 NOTE — PROGRESS NOTES
Violette Garcia is managing well at home and denies needing additional assistance  Nutritional intake adequate  He performs glucometer checks three times daily  BS in the range of 108-300  No hypoglycemic episodes  He is actively trying to lose weight  He weighs regularly and weight has been stable  No c/o chest pain, sob , LE edema  He is using cane to ambulate  He is not walking regularly, states he became winded while walking so he stopped  Encouraged him to start walking short distances and slowly increase the distance to improve his endurance  He verbalized understanding  We discussed diet, he is watching sodium and limiting sugar  Encouraged him to read food labels and pick healthier options  He is taking all medications as prescribed  Follow up appointments scheduled  Will meet with him at his next PCP appointment 9/16

## 2020-09-16 PROBLEM — L97.519 ULCER OF RIGHT FOOT (HCC): Status: RESOLVED | Noted: 2019-11-14 | Resolved: 2020-01-01

## 2020-09-16 NOTE — PROGRESS NOTES
Received call from patient  He checked his blood sugar after eating lunch and it was in the 400's  He states he feels fine and is having no symptoms  Encouraged him to take his insulin as directed and keep monitoring his BS  If his BS continues to be elevated he should contact primary or endocrinologist   He verbalized understanding

## 2020-09-16 NOTE — PROGRESS NOTES
Met with patient following PCP visit  His FBS today at office was 358  He is not sure why it was so high today, his results have been more stable  Toujeo was increased to 65 units  Instructed him to keep a close eye on his blood sugar readings and to call with any issues  He does have f/u with endo in October  We discussed his diet and reminded him to eat a pm snack before bed  He verbalized understanding  He has not resumed walking, was walking daily prior to covid  Encouraged him to try to resume walking daily if possible  No other issues or concerns at this time  Agreeable to continued outreach

## 2020-09-17 NOTE — PROGRESS NOTES
Assessment/Plan:    69-year-old male with:  Type 2 diabetes with end-stage renal disease prostate cancer and CHF discussed supportive care return parameters encouraged follow-up with specialists  Will continue current medications  Will titrate up Toujeo and encouraged patient to closely follow his symptoms and follow-up in 3 months    No problem-specific Assessment & Plan notes found for this encounter  Diagnoses and all orders for this visit:    Type 2 diabetes mellitus with ESRD (end-stage renal disease) ([facility] )  -     POCT blood glucose    Type 2 diabetes mellitus without complication, with long-term current use of insulin ([facility])  -     insulin glargine (TOUJEO MAX) 300 units/mL CONCETRATED U-300 injection pen (2-unit dial); INJECT BY SUBCUTANEOUS ROUTE 65 units in AM    Prostate cancer ([facility] )  -     PSA Total, Diagnostic; Future    Acute on chronic combined systolic and diastolic CHF (congestive heart failure) ([facility])          Subjective:     Chief Complaint   Patient presents with    Follow-up     3 month follow up    Diabetes     Patient took blood glucose level this morning, was 247 on waking  Patient states that this is usually in low 100s  Patient has not eaten yet this morning and has not taken medications yet  Blood glucose in office now is [address]     Patient declined flu vaccine today        Patient ID: Mehdi Emerson Sr  is a 72 y o  male  Patient is a 69-year-old male who presents for follow-up on type 2 diabetes with end-stage renal disease, prostate cancer, CHF  Patient admits that his sugars have been elevated recently and he is not sure why no fevers chills nausea vomiting  Tolerating p o   Intake no other complaints at this      The following portions of the patient's history were reviewed and updated as appropriate: allergies, current medications, past family history, past medical history, past social history, past surgical history and problem list     Review of Systems Constitutional: Negative  HENT: Negative  Eyes: Negative  Respiratory: Negative  Cardiovascular: Negative  Gastrointestinal: Negative  Endocrine: Negative  Genitourinary: Negative  Musculoskeletal: Negative  Allergic/Immunologic: Negative  Neurological: Negative  Hematological: Negative  Psychiatric/Behavioral: Negative  All other systems reviewed and are negative  Objective:      /70 (BP Location: Right arm, Patient Position: Sitting, Cuff Size: Adult)   Pulse 74   Temp 98 3 °F (36 8 °C) (Tympanic)   Ht 5' 8" (1 727 m)   Wt 114 kg (252 lb)   SpO2 96%   BMI 38 32 kg/m²          Physical Exam  Constitutional:       Appearance: He is well-developed  HENT:      Head: Atraumatic  Right Ear: External ear normal       Left Ear: External ear normal    Eyes:      Conjunctiva/sclera: Conjunctivae normal       Pupils: Pupils are equal, round, and reactive to light  Neck:      Musculoskeletal: Normal range of motion  Cardiovascular:      Rate and Rhythm: Normal rate and regular rhythm  Heart sounds: Normal heart sounds  Pulmonary:      Effort: Pulmonary effort is normal  No respiratory distress  Breath sounds: Normal breath sounds  Abdominal:      General: Bowel sounds are normal  There is no distension  Palpations: Abdomen is soft  Tenderness: There is no abdominal tenderness  There is no guarding or rebound  Musculoskeletal: Normal range of motion  Skin:     General: Skin is warm and dry  Neurological:      Mental Status: He is alert and oriented to person, place, and time  Cranial Nerves: No cranial nerve deficit  Psychiatric:         Behavior: Behavior normal          Thought Content:  Thought content normal          Judgment: Judgment normal

## 2020-09-30 NOTE — TELEPHONE ENCOUNTER
Can someone please order labs for patient? It looks like he is due to have some lab work done from his visit back in July, but I don't seen them ordered  Thank you!

## 2020-10-12 PROBLEM — N17.9 AKI (ACUTE KIDNEY INJURY) (HCC): Status: RESOLVED | Noted: 2020-01-01 | Resolved: 2020-01-01

## 2020-11-20 PROBLEM — D69.6 PLATELETS DECREASED (HCC): Status: ACTIVE | Noted: 2020-01-01

## 2020-11-20 PROBLEM — E66.01 OBESITY, MORBID (HCC): Status: ACTIVE | Noted: 2020-01-01

## 2020-11-20 PROBLEM — G63 POLYNEUROPATHY ASSOCIATED WITH UNDERLYING DISEASE (HCC): Status: ACTIVE | Noted: 2020-01-01

## 2021-01-01 ENCOUNTER — APPOINTMENT (INPATIENT)
Dept: CT IMAGING | Facility: HOSPITAL | Age: 71
DRG: 870 | End: 2021-01-01
Payer: COMMERCIAL

## 2021-01-01 ENCOUNTER — APPOINTMENT (INPATIENT)
Dept: RADIOLOGY | Facility: HOSPITAL | Age: 71
DRG: 870 | End: 2021-01-01
Payer: COMMERCIAL

## 2021-01-01 ENCOUNTER — APPOINTMENT (INPATIENT)
Dept: GASTROENTEROLOGY | Facility: HOSPITAL | Age: 71
DRG: 870 | End: 2021-01-01
Payer: COMMERCIAL

## 2021-01-01 ENCOUNTER — TELEPHONE (OUTPATIENT)
Dept: OTHER | Facility: OTHER | Age: 71
End: 2021-01-01

## 2021-01-01 ENCOUNTER — OFFICE VISIT (OUTPATIENT)
Dept: FAMILY MEDICINE CLINIC | Facility: CLINIC | Age: 71
End: 2021-01-01
Payer: COMMERCIAL

## 2021-01-01 ENCOUNTER — APPOINTMENT (INPATIENT)
Dept: ULTRASOUND IMAGING | Facility: HOSPITAL | Age: 71
DRG: 870 | End: 2021-01-01
Payer: COMMERCIAL

## 2021-01-01 ENCOUNTER — TELEPHONE (OUTPATIENT)
Dept: FAMILY MEDICINE CLINIC | Facility: CLINIC | Age: 71
End: 2021-01-01

## 2021-01-01 ENCOUNTER — APPOINTMENT (EMERGENCY)
Dept: RADIOLOGY | Facility: HOSPITAL | Age: 71
DRG: 870 | End: 2021-01-01
Payer: COMMERCIAL

## 2021-01-01 ENCOUNTER — APPOINTMENT (INPATIENT)
Dept: NON INVASIVE DIAGNOSTICS | Facility: HOSPITAL | Age: 71
DRG: 870 | End: 2021-01-01
Payer: COMMERCIAL

## 2021-01-01 ENCOUNTER — APPOINTMENT (OUTPATIENT)
Dept: LAB | Facility: HOSPITAL | Age: 71
End: 2021-01-01
Payer: COMMERCIAL

## 2021-01-01 ENCOUNTER — OFFICE VISIT (OUTPATIENT)
Dept: CARDIOLOGY CLINIC | Facility: CLINIC | Age: 71
End: 2021-01-01
Payer: COMMERCIAL

## 2021-01-01 ENCOUNTER — TELEPHONE (OUTPATIENT)
Dept: OTHER | Facility: HOSPITAL | Age: 71
End: 2021-01-01

## 2021-01-01 ENCOUNTER — REMOTE DEVICE CLINIC VISIT (OUTPATIENT)
Dept: CARDIOLOGY CLINIC | Facility: CLINIC | Age: 71
End: 2021-01-01
Payer: COMMERCIAL

## 2021-01-01 ENCOUNTER — PATIENT OUTREACH (OUTPATIENT)
Dept: FAMILY MEDICINE CLINIC | Facility: CLINIC | Age: 71
End: 2021-01-01

## 2021-01-01 ENCOUNTER — LAB (OUTPATIENT)
Dept: LAB | Facility: HOSPITAL | Age: 71
End: 2021-01-01
Payer: COMMERCIAL

## 2021-01-01 ENCOUNTER — TELEPHONE (OUTPATIENT)
Dept: NEPHROLOGY | Facility: CLINIC | Age: 71
End: 2021-01-01

## 2021-01-01 ENCOUNTER — APPOINTMENT (OUTPATIENT)
Dept: LAB | Facility: HOSPITAL | Age: 71
End: 2021-01-01
Attending: INTERNAL MEDICINE
Payer: COMMERCIAL

## 2021-01-01 ENCOUNTER — TRANSCRIBE ORDERS (OUTPATIENT)
Dept: LAB | Facility: HOSPITAL | Age: 71
End: 2021-01-01

## 2021-01-01 ENCOUNTER — APPOINTMENT (INPATIENT)
Dept: RADIOLOGY | Facility: HOSPITAL | Age: 71
DRG: 870 | End: 2021-01-01
Attending: RADIOLOGY
Payer: COMMERCIAL

## 2021-01-01 ENCOUNTER — TELEPHONE (OUTPATIENT)
Dept: CARDIOLOGY CLINIC | Facility: CLINIC | Age: 71
End: 2021-01-01

## 2021-01-01 ENCOUNTER — APPOINTMENT (EMERGENCY)
Dept: CT IMAGING | Facility: HOSPITAL | Age: 71
DRG: 870 | End: 2021-01-01
Payer: COMMERCIAL

## 2021-01-01 ENCOUNTER — HOSPITAL ENCOUNTER (OUTPATIENT)
Dept: NON INVASIVE DIAGNOSTICS | Facility: HOSPITAL | Age: 71
Discharge: HOME | End: 2021-04-23
Payer: COMMERCIAL

## 2021-01-01 ENCOUNTER — HOSPITAL ENCOUNTER (INPATIENT)
Facility: HOSPITAL | Age: 71
LOS: 22 days | DRG: 870 | End: 2021-05-29
Attending: EMERGENCY MEDICINE | Admitting: INTERNAL MEDICINE
Payer: COMMERCIAL

## 2021-01-01 ENCOUNTER — TELEMEDICINE (OUTPATIENT)
Dept: NEPHROLOGY | Facility: CLINIC | Age: 71
End: 2021-01-01
Payer: COMMERCIAL

## 2021-01-01 VITALS
WEIGHT: 261 LBS | DIASTOLIC BLOOD PRESSURE: 78 MMHG | BODY MASS INDEX: 39.56 KG/M2 | HEIGHT: 68 IN | SYSTOLIC BLOOD PRESSURE: 154 MMHG

## 2021-01-01 VITALS
BODY MASS INDEX: 39.56 KG/M2 | DIASTOLIC BLOOD PRESSURE: 72 MMHG | SYSTOLIC BLOOD PRESSURE: 136 MMHG | HEIGHT: 68 IN | WEIGHT: 261 LBS | TEMPERATURE: 97.8 F

## 2021-01-01 VITALS
RESPIRATION RATE: 17 BRPM | OXYGEN SATURATION: 73 % | SYSTOLIC BLOOD PRESSURE: 66 MMHG | TEMPERATURE: 102.2 F | HEIGHT: 68 IN | BODY MASS INDEX: 36.29 KG/M2 | WEIGHT: 239.42 LBS | DIASTOLIC BLOOD PRESSURE: 27 MMHG | HEART RATE: 40 BPM

## 2021-01-01 VITALS — BODY MASS INDEX: 39.53 KG/M2 | WEIGHT: 260 LBS

## 2021-01-01 DIAGNOSIS — E11.8 TYPE 2 DIABETES MELLITUS WITH COMPLICATION, WITH LONG-TERM CURRENT USE OF INSULIN (HCC): ICD-10-CM

## 2021-01-01 DIAGNOSIS — E11.65 TYPE 2 DIABETES MELLITUS WITH HYPERGLYCEMIA, WITH LONG-TERM CURRENT USE OF INSULIN (HCC): ICD-10-CM

## 2021-01-01 DIAGNOSIS — E87.1 HYPONATREMIA: ICD-10-CM

## 2021-01-01 DIAGNOSIS — N25.81 SECONDARY HYPERPARATHYROIDISM OF RENAL ORIGIN (HCC): ICD-10-CM

## 2021-01-01 DIAGNOSIS — I50.42 CHRONIC COMBINED SYSTOLIC AND DIASTOLIC CHF, NYHA CLASS 3 (HCC): Primary | ICD-10-CM

## 2021-01-01 DIAGNOSIS — I74.3 EMBOLISM AND THROMBOSIS OF ARTERIES OF THE LOWER EXTREMITIES (HCC): ICD-10-CM

## 2021-01-01 DIAGNOSIS — J30.1 ALLERGIC RHINITIS DUE TO POLLEN, UNSPECIFIED SEASONALITY: ICD-10-CM

## 2021-01-01 DIAGNOSIS — N18.32 CKD STAGE G3B/A3, GFR 30-44 AND ALBUMIN CREATININE RATIO >300 MG/G (HCC): ICD-10-CM

## 2021-01-01 DIAGNOSIS — I50.42 CHRONIC COMBINED SYSTOLIC AND DIASTOLIC HEART FAILURE, NYHA CLASS 3 (HCC): ICD-10-CM

## 2021-01-01 DIAGNOSIS — U07.1 ACUTE HYPOXEMIC RESPIRATORY FAILURE DUE TO COVID-19 (HCC): ICD-10-CM

## 2021-01-01 DIAGNOSIS — E78.00 PURE HYPERCHOLESTEROLEMIA: Chronic | ICD-10-CM

## 2021-01-01 DIAGNOSIS — I50.22 CHRONIC SYSTOLIC CHF (CONGESTIVE HEART FAILURE), NYHA CLASS 2 (HCC): ICD-10-CM

## 2021-01-01 DIAGNOSIS — I25.10 ATHEROSCLEROSIS OF NATIVE CORONARY ARTERY OF NATIVE HEART WITHOUT ANGINA PECTORIS: ICD-10-CM

## 2021-01-01 DIAGNOSIS — I70.202 OCCLUSION OF LEFT TIBIAL ARTERY (HCC): ICD-10-CM

## 2021-01-01 DIAGNOSIS — N18.6 TYPE 2 DIABETES MELLITUS WITH ESRD (END-STAGE RENAL DISEASE) (HCC): Primary | ICD-10-CM

## 2021-01-01 DIAGNOSIS — N25.81 SECONDARY HYPERPARATHYROIDISM OF RENAL ORIGIN (HCC): Primary | ICD-10-CM

## 2021-01-01 DIAGNOSIS — J12.82 PNEUMONIA DUE TO COVID-19 VIRUS: Primary | ICD-10-CM

## 2021-01-01 DIAGNOSIS — N18.31 DIABETES MELLITUS DUE TO UNDERLYING CONDITION WITH STAGE 3A CHRONIC KIDNEY DISEASE, WITH LONG-TERM CURRENT USE OF INSULIN (HCC): ICD-10-CM

## 2021-01-01 DIAGNOSIS — E87.1 HYPONATREMIA: Primary | ICD-10-CM

## 2021-01-01 DIAGNOSIS — N18.4 STAGE 4 CHRONIC KIDNEY DISEASE (HCC): ICD-10-CM

## 2021-01-01 DIAGNOSIS — C61 PROSTATE CANCER (HCC): ICD-10-CM

## 2021-01-01 DIAGNOSIS — I50.42 CHRONIC COMBINED SYSTOLIC AND DIASTOLIC CHF, NYHA CLASS 3 (HCC): ICD-10-CM

## 2021-01-01 DIAGNOSIS — R09.02 HYPOXIA: ICD-10-CM

## 2021-01-01 DIAGNOSIS — E66.9 OBESITY (BMI 30-39.9): ICD-10-CM

## 2021-01-01 DIAGNOSIS — E66.01 OBESITY, MORBID (HCC): ICD-10-CM

## 2021-01-01 DIAGNOSIS — N18.4 STAGE 4 CHRONIC KIDNEY DISEASE (HCC): Primary | ICD-10-CM

## 2021-01-01 DIAGNOSIS — R10.13 EPIGASTRIC PAIN: ICD-10-CM

## 2021-01-01 DIAGNOSIS — E78.5 HYPERLIPIDEMIA, UNSPECIFIED HYPERLIPIDEMIA TYPE: ICD-10-CM

## 2021-01-01 DIAGNOSIS — Z79.4 DIABETES MELLITUS DUE TO UNDERLYING CONDITION WITH STAGE 3A CHRONIC KIDNEY DISEASE, WITH LONG-TERM CURRENT USE OF INSULIN (HCC): ICD-10-CM

## 2021-01-01 DIAGNOSIS — M54.50 CHRONIC LOW BACK PAIN: ICD-10-CM

## 2021-01-01 DIAGNOSIS — I50.43 ACUTE ON CHRONIC COMBINED SYSTOLIC AND DIASTOLIC CHF (CONGESTIVE HEART FAILURE) (HCC): ICD-10-CM

## 2021-01-01 DIAGNOSIS — G89.29 CHRONIC LOW BACK PAIN: ICD-10-CM

## 2021-01-01 DIAGNOSIS — D69.6 PLATELETS DECREASED (HCC): ICD-10-CM

## 2021-01-01 DIAGNOSIS — N18.32 HYPERTENSIVE HEART AND KIDNEY DISEASE WITH CHRONIC COMBINED SYSTOLIC AND DIASTOLIC CONGESTIVE HEART FAILURE AND STAGE 3B CHRONIC KIDNEY DISEASE (HCC): ICD-10-CM

## 2021-01-01 DIAGNOSIS — Z20.822 CLOSE EXPOSURE TO COVID-19 VIRUS: ICD-10-CM

## 2021-01-01 DIAGNOSIS — N17.9 ACUTE KIDNEY INJURY (HCC): ICD-10-CM

## 2021-01-01 DIAGNOSIS — N18.32 HYPERTENSIVE HEART AND KIDNEY DISEASE WITH CHRONIC COMBINED SYSTOLIC AND DIASTOLIC CONGESTIVE HEART FAILURE AND STAGE 3B CHRONIC KIDNEY DISEASE (HCC): Primary | ICD-10-CM

## 2021-01-01 DIAGNOSIS — Z79.4 TYPE 2 DIABETES MELLITUS WITH COMPLICATION, WITH LONG-TERM CURRENT USE OF INSULIN (HCC): ICD-10-CM

## 2021-01-01 DIAGNOSIS — E55.9 VITAMIN D DEFICIENCY: ICD-10-CM

## 2021-01-01 DIAGNOSIS — I42.8 NICM (NONISCHEMIC CARDIOMYOPATHY) (HCC): ICD-10-CM

## 2021-01-01 DIAGNOSIS — J96.01 ACUTE HYPOXEMIC RESPIRATORY FAILURE DUE TO COVID-19 (HCC): ICD-10-CM

## 2021-01-01 DIAGNOSIS — R77.8 ELEVATED TROPONIN: ICD-10-CM

## 2021-01-01 DIAGNOSIS — I50.42 HYPERTENSIVE HEART AND KIDNEY DISEASE WITH CHRONIC COMBINED SYSTOLIC AND DIASTOLIC CONGESTIVE HEART FAILURE AND STAGE 3B CHRONIC KIDNEY DISEASE (HCC): Primary | ICD-10-CM

## 2021-01-01 DIAGNOSIS — G47.00 INSOMNIA, UNSPECIFIED TYPE: ICD-10-CM

## 2021-01-01 DIAGNOSIS — G63 POLYNEUROPATHY ASSOCIATED WITH UNDERLYING DISEASE (HCC): ICD-10-CM

## 2021-01-01 DIAGNOSIS — E11.3551 STABLE PROLIFERATIVE DIABETIC RETINOPATHY OF RIGHT EYE ASSOCIATED WITH TYPE 2 DIABETES MELLITUS (HCC): ICD-10-CM

## 2021-01-01 DIAGNOSIS — R53.1 GENERALIZED WEAKNESS: ICD-10-CM

## 2021-01-01 DIAGNOSIS — I50.42 HYPERTENSIVE HEART AND KIDNEY DISEASE WITH CHRONIC COMBINED SYSTOLIC AND DIASTOLIC CONGESTIVE HEART FAILURE AND STAGE 3B CHRONIC KIDNEY DISEASE (HCC): ICD-10-CM

## 2021-01-01 DIAGNOSIS — J44.1 CHRONIC OBSTRUCTIVE PULMONARY DISEASE WITH ACUTE EXACERBATION (HCC): ICD-10-CM

## 2021-01-01 DIAGNOSIS — N18.32 CKD STAGE G3B/A3, GFR 30-44 AND ALBUMIN CREATININE RATIO >300 MG/G (HCC): Primary | ICD-10-CM

## 2021-01-01 DIAGNOSIS — Z95.810 PRESENCE OF AUTOMATIC CARDIOVERTER/DEFIBRILLATOR (AICD): Primary | ICD-10-CM

## 2021-01-01 DIAGNOSIS — N39.45 CONTINUOUS URINE LEAKAGE: ICD-10-CM

## 2021-01-01 DIAGNOSIS — Z20.822 CLOSE EXPOSURE TO COVID-19 VIRUS: Primary | ICD-10-CM

## 2021-01-01 DIAGNOSIS — E11.22 TYPE 2 DIABETES MELLITUS WITH ESRD (END-STAGE RENAL DISEASE) (HCC): Primary | ICD-10-CM

## 2021-01-01 DIAGNOSIS — I44.1 SECOND DEGREE HEART BLOCK: ICD-10-CM

## 2021-01-01 DIAGNOSIS — E08.22 DIABETES MELLITUS DUE TO UNDERLYING CONDITION WITH STAGE 3A CHRONIC KIDNEY DISEASE, WITH LONG-TERM CURRENT USE OF INSULIN (HCC): ICD-10-CM

## 2021-01-01 DIAGNOSIS — U07.1 PNEUMONIA DUE TO COVID-19 VIRUS: Primary | ICD-10-CM

## 2021-01-01 DIAGNOSIS — M54.16 LUMBAR RADICULITIS: ICD-10-CM

## 2021-01-01 DIAGNOSIS — Z79.4 TYPE 2 DIABETES MELLITUS WITH HYPERGLYCEMIA, WITH LONG-TERM CURRENT USE OF INSULIN (HCC): ICD-10-CM

## 2021-01-01 DIAGNOSIS — L30.9 DERMATITIS: ICD-10-CM

## 2021-01-01 DIAGNOSIS — E78.2 MIXED HYPERLIPIDEMIA: ICD-10-CM

## 2021-01-01 DIAGNOSIS — I13.0 HYPERTENSIVE HEART AND KIDNEY DISEASE WITH CHRONIC COMBINED SYSTOLIC AND DIASTOLIC CONGESTIVE HEART FAILURE AND STAGE 3B CHRONIC KIDNEY DISEASE (HCC): Primary | ICD-10-CM

## 2021-01-01 DIAGNOSIS — I13.0 HYPERTENSIVE HEART AND KIDNEY DISEASE WITH CHRONIC COMBINED SYSTOLIC AND DIASTOLIC CONGESTIVE HEART FAILURE AND STAGE 3B CHRONIC KIDNEY DISEASE (HCC): ICD-10-CM

## 2021-01-01 DIAGNOSIS — Z79.4 TYPE 2 DIABETES MELLITUS WITH STABLE PROLIFERATIVE RETINOPATHY OF RIGHT EYE, WITH LONG-TERM CURRENT USE OF INSULIN (HCC): ICD-10-CM

## 2021-01-01 DIAGNOSIS — E11.3551 TYPE 2 DIABETES MELLITUS WITH STABLE PROLIFERATIVE RETINOPATHY OF RIGHT EYE, WITH LONG-TERM CURRENT USE OF INSULIN (HCC): ICD-10-CM

## 2021-01-01 DIAGNOSIS — I47.2 VENTRICULAR TACHYCARDIA (HCC): ICD-10-CM

## 2021-01-01 LAB
ABO GROUP BLD BPU: NORMAL
ABO GROUP BLD: NORMAL
ALBUMIN SERPL BCP-MCNC: 1.9 G/DL (ref 3.5–5)
ALBUMIN SERPL BCP-MCNC: 2.2 G/DL (ref 3.5–5)
ALBUMIN SERPL BCP-MCNC: 2.2 G/DL (ref 3.5–5)
ALBUMIN SERPL BCP-MCNC: 2.3 G/DL (ref 3.5–5)
ALBUMIN SERPL BCP-MCNC: 2.4 G/DL (ref 3.5–5)
ALBUMIN SERPL BCP-MCNC: 2.5 G/DL (ref 3.5–5)
ALBUMIN SERPL BCP-MCNC: 2.6 G/DL (ref 3.5–5)
ALBUMIN SERPL BCP-MCNC: 2.6 G/DL (ref 3.5–5)
ALBUMIN SERPL BCP-MCNC: 2.8 G/DL (ref 3.5–5)
ALBUMIN SERPL BCP-MCNC: 3.8 G/DL (ref 3.5–5)
ALP SERPL-CCNC: 114 U/L (ref 46–116)
ALP SERPL-CCNC: 123 U/L (ref 46–116)
ALP SERPL-CCNC: 126 U/L (ref 46–116)
ALP SERPL-CCNC: 135 U/L (ref 46–116)
ALP SERPL-CCNC: 150 U/L (ref 46–116)
ALP SERPL-CCNC: 172 U/L (ref 46–116)
ALP SERPL-CCNC: 177 U/L (ref 46–116)
ALP SERPL-CCNC: 220 U/L (ref 46–116)
ALP SERPL-CCNC: 253 U/L (ref 46–116)
ALP SERPL-CCNC: 69 U/L (ref 46–116)
ALP SERPL-CCNC: 71 U/L (ref 46–116)
ALP SERPL-CCNC: 76 U/L (ref 46–116)
ALP SERPL-CCNC: 78 U/L (ref 46–116)
ALP SERPL-CCNC: 89 U/L (ref 46–116)
ALT SERPL W P-5'-P-CCNC: 28 U/L (ref 12–78)
ALT SERPL W P-5'-P-CCNC: 32 U/L (ref 12–78)
ALT SERPL W P-5'-P-CCNC: 36 U/L (ref 12–78)
ALT SERPL W P-5'-P-CCNC: 37 U/L (ref 12–78)
ALT SERPL W P-5'-P-CCNC: 44 U/L (ref 12–78)
ALT SERPL W P-5'-P-CCNC: 56 U/L (ref 12–78)
ALT SERPL W P-5'-P-CCNC: 57 U/L (ref 12–78)
ALT SERPL W P-5'-P-CCNC: 59 U/L (ref 12–78)
ALT SERPL W P-5'-P-CCNC: 60 U/L (ref 12–78)
ALT SERPL W P-5'-P-CCNC: 67 U/L (ref 12–78)
ALT SERPL W P-5'-P-CCNC: 70 U/L (ref 12–78)
ALT SERPL W P-5'-P-CCNC: 73 U/L (ref 12–78)
ALT SERPL W P-5'-P-CCNC: 76 U/L (ref 12–78)
ALT SERPL W P-5'-P-CCNC: 76 U/L (ref 12–78)
AMMONIA PLAS-SCNC: 20 UMOL/L (ref 11–35)
AMMONIA PLAS-SCNC: 38 UMOL/L (ref 11–35)
AMORPH URATE CRY URNS QL MICRO: ABNORMAL /HPF
ANION GAP SERPL CALCULATED.3IONS-SCNC: 10 MMOL/L (ref 4–13)
ANION GAP SERPL CALCULATED.3IONS-SCNC: 10 MMOL/L (ref 5–14)
ANION GAP SERPL CALCULATED.3IONS-SCNC: 11 MMOL/L (ref 4–13)
ANION GAP SERPL CALCULATED.3IONS-SCNC: 11 MMOL/L (ref 5–14)
ANION GAP SERPL CALCULATED.3IONS-SCNC: 12 MMOL/L (ref 4–13)
ANION GAP SERPL CALCULATED.3IONS-SCNC: 13 MMOL/L (ref 4–13)
ANION GAP SERPL CALCULATED.3IONS-SCNC: 14 MMOL/L (ref 4–13)
ANION GAP SERPL CALCULATED.3IONS-SCNC: 14 MMOL/L (ref 4–13)
ANION GAP SERPL CALCULATED.3IONS-SCNC: 15 MMOL/L (ref 4–13)
ANION GAP SERPL CALCULATED.3IONS-SCNC: 21 MMOL/L (ref 4–13)
ANION GAP SERPL CALCULATED.3IONS-SCNC: 5 MMOL/L (ref 4–13)
ANION GAP SERPL CALCULATED.3IONS-SCNC: 5 MMOL/L (ref 4–13)
ANION GAP SERPL CALCULATED.3IONS-SCNC: 6 MMOL/L (ref 4–13)
ANION GAP SERPL CALCULATED.3IONS-SCNC: 6 MMOL/L (ref 4–13)
ANION GAP SERPL CALCULATED.3IONS-SCNC: 7 MMOL/L (ref 4–13)
ANION GAP SERPL CALCULATED.3IONS-SCNC: 7 MMOL/L (ref 4–13)
ANION GAP SERPL CALCULATED.3IONS-SCNC: 8 MMOL/L (ref 4–13)
ANION GAP SERPL CALCULATED.3IONS-SCNC: 9 MMOL/L (ref 4–13)
ANION GAP SERPL CALCULATED.3IONS-SCNC: 9 MMOL/L (ref 5–14)
ANION GAP SERPL CALCULATED.3IONS-SCNC: 9 MMOL/L (ref 5–14)
ANISOCYTOSIS BLD QL SMEAR: PRESENT
APTT PPP: 101 SECONDS (ref 23–37)
APTT PPP: 110 SECONDS (ref 23–37)
APTT PPP: 126 SECONDS (ref 23–37)
APTT PPP: 138 SECONDS (ref 23–37)
APTT PPP: 28 SECONDS (ref 23–37)
APTT PPP: 33 SECONDS (ref 23–37)
APTT PPP: 35 SECONDS (ref 23–37)
APTT PPP: 53 SECONDS (ref 23–37)
APTT PPP: 59 SECONDS (ref 23–37)
APTT PPP: 62 SECONDS (ref 23–37)
APTT PPP: 62 SECONDS (ref 23–37)
APTT PPP: 66 SECONDS (ref 23–37)
APTT PPP: 66 SECONDS (ref 23–37)
APTT PPP: 67 SECONDS (ref 23–37)
APTT PPP: 94 SECONDS (ref 23–37)
ARTERIAL PATENCY WRIST A: YES
AST SERPL W P-5'-P-CCNC: 101 U/L (ref 5–45)
AST SERPL W P-5'-P-CCNC: 106 U/L (ref 5–45)
AST SERPL W P-5'-P-CCNC: 120 U/L (ref 5–45)
AST SERPL W P-5'-P-CCNC: 18 U/L (ref 5–45)
AST SERPL W P-5'-P-CCNC: 181 U/L (ref 5–45)
AST SERPL W P-5'-P-CCNC: 182 U/L (ref 5–45)
AST SERPL W P-5'-P-CCNC: 20 U/L (ref 5–45)
AST SERPL W P-5'-P-CCNC: 40 U/L (ref 5–45)
AST SERPL W P-5'-P-CCNC: 47 U/L (ref 5–45)
AST SERPL W P-5'-P-CCNC: 55 U/L (ref 5–45)
AST SERPL W P-5'-P-CCNC: 57 U/L (ref 5–45)
AST SERPL W P-5'-P-CCNC: 61 U/L (ref 5–45)
AST SERPL W P-5'-P-CCNC: 62 U/L (ref 5–45)
AST SERPL W P-5'-P-CCNC: 74 U/L (ref 5–45)
ATRIAL RATE: 105 BPM
ATRIAL RATE: 168 BPM
ATRIAL RATE: 39 BPM
ATRIAL RATE: 41 BPM
ATRIAL RATE: 51 BPM
ATRIAL RATE: 59 BPM
ATRIAL RATE: 65 BPM
ATRIAL RATE: 73 BPM
ATRIAL RATE: 76 BPM
ATRIAL RATE: 86 BPM
BACTERIA BLD CULT: NORMAL
BACTERIA SPT RESP CULT: NORMAL
BACTERIA UR QL AUTO: ABNORMAL /HPF
BASE EXCESS BLDA CALC-SCNC: -0.4 MMOL/L
BASE EXCESS BLDA CALC-SCNC: -0.9 MMOL/L
BASE EXCESS BLDA CALC-SCNC: -1.7 MMOL/L
BASE EXCESS BLDA CALC-SCNC: -14.8 MMOL/L
BASE EXCESS BLDA CALC-SCNC: -2.1 MMOL/L
BASE EXCESS BLDA CALC-SCNC: -2.6 MMOL/L
BASE EXCESS BLDA CALC-SCNC: -3.7 MMOL/L
BASE EXCESS BLDA CALC-SCNC: -3.9 MMOL/L
BASE EXCESS BLDA CALC-SCNC: -6.3 MMOL/L
BASE EXCESS BLDA CALC-SCNC: -6.9 MMOL/L
BASE EXCESS BLDA CALC-SCNC: 0.1 MMOL/L
BASE EXCESS BLDA CALC-SCNC: 0.5 MMOL/L
BASE EXCESS BLDA CALC-SCNC: 1.3 MMOL/L
BASE EXCESS BLDA CALC-SCNC: 2 MMOL/L
BASE EXCESS BLDA CALC-SCNC: 3.9 MMOL/L
BASOPHILS # BLD AUTO: 0 THOUSANDS/ΜL (ref 0–0.1)
BASOPHILS # BLD AUTO: 0.01 THOUSANDS/ΜL (ref 0–0.1)
BASOPHILS # BLD AUTO: 0.01 THOUSANDS/ΜL (ref 0–0.1)
BASOPHILS # BLD AUTO: 0.02 THOUSANDS/ΜL (ref 0–0.1)
BASOPHILS # BLD AUTO: 0.03 THOUSANDS/ΜL (ref 0–0.1)
BASOPHILS # BLD MANUAL: 0 THOUSAND/UL (ref 0–0.1)
BASOPHILS NFR BLD AUTO: 0 % (ref 0–1)
BASOPHILS NFR BLD AUTO: 1 % (ref 0–1)
BASOPHILS NFR MAR MANUAL: 0 % (ref 0–1)
BILIRUB DIRECT SERPL-MCNC: 0.35 MG/DL (ref 0–0.2)
BILIRUB SERPL-MCNC: 0.25 MG/DL (ref 0.2–1)
BILIRUB SERPL-MCNC: 0.34 MG/DL (ref 0.2–1)
BILIRUB SERPL-MCNC: 0.35 MG/DL (ref 0.2–1)
BILIRUB SERPL-MCNC: 0.39 MG/DL (ref 0.2–1)
BILIRUB SERPL-MCNC: 0.41 MG/DL (ref 0.2–1)
BILIRUB SERPL-MCNC: 0.5 MG/DL (ref 0.2–1)
BILIRUB SERPL-MCNC: 0.56 MG/DL (ref 0.2–1)
BILIRUB SERPL-MCNC: 0.58 MG/DL (ref 0.2–1)
BILIRUB SERPL-MCNC: 0.58 MG/DL (ref 0.2–1)
BILIRUB SERPL-MCNC: 0.6 MG/DL (ref 0.2–1)
BILIRUB SERPL-MCNC: 0.63 MG/DL (ref 0.2–1)
BILIRUB SERPL-MCNC: 0.65 MG/DL (ref 0.2–1)
BILIRUB SERPL-MCNC: 0.68 MG/DL (ref 0.2–1)
BILIRUB SERPL-MCNC: 0.82 MG/DL (ref 0.2–1)
BILIRUB UR QL STRIP: NEGATIVE
BLD GP AB SCN SERPL QL: NEGATIVE
BPU ID: NORMAL
BUN SERPL-MCNC: 100 MG/DL (ref 5–25)
BUN SERPL-MCNC: 107 MG/DL (ref 5–25)
BUN SERPL-MCNC: 107 MG/DL (ref 5–25)
BUN SERPL-MCNC: 109 MG/DL (ref 5–25)
BUN SERPL-MCNC: 26 MG/DL (ref 5–25)
BUN SERPL-MCNC: 31 MG/DL (ref 5–25)
BUN SERPL-MCNC: 31 MG/DL (ref 5–25)
BUN SERPL-MCNC: 36 MG/DL (ref 5–25)
BUN SERPL-MCNC: 38 MG/DL (ref 5–25)
BUN SERPL-MCNC: 43 MG/DL (ref 5–25)
BUN SERPL-MCNC: 50 MG/DL (ref 5–25)
BUN SERPL-MCNC: 52 MG/DL (ref 5–25)
BUN SERPL-MCNC: 52 MG/DL (ref 5–25)
BUN SERPL-MCNC: 53 MG/DL (ref 5–25)
BUN SERPL-MCNC: 53 MG/DL (ref 5–25)
BUN SERPL-MCNC: 54 MG/DL (ref 5–25)
BUN SERPL-MCNC: 57 MG/DL (ref 5–25)
BUN SERPL-MCNC: 59 MG/DL (ref 5–25)
BUN SERPL-MCNC: 64 MG/DL (ref 5–25)
BUN SERPL-MCNC: 65 MG/DL (ref 5–25)
BUN SERPL-MCNC: 70 MG/DL (ref 5–25)
BUN SERPL-MCNC: 71 MG/DL (ref 5–25)
BUN SERPL-MCNC: 72 MG/DL (ref 5–25)
BUN SERPL-MCNC: 76 MG/DL (ref 5–25)
BUN SERPL-MCNC: 79 MG/DL (ref 5–25)
BUN SERPL-MCNC: 79 MG/DL (ref 5–25)
BUN SERPL-MCNC: 83 MG/DL (ref 5–25)
BUN SERPL-MCNC: 83 MG/DL (ref 5–25)
BUN SERPL-MCNC: 85 MG/DL (ref 5–25)
BUN SERPL-MCNC: 85 MG/DL (ref 5–25)
BUN SERPL-MCNC: 88 MG/DL (ref 5–25)
BUN SERPL-MCNC: 88 MG/DL (ref 5–25)
BUN SERPL-MCNC: 97 MG/DL (ref 5–25)
BUN SERPL-MCNC: 99 MG/DL (ref 5–25)
BURR CELLS BLD QL SMEAR: PRESENT
BURR CELLS BLD QL SMEAR: PRESENT
CALCIUM ALBUM COR SERPL-MCNC: 10.2 MG/DL (ref 8.3–10.1)
CALCIUM ALBUM COR SERPL-MCNC: 8.3 MG/DL (ref 8.3–10.1)
CALCIUM ALBUM COR SERPL-MCNC: 8.6 MG/DL (ref 8.3–10.1)
CALCIUM ALBUM COR SERPL-MCNC: 8.7 MG/DL (ref 8.3–10.1)
CALCIUM ALBUM COR SERPL-MCNC: 8.8 MG/DL (ref 8.3–10.1)
CALCIUM ALBUM COR SERPL-MCNC: 9 MG/DL (ref 8.3–10.1)
CALCIUM ALBUM COR SERPL-MCNC: 9.1 MG/DL (ref 8.3–10.1)
CALCIUM ALBUM COR SERPL-MCNC: 9.2 MG/DL (ref 8.3–10.1)
CALCIUM ALBUM COR SERPL-MCNC: 9.3 MG/DL (ref 8.3–10.1)
CALCIUM ALBUM COR SERPL-MCNC: 9.3 MG/DL (ref 8.3–10.1)
CALCIUM SERPL-MCNC: 6.3 MG/DL (ref 8.3–10.1)
CALCIUM SERPL-MCNC: 6.9 MG/DL (ref 8.3–10.1)
CALCIUM SERPL-MCNC: 7 MG/DL (ref 8.3–10.1)
CALCIUM SERPL-MCNC: 7.2 MG/DL (ref 8.3–10.1)
CALCIUM SERPL-MCNC: 7.4 MG/DL (ref 8.3–10.1)
CALCIUM SERPL-MCNC: 7.5 MG/DL (ref 8.3–10.1)
CALCIUM SERPL-MCNC: 7.6 MG/DL (ref 8.3–10.1)
CALCIUM SERPL-MCNC: 7.7 MG/DL (ref 8.3–10.1)
CALCIUM SERPL-MCNC: 7.9 MG/DL (ref 8.3–10.1)
CALCIUM SERPL-MCNC: 8 MG/DL (ref 8.3–10.1)
CALCIUM SERPL-MCNC: 8.1 MG/DL (ref 8.3–10.1)
CALCIUM SERPL-MCNC: 8.3 MG/DL (ref 8.3–10.1)
CALCIUM SERPL-MCNC: 8.5 MG/DL (ref 8.3–10.1)
CALCIUM SERPL-MCNC: 8.6 MG/DL (ref 8.3–10.1)
CALCIUM SERPL-MCNC: 8.7 MG/DL (ref 8.3–10.1)
CALCIUM SERPL-MCNC: 8.7 MG/DL (ref 8.3–10.1)
CALCIUM SERPL-MCNC: 8.9 MG/DL (ref 8.3–10.1)
CALCIUM SERPL-MCNC: 9.4 MG/DL (ref 8.4–10.2)
CALCIUM SERPL-MCNC: 9.5 MG/DL (ref 8.4–10.2)
CALCIUM SERPL-MCNC: 9.6 MG/DL (ref 8.4–10.2)
CALCIUM SERPL-MCNC: 9.7 MG/DL (ref 8.4–10.2)
CHLORIDE SERPL-SCNC: 100 MMOL/L (ref 100–108)
CHLORIDE SERPL-SCNC: 100 MMOL/L (ref 100–108)
CHLORIDE SERPL-SCNC: 101 MMOL/L (ref 100–108)
CHLORIDE SERPL-SCNC: 101 MMOL/L (ref 97–108)
CHLORIDE SERPL-SCNC: 102 MMOL/L (ref 100–108)
CHLORIDE SERPL-SCNC: 102 MMOL/L (ref 100–108)
CHLORIDE SERPL-SCNC: 103 MMOL/L (ref 100–108)
CHLORIDE SERPL-SCNC: 104 MMOL/L (ref 100–108)
CHLORIDE SERPL-SCNC: 104 MMOL/L (ref 100–108)
CHLORIDE SERPL-SCNC: 105 MMOL/L (ref 100–108)
CHLORIDE SERPL-SCNC: 106 MMOL/L (ref 100–108)
CHLORIDE SERPL-SCNC: 107 MMOL/L (ref 100–108)
CHLORIDE SERPL-SCNC: 108 MMOL/L (ref 100–108)
CHLORIDE SERPL-SCNC: 109 MMOL/L (ref 100–108)
CHLORIDE SERPL-SCNC: 109 MMOL/L (ref 100–108)
CHLORIDE SERPL-SCNC: 110 MMOL/L (ref 100–108)
CHLORIDE SERPL-SCNC: 111 MMOL/L (ref 100–108)
CHLORIDE SERPL-SCNC: 111 MMOL/L (ref 100–108)
CHLORIDE SERPL-SCNC: 120 MMOL/L (ref 100–108)
CHLORIDE SERPL-SCNC: 82 MMOL/L (ref 97–108)
CHLORIDE SERPL-SCNC: 91 MMOL/L (ref 100–108)
CHLORIDE SERPL-SCNC: 91 MMOL/L (ref 97–108)
CHLORIDE SERPL-SCNC: 97 MMOL/L (ref 100–108)
CHLORIDE SERPL-SCNC: 97 MMOL/L (ref 100–108)
CHLORIDE SERPL-SCNC: 99 MMOL/L (ref 97–108)
CK MB SERPL-MCNC: 3.2 NG/ML (ref 0–5)
CK MB SERPL-MCNC: 4.8 NG/ML (ref 0–5)
CK MB SERPL-MCNC: 6.3 NG/ML (ref 0–5)
CK MB SERPL-MCNC: 7 NG/ML (ref 0–5)
CK MB SERPL-MCNC: 7.9 NG/ML (ref 0–5)
CK MB SERPL-MCNC: <1 % (ref 0–2.5)
CK SERPL-CCNC: 4375 U/L (ref 39–308)
CK SERPL-CCNC: 6420 U/L (ref 39–308)
CK SERPL-CCNC: 816 U/L (ref 39–308)
CK SERPL-CCNC: 9220 U/L (ref 39–308)
CK SERPL-CCNC: 979 U/L (ref 39–308)
CLARITY UR: CLEAR
CO2 SERPL-SCNC: 16 MMOL/L (ref 21–32)
CO2 SERPL-SCNC: 22 MMOL/L (ref 21–32)
CO2 SERPL-SCNC: 23 MMOL/L (ref 21–32)
CO2 SERPL-SCNC: 23 MMOL/L (ref 21–32)
CO2 SERPL-SCNC: 24 MMOL/L (ref 21–32)
CO2 SERPL-SCNC: 25 MMOL/L (ref 21–32)
CO2 SERPL-SCNC: 26 MMOL/L (ref 21–32)
CO2 SERPL-SCNC: 27 MMOL/L (ref 21–32)
CO2 SERPL-SCNC: 27 MMOL/L (ref 22–30)
CO2 SERPL-SCNC: 28 MMOL/L (ref 21–32)
CO2 SERPL-SCNC: 29 MMOL/L (ref 21–32)
CO2 SERPL-SCNC: 29 MMOL/L (ref 21–32)
CO2 SERPL-SCNC: 30 MMOL/L (ref 21–32)
CO2 SERPL-SCNC: 30 MMOL/L (ref 22–30)
CO2 SERPL-SCNC: 31 MMOL/L (ref 21–32)
CO2 SERPL-SCNC: 31 MMOL/L (ref 21–32)
CO2 SERPL-SCNC: 31 MMOL/L (ref 22–30)
CO2 SERPL-SCNC: 32 MMOL/L (ref 21–32)
CO2 SERPL-SCNC: 32 MMOL/L (ref 22–30)
CO2 SERPL-SCNC: 33 MMOL/L (ref 21–32)
COLOR UR: YELLOW
CREAT SERPL-MCNC: 1.25 MG/DL (ref 0.6–1.3)
CREAT SERPL-MCNC: 1.68 MG/DL (ref 0.6–1.3)
CREAT SERPL-MCNC: 1.76 MG/DL (ref 0.6–1.3)
CREAT SERPL-MCNC: 1.77 MG/DL (ref 0.6–1.3)
CREAT SERPL-MCNC: 1.8 MG/DL (ref 0.6–1.3)
CREAT SERPL-MCNC: 1.83 MG/DL (ref 0.6–1.3)
CREAT SERPL-MCNC: 1.83 MG/DL (ref 0.6–1.3)
CREAT SERPL-MCNC: 1.84 MG/DL (ref 0.6–1.3)
CREAT SERPL-MCNC: 1.88 MG/DL (ref 0.7–1.5)
CREAT SERPL-MCNC: 1.9 MG/DL (ref 0.6–1.3)
CREAT SERPL-MCNC: 1.92 MG/DL (ref 0.6–1.3)
CREAT SERPL-MCNC: 1.93 MG/DL (ref 0.6–1.3)
CREAT SERPL-MCNC: 2.11 MG/DL (ref 0.6–1.3)
CREAT SERPL-MCNC: 2.2 MG/DL (ref 0.6–1.3)
CREAT SERPL-MCNC: 2.29 MG/DL (ref 0.6–1.3)
CREAT SERPL-MCNC: 2.32 MG/DL (ref 0.6–1.3)
CREAT SERPL-MCNC: 2.34 MG/DL (ref 0.6–1.3)
CREAT SERPL-MCNC: 2.37 MG/DL (ref 0.7–1.5)
CREAT SERPL-MCNC: 2.38 MG/DL (ref 0.6–1.3)
CREAT SERPL-MCNC: 2.38 MG/DL (ref 0.6–1.3)
CREAT SERPL-MCNC: 2.41 MG/DL (ref 0.6–1.3)
CREAT SERPL-MCNC: 2.42 MG/DL (ref 0.6–1.3)
CREAT SERPL-MCNC: 2.48 MG/DL (ref 0.6–1.3)
CREAT SERPL-MCNC: 2.53 MG/DL (ref 0.6–1.3)
CREAT SERPL-MCNC: 2.54 MG/DL (ref 0.6–1.3)
CREAT SERPL-MCNC: 2.54 MG/DL (ref 0.7–1.5)
CREAT SERPL-MCNC: 2.64 MG/DL (ref 0.6–1.3)
CREAT SERPL-MCNC: 2.65 MG/DL (ref 0.6–1.3)
CREAT SERPL-MCNC: 2.68 MG/DL (ref 0.6–1.3)
CREAT SERPL-MCNC: 2.69 MG/DL (ref 0.6–1.3)
CREAT SERPL-MCNC: 2.7 MG/DL (ref 0.6–1.3)
CREAT SERPL-MCNC: 2.71 MG/DL (ref 0.7–1.5)
CREAT SERPL-MCNC: 2.73 MG/DL (ref 0.6–1.3)
CREAT SERPL-MCNC: 2.74 MG/DL (ref 0.6–1.3)
CREAT SERPL-MCNC: 2.76 MG/DL (ref 0.6–1.3)
CREAT SERPL-MCNC: 3.17 MG/DL (ref 0.6–1.3)
CREAT SERPL-MCNC: 3.31 MG/DL (ref 0.6–1.3)
CREAT SERPL-MCNC: 4.23 MG/DL (ref 0.6–1.3)
CREAT SERPL-MCNC: 4.23 MG/DL (ref 0.6–1.3)
CREAT SERPL-MCNC: 4.64 MG/DL (ref 0.6–1.3)
CREAT SERPL-MCNC: 4.97 MG/DL (ref 0.6–1.3)
CROSSMATCH: NORMAL
CRP SERPL QL: 120.6 MG/L
CRP SERPL QL: 157.4 MG/L
CRP SERPL QL: 182 MG/L
CRP SERPL QL: 234.1 MG/L
CRP SERPL QL: 290 MG/L
CRP SERPL QL: 35.5 MG/L
CRP SERPL QL: 52.8 MG/L
CRP SERPL QL: 66.7 MG/L
CRP SERPL QL: 86 MG/L
D DIMER PPP FEU-MCNC: 1.39 UG/ML FEU
D DIMER PPP FEU-MCNC: 1.39 UG/ML FEU
D DIMER PPP FEU-MCNC: 1.49 UG/ML FEU
D DIMER PPP FEU-MCNC: 1.58 UG/ML FEU
D DIMER PPP FEU-MCNC: 1.78 UG/ML FEU
D DIMER PPP FEU-MCNC: 1.89 UG/ML FEU
D DIMER PPP FEU-MCNC: 2.06 UG/ML FEU
D DIMER PPP FEU-MCNC: 2.88 UG/ML FEU
D DIMER PPP FEU-MCNC: 3.84 UG/ML FEU
DACRYOCYTES BLD QL SMEAR: PRESENT
DOHLE BOD BLD QL SMEAR: PRESENT
EOSINOPHIL # BLD AUTO: 0 THOUSAND/ΜL (ref 0–0.61)
EOSINOPHIL # BLD AUTO: 0.24 THOUSAND/ΜL (ref 0–0.61)
EOSINOPHIL # BLD AUTO: 0.5 THOUSAND/ΜL (ref 0–0.61)
EOSINOPHIL # BLD MANUAL: 0 THOUSAND/UL (ref 0–0.4)
EOSINOPHIL # BLD MANUAL: 0 THOUSAND/UL (ref 0–0.4)
EOSINOPHIL # BLD MANUAL: 0.07 THOUSAND/UL (ref 0–0.4)
EOSINOPHIL # BLD MANUAL: 0.1 THOUSAND/UL (ref 0–0.4)
EOSINOPHIL # BLD MANUAL: 0.25 THOUSAND/UL (ref 0–0.4)
EOSINOPHIL NFR BLD AUTO: 0 % (ref 0–6)
EOSINOPHIL NFR BLD AUTO: 5 % (ref 0–6)
EOSINOPHIL NFR BLD AUTO: 9 % (ref 0–6)
EOSINOPHIL NFR BLD MANUAL: 0 % (ref 0–6)
EOSINOPHIL NFR BLD MANUAL: 0 % (ref 0–6)
EOSINOPHIL NFR BLD MANUAL: 1 % (ref 0–6)
EOSINOPHIL NFR BLD MANUAL: 2 % (ref 0–6)
EOSINOPHIL NFR BLD MANUAL: 4 % (ref 0–6)
ERYTHROCYTE [DISTWIDTH] IN BLOOD BY AUTOMATED COUNT: 14.4 % (ref 11.6–15.1)
ERYTHROCYTE [DISTWIDTH] IN BLOOD BY AUTOMATED COUNT: 14.6 % (ref 11.6–15.1)
ERYTHROCYTE [DISTWIDTH] IN BLOOD BY AUTOMATED COUNT: 14.6 % (ref 11.6–15.1)
ERYTHROCYTE [DISTWIDTH] IN BLOOD BY AUTOMATED COUNT: 14.7 % (ref 11.6–15.1)
ERYTHROCYTE [DISTWIDTH] IN BLOOD BY AUTOMATED COUNT: 14.9 % (ref 11.6–15.1)
ERYTHROCYTE [DISTWIDTH] IN BLOOD BY AUTOMATED COUNT: 14.9 % (ref 11.6–15.1)
ERYTHROCYTE [DISTWIDTH] IN BLOOD BY AUTOMATED COUNT: 15.2 % (ref 11.6–15.1)
ERYTHROCYTE [DISTWIDTH] IN BLOOD BY AUTOMATED COUNT: 15.3 % (ref 11.6–15.1)
ERYTHROCYTE [DISTWIDTH] IN BLOOD BY AUTOMATED COUNT: 15.3 % (ref 11.6–15.1)
ERYTHROCYTE [DISTWIDTH] IN BLOOD BY AUTOMATED COUNT: 15.4 % (ref 11.6–15.1)
ERYTHROCYTE [DISTWIDTH] IN BLOOD BY AUTOMATED COUNT: 15.5 % (ref 11.6–15.1)
ERYTHROCYTE [DISTWIDTH] IN BLOOD BY AUTOMATED COUNT: 15.6 % (ref 11.6–15.1)
ERYTHROCYTE [DISTWIDTH] IN BLOOD BY AUTOMATED COUNT: 15.7 % (ref 11.6–15.1)
ERYTHROCYTE [DISTWIDTH] IN BLOOD BY AUTOMATED COUNT: 15.7 % (ref 11.6–15.1)
ERYTHROCYTE [DISTWIDTH] IN BLOOD BY AUTOMATED COUNT: 16.3 % (ref 11.6–15.1)
ERYTHROCYTE [DISTWIDTH] IN BLOOD BY AUTOMATED COUNT: 16.5 % (ref 11.6–15.1)
ERYTHROCYTE [DISTWIDTH] IN BLOOD BY AUTOMATED COUNT: 16.7 % (ref 11.6–15.1)
FERRITIN SERPL-MCNC: 1018 NG/ML (ref 8–388)
FERRITIN SERPL-MCNC: 1302 NG/ML (ref 8–388)
FERRITIN SERPL-MCNC: 1696 NG/ML (ref 8–388)
FERRITIN SERPL-MCNC: 1839 NG/ML (ref 8–388)
FERRITIN SERPL-MCNC: 2421 NG/ML (ref 8–388)
GFR SERPL CREATININE-BSD FRML MDRD: 13 ML/MIN/1.73SQ M
GFR SERPL CREATININE-BSD FRML MDRD: 14 ML/MIN/1.73SQ M
GFR SERPL CREATININE-BSD FRML MDRD: 15 ML/MIN/1.73SQ M
GFR SERPL CREATININE-BSD FRML MDRD: 15 ML/MIN/1.73SQ M
GFR SERPL CREATININE-BSD FRML MDRD: 21 ML/MIN/1.73SQ M
GFR SERPL CREATININE-BSD FRML MDRD: 22 ML/MIN/1.73SQ M
GFR SERPL CREATININE-BSD FRML MDRD: 26 ML/MIN/1.73SQ M
GFR SERPL CREATININE-BSD FRML MDRD: 27 ML/MIN/1.73SQ M
GFR SERPL CREATININE-BSD FRML MDRD: 28 ML/MIN/1.73SQ M
GFR SERPL CREATININE-BSD FRML MDRD: 29 ML/MIN/1.73SQ M
GFR SERPL CREATININE-BSD FRML MDRD: 30 ML/MIN/1.73SQ M
GFR SERPL CREATININE-BSD FRML MDRD: 30 ML/MIN/1.73SQ M
GFR SERPL CREATININE-BSD FRML MDRD: 31 ML/MIN/1.73SQ M
GFR SERPL CREATININE-BSD FRML MDRD: 32 ML/MIN/1.73SQ M
GFR SERPL CREATININE-BSD FRML MDRD: 32 ML/MIN/1.73SQ M
GFR SERPL CREATININE-BSD FRML MDRD: 34 ML/MIN/1.73SQ M
GFR SERPL CREATININE-BSD FRML MDRD: 36 ML/MIN/1.73SQ M
GFR SERPL CREATININE-BSD FRML MDRD: 40 ML/MIN/1.73SQ M
GFR SERPL CREATININE-BSD FRML MDRD: 41 ML/MIN/1.73SQ M
GFR SERPL CREATININE-BSD FRML MDRD: 42 ML/MIN/1.73SQ M
GFR SERPL CREATININE-BSD FRML MDRD: 43 ML/MIN/1.73SQ M
GFR SERPL CREATININE-BSD FRML MDRD: 44 ML/MIN/1.73SQ M
GFR SERPL CREATININE-BSD FRML MDRD: 44 ML/MIN/1.73SQ M
GFR SERPL CREATININE-BSD FRML MDRD: 47 ML/MIN/1.73SQ M
GFR SERPL CREATININE-BSD FRML MDRD: 67 ML/MIN/1.73SQ M
GLUCOSE P FAST SERPL-MCNC: 120 MG/DL (ref 70–99)
GLUCOSE P FAST SERPL-MCNC: 84 MG/DL (ref 70–99)
GLUCOSE P FAST SERPL-MCNC: 87 MG/DL (ref 70–99)
GLUCOSE P FAST SERPL-MCNC: 90 MG/DL (ref 65–99)
GLUCOSE P FAST SERPL-MCNC: 95 MG/DL (ref 70–99)
GLUCOSE SERPL-MCNC: 100 MG/DL (ref 65–140)
GLUCOSE SERPL-MCNC: 102 MG/DL (ref 65–140)
GLUCOSE SERPL-MCNC: 106 MG/DL (ref 65–140)
GLUCOSE SERPL-MCNC: 106 MG/DL (ref 65–140)
GLUCOSE SERPL-MCNC: 109 MG/DL (ref 65–140)
GLUCOSE SERPL-MCNC: 110 MG/DL (ref 65–140)
GLUCOSE SERPL-MCNC: 113 MG/DL (ref 65–140)
GLUCOSE SERPL-MCNC: 114 MG/DL (ref 65–140)
GLUCOSE SERPL-MCNC: 114 MG/DL (ref 65–140)
GLUCOSE SERPL-MCNC: 119 MG/DL (ref 65–140)
GLUCOSE SERPL-MCNC: 120 MG/DL (ref 65–140)
GLUCOSE SERPL-MCNC: 120 MG/DL (ref 65–140)
GLUCOSE SERPL-MCNC: 123 MG/DL (ref 65–140)
GLUCOSE SERPL-MCNC: 124 MG/DL (ref 65–140)
GLUCOSE SERPL-MCNC: 124 MG/DL (ref 65–140)
GLUCOSE SERPL-MCNC: 126 MG/DL (ref 65–140)
GLUCOSE SERPL-MCNC: 127 MG/DL (ref 65–140)
GLUCOSE SERPL-MCNC: 128 MG/DL (ref 65–140)
GLUCOSE SERPL-MCNC: 129 MG/DL (ref 65–140)
GLUCOSE SERPL-MCNC: 130 MG/DL (ref 65–140)
GLUCOSE SERPL-MCNC: 130 MG/DL (ref 65–140)
GLUCOSE SERPL-MCNC: 131 MG/DL (ref 65–140)
GLUCOSE SERPL-MCNC: 131 MG/DL (ref 65–140)
GLUCOSE SERPL-MCNC: 132 MG/DL (ref 65–140)
GLUCOSE SERPL-MCNC: 134 MG/DL (ref 65–140)
GLUCOSE SERPL-MCNC: 134 MG/DL (ref 65–140)
GLUCOSE SERPL-MCNC: 135 MG/DL (ref 65–140)
GLUCOSE SERPL-MCNC: 135 MG/DL (ref 65–140)
GLUCOSE SERPL-MCNC: 136 MG/DL (ref 65–140)
GLUCOSE SERPL-MCNC: 137 MG/DL (ref 65–140)
GLUCOSE SERPL-MCNC: 138 MG/DL (ref 65–140)
GLUCOSE SERPL-MCNC: 138 MG/DL (ref 65–140)
GLUCOSE SERPL-MCNC: 139 MG/DL (ref 65–140)
GLUCOSE SERPL-MCNC: 139 MG/DL (ref 65–140)
GLUCOSE SERPL-MCNC: 140 MG/DL (ref 65–140)
GLUCOSE SERPL-MCNC: 141 MG/DL (ref 65–140)
GLUCOSE SERPL-MCNC: 143 MG/DL (ref 65–140)
GLUCOSE SERPL-MCNC: 144 MG/DL (ref 65–140)
GLUCOSE SERPL-MCNC: 145 MG/DL (ref 65–140)
GLUCOSE SERPL-MCNC: 146 MG/DL (ref 65–140)
GLUCOSE SERPL-MCNC: 147 MG/DL (ref 65–140)
GLUCOSE SERPL-MCNC: 148 MG/DL (ref 65–140)
GLUCOSE SERPL-MCNC: 148 MG/DL (ref 65–140)
GLUCOSE SERPL-MCNC: 149 MG/DL (ref 65–140)
GLUCOSE SERPL-MCNC: 150 MG/DL (ref 65–140)
GLUCOSE SERPL-MCNC: 151 MG/DL (ref 65–140)
GLUCOSE SERPL-MCNC: 151 MG/DL (ref 65–140)
GLUCOSE SERPL-MCNC: 152 MG/DL (ref 65–140)
GLUCOSE SERPL-MCNC: 154 MG/DL (ref 65–140)
GLUCOSE SERPL-MCNC: 154 MG/DL (ref 65–140)
GLUCOSE SERPL-MCNC: 155 MG/DL (ref 65–140)
GLUCOSE SERPL-MCNC: 156 MG/DL (ref 65–140)
GLUCOSE SERPL-MCNC: 157 MG/DL (ref 65–140)
GLUCOSE SERPL-MCNC: 157 MG/DL (ref 65–140)
GLUCOSE SERPL-MCNC: 158 MG/DL (ref 65–140)
GLUCOSE SERPL-MCNC: 158 MG/DL (ref 65–140)
GLUCOSE SERPL-MCNC: 159 MG/DL (ref 65–140)
GLUCOSE SERPL-MCNC: 160 MG/DL (ref 65–140)
GLUCOSE SERPL-MCNC: 160 MG/DL (ref 65–140)
GLUCOSE SERPL-MCNC: 161 MG/DL (ref 65–140)
GLUCOSE SERPL-MCNC: 163 MG/DL (ref 65–140)
GLUCOSE SERPL-MCNC: 164 MG/DL (ref 65–140)
GLUCOSE SERPL-MCNC: 165 MG/DL (ref 65–140)
GLUCOSE SERPL-MCNC: 166 MG/DL (ref 65–140)
GLUCOSE SERPL-MCNC: 167 MG/DL (ref 65–140)
GLUCOSE SERPL-MCNC: 169 MG/DL (ref 65–140)
GLUCOSE SERPL-MCNC: 169 MG/DL (ref 65–140)
GLUCOSE SERPL-MCNC: 170 MG/DL (ref 65–140)
GLUCOSE SERPL-MCNC: 171 MG/DL (ref 65–140)
GLUCOSE SERPL-MCNC: 172 MG/DL (ref 65–140)
GLUCOSE SERPL-MCNC: 172 MG/DL (ref 65–140)
GLUCOSE SERPL-MCNC: 173 MG/DL (ref 65–140)
GLUCOSE SERPL-MCNC: 173 MG/DL (ref 65–140)
GLUCOSE SERPL-MCNC: 174 MG/DL (ref 65–140)
GLUCOSE SERPL-MCNC: 176 MG/DL (ref 65–140)
GLUCOSE SERPL-MCNC: 176 MG/DL (ref 65–140)
GLUCOSE SERPL-MCNC: 178 MG/DL (ref 65–140)
GLUCOSE SERPL-MCNC: 179 MG/DL (ref 65–140)
GLUCOSE SERPL-MCNC: 180 MG/DL (ref 65–140)
GLUCOSE SERPL-MCNC: 181 MG/DL (ref 65–140)
GLUCOSE SERPL-MCNC: 181 MG/DL (ref 65–140)
GLUCOSE SERPL-MCNC: 182 MG/DL (ref 65–140)
GLUCOSE SERPL-MCNC: 183 MG/DL (ref 65–140)
GLUCOSE SERPL-MCNC: 183 MG/DL (ref 65–140)
GLUCOSE SERPL-MCNC: 184 MG/DL (ref 65–140)
GLUCOSE SERPL-MCNC: 186 MG/DL (ref 65–140)
GLUCOSE SERPL-MCNC: 187 MG/DL (ref 65–140)
GLUCOSE SERPL-MCNC: 189 MG/DL (ref 65–140)
GLUCOSE SERPL-MCNC: 190 MG/DL (ref 65–140)
GLUCOSE SERPL-MCNC: 191 MG/DL (ref 65–140)
GLUCOSE SERPL-MCNC: 192 MG/DL (ref 65–140)
GLUCOSE SERPL-MCNC: 193 MG/DL (ref 65–140)
GLUCOSE SERPL-MCNC: 193 MG/DL (ref 65–140)
GLUCOSE SERPL-MCNC: 194 MG/DL (ref 65–140)
GLUCOSE SERPL-MCNC: 195 MG/DL (ref 65–140)
GLUCOSE SERPL-MCNC: 196 MG/DL (ref 65–140)
GLUCOSE SERPL-MCNC: 196 MG/DL (ref 65–140)
GLUCOSE SERPL-MCNC: 197 MG/DL (ref 65–140)
GLUCOSE SERPL-MCNC: 199 MG/DL (ref 65–140)
GLUCOSE SERPL-MCNC: 200 MG/DL (ref 65–140)
GLUCOSE SERPL-MCNC: 200 MG/DL (ref 65–140)
GLUCOSE SERPL-MCNC: 201 MG/DL (ref 65–140)
GLUCOSE SERPL-MCNC: 204 MG/DL (ref 65–140)
GLUCOSE SERPL-MCNC: 205 MG/DL (ref 65–140)
GLUCOSE SERPL-MCNC: 213 MG/DL (ref 65–140)
GLUCOSE SERPL-MCNC: 214 MG/DL (ref 65–140)
GLUCOSE SERPL-MCNC: 216 MG/DL (ref 65–140)
GLUCOSE SERPL-MCNC: 218 MG/DL (ref 65–140)
GLUCOSE SERPL-MCNC: 219 MG/DL (ref 65–140)
GLUCOSE SERPL-MCNC: 227 MG/DL (ref 65–140)
GLUCOSE SERPL-MCNC: 227 MG/DL (ref 65–140)
GLUCOSE SERPL-MCNC: 228 MG/DL (ref 65–140)
GLUCOSE SERPL-MCNC: 234 MG/DL (ref 65–140)
GLUCOSE SERPL-MCNC: 237 MG/DL (ref 65–140)
GLUCOSE SERPL-MCNC: 240 MG/DL (ref 65–140)
GLUCOSE SERPL-MCNC: 244 MG/DL (ref 65–140)
GLUCOSE SERPL-MCNC: 245 MG/DL (ref 65–140)
GLUCOSE SERPL-MCNC: 245 MG/DL (ref 65–140)
GLUCOSE SERPL-MCNC: 246 MG/DL (ref 65–140)
GLUCOSE SERPL-MCNC: 247 MG/DL (ref 65–140)
GLUCOSE SERPL-MCNC: 253 MG/DL (ref 65–140)
GLUCOSE SERPL-MCNC: 255 MG/DL (ref 65–140)
GLUCOSE SERPL-MCNC: 262 MG/DL (ref 65–140)
GLUCOSE SERPL-MCNC: 266 MG/DL (ref 65–140)
GLUCOSE SERPL-MCNC: 270 MG/DL (ref 65–140)
GLUCOSE SERPL-MCNC: 277 MG/DL (ref 65–140)
GLUCOSE SERPL-MCNC: 277 MG/DL (ref 65–140)
GLUCOSE SERPL-MCNC: 278 MG/DL (ref 65–140)
GLUCOSE SERPL-MCNC: 280 MG/DL (ref 65–140)
GLUCOSE SERPL-MCNC: 280 MG/DL (ref 65–140)
GLUCOSE SERPL-MCNC: 282 MG/DL (ref 65–140)
GLUCOSE SERPL-MCNC: 282 MG/DL (ref 65–140)
GLUCOSE SERPL-MCNC: 286 MG/DL (ref 65–140)
GLUCOSE SERPL-MCNC: 290 MG/DL (ref 65–140)
GLUCOSE SERPL-MCNC: 305 MG/DL (ref 65–140)
GLUCOSE SERPL-MCNC: 322 MG/DL (ref 65–140)
GLUCOSE SERPL-MCNC: 325 MG/DL (ref 65–140)
GLUCOSE SERPL-MCNC: 369 MG/DL (ref 65–140)
GLUCOSE SERPL-MCNC: 371 MG/DL (ref 65–140)
GLUCOSE SERPL-MCNC: 378 MG/DL (ref 65–140)
GLUCOSE SERPL-MCNC: 392 MG/DL (ref 65–140)
GLUCOSE SERPL-MCNC: 419 MG/DL (ref 65–140)
GLUCOSE SERPL-MCNC: 437 MG/DL (ref 65–140)
GLUCOSE SERPL-MCNC: 49 MG/DL (ref 65–140)
GLUCOSE SERPL-MCNC: 52 MG/DL (ref 65–140)
GLUCOSE SERPL-MCNC: 67 MG/DL (ref 65–140)
GLUCOSE SERPL-MCNC: 67 MG/DL (ref 65–140)
GLUCOSE SERPL-MCNC: 68 MG/DL (ref 65–140)
GLUCOSE SERPL-MCNC: 71 MG/DL (ref 65–140)
GLUCOSE SERPL-MCNC: 75 MG/DL (ref 65–140)
GLUCOSE SERPL-MCNC: 76 MG/DL (ref 65–140)
GLUCOSE SERPL-MCNC: 81 MG/DL (ref 65–140)
GLUCOSE SERPL-MCNC: 91 MG/DL (ref 65–140)
GLUCOSE SERPL-MCNC: 94 MG/DL (ref 65–140)
GLUCOSE SERPL-MCNC: 99 MG/DL (ref 65–140)
GLUCOSE UR STRIP-MCNC: ABNORMAL MG/DL
GLUCOSE UR STRIP-MCNC: NEGATIVE MG/DL
GRAM STN SPEC: NORMAL
HAPTOGLOB SERPL-MCNC: 130 MG/DL (ref 32–363)
HBV CORE AB SER QL: NORMAL
HBV CORE IGM SER QL: NORMAL
HBV SURFACE AG SER QL: NORMAL
HCO3 BLDA-SCNC: 19.6 MMOL/L (ref 22–28)
HCO3 BLDA-SCNC: 20.2 MMOL/L (ref 22–28)
HCO3 BLDA-SCNC: 20.7 MMOL/L (ref 22–28)
HCO3 BLDA-SCNC: 21.6 MMOL/L (ref 22–28)
HCO3 BLDA-SCNC: 21.7 MMOL/L (ref 22–28)
HCO3 BLDA-SCNC: 22.9 MMOL/L (ref 22–28)
HCO3 BLDA-SCNC: 23 MMOL/L (ref 22–28)
HCO3 BLDA-SCNC: 23.2 MMOL/L (ref 22–28)
HCO3 BLDA-SCNC: 23.5 MMOL/L (ref 22–28)
HCO3 BLDA-SCNC: 23.6 MMOL/L (ref 22–28)
HCO3 BLDA-SCNC: 24.3 MMOL/L (ref 22–28)
HCO3 BLDA-SCNC: 24.6 MMOL/L (ref 22–28)
HCO3 BLDA-SCNC: 24.7 MMOL/L (ref 22–28)
HCO3 BLDA-SCNC: 25.9 MMOL/L (ref 22–28)
HCO3 BLDA-SCNC: 9.1 MMOL/L (ref 22–28)
HCT VFR BLD AUTO: 21.8 % (ref 36.5–49.3)
HCT VFR BLD AUTO: 22.1 % (ref 36.5–49.3)
HCT VFR BLD AUTO: 23.4 % (ref 36.5–49.3)
HCT VFR BLD AUTO: 24.4 % (ref 36.5–49.3)
HCT VFR BLD AUTO: 24.6 % (ref 36.5–49.3)
HCT VFR BLD AUTO: 26.3 % (ref 36.5–49.3)
HCT VFR BLD AUTO: 26.4 % (ref 36.5–49.3)
HCT VFR BLD AUTO: 26.5 % (ref 36.5–49.3)
HCT VFR BLD AUTO: 26.9 % (ref 36.5–49.3)
HCT VFR BLD AUTO: 27.2 % (ref 36.5–49.3)
HCT VFR BLD AUTO: 27.6 % (ref 36.5–49.3)
HCT VFR BLD AUTO: 29.4 % (ref 36.5–49.3)
HCT VFR BLD AUTO: 30.2 % (ref 36.5–49.3)
HCT VFR BLD AUTO: 31.8 % (ref 36.5–49.3)
HCT VFR BLD AUTO: 33.1 % (ref 36.5–49.3)
HCT VFR BLD AUTO: 34.3 % (ref 36.5–49.3)
HCT VFR BLD AUTO: 34.4 % (ref 36.5–49.3)
HCT VFR BLD AUTO: 34.9 % (ref 36.5–49.3)
HCT VFR BLD AUTO: 35.4 % (ref 36.5–49.3)
HCT VFR BLD AUTO: 35.4 % (ref 36.5–49.3)
HCT VFR BLD AUTO: 35.7 % (ref 36.5–49.3)
HCT VFR BLD AUTO: 35.8 % (ref 36.5–49.3)
HCT VFR BLD AUTO: 36 % (ref 36.5–49.3)
HCT VFR BLD AUTO: 39 % (ref 36.5–49.3)
HCT VFR BLD AUTO: 39.5 % (ref 36.5–49.3)
HCV AB SER QL: NORMAL
HEMOCCULT STL QL: NEGATIVE
HFNC FLOW LPM: 55
HGB BLD-MCNC: 10.4 G/DL (ref 12–17)
HGB BLD-MCNC: 11.1 G/DL (ref 12–17)
HGB BLD-MCNC: 11.2 G/DL (ref 12–17)
HGB BLD-MCNC: 11.3 G/DL (ref 12–17)
HGB BLD-MCNC: 11.6 G/DL (ref 12–17)
HGB BLD-MCNC: 11.6 G/DL (ref 12–17)
HGB BLD-MCNC: 11.8 G/DL (ref 12–17)
HGB BLD-MCNC: 11.9 G/DL (ref 12–17)
HGB BLD-MCNC: 11.9 G/DL (ref 12–17)
HGB BLD-MCNC: 12.2 G/DL (ref 12–17)
HGB BLD-MCNC: 12.6 G/DL (ref 12–17)
HGB BLD-MCNC: 12.8 G/DL (ref 12–17)
HGB BLD-MCNC: 7.1 G/DL (ref 12–17)
HGB BLD-MCNC: 7.7 G/DL (ref 12–17)
HGB BLD-MCNC: 7.9 G/DL (ref 12–17)
HGB BLD-MCNC: 8.1 G/DL (ref 12–17)
HGB BLD-MCNC: 8.3 G/DL (ref 12–17)
HGB BLD-MCNC: 8.4 G/DL (ref 12–17)
HGB BLD-MCNC: 8.6 G/DL (ref 12–17)
HGB BLD-MCNC: 9 G/DL (ref 12–17)
HGB BLD-MCNC: 9.1 G/DL (ref 12–17)
HGB BLD-MCNC: 9.7 G/DL (ref 12–17)
HGB BLD-MCNC: 9.9 G/DL (ref 12–17)
HGB UR QL STRIP.AUTO: ABNORMAL
HGB UR QL STRIP.AUTO: ABNORMAL
HGB UR QL STRIP.AUTO: NEGATIVE
HGB UR QL STRIP.AUTO: NEGATIVE
HIV 1+2 AB+HIV1 P24 AG SERPL QL IA: NORMAL
HOROWITZ INDEX BLDA+IHG-RTO: 100 MM[HG]
HOROWITZ INDEX BLDA+IHG-RTO: 80 MM[HG]
HOROWITZ INDEX BLDA+IHG-RTO: 90 MM[HG]
HYALINE CASTS #/AREA URNS LPF: ABNORMAL /LPF
HYPERCHROMIA BLD QL SMEAR: PRESENT
IMM GRANULOCYTES # BLD AUTO: 0.01 THOUSAND/UL (ref 0–0.2)
IMM GRANULOCYTES # BLD AUTO: 0.02 THOUSAND/UL (ref 0–0.2)
IMM GRANULOCYTES # BLD AUTO: 0.03 THOUSAND/UL (ref 0–0.2)
IMM GRANULOCYTES # BLD AUTO: 0.15 THOUSAND/UL (ref 0–0.2)
IMM GRANULOCYTES # BLD AUTO: 0.16 THOUSAND/UL (ref 0–0.2)
IMM GRANULOCYTES NFR BLD AUTO: 0 % (ref 0–2)
IMM GRANULOCYTES NFR BLD AUTO: 0 % (ref 0–2)
IMM GRANULOCYTES NFR BLD AUTO: 1 % (ref 0–2)
IMM GRANULOCYTES NFR BLD AUTO: 1 % (ref 0–2)
IMM GRANULOCYTES NFR BLD AUTO: 2 % (ref 0–2)
IMM GRANULOCYTES NFR BLD AUTO: 3 % (ref 0–2)
IMM GRANULOCYTES NFR BLD AUTO: 4 % (ref 0–2)
INR PPP: 1.12 (ref 0.84–1.19)
INR PPP: 1.17 (ref 0.84–1.19)
INR PPP: 1.49 (ref 0.84–1.19)
IPAP: 20
KETONES UR STRIP-MCNC: NEGATIVE MG/DL
LACTATE SERPL-SCNC: 1.1 MMOL/L (ref 0.5–2)
LACTATE SERPL-SCNC: 1.5 MMOL/L (ref 0.5–2)
LDH SERPL-CCNC: 759 U/L (ref 81–234)
LEUKOCYTE ESTERASE UR QL STRIP: ABNORMAL
LEUKOCYTE ESTERASE UR QL STRIP: NEGATIVE
LYMPHOCYTES # BLD AUTO: 0.11 THOUSAND/UL (ref 0.6–4.47)
LYMPHOCYTES # BLD AUTO: 0.21 THOUSAND/UL (ref 0.6–4.47)
LYMPHOCYTES # BLD AUTO: 0.25 THOUSANDS/ΜL (ref 0.6–4.47)
LYMPHOCYTES # BLD AUTO: 0.36 THOUSANDS/ΜL (ref 0.6–4.47)
LYMPHOCYTES # BLD AUTO: 0.4 THOUSAND/UL (ref 0.6–4.47)
LYMPHOCYTES # BLD AUTO: 0.45 THOUSAND/UL (ref 0.6–4.47)
LYMPHOCYTES # BLD AUTO: 0.56 THOUSANDS/ΜL (ref 0.6–4.47)
LYMPHOCYTES # BLD AUTO: 0.64 THOUSANDS/ΜL (ref 0.6–4.47)
LYMPHOCYTES # BLD AUTO: 0.99 THOUSAND/UL (ref 0.6–4.47)
LYMPHOCYTES # BLD AUTO: 1 % (ref 14–44)
LYMPHOCYTES # BLD AUTO: 1.09 THOUSANDS/ΜL (ref 0.6–4.47)
LYMPHOCYTES # BLD AUTO: 16 % (ref 14–44)
LYMPHOCYTES # BLD AUTO: 2 % (ref 14–44)
LYMPHOCYTES # BLD AUTO: 3 % (ref 14–44)
LYMPHOCYTES # BLD AUTO: 4 % (ref 14–44)
LYMPHOCYTES NFR BLD AUTO: 1 % (ref 14–44)
LYMPHOCYTES NFR BLD AUTO: 10 % (ref 14–44)
LYMPHOCYTES NFR BLD AUTO: 17 % (ref 14–44)
LYMPHOCYTES NFR BLD AUTO: 2 % (ref 14–44)
LYMPHOCYTES NFR BLD AUTO: 2 % (ref 14–44)
LYMPHOCYTES NFR BLD AUTO: 21 % (ref 14–44)
LYMPHOCYTES NFR BLD AUTO: 4 % (ref 14–44)
MAGNESIUM SERPL-MCNC: 2.1 MG/DL (ref 1.6–2.6)
MAGNESIUM SERPL-MCNC: 2.3 MG/DL (ref 1.6–2.6)
MAGNESIUM SERPL-MCNC: 2.4 MG/DL (ref 1.6–2.6)
MAGNESIUM SERPL-MCNC: 2.5 MG/DL (ref 1.6–2.6)
MAGNESIUM SERPL-MCNC: 2.6 MG/DL (ref 1.6–2.6)
MAGNESIUM SERPL-MCNC: 2.6 MG/DL (ref 1.6–2.6)
MAGNESIUM SERPL-MCNC: 2.7 MG/DL (ref 1.6–2.6)
MCH RBC QN AUTO: 28 PG (ref 26.8–34.3)
MCH RBC QN AUTO: 28.3 PG (ref 26.8–34.3)
MCH RBC QN AUTO: 28.3 PG (ref 26.8–34.3)
MCH RBC QN AUTO: 28.5 PG (ref 26.8–34.3)
MCH RBC QN AUTO: 28.6 PG (ref 26.8–34.3)
MCH RBC QN AUTO: 28.6 PG (ref 26.8–34.3)
MCH RBC QN AUTO: 28.7 PG (ref 26.8–34.3)
MCH RBC QN AUTO: 29 PG (ref 26.8–34.3)
MCH RBC QN AUTO: 29.1 PG (ref 26.8–34.3)
MCH RBC QN AUTO: 29.1 PG (ref 26.8–34.3)
MCH RBC QN AUTO: 29.2 PG (ref 26.8–34.3)
MCH RBC QN AUTO: 29.2 PG (ref 26.8–34.3)
MCH RBC QN AUTO: 29.3 PG (ref 26.8–34.3)
MCH RBC QN AUTO: 29.4 PG (ref 26.8–34.3)
MCH RBC QN AUTO: 29.4 PG (ref 26.8–34.3)
MCH RBC QN AUTO: 29.5 PG (ref 26.8–34.3)
MCHC RBC AUTO-ENTMCNC: 30.6 G/DL (ref 31.4–37.4)
MCHC RBC AUTO-ENTMCNC: 30.9 G/DL (ref 31.4–37.4)
MCHC RBC AUTO-ENTMCNC: 30.9 G/DL (ref 31.4–37.4)
MCHC RBC AUTO-ENTMCNC: 31.6 G/DL (ref 31.4–37.4)
MCHC RBC AUTO-ENTMCNC: 32.1 G/DL (ref 31.4–37.4)
MCHC RBC AUTO-ENTMCNC: 32.3 G/DL (ref 31.4–37.4)
MCHC RBC AUTO-ENTMCNC: 32.4 G/DL (ref 31.4–37.4)
MCHC RBC AUTO-ENTMCNC: 32.6 G/DL (ref 31.4–37.4)
MCHC RBC AUTO-ENTMCNC: 32.7 G/DL (ref 31.4–37.4)
MCHC RBC AUTO-ENTMCNC: 32.8 G/DL (ref 31.4–37.4)
MCHC RBC AUTO-ENTMCNC: 33 G/DL (ref 31.4–37.4)
MCHC RBC AUTO-ENTMCNC: 33 G/DL (ref 31.4–37.4)
MCHC RBC AUTO-ENTMCNC: 33.3 G/DL (ref 31.4–37.4)
MCHC RBC AUTO-ENTMCNC: 33.3 G/DL (ref 31.4–37.4)
MCHC RBC AUTO-ENTMCNC: 33.5 G/DL (ref 31.4–37.4)
MCHC RBC AUTO-ENTMCNC: 33.6 G/DL (ref 31.4–37.4)
MCHC RBC AUTO-ENTMCNC: 33.7 G/DL (ref 31.4–37.4)
MCHC RBC AUTO-ENTMCNC: 33.8 G/DL (ref 31.4–37.4)
MCHC RBC AUTO-ENTMCNC: 33.9 G/DL (ref 31.4–37.4)
MCHC RBC AUTO-ENTMCNC: 34.1 G/DL (ref 31.4–37.4)
MCV RBC AUTO: 85 FL (ref 82–98)
MCV RBC AUTO: 86 FL (ref 82–98)
MCV RBC AUTO: 87 FL (ref 82–98)
MCV RBC AUTO: 88 FL (ref 82–98)
MCV RBC AUTO: 89 FL (ref 82–98)
MCV RBC AUTO: 90 FL (ref 82–98)
MCV RBC AUTO: 91 FL (ref 82–98)
MCV RBC AUTO: 92 FL (ref 82–98)
MCV RBC AUTO: 94 FL (ref 82–98)
MCV RBC AUTO: 95 FL (ref 82–98)
METAMYELOCYTES NFR BLD MANUAL: 1 % (ref 0–1)
METAMYELOCYTES NFR BLD MANUAL: 3 % (ref 0–1)
MICROCYTES BLD QL AUTO: PRESENT
MICROCYTES BLD QL AUTO: PRESENT
MONOCYTES # BLD AUTO: 0 THOUSAND/UL (ref 0–1.22)
MONOCYTES # BLD AUTO: 0.07 THOUSAND/UL (ref 0–1.22)
MONOCYTES # BLD AUTO: 0.29 THOUSAND/ΜL (ref 0.17–1.22)
MONOCYTES # BLD AUTO: 0.33 THOUSAND/ΜL (ref 0.17–1.22)
MONOCYTES # BLD AUTO: 0.37 THOUSAND/UL (ref 0–1.22)
MONOCYTES # BLD AUTO: 0.45 THOUSAND/UL (ref 0–1.22)
MONOCYTES # BLD AUTO: 0.55 THOUSAND/ΜL (ref 0.17–1.22)
MONOCYTES # BLD AUTO: 0.61 THOUSAND/ΜL (ref 0.17–1.22)
MONOCYTES # BLD AUTO: 0.86 THOUSAND/UL (ref 0–1.22)
MONOCYTES # BLD AUTO: 0.93 THOUSAND/ΜL (ref 0.17–1.22)
MONOCYTES NFR BLD AUTO: 1 % (ref 4–12)
MONOCYTES NFR BLD AUTO: 18 % (ref 4–12)
MONOCYTES NFR BLD AUTO: 4 % (ref 4–12)
MONOCYTES NFR BLD AUTO: 4 % (ref 4–12)
MONOCYTES NFR BLD AUTO: 5 % (ref 4–12)
MONOCYTES NFR BLD AUTO: 6 % (ref 4–12)
MONOCYTES NFR BLD AUTO: 9 % (ref 4–12)
MONOCYTES NFR BLD: 0 % (ref 4–12)
MONOCYTES NFR BLD: 2 % (ref 4–12)
MONOCYTES NFR BLD: 2 % (ref 4–12)
MONOCYTES NFR BLD: 4 % (ref 4–12)
MONOCYTES NFR BLD: 6 % (ref 4–12)
MRSA NOSE QL CULT: NORMAL
MYELOCYTES NFR BLD MANUAL: 1 % (ref 0–1)
NASAL CANNULA: 5
NEUTROPHILS # BLD AUTO: 12.07 THOUSANDS/ΜL (ref 1.85–7.62)
NEUTROPHILS # BLD AUTO: 2.77 THOUSANDS/ΜL (ref 1.85–7.62)
NEUTROPHILS # BLD AUTO: 2.81 THOUSANDS/ΜL (ref 1.85–7.62)
NEUTROPHILS # BLD AUTO: 4.07 THOUSANDS/ΜL (ref 1.85–7.62)
NEUTROPHILS # BLD AUTO: 8.96 THOUSANDS/ΜL (ref 1.85–7.62)
NEUTROPHILS # BLD MANUAL: 20.13 THOUSAND/UL (ref 1.85–7.62)
NEUTROPHILS # BLD MANUAL: 21.74 THOUSAND/UL (ref 1.85–7.62)
NEUTROPHILS # BLD MANUAL: 3.32 THOUSAND/UL (ref 1.85–7.62)
NEUTROPHILS # BLD MANUAL: 4.52 THOUSAND/UL (ref 1.85–7.62)
NEUTROPHILS # BLD MANUAL: 9.11 THOUSAND/UL (ref 1.85–7.62)
NEUTS BAND NFR BLD MANUAL: 12 % (ref 0–8)
NEUTS BAND NFR BLD MANUAL: 13 % (ref 0–8)
NEUTS BAND NFR BLD MANUAL: 5 % (ref 0–8)
NEUTS BAND NFR BLD MANUAL: 9 % (ref 0–8)
NEUTS SEG NFR BLD AUTO: 55 % (ref 43–75)
NEUTS SEG NFR BLD AUTO: 61 % (ref 43–75)
NEUTS SEG NFR BLD AUTO: 74 % (ref 43–75)
NEUTS SEG NFR BLD AUTO: 75 % (ref 43–75)
NEUTS SEG NFR BLD AUTO: 79 % (ref 43–75)
NEUTS SEG NFR BLD AUTO: 87 % (ref 43–75)
NEUTS SEG NFR BLD AUTO: 88 % (ref 43–75)
NEUTS SEG NFR BLD AUTO: 88 % (ref 43–75)
NEUTS SEG NFR BLD AUTO: 91 % (ref 43–75)
NEUTS SEG NFR BLD AUTO: 93 % (ref 43–75)
NEUTS SEG NFR BLD AUTO: 94 % (ref 43–75)
NEUTS SEG NFR BLD AUTO: 94 % (ref 43–75)
NITRITE UR QL STRIP: NEGATIVE
NON VENT HFNC FIO2: 100
NON VENT TYPE HFNC: ABNORMAL
NON VENT TYPE- NRB: 100
NON VENT TYPE- NRB: 100
NON VENT- BIPAP: ABNORMAL
NON-SQ EPI CELLS URNS QL MICRO: ABNORMAL /HPF
NRBC BLD AUTO-RTO: 0 /100 WBCS
NRBC BLD AUTO-RTO: 1 /100 WBC (ref 0–2)
NRBC BLD AUTO-RTO: 1 /100 WBC (ref 0–2)
NRBC BLD AUTO-RTO: 1 /100 WBCS
NRBC BLD AUTO-RTO: 1 /100 WBCS
NT-PROBNP SERPL-MCNC: 7074 PG/ML
O2 CT BLDA-SCNC: 12.8 ML/DL (ref 16–23)
O2 CT BLDA-SCNC: 13.8 ML/DL (ref 16–23)
O2 CT BLDA-SCNC: 14 ML/DL (ref 16–23)
O2 CT BLDA-SCNC: 14.2 ML/DL (ref 16–23)
O2 CT BLDA-SCNC: 14.4 ML/DL (ref 16–23)
O2 CT BLDA-SCNC: 14.6 ML/DL (ref 16–23)
O2 CT BLDA-SCNC: 14.7 ML/DL (ref 16–23)
O2 CT BLDA-SCNC: 14.8 ML/DL (ref 16–23)
O2 CT BLDA-SCNC: 14.9 ML/DL (ref 16–23)
O2 CT BLDA-SCNC: 15.6 ML/DL (ref 16–23)
O2 CT BLDA-SCNC: 15.7 ML/DL (ref 16–23)
O2 CT BLDA-SCNC: 15.9 ML/DL (ref 16–23)
O2 CT BLDA-SCNC: 17.1 ML/DL (ref 16–23)
O2 CT BLDA-SCNC: 6.8 ML/DL (ref 16–23)
O2 CT BLDA-SCNC: 8.4 ML/DL (ref 16–23)
OXYHGB MFR BLDA: 72.5 % (ref 94–97)
OXYHGB MFR BLDA: 77.4 % (ref 94–97)
OXYHGB MFR BLDA: 85.8 % (ref 94–97)
OXYHGB MFR BLDA: 87.6 % (ref 94–97)
OXYHGB MFR BLDA: 89.8 % (ref 94–97)
OXYHGB MFR BLDA: 90.4 % (ref 94–97)
OXYHGB MFR BLDA: 91.4 % (ref 94–97)
OXYHGB MFR BLDA: 96.8 % (ref 94–97)
OXYHGB MFR BLDA: 97.7 % (ref 94–97)
OXYHGB MFR BLDA: 98.1 % (ref 94–97)
OXYHGB MFR BLDA: 98.2 % (ref 94–97)
OXYHGB MFR BLDA: 98.3 % (ref 94–97)
OXYHGB MFR BLDA: 98.4 % (ref 94–97)
OXYHGB MFR BLDA: 98.5 % (ref 94–97)
OXYHGB MFR BLDA: 98.6 % (ref 94–97)
P AXIS: 55 DEGREES
P AXIS: 66 DEGREES
P AXIS: 67 DEGREES
P AXIS: 68 DEGREES
P AXIS: 70 DEGREES
P AXIS: 74 DEGREES
P AXIS: 79 DEGREES
P AXIS: 81 DEGREES
PATHOLOGIST INTERPRETATION: NORMAL
PCO2 BLDA: 15.2 MM HG (ref 36–44)
PCO2 BLDA: 29.7 MM HG (ref 36–44)
PCO2 BLDA: 30.3 MM HG (ref 36–44)
PCO2 BLDA: 31.3 MM HG (ref 36–44)
PCO2 BLDA: 31.7 MM HG (ref 36–44)
PCO2 BLDA: 32.2 MM HG (ref 36–44)
PCO2 BLDA: 32.9 MM HG (ref 36–44)
PCO2 BLDA: 33 MM HG (ref 36–44)
PCO2 BLDA: 35 MM HG (ref 36–44)
PCO2 BLDA: 35.4 MM HG (ref 36–44)
PCO2 BLDA: 35.9 MM HG (ref 36–44)
PCO2 BLDA: 37.9 MM HG (ref 36–44)
PCO2 BLDA: 46.8 MM HG (ref 36–44)
PCO2 BLDA: 49 MM HG (ref 36–44)
PCO2 BLDA: 75.1 MM HG (ref 36–44)
PEEP MAX SETTING VENT: 12 CM[H2O]
PEEP RESPIRATORY: 14 CM[H2O]
PEEP RESPIRATORY: 16 CM[H2O]
PH BLDA: 7.11 [PH] (ref 7.35–7.45)
PH BLDA: 7.25 [PH] (ref 7.35–7.45)
PH BLDA: 7.32 [PH] (ref 7.35–7.45)
PH BLDA: 7.38 [PH] (ref 7.35–7.45)
PH BLDA: 7.39 [PH] (ref 7.35–7.45)
PH BLDA: 7.4 [PH] (ref 7.35–7.45)
PH BLDA: 7.42 [PH] (ref 7.35–7.45)
PH BLDA: 7.43 [PH] (ref 7.35–7.45)
PH BLDA: 7.46 [PH] (ref 7.35–7.45)
PH BLDA: 7.48 [PH] (ref 7.35–7.45)
PH BLDA: 7.51 [PH] (ref 7.35–7.45)
PH BLDA: 7.52 [PH] (ref 7.35–7.45)
PH BLDA: 7.54 [PH] (ref 7.35–7.45)
PH UR STRIP.AUTO: 5.5 [PH]
PHOSPHATE SERPL-MCNC: 3.4 MG/DL (ref 2.3–4.1)
PHOSPHATE SERPL-MCNC: 3.7 MG/DL (ref 2.3–4.1)
PHOSPHATE SERPL-MCNC: 3.9 MG/DL (ref 2.3–4.1)
PHOSPHATE SERPL-MCNC: 4.5 MG/DL (ref 2.3–4.1)
PHOSPHATE SERPL-MCNC: 4.5 MG/DL (ref 2.3–4.1)
PHOSPHATE SERPL-MCNC: 5.3 MG/DL (ref 2.3–4.1)
PHOSPHATE SERPL-MCNC: 7.9 MG/DL (ref 2.3–4.1)
PLATELET # BLD AUTO: 104 THOUSANDS/UL (ref 149–390)
PLATELET # BLD AUTO: 104 THOUSANDS/UL (ref 149–390)
PLATELET # BLD AUTO: 107 THOUSANDS/UL (ref 149–390)
PLATELET # BLD AUTO: 112 THOUSANDS/UL (ref 149–390)
PLATELET # BLD AUTO: 129 THOUSANDS/UL (ref 149–390)
PLATELET # BLD AUTO: 140 THOUSANDS/UL (ref 149–390)
PLATELET # BLD AUTO: 143 THOUSANDS/UL (ref 149–390)
PLATELET # BLD AUTO: 149 THOUSANDS/UL (ref 149–390)
PLATELET # BLD AUTO: 163 THOUSANDS/UL (ref 149–390)
PLATELET # BLD AUTO: 173 THOUSANDS/UL (ref 149–390)
PLATELET # BLD AUTO: 175 THOUSANDS/UL (ref 149–390)
PLATELET # BLD AUTO: 183 THOUSANDS/UL (ref 149–390)
PLATELET # BLD AUTO: 71 THOUSANDS/UL (ref 149–390)
PLATELET # BLD AUTO: 75 THOUSANDS/UL (ref 149–390)
PLATELET # BLD AUTO: 76 THOUSANDS/UL (ref 149–390)
PLATELET # BLD AUTO: 81 THOUSANDS/UL (ref 149–390)
PLATELET # BLD AUTO: 85 THOUSANDS/UL (ref 149–390)
PLATELET # BLD AUTO: 87 THOUSANDS/UL (ref 149–390)
PLATELET # BLD AUTO: 88 THOUSANDS/UL (ref 149–390)
PLATELET # BLD AUTO: 92 THOUSANDS/UL (ref 149–390)
PLATELET # BLD AUTO: 96 THOUSANDS/UL (ref 149–390)
PLATELET # BLD AUTO: 98 THOUSANDS/UL (ref 149–390)
PLATELET BLD QL SMEAR: ABNORMAL
PMV BLD AUTO: 11.4 FL (ref 8.9–12.7)
PMV BLD AUTO: 11.8 FL (ref 8.9–12.7)
PMV BLD AUTO: 12 FL (ref 8.9–12.7)
PMV BLD AUTO: 12 FL (ref 8.9–12.7)
PMV BLD AUTO: 12.1 FL (ref 8.9–12.7)
PMV BLD AUTO: 12.1 FL (ref 8.9–12.7)
PMV BLD AUTO: 12.2 FL (ref 8.9–12.7)
PMV BLD AUTO: 12.4 FL (ref 8.9–12.7)
PMV BLD AUTO: 12.6 FL (ref 8.9–12.7)
PMV BLD AUTO: 12.6 FL (ref 8.9–12.7)
PMV BLD AUTO: 12.7 FL (ref 8.9–12.7)
PMV BLD AUTO: 12.8 FL (ref 8.9–12.7)
PMV BLD AUTO: 13.1 FL (ref 8.9–12.7)
PMV BLD AUTO: 13.1 FL (ref 8.9–12.7)
PMV BLD AUTO: 13.3 FL (ref 8.9–12.7)
PMV BLD AUTO: 13.4 FL (ref 8.9–12.7)
PMV BLD AUTO: 13.5 FL (ref 8.9–12.7)
PMV BLD AUTO: 13.8 FL (ref 8.9–12.7)
PO2 BLDA: 105.1 MM HG (ref 75–129)
PO2 BLDA: 129.7 MM HG (ref 75–129)
PO2 BLDA: 180.1 MM HG (ref 75–129)
PO2 BLDA: 183.6 MM HG (ref 75–129)
PO2 BLDA: 195.2 MM HG (ref 75–129)
PO2 BLDA: 207.7 MM HG (ref 75–129)
PO2 BLDA: 208.9 MM HG (ref 75–129)
PO2 BLDA: 217.1 MM HG (ref 75–129)
PO2 BLDA: 43.9 MM HG (ref 75–129)
PO2 BLDA: 50.5 MM HG (ref 75–129)
PO2 BLDA: 53.2 MM HG (ref 75–129)
PO2 BLDA: 57.7 MM HG (ref 75–129)
PO2 BLDA: 59.6 MM HG (ref 75–129)
PO2 BLDA: 60.6 MM HG (ref 75–129)
PO2 BLDA: 76.5 MM HG (ref 75–129)
POLYCHROMASIA BLD QL SMEAR: PRESENT
POTASSIUM SERPL-SCNC: 3.2 MMOL/L (ref 3.5–5.3)
POTASSIUM SERPL-SCNC: 3.3 MMOL/L (ref 3.5–5.3)
POTASSIUM SERPL-SCNC: 3.5 MMOL/L (ref 3.5–5.3)
POTASSIUM SERPL-SCNC: 3.6 MMOL/L (ref 3.5–5.3)
POTASSIUM SERPL-SCNC: 3.6 MMOL/L (ref 3.6–5)
POTASSIUM SERPL-SCNC: 3.7 MMOL/L (ref 3.5–5.3)
POTASSIUM SERPL-SCNC: 3.7 MMOL/L (ref 3.6–5)
POTASSIUM SERPL-SCNC: 3.8 MMOL/L (ref 3.5–5.3)
POTASSIUM SERPL-SCNC: 3.8 MMOL/L (ref 3.6–5)
POTASSIUM SERPL-SCNC: 3.9 MMOL/L (ref 3.5–5.3)
POTASSIUM SERPL-SCNC: 3.9 MMOL/L (ref 3.5–5.3)
POTASSIUM SERPL-SCNC: 4 MMOL/L (ref 3.5–5.3)
POTASSIUM SERPL-SCNC: 4.1 MMOL/L (ref 3.5–5.3)
POTASSIUM SERPL-SCNC: 4.1 MMOL/L (ref 3.6–5)
POTASSIUM SERPL-SCNC: 4.2 MMOL/L (ref 3.5–5.3)
POTASSIUM SERPL-SCNC: 4.2 MMOL/L (ref 3.5–5.3)
POTASSIUM SERPL-SCNC: 4.5 MMOL/L (ref 3.5–5.3)
POTASSIUM SERPL-SCNC: 4.5 MMOL/L (ref 3.5–5.3)
POTASSIUM SERPL-SCNC: 4.6 MMOL/L (ref 3.5–5.3)
POTASSIUM SERPL-SCNC: 4.7 MMOL/L (ref 3.5–5.3)
POTASSIUM SERPL-SCNC: 4.8 MMOL/L (ref 3.5–5.3)
POTASSIUM SERPL-SCNC: 5.1 MMOL/L (ref 3.5–5.3)
POTASSIUM SERPL-SCNC: 5.5 MMOL/L (ref 3.5–5.3)
POTASSIUM SERPL-SCNC: 6 MMOL/L (ref 3.5–5.3)
POTASSIUM SERPL-SCNC: 6.9 MMOL/L (ref 3.5–5.3)
PR INTERVAL: 1049 MS
PR INTERVAL: 200 MS
PR INTERVAL: 200 MS
PR INTERVAL: 213 MS
PR INTERVAL: 213 MS
PR INTERVAL: 217 MS
PR INTERVAL: 229 MS
PR INTERVAL: 254 MS
PR INTERVAL: 258 MS
PR INTERVAL: 28 MS
PROCALCITONIN SERPL-MCNC: 0.15 NG/ML
PROCALCITONIN SERPL-MCNC: 0.18 NG/ML
PROCALCITONIN SERPL-MCNC: 0.19 NG/ML
PROCALCITONIN SERPL-MCNC: 0.22 NG/ML
PROCALCITONIN SERPL-MCNC: 0.44 NG/ML
PROCALCITONIN SERPL-MCNC: 0.54 NG/ML
PROCALCITONIN SERPL-MCNC: 0.71 NG/ML
PROCALCITONIN SERPL-MCNC: 0.73 NG/ML
PROCALCITONIN SERPL-MCNC: 0.76 NG/ML
PROCALCITONIN SERPL-MCNC: 0.97 NG/ML
PROCALCITONIN SERPL-MCNC: 1.22 NG/ML
PROCALCITONIN SERPL-MCNC: 1.38 NG/ML
PROCALCITONIN SERPL-MCNC: 1.57 NG/ML
PROCALCITONIN SERPL-MCNC: 3.6 NG/ML
PROT SERPL-MCNC: 5.3 G/DL (ref 6.4–8.2)
PROT SERPL-MCNC: 5.4 G/DL (ref 6.4–8.2)
PROT SERPL-MCNC: 5.6 G/DL (ref 6.4–8.2)
PROT SERPL-MCNC: 5.7 G/DL (ref 6.4–8.2)
PROT SERPL-MCNC: 6.2 G/DL (ref 6.4–8.2)
PROT SERPL-MCNC: 6.3 G/DL (ref 6.4–8.2)
PROT SERPL-MCNC: 6.6 G/DL (ref 6.4–8.2)
PROT SERPL-MCNC: 6.7 G/DL (ref 6.4–8.2)
PROT UR STRIP-MCNC: ABNORMAL MG/DL
PROT UR STRIP-MCNC: NEGATIVE MG/DL
PROTHROMBIN TIME: 14.1 SECONDS (ref 11.6–14.5)
PROTHROMBIN TIME: 14.7 SECONDS (ref 11.6–14.5)
PROTHROMBIN TIME: 17.7 SECONDS (ref 11.6–14.5)
PTH-INTACT SERPL-MCNC: 160.3 PG/ML (ref 18.4–80.1)
QRS AXIS: -53 DEGREES
QRS AXIS: 23 DEGREES
QRS AXIS: 26 DEGREES
QRS AXIS: 31 DEGREES
QRS AXIS: 38 DEGREES
QRS AXIS: 4 DEGREES
QRS AXIS: 46 DEGREES
QRS AXIS: 46 DEGREES
QRS AXIS: 64 DEGREES
QRS AXIS: 8 DEGREES
QRSD INTERVAL: 102 MS
QRSD INTERVAL: 104 MS
QRSD INTERVAL: 104 MS
QRSD INTERVAL: 108 MS
QRSD INTERVAL: 117 MS
QRSD INTERVAL: 196 MS
QRSD INTERVAL: 92 MS
QRSD INTERVAL: 96 MS
QT INTERVAL: 325 MS
QT INTERVAL: 358 MS
QT INTERVAL: 400 MS
QT INTERVAL: 429 MS
QT INTERVAL: 442 MS
QT INTERVAL: 504 MS
QT INTERVAL: 521 MS
QT INTERVAL: 533 MS
QT INTERVAL: 613 MS
QT INTERVAL: 725 MS
QTC INTERVAL: 402 MS
QTC INTERVAL: 430 MS
QTC INTERVAL: 446 MS
QTC INTERVAL: 447 MS
QTC INTERVAL: 479 MS
QTC INTERVAL: 480 MS
QTC INTERVAL: 488 MS
QTC INTERVAL: 500 MS
QTC INTERVAL: 507 MS
QTC INTERVAL: 585 MS
RBC # BLD AUTO: 2.41 MILLION/UL (ref 3.88–5.62)
RBC # BLD AUTO: 2.51 MILLION/UL (ref 3.88–5.62)
RBC # BLD AUTO: 2.68 MILLION/UL (ref 3.88–5.62)
RBC # BLD AUTO: 2.7 MILLION/UL (ref 3.88–5.62)
RBC # BLD AUTO: 2.79 MILLION/UL (ref 3.88–5.62)
RBC # BLD AUTO: 2.86 MILLION/UL (ref 3.88–5.62)
RBC # BLD AUTO: 2.92 MILLION/UL (ref 3.88–5.62)
RBC # BLD AUTO: 2.95 MILLION/UL (ref 3.88–5.62)
RBC # BLD AUTO: 3.06 MILLION/UL (ref 3.88–5.62)
RBC # BLD AUTO: 3.14 MILLION/UL (ref 3.88–5.62)
RBC # BLD AUTO: 3.32 MILLION/UL (ref 3.88–5.62)
RBC # BLD AUTO: 3.46 MILLION/UL (ref 3.88–5.62)
RBC # BLD AUTO: 3.59 MILLION/UL (ref 3.88–5.62)
RBC # BLD AUTO: 3.78 MILLION/UL (ref 3.88–5.62)
RBC # BLD AUTO: 3.92 MILLION/UL (ref 3.88–5.62)
RBC # BLD AUTO: 3.98 MILLION/UL (ref 3.88–5.62)
RBC # BLD AUTO: 4 MILLION/UL (ref 3.88–5.62)
RBC # BLD AUTO: 4.03 MILLION/UL (ref 3.88–5.62)
RBC # BLD AUTO: 4.04 MILLION/UL (ref 3.88–5.62)
RBC # BLD AUTO: 4.15 MILLION/UL (ref 3.88–5.62)
RBC # BLD AUTO: 4.17 MILLION/UL (ref 3.88–5.62)
RBC # BLD AUTO: 4.19 MILLION/UL (ref 3.88–5.62)
RBC # BLD AUTO: 4.38 MILLION/UL (ref 3.88–5.62)
RBC # BLD AUTO: 4.39 MILLION/UL (ref 3.88–5.62)
RBC #/AREA URNS AUTO: ABNORMAL /HPF
RBC MORPH BLD: PRESENT
RH BLD: POSITIVE
SARS-COV-2 RNA RESP QL NAA+PROBE: NEGATIVE
SARS-COV-2 RNA RESP QL NAA+PROBE: POSITIVE
SCHISTOCYTES BLD QL SMEAR: PRESENT
SL AMB POCT HEMOGLOBIN AIC: 6 (ref ?–6.5)
SODIUM SERPL-SCNC: 123 MMOL/L (ref 137–147)
SODIUM SERPL-SCNC: 130 MMOL/L (ref 136–145)
SODIUM SERPL-SCNC: 130 MMOL/L (ref 137–147)
SODIUM SERPL-SCNC: 134 MMOL/L (ref 136–145)
SODIUM SERPL-SCNC: 134 MMOL/L (ref 136–145)
SODIUM SERPL-SCNC: 136 MMOL/L (ref 136–145)
SODIUM SERPL-SCNC: 137 MMOL/L (ref 136–145)
SODIUM SERPL-SCNC: 137 MMOL/L (ref 137–147)
SODIUM SERPL-SCNC: 138 MMOL/L (ref 136–145)
SODIUM SERPL-SCNC: 139 MMOL/L (ref 136–145)
SODIUM SERPL-SCNC: 140 MMOL/L (ref 136–145)
SODIUM SERPL-SCNC: 141 MMOL/L (ref 136–145)
SODIUM SERPL-SCNC: 142 MMOL/L (ref 137–147)
SODIUM SERPL-SCNC: 143 MMOL/L (ref 136–145)
SODIUM SERPL-SCNC: 144 MMOL/L (ref 136–145)
SODIUM SERPL-SCNC: 145 MMOL/L (ref 136–145)
SODIUM SERPL-SCNC: 146 MMOL/L (ref 136–145)
SODIUM SERPL-SCNC: 147 MMOL/L (ref 136–145)
SODIUM SERPL-SCNC: 148 MMOL/L (ref 136–145)
SODIUM SERPL-SCNC: 149 MMOL/L (ref 136–145)
SODIUM SERPL-SCNC: 151 MMOL/L (ref 136–145)
SODIUM SERPL-SCNC: 153 MMOL/L (ref 136–145)
SP GR UR STRIP.AUTO: 1.01 (ref 1–1.03)
SP GR UR STRIP.AUTO: 1.02 (ref 1–1.03)
SPECIMEN EXPIRATION DATE: NORMAL
SPECIMEN SOURCE: ABNORMAL
T WAVE AXIS: -44 DEGREES
T WAVE AXIS: 102 DEGREES
T WAVE AXIS: 102 DEGREES
T WAVE AXIS: 103 DEGREES
T WAVE AXIS: 116 DEGREES
T WAVE AXIS: 119 DEGREES
T WAVE AXIS: 135 DEGREES
T WAVE AXIS: 200 DEGREES
T WAVE AXIS: 236 DEGREES
T WAVE AXIS: 83 DEGREES
T4 FREE SERPL-MCNC: 1.22 NG/DL (ref 0.76–1.46)
TARGETS BLD QL SMEAR: PRESENT
TOTAL CELLS COUNTED SPEC: 100
TRIGL SERPL-MCNC: 317 MG/DL
TROPONIN I SERPL-MCNC: 0.07 NG/ML
TROPONIN I SERPL-MCNC: 0.45 NG/ML
TROPONIN I SERPL-MCNC: 0.53 NG/ML
TROPONIN I SERPL-MCNC: 0.6 NG/ML
TROPONIN I SERPL-MCNC: 0.72 NG/ML
TSH SERPL DL<=0.05 MIU/L-ACNC: 0.16 UIU/ML (ref 0.36–3.74)
UNIT DISPENSE STATUS: NORMAL
UNIT PRODUCT CODE: NORMAL
UNIT RH: NORMAL
UROBILINOGEN UR QL STRIP.AUTO: 0.2 E.U./DL
VANCOMYCIN SERPL-MCNC: 12.9 UG/ML
VANCOMYCIN SERPL-MCNC: 16 UG/ML
VANCOMYCIN SERPL-MCNC: 24 UG/ML
VENT AC: 18
VENT AC: 26
VENT BIPAP FIO2: 100 %
VENT- AC: AC
VENTRICULAR RATE: 105 BPM
VENTRICULAR RATE: 39 BPM
VENTRICULAR RATE: 41 BPM
VENTRICULAR RATE: 42 BPM
VENTRICULAR RATE: 51 BPM
VENTRICULAR RATE: 59 BPM
VENTRICULAR RATE: 65 BPM
VENTRICULAR RATE: 73 BPM
VENTRICULAR RATE: 76 BPM
VENTRICULAR RATE: 86 BPM
VT SETTING VENT: 450 ML
WBC # BLD AUTO: 10.09 THOUSAND/UL (ref 4.31–10.16)
WBC # BLD AUTO: 10.29 THOUSAND/UL (ref 4.31–10.16)
WBC # BLD AUTO: 10.71 THOUSAND/UL (ref 4.31–10.16)
WBC # BLD AUTO: 11.12 THOUSAND/UL (ref 4.31–10.16)
WBC # BLD AUTO: 11.93 THOUSAND/UL (ref 4.31–10.16)
WBC # BLD AUTO: 13.05 THOUSAND/UL (ref 4.31–10.16)
WBC # BLD AUTO: 15.98 THOUSAND/UL (ref 4.31–10.16)
WBC # BLD AUTO: 19.38 THOUSAND/UL (ref 4.31–10.16)
WBC # BLD AUTO: 21.41 THOUSAND/UL (ref 4.31–10.16)
WBC # BLD AUTO: 22.65 THOUSAND/UL (ref 4.31–10.16)
WBC # BLD AUTO: 28.84 THOUSAND/UL (ref 4.31–10.16)
WBC # BLD AUTO: 3.24 THOUSAND/UL (ref 4.31–10.16)
WBC # BLD AUTO: 3.57 THOUSAND/UL (ref 4.31–10.16)
WBC # BLD AUTO: 3.75 THOUSAND/UL (ref 4.31–10.16)
WBC # BLD AUTO: 5.12 THOUSAND/UL (ref 4.31–10.16)
WBC # BLD AUTO: 5.46 THOUSAND/UL (ref 4.31–10.16)
WBC # BLD AUTO: 6.15 THOUSAND/UL (ref 4.31–10.16)
WBC # BLD AUTO: 6.19 THOUSAND/UL (ref 4.31–10.16)
WBC # BLD AUTO: 7.46 THOUSAND/UL (ref 4.31–10.16)
WBC # BLD AUTO: 7.87 THOUSAND/UL (ref 4.31–10.16)
WBC # BLD AUTO: 9.08 THOUSAND/UL (ref 4.31–10.16)
WBC # BLD AUTO: 9.22 THOUSAND/UL (ref 4.31–10.16)
WBC # BLD AUTO: 9.9 THOUSAND/UL (ref 4.31–10.16)
WBC # BLD AUTO: 9.93 THOUSAND/UL (ref 4.31–10.16)
WBC #/AREA URNS AUTO: ABNORMAL /HPF

## 2021-01-01 PROCEDURE — 83880 ASSAY OF NATRIURETIC PEPTIDE: CPT | Performed by: EMERGENCY MEDICINE

## 2021-01-01 PROCEDURE — 80048 BASIC METABOLIC PNL TOTAL CA: CPT | Performed by: INTERNAL MEDICINE

## 2021-01-01 PROCEDURE — 99223 1ST HOSP IP/OBS HIGH 75: CPT | Performed by: INTERNAL MEDICINE

## 2021-01-01 PROCEDURE — 84484 ASSAY OF TROPONIN QUANT: CPT | Performed by: PHYSICIAN ASSISTANT

## 2021-01-01 PROCEDURE — 85027 COMPLETE CBC AUTOMATED: CPT | Performed by: PHYSICIAN ASSISTANT

## 2021-01-01 PROCEDURE — 85730 THROMBOPLASTIN TIME PARTIAL: CPT | Performed by: INTERNAL MEDICINE

## 2021-01-01 PROCEDURE — 93010 ELECTROCARDIOGRAM REPORT: CPT | Performed by: INTERNAL MEDICINE

## 2021-01-01 PROCEDURE — 82550 ASSAY OF CK (CPK): CPT | Performed by: INTERNAL MEDICINE

## 2021-01-01 PROCEDURE — G1004 CDSM NDSC: HCPCS

## 2021-01-01 PROCEDURE — 99233 SBSQ HOSP IP/OBS HIGH 50: CPT | Performed by: INTERNAL MEDICINE

## 2021-01-01 PROCEDURE — 76937 US GUIDE VASCULAR ACCESS: CPT | Performed by: RADIOLOGY

## 2021-01-01 PROCEDURE — 93306 TTE W/DOPPLER COMPLETE: CPT | Performed by: INTERNAL MEDICINE

## 2021-01-01 PROCEDURE — 84100 ASSAY OF PHOSPHORUS: CPT | Performed by: INTERNAL MEDICINE

## 2021-01-01 PROCEDURE — 85014 HEMATOCRIT: CPT | Performed by: INTERNAL MEDICINE

## 2021-01-01 PROCEDURE — 94645 CONT INHLJ TX EACH ADDL HOUR: CPT

## 2021-01-01 PROCEDURE — 85007 BL SMEAR W/DIFF WBC COUNT: CPT | Performed by: PHYSICIAN ASSISTANT

## 2021-01-01 PROCEDURE — 86140 C-REACTIVE PROTEIN: CPT | Performed by: NURSE PRACTITIONER

## 2021-01-01 PROCEDURE — 80053 COMPREHEN METABOLIC PANEL: CPT | Performed by: EMERGENCY MEDICINE

## 2021-01-01 PROCEDURE — 97163 PT EVAL HIGH COMPLEX 45 MIN: CPT

## 2021-01-01 PROCEDURE — 36600 WITHDRAWAL OF ARTERIAL BLOOD: CPT

## 2021-01-01 PROCEDURE — 31622 DX BRONCHOSCOPE/WASH: CPT | Performed by: INTERNAL MEDICINE

## 2021-01-01 PROCEDURE — 84145 PROCALCITONIN (PCT): CPT | Performed by: INTERNAL MEDICINE

## 2021-01-01 PROCEDURE — 85025 COMPLETE CBC W/AUTO DIFF WBC: CPT | Performed by: INTERNAL MEDICINE

## 2021-01-01 PROCEDURE — 93970 EXTREMITY STUDY: CPT

## 2021-01-01 PROCEDURE — 84484 ASSAY OF TROPONIN QUANT: CPT | Performed by: EMERGENCY MEDICINE

## 2021-01-01 PROCEDURE — 82948 REAGENT STRIP/BLOOD GLUCOSE: CPT

## 2021-01-01 PROCEDURE — 86140 C-REACTIVE PROTEIN: CPT | Performed by: INTERNAL MEDICINE

## 2021-01-01 PROCEDURE — 85007 BL SMEAR W/DIFF WBC COUNT: CPT | Performed by: INTERNAL MEDICINE

## 2021-01-01 PROCEDURE — 70450 CT HEAD/BRAIN W/O DYE: CPT

## 2021-01-01 PROCEDURE — 82728 ASSAY OF FERRITIN: CPT | Performed by: PHYSICIAN ASSISTANT

## 2021-01-01 PROCEDURE — 82140 ASSAY OF AMMONIA: CPT | Performed by: NURSE PRACTITIONER

## 2021-01-01 PROCEDURE — 85610 PROTHROMBIN TIME: CPT | Performed by: INTERNAL MEDICINE

## 2021-01-01 PROCEDURE — 99232 SBSQ HOSP IP/OBS MODERATE 35: CPT | Performed by: INTERNAL MEDICINE

## 2021-01-01 PROCEDURE — 81001 URINALYSIS AUTO W/SCOPE: CPT | Performed by: NURSE PRACTITIONER

## 2021-01-01 PROCEDURE — 36556 INSERT NON-TUNNEL CV CATH: CPT | Performed by: RADIOLOGY

## 2021-01-01 PROCEDURE — NC001 PR NO CHARGE: Performed by: INTERNAL MEDICINE

## 2021-01-01 PROCEDURE — 80053 COMPREHEN METABOLIC PANEL: CPT | Performed by: INTERNAL MEDICINE

## 2021-01-01 PROCEDURE — 99291 CRITICAL CARE FIRST HOUR: CPT | Performed by: INTERNAL MEDICINE

## 2021-01-01 PROCEDURE — 99214 OFFICE O/P EST MOD 30 MIN: CPT | Performed by: FAMILY MEDICINE

## 2021-01-01 PROCEDURE — 85379 FIBRIN DEGRADATION QUANT: CPT | Performed by: INTERNAL MEDICINE

## 2021-01-01 PROCEDURE — 97116 GAIT TRAINING THERAPY: CPT

## 2021-01-01 PROCEDURE — 36415 COLL VENOUS BLD VENIPUNCTURE: CPT | Performed by: EMERGENCY MEDICINE

## 2021-01-01 PROCEDURE — 93005 ELECTROCARDIOGRAM TRACING: CPT

## 2021-01-01 PROCEDURE — 82553 CREATINE MB FRACTION: CPT | Performed by: INTERNAL MEDICINE

## 2021-01-01 PROCEDURE — 93295 DEV INTERROG REMOTE 1/2/MLT: CPT | Performed by: INTERNAL MEDICINE

## 2021-01-01 PROCEDURE — 94003 VENT MGMT INPAT SUBQ DAY: CPT

## 2021-01-01 PROCEDURE — 84478 ASSAY OF TRIGLYCERIDES: CPT | Performed by: INTERNAL MEDICINE

## 2021-01-01 PROCEDURE — 5A1955Z RESPIRATORY VENTILATION, GREATER THAN 96 CONSECUTIVE HOURS: ICD-10-PCS | Performed by: INTERNAL MEDICINE

## 2021-01-01 PROCEDURE — 80202 ASSAY OF VANCOMYCIN: CPT | Performed by: INTERNAL MEDICINE

## 2021-01-01 PROCEDURE — 86900 BLOOD TYPING SEROLOGIC ABO: CPT | Performed by: NURSE PRACTITIONER

## 2021-01-01 PROCEDURE — 83735 ASSAY OF MAGNESIUM: CPT | Performed by: INTERNAL MEDICINE

## 2021-01-01 PROCEDURE — 85730 THROMBOPLASTIN TIME PARTIAL: CPT | Performed by: PHYSICIAN ASSISTANT

## 2021-01-01 PROCEDURE — 83036 HEMOGLOBIN GLYCOSYLATED A1C: CPT | Performed by: FAMILY MEDICINE

## 2021-01-01 PROCEDURE — 97166 OT EVAL MOD COMPLEX 45 MIN: CPT

## 2021-01-01 PROCEDURE — 84100 ASSAY OF PHOSPHORUS: CPT | Performed by: NURSE PRACTITIONER

## 2021-01-01 PROCEDURE — 81001 URINALYSIS AUTO W/SCOPE: CPT | Performed by: INTERNAL MEDICINE

## 2021-01-01 PROCEDURE — 82805 BLOOD GASES W/O2 SATURATION: CPT | Performed by: NURSE PRACTITIONER

## 2021-01-01 PROCEDURE — 80048 BASIC METABOLIC PNL TOTAL CA: CPT | Performed by: PHYSICIAN ASSISTANT

## 2021-01-01 PROCEDURE — 82728 ASSAY OF FERRITIN: CPT | Performed by: INTERNAL MEDICINE

## 2021-01-01 PROCEDURE — 99291 CRITICAL CARE FIRST HOUR: CPT | Performed by: NURSE PRACTITIONER

## 2021-01-01 PROCEDURE — 93970 EXTREMITY STUDY: CPT | Performed by: SURGERY

## 2021-01-01 PROCEDURE — 87081 CULTURE SCREEN ONLY: CPT | Performed by: NURSE PRACTITIONER

## 2021-01-01 PROCEDURE — 82805 BLOOD GASES W/O2 SATURATION: CPT | Performed by: EMERGENCY MEDICINE

## 2021-01-01 PROCEDURE — 87040 BLOOD CULTURE FOR BACTERIA: CPT | Performed by: NURSE PRACTITIONER

## 2021-01-01 PROCEDURE — 71045 X-RAY EXAM CHEST 1 VIEW: CPT

## 2021-01-01 PROCEDURE — 4A133J1 MONITORING OF ARTERIAL PULSE, PERIPHERAL, PERCUTANEOUS APPROACH: ICD-10-PCS | Performed by: INTERNAL MEDICINE

## 2021-01-01 PROCEDURE — 1160F RVW MEDS BY RX/DR IN RCRD: CPT | Performed by: FAMILY MEDICINE

## 2021-01-01 PROCEDURE — U0003 INFECTIOUS AGENT DETECTION BY NUCLEIC ACID (DNA OR RNA); SEVERE ACUTE RESPIRATORY SYNDROME CORONAVIRUS 2 (SARS-COV-2) (CORONAVIRUS DISEASE [COVID-19]), AMPLIFIED PROBE TECHNIQUE, MAKING USE OF HIGH THROUGHPUT TECHNOLOGIES AS DESCRIBED BY CMS-2020-01-R: HCPCS | Performed by: EMERGENCY MEDICINE

## 2021-01-01 PROCEDURE — 97110 THERAPEUTIC EXERCISES: CPT

## 2021-01-01 PROCEDURE — 94760 N-INVAS EAR/PLS OXIMETRY 1: CPT

## 2021-01-01 PROCEDURE — 93308 TTE F-UP OR LMTD: CPT | Performed by: INTERNAL MEDICINE

## 2021-01-01 PROCEDURE — 86704 HEP B CORE ANTIBODY TOTAL: CPT | Performed by: NURSE PRACTITIONER

## 2021-01-01 PROCEDURE — 85610 PROTHROMBIN TIME: CPT | Performed by: EMERGENCY MEDICINE

## 2021-01-01 PROCEDURE — 85379 FIBRIN DEGRADATION QUANT: CPT | Performed by: PHYSICIAN ASSISTANT

## 2021-01-01 PROCEDURE — 99285 EMERGENCY DEPT VISIT HI MDM: CPT

## 2021-01-01 PROCEDURE — 83735 ASSAY OF MAGNESIUM: CPT | Performed by: NURSE PRACTITIONER

## 2021-01-01 PROCEDURE — 85027 COMPLETE CBC AUTOMATED: CPT | Performed by: INTERNAL MEDICINE

## 2021-01-01 PROCEDURE — 85025 COMPLETE CBC W/AUTO DIFF WBC: CPT

## 2021-01-01 PROCEDURE — 36415 COLL VENOUS BLD VENIPUNCTURE: CPT

## 2021-01-01 PROCEDURE — 94644 CONT INHLJ TX 1ST HOUR: CPT

## 2021-01-01 PROCEDURE — P9040 RBC LEUKOREDUCED IRRADIATED: HCPCS

## 2021-01-01 PROCEDURE — 0BJ08ZZ INSPECTION OF TRACHEOBRONCHIAL TREE, VIA NATURAL OR ARTIFICIAL OPENING ENDOSCOPIC: ICD-10-PCS | Performed by: INTERNAL MEDICINE

## 2021-01-01 PROCEDURE — 85027 COMPLETE CBC AUTOMATED: CPT | Performed by: NURSE PRACTITIONER

## 2021-01-01 PROCEDURE — 80053 COMPREHEN METABOLIC PANEL: CPT | Performed by: NURSE PRACTITIONER

## 2021-01-01 PROCEDURE — 85379 FIBRIN DEGRADATION QUANT: CPT | Performed by: NURSE PRACTITIONER

## 2021-01-01 PROCEDURE — XW033E5 INTRODUCTION OF REMDESIVIR ANTI-INFECTIVE INTO PERIPHERAL VEIN, PERCUTANEOUS APPROACH, NEW TECHNOLOGY GROUP 5: ICD-10-PCS | Performed by: STUDENT IN AN ORGANIZED HEALTH CARE EDUCATION/TRAINING PROGRAM

## 2021-01-01 PROCEDURE — 86850 RBC ANTIBODY SCREEN: CPT | Performed by: NURSE PRACTITIONER

## 2021-01-01 PROCEDURE — 84443 ASSAY THYROID STIM HORMONE: CPT | Performed by: NURSE PRACTITIONER

## 2021-01-01 PROCEDURE — 93010 ELECTROCARDIOGRAM REPORT: CPT

## 2021-01-01 PROCEDURE — 83605 ASSAY OF LACTIC ACID: CPT | Performed by: EMERGENCY MEDICINE

## 2021-01-01 PROCEDURE — 86140 C-REACTIVE PROTEIN: CPT | Performed by: PHYSICIAN ASSISTANT

## 2021-01-01 PROCEDURE — 83010 ASSAY OF HAPTOGLOBIN QUANT: CPT | Performed by: INTERNAL MEDICINE

## 2021-01-01 PROCEDURE — 99214 OFFICE O/P EST MOD 30 MIN: CPT | Performed by: INTERNAL MEDICINE

## 2021-01-01 PROCEDURE — 80048 BASIC METABOLIC PNL TOTAL CA: CPT

## 2021-01-01 PROCEDURE — 80048 BASIC METABOLIC PNL TOTAL CA: CPT | Performed by: NURSE PRACTITIONER

## 2021-01-01 PROCEDURE — 3008F BODY MASS INDEX DOCD: CPT | Performed by: FAMILY MEDICINE

## 2021-01-01 PROCEDURE — 93000 ELECTROCARDIOGRAM COMPLETE: CPT | Performed by: INTERNAL MEDICINE

## 2021-01-01 PROCEDURE — C8929 TTE W OR WO FOL WCON,DOPPLER: HCPCS

## 2021-01-01 PROCEDURE — 82272 OCCULT BLD FECES 1-3 TESTS: CPT | Performed by: NURSE PRACTITIONER

## 2021-01-01 PROCEDURE — 99223 1ST HOSP IP/OBS HIGH 75: CPT | Performed by: STUDENT IN AN ORGANIZED HEALTH CARE EDUCATION/TRAINING PROGRAM

## 2021-01-01 PROCEDURE — U0005 INFEC AGEN DETEC AMPLI PROBE: HCPCS | Performed by: FAMILY MEDICINE

## 2021-01-01 PROCEDURE — 94640 AIRWAY INHALATION TREATMENT: CPT

## 2021-01-01 PROCEDURE — 86923 COMPATIBILITY TEST ELECTRIC: CPT

## 2021-01-01 PROCEDURE — 80053 COMPREHEN METABOLIC PANEL: CPT | Performed by: PHYSICIAN ASSISTANT

## 2021-01-01 PROCEDURE — 85610 PROTHROMBIN TIME: CPT | Performed by: PHYSICIAN ASSISTANT

## 2021-01-01 PROCEDURE — 03HY32Z INSERTION OF MONITORING DEVICE INTO UPPER ARTERY, PERCUTANEOUS APPROACH: ICD-10-PCS | Performed by: INTERNAL MEDICINE

## 2021-01-01 PROCEDURE — 84145 PROCALCITONIN (PCT): CPT | Performed by: EMERGENCY MEDICINE

## 2021-01-01 PROCEDURE — 99291 CRITICAL CARE FIRST HOUR: CPT | Performed by: EMERGENCY MEDICINE

## 2021-01-01 PROCEDURE — 76937 US GUIDE VASCULAR ACCESS: CPT | Performed by: PHYSICIAN ASSISTANT

## 2021-01-01 PROCEDURE — 82550 ASSAY OF CK (CPK): CPT | Performed by: NURSE PRACTITIONER

## 2021-01-01 PROCEDURE — U0003 INFECTIOUS AGENT DETECTION BY NUCLEIC ACID (DNA OR RNA); SEVERE ACUTE RESPIRATORY SYNDROME CORONAVIRUS 2 (SARS-COV-2) (CORONAVIRUS DISEASE [COVID-19]), AMPLIFIED PROBE TECHNIQUE, MAKING USE OF HIGH THROUGHPUT TECHNOLOGIES AS DESCRIBED BY CMS-2020-01-R: HCPCS | Performed by: FAMILY MEDICINE

## 2021-01-01 PROCEDURE — 02HV33Z INSERTION OF INFUSION DEVICE INTO SUPERIOR VENA CAVA, PERCUTANEOUS APPROACH: ICD-10-PCS | Performed by: INTERNAL MEDICINE

## 2021-01-01 PROCEDURE — 87070 CULTURE OTHR SPECIMN AEROBIC: CPT | Performed by: INTERNAL MEDICINE

## 2021-01-01 PROCEDURE — 99222 1ST HOSP IP/OBS MODERATE 55: CPT | Performed by: INTERNAL MEDICINE

## 2021-01-01 PROCEDURE — 84145 PROCALCITONIN (PCT): CPT | Performed by: NURSE PRACTITIONER

## 2021-01-01 PROCEDURE — 82550 ASSAY OF CK (CPK): CPT | Performed by: PHYSICIAN ASSISTANT

## 2021-01-01 PROCEDURE — 94660 CPAP INITIATION&MGMT: CPT

## 2021-01-01 PROCEDURE — 92610 EVALUATE SWALLOWING FUNCTION: CPT

## 2021-01-01 PROCEDURE — 02HV33Z INSERTION OF INFUSION DEVICE INTO SUPERIOR VENA CAVA, PERCUTANEOUS APPROACH: ICD-10-PCS | Performed by: RADIOLOGY

## 2021-01-01 PROCEDURE — 85025 COMPLETE CBC W/AUTO DIFF WBC: CPT | Performed by: EMERGENCY MEDICINE

## 2021-01-01 PROCEDURE — 85025 COMPLETE CBC W/AUTO DIFF WBC: CPT | Performed by: NURSE PRACTITIONER

## 2021-01-01 PROCEDURE — 3066F NEPHROPATHY DOC TX: CPT | Performed by: FAMILY MEDICINE

## 2021-01-01 PROCEDURE — 99292 CRITICAL CARE ADDL 30 MIN: CPT | Performed by: INTERNAL MEDICINE

## 2021-01-01 PROCEDURE — 93325 DOPPLER ECHO COLOR FLOW MAPG: CPT | Performed by: INTERNAL MEDICINE

## 2021-01-01 PROCEDURE — NC001 PR NO CHARGE: Performed by: RADIOLOGY

## 2021-01-01 PROCEDURE — 4A133B1 MONITORING OF ARTERIAL PRESSURE, PERIPHERAL, PERCUTANEOUS APPROACH: ICD-10-PCS | Performed by: INTERNAL MEDICINE

## 2021-01-01 PROCEDURE — 82805 BLOOD GASES W/O2 SATURATION: CPT | Performed by: INTERNAL MEDICINE

## 2021-01-01 PROCEDURE — 30233N1 TRANSFUSION OF NONAUTOLOGOUS RED BLOOD CELLS INTO PERIPHERAL VEIN, PERCUTANEOUS APPROACH: ICD-10-PCS | Performed by: INTERNAL MEDICINE

## 2021-01-01 PROCEDURE — 83615 LACTATE (LD) (LDH) ENZYME: CPT | Performed by: INTERNAL MEDICINE

## 2021-01-01 PROCEDURE — 86705 HEP B CORE ANTIBODY IGM: CPT | Performed by: NURSE PRACTITIONER

## 2021-01-01 PROCEDURE — 80076 HEPATIC FUNCTION PANEL: CPT | Performed by: INTERNAL MEDICINE

## 2021-01-01 PROCEDURE — 93306 TTE W/DOPPLER COMPLETE: CPT

## 2021-01-01 PROCEDURE — 85018 HEMOGLOBIN: CPT | Performed by: INTERNAL MEDICINE

## 2021-01-01 PROCEDURE — NC001 PR NO CHARGE: Performed by: PHYSICIAN ASSISTANT

## 2021-01-01 PROCEDURE — 96365 THER/PROPH/DIAG IV INF INIT: CPT

## 2021-01-01 PROCEDURE — 99239 HOSP IP/OBS DSCHRG MGMT >30: CPT | Performed by: NURSE PRACTITIONER

## 2021-01-01 PROCEDURE — 83970 ASSAY OF PARATHORMONE: CPT

## 2021-01-01 PROCEDURE — 74018 RADEX ABDOMEN 1 VIEW: CPT

## 2021-01-01 PROCEDURE — 93321 DOPPLER ECHO F-UP/LMTD STD: CPT | Performed by: INTERNAL MEDICINE

## 2021-01-01 PROCEDURE — 85007 BL SMEAR W/DIFF WBC COUNT: CPT | Performed by: NURSE PRACTITIONER

## 2021-01-01 PROCEDURE — 87205 SMEAR GRAM STAIN: CPT | Performed by: INTERNAL MEDICINE

## 2021-01-01 PROCEDURE — 82805 BLOOD GASES W/O2 SATURATION: CPT | Performed by: PHYSICIAN ASSISTANT

## 2021-01-01 PROCEDURE — 87040 BLOOD CULTURE FOR BACTERIA: CPT | Performed by: EMERGENCY MEDICINE

## 2021-01-01 PROCEDURE — 99222 1ST HOSP IP/OBS MODERATE 55: CPT

## 2021-01-01 PROCEDURE — 86803 HEPATITIS C AB TEST: CPT | Performed by: NURSE PRACTITIONER

## 2021-01-01 PROCEDURE — XW033H5 INTRODUCTION OF TOCILIZUMAB INTO PERIPHERAL VEIN, PERCUTANEOUS APPROACH, NEW TECHNOLOGY GROUP 5: ICD-10-PCS | Performed by: INTERNAL MEDICINE

## 2021-01-01 PROCEDURE — 76705 ECHO EXAM OF ABDOMEN: CPT

## 2021-01-01 PROCEDURE — 94002 VENT MGMT INPAT INIT DAY: CPT

## 2021-01-01 PROCEDURE — NC001 PR NO CHARGE

## 2021-01-01 PROCEDURE — 82553 CREATINE MB FRACTION: CPT | Performed by: PHYSICIAN ASSISTANT

## 2021-01-01 PROCEDURE — 87040 BLOOD CULTURE FOR BACTERIA: CPT | Performed by: PHYSICIAN ASSISTANT

## 2021-01-01 PROCEDURE — 85730 THROMBOPLASTIN TIME PARTIAL: CPT | Performed by: EMERGENCY MEDICINE

## 2021-01-01 PROCEDURE — 3044F HG A1C LEVEL LT 7.0%: CPT | Performed by: FAMILY MEDICINE

## 2021-01-01 PROCEDURE — 83735 ASSAY OF MAGNESIUM: CPT

## 2021-01-01 PROCEDURE — 84484 ASSAY OF TROPONIN QUANT: CPT | Performed by: NURSE PRACTITIONER

## 2021-01-01 PROCEDURE — 83605 ASSAY OF LACTIC ACID: CPT | Performed by: INTERNAL MEDICINE

## 2021-01-01 PROCEDURE — 87340 HEPATITIS B SURFACE AG IA: CPT | Performed by: NURSE PRACTITIONER

## 2021-01-01 PROCEDURE — 71250 CT THORAX DX C-: CPT

## 2021-01-01 PROCEDURE — 3066F NEPHROPATHY DOC TX: CPT | Performed by: INTERNAL MEDICINE

## 2021-01-01 PROCEDURE — 82553 CREATINE MB FRACTION: CPT | Performed by: NURSE PRACTITIONER

## 2021-01-01 PROCEDURE — 80069 RENAL FUNCTION PANEL: CPT

## 2021-01-01 PROCEDURE — 76937 US GUIDE VASCULAR ACCESS: CPT

## 2021-01-01 PROCEDURE — U0005 INFEC AGEN DETEC AMPLI PROBE: HCPCS | Performed by: EMERGENCY MEDICINE

## 2021-01-01 PROCEDURE — C1751 CATH, INF, PER/CENT/MIDLINE: HCPCS

## 2021-01-01 PROCEDURE — 86901 BLOOD TYPING SEROLOGIC RH(D): CPT | Performed by: NURSE PRACTITIONER

## 2021-01-01 PROCEDURE — 82728 ASSAY OF FERRITIN: CPT | Performed by: NURSE PRACTITIONER

## 2021-01-01 PROCEDURE — 87081 CULTURE SCREEN ONLY: CPT | Performed by: PHYSICIAN ASSISTANT

## 2021-01-01 PROCEDURE — 87389 HIV-1 AG W/HIV-1&-2 AB AG IA: CPT | Performed by: INTERNAL MEDICINE

## 2021-01-01 PROCEDURE — 0BH17EZ INSERTION OF ENDOTRACHEAL AIRWAY INTO TRACHEA, VIA NATURAL OR ARTIFICIAL OPENING: ICD-10-PCS | Performed by: INTERNAL MEDICINE

## 2021-01-01 PROCEDURE — 84439 ASSAY OF FREE THYROXINE: CPT | Performed by: NURSE PRACTITIONER

## 2021-01-01 PROCEDURE — 93296 REM INTERROG EVL PM/IDS: CPT | Performed by: INTERNAL MEDICINE

## 2021-01-01 PROCEDURE — 1036F TOBACCO NON-USER: CPT | Performed by: FAMILY MEDICINE

## 2021-01-01 PROCEDURE — 97530 THERAPEUTIC ACTIVITIES: CPT

## 2021-01-01 PROCEDURE — 36620 INSERTION CATHETER ARTERY: CPT | Performed by: INTERNAL MEDICINE

## 2021-01-01 RX ORDER — ACETAMINOPHEN 325 MG/1
650 TABLET ORAL EVERY 6 HOURS PRN
Status: DISCONTINUED | OUTPATIENT
Start: 2021-01-01 | End: 2021-01-01

## 2021-01-01 RX ORDER — LINAGLIPTIN 5 MG/1
5 TABLET, FILM COATED ORAL DAILY
COMMUNITY
Start: 2020-01-01 | End: 2021-01-01 | Stop reason: ALTCHOICE

## 2021-01-01 RX ORDER — POTASSIUM CHLORIDE 20MEQ/15ML
40 LIQUID (ML) ORAL ONCE
Status: COMPLETED | OUTPATIENT
Start: 2021-01-01 | End: 2021-01-01

## 2021-01-01 RX ORDER — SODIUM POLYSTYRENE SULFONATE 4.1 MEQ/G
15 POWDER, FOR SUSPENSION ORAL; RECTAL ONCE
Status: DISCONTINUED | OUTPATIENT
Start: 2021-01-01 | End: 2021-01-01 | Stop reason: HOSPADM

## 2021-01-01 RX ORDER — AMLODIPINE BESYLATE 5 MG/1
10 TABLET ORAL DAILY
Status: DISCONTINUED | OUTPATIENT
Start: 2021-01-01 | End: 2021-01-01

## 2021-01-01 RX ORDER — MIDODRINE HYDROCHLORIDE 5 MG/1
5 TABLET ORAL
Status: DISCONTINUED | OUTPATIENT
Start: 2021-01-01 | End: 2021-01-01

## 2021-01-01 RX ORDER — FUROSEMIDE 10 MG/ML
80 INJECTION INTRAMUSCULAR; INTRAVENOUS ONCE
Status: COMPLETED | OUTPATIENT
Start: 2021-01-01 | End: 2021-01-01

## 2021-01-01 RX ORDER — HEPARIN SODIUM 5000 [USP'U]/ML
5000 INJECTION, SOLUTION INTRAVENOUS; SUBCUTANEOUS EVERY 8 HOURS SCHEDULED
Status: CANCELLED | OUTPATIENT
Start: 2021-01-01

## 2021-01-01 RX ORDER — SODIUM CHLORIDE, SODIUM GLUCONATE, SODIUM ACETATE, POTASSIUM CHLORIDE, MAGNESIUM CHLORIDE, SODIUM PHOSPHATE, DIBASIC, AND POTASSIUM PHOSPHATE .53; .5; .37; .037; .03; .012; .00082 G/100ML; G/100ML; G/100ML; G/100ML; G/100ML; G/100ML; G/100ML
1000 INJECTION, SOLUTION INTRAVENOUS ONCE
Status: COMPLETED | OUTPATIENT
Start: 2021-01-01 | End: 2021-01-01

## 2021-01-01 RX ORDER — SODIUM BICARBONATE 84 MG/ML
INJECTION, SOLUTION INTRAVENOUS CODE/TRAUMA/SEDATION MEDICATION
Status: COMPLETED | OUTPATIENT
Start: 2021-01-01 | End: 2021-01-01

## 2021-01-01 RX ORDER — BISACODYL 10 MG
10 SUPPOSITORY, RECTAL RECTAL DAILY PRN
Status: DISCONTINUED | OUTPATIENT
Start: 2021-01-01 | End: 2021-01-01

## 2021-01-01 RX ORDER — SODIUM POLYSTYRENE SULFONATE 4.1 MEQ/G
30 POWDER, FOR SUSPENSION ORAL; RECTAL ONCE
Status: DISCONTINUED | OUTPATIENT
Start: 2021-01-01 | End: 2021-01-01 | Stop reason: HOSPADM

## 2021-01-01 RX ORDER — AMOXICILLIN 250 MG
2 CAPSULE ORAL 2 TIMES DAILY
Status: DISCONTINUED | OUTPATIENT
Start: 2021-01-01 | End: 2021-01-01 | Stop reason: HOSPADM

## 2021-01-01 RX ORDER — HEPARIN SODIUM 1000 [USP'U]/ML
4000 INJECTION, SOLUTION INTRAVENOUS; SUBCUTANEOUS
Status: DISCONTINUED | OUTPATIENT
Start: 2021-01-01 | End: 2021-01-01

## 2021-01-01 RX ORDER — GABAPENTIN 400 MG/1
CAPSULE ORAL
Qty: 360 CAPSULE | Refills: 0 | Status: SHIPPED | OUTPATIENT
Start: 2021-01-01 | End: 2021-01-01 | Stop reason: HOSPADM

## 2021-01-01 RX ORDER — INSULIN GLARGINE 100 [IU]/ML
20 INJECTION, SOLUTION SUBCUTANEOUS EVERY 12 HOURS SCHEDULED
Status: DISCONTINUED | OUTPATIENT
Start: 2021-01-01 | End: 2021-01-01

## 2021-01-01 RX ORDER — LIDOCAINE HYDROCHLORIDE 10 MG/ML
INJECTION, SOLUTION EPIDURAL; INFILTRATION; INTRACAUDAL; PERINEURAL
Status: COMPLETED
Start: 2021-01-01 | End: 2021-01-01

## 2021-01-01 RX ORDER — INSULIN GLARGINE 100 [IU]/ML
40 INJECTION, SOLUTION SUBCUTANEOUS EVERY MORNING
Status: DISCONTINUED | OUTPATIENT
Start: 2021-01-01 | End: 2021-01-01

## 2021-01-01 RX ORDER — CALCIUM GLUCONATE 20 MG/ML
1 INJECTION, SOLUTION INTRAVENOUS ONCE
Status: DISCONTINUED | OUTPATIENT
Start: 2021-01-01 | End: 2021-01-01 | Stop reason: HOSPADM

## 2021-01-01 RX ORDER — EMPAGLIFLOZIN 10 MG/1
10 TABLET, FILM COATED ORAL EVERY MORNING
Qty: 30 TABLET | Refills: 5 | Status: SHIPPED | OUTPATIENT
Start: 2021-01-01 | End: 2021-01-01 | Stop reason: HOSPADM

## 2021-01-01 RX ORDER — ALBUMIN (HUMAN) 12.5 G/50ML
25 SOLUTION INTRAVENOUS EVERY 6 HOURS
Status: COMPLETED | OUTPATIENT
Start: 2021-01-01 | End: 2021-01-01

## 2021-01-01 RX ORDER — DEXTROSE MONOHYDRATE 50 MG/ML
50 INJECTION, SOLUTION INTRAVENOUS CONTINUOUS
Status: DISCONTINUED | OUTPATIENT
Start: 2021-01-01 | End: 2021-01-01

## 2021-01-01 RX ORDER — CLONAZEPAM 0.5 MG/1
0.5 TABLET ORAL 2 TIMES DAILY
Status: DISCONTINUED | OUTPATIENT
Start: 2021-01-01 | End: 2021-01-01

## 2021-01-01 RX ORDER — FUROSEMIDE 10 MG/ML
40 INJECTION INTRAMUSCULAR; INTRAVENOUS DAILY
Status: DISCONTINUED | OUTPATIENT
Start: 2021-01-01 | End: 2021-01-01

## 2021-01-01 RX ORDER — ASCORBIC ACID 500 MG
1000 TABLET ORAL EVERY 12 HOURS SCHEDULED
Status: DISPENSED | OUTPATIENT
Start: 2021-01-01 | End: 2021-01-01

## 2021-01-01 RX ORDER — FENTANYL CITRATE 50 UG/ML
50 INJECTION, SOLUTION INTRAMUSCULAR; INTRAVENOUS ONCE
Status: COMPLETED | OUTPATIENT
Start: 2021-01-01 | End: 2021-01-01

## 2021-01-01 RX ORDER — PANTOPRAZOLE SODIUM 40 MG/1
TABLET, DELAYED RELEASE ORAL
Qty: 30 TABLET | Refills: 0 | Status: SHIPPED | OUTPATIENT
Start: 2021-01-01 | End: 2021-01-01

## 2021-01-01 RX ORDER — EPINEPHRINE 0.1 MG/ML
SYRINGE (ML) INJECTION CODE/TRAUMA/SEDATION MEDICATION
Status: COMPLETED | OUTPATIENT
Start: 2021-01-01 | End: 2021-01-01

## 2021-01-01 RX ORDER — ONDANSETRON 2 MG/ML
4 INJECTION INTRAMUSCULAR; INTRAVENOUS EVERY 6 HOURS PRN
Status: DISCONTINUED | OUTPATIENT
Start: 2021-01-01 | End: 2021-01-01 | Stop reason: HOSPADM

## 2021-01-01 RX ORDER — DEXTROSE 50 % IN WATER 50 %
SYRINGE (ML) INTRAVENOUS CODE/TRAUMA/SEDATION MEDICATION
Status: COMPLETED | OUTPATIENT
Start: 2021-01-01 | End: 2021-01-01

## 2021-01-01 RX ORDER — DEXAMETHASONE SODIUM PHOSPHATE 4 MG/ML
6 INJECTION, SOLUTION INTRA-ARTICULAR; INTRALESIONAL; INTRAMUSCULAR; INTRAVENOUS; SOFT TISSUE DAILY
Status: DISCONTINUED | OUTPATIENT
Start: 2021-01-01 | End: 2021-01-01

## 2021-01-01 RX ORDER — TRIAMCINOLONE ACETONIDE 1 MG/G
CREAM TOPICAL
Qty: 30 G | Refills: 0 | Status: SHIPPED | OUTPATIENT
Start: 2021-01-01 | End: 2021-01-01 | Stop reason: HOSPADM

## 2021-01-01 RX ORDER — POTASSIUM CHLORIDE 20MEQ/15ML
40 LIQUID (ML) ORAL EVERY 4 HOURS
Status: COMPLETED | OUTPATIENT
Start: 2021-01-01 | End: 2021-01-01

## 2021-01-01 RX ORDER — LORAZEPAM 2 MG/ML
0.5 INJECTION INTRAMUSCULAR ONCE
Status: COMPLETED | OUTPATIENT
Start: 2021-01-01 | End: 2021-01-01

## 2021-01-01 RX ORDER — AMLODIPINE BESYLATE 2.5 MG/1
2.5 TABLET ORAL DAILY
Status: DISCONTINUED | OUTPATIENT
Start: 2021-01-01 | End: 2021-01-01

## 2021-01-01 RX ORDER — CALCIUM CHLORIDE 100 MG/ML
SYRINGE (ML) INTRAVENOUS CODE/TRAUMA/SEDATION MEDICATION
Status: COMPLETED | OUTPATIENT
Start: 2021-01-01 | End: 2021-01-01

## 2021-01-01 RX ORDER — ZINC SULFATE 50(220)MG
220 CAPSULE ORAL DAILY
Status: COMPLETED | OUTPATIENT
Start: 2021-01-01 | End: 2021-01-01

## 2021-01-01 RX ORDER — SODIUM CHLORIDE, SODIUM GLUCONATE, SODIUM ACETATE, POTASSIUM CHLORIDE, MAGNESIUM CHLORIDE, SODIUM PHOSPHATE, DIBASIC, AND POTASSIUM PHOSPHATE .53; .5; .37; .037; .03; .012; .00082 G/100ML; G/100ML; G/100ML; G/100ML; G/100ML; G/100ML; G/100ML
50 INJECTION, SOLUTION INTRAVENOUS CONTINUOUS
Status: DISCONTINUED | OUTPATIENT
Start: 2021-01-01 | End: 2021-01-01

## 2021-01-01 RX ORDER — INCONTINENCE PAD,LINER,DISP
EACH MISCELLANEOUS 4 TIMES DAILY
Qty: 360 EACH | Refills: 1 | Status: SHIPPED | OUTPATIENT
Start: 2021-01-01

## 2021-01-01 RX ORDER — FUROSEMIDE 40 MG/1
80 TABLET ORAL ONCE
Status: DISCONTINUED | OUTPATIENT
Start: 2021-01-01 | End: 2021-01-01

## 2021-01-01 RX ORDER — DEXTROSE MONOHYDRATE 25 G/50ML
25 INJECTION, SOLUTION INTRAVENOUS ONCE
Status: DISCONTINUED | OUTPATIENT
Start: 2021-01-01 | End: 2021-01-01 | Stop reason: HOSPADM

## 2021-01-01 RX ORDER — DEXAMETHASONE SODIUM PHOSPHATE 4 MG/ML
6 INJECTION, SOLUTION INTRA-ARTICULAR; INTRALESIONAL; INTRAMUSCULAR; INTRAVENOUS; SOFT TISSUE EVERY 24 HOURS
Status: DISCONTINUED | OUTPATIENT
Start: 2021-01-01 | End: 2021-01-01

## 2021-01-01 RX ORDER — TRAZODONE HYDROCHLORIDE 50 MG/1
TABLET ORAL
Qty: 30 TABLET | Refills: 0 | Status: SHIPPED | OUTPATIENT
Start: 2021-01-01 | End: 2021-01-01

## 2021-01-01 RX ORDER — ATORVASTATIN CALCIUM 40 MG/1
40 TABLET, FILM COATED ORAL
Status: DISCONTINUED | OUTPATIENT
Start: 2021-01-01 | End: 2021-01-01 | Stop reason: HOSPADM

## 2021-01-01 RX ORDER — HYDRALAZINE HYDROCHLORIDE 20 MG/ML
20 INJECTION INTRAMUSCULAR; INTRAVENOUS EVERY 4 HOURS PRN
Status: DISCONTINUED | OUTPATIENT
Start: 2021-01-01 | End: 2021-01-01 | Stop reason: HOSPADM

## 2021-01-01 RX ORDER — HYDROMORPHONE HCL/PF 1 MG/ML
1 SYRINGE (ML) INJECTION EVERY 2 HOUR PRN
Status: DISCONTINUED | OUTPATIENT
Start: 2021-01-01 | End: 2021-01-01 | Stop reason: HOSPADM

## 2021-01-01 RX ORDER — LORAZEPAM 2 MG/ML
0.5 INJECTION INTRAMUSCULAR EVERY 6 HOURS PRN
Status: DISCONTINUED | OUTPATIENT
Start: 2021-01-01 | End: 2021-01-01

## 2021-01-01 RX ORDER — CALCITRIOL 0.25 UG/1
0.25 CAPSULE, LIQUID FILLED ORAL DAILY
Qty: 90 CAPSULE | Refills: 3 | Status: SHIPPED | OUTPATIENT
Start: 2021-01-01 | End: 2021-01-01 | Stop reason: HOSPADM

## 2021-01-01 RX ORDER — CALCIUM CARBONATE 200(500)MG
1000 TABLET,CHEWABLE ORAL 3 TIMES DAILY PRN
Status: DISCONTINUED | OUTPATIENT
Start: 2021-01-01 | End: 2021-01-01

## 2021-01-01 RX ORDER — ALPRAZOLAM 0.5 MG/1
1 TABLET ORAL 2 TIMES DAILY PRN
Status: DISCONTINUED | OUTPATIENT
Start: 2021-01-01 | End: 2021-01-01

## 2021-01-01 RX ORDER — FUROSEMIDE 10 MG/ML
20 SYRINGE (ML) INJECTION CONTINUOUS
Status: DISCONTINUED | OUTPATIENT
Start: 2021-01-01 | End: 2021-01-01

## 2021-01-01 RX ORDER — HYDROMORPHONE HCL/PF 1 MG/ML
1 SYRINGE (ML) INJECTION EVERY 4 HOURS PRN
Status: DISCONTINUED | OUTPATIENT
Start: 2021-01-01 | End: 2021-01-01

## 2021-01-01 RX ORDER — FUROSEMIDE 10 MG/ML
40 INJECTION INTRAMUSCULAR; INTRAVENOUS ONCE
Status: COMPLETED | OUTPATIENT
Start: 2021-01-01 | End: 2021-01-01

## 2021-01-01 RX ORDER — ASPIRIN 81 MG/1
81 TABLET ORAL DAILY
Status: DISCONTINUED | OUTPATIENT
Start: 2021-01-01 | End: 2021-01-01

## 2021-01-01 RX ORDER — LIDOCAINE HYDROCHLORIDE 20 MG/ML
INJECTION, SOLUTION EPIDURAL; INFILTRATION; INTRACAUDAL; PERINEURAL CODE/TRAUMA/SEDATION MEDICATION
Status: COMPLETED | OUTPATIENT
Start: 2021-01-01 | End: 2021-01-01

## 2021-01-01 RX ORDER — MELATONIN
2000 DAILY
Status: DISCONTINUED | OUTPATIENT
Start: 2021-01-01 | End: 2021-01-01 | Stop reason: HOSPADM

## 2021-01-01 RX ORDER — CAPSAICIN 0.07 G/100G
CREAM TOPICAL 3 TIMES DAILY
Qty: 28.3 G | Refills: 0 | Status: SHIPPED | OUTPATIENT
Start: 2021-01-01 | End: 2021-01-01 | Stop reason: HOSPADM

## 2021-01-01 RX ORDER — ACETAMINOPHEN 160 MG/5ML
650 SUSPENSION, ORAL (FINAL DOSE FORM) ORAL EVERY 8 HOURS
Status: DISCONTINUED | OUTPATIENT
Start: 2021-01-01 | End: 2021-01-01

## 2021-01-01 RX ORDER — PANTOPRAZOLE SODIUM 40 MG/1
40 TABLET, DELAYED RELEASE ORAL DAILY
Qty: 90 TABLET | Refills: 1 | Status: SHIPPED | OUTPATIENT
Start: 2021-01-01 | End: 2021-01-01 | Stop reason: HOSPADM

## 2021-01-01 RX ORDER — ISOSORBIDE DINITRATE 20 MG/1
30 TABLET ORAL 3 TIMES DAILY
Qty: 405 TABLET | Refills: 3 | Status: SHIPPED | OUTPATIENT
Start: 2021-01-01 | End: 2021-01-01 | Stop reason: HOSPADM

## 2021-01-01 RX ORDER — CALCITRIOL 0.25 UG/1
0.25 CAPSULE, LIQUID FILLED ORAL DAILY
Status: DISCONTINUED | OUTPATIENT
Start: 2021-01-01 | End: 2021-01-01

## 2021-01-01 RX ORDER — FLUTICASONE PROPIONATE 50 MCG
2 SPRAY, SUSPENSION (ML) NASAL DAILY
Qty: 48 ML | Refills: 1 | Status: SHIPPED | OUTPATIENT
Start: 2021-01-01 | End: 2021-01-01 | Stop reason: HOSPADM

## 2021-01-01 RX ORDER — CARVEDILOL 25 MG/1
25 TABLET ORAL 2 TIMES DAILY WITH MEALS
Status: DISCONTINUED | OUTPATIENT
Start: 2021-01-01 | End: 2021-01-01

## 2021-01-01 RX ORDER — FUROSEMIDE 10 MG/ML
80 INJECTION INTRAMUSCULAR; INTRAVENOUS ONCE
Status: DISCONTINUED | OUTPATIENT
Start: 2021-01-01 | End: 2021-01-01

## 2021-01-01 RX ORDER — MIDAZOLAM HYDROCHLORIDE 2 MG/2ML
INJECTION, SOLUTION INTRAMUSCULAR; INTRAVENOUS
Status: COMPLETED
Start: 2021-01-01 | End: 2021-01-01

## 2021-01-01 RX ORDER — SACUBITRIL AND VALSARTAN 97; 103 MG/1; MG/1
1 TABLET, FILM COATED ORAL 2 TIMES DAILY
Qty: 180 TABLET | Refills: 3 | Status: SHIPPED | OUTPATIENT
Start: 2021-01-01 | End: 2021-01-01 | Stop reason: HOSPADM

## 2021-01-01 RX ORDER — SODIUM CHLORIDE, SODIUM LACTATE, POTASSIUM CHLORIDE, CALCIUM CHLORIDE 600; 310; 30; 20 MG/100ML; MG/100ML; MG/100ML; MG/100ML
50 INJECTION, SOLUTION INTRAVENOUS CONTINUOUS
Status: DISCONTINUED | OUTPATIENT
Start: 2021-01-01 | End: 2021-01-01

## 2021-01-01 RX ORDER — PANTOPRAZOLE SODIUM 40 MG/1
TABLET, DELAYED RELEASE ORAL
Qty: 30 TABLET | Refills: 0 | Status: SHIPPED | OUTPATIENT
Start: 2021-01-01 | End: 2021-01-01 | Stop reason: SDUPTHER

## 2021-01-01 RX ORDER — GABAPENTIN 100 MG/1
100 CAPSULE ORAL 3 TIMES DAILY
Status: DISCONTINUED | OUTPATIENT
Start: 2021-01-01 | End: 2021-01-01 | Stop reason: HOSPADM

## 2021-01-01 RX ORDER — FUROSEMIDE 10 MG/ML
10 SYRINGE (ML) INJECTION CONTINUOUS
Status: DISCONTINUED | OUTPATIENT
Start: 2021-01-01 | End: 2021-01-01

## 2021-01-01 RX ORDER — LORAZEPAM 2 MG/ML
INJECTION INTRAMUSCULAR
Status: COMPLETED
Start: 2021-01-01 | End: 2021-01-01

## 2021-01-01 RX ORDER — INSULIN GLARGINE 100 [IU]/ML
10 INJECTION, SOLUTION SUBCUTANEOUS
Status: DISCONTINUED | OUTPATIENT
Start: 2021-01-01 | End: 2021-01-01

## 2021-01-01 RX ORDER — CEFAZOLIN SODIUM 1 G/50ML
1000 SOLUTION INTRAVENOUS EVERY 12 HOURS
Status: COMPLETED | OUTPATIENT
Start: 2021-01-01 | End: 2021-01-01

## 2021-01-01 RX ORDER — HYDRALAZINE HYDROCHLORIDE 20 MG/ML
10 INJECTION INTRAMUSCULAR; INTRAVENOUS EVERY 6 HOURS PRN
Status: DISCONTINUED | OUTPATIENT
Start: 2021-01-01 | End: 2021-01-01

## 2021-01-01 RX ORDER — HEPARIN SODIUM 10000 [USP'U]/100ML
3-20 INJECTION, SOLUTION INTRAVENOUS
Status: DISCONTINUED | OUTPATIENT
Start: 2021-01-01 | End: 2021-01-01 | Stop reason: HOSPADM

## 2021-01-01 RX ORDER — MINERAL OIL AND PETROLATUM 150; 830 MG/G; MG/G
OINTMENT OPHTHALMIC
Status: DISCONTINUED | OUTPATIENT
Start: 2021-01-01 | End: 2021-01-01

## 2021-01-01 RX ORDER — CLONAZEPAM 0.5 MG/1
0.5 TABLET ORAL 2 TIMES DAILY
COMMUNITY
End: 2021-01-01 | Stop reason: HOSPADM

## 2021-01-01 RX ORDER — FAMOTIDINE 20 MG/1
10 TABLET, FILM COATED ORAL DAILY
Status: DISCONTINUED | OUTPATIENT
Start: 2021-01-01 | End: 2021-01-01

## 2021-01-01 RX ORDER — AMLODIPINE BESYLATE 5 MG/1
5 TABLET ORAL DAILY
Status: DISCONTINUED | OUTPATIENT
Start: 2021-01-01 | End: 2021-01-01

## 2021-01-01 RX ORDER — SIMVASTATIN 40 MG
TABLET ORAL
Qty: 90 TABLET | Refills: 1 | Status: SHIPPED | OUTPATIENT
Start: 2021-01-01 | End: 2021-01-01 | Stop reason: HOSPADM

## 2021-01-01 RX ORDER — POTASSIUM CHLORIDE 14.9 MG/ML
20 INJECTION INTRAVENOUS ONCE
Status: COMPLETED | OUTPATIENT
Start: 2021-01-01 | End: 2021-01-01

## 2021-01-01 RX ORDER — AMLODIPINE BESYLATE 5 MG/1
5 TABLET ORAL DAILY
Status: DISCONTINUED | OUTPATIENT
Start: 2021-01-01 | End: 2021-01-01 | Stop reason: HOSPADM

## 2021-01-01 RX ORDER — DOXYCYCLINE HYCLATE 100 MG/1
100 CAPSULE ORAL ONCE
Status: DISCONTINUED | OUTPATIENT
Start: 2021-01-01 | End: 2021-01-01

## 2021-01-01 RX ORDER — SODIUM CHLORIDE, SODIUM GLUCONATE, SODIUM ACETATE, POTASSIUM CHLORIDE, MAGNESIUM CHLORIDE, SODIUM PHOSPHATE, DIBASIC, AND POTASSIUM PHOSPHATE .53; .5; .37; .037; .03; .012; .00082 G/100ML; G/100ML; G/100ML; G/100ML; G/100ML; G/100ML; G/100ML
75 INJECTION, SOLUTION INTRAVENOUS ONCE
Status: DISCONTINUED | OUTPATIENT
Start: 2021-01-01 | End: 2021-01-01

## 2021-01-01 RX ORDER — HEPARIN SODIUM 1000 [USP'U]/ML
2000 INJECTION, SOLUTION INTRAVENOUS; SUBCUTANEOUS
Status: DISCONTINUED | OUTPATIENT
Start: 2021-01-01 | End: 2021-01-01 | Stop reason: HOSPADM

## 2021-01-01 RX ORDER — HEPARIN SODIUM 5000 [USP'U]/ML
5000 INJECTION, SOLUTION INTRAVENOUS; SUBCUTANEOUS EVERY 8 HOURS SCHEDULED
Status: DISCONTINUED | OUTPATIENT
Start: 2021-01-01 | End: 2021-01-01

## 2021-01-01 RX ORDER — POTASSIUM CHLORIDE 29.8 MG/ML
40 INJECTION INTRAVENOUS ONCE
Status: CANCELLED | OUTPATIENT
Start: 2021-01-01

## 2021-01-01 RX ORDER — FENTANYL CITRATE 50 UG/ML
INJECTION, SOLUTION INTRAMUSCULAR; INTRAVENOUS
Status: COMPLETED
Start: 2021-01-01 | End: 2021-01-01

## 2021-01-01 RX ORDER — MIDAZOLAM HYDROCHLORIDE 2 MG/2ML
2 INJECTION, SOLUTION INTRAMUSCULAR; INTRAVENOUS EVERY 2 HOUR PRN
Status: DISCONTINUED | OUTPATIENT
Start: 2021-01-01 | End: 2021-01-01 | Stop reason: HOSPADM

## 2021-01-01 RX ORDER — LANOLIN ALCOHOL/MO/W.PET/CERES
6 CREAM (GRAM) TOPICAL
Status: DISCONTINUED | OUTPATIENT
Start: 2021-01-01 | End: 2021-01-01

## 2021-01-01 RX ORDER — ACETAMINOPHEN 650 MG/20.3ML
650 SUSPENSION ORAL EVERY 4 HOURS PRN
Status: DISCONTINUED | OUTPATIENT
Start: 2021-01-01 | End: 2021-01-01 | Stop reason: HOSPADM

## 2021-01-01 RX ORDER — FUROSEMIDE 10 MG/ML
60 INJECTION INTRAMUSCULAR; INTRAVENOUS ONCE
Status: COMPLETED | OUTPATIENT
Start: 2021-01-01 | End: 2021-01-01

## 2021-01-01 RX ORDER — PRAVASTATIN SODIUM 40 MG
80 TABLET ORAL
Status: CANCELLED | OUTPATIENT
Start: 2021-01-01

## 2021-01-01 RX ORDER — POLYETHYLENE GLYCOL 3350 17 G/17G
17 POWDER, FOR SOLUTION ORAL DAILY
Status: DISCONTINUED | OUTPATIENT
Start: 2021-01-01 | End: 2021-01-01

## 2021-01-01 RX ORDER — LABETALOL 20 MG/4 ML (5 MG/ML) INTRAVENOUS SYRINGE
20 EVERY 4 HOURS PRN
Status: DISCONTINUED | OUTPATIENT
Start: 2021-01-01 | End: 2021-01-01 | Stop reason: HOSPADM

## 2021-01-01 RX ORDER — HYDROMORPHONE HCL 110MG/55ML
2 PATIENT CONTROLLED ANALGESIA SYRINGE INTRAVENOUS ONCE
Status: COMPLETED | OUTPATIENT
Start: 2021-01-01 | End: 2021-01-01

## 2021-01-01 RX ORDER — DEXAMETHASONE SODIUM PHOSPHATE 4 MG/ML
3 INJECTION, SOLUTION INTRA-ARTICULAR; INTRALESIONAL; INTRAMUSCULAR; INTRAVENOUS; SOFT TISSUE ONCE
Status: COMPLETED | OUTPATIENT
Start: 2021-01-01 | End: 2021-01-01

## 2021-01-01 RX ORDER — POLYETHYLENE GLYCOL 3350 17 G/17G
17 POWDER, FOR SOLUTION ORAL DAILY PRN
Status: DISCONTINUED | OUTPATIENT
Start: 2021-01-01 | End: 2021-01-01 | Stop reason: HOSPADM

## 2021-01-01 RX ORDER — SODIUM CHLORIDE, SODIUM LACTATE, POTASSIUM CHLORIDE, CALCIUM CHLORIDE 600; 310; 30; 20 MG/100ML; MG/100ML; MG/100ML; MG/100ML
100 INJECTION, SOLUTION INTRAVENOUS CONTINUOUS
Status: DISCONTINUED | OUTPATIENT
Start: 2021-01-01 | End: 2021-01-01

## 2021-01-01 RX ORDER — AMLODIPINE BESYLATE 5 MG/1
5 TABLET ORAL ONCE
Status: COMPLETED | OUTPATIENT
Start: 2021-01-01 | End: 2021-01-01

## 2021-01-01 RX ORDER — FLUTICASONE PROPIONATE 50 MCG
SPRAY, SUSPENSION (ML) NASAL
Qty: 48 ML | Refills: 1 | Status: SHIPPED | OUTPATIENT
Start: 2021-01-01 | End: 2021-01-01 | Stop reason: SDUPTHER

## 2021-01-01 RX ORDER — OLANZAPINE 5 MG/1
5 TABLET ORAL ONCE
Status: COMPLETED | OUTPATIENT
Start: 2021-01-01 | End: 2021-01-01

## 2021-01-01 RX ORDER — HEPARIN SODIUM 10000 [USP'U]/100ML
3-20 INJECTION, SOLUTION INTRAVENOUS
Status: DISCONTINUED | OUTPATIENT
Start: 2021-01-01 | End: 2021-01-01

## 2021-01-01 RX ORDER — METOLAZONE 5 MG/1
5 TABLET ORAL DAILY
Status: DISCONTINUED | OUTPATIENT
Start: 2021-01-01 | End: 2021-01-01

## 2021-01-01 RX ORDER — PROPOFOL 10 MG/ML
5-50 INJECTION, EMULSION INTRAVENOUS
Status: DISCONTINUED | OUTPATIENT
Start: 2021-01-01 | End: 2021-01-01

## 2021-01-01 RX ORDER — HEPARIN SODIUM 1000 [USP'U]/ML
4000 INJECTION, SOLUTION INTRAVENOUS; SUBCUTANEOUS
Status: DISCONTINUED | OUTPATIENT
Start: 2021-01-01 | End: 2021-01-01 | Stop reason: HOSPADM

## 2021-01-01 RX ORDER — PEN NEEDLE, DIABETIC 32GX 5/32"
NEEDLE, DISPOSABLE MISCELLANEOUS
Qty: 300 EACH | Refills: 1 | Status: SHIPPED | OUTPATIENT
Start: 2021-01-01

## 2021-01-01 RX ORDER — PANTOPRAZOLE SODIUM 40 MG/1
40 TABLET, DELAYED RELEASE ORAL DAILY
Qty: 30 TABLET | Refills: 0 | Status: CANCELLED | OUTPATIENT
Start: 2021-01-01

## 2021-01-01 RX ORDER — CHLORHEXIDINE GLUCONATE 0.12 MG/ML
15 RINSE ORAL EVERY 12 HOURS SCHEDULED
Status: DISCONTINUED | OUTPATIENT
Start: 2021-01-01 | End: 2021-01-01 | Stop reason: HOSPADM

## 2021-01-01 RX ORDER — DOXYCYCLINE HYCLATE 100 MG/1
100 CAPSULE ORAL EVERY 12 HOURS SCHEDULED
Status: DISCONTINUED | OUTPATIENT
Start: 2021-01-01 | End: 2021-01-01

## 2021-01-01 RX ORDER — POTASSIUM CHLORIDE 20MEQ/15ML
40 LIQUID (ML) ORAL EVERY 4 HOURS
Status: DISCONTINUED | OUTPATIENT
Start: 2021-01-01 | End: 2021-01-01

## 2021-01-01 RX ORDER — SODIUM CHLORIDE 450 MG/100ML
50 INJECTION, SOLUTION INTRAVENOUS CONTINUOUS
Status: DISCONTINUED | OUTPATIENT
Start: 2021-01-01 | End: 2021-01-01

## 2021-01-01 RX ORDER — SODIUM CHLORIDE, SODIUM GLUCONATE, SODIUM ACETATE, POTASSIUM CHLORIDE, MAGNESIUM CHLORIDE, SODIUM PHOSPHATE, DIBASIC, AND POTASSIUM PHOSPHATE .53; .5; .37; .037; .03; .012; .00082 G/100ML; G/100ML; G/100ML; G/100ML; G/100ML; G/100ML; G/100ML
75 INJECTION, SOLUTION INTRAVENOUS CONTINUOUS
Status: DISPENSED | OUTPATIENT
Start: 2021-01-01 | End: 2021-01-01

## 2021-01-01 RX ORDER — HYDROMORPHONE HCL/PF 1 MG/ML
1 SYRINGE (ML) INJECTION ONCE
Status: COMPLETED | OUTPATIENT
Start: 2021-01-01 | End: 2021-01-01

## 2021-01-01 RX ORDER — HEPARIN SODIUM 1000 [USP'U]/ML
2000 INJECTION, SOLUTION INTRAVENOUS; SUBCUTANEOUS
Status: DISCONTINUED | OUTPATIENT
Start: 2021-01-01 | End: 2021-01-01

## 2021-01-01 RX ORDER — FUROSEMIDE 10 MG/ML
40 SYRINGE (ML) INJECTION CONTINUOUS
Status: DISCONTINUED | OUTPATIENT
Start: 2021-01-01 | End: 2021-01-01 | Stop reason: HOSPADM

## 2021-01-01 RX ORDER — INSULIN GLARGINE 100 [IU]/ML
35 INJECTION, SOLUTION SUBCUTANEOUS EVERY 12 HOURS SCHEDULED
Status: DISCONTINUED | OUTPATIENT
Start: 2021-01-01 | End: 2021-01-01

## 2021-01-01 RX ORDER — HYDRALAZINE HYDROCHLORIDE 20 MG/ML
5 INJECTION INTRAMUSCULAR; INTRAVENOUS EVERY 6 HOURS PRN
Status: DISCONTINUED | OUTPATIENT
Start: 2021-01-01 | End: 2021-01-01

## 2021-01-01 RX ORDER — TEMAZEPAM 30 MG/1
CAPSULE ORAL AS NEEDED
COMMUNITY
Start: 2021-01-01 | End: 2021-01-01 | Stop reason: HOSPADM

## 2021-01-01 RX ORDER — GABAPENTIN 400 MG/1
400 CAPSULE ORAL 4 TIMES DAILY
Qty: 360 CAPSULE | Refills: 0 | Status: SHIPPED | OUTPATIENT
Start: 2021-01-01 | End: 2021-01-01

## 2021-01-01 RX ORDER — AMOXICILLIN 250 MG
1 CAPSULE ORAL
Status: DISCONTINUED | OUTPATIENT
Start: 2021-01-01 | End: 2021-01-01

## 2021-01-01 RX ORDER — FLUTICASONE PROPIONATE 50 MCG
SPRAY, SUSPENSION (ML) NASAL
Qty: 48 ML | Refills: 1 | Status: CANCELLED | OUTPATIENT
Start: 2021-01-01

## 2021-01-01 RX ORDER — LIFITEGRAST 50 MG/ML
SOLUTION/ DROPS OPHTHALMIC DAILY
COMMUNITY
Start: 2021-01-01 | End: 2021-01-01 | Stop reason: HOSPADM

## 2021-01-01 RX ORDER — DEXTROSE AND SODIUM CHLORIDE 5; .45 G/100ML; G/100ML
50 INJECTION, SOLUTION INTRAVENOUS CONTINUOUS
Status: DISCONTINUED | OUTPATIENT
Start: 2021-01-01 | End: 2021-01-01

## 2021-01-01 RX ORDER — MIDAZOLAM HYDROCHLORIDE 2 MG/2ML
2 INJECTION, SOLUTION INTRAMUSCULAR; INTRAVENOUS ONCE
Status: COMPLETED | OUTPATIENT
Start: 2021-01-01 | End: 2021-01-01

## 2021-01-01 RX ORDER — SODIUM CHLORIDE 9 MG/ML
75 INJECTION, SOLUTION INTRAVENOUS CONTINUOUS
Status: DISCONTINUED | OUTPATIENT
Start: 2021-01-01 | End: 2021-01-01

## 2021-01-01 RX ORDER — LORAZEPAM 2 MG/ML
1 INJECTION INTRAMUSCULAR EVERY 4 HOURS PRN
Status: DISCONTINUED | OUTPATIENT
Start: 2021-01-01 | End: 2021-01-01

## 2021-01-01 RX ORDER — FLUTICASONE PROPIONATE 50 MCG
2 SPRAY, SUSPENSION (ML) NASAL DAILY
Status: DISCONTINUED | OUTPATIENT
Start: 2021-01-01 | End: 2021-01-01

## 2021-01-01 RX ORDER — PANTOPRAZOLE SODIUM 40 MG/1
40 TABLET, DELAYED RELEASE ORAL
Status: DISCONTINUED | OUTPATIENT
Start: 2021-01-01 | End: 2021-01-01

## 2021-01-01 RX ORDER — POTASSIUM CHLORIDE 29.8 MG/ML
40 INJECTION INTRAVENOUS
Status: COMPLETED | OUTPATIENT
Start: 2021-01-01 | End: 2021-01-01

## 2021-01-01 RX ORDER — FENTANYL CITRATE-0.9 % NACL/PF 10 MCG/ML
100 PLASTIC BAG, INJECTION (ML) INTRAVENOUS CONTINUOUS
Status: DISCONTINUED | OUTPATIENT
Start: 2021-01-01 | End: 2021-01-01

## 2021-01-01 RX ORDER — FAMOTIDINE 20 MG/1
20 TABLET, FILM COATED ORAL DAILY
Status: DISCONTINUED | OUTPATIENT
Start: 2021-01-01 | End: 2021-01-01

## 2021-01-01 RX ORDER — MULTIVITAMIN/IRON/FOLIC ACID 18MG-0.4MG
1 TABLET ORAL DAILY
Status: DISCONTINUED | OUTPATIENT
Start: 2021-01-01 | End: 2021-01-01 | Stop reason: HOSPADM

## 2021-01-01 RX ADMIN — MIDODRINE HYDROCHLORIDE 5 MG: 5 TABLET ORAL at 08:16

## 2021-01-01 RX ADMIN — PROPOFOL 40 MCG/KG/MIN: 10 INJECTION, EMULSION INTRAVENOUS at 21:38

## 2021-01-01 RX ADMIN — SODIUM CHLORIDE 9 UNITS/HR: 9 INJECTION, SOLUTION INTRAVENOUS at 11:20

## 2021-01-01 RX ADMIN — ATORVASTATIN CALCIUM 40 MG: 40 TABLET, FILM COATED ORAL at 22:15

## 2021-01-01 RX ADMIN — Medication 20 MG: at 08:17

## 2021-01-01 RX ADMIN — HEPARIN SODIUM 5000 UNITS: 5000 INJECTION INTRAVENOUS; SUBCUTANEOUS at 05:06

## 2021-01-01 RX ADMIN — ISOSORBIDE DINITRATE 30 MG: 20 TABLET ORAL at 20:12

## 2021-01-01 RX ADMIN — CLONAZEPAM 0.5 MG: 0.5 TABLET ORAL at 12:36

## 2021-01-01 RX ADMIN — PROPOFOL 50 MCG/KG/MIN: 10 INJECTION, EMULSION INTRAVENOUS at 18:30

## 2021-01-01 RX ADMIN — PROPOFOL 20 MCG/KG/MIN: 10 INJECTION, EMULSION INTRAVENOUS at 13:21

## 2021-01-01 RX ADMIN — Medication 20 MG: at 09:24

## 2021-01-01 RX ADMIN — POLYETHYLENE GLYCOL 3350 17 G: 17 POWDER, FOR SOLUTION ORAL at 08:00

## 2021-01-01 RX ADMIN — SODIUM CHLORIDE 0.1 MCG/KG/HR: 9 INJECTION, SOLUTION INTRAVENOUS at 22:29

## 2021-01-01 RX ADMIN — DEXTROSE AND SODIUM CHLORIDE 50 ML/HR: 5; .45 INJECTION, SOLUTION INTRAVENOUS at 15:17

## 2021-01-01 RX ADMIN — CALCITRIOL 0.25 MCG: 0.25 CAPSULE, LIQUID FILLED ORAL at 08:08

## 2021-01-01 RX ADMIN — LORAZEPAM 2 MG: 2 INJECTION INTRAMUSCULAR; INTRAVENOUS at 23:36

## 2021-01-01 RX ADMIN — METRONIDAZOLE 500 MG: 500 INJECTION, SOLUTION INTRAVENOUS at 08:30

## 2021-01-01 RX ADMIN — ATORVASTATIN CALCIUM 40 MG: 40 TABLET, FILM COATED ORAL at 21:37

## 2021-01-01 RX ADMIN — INSULIN LISPRO 2 UNITS: 100 INJECTION, SOLUTION INTRAVENOUS; SUBCUTANEOUS at 12:43

## 2021-01-01 RX ADMIN — MIDAZOLAM 1 MG: 1 INJECTION INTRAMUSCULAR; INTRAVENOUS at 17:28

## 2021-01-01 RX ADMIN — FENTANYL CITRATE 50 MCG: 50 INJECTION, SOLUTION INTRAMUSCULAR; INTRAVENOUS at 21:21

## 2021-01-01 RX ADMIN — SODIUM CHLORIDE, SODIUM LACTATE, POTASSIUM CHLORIDE, AND CALCIUM CHLORIDE 100 ML/HR: .6; .31; .03; .02 INJECTION, SOLUTION INTRAVENOUS at 10:06

## 2021-01-01 RX ADMIN — DEXAMETHASONE SODIUM PHOSPHATE 11.5 MG: 10 INJECTION, SOLUTION INTRAMUSCULAR; INTRAVENOUS at 09:27

## 2021-01-01 RX ADMIN — NOREPINEPHRINE BITARTRATE 14 MCG/MIN: 1 INJECTION, SOLUTION, CONCENTRATE INTRAVENOUS at 06:29

## 2021-01-01 RX ADMIN — DOCUSATE SODIUM 50 MG AND SENNOSIDES 8.6 MG 2 TABLET: 8.6; 5 TABLET, FILM COATED ORAL at 08:00

## 2021-01-01 RX ADMIN — MIDAZOLAM: 5 INJECTION INTRAMUSCULAR; INTRAVENOUS at 02:48

## 2021-01-01 RX ADMIN — GABAPENTIN 100 MG: 100 CAPSULE ORAL at 08:01

## 2021-01-01 RX ADMIN — HYDROMORPHONE HYDROCHLORIDE 1 MG: 1 INJECTION, SOLUTION INTRAMUSCULAR; INTRAVENOUS; SUBCUTANEOUS at 06:04

## 2021-01-01 RX ADMIN — METRONIDAZOLE 500 MG: 500 INJECTION, SOLUTION INTRAVENOUS at 16:39

## 2021-01-01 RX ADMIN — DOXYCYCLINE 100 MG: 100 INJECTION, POWDER, LYOPHILIZED, FOR SOLUTION INTRAVENOUS at 09:00

## 2021-01-01 RX ADMIN — Medication 20 MG: at 08:45

## 2021-01-01 RX ADMIN — PROPOFOL 20 MCG/KG/MIN: 10 INJECTION, EMULSION INTRAVENOUS at 18:41

## 2021-01-01 RX ADMIN — EPINEPHRINE 1 MG: 0.1 INJECTION, SOLUTION ENDOTRACHEAL; INTRACARDIAC; INTRAVENOUS at 13:31

## 2021-01-01 RX ADMIN — PANTOPRAZOLE SODIUM 40 MG: 40 TABLET, DELAYED RELEASE ORAL at 05:08

## 2021-01-01 RX ADMIN — HYDRALAZINE HYDROCHLORIDE 10 MG: 20 INJECTION, SOLUTION INTRAMUSCULAR; INTRAVENOUS at 22:05

## 2021-01-01 RX ADMIN — HEPARIN SODIUM 5000 UNITS: 5000 INJECTION INTRAVENOUS; SUBCUTANEOUS at 13:04

## 2021-01-01 RX ADMIN — DOXYCYCLINE 100 MG: 100 CAPSULE ORAL at 21:37

## 2021-01-01 RX ADMIN — ISOSORBIDE DINITRATE 30 MG: 20 TABLET ORAL at 09:11

## 2021-01-01 RX ADMIN — INSULIN LISPRO 2 UNITS: 100 INJECTION, SOLUTION INTRAVENOUS; SUBCUTANEOUS at 17:11

## 2021-01-01 RX ADMIN — SODIUM CHLORIDE, SODIUM GLUCONATE, SODIUM ACETATE, POTASSIUM CHLORIDE, MAGNESIUM CHLORIDE, SODIUM PHOSPHATE, DIBASIC, AND POTASSIUM PHOSPHATE 75 ML/HR: .53; .5; .37; .037; .03; .012; .00082 INJECTION, SOLUTION INTRAVENOUS at 10:20

## 2021-01-01 RX ADMIN — EPOPROSTENOL 49.71 NG/KG/MIN: 1.5 INJECTION, POWDER, LYOPHILIZED, FOR SOLUTION INTRAVENOUS at 13:10

## 2021-01-01 RX ADMIN — WHITE PETROLATUM 57.7 %-MINERAL OIL 31.9 % EYE OINTMENT: at 08:30

## 2021-01-01 RX ADMIN — LORAZEPAM 0.5 MG: 2 INJECTION INTRAMUSCULAR; INTRAVENOUS at 15:00

## 2021-01-01 RX ADMIN — GABAPENTIN 100 MG: 100 CAPSULE ORAL at 16:36

## 2021-01-01 RX ADMIN — PROPOFOL 40.88 MCG/KG/MIN: 10 INJECTION, EMULSION INTRAVENOUS at 02:43

## 2021-01-01 RX ADMIN — DEXAMETHASONE SODIUM PHOSPHATE 6 MG: 4 INJECTION INTRA-ARTICULAR; INTRALESIONAL; INTRAMUSCULAR; INTRAVENOUS; SOFT TISSUE at 21:49

## 2021-01-01 RX ADMIN — Medication 2000 UNITS: at 09:13

## 2021-01-01 RX ADMIN — INSULIN LISPRO 2 UNITS: 100 INJECTION, SOLUTION INTRAVENOUS; SUBCUTANEOUS at 18:17

## 2021-01-01 RX ADMIN — ZINC SULFATE 220 MG (50 MG) CAPSULE 220 MG: CAPSULE at 08:21

## 2021-01-01 RX ADMIN — MIDAZOLAM 2 MG: 1 INJECTION INTRAMUSCULAR; INTRAVENOUS at 16:23

## 2021-01-01 RX ADMIN — Medication 100 MCG/HR: at 15:01

## 2021-01-01 RX ADMIN — OXYCODONE HYDROCHLORIDE AND ACETAMINOPHEN 1000 MG: 500 TABLET ORAL at 10:21

## 2021-01-01 RX ADMIN — INSULIN LISPRO 2 UNITS: 100 INJECTION, SOLUTION INTRAVENOUS; SUBCUTANEOUS at 05:50

## 2021-01-01 RX ADMIN — MIDAZOLAM 2 MG: 1 INJECTION INTRAMUSCULAR; INTRAVENOUS at 22:51

## 2021-01-01 RX ADMIN — PROPOFOL 50 MCG/KG/MIN: 10 INJECTION, EMULSION INTRAVENOUS at 16:12

## 2021-01-01 RX ADMIN — WHITE PETROLATUM 57.7 %-MINERAL OIL 31.9 % EYE OINTMENT: at 12:00

## 2021-01-01 RX ADMIN — GABAPENTIN 100 MG: 100 CAPSULE ORAL at 08:38

## 2021-01-01 RX ADMIN — GABAPENTIN 100 MG: 100 CAPSULE ORAL at 21:11

## 2021-01-01 RX ADMIN — CALCITRIOL 0.25 MCG: 0.25 CAPSULE, LIQUID FILLED ORAL at 08:25

## 2021-01-01 RX ADMIN — ACETAMINOPHEN 650 MG: 650 SUSPENSION ORAL at 22:30

## 2021-01-01 RX ADMIN — DEXTROSE AND SODIUM CHLORIDE 50 ML/HR: 5; .45 INJECTION, SOLUTION INTRAVENOUS at 10:26

## 2021-01-01 RX ADMIN — CEFTRIAXONE SODIUM 1000 MG: 10 INJECTION, POWDER, FOR SOLUTION INTRAVENOUS at 17:48

## 2021-01-01 RX ADMIN — CEFTRIAXONE SODIUM 1000 MG: 10 INJECTION, POWDER, FOR SOLUTION INTRAVENOUS at 16:12

## 2021-01-01 RX ADMIN — CARVEDILOL 25 MG: 25 TABLET, FILM COATED ORAL at 15:56

## 2021-01-01 RX ADMIN — DOXYCYCLINE 100 MG: 100 INJECTION, POWDER, LYOPHILIZED, FOR SOLUTION INTRAVENOUS at 23:50

## 2021-01-01 RX ADMIN — HEPARIN SODIUM 5000 UNITS: 5000 INJECTION INTRAVENOUS; SUBCUTANEOUS at 13:50

## 2021-01-01 RX ADMIN — PROPOFOL 50 MCG/KG/MIN: 10 INJECTION, EMULSION INTRAVENOUS at 07:11

## 2021-01-01 RX ADMIN — SODIUM BICARBONATE 50 MEQ: 84 INJECTION, SOLUTION INTRAVENOUS at 13:59

## 2021-01-01 RX ADMIN — OXYCODONE HYDROCHLORIDE AND ACETAMINOPHEN 1000 MG: 500 TABLET ORAL at 20:31

## 2021-01-01 RX ADMIN — DEXAMETHASONE SODIUM PHOSPHATE 11.5 MG: 10 INJECTION, SOLUTION INTRAMUSCULAR; INTRAVENOUS at 13:48

## 2021-01-01 RX ADMIN — Medication 2000 UNITS: at 09:09

## 2021-01-01 RX ADMIN — HYDROMORPHONE HYDROCHLORIDE 1 MG: 1 INJECTION, SOLUTION INTRAMUSCULAR; INTRAVENOUS; SUBCUTANEOUS at 14:26

## 2021-01-01 RX ADMIN — FLUTICASONE PROPIONATE 2 SPRAY: 50 SPRAY, METERED NASAL at 08:50

## 2021-01-01 RX ADMIN — GABAPENTIN 100 MG: 100 CAPSULE ORAL at 16:21

## 2021-01-01 RX ADMIN — EPINEPHRINE 1 MG: 0.1 INJECTION, SOLUTION ENDOTRACHEAL; INTRACARDIAC; INTRAVENOUS at 13:50

## 2021-01-01 RX ADMIN — HEPARIN SODIUM 2000 UNITS: 1000 INJECTION INTRAVENOUS; SUBCUTANEOUS at 07:47

## 2021-01-01 RX ADMIN — FUROSEMIDE 80 MG: 10 INJECTION, SOLUTION INTRAVENOUS at 12:55

## 2021-01-01 RX ADMIN — INSULIN LISPRO 2 UNITS: 100 INJECTION, SOLUTION INTRAVENOUS; SUBCUTANEOUS at 13:13

## 2021-01-01 RX ADMIN — DOCUSATE SODIUM 50 MG AND SENNOSIDES 8.6 MG 2 TABLET: 8.6; 5 TABLET, FILM COATED ORAL at 08:23

## 2021-01-01 RX ADMIN — INSULIN LISPRO 6 UNITS: 100 INJECTION, SOLUTION INTRAVENOUS; SUBCUTANEOUS at 18:23

## 2021-01-01 RX ADMIN — MIDODRINE HYDROCHLORIDE 5 MG: 5 TABLET ORAL at 12:30

## 2021-01-01 RX ADMIN — GABAPENTIN 100 MG: 100 CAPSULE ORAL at 15:56

## 2021-01-01 RX ADMIN — CALCITRIOL 0.25 MCG: 0.25 CAPSULE, LIQUID FILLED ORAL at 09:00

## 2021-01-01 RX ADMIN — CARVEDILOL 25 MG: 25 TABLET, FILM COATED ORAL at 16:26

## 2021-01-01 RX ADMIN — WHITE PETROLATUM 57.7 %-MINERAL OIL 31.9 % EYE OINTMENT 1 APPLICATION: at 13:31

## 2021-01-01 RX ADMIN — DOXYCYCLINE 100 MG: 100 CAPSULE ORAL at 20:31

## 2021-01-01 RX ADMIN — FENTANYL CITRATE 50 MCG: 50 INJECTION, SOLUTION INTRAMUSCULAR; INTRAVENOUS at 02:48

## 2021-01-01 RX ADMIN — FLUTICASONE PROPIONATE 2 SPRAY: 50 SPRAY, METERED NASAL at 09:00

## 2021-01-01 RX ADMIN — POLYETHYLENE GLYCOL 3350 17 G: 17 POWDER, FOR SOLUTION ORAL at 07:39

## 2021-01-01 RX ADMIN — ASPIRIN 81 MG: 81 TABLET ORAL at 09:13

## 2021-01-01 RX ADMIN — SODIUM BICARBONATE 50 MEQ: 84 INJECTION, SOLUTION INTRAVENOUS at 13:31

## 2021-01-01 RX ADMIN — HEPARIN SODIUM 5000 UNITS: 5000 INJECTION INTRAVENOUS; SUBCUTANEOUS at 21:32

## 2021-01-01 RX ADMIN — Medication 1 MCG/KG/MIN: at 19:22

## 2021-01-01 RX ADMIN — PROPOFOL 50 MCG/KG/MIN: 10 INJECTION, EMULSION INTRAVENOUS at 13:06

## 2021-01-01 RX ADMIN — POLYETHYLENE GLYCOL 3350 17 G: 17 POWDER, FOR SOLUTION ORAL at 08:11

## 2021-01-01 RX ADMIN — MIDODRINE HYDROCHLORIDE 5 MG: 5 TABLET ORAL at 15:05

## 2021-01-01 RX ADMIN — Medication 1 TABLET: at 08:19

## 2021-01-01 RX ADMIN — SODIUM CHLORIDE 11 UNITS/HR: 9 INJECTION, SOLUTION INTRAVENOUS at 03:22

## 2021-01-01 RX ADMIN — PROPOFOL 50 MCG/KG/MIN: 10 INJECTION, EMULSION INTRAVENOUS at 13:56

## 2021-01-01 RX ADMIN — DEXAMETHASONE SODIUM PHOSPHATE 11.5 MG: 10 INJECTION, SOLUTION INTRAMUSCULAR; INTRAVENOUS at 22:31

## 2021-01-01 RX ADMIN — ASPIRIN 81 MG: 81 TABLET ORAL at 09:04

## 2021-01-01 RX ADMIN — HYDROMORPHONE HYDROCHLORIDE 1 MG: 1 INJECTION, SOLUTION INTRAMUSCULAR; INTRAVENOUS; SUBCUTANEOUS at 20:35

## 2021-01-01 RX ADMIN — GABAPENTIN 100 MG: 100 CAPSULE ORAL at 08:19

## 2021-01-01 RX ADMIN — HYDROMORPHONE HYDROCHLORIDE 1 MG: 1 INJECTION, SOLUTION INTRAMUSCULAR; INTRAVENOUS; SUBCUTANEOUS at 03:24

## 2021-01-01 RX ADMIN — INSULIN LISPRO 15 UNITS: 100 INJECTION, SOLUTION INTRAVENOUS; SUBCUTANEOUS at 17:24

## 2021-01-01 RX ADMIN — VANCOMYCIN HYDROCHLORIDE 1750 MG: 1 INJECTION, POWDER, LYOPHILIZED, FOR SOLUTION INTRAVENOUS at 21:09

## 2021-01-01 RX ADMIN — SODIUM CHLORIDE 11 UNITS/HR: 9 INJECTION, SOLUTION INTRAVENOUS at 22:12

## 2021-01-01 RX ADMIN — FENTANYL CITRATE: 50 INJECTION, SOLUTION INTRAMUSCULAR; INTRAVENOUS at 14:40

## 2021-01-01 RX ADMIN — ISOSORBIDE DINITRATE 30 MG: 20 TABLET ORAL at 10:31

## 2021-01-01 RX ADMIN — ALBUMIN (HUMAN) 25 G: 0.25 INJECTION, SOLUTION INTRAVENOUS at 11:37

## 2021-01-01 RX ADMIN — ISOSORBIDE DINITRATE 30 MG: 20 TABLET ORAL at 17:23

## 2021-01-01 RX ADMIN — PROPOFOL 30 MCG/KG/MIN: 10 INJECTION, EMULSION INTRAVENOUS at 13:13

## 2021-01-01 RX ADMIN — GABAPENTIN 100 MG: 100 CAPSULE ORAL at 16:00

## 2021-01-01 RX ADMIN — NOREPINEPHRINE BITARTRATE 4 MCG/MIN: 1 INJECTION, SOLUTION, CONCENTRATE INTRAVENOUS at 05:14

## 2021-01-01 RX ADMIN — FENTANYL CITRATE: 50 INJECTION, SOLUTION INTRAMUSCULAR; INTRAVENOUS at 12:08

## 2021-01-01 RX ADMIN — EPOPROSTENOL 49.71 NG/KG/MIN: 1.5 INJECTION, POWDER, LYOPHILIZED, FOR SOLUTION INTRAVENOUS at 02:57

## 2021-01-01 RX ADMIN — LORAZEPAM 0.5 MG: 2 INJECTION INTRAMUSCULAR; INTRAVENOUS at 14:28

## 2021-01-01 RX ADMIN — CHLORHEXIDINE GLUCONATE 0.12% ORAL RINSE 15 ML: 1.2 LIQUID ORAL at 20:29

## 2021-01-01 RX ADMIN — CHLORHEXIDINE GLUCONATE 0.12% ORAL RINSE 15 ML: 1.2 LIQUID ORAL at 08:16

## 2021-01-01 RX ADMIN — GABAPENTIN 100 MG: 100 CAPSULE ORAL at 17:00

## 2021-01-01 RX ADMIN — INSULIN LISPRO 8 UNITS: 100 INJECTION, SOLUTION INTRAVENOUS; SUBCUTANEOUS at 17:21

## 2021-01-01 RX ADMIN — POLYETHYLENE GLYCOL 3350 17 G: 17 POWDER, FOR SOLUTION ORAL at 08:45

## 2021-01-01 RX ADMIN — SODIUM CHLORIDE 0.1 MCG/KG/HR: 9 INJECTION, SOLUTION INTRAVENOUS at 09:56

## 2021-01-01 RX ADMIN — HYDROMORPHONE HYDROCHLORIDE 1 MG: 1 INJECTION, SOLUTION INTRAMUSCULAR; INTRAVENOUS; SUBCUTANEOUS at 23:15

## 2021-01-01 RX ADMIN — DOCUSATE SODIUM 50 MG AND SENNOSIDES 8.6 MG 2 TABLET: 8.6; 5 TABLET, FILM COATED ORAL at 08:28

## 2021-01-01 RX ADMIN — ACETAMINOPHEN 650 MG: 650 SUSPENSION ORAL at 14:13

## 2021-01-01 RX ADMIN — POLYETHYLENE GLYCOL 3350 17 G: 17 POWDER, FOR SOLUTION ORAL at 08:01

## 2021-01-01 RX ADMIN — DEXTROSE 50 ML/HR: 5 SOLUTION INTRAVENOUS at 02:20

## 2021-01-01 RX ADMIN — ISOSORBIDE DINITRATE 30 MG: 20 TABLET ORAL at 17:11

## 2021-01-01 RX ADMIN — MIDAZOLAM 2 MG: 1 INJECTION INTRAMUSCULAR; INTRAVENOUS at 05:53

## 2021-01-01 RX ADMIN — VANCOMYCIN HYDROCHLORIDE 1750 MG: 10 INJECTION, POWDER, LYOPHILIZED, FOR SOLUTION INTRAVENOUS at 07:31

## 2021-01-01 RX ADMIN — Medication 20 MG/HR: at 13:25

## 2021-01-01 RX ADMIN — CEFAZOLIN SODIUM 1000 MG: 1 SOLUTION INTRAVENOUS at 07:59

## 2021-01-01 RX ADMIN — SODIUM CHLORIDE 5 UNITS/HR: 9 INJECTION, SOLUTION INTRAVENOUS at 11:38

## 2021-01-01 RX ADMIN — AMLODIPINE BESYLATE 5 MG: 5 TABLET ORAL at 08:10

## 2021-01-01 RX ADMIN — PROPOFOL 50 MCG/KG/MIN: 10 INJECTION, EMULSION INTRAVENOUS at 10:11

## 2021-01-01 RX ADMIN — Medication 2000 UNITS: at 08:56

## 2021-01-01 RX ADMIN — Medication 1 TABLET: at 08:16

## 2021-01-01 RX ADMIN — CEFAZOLIN SODIUM 1000 MG: 1 SOLUTION INTRAVENOUS at 18:45

## 2021-01-01 RX ADMIN — SODIUM BICARBONATE 50 MEQ: 84 INJECTION, SOLUTION INTRAVENOUS at 13:50

## 2021-01-01 RX ADMIN — CHLORHEXIDINE GLUCONATE 0.12% ORAL RINSE 15 ML: 1.2 LIQUID ORAL at 08:23

## 2021-01-01 RX ADMIN — INSULIN LISPRO 4 UNITS: 100 INJECTION, SOLUTION INTRAVENOUS; SUBCUTANEOUS at 20:28

## 2021-01-01 RX ADMIN — ATORVASTATIN CALCIUM 40 MG: 40 TABLET, FILM COATED ORAL at 22:07

## 2021-01-01 RX ADMIN — GABAPENTIN 100 MG: 100 CAPSULE ORAL at 21:49

## 2021-01-01 RX ADMIN — PROPOFOL 40 MCG/KG/MIN: 10 INJECTION, EMULSION INTRAVENOUS at 15:24

## 2021-01-01 RX ADMIN — CHLORHEXIDINE GLUCONATE 0.12% ORAL RINSE 15 ML: 1.2 LIQUID ORAL at 20:03

## 2021-01-01 RX ADMIN — GABAPENTIN 100 MG: 100 CAPSULE ORAL at 09:07

## 2021-01-01 RX ADMIN — CARVEDILOL 25 MG: 25 TABLET, FILM COATED ORAL at 16:21

## 2021-01-01 RX ADMIN — EPOPROSTENOL 49.71 NG/KG/MIN: 1.5 INJECTION, POWDER, LYOPHILIZED, FOR SOLUTION INTRAVENOUS at 09:58

## 2021-01-01 RX ADMIN — CEFTRIAXONE SODIUM 1000 MG: 10 INJECTION, POWDER, FOR SOLUTION INTRAVENOUS at 19:05

## 2021-01-01 RX ADMIN — INSULIN LISPRO 10 UNITS: 100 INJECTION, SOLUTION INTRAVENOUS; SUBCUTANEOUS at 08:57

## 2021-01-01 RX ADMIN — HYDROMORPHONE HYDROCHLORIDE 1 MG: 1 INJECTION, SOLUTION INTRAMUSCULAR; INTRAVENOUS; SUBCUTANEOUS at 02:34

## 2021-01-01 RX ADMIN — PROPOFOL 40 MCG/KG/MIN: 10 INJECTION, EMULSION INTRAVENOUS at 18:12

## 2021-01-01 RX ADMIN — Medication 1 TABLET: at 10:33

## 2021-01-01 RX ADMIN — MIDODRINE HYDROCHLORIDE 5 MG: 5 TABLET ORAL at 17:00

## 2021-01-01 RX ADMIN — PROPOFOL 50 MCG/KG/MIN: 10 INJECTION, EMULSION INTRAVENOUS at 17:34

## 2021-01-01 RX ADMIN — GABAPENTIN 100 MG: 100 CAPSULE ORAL at 09:23

## 2021-01-01 RX ADMIN — EPOPROSTENOL 49.71 NG/KG/MIN: 1.5 INJECTION, POWDER, LYOPHILIZED, FOR SOLUTION INTRAVENOUS at 15:34

## 2021-01-01 RX ADMIN — EPOPROSTENOL 49.71 NG/KG/MIN: 1.5 INJECTION, POWDER, LYOPHILIZED, FOR SOLUTION INTRAVENOUS at 03:46

## 2021-01-01 RX ADMIN — DEXAMETHASONE SODIUM PHOSPHATE 11.5 MG: 10 INJECTION, SOLUTION INTRAMUSCULAR; INTRAVENOUS at 10:00

## 2021-01-01 RX ADMIN — CHLORHEXIDINE GLUCONATE 0.12% ORAL RINSE 15 ML: 1.2 LIQUID ORAL at 09:23

## 2021-01-01 RX ADMIN — MIDAZOLAM 2 MG: 1 INJECTION INTRAMUSCULAR; INTRAVENOUS at 11:46

## 2021-01-01 RX ADMIN — SODIUM CHLORIDE 4 UNITS/HR: 9 INJECTION, SOLUTION INTRAVENOUS at 15:00

## 2021-01-01 RX ADMIN — MIDAZOLAM 2 MG: 1 INJECTION INTRAMUSCULAR; INTRAVENOUS at 19:39

## 2021-01-01 RX ADMIN — TOCILIZUMAB 800 MG: 20 INJECTION, SOLUTION, CONCENTRATE INTRAVENOUS at 00:47

## 2021-01-01 RX ADMIN — MIDAZOLAM 2 MG: 1 INJECTION INTRAMUSCULAR; INTRAVENOUS at 13:30

## 2021-01-01 RX ADMIN — HYDRALAZINE HYDROCHLORIDE 5 MG: 20 INJECTION, SOLUTION INTRAMUSCULAR; INTRAVENOUS at 00:44

## 2021-01-01 RX ADMIN — ATORVASTATIN CALCIUM 40 MG: 40 TABLET, FILM COATED ORAL at 21:48

## 2021-01-01 RX ADMIN — SODIUM BICARBONATE 50 MEQ: 84 INJECTION, SOLUTION INTRAVENOUS at 13:52

## 2021-01-01 RX ADMIN — Medication 100 MCG/HR: at 18:12

## 2021-01-01 RX ADMIN — LORAZEPAM 1 MG: 2 INJECTION INTRAMUSCULAR; INTRAVENOUS at 03:34

## 2021-01-01 RX ADMIN — ISOSORBIDE DINITRATE 30 MG: 20 TABLET ORAL at 22:24

## 2021-01-01 RX ADMIN — MIDAZOLAM: 5 INJECTION INTRAMUSCULAR; INTRAVENOUS at 14:40

## 2021-01-01 RX ADMIN — POTASSIUM CHLORIDE 40 MEQ: 29.8 INJECTION, SOLUTION INTRAVENOUS at 13:12

## 2021-01-01 RX ADMIN — DEXTROSE AND SODIUM CHLORIDE 50 ML/HR: 5; .45 INJECTION, SOLUTION INTRAVENOUS at 06:20

## 2021-01-01 RX ADMIN — CARVEDILOL 25 MG: 25 TABLET, FILM COATED ORAL at 08:56

## 2021-01-01 RX ADMIN — PROPOFOL 50 MCG/KG/MIN: 10 INJECTION, EMULSION INTRAVENOUS at 09:07

## 2021-01-01 RX ADMIN — ISOSORBIDE DINITRATE 30 MG: 20 TABLET ORAL at 08:51

## 2021-01-01 RX ADMIN — DOCUSATE SODIUM 50 MG AND SENNOSIDES 8.6 MG 2 TABLET: 8.6; 5 TABLET, FILM COATED ORAL at 17:12

## 2021-01-01 RX ADMIN — DEXAMETHASONE SODIUM PHOSPHATE 11.5 MG: 10 INJECTION, SOLUTION INTRAMUSCULAR; INTRAVENOUS at 12:33

## 2021-01-01 RX ADMIN — MIDAZOLAM 2 MG: 1 INJECTION INTRAMUSCULAR; INTRAVENOUS at 19:54

## 2021-01-01 RX ADMIN — HEPARIN SODIUM 5000 UNITS: 5000 INJECTION INTRAVENOUS; SUBCUTANEOUS at 05:12

## 2021-01-01 RX ADMIN — DEXAMETHASONE SODIUM PHOSPHATE 6 MG: 4 INJECTION INTRA-ARTICULAR; INTRALESIONAL; INTRAMUSCULAR; INTRAVENOUS; SOFT TISSUE at 21:38

## 2021-01-01 RX ADMIN — FAMOTIDINE 10 MG: 20 TABLET ORAL at 08:56

## 2021-01-01 RX ADMIN — HEPARIN SODIUM 5000 UNITS: 5000 INJECTION INTRAVENOUS; SUBCUTANEOUS at 13:41

## 2021-01-01 RX ADMIN — ZINC SULFATE 220 MG (50 MG) CAPSULE 220 MG: CAPSULE at 10:23

## 2021-01-01 RX ADMIN — HEPARIN SODIUM 5000 UNITS: 5000 INJECTION INTRAVENOUS; SUBCUTANEOUS at 21:37

## 2021-01-01 RX ADMIN — EPINEPHRINE 1 MG: 0.1 INJECTION, SOLUTION ENDOTRACHEAL; INTRACARDIAC; INTRAVENOUS at 14:08

## 2021-01-01 RX ADMIN — LORAZEPAM 1 MG: 2 INJECTION INTRAMUSCULAR; INTRAVENOUS at 17:07

## 2021-01-01 RX ADMIN — Medication 1 TABLET: at 08:14

## 2021-01-01 RX ADMIN — ISOSORBIDE DINITRATE 30 MG: 20 TABLET ORAL at 22:13

## 2021-01-01 RX ADMIN — ALBUMIN (HUMAN) 25 G: 0.25 INJECTION, SOLUTION INTRAVENOUS at 04:46

## 2021-01-01 RX ADMIN — INSULIN GLARGINE 10 UNITS: 100 INJECTION, SOLUTION SUBCUTANEOUS at 21:27

## 2021-01-01 RX ADMIN — Medication 2000 UNITS: at 08:45

## 2021-01-01 RX ADMIN — SODIUM BICARBONATE 50 MEQ: 84 INJECTION, SOLUTION INTRAVENOUS at 13:41

## 2021-01-01 RX ADMIN — HEPARIN SODIUM 5000 UNITS: 5000 INJECTION INTRAVENOUS; SUBCUTANEOUS at 22:07

## 2021-01-01 RX ADMIN — VASOPRESSIN 0.04 UNITS/MIN: 20 INJECTION INTRAVENOUS at 13:48

## 2021-01-01 RX ADMIN — MIDODRINE HYDROCHLORIDE 5 MG: 5 TABLET ORAL at 05:10

## 2021-01-01 RX ADMIN — PROPOFOL 20 MCG/KG/MIN: 10 INJECTION, EMULSION INTRAVENOUS at 14:59

## 2021-01-01 RX ADMIN — DEXAMETHASONE SODIUM PHOSPHATE 11.5 MG: 10 INJECTION, SOLUTION INTRAMUSCULAR; INTRAVENOUS at 20:12

## 2021-01-01 RX ADMIN — Medication 75 MCG/HR: at 01:06

## 2021-01-01 RX ADMIN — DEXTROSE MONOHYDRATE 25 G: 500 INJECTION PARENTERAL at 13:54

## 2021-01-01 RX ADMIN — CARVEDILOL 25 MG: 25 TABLET, FILM COATED ORAL at 08:10

## 2021-01-01 RX ADMIN — LABETALOL 20 MG/4 ML (5 MG/ML) INTRAVENOUS SYRINGE 20 MG: at 12:47

## 2021-01-01 RX ADMIN — ACETAMINOPHEN 650 MG: 650 SUSPENSION ORAL at 05:52

## 2021-01-01 RX ADMIN — WHITE PETROLATUM 57.7 %-MINERAL OIL 31.9 % EYE OINTMENT: at 22:21

## 2021-01-01 RX ADMIN — CARVEDILOL 25 MG: 25 TABLET, FILM COATED ORAL at 16:45

## 2021-01-01 RX ADMIN — Medication 75 MCG/HR: at 06:34

## 2021-01-01 RX ADMIN — WHITE PETROLATUM 57.7 %-MINERAL OIL 31.9 % EYE OINTMENT: at 02:08

## 2021-01-01 RX ADMIN — PROPOFOL 50 MCG/KG/MIN: 10 INJECTION, EMULSION INTRAVENOUS at 06:21

## 2021-01-01 RX ADMIN — CALCITRIOL 0.25 MCG: 0.25 CAPSULE, LIQUID FILLED ORAL at 08:40

## 2021-01-01 RX ADMIN — CHLORHEXIDINE GLUCONATE 0.12% ORAL RINSE 15 ML: 1.2 LIQUID ORAL at 10:00

## 2021-01-01 RX ADMIN — DEXAMETHASONE SODIUM PHOSPHATE 11.5 MG: 10 INJECTION, SOLUTION INTRAMUSCULAR; INTRAVENOUS at 21:47

## 2021-01-01 RX ADMIN — Medication 10 MG/HR: at 16:42

## 2021-01-01 RX ADMIN — SODIUM BICARBONATE 50 MEQ: 84 INJECTION, SOLUTION INTRAVENOUS at 13:35

## 2021-01-01 RX ADMIN — CEFEPIME HYDROCHLORIDE 2000 MG: 2 INJECTION, POWDER, FOR SOLUTION INTRAVENOUS at 10:00

## 2021-01-01 RX ADMIN — ISOSORBIDE DINITRATE 30 MG: 20 TABLET ORAL at 16:21

## 2021-01-01 RX ADMIN — INSULIN LISPRO 2 UNITS: 100 INJECTION, SOLUTION INTRAVENOUS; SUBCUTANEOUS at 05:30

## 2021-01-01 RX ADMIN — DEXAMETHASONE SODIUM PHOSPHATE 11.5 MG: 10 INJECTION, SOLUTION INTRAMUSCULAR; INTRAVENOUS at 09:19

## 2021-01-01 RX ADMIN — MIDAZOLAM 1 MG: 1 INJECTION INTRAMUSCULAR; INTRAVENOUS at 15:25

## 2021-01-01 RX ADMIN — HEPARIN SODIUM 5000 UNITS: 5000 INJECTION INTRAVENOUS; SUBCUTANEOUS at 21:01

## 2021-01-01 RX ADMIN — ENOXAPARIN SODIUM 30 MG: 30 INJECTION SUBCUTANEOUS at 21:38

## 2021-01-01 RX ADMIN — DOXYCYCLINE 100 MG: 100 CAPSULE ORAL at 08:21

## 2021-01-01 RX ADMIN — ACETAMINOPHEN 650 MG: 650 SUSPENSION ORAL at 05:45

## 2021-01-01 RX ADMIN — MIDODRINE HYDROCHLORIDE 5 MG: 5 TABLET ORAL at 06:14

## 2021-01-01 RX ADMIN — MIDAZOLAM 2 MG: 1 INJECTION INTRAMUSCULAR; INTRAVENOUS at 07:39

## 2021-01-01 RX ADMIN — POTASSIUM CHLORIDE 20 MEQ: 14.9 INJECTION, SOLUTION INTRAVENOUS at 19:49

## 2021-01-01 RX ADMIN — CALCITRIOL 0.25 MCG: 0.25 CAPSULE, LIQUID FILLED ORAL at 09:07

## 2021-01-01 RX ADMIN — PROPOFOL 40 MCG/KG/MIN: 10 INJECTION, EMULSION INTRAVENOUS at 05:03

## 2021-01-01 RX ADMIN — PROPOFOL 50 MCG/KG/MIN: 10 INJECTION, EMULSION INTRAVENOUS at 23:02

## 2021-01-01 RX ADMIN — CARVEDILOL 25 MG: 25 TABLET, FILM COATED ORAL at 08:30

## 2021-01-01 RX ADMIN — DEXAMETHASONE SODIUM PHOSPHATE 11.5 MG: 10 INJECTION, SOLUTION INTRAMUSCULAR; INTRAVENOUS at 09:25

## 2021-01-01 RX ADMIN — PANTOPRAZOLE SODIUM 40 MG: 40 TABLET, DELAYED RELEASE ORAL at 05:41

## 2021-01-01 RX ADMIN — GABAPENTIN 100 MG: 100 CAPSULE ORAL at 20:00

## 2021-01-01 RX ADMIN — MIDODRINE HYDROCHLORIDE 5 MG: 5 TABLET ORAL at 11:43

## 2021-01-01 RX ADMIN — OLANZAPINE 5 MG: 5 TABLET, FILM COATED ORAL at 11:00

## 2021-01-01 RX ADMIN — PROPOFOL 50 MCG/KG/MIN: 10 INJECTION, EMULSION INTRAVENOUS at 11:06

## 2021-01-01 RX ADMIN — GABAPENTIN 100 MG: 100 CAPSULE ORAL at 17:11

## 2021-01-01 RX ADMIN — GABAPENTIN 100 MG: 100 CAPSULE ORAL at 16:39

## 2021-01-01 RX ADMIN — GABAPENTIN 100 MG: 100 CAPSULE ORAL at 15:05

## 2021-01-01 RX ADMIN — CHLORHEXIDINE GLUCONATE 0.12% ORAL RINSE 15 ML: 1.2 LIQUID ORAL at 08:01

## 2021-01-01 RX ADMIN — PROPOFOL 26.73 MCG/KG/MIN: 10 INJECTION, EMULSION INTRAVENOUS at 06:34

## 2021-01-01 RX ADMIN — GABAPENTIN 100 MG: 100 CAPSULE ORAL at 09:13

## 2021-01-01 RX ADMIN — HEPARIN SODIUM 5000 UNITS: 5000 INJECTION INTRAVENOUS; SUBCUTANEOUS at 05:55

## 2021-01-01 RX ADMIN — HYDRALAZINE HYDROCHLORIDE 10 MG: 20 INJECTION, SOLUTION INTRAMUSCULAR; INTRAVENOUS at 22:22

## 2021-01-01 RX ADMIN — GABAPENTIN 100 MG: 100 CAPSULE ORAL at 10:17

## 2021-01-01 RX ADMIN — HEPARIN SODIUM 5000 UNITS: 5000 INJECTION INTRAVENOUS; SUBCUTANEOUS at 21:00

## 2021-01-01 RX ADMIN — HYDRALAZINE HYDROCHLORIDE 10 MG: 20 INJECTION, SOLUTION INTRAMUSCULAR; INTRAVENOUS at 03:28

## 2021-01-01 RX ADMIN — WHITE PETROLATUM 57.7 %-MINERAL OIL 31.9 % EYE OINTMENT 1 APPLICATION: at 10:52

## 2021-01-01 RX ADMIN — AMLODIPINE BESYLATE 2.5 MG: 2.5 TABLET ORAL at 09:06

## 2021-01-01 RX ADMIN — POTASSIUM CHLORIDE 40 MEQ: 20 SOLUTION ORAL at 11:23

## 2021-01-01 RX ADMIN — BISACODYL 10 MG: 10 SUPPOSITORY RECTAL at 12:55

## 2021-01-01 RX ADMIN — CARVEDILOL 25 MG: 25 TABLET, FILM COATED ORAL at 09:13

## 2021-01-01 RX ADMIN — ATORVASTATIN CALCIUM 40 MG: 40 TABLET, FILM COATED ORAL at 21:01

## 2021-01-01 RX ADMIN — Medication 100 MCG/HR: at 08:23

## 2021-01-01 RX ADMIN — EPOPROSTENOL 49.71 NG/KG/MIN: 1.5 INJECTION, POWDER, LYOPHILIZED, FOR SOLUTION INTRAVENOUS at 05:59

## 2021-01-01 RX ADMIN — WHITE PETROLATUM 57.7 %-MINERAL OIL 31.9 % EYE OINTMENT: at 00:24

## 2021-01-01 RX ADMIN — INSULIN LISPRO 2 UNITS: 100 INJECTION, SOLUTION INTRAVENOUS; SUBCUTANEOUS at 13:01

## 2021-01-01 RX ADMIN — GABAPENTIN 100 MG: 100 CAPSULE ORAL at 20:13

## 2021-01-01 RX ADMIN — PROPOFOL 50 MCG/KG/MIN: 10 INJECTION, EMULSION INTRAVENOUS at 08:23

## 2021-01-01 RX ADMIN — CALCITRIOL 0.25 MCG: 0.25 CAPSULE, LIQUID FILLED ORAL at 09:11

## 2021-01-01 RX ADMIN — POTASSIUM CHLORIDE 40 MEQ: 20 SOLUTION ORAL at 10:13

## 2021-01-01 RX ADMIN — LORAZEPAM: 2 INJECTION INTRAMUSCULAR; INTRAVENOUS at 14:35

## 2021-01-01 RX ADMIN — GABAPENTIN 100 MG: 100 CAPSULE ORAL at 16:34

## 2021-01-01 RX ADMIN — INSULIN LISPRO 6 UNITS: 100 INJECTION, SOLUTION INTRAVENOUS; SUBCUTANEOUS at 12:00

## 2021-01-01 RX ADMIN — FLUTICASONE PROPIONATE 2 SPRAY: 50 SPRAY, METERED NASAL at 08:24

## 2021-01-01 RX ADMIN — POLYETHYLENE GLYCOL 3350 17 G: 17 POWDER, FOR SOLUTION ORAL at 09:28

## 2021-01-01 RX ADMIN — CALCITRIOL 0.25 MCG: 0.25 CAPSULE, LIQUID FILLED ORAL at 10:33

## 2021-01-01 RX ADMIN — PROPOFOL 30 MCG/KG/MIN: 10 INJECTION, EMULSION INTRAVENOUS at 09:16

## 2021-01-01 RX ADMIN — ASPIRIN 81 MG: 81 TABLET ORAL at 08:56

## 2021-01-01 RX ADMIN — OXYCODONE HYDROCHLORIDE AND ACETAMINOPHEN 1000 MG: 500 TABLET ORAL at 09:13

## 2021-01-01 RX ADMIN — ZINC SULFATE 220 MG (50 MG) CAPSULE 220 MG: CAPSULE at 09:09

## 2021-01-01 RX ADMIN — PANTOPRAZOLE SODIUM 40 MG: 40 TABLET, DELAYED RELEASE ORAL at 05:07

## 2021-01-01 RX ADMIN — WHITE PETROLATUM 57.7 %-MINERAL OIL 31.9 % EYE OINTMENT: at 02:10

## 2021-01-01 RX ADMIN — CARVEDILOL 25 MG: 25 TABLET, FILM COATED ORAL at 16:10

## 2021-01-01 RX ADMIN — PROPOFOL 50 MCG/KG/MIN: 10 INJECTION, EMULSION INTRAVENOUS at 21:53

## 2021-01-01 RX ADMIN — CHLORHEXIDINE GLUCONATE 0.12% ORAL RINSE 15 ML: 1.2 LIQUID ORAL at 08:14

## 2021-01-01 RX ADMIN — Medication 2000 UNITS: at 08:08

## 2021-01-01 RX ADMIN — GABAPENTIN 100 MG: 100 CAPSULE ORAL at 08:50

## 2021-01-01 RX ADMIN — WHITE PETROLATUM 57.7 %-MINERAL OIL 31.9 % EYE OINTMENT 1 APPLICATION: at 18:11

## 2021-01-01 RX ADMIN — HEPARIN SODIUM 5000 UNITS: 5000 INJECTION INTRAVENOUS; SUBCUTANEOUS at 05:52

## 2021-01-01 RX ADMIN — METRONIDAZOLE 500 MG: 500 INJECTION, SOLUTION INTRAVENOUS at 00:18

## 2021-01-01 RX ADMIN — PROPOFOL 40 MCG/KG/MIN: 10 INJECTION, EMULSION INTRAVENOUS at 05:39

## 2021-01-01 RX ADMIN — LORAZEPAM 1 MG: 2 INJECTION INTRAMUSCULAR; INTRAVENOUS at 06:11

## 2021-01-01 RX ADMIN — GABAPENTIN 100 MG: 100 CAPSULE ORAL at 16:17

## 2021-01-01 RX ADMIN — Medication 0.2 MCG/KG/MIN: at 09:00

## 2021-01-01 RX ADMIN — DEXAMETHASONE SODIUM PHOSPHATE 11.5 MG: 10 INJECTION, SOLUTION INTRAMUSCULAR; INTRAVENOUS at 21:38

## 2021-01-01 RX ADMIN — WHITE PETROLATUM 57.7 %-MINERAL OIL 31.9 % EYE OINTMENT 1 APPLICATION: at 10:49

## 2021-01-01 RX ADMIN — FAMOTIDINE 10 MG: 20 TABLET ORAL at 08:25

## 2021-01-01 RX ADMIN — PROPOFOL 50 MCG/KG/MIN: 10 INJECTION, EMULSION INTRAVENOUS at 04:48

## 2021-01-01 RX ADMIN — REMDESIVIR 200 MG: 100 INJECTION, POWDER, LYOPHILIZED, FOR SOLUTION INTRAVENOUS at 22:36

## 2021-01-01 RX ADMIN — Medication 100 MCG/HR: at 21:16

## 2021-01-01 RX ADMIN — OXYCODONE HYDROCHLORIDE AND ACETAMINOPHEN 1000 MG: 500 TABLET ORAL at 20:03

## 2021-01-01 RX ADMIN — Medication 50 MCG/HR: at 09:18

## 2021-01-01 RX ADMIN — HEPARIN SODIUM 5000 UNITS: 5000 INJECTION INTRAVENOUS; SUBCUTANEOUS at 13:19

## 2021-01-01 RX ADMIN — WHITE PETROLATUM 57.7 %-MINERAL OIL 31.9 % EYE OINTMENT 1 APPLICATION: at 16:50

## 2021-01-01 RX ADMIN — NOREPINEPHRINE BITARTRATE 10 MCG/MIN: 1 INJECTION, SOLUTION, CONCENTRATE INTRAVENOUS at 13:07

## 2021-01-01 RX ADMIN — INSULIN GLARGINE 40 UNITS: 100 INJECTION, SOLUTION SUBCUTANEOUS at 08:51

## 2021-01-01 RX ADMIN — HYDROMORPHONE HYDROCHLORIDE 1 MG: 1 INJECTION, SOLUTION INTRAMUSCULAR; INTRAVENOUS; SUBCUTANEOUS at 08:18

## 2021-01-01 RX ADMIN — GABAPENTIN 100 MG: 100 CAPSULE ORAL at 20:59

## 2021-01-01 RX ADMIN — HEPARIN SODIUM 5000 UNITS: 5000 INJECTION INTRAVENOUS; SUBCUTANEOUS at 20:31

## 2021-01-01 RX ADMIN — WHITE PETROLATUM 57.7 %-MINERAL OIL 31.9 % EYE OINTMENT: at 04:19

## 2021-01-01 RX ADMIN — AMLODIPINE BESYLATE 2.5 MG: 2.5 TABLET ORAL at 09:09

## 2021-01-01 RX ADMIN — HEPARIN SODIUM 5000 UNITS: 5000 INJECTION INTRAVENOUS; SUBCUTANEOUS at 05:30

## 2021-01-01 RX ADMIN — INSULIN LISPRO 2 UNITS: 100 INJECTION, SOLUTION INTRAVENOUS; SUBCUTANEOUS at 06:18

## 2021-01-01 RX ADMIN — GABAPENTIN 100 MG: 100 CAPSULE ORAL at 20:21

## 2021-01-01 RX ADMIN — DOCUSATE SODIUM 50 MG AND SENNOSIDES 8.6 MG 2 TABLET: 8.6; 5 TABLET, FILM COATED ORAL at 18:32

## 2021-01-01 RX ADMIN — EPOPROSTENOL 49.71 NG/KG/MIN: 1.5 INJECTION, POWDER, LYOPHILIZED, FOR SOLUTION INTRAVENOUS at 14:08

## 2021-01-01 RX ADMIN — DEXAMETHASONE SODIUM PHOSPHATE 11.5 MG: 10 INJECTION, SOLUTION INTRAMUSCULAR; INTRAVENOUS at 20:13

## 2021-01-01 RX ADMIN — ISOSORBIDE DINITRATE 30 MG: 20 TABLET ORAL at 16:10

## 2021-01-01 RX ADMIN — CARVEDILOL 25 MG: 25 TABLET, FILM COATED ORAL at 17:23

## 2021-01-01 RX ADMIN — DOCUSATE SODIUM 50 MG AND SENNOSIDES 8.6 MG 2 TABLET: 8.6; 5 TABLET, FILM COATED ORAL at 09:22

## 2021-01-01 RX ADMIN — PANTOPRAZOLE SODIUM 40 MG: 40 TABLET, DELAYED RELEASE ORAL at 05:30

## 2021-01-01 RX ADMIN — MIDAZOLAM 2 MG: 1 INJECTION INTRAMUSCULAR; INTRAVENOUS at 02:49

## 2021-01-01 RX ADMIN — WHITE PETROLATUM 57.7 %-MINERAL OIL 31.9 % EYE OINTMENT: at 18:31

## 2021-01-01 RX ADMIN — GABAPENTIN 100 MG: 100 CAPSULE ORAL at 20:29

## 2021-01-01 RX ADMIN — ISOSORBIDE DINITRATE 30 MG: 20 TABLET ORAL at 20:02

## 2021-01-01 RX ADMIN — Medication 2000 UNITS: at 09:00

## 2021-01-01 RX ADMIN — EPOPROSTENOL 49.71 NG/KG/MIN: 1.5 INJECTION, POWDER, LYOPHILIZED, FOR SOLUTION INTRAVENOUS at 03:17

## 2021-01-01 RX ADMIN — GABAPENTIN 100 MG: 100 CAPSULE ORAL at 20:35

## 2021-01-01 RX ADMIN — GABAPENTIN 100 MG: 100 CAPSULE ORAL at 20:05

## 2021-01-01 RX ADMIN — WHITE PETROLATUM 57.7 %-MINERAL OIL 31.9 % EYE OINTMENT: at 15:33

## 2021-01-01 RX ADMIN — PROPOFOL 50 MCG/KG/MIN: 10 INJECTION, EMULSION INTRAVENOUS at 22:32

## 2021-01-01 RX ADMIN — SODIUM CHLORIDE 75 ML/HR: 0.9 INJECTION, SOLUTION INTRAVENOUS at 21:14

## 2021-01-01 RX ADMIN — DOXYCYCLINE 100 MG: 100 CAPSULE ORAL at 08:38

## 2021-01-01 RX ADMIN — METRONIDAZOLE 500 MG: 500 INJECTION, SOLUTION INTRAVENOUS at 16:16

## 2021-01-01 RX ADMIN — GABAPENTIN 100 MG: 100 CAPSULE ORAL at 17:12

## 2021-01-01 RX ADMIN — DOCUSATE SODIUM 50 MG AND SENNOSIDES 8.6 MG 2 TABLET: 8.6; 5 TABLET, FILM COATED ORAL at 08:34

## 2021-01-01 RX ADMIN — HEPARIN SODIUM 5000 UNITS: 5000 INJECTION INTRAVENOUS; SUBCUTANEOUS at 22:38

## 2021-01-01 RX ADMIN — METRONIDAZOLE 500 MG: 500 INJECTION, SOLUTION INTRAVENOUS at 16:15

## 2021-01-01 RX ADMIN — HYDROMORPHONE HYDROCHLORIDE 2 MG: 2 INJECTION INTRAMUSCULAR; INTRAVENOUS; SUBCUTANEOUS at 23:17

## 2021-01-01 RX ADMIN — EPOPROSTENOL 49.71 NG/KG/MIN: 1.5 INJECTION, POWDER, LYOPHILIZED, FOR SOLUTION INTRAVENOUS at 17:40

## 2021-01-01 RX ADMIN — PROPOFOL 40 MCG/KG/MIN: 10 INJECTION, EMULSION INTRAVENOUS at 02:49

## 2021-01-01 RX ADMIN — HEPARIN SODIUM 5000 UNITS: 5000 INJECTION INTRAVENOUS; SUBCUTANEOUS at 22:05

## 2021-01-01 RX ADMIN — ASPIRIN 81 MG: 81 TABLET ORAL at 10:17

## 2021-01-01 RX ADMIN — SODIUM CHLORIDE 3 UNITS/HR: 9 INJECTION, SOLUTION INTRAVENOUS at 17:46

## 2021-01-01 RX ADMIN — HEPARIN SODIUM 5000 UNITS: 5000 INJECTION INTRAVENOUS; SUBCUTANEOUS at 22:12

## 2021-01-01 RX ADMIN — PROPOFOL 50 MCG/KG/MIN: 10 INJECTION, EMULSION INTRAVENOUS at 03:27

## 2021-01-01 RX ADMIN — HEPARIN SODIUM 5000 UNITS: 5000 INJECTION INTRAVENOUS; SUBCUTANEOUS at 21:21

## 2021-01-01 RX ADMIN — MIDODRINE HYDROCHLORIDE 5 MG: 5 TABLET ORAL at 09:23

## 2021-01-01 RX ADMIN — INSULIN LISPRO 2 UNITS: 100 INJECTION, SOLUTION INTRAVENOUS; SUBCUTANEOUS at 00:22

## 2021-01-01 RX ADMIN — POTASSIUM CHLORIDE 40 MEQ: 20 SOLUTION ORAL at 08:01

## 2021-01-01 RX ADMIN — CEFAZOLIN SODIUM 1000 MG: 1 SOLUTION INTRAVENOUS at 20:30

## 2021-01-01 RX ADMIN — Medication 1 TABLET: at 08:23

## 2021-01-01 RX ADMIN — CHLORHEXIDINE GLUCONATE 0.12% ORAL RINSE 15 ML: 1.2 LIQUID ORAL at 08:20

## 2021-01-01 RX ADMIN — EPOPROSTENOL 49.71 NG/KG/MIN: 1.5 INJECTION, POWDER, LYOPHILIZED, FOR SOLUTION INTRAVENOUS at 15:12

## 2021-01-01 RX ADMIN — SODIUM CHLORIDE 4 UNITS/HR: 9 INJECTION, SOLUTION INTRAVENOUS at 06:34

## 2021-01-01 RX ADMIN — Medication 2000 UNITS: at 08:16

## 2021-01-01 RX ADMIN — GABAPENTIN 100 MG: 100 CAPSULE ORAL at 16:26

## 2021-01-01 RX ADMIN — PROPOFOL 50 MCG/KG/MIN: 10 INJECTION, EMULSION INTRAVENOUS at 00:40

## 2021-01-01 RX ADMIN — ATORVASTATIN CALCIUM 40 MG: 40 TABLET, FILM COATED ORAL at 22:02

## 2021-01-01 RX ADMIN — SODIUM BICARBONATE: 84 INJECTION, SOLUTION INTRAVENOUS at 13:56

## 2021-01-01 RX ADMIN — GABAPENTIN 100 MG: 100 CAPSULE ORAL at 15:49

## 2021-01-01 RX ADMIN — GABAPENTIN 100 MG: 100 CAPSULE ORAL at 20:03

## 2021-01-01 RX ADMIN — GABAPENTIN 100 MG: 100 CAPSULE ORAL at 08:56

## 2021-01-01 RX ADMIN — Medication 75 MCG/HR: at 09:39

## 2021-01-01 RX ADMIN — FUROSEMIDE 80 MG: 10 INJECTION, SOLUTION INTRAVENOUS at 12:21

## 2021-01-01 RX ADMIN — PROPOFOL 40 MCG/KG/MIN: 10 INJECTION, EMULSION INTRAVENOUS at 11:25

## 2021-01-01 RX ADMIN — INSULIN LISPRO 6 UNITS: 100 INJECTION, SOLUTION INTRAVENOUS; SUBCUTANEOUS at 00:20

## 2021-01-01 RX ADMIN — AMLODIPINE BESYLATE 5 MG: 5 TABLET ORAL at 12:36

## 2021-01-01 RX ADMIN — Medication 1 TABLET: at 09:00

## 2021-01-01 RX ADMIN — HYDROMORPHONE HYDROCHLORIDE 1 MG: 1 INJECTION, SOLUTION INTRAMUSCULAR; INTRAVENOUS; SUBCUTANEOUS at 16:34

## 2021-01-01 RX ADMIN — ENOXAPARIN SODIUM 30 MG: 30 INJECTION SUBCUTANEOUS at 08:11

## 2021-01-01 RX ADMIN — GABAPENTIN 100 MG: 100 CAPSULE ORAL at 08:00

## 2021-01-01 RX ADMIN — LORAZEPAM 1 MG: 2 INJECTION INTRAMUSCULAR; INTRAVENOUS at 10:13

## 2021-01-01 RX ADMIN — GABAPENTIN 100 MG: 100 CAPSULE ORAL at 15:28

## 2021-01-01 RX ADMIN — DEXAMETHASONE SODIUM PHOSPHATE 11.5 MG: 10 INJECTION, SOLUTION INTRAMUSCULAR; INTRAVENOUS at 20:27

## 2021-01-01 RX ADMIN — HYDROMORPHONE HYDROCHLORIDE 1 MG: 1 INJECTION, SOLUTION INTRAMUSCULAR; INTRAVENOUS; SUBCUTANEOUS at 06:23

## 2021-01-01 RX ADMIN — DOCUSATE SODIUM 50 MG AND SENNOSIDES 8.6 MG 2 TABLET: 8.6; 5 TABLET, FILM COATED ORAL at 08:07

## 2021-01-01 RX ADMIN — LORAZEPAM 1 MG: 2 INJECTION INTRAMUSCULAR; INTRAVENOUS at 21:48

## 2021-01-01 RX ADMIN — SODIUM CHLORIDE 4 UNITS/HR: 9 INJECTION, SOLUTION INTRAVENOUS at 04:17

## 2021-01-01 RX ADMIN — CLONAZEPAM 0.5 MG: 0.5 TABLET ORAL at 21:49

## 2021-01-01 RX ADMIN — CALCITRIOL 0.25 MCG: 0.25 CAPSULE, LIQUID FILLED ORAL at 09:13

## 2021-01-01 RX ADMIN — INSULIN LISPRO 15 UNITS: 100 INJECTION, SOLUTION INTRAVENOUS; SUBCUTANEOUS at 08:20

## 2021-01-01 RX ADMIN — ATORVASTATIN CALCIUM 40 MG: 40 TABLET, FILM COATED ORAL at 22:06

## 2021-01-01 RX ADMIN — Medication 2000 UNITS: at 09:23

## 2021-01-01 RX ADMIN — SODIUM BICARBONATE 50 MEQ: 84 INJECTION, SOLUTION INTRAVENOUS at 14:22

## 2021-01-01 RX ADMIN — OXYCODONE HYDROCHLORIDE AND ACETAMINOPHEN 1000 MG: 500 TABLET ORAL at 08:38

## 2021-01-01 RX ADMIN — Medication 20 MG: at 08:28

## 2021-01-01 RX ADMIN — CHLORHEXIDINE GLUCONATE 0.12% ORAL RINSE 15 ML: 1.2 LIQUID ORAL at 08:09

## 2021-01-01 RX ADMIN — PERFLUTREN 0.4 ML/MIN: 6.52 INJECTION, SUSPENSION INTRAVENOUS at 12:12

## 2021-01-01 RX ADMIN — GABAPENTIN 100 MG: 100 CAPSULE ORAL at 08:23

## 2021-01-01 RX ADMIN — CEFEPIME HYDROCHLORIDE 2000 MG: 2 INJECTION, POWDER, FOR SOLUTION INTRAVENOUS at 19:49

## 2021-01-01 RX ADMIN — DOXYCYCLINE 100 MG: 100 CAPSULE ORAL at 20:02

## 2021-01-01 RX ADMIN — EPOPROSTENOL 49.71 NG/KG/MIN: 1.5 INJECTION, POWDER, LYOPHILIZED, FOR SOLUTION INTRAVENOUS at 23:06

## 2021-01-01 RX ADMIN — HEPARIN SODIUM 5000 UNITS: 5000 INJECTION INTRAVENOUS; SUBCUTANEOUS at 05:08

## 2021-01-01 RX ADMIN — EPOPROSTENOL 49.71 NG/KG/MIN: 1.5 INJECTION, POWDER, LYOPHILIZED, FOR SOLUTION INTRAVENOUS at 20:18

## 2021-01-01 RX ADMIN — FENTANYL CITRATE 50 MCG: 50 INJECTION, SOLUTION INTRAMUSCULAR; INTRAVENOUS at 15:24

## 2021-01-01 RX ADMIN — ENOXAPARIN SODIUM 30 MG: 30 INJECTION SUBCUTANEOUS at 09:06

## 2021-01-01 RX ADMIN — CHLORHEXIDINE GLUCONATE 0.12% ORAL RINSE 15 ML: 1.2 LIQUID ORAL at 08:48

## 2021-01-01 RX ADMIN — FUROSEMIDE 40 MG: 10 INJECTION, SOLUTION INTRAVENOUS at 07:39

## 2021-01-01 RX ADMIN — HEPARIN SODIUM 2000 UNITS: 1000 INJECTION INTRAVENOUS; SUBCUTANEOUS at 21:42

## 2021-01-01 RX ADMIN — INSULIN LISPRO 6 UNITS: 100 INJECTION, SOLUTION INTRAVENOUS; SUBCUTANEOUS at 21:50

## 2021-01-01 RX ADMIN — LIDOCAINE HYDROCHLORIDE: 10 INJECTION, SOLUTION EPIDURAL; INFILTRATION; INTRACAUDAL; PERINEURAL at 15:31

## 2021-01-01 RX ADMIN — ZINC SULFATE 220 MG (50 MG) CAPSULE 220 MG: CAPSULE at 09:13

## 2021-01-01 RX ADMIN — MIDODRINE HYDROCHLORIDE 5 MG: 5 TABLET ORAL at 12:23

## 2021-01-01 RX ADMIN — SODIUM CHLORIDE 6 UNITS/HR: 9 INJECTION, SOLUTION INTRAVENOUS at 14:16

## 2021-01-01 RX ADMIN — DEXTROSE 50 ML/HR: 5 SOLUTION INTRAVENOUS at 12:50

## 2021-01-01 RX ADMIN — DOCUSATE SODIUM 50 MG AND SENNOSIDES 8.6 MG 2 TABLET: 8.6; 5 TABLET, FILM COATED ORAL at 10:33

## 2021-01-01 RX ADMIN — ENOXAPARIN SODIUM 105 MG: 120 INJECTION SUBCUTANEOUS at 21:26

## 2021-01-01 RX ADMIN — AMLODIPINE BESYLATE 5 MG: 5 TABLET ORAL at 13:55

## 2021-01-01 RX ADMIN — CALCIUM CHLORIDE 1 G: 100 INJECTION PARENTERAL at 14:23

## 2021-01-01 RX ADMIN — WHITE PETROLATUM 57.7 %-MINERAL OIL 31.9 % EYE OINTMENT: at 06:03

## 2021-01-01 RX ADMIN — ASPIRIN 81 MG: 81 TABLET ORAL at 08:10

## 2021-01-01 RX ADMIN — GABAPENTIN 100 MG: 100 CAPSULE ORAL at 09:00

## 2021-01-01 RX ADMIN — SODIUM CHLORIDE 9 UNITS/HR: 9 INJECTION, SOLUTION INTRAVENOUS at 05:10

## 2021-01-01 RX ADMIN — EPOPROSTENOL 49.71 NG/KG/MIN: 1.5 INJECTION, POWDER, LYOPHILIZED, FOR SOLUTION INTRAVENOUS at 21:20

## 2021-01-01 RX ADMIN — ZINC SULFATE 220 MG (50 MG) CAPSULE 220 MG: CAPSULE at 08:39

## 2021-01-01 RX ADMIN — GABAPENTIN 100 MG: 100 CAPSULE ORAL at 22:12

## 2021-01-01 RX ADMIN — CLONAZEPAM 0.5 MG: 0.5 TABLET ORAL at 21:37

## 2021-01-01 RX ADMIN — DOCUSATE SODIUM 50 MG AND SENNOSIDES 8.6 MG 2 TABLET: 8.6; 5 TABLET, FILM COATED ORAL at 17:34

## 2021-01-01 RX ADMIN — PROPOFOL 40 MCG/KG/MIN: 10 INJECTION, EMULSION INTRAVENOUS at 04:10

## 2021-01-01 RX ADMIN — CARVEDILOL 25 MG: 25 TABLET, FILM COATED ORAL at 09:06

## 2021-01-01 RX ADMIN — FAMOTIDINE 10 MG: 20 TABLET ORAL at 08:39

## 2021-01-01 RX ADMIN — INSULIN HUMAN 10 UNITS: 100 INJECTION, SOLUTION PARENTERAL at 13:54

## 2021-01-01 RX ADMIN — PROPOFOL 25.16 MCG/KG/MIN: 10 INJECTION, EMULSION INTRAVENOUS at 19:58

## 2021-01-01 RX ADMIN — LORAZEPAM 1 MG: 2 INJECTION INTRAMUSCULAR; INTRAVENOUS at 22:26

## 2021-01-01 RX ADMIN — INSULIN LISPRO 4 UNITS: 100 INJECTION, SOLUTION INTRAVENOUS; SUBCUTANEOUS at 00:07

## 2021-01-01 RX ADMIN — WHITE PETROLATUM 57.7 %-MINERAL OIL 31.9 % EYE OINTMENT: at 20:38

## 2021-01-01 RX ADMIN — METRONIDAZOLE 500 MG: 500 INJECTION, SOLUTION INTRAVENOUS at 00:08

## 2021-01-01 RX ADMIN — ISOSORBIDE DINITRATE 30 MG: 20 TABLET ORAL at 08:06

## 2021-01-01 RX ADMIN — WHITE PETROLATUM 57.7 %-MINERAL OIL 31.9 % EYE OINTMENT: at 04:06

## 2021-01-01 RX ADMIN — ATORVASTATIN CALCIUM 40 MG: 40 TABLET, FILM COATED ORAL at 22:12

## 2021-01-01 RX ADMIN — WHITE PETROLATUM 57.7 %-MINERAL OIL 31.9 % EYE OINTMENT: at 05:48

## 2021-01-01 RX ADMIN — LORAZEPAM 1 MG: 2 INJECTION INTRAMUSCULAR; INTRAVENOUS at 06:24

## 2021-01-01 RX ADMIN — CHLORHEXIDINE GLUCONATE 0.12% ORAL RINSE 15 ML: 1.2 LIQUID ORAL at 20:11

## 2021-01-01 RX ADMIN — PROPOFOL 45 MCG/KG/MIN: 10 INJECTION, EMULSION INTRAVENOUS at 10:46

## 2021-01-01 RX ADMIN — INSULIN LISPRO 2 UNITS: 100 INJECTION, SOLUTION INTRAVENOUS; SUBCUTANEOUS at 18:15

## 2021-01-01 RX ADMIN — WHITE PETROLATUM 57.7 %-MINERAL OIL 31.9 % EYE OINTMENT: at 19:37

## 2021-01-01 RX ADMIN — CHLORHEXIDINE GLUCONATE 0.12% ORAL RINSE 15 ML: 1.2 LIQUID ORAL at 08:00

## 2021-01-01 RX ADMIN — PROPOFOL 40 MCG/KG/MIN: 10 INJECTION, EMULSION INTRAVENOUS at 22:31

## 2021-01-01 RX ADMIN — MIDAZOLAM: 5 INJECTION INTRAMUSCULAR; INTRAVENOUS at 12:30

## 2021-01-01 RX ADMIN — LORAZEPAM 1 MG: 2 INJECTION INTRAMUSCULAR; INTRAVENOUS at 04:13

## 2021-01-01 RX ADMIN — PROPOFOL 50 MCG/KG/MIN: 10 INJECTION, EMULSION INTRAVENOUS at 18:31

## 2021-01-01 RX ADMIN — CLONAZEPAM 0.5 MG: 0.5 TABLET ORAL at 08:38

## 2021-01-01 RX ADMIN — PROPOFOL 40 MCG/KG/MIN: 10 INJECTION, EMULSION INTRAVENOUS at 22:14

## 2021-01-01 RX ADMIN — GABAPENTIN 100 MG: 100 CAPSULE ORAL at 08:09

## 2021-01-01 RX ADMIN — DEXTROSE 50 ML/HR: 5 SOLUTION INTRAVENOUS at 09:00

## 2021-01-01 RX ADMIN — CARVEDILOL 25 MG: 25 TABLET, FILM COATED ORAL at 08:51

## 2021-01-01 RX ADMIN — PROPOFOL 50 MCG/KG/MIN: 10 INJECTION, EMULSION INTRAVENOUS at 03:39

## 2021-01-01 RX ADMIN — Medication 75 MCG/HR: at 17:25

## 2021-01-01 RX ADMIN — PROPOFOL 50 MCG/KG/MIN: 10 INJECTION, EMULSION INTRAVENOUS at 01:45

## 2021-01-01 RX ADMIN — PROPOFOL 50 MCG/KG/MIN: 10 INJECTION, EMULSION INTRAVENOUS at 16:39

## 2021-01-01 RX ADMIN — Medication 20 MG: at 08:01

## 2021-01-01 RX ADMIN — NOREPINEPHRINE BITARTRATE 4 MCG/MIN: 1 INJECTION, SOLUTION, CONCENTRATE INTRAVENOUS at 18:21

## 2021-01-01 RX ADMIN — ISOSORBIDE DINITRATE 30 MG: 20 TABLET ORAL at 08:40

## 2021-01-01 RX ADMIN — PROPOFOL 50 MCG/KG/MIN: 10 INJECTION, EMULSION INTRAVENOUS at 06:45

## 2021-01-01 RX ADMIN — DOCUSATE SODIUM 50 MG AND SENNOSIDES 8.6 MG 2 TABLET: 8.6; 5 TABLET, FILM COATED ORAL at 17:04

## 2021-01-01 RX ADMIN — CHLORHEXIDINE GLUCONATE 0.12% ORAL RINSE 15 ML: 1.2 LIQUID ORAL at 20:05

## 2021-01-01 RX ADMIN — AMLODIPINE BESYLATE 5 MG: 5 TABLET ORAL at 08:24

## 2021-01-01 RX ADMIN — MIDAZOLAM: 5 INJECTION INTRAMUSCULAR; INTRAVENOUS at 11:16

## 2021-01-01 RX ADMIN — EPOPROSTENOL 49.71 NG/KG/MIN: 1.5 INJECTION, POWDER, LYOPHILIZED, FOR SOLUTION INTRAVENOUS at 07:52

## 2021-01-01 RX ADMIN — ACETAMINOPHEN 650 MG: 650 SUSPENSION ORAL at 13:54

## 2021-01-01 RX ADMIN — GABAPENTIN 100 MG: 100 CAPSULE ORAL at 16:11

## 2021-01-01 RX ADMIN — SODIUM CHLORIDE 12 UNITS/HR: 9 INJECTION, SOLUTION INTRAVENOUS at 02:25

## 2021-01-01 RX ADMIN — HEPARIN SODIUM 5000 UNITS: 5000 INJECTION INTRAVENOUS; SUBCUTANEOUS at 15:00

## 2021-01-01 RX ADMIN — FUROSEMIDE 40 MG: 10 INJECTION, SOLUTION INTRAVENOUS at 11:26

## 2021-01-01 RX ADMIN — PROPOFOL 30 MCG/KG/MIN: 10 INJECTION, EMULSION INTRAVENOUS at 17:03

## 2021-01-01 RX ADMIN — POLYETHYLENE GLYCOL 3350 17 G: 17 POWDER, FOR SOLUTION ORAL at 10:33

## 2021-01-01 RX ADMIN — ALBUMIN (HUMAN) 25 G: 0.25 INJECTION, SOLUTION INTRAVENOUS at 23:37

## 2021-01-01 RX ADMIN — REMDESIVIR 100 MG: 100 INJECTION, POWDER, LYOPHILIZED, FOR SOLUTION INTRAVENOUS at 22:17

## 2021-01-01 RX ADMIN — WHITE PETROLATUM 57.7 %-MINERAL OIL 31.9 % EYE OINTMENT: at 04:41

## 2021-01-01 RX ADMIN — INSULIN LISPRO 2 UNITS: 100 INJECTION, SOLUTION INTRAVENOUS; SUBCUTANEOUS at 08:20

## 2021-01-01 RX ADMIN — Medication 2000 UNITS: at 08:23

## 2021-01-01 RX ADMIN — WHITE PETROLATUM 57.7 %-MINERAL OIL 31.9 % EYE OINTMENT: at 22:02

## 2021-01-01 RX ADMIN — GABAPENTIN 100 MG: 100 CAPSULE ORAL at 08:18

## 2021-01-01 RX ADMIN — Medication 1 TABLET: at 08:01

## 2021-01-01 RX ADMIN — EPOPROSTENOL 49.71 NG/KG/MIN: 1.5 INJECTION, POWDER, LYOPHILIZED, FOR SOLUTION INTRAVENOUS at 19:26

## 2021-01-01 RX ADMIN — ENOXAPARIN SODIUM 30 MG: 30 INJECTION SUBCUTANEOUS at 12:55

## 2021-01-01 RX ADMIN — WHITE PETROLATUM 57.7 %-MINERAL OIL 31.9 % EYE OINTMENT: at 08:00

## 2021-01-01 RX ADMIN — SODIUM CHLORIDE 6 UNITS/HR: 9 INJECTION, SOLUTION INTRAVENOUS at 16:44

## 2021-01-01 RX ADMIN — MIDODRINE HYDROCHLORIDE 5 MG: 5 TABLET ORAL at 16:37

## 2021-01-01 RX ADMIN — DEXAMETHASONE SODIUM PHOSPHATE 11.5 MG: 10 INJECTION, SOLUTION INTRAMUSCULAR; INTRAVENOUS at 21:00

## 2021-01-01 RX ADMIN — Medication 75 MCG/HR: at 14:24

## 2021-01-01 RX ADMIN — CEFTRIAXONE SODIUM 1000 MG: 10 INJECTION, POWDER, FOR SOLUTION INTRAVENOUS at 17:12

## 2021-01-01 RX ADMIN — ATORVASTATIN CALCIUM 40 MG: 40 TABLET, FILM COATED ORAL at 22:18

## 2021-01-01 RX ADMIN — DEXAMETHASONE SODIUM PHOSPHATE 3 MG: 4 INJECTION INTRA-ARTICULAR; INTRALESIONAL; INTRAMUSCULAR; INTRAVENOUS; SOFT TISSUE at 08:00

## 2021-01-01 RX ADMIN — EPINEPHRINE 1 MG: 0.1 INJECTION, SOLUTION ENDOTRACHEAL; INTRACARDIAC; INTRAVENOUS at 13:42

## 2021-01-01 RX ADMIN — SODIUM CHLORIDE 0.25 MCG/KG/HR: 9 INJECTION, SOLUTION INTRAVENOUS at 23:00

## 2021-01-01 RX ADMIN — INSULIN LISPRO 2 UNITS: 100 INJECTION, SOLUTION INTRAVENOUS; SUBCUTANEOUS at 12:21

## 2021-01-01 RX ADMIN — CHLORHEXIDINE GLUCONATE 0.12% ORAL RINSE 15 ML: 1.2 LIQUID ORAL at 20:52

## 2021-01-01 RX ADMIN — CLONAZEPAM 0.5 MG: 0.5 TABLET ORAL at 08:21

## 2021-01-01 RX ADMIN — HEPARIN SODIUM 11.1 UNITS/KG/HR: 10000 INJECTION, SOLUTION INTRAVENOUS at 21:58

## 2021-01-01 RX ADMIN — Medication 2000 UNITS: at 08:00

## 2021-01-01 RX ADMIN — POLYETHYLENE GLYCOL 3350 17 G: 17 POWDER, FOR SOLUTION ORAL at 10:24

## 2021-01-01 RX ADMIN — LORAZEPAM 1 MG: 2 INJECTION INTRAMUSCULAR; INTRAVENOUS at 18:18

## 2021-01-01 RX ADMIN — INSULIN LISPRO 8 UNITS: 100 INJECTION, SOLUTION INTRAVENOUS; SUBCUTANEOUS at 05:52

## 2021-01-01 RX ADMIN — HEPARIN SODIUM 5000 UNITS: 5000 INJECTION INTRAVENOUS; SUBCUTANEOUS at 22:19

## 2021-01-01 RX ADMIN — PROPOFOL 40 MCG/KG/MIN: 10 INJECTION, EMULSION INTRAVENOUS at 08:00

## 2021-01-01 RX ADMIN — ISOSORBIDE DINITRATE 30 MG: 20 TABLET ORAL at 08:25

## 2021-01-01 RX ADMIN — CHLORHEXIDINE GLUCONATE 0.12% ORAL RINSE 15 ML: 1.2 LIQUID ORAL at 21:38

## 2021-01-01 RX ADMIN — EPOPROSTENOL 49.71 NG/KG/MIN: 1.5 INJECTION, POWDER, LYOPHILIZED, FOR SOLUTION INTRAVENOUS at 05:30

## 2021-01-01 RX ADMIN — Medication 2000 UNITS: at 09:05

## 2021-01-01 RX ADMIN — ACETAMINOPHEN 650 MG: 650 SUSPENSION ORAL at 01:30

## 2021-01-01 RX ADMIN — INSULIN LISPRO 4 UNITS: 100 INJECTION, SOLUTION INTRAVENOUS; SUBCUTANEOUS at 18:21

## 2021-01-01 RX ADMIN — DOCUSATE SODIUM 50 MG AND SENNOSIDES 8.6 MG 2 TABLET: 8.6; 5 TABLET, FILM COATED ORAL at 12:56

## 2021-01-01 RX ADMIN — CEFEPIME HYDROCHLORIDE 2000 MG: 2 INJECTION, POWDER, FOR SOLUTION INTRAVENOUS at 06:23

## 2021-01-01 RX ADMIN — HYDROMORPHONE HYDROCHLORIDE 1 MG: 1 INJECTION, SOLUTION INTRAMUSCULAR; INTRAVENOUS; SUBCUTANEOUS at 16:21

## 2021-01-01 RX ADMIN — GABAPENTIN 100 MG: 100 CAPSULE ORAL at 09:09

## 2021-01-01 RX ADMIN — OXYCODONE HYDROCHLORIDE AND ACETAMINOPHEN 1000 MG: 500 TABLET ORAL at 08:56

## 2021-01-01 RX ADMIN — HEPARIN SODIUM 5000 UNITS: 5000 INJECTION INTRAVENOUS; SUBCUTANEOUS at 14:03

## 2021-01-01 RX ADMIN — DOCUSATE SODIUM 50 MG AND SENNOSIDES 8.6 MG 2 TABLET: 8.6; 5 TABLET, FILM COATED ORAL at 18:30

## 2021-01-01 RX ADMIN — MIDAZOLAM 2 MG: 1 INJECTION INTRAMUSCULAR; INTRAVENOUS at 09:43

## 2021-01-01 RX ADMIN — DOXYCYCLINE 100 MG: 100 CAPSULE ORAL at 10:17

## 2021-01-01 RX ADMIN — FAMOTIDINE 20 MG: 20 TABLET ORAL at 09:28

## 2021-01-01 RX ADMIN — LORAZEPAM 1 MG: 2 INJECTION INTRAMUSCULAR; INTRAVENOUS at 02:09

## 2021-01-01 RX ADMIN — ATORVASTATIN CALCIUM 40 MG: 40 TABLET, FILM COATED ORAL at 21:00

## 2021-01-01 RX ADMIN — GABAPENTIN 100 MG: 100 CAPSULE ORAL at 08:16

## 2021-01-01 RX ADMIN — VANCOMYCIN HYDROCHLORIDE 1250 MG: 1 INJECTION, POWDER, LYOPHILIZED, FOR SOLUTION INTRAVENOUS at 22:02

## 2021-01-01 RX ADMIN — LIDOCAINE HYDROCHLORIDE 100 MG: 20 INJECTION, SOLUTION EPIDURAL; INFILTRATION; INTRACAUDAL; PERINEURAL at 14:05

## 2021-01-01 RX ADMIN — DOCUSATE SODIUM 50 MG AND SENNOSIDES 8.6 MG 2 TABLET: 8.6; 5 TABLET, FILM COATED ORAL at 09:00

## 2021-01-01 RX ADMIN — PROPOFOL 45 MCG/KG/MIN: 10 INJECTION, EMULSION INTRAVENOUS at 11:56

## 2021-01-01 RX ADMIN — Medication 10 MG/HR: at 18:39

## 2021-01-01 RX ADMIN — HEPARIN SODIUM 5000 UNITS: 5000 INJECTION INTRAVENOUS; SUBCUTANEOUS at 04:25

## 2021-01-01 RX ADMIN — Medication 100 MCG/HR: at 05:16

## 2021-01-01 RX ADMIN — ISOSORBIDE DINITRATE 30 MG: 20 TABLET ORAL at 09:04

## 2021-01-01 RX ADMIN — HEPARIN SODIUM 5000 UNITS: 5000 INJECTION INTRAVENOUS; SUBCUTANEOUS at 13:12

## 2021-01-01 RX ADMIN — FLUTICASONE PROPIONATE 2 SPRAY: 50 SPRAY, METERED NASAL at 09:14

## 2021-01-01 RX ADMIN — CEFTRIAXONE SODIUM 1000 MG: 10 INJECTION, POWDER, FOR SOLUTION INTRAVENOUS at 16:45

## 2021-01-01 RX ADMIN — EPINEPHRINE 1 MG: 0.1 INJECTION, SOLUTION ENDOTRACHEAL; INTRACARDIAC; INTRAVENOUS at 13:35

## 2021-01-01 RX ADMIN — HEPARIN SODIUM 5000 UNITS: 5000 INJECTION INTRAVENOUS; SUBCUTANEOUS at 05:44

## 2021-01-01 RX ADMIN — POTASSIUM CHLORIDE 40 MEQ: 20 SOLUTION ORAL at 08:45

## 2021-01-01 RX ADMIN — DOXYCYCLINE 100 MG: 100 CAPSULE ORAL at 22:12

## 2021-01-01 RX ADMIN — MIDODRINE HYDROCHLORIDE 5 MG: 5 TABLET ORAL at 11:23

## 2021-01-01 RX ADMIN — NOREPINEPHRINE BITARTRATE 5 MCG/MIN: 1 INJECTION, SOLUTION, CONCENTRATE INTRAVENOUS at 09:00

## 2021-01-01 RX ADMIN — HEPARIN SODIUM 5000 UNITS: 5000 INJECTION INTRAVENOUS; SUBCUTANEOUS at 14:06

## 2021-01-01 RX ADMIN — MIDAZOLAM 2 MG: 1 INJECTION INTRAMUSCULAR; INTRAVENOUS at 02:52

## 2021-01-01 RX ADMIN — ALBUMIN (HUMAN) 25 G: 0.25 INJECTION, SOLUTION INTRAVENOUS at 16:23

## 2021-01-01 RX ADMIN — FLUTICASONE PROPIONATE 2 SPRAY: 50 SPRAY, METERED NASAL at 09:11

## 2021-01-01 RX ADMIN — INSULIN LISPRO 6 UNITS: 100 INJECTION, SOLUTION INTRAVENOUS; SUBCUTANEOUS at 00:46

## 2021-01-01 RX ADMIN — MIDAZOLAM 2 MG: 1 INJECTION INTRAMUSCULAR; INTRAVENOUS at 02:48

## 2021-01-01 RX ADMIN — CHLORHEXIDINE GLUCONATE 0.12% ORAL RINSE 15 ML: 1.2 LIQUID ORAL at 20:35

## 2021-01-01 RX ADMIN — HYDROMORPHONE HYDROCHLORIDE 1 MG: 1 INJECTION, SOLUTION INTRAMUSCULAR; INTRAVENOUS; SUBCUTANEOUS at 14:14

## 2021-01-01 RX ADMIN — CALCITRIOL 0.25 MCG: 0.25 CAPSULE, LIQUID FILLED ORAL at 10:17

## 2021-01-01 RX ADMIN — Medication 50 MCG/HR: at 18:21

## 2021-01-01 RX ADMIN — ASPIRIN 81 MG: 81 TABLET ORAL at 08:50

## 2021-01-01 RX ADMIN — Medication 20 MG/HR: at 04:45

## 2021-01-01 RX ADMIN — PROPOFOL 50 MCG/KG/MIN: 10 INJECTION, EMULSION INTRAVENOUS at 15:11

## 2021-01-01 RX ADMIN — HEPARIN SODIUM 5000 UNITS: 5000 INJECTION INTRAVENOUS; SUBCUTANEOUS at 21:50

## 2021-01-01 RX ADMIN — EPOPROSTENOL 49.71 NG/KG/MIN: 1.5 INJECTION, POWDER, LYOPHILIZED, FOR SOLUTION INTRAVENOUS at 22:27

## 2021-01-01 RX ADMIN — INSULIN LISPRO 6 UNITS: 100 INJECTION, SOLUTION INTRAVENOUS; SUBCUTANEOUS at 12:04

## 2021-01-01 RX ADMIN — OXYCODONE HYDROCHLORIDE AND ACETAMINOPHEN 1000 MG: 500 TABLET ORAL at 09:09

## 2021-01-01 RX ADMIN — Medication 1 TABLET: at 08:28

## 2021-01-01 RX ADMIN — HYDRALAZINE HYDROCHLORIDE 10 MG: 20 INJECTION, SOLUTION INTRAMUSCULAR; INTRAVENOUS at 23:51

## 2021-01-01 RX ADMIN — CHLORHEXIDINE GLUCONATE 0.12% ORAL RINSE 15 ML: 1.2 LIQUID ORAL at 20:15

## 2021-01-01 RX ADMIN — WHITE PETROLATUM 57.7 %-MINERAL OIL 31.9 % EYE OINTMENT: at 00:03

## 2021-01-01 RX ADMIN — WHITE PETROLATUM 57.7 %-MINERAL OIL 31.9 % EYE OINTMENT: at 18:19

## 2021-01-01 RX ADMIN — WHITE PETROLATUM 57.7 %-MINERAL OIL 31.9 % EYE OINTMENT: at 14:00

## 2021-01-01 RX ADMIN — SODIUM CHLORIDE 0.2 MCG/KG/HR: 9 INJECTION, SOLUTION INTRAVENOUS at 18:30

## 2021-01-01 RX ADMIN — GABAPENTIN 100 MG: 100 CAPSULE ORAL at 10:33

## 2021-01-01 RX ADMIN — Medication 1 TABLET: at 08:20

## 2021-01-01 RX ADMIN — CEFTRIAXONE SODIUM 1000 MG: 10 INJECTION, POWDER, FOR SOLUTION INTRAVENOUS at 16:26

## 2021-01-01 RX ADMIN — CARVEDILOL 25 MG: 25 TABLET, FILM COATED ORAL at 07:48

## 2021-01-01 RX ADMIN — WHITE PETROLATUM 57.7 %-MINERAL OIL 31.9 % EYE OINTMENT: at 21:39

## 2021-01-01 RX ADMIN — Medication 2000 UNITS: at 08:01

## 2021-01-01 RX ADMIN — POTASSIUM CHLORIDE 40 MEQ: 29.8 INJECTION, SOLUTION INTRAVENOUS at 11:01

## 2021-01-01 RX ADMIN — Medication 2000 UNITS: at 08:35

## 2021-01-01 RX ADMIN — CALCITRIOL 0.25 MCG: 0.25 CAPSULE, LIQUID FILLED ORAL at 08:51

## 2021-01-01 RX ADMIN — PANTOPRAZOLE SODIUM 40 MG: 40 TABLET, DELAYED RELEASE ORAL at 05:04

## 2021-01-01 RX ADMIN — DOCUSATE SODIUM 50 MG AND SENNOSIDES 8.6 MG 2 TABLET: 8.6; 5 TABLET, FILM COATED ORAL at 08:19

## 2021-01-01 RX ADMIN — CEFAZOLIN SODIUM 1000 MG: 1 SOLUTION INTRAVENOUS at 08:00

## 2021-01-01 RX ADMIN — PROPOFOL 50 MCG/KG/MIN: 10 INJECTION, EMULSION INTRAVENOUS at 05:07

## 2021-01-01 RX ADMIN — Medication 20 MG/HR: at 00:51

## 2021-01-01 RX ADMIN — GABAPENTIN 100 MG: 100 CAPSULE ORAL at 20:04

## 2021-01-01 RX ADMIN — HEPARIN SODIUM 5000 UNITS: 5000 INJECTION INTRAVENOUS; SUBCUTANEOUS at 14:33

## 2021-01-01 RX ADMIN — Medication 20 MG/HR: at 10:12

## 2021-01-01 RX ADMIN — MIDAZOLAM 2 MG: 1 INJECTION INTRAMUSCULAR; INTRAVENOUS at 17:20

## 2021-01-01 RX ADMIN — PROPOFOL 40 MCG/KG/MIN: 10 INJECTION, EMULSION INTRAVENOUS at 00:32

## 2021-01-01 RX ADMIN — PROPOFOL 50 MCG/KG/MIN: 10 INJECTION, EMULSION INTRAVENOUS at 05:55

## 2021-01-01 RX ADMIN — METRONIDAZOLE 500 MG: 500 INJECTION, SOLUTION INTRAVENOUS at 08:22

## 2021-01-01 RX ADMIN — WHITE PETROLATUM 57.7 %-MINERAL OIL 31.9 % EYE OINTMENT: at 19:57

## 2021-01-01 RX ADMIN — CEFEPIME HYDROCHLORIDE 2000 MG: 2 INJECTION, POWDER, FOR SOLUTION INTRAVENOUS at 07:43

## 2021-01-01 RX ADMIN — SODIUM BICARBONATE 50 MEQ: 84 INJECTION, SOLUTION INTRAVENOUS at 14:14

## 2021-01-01 RX ADMIN — GABAPENTIN 100 MG: 100 CAPSULE ORAL at 08:46

## 2021-01-01 RX ADMIN — DEXAMETHASONE SODIUM PHOSPHATE 6 MG: 4 INJECTION INTRA-ARTICULAR; INTRALESIONAL; INTRAMUSCULAR; INTRAVENOUS; SOFT TISSUE at 22:12

## 2021-01-01 RX ADMIN — Medication 1 TABLET: at 09:29

## 2021-01-01 RX ADMIN — MELATONIN 6 MG: at 22:49

## 2021-01-01 RX ADMIN — Medication 1 TABLET: at 09:23

## 2021-01-01 RX ADMIN — WHITE PETROLATUM 57.7 %-MINERAL OIL 31.9 % EYE OINTMENT: at 00:00

## 2021-01-01 RX ADMIN — DEXAMETHASONE SODIUM PHOSPHATE 11.5 MG: 10 INJECTION, SOLUTION INTRAMUSCULAR; INTRAVENOUS at 09:57

## 2021-01-01 RX ADMIN — SODIUM BICARBONATE 50 MEQ: 84 INJECTION, SOLUTION INTRAVENOUS at 14:26

## 2021-01-01 RX ADMIN — ISOSORBIDE DINITRATE 30 MG: 20 TABLET ORAL at 16:45

## 2021-01-01 RX ADMIN — ISOSORBIDE DINITRATE 30 MG: 20 TABLET ORAL at 08:56

## 2021-01-01 RX ADMIN — ZINC SULFATE 220 MG (50 MG) CAPSULE 220 MG: CAPSULE at 08:51

## 2021-01-01 RX ADMIN — ISOSORBIDE DINITRATE 30 MG: 20 TABLET ORAL at 09:13

## 2021-01-01 RX ADMIN — POLYETHYLENE GLYCOL 3350 17 G: 17 POWDER, FOR SOLUTION ORAL at 09:12

## 2021-01-01 RX ADMIN — FAMOTIDINE 10 MG: 20 TABLET ORAL at 08:51

## 2021-01-01 RX ADMIN — METOLAZONE 5 MG: 5 TABLET ORAL at 10:51

## 2021-01-01 RX ADMIN — ATORVASTATIN CALCIUM 40 MG: 40 TABLET, FILM COATED ORAL at 22:05

## 2021-01-01 RX ADMIN — INSULIN GLARGINE 35 UNITS: 100 INJECTION, SOLUTION SUBCUTANEOUS at 21:37

## 2021-01-01 RX ADMIN — FENTANYL CITRATE: 50 INJECTION, SOLUTION INTRAMUSCULAR; INTRAVENOUS at 17:12

## 2021-01-01 RX ADMIN — NOREPINEPHRINE BITARTRATE 5 MCG/MIN: 1 INJECTION, SOLUTION, CONCENTRATE INTRAVENOUS at 23:45

## 2021-01-01 RX ADMIN — SODIUM CHLORIDE 4 UNITS/HR: 9 INJECTION, SOLUTION INTRAVENOUS at 18:38

## 2021-01-01 RX ADMIN — ACETAMINOPHEN 650 MG: 650 SUSPENSION ORAL at 17:06

## 2021-01-01 RX ADMIN — PROPOFOL 50 MCG/KG/MIN: 10 INJECTION, EMULSION INTRAVENOUS at 00:57

## 2021-01-01 RX ADMIN — MIDODRINE HYDROCHLORIDE 5 MG: 5 TABLET ORAL at 15:28

## 2021-01-01 RX ADMIN — GABAPENTIN 100 MG: 100 CAPSULE ORAL at 21:37

## 2021-01-01 RX ADMIN — METRONIDAZOLE 500 MG: 500 INJECTION, SOLUTION INTRAVENOUS at 09:00

## 2021-01-01 RX ADMIN — POTASSIUM CHLORIDE 40 MEQ: 20 SOLUTION ORAL at 08:00

## 2021-01-01 RX ADMIN — PANTOPRAZOLE SODIUM 40 MG: 40 TABLET, DELAYED RELEASE ORAL at 05:55

## 2021-01-01 RX ADMIN — Medication 2000 UNITS: at 08:50

## 2021-01-01 RX ADMIN — CEFAZOLIN SODIUM 1000 MG: 1 SOLUTION INTRAVENOUS at 08:29

## 2021-01-01 RX ADMIN — GABAPENTIN 100 MG: 100 CAPSULE ORAL at 16:10

## 2021-01-01 RX ADMIN — EPOPROSTENOL 24.85 NG/KG/MIN: 1.5 INJECTION, POWDER, LYOPHILIZED, FOR SOLUTION INTRAVENOUS at 06:30

## 2021-01-01 RX ADMIN — Medication 75 MCG/HR: at 04:53

## 2021-01-01 RX ADMIN — EPINEPHRINE 1 MG: 0.1 INJECTION, SOLUTION ENDOTRACHEAL; INTRACARDIAC; INTRAVENOUS at 13:59

## 2021-01-01 RX ADMIN — CEFAZOLIN SODIUM 1000 MG: 1 SOLUTION INTRAVENOUS at 18:31

## 2021-01-01 RX ADMIN — Medication 20 MG: at 09:00

## 2021-01-01 RX ADMIN — CARVEDILOL 25 MG: 25 TABLET, FILM COATED ORAL at 08:21

## 2021-01-01 RX ADMIN — CEFEPIME HYDROCHLORIDE 2000 MG: 2 INJECTION, POWDER, FOR SOLUTION INTRAVENOUS at 07:37

## 2021-01-01 RX ADMIN — CLONAZEPAM 0.5 MG: 0.5 TABLET ORAL at 22:12

## 2021-01-01 RX ADMIN — GABAPENTIN 100 MG: 100 CAPSULE ORAL at 08:35

## 2021-01-01 RX ADMIN — Medication 2000 UNITS: at 10:22

## 2021-01-01 RX ADMIN — DOCUSATE SODIUM 50 MG AND SENNOSIDES 8.6 MG 2 TABLET: 8.6; 5 TABLET, FILM COATED ORAL at 17:02

## 2021-01-01 RX ADMIN — INSULIN LISPRO 4 UNITS: 100 INJECTION, SOLUTION INTRAVENOUS; SUBCUTANEOUS at 06:27

## 2021-01-01 RX ADMIN — FENTANYL CITRATE: 50 INJECTION, SOLUTION INTRAMUSCULAR; INTRAVENOUS at 05:19

## 2021-01-01 RX ADMIN — ATORVASTATIN CALCIUM 40 MG: 40 TABLET, FILM COATED ORAL at 20:31

## 2021-01-01 RX ADMIN — GABAPENTIN 100 MG: 100 CAPSULE ORAL at 20:02

## 2021-01-01 RX ADMIN — DOXYCYCLINE 100 MG: 100 INJECTION, POWDER, LYOPHILIZED, FOR SOLUTION INTRAVENOUS at 21:52

## 2021-01-01 RX ADMIN — ATORVASTATIN CALCIUM 40 MG: 40 TABLET, FILM COATED ORAL at 21:59

## 2021-01-01 RX ADMIN — PROPOFOL 40 MCG/KG/MIN: 10 INJECTION, EMULSION INTRAVENOUS at 14:14

## 2021-01-01 RX ADMIN — DOCUSATE SODIUM 50 MG AND SENNOSIDES 8.6 MG 2 TABLET: 8.6; 5 TABLET, FILM COATED ORAL at 08:18

## 2021-01-01 RX ADMIN — DEXAMETHASONE SODIUM PHOSPHATE 6 MG: 4 INJECTION INTRA-ARTICULAR; INTRALESIONAL; INTRAMUSCULAR; INTRAVENOUS; SOFT TISSUE at 08:03

## 2021-01-01 RX ADMIN — DEXAMETHASONE SODIUM PHOSPHATE 11.5 MG: 10 INJECTION, SOLUTION INTRAMUSCULAR; INTRAVENOUS at 10:40

## 2021-01-01 RX ADMIN — FLUTICASONE PROPIONATE 2 SPRAY: 50 SPRAY, METERED NASAL at 08:40

## 2021-01-01 RX ADMIN — DOCUSATE SODIUM 50 MG AND SENNOSIDES 8.6 MG 2 TABLET: 8.6; 5 TABLET, FILM COATED ORAL at 18:21

## 2021-01-01 RX ADMIN — EPINEPHRINE 1 MG: 0.1 INJECTION, SOLUTION ENDOTRACHEAL; INTRACARDIAC; INTRAVENOUS at 14:22

## 2021-01-01 RX ADMIN — MIDODRINE HYDROCHLORIDE 5 MG: 5 TABLET ORAL at 11:15

## 2021-01-01 RX ADMIN — Medication 20 MG: at 09:20

## 2021-01-01 RX ADMIN — INSULIN LISPRO 12 UNITS: 100 INJECTION, SOLUTION INTRAVENOUS; SUBCUTANEOUS at 17:24

## 2021-01-01 RX ADMIN — HEPARIN SODIUM 11.1 UNITS/KG/HR: 10000 INJECTION, SOLUTION INTRAVENOUS at 15:48

## 2021-01-01 RX ADMIN — AMLODIPINE BESYLATE 5 MG: 5 TABLET ORAL at 08:19

## 2021-01-01 RX ADMIN — REMDESIVIR 100 MG: 100 INJECTION, POWDER, LYOPHILIZED, FOR SOLUTION INTRAVENOUS at 21:50

## 2021-01-01 RX ADMIN — CHLORHEXIDINE GLUCONATE 0.12% ORAL RINSE 15 ML: 1.2 LIQUID ORAL at 20:00

## 2021-01-01 RX ADMIN — DOCUSATE SODIUM 50 MG AND SENNOSIDES 8.6 MG 1 TABLET: 8.6; 5 TABLET, FILM COATED ORAL at 21:01

## 2021-01-01 RX ADMIN — HYDROMORPHONE HYDROCHLORIDE 1 MG: 1 INJECTION, SOLUTION INTRAMUSCULAR; INTRAVENOUS; SUBCUTANEOUS at 12:29

## 2021-01-01 RX ADMIN — INSULIN LISPRO 2 UNITS: 100 INJECTION, SOLUTION INTRAVENOUS; SUBCUTANEOUS at 03:41

## 2021-01-01 RX ADMIN — OXYCODONE HYDROCHLORIDE AND ACETAMINOPHEN 1000 MG: 500 TABLET ORAL at 21:37

## 2021-01-01 RX ADMIN — POLYETHYLENE GLYCOL 3350 17 G: 17 POWDER, FOR SOLUTION ORAL at 09:22

## 2021-01-01 RX ADMIN — ONDANSETRON 4 MG: 2 INJECTION INTRAMUSCULAR; INTRAVENOUS at 09:35

## 2021-01-01 RX ADMIN — PROPOFOL 50 MCG/KG/MIN: 10 INJECTION, EMULSION INTRAVENOUS at 21:30

## 2021-01-01 RX ADMIN — DOCUSATE SODIUM 50 MG AND SENNOSIDES 8.6 MG 2 TABLET: 8.6; 5 TABLET, FILM COATED ORAL at 09:07

## 2021-01-01 RX ADMIN — SODIUM CHLORIDE, SODIUM LACTATE, POTASSIUM CHLORIDE, AND CALCIUM CHLORIDE 1000 ML: .6; .31; .03; .02 INJECTION, SOLUTION INTRAVENOUS at 05:50

## 2021-01-01 RX ADMIN — MIDAZOLAM 2 MG: 1 INJECTION INTRAMUSCULAR; INTRAVENOUS at 05:05

## 2021-01-01 RX ADMIN — DOCUSATE SODIUM 50 MG AND SENNOSIDES 8.6 MG 2 TABLET: 8.6; 5 TABLET, FILM COATED ORAL at 17:06

## 2021-01-01 RX ADMIN — PROPOFOL 50 MCG/KG/MIN: 10 INJECTION, EMULSION INTRAVENOUS at 19:05

## 2021-01-01 RX ADMIN — HEPARIN SODIUM 5000 UNITS: 5000 INJECTION INTRAVENOUS; SUBCUTANEOUS at 13:21

## 2021-01-01 RX ADMIN — HYDROMORPHONE HYDROCHLORIDE 1 MG: 1 INJECTION, SOLUTION INTRAMUSCULAR; INTRAVENOUS; SUBCUTANEOUS at 10:49

## 2021-01-01 RX ADMIN — HEPARIN SODIUM 5000 UNITS: 5000 INJECTION INTRAVENOUS; SUBCUTANEOUS at 13:54

## 2021-01-01 RX ADMIN — INSULIN GLARGINE 35 UNITS: 100 INJECTION, SOLUTION SUBCUTANEOUS at 08:25

## 2021-01-01 RX ADMIN — PROPOFOL 50 MCG/KG/MIN: 10 INJECTION, EMULSION INTRAVENOUS at 01:55

## 2021-01-01 RX ADMIN — INSULIN LISPRO 6 UNITS: 100 INJECTION, SOLUTION INTRAVENOUS; SUBCUTANEOUS at 12:28

## 2021-01-01 RX ADMIN — HEPARIN SODIUM 5000 UNITS: 5000 INJECTION INTRAVENOUS; SUBCUTANEOUS at 22:06

## 2021-01-01 RX ADMIN — AMLODIPINE BESYLATE 2.5 MG: 2.5 TABLET ORAL at 09:13

## 2021-01-01 RX ADMIN — Medication 20 MG: at 08:20

## 2021-01-01 RX ADMIN — EPOPROSTENOL 49.71 NG/KG/MIN: 1.5 INJECTION, POWDER, LYOPHILIZED, FOR SOLUTION INTRAVENOUS at 01:33

## 2021-01-01 RX ADMIN — ATORVASTATIN CALCIUM 40 MG: 40 TABLET, FILM COATED ORAL at 21:21

## 2021-01-01 RX ADMIN — Medication 1 TABLET: at 08:09

## 2021-01-01 RX ADMIN — INSULIN GLARGINE 10 UNITS: 100 INJECTION, SOLUTION SUBCUTANEOUS at 21:11

## 2021-01-01 RX ADMIN — CALCIUM CHLORIDE 1 G: 100 INJECTION PARENTERAL at 13:59

## 2021-01-01 RX ADMIN — GABAPENTIN 100 MG: 100 CAPSULE ORAL at 21:38

## 2021-01-01 RX ADMIN — FUROSEMIDE 60 MG: 10 INJECTION, SOLUTION INTRAVENOUS at 00:47

## 2021-01-01 RX ADMIN — GABAPENTIN 100 MG: 100 CAPSULE ORAL at 15:23

## 2021-01-01 RX ADMIN — HEPARIN SODIUM 5000 UNITS: 5000 INJECTION INTRAVENOUS; SUBCUTANEOUS at 06:09

## 2021-01-01 RX ADMIN — ISOSORBIDE DINITRATE 30 MG: 20 TABLET ORAL at 20:31

## 2021-01-01 RX ADMIN — DEXAMETHASONE SODIUM PHOSPHATE 6 MG: 4 INJECTION INTRA-ARTICULAR; INTRALESIONAL; INTRAMUSCULAR; INTRAVENOUS; SOFT TISSUE at 22:37

## 2021-01-01 RX ADMIN — Medication 20 MG: at 10:39

## 2021-01-01 RX ADMIN — Medication 2000 UNITS: at 08:28

## 2021-01-01 RX ADMIN — FAMOTIDINE 10 MG: 20 TABLET ORAL at 09:11

## 2021-01-01 RX ADMIN — EPINEPHRINE 1 MG: 0.1 INJECTION, SOLUTION ENDOTRACHEAL; INTRACARDIAC; INTRAVENOUS at 14:26

## 2021-01-01 RX ADMIN — MIDAZOLAM 2 MG: 1 INJECTION INTRAMUSCULAR; INTRAVENOUS at 15:33

## 2021-01-01 RX ADMIN — DOCUSATE SODIUM 50 MG AND SENNOSIDES 8.6 MG 2 TABLET: 8.6; 5 TABLET, FILM COATED ORAL at 17:10

## 2021-01-01 RX ADMIN — CHLORHEXIDINE GLUCONATE 0.12% ORAL RINSE 15 ML: 1.2 LIQUID ORAL at 09:00

## 2021-01-01 RX ADMIN — Medication 2000 UNITS: at 10:32

## 2021-01-01 RX ADMIN — OXYCODONE HYDROCHLORIDE AND ACETAMINOPHEN 1000 MG: 500 TABLET ORAL at 21:49

## 2021-01-01 RX ADMIN — PROPOFOL 40 MCG/KG/MIN: 10 INJECTION, EMULSION INTRAVENOUS at 02:14

## 2021-01-01 RX ADMIN — GABAPENTIN 100 MG: 100 CAPSULE ORAL at 20:30

## 2021-01-01 RX ADMIN — ZINC SULFATE 220 MG (50 MG) CAPSULE 220 MG: CAPSULE at 08:56

## 2021-01-01 RX ADMIN — MIDAZOLAM 2 MG: 1 INJECTION INTRAMUSCULAR; INTRAVENOUS at 22:02

## 2021-01-01 RX ADMIN — HEPARIN SODIUM 5000 UNITS: 5000 INJECTION INTRAVENOUS; SUBCUTANEOUS at 05:10

## 2021-01-01 RX ADMIN — GABAPENTIN 100 MG: 100 CAPSULE ORAL at 17:23

## 2021-01-01 RX ADMIN — ISOSORBIDE DINITRATE 30 MG: 20 TABLET ORAL at 17:12

## 2021-01-01 RX ADMIN — ASPIRIN 81 MG: 81 TABLET ORAL at 08:21

## 2021-01-01 RX ADMIN — PROPOFOL 40 MCG/KG/MIN: 10 INJECTION, EMULSION INTRAVENOUS at 15:49

## 2021-01-01 RX ADMIN — HEPARIN SODIUM 5000 UNITS: 5000 INJECTION INTRAVENOUS; SUBCUTANEOUS at 06:03

## 2021-01-01 RX ADMIN — PANTOPRAZOLE SODIUM 40 MG: 40 TABLET, DELAYED RELEASE ORAL at 04:25

## 2021-01-01 RX ADMIN — CALCITRIOL 0.25 MCG: 0.25 CAPSULE, LIQUID FILLED ORAL at 08:56

## 2021-01-01 RX ADMIN — DOCUSATE SODIUM 50 MG AND SENNOSIDES 8.6 MG 2 TABLET: 8.6; 5 TABLET, FILM COATED ORAL at 08:01

## 2021-01-01 RX ADMIN — ACETAMINOPHEN 650 MG: 650 SUSPENSION ORAL at 05:42

## 2021-01-01 RX ADMIN — OXYCODONE HYDROCHLORIDE AND ACETAMINOPHEN 1000 MG: 500 TABLET ORAL at 22:12

## 2021-01-01 RX ADMIN — ISOSORBIDE DINITRATE 30 MG: 20 TABLET ORAL at 16:26

## 2021-01-01 RX ADMIN — SODIUM CHLORIDE 12 UNITS/HR: 9 INJECTION, SOLUTION INTRAVENOUS at 18:52

## 2021-01-01 RX ADMIN — EPINEPHRINE 1 MG: 0.1 INJECTION, SOLUTION ENDOTRACHEAL; INTRACARDIAC; INTRAVENOUS at 14:14

## 2021-01-01 RX ADMIN — EPOPROSTENOL 49.71 NG/KG/MIN: 1.5 INJECTION, POWDER, LYOPHILIZED, FOR SOLUTION INTRAVENOUS at 00:39

## 2021-01-01 RX ADMIN — CLONAZEPAM 0.5 MG: 0.5 TABLET ORAL at 08:50

## 2021-01-01 RX ADMIN — ACETAMINOPHEN 650 MG: 650 SUSPENSION ORAL at 22:10

## 2021-01-01 RX ADMIN — Medication 2000 UNITS: at 08:18

## 2021-01-01 RX ADMIN — SODIUM CHLORIDE 6 UNITS/HR: 9 INJECTION, SOLUTION INTRAVENOUS at 21:10

## 2021-01-01 RX ADMIN — SODIUM CHLORIDE 14 UNITS/HR: 9 INJECTION, SOLUTION INTRAVENOUS at 06:55

## 2021-01-01 RX ADMIN — CEFEPIME HYDROCHLORIDE 2000 MG: 2 INJECTION, POWDER, FOR SOLUTION INTRAVENOUS at 20:48

## 2021-01-01 RX ADMIN — GABAPENTIN 100 MG: 100 CAPSULE ORAL at 08:20

## 2021-01-01 RX ADMIN — Medication 50 MCG/HR: at 10:11

## 2021-01-01 RX ADMIN — GABAPENTIN 100 MG: 100 CAPSULE ORAL at 20:12

## 2021-01-01 RX ADMIN — ISOSORBIDE DINITRATE 30 MG: 20 TABLET ORAL at 21:39

## 2021-01-01 RX ADMIN — EPOPROSTENOL 49.71 NG/KG/MIN: 1.5 INJECTION, POWDER, LYOPHILIZED, FOR SOLUTION INTRAVENOUS at 11:40

## 2021-01-01 RX ADMIN — HYDROMORPHONE HYDROCHLORIDE 1 MG: 1 INJECTION, SOLUTION INTRAMUSCULAR; INTRAVENOUS; SUBCUTANEOUS at 23:34

## 2021-01-01 RX ADMIN — LORAZEPAM 1 MG: 2 INJECTION INTRAMUSCULAR; INTRAVENOUS at 14:19

## 2021-01-01 RX ADMIN — DOXYCYCLINE 100 MG: 100 CAPSULE ORAL at 09:09

## 2021-01-01 RX ADMIN — DOXYCYCLINE 100 MG: 100 INJECTION, POWDER, LYOPHILIZED, FOR SOLUTION INTRAVENOUS at 08:41

## 2021-01-01 RX ADMIN — Medication 75 MCG/HR: at 04:19

## 2021-01-01 RX ADMIN — PROPOFOL 50 MCG/KG/MIN: 10 INJECTION, EMULSION INTRAVENOUS at 08:27

## 2021-01-01 RX ADMIN — ISOSORBIDE DINITRATE 30 MG: 20 TABLET ORAL at 15:56

## 2021-01-01 RX ADMIN — SODIUM CHLORIDE 0.5 MCG/KG/HR: 9 INJECTION, SOLUTION INTRAVENOUS at 00:58

## 2021-01-01 RX ADMIN — ATORVASTATIN CALCIUM 40 MG: 40 TABLET, FILM COATED ORAL at 22:17

## 2021-01-01 RX ADMIN — Medication 75 MCG/HR: at 19:59

## 2021-01-01 RX ADMIN — INSULIN LISPRO 10 UNITS: 100 INJECTION, SOLUTION INTRAVENOUS; SUBCUTANEOUS at 13:24

## 2021-01-01 RX ADMIN — ACETAMINOPHEN 650 MG: 650 SUSPENSION ORAL at 10:15

## 2021-01-01 RX ADMIN — NOREPINEPHRINE BITARTRATE 9 MCG/MIN: 1 INJECTION, SOLUTION, CONCENTRATE INTRAVENOUS at 12:10

## 2021-01-01 RX ADMIN — OXYCODONE HYDROCHLORIDE AND ACETAMINOPHEN 1000 MG: 500 TABLET ORAL at 08:50

## 2021-01-01 RX ADMIN — EPOPROSTENOL 49.71 NG/KG/MIN: 1.5 INJECTION, POWDER, LYOPHILIZED, FOR SOLUTION INTRAVENOUS at 08:54

## 2021-01-01 RX ADMIN — INSULIN LISPRO 15 UNITS: 100 INJECTION, SOLUTION INTRAVENOUS; SUBCUTANEOUS at 10:47

## 2021-01-01 RX ADMIN — POTASSIUM CHLORIDE 40 MEQ: 20 SOLUTION ORAL at 06:31

## 2021-01-01 RX ADMIN — EPOPROSTENOL 49.71 NG/KG/MIN: 1.5 INJECTION, POWDER, LYOPHILIZED, FOR SOLUTION INTRAVENOUS at 08:48

## 2021-01-01 RX ADMIN — INSULIN GLARGINE 20 UNITS: 100 INJECTION, SOLUTION SUBCUTANEOUS at 09:10

## 2021-01-01 RX ADMIN — CEFAZOLIN SODIUM 1000 MG: 1 SOLUTION INTRAVENOUS at 18:30

## 2021-01-01 RX ADMIN — ATORVASTATIN CALCIUM 40 MG: 40 TABLET, FILM COATED ORAL at 21:38

## 2021-01-01 RX ADMIN — CLONAZEPAM 0.5 MG: 0.5 TABLET ORAL at 09:09

## 2021-01-01 RX ADMIN — GABAPENTIN 100 MG: 100 CAPSULE ORAL at 08:21

## 2021-01-01 RX ADMIN — FLUTICASONE PROPIONATE 2 SPRAY: 50 SPRAY, METERED NASAL at 08:56

## 2021-01-01 RX ADMIN — SODIUM BICARBONATE 50 MEQ: 84 INJECTION, SOLUTION INTRAVENOUS at 14:08

## 2021-01-01 RX ADMIN — EPOPROSTENOL 49.71 NG/KG/MIN: 1.5 INJECTION, POWDER, LYOPHILIZED, FOR SOLUTION INTRAVENOUS at 12:43

## 2021-01-01 RX ADMIN — CHLORHEXIDINE GLUCONATE 0.12% ORAL RINSE 15 ML: 1.2 LIQUID ORAL at 20:59

## 2021-01-01 RX ADMIN — ENOXAPARIN SODIUM 30 MG: 30 INJECTION SUBCUTANEOUS at 20:12

## 2021-01-01 RX ADMIN — FUROSEMIDE 80 MG: 10 INJECTION, SOLUTION INTRAVENOUS at 10:00

## 2021-01-01 RX ADMIN — POLYETHYLENE GLYCOL 3350 17 G: 17 POWDER, FOR SOLUTION ORAL at 08:28

## 2021-01-01 RX ADMIN — EPOPROSTENOL 24.85 NG/KG/MIN: 1.5 INJECTION, POWDER, LYOPHILIZED, FOR SOLUTION INTRAVENOUS at 16:52

## 2021-01-01 RX ADMIN — SODIUM CHLORIDE, SODIUM GLUCONATE, SODIUM ACETATE, POTASSIUM CHLORIDE, MAGNESIUM CHLORIDE, SODIUM PHOSPHATE, DIBASIC, AND POTASSIUM PHOSPHATE 1000 ML: .53; .5; .37; .037; .03; .012; .00082 INJECTION, SOLUTION INTRAVENOUS at 10:39

## 2021-01-01 RX ADMIN — ASPIRIN 81 MG: 81 TABLET ORAL at 09:09

## 2021-01-01 RX ADMIN — GABAPENTIN 100 MG: 100 CAPSULE ORAL at 16:45

## 2021-01-01 RX ADMIN — GABAPENTIN 100 MG: 100 CAPSULE ORAL at 15:43

## 2021-01-01 RX ADMIN — CHLORHEXIDINE GLUCONATE 0.12% ORAL RINSE 15 ML: 1.2 LIQUID ORAL at 20:12

## 2021-01-01 RX ADMIN — WHITE PETROLATUM 57.7 %-MINERAL OIL 31.9 % EYE OINTMENT 1 APPLICATION: at 08:02

## 2021-01-01 RX ADMIN — HEPARIN SODIUM 6 UNITS/KG/HR: 10000 INJECTION, SOLUTION INTRAVENOUS at 01:45

## 2021-01-01 RX ADMIN — LIDOCAINE HYDROCHLORIDE 100 MG: 20 INJECTION, SOLUTION EPIDURAL; INFILTRATION; INTRACAUDAL; PERINEURAL at 14:12

## 2021-01-01 RX ADMIN — PROPOFOL 40 MCG/KG/MIN: 10 INJECTION, EMULSION INTRAVENOUS at 07:49

## 2021-01-01 RX ADMIN — CEFEPIME HYDROCHLORIDE 2000 MG: 2 INJECTION, POWDER, FOR SOLUTION INTRAVENOUS at 20:23

## 2021-01-01 RX ADMIN — FAMOTIDINE 10 MG: 20 TABLET ORAL at 10:17

## 2021-01-01 RX ADMIN — HEPARIN SODIUM 5000 UNITS: 5000 INJECTION INTRAVENOUS; SUBCUTANEOUS at 21:48

## 2021-01-01 RX ADMIN — CARVEDILOL 25 MG: 25 TABLET, FILM COATED ORAL at 09:09

## 2021-01-01 RX ADMIN — SODIUM CHLORIDE, SODIUM LACTATE, POTASSIUM CHLORIDE, AND CALCIUM CHLORIDE 50 ML/HR: .6; .31; .03; .02 INJECTION, SOLUTION INTRAVENOUS at 00:46

## 2021-01-01 RX ADMIN — Medication 2000 UNITS: at 08:38

## 2021-01-01 RX ADMIN — CARVEDILOL 25 MG: 25 TABLET, FILM COATED ORAL at 17:12

## 2021-01-01 RX ADMIN — CHLORHEXIDINE GLUCONATE 0.12% ORAL RINSE 15 ML: 1.2 LIQUID ORAL at 08:28

## 2021-01-01 RX ADMIN — REMDESIVIR 100 MG: 100 INJECTION, POWDER, LYOPHILIZED, FOR SOLUTION INTRAVENOUS at 21:42

## 2021-01-01 RX ADMIN — MIDAZOLAM 2 MG: 1 INJECTION INTRAMUSCULAR; INTRAVENOUS at 13:41

## 2021-01-01 RX ADMIN — GABAPENTIN 100 MG: 100 CAPSULE ORAL at 20:53

## 2021-01-01 RX ADMIN — POLYETHYLENE GLYCOL 3350 17 G: 17 POWDER, FOR SOLUTION ORAL at 09:00

## 2021-01-01 RX ADMIN — MIDODRINE HYDROCHLORIDE 5 MG: 5 TABLET ORAL at 16:17

## 2021-01-01 RX ADMIN — Medication 1 TABLET: at 08:46

## 2021-01-01 RX ADMIN — ISOSORBIDE DINITRATE 30 MG: 20 TABLET ORAL at 21:52

## 2021-01-01 RX ADMIN — INSULIN LISPRO 5 UNITS: 100 INJECTION, SOLUTION INTRAVENOUS; SUBCUTANEOUS at 22:11

## 2021-01-01 RX ADMIN — INSULIN GLARGINE 35 UNITS: 100 INJECTION, SOLUTION SUBCUTANEOUS at 22:12

## 2021-01-01 RX ADMIN — ASPIRIN 81 MG: 81 TABLET ORAL at 08:39

## 2021-01-01 RX ADMIN — ATORVASTATIN CALCIUM 40 MG: 40 TABLET, FILM COATED ORAL at 21:49

## 2021-01-01 RX ADMIN — Medication 1 TABLET: at 08:00

## 2021-01-01 RX ADMIN — DOCUSATE SODIUM 50 MG AND SENNOSIDES 8.6 MG 2 TABLET: 8.6; 5 TABLET, FILM COATED ORAL at 08:46

## 2021-01-01 RX ADMIN — ACETAMINOPHEN 650 MG: 650 SUSPENSION ORAL at 22:18

## 2021-01-01 RX ADMIN — CLONAZEPAM 0.5 MG: 0.5 TABLET ORAL at 08:56

## 2021-01-01 RX ADMIN — INSULIN GLARGINE 40 UNITS: 100 INJECTION, SOLUTION SUBCUTANEOUS at 08:57

## 2021-01-01 RX ADMIN — EPOPROSTENOL 49.71 NG/KG/MIN: 1.5 INJECTION, POWDER, LYOPHILIZED, FOR SOLUTION INTRAVENOUS at 18:39

## 2021-01-01 RX ADMIN — Medication 2000 UNITS: at 08:20

## 2021-01-01 RX ADMIN — Medication 2000 UNITS: at 08:21

## 2021-01-01 RX ADMIN — OXYCODONE HYDROCHLORIDE AND ACETAMINOPHEN 1000 MG: 500 TABLET ORAL at 08:21

## 2021-01-01 RX ADMIN — PROPOFOL 50 MCG/KG/MIN: 10 INJECTION, EMULSION INTRAVENOUS at 07:00

## 2021-01-01 RX ADMIN — Medication 75 MCG/HR: at 23:05

## 2021-01-01 RX ADMIN — PROPOFOL 50 MCG/KG/MIN: 10 INJECTION, EMULSION INTRAVENOUS at 12:26

## 2021-01-01 RX ADMIN — DEXAMETHASONE SODIUM PHOSPHATE 11.5 MG: 10 INJECTION, SOLUTION INTRAMUSCULAR; INTRAVENOUS at 09:12

## 2021-01-01 RX ADMIN — AMLODIPINE BESYLATE 5 MG: 5 TABLET ORAL at 09:08

## 2021-01-01 RX ADMIN — HYDROMORPHONE HYDROCHLORIDE 1 MG: 1 INJECTION, SOLUTION INTRAMUSCULAR; INTRAVENOUS; SUBCUTANEOUS at 18:33

## 2021-01-01 RX ADMIN — SODIUM CHLORIDE 0.1 MCG/KG/HR: 9 INJECTION, SOLUTION INTRAVENOUS at 03:39

## 2021-01-01 RX ADMIN — SODIUM CHLORIDE, SODIUM GLUCONATE, SODIUM ACETATE, POTASSIUM CHLORIDE, MAGNESIUM CHLORIDE, SODIUM PHOSPHATE, DIBASIC, AND POTASSIUM PHOSPHATE 50 ML/HR: .53; .5; .37; .037; .03; .012; .00082 INJECTION, SOLUTION INTRAVENOUS at 13:21

## 2021-01-01 RX ADMIN — EPOPROSTENOL 49.71 NG/KG/MIN: 1.5 INJECTION, POWDER, LYOPHILIZED, FOR SOLUTION INTRAVENOUS at 11:17

## 2021-01-01 RX ADMIN — DEXAMETHASONE SODIUM PHOSPHATE 11.5 MG: 10 INJECTION, SOLUTION INTRAMUSCULAR; INTRAVENOUS at 20:07

## 2021-01-01 RX ADMIN — DEXAMETHASONE SODIUM PHOSPHATE 11.5 MG: 10 INJECTION, SOLUTION INTRAMUSCULAR; INTRAVENOUS at 20:15

## 2021-01-01 RX ADMIN — WHITE PETROLATUM 57.7 %-MINERAL OIL 31.9 % EYE OINTMENT 1 APPLICATION: at 10:00

## 2021-01-01 RX ADMIN — AMLODIPINE BESYLATE 2.5 MG: 2.5 TABLET ORAL at 08:39

## 2021-01-01 RX ADMIN — GABAPENTIN 100 MG: 100 CAPSULE ORAL at 08:28

## 2021-01-01 RX ADMIN — GABAPENTIN 100 MG: 100 CAPSULE ORAL at 20:15

## 2021-01-01 RX ADMIN — Medication 100 MCG/HR: at 20:43

## 2021-01-01 RX ADMIN — POTASSIUM CHLORIDE 40 MEQ: 20 SOLUTION ORAL at 08:09

## 2021-01-01 RX ADMIN — FLUTICASONE PROPIONATE 2 SPRAY: 50 SPRAY, METERED NASAL at 08:58

## 2021-01-14 NOTE — PROGRESS NOTES
Results for orders placed or performed in visit on 01/14/21   Cardiac EP device report    Narrative    MDT-SINGLE CHAMBER ICD/ NOT MRI CONDITIONAL  CARELINK TRANSMISSION: BATTERY VOLTAGE ADEQUATE  (10 7 YRS)  <1%  ALL AVAILABLE LEAD PARAMETERS WITHIN NORMAL LIMITS  NO SIGNIFICANT HIGH RATE EPISODES  1 AF EPISODE DETECTED 44 MIN LONG, PAC'S AND PVC'S NOTED, CANNOT EXCLUDE AF  PATIENT IS ON CARVEDILOL AND ASA 81  NO AC'S  OPTI-VOL WITHIN NORMAL LIMITS  NORMAL DEVICE FUNCTION  ---LANE

## 2021-02-01 NOTE — PROGRESS NOTES
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 stage 3 chronic kidney disease (HCC)       Genitourinary    CKD stage G3b/A3, GFR 30-44 and albumin creatinine ratio >300 mg/g - Primary    Relevant Medications    calcitriol (ROCALTROL) 0 25 mcg capsule    Other Relevant Orders    Basic metabolic panel    PTH, intact    Renal function panel    Vitamin D 25 hydroxy    Phosphorus       Other    Hyperlipidemia (Chronic)    Obesity (BMI 30-39  9)    Relevant Orders    Basic metabolic panel    PTH, intact    Renal function panel    Vitamin D 25 hydroxy    Phosphorus    Vitamin D deficiency    Relevant Medications    calcitriol (ROCALTROL) 0 25 mcg capsule    Other Relevant Orders    Basic metabolic panel    PTH, intact    Renal function panel    Vitamin D 25 hydroxy    Phosphorus               Reason for visit is   Chief Complaint   Patient presents with    Chronic Kidney Disease    Follow-up    Hypertension    Virtual Regular Visit   patient with concern for COVID exposure, hence did not want to come in for actual appointment  Encounter provider Ulysses Harpin, MD    Provider located at [address] [place] [address]  [phone]      Recent Visits  No visits were found meeting these conditions     Showing recent visits within past 7 days and meeting all other requirements Today's Visits  Date Type Provider Dept   02/01/21 Telemedicine Casey Garcia, [address] [place] today's visits and meeting all other requirements     Future Appointments  No visits were found meeting these conditions  Showing future appointments within next 150 days and meeting all other requirements        The patient was identified by name and date of birth  Chuy Laureano Sr  was informed that this is a telemedicine visit and that the visit is being conducted through telephone  My office door was closed  No one else was in the room  He acknowledged consent and understanding of privacy and security of the video platform  The patient has agreed to participate and understands they can discontinue the visit at any time  I informed the patient about the limitations of using virtual visit platform and that my medical advice and physical exam would be limited as a consequence of this  Patient was informed that this was a billable visit and they are aware of it and wished to proceed  Patient is aware this is a billable service  Alcira Saunders is a 72 y o  male virtual care with me today   It was my intent to perform this visit via video technology but the patient was not able to do a video connection so the visit was completed via audio telephone only  Taking metolazone twice a week  No recent hospitalization has not followed up with cardiology in a long time reports he has a pacemaker defibrillator that they are monitoring  Strongly encouraged him to follow-up with them  Has not been checking his blood pressures at home  No issues with edema  Thankful for all the care information he has gotten today disappointed to hear about his renal parameters          HPI     Past Medical History:   Diagnosis Date    AICD (automatic cardioverter/defibrillator) present     Anxiety and depression     Arthritis     Asthma     pt denies    CAD (coronary artery disease) 10/10/2014    CKD (chronic kidney disease) stage 3, GFR 30-59 ml/min     COPD (chronic obstructive pulmonary disease) (HCC)     pt denies    CPAP (continuous positive airway pressure) dependence     Depression (emotion)     affective disorder    Diabetic nephropathy (HCC)     Erectile dysfunction     GERD (gastroesophageal reflux disease)     Hypertension     Iron deficiency anemia     Latent tuberculosis     Lumbar herniated disc     low back pain    Neuropathy     bles    Obesity (BMI 30-39  9) 7/9/2019    Prostate cancer ([facility] [address] )     2013- had radiation- had prostatectomy    Sleep apnea     Systolic CHF, chronic (HCC)     Type 2 diabetes mellitus with hyperlipidemia (HCC)     Use of cane as ambulatory aid     Vitamin D deficiency     Wears glasses        Past Surgical History:   Procedure Laterality Date    AV FISTULA PLACEMENT      left upper arm and removal    CARDIAC DEFIBRILLATOR PLACEMENT      CARDIAC ICD GENERATOR CHANGE  9/6/2019    CATARACT EXTRACTION Bilateral 10/09/2014    COLONOSCOPY      INGUINAL HERNIA REPAIR Bilateral     OTHER SURGICAL HISTORY Left 10/10/2014    AV Shunt    AK INSERT,INFLATABLE PENILE PROSTHESIS N/A 1/19/2018    Procedure: INSERTION 3 PART PROSTHESIS PENILE;  Surgeon: Howard Avila MD;  Location: [facility];  Service: Urology    PROSTATECTOMY         Current Outpatient Medications   Medication Sig Dispense Refill    aspirin (ADULT ASPIRIN EC LOW STRENGTH) 81 mg EC tablet Take 81 mg by mouth daily        calcitriol (ROCALTROL) 0 25 mcg capsule Take 1 capsule (0 25 mcg total) by mouth daily 90 capsule 3    carvedilol (COREG) 25 mg tablet TAKE 1 TABLET BY MOUTH TWICE A DAY WITH FOOD 180 tablet 3    CINNAMON PO Take 500 mg by mouth daily      Coenzyme Q10 (COQ-10) 200 MG CAPS Take 1 capsule by mouth daily        Entresto  MG TABS TAKE 1 TABLET BY MOUTH TWICE A DAY 60 tablet 5    fluticasone (FLONASE) 50 mcg/act nasal spray SPRAY 2 SPRAYS INTO EACH NOSTRIL EVERY DAY 48 mL 1    furosemide (LASIX) 40 mg tablet TAKE 1 & 1/2 TABLETS (60 MG TOTAL) BY MOUTH 2 (TWO) TIMES A  tablet 3    gabapentin (NEURONTIN) 400 mg capsule TAKE 1 CAPSULE (400 MG TOTAL) BY MOUTH 4 (FOUR) TIMES A  capsule 0    insulin aspart (NovoLOG FlexPen) 100 UNIT/ML injection pen Inject 15 Units under the skin      insulin glargine (TOUJEO MAX) 300 units/mL CONCETRATED U-300 injection pen (2-unit dial) INJECT BY SUBCUTANEOUS ROUTE 65 units in AM 5 pen 3    isosorbide dinitrate (ISORDIL) 20 mg tablet Take 1 5 tablets (30 mg total) by mouth 3 (three) times a day 270 tablet 4    methocarbamol (ROBAXIN) 500 mg tablet TAKE 1 TABLET BY MOUTH THREE TIMES A DAY 90 tablet 0    metolazone (ZAROXOLYN) 5 mg tablet TAKE 1 TABLET (5 MG TOTAL) BY MOUTH DAILY AS NEEDED (WEIGHT GAIN) WEIGHT GAIN 3 LBS 90 tablet 1    Multiple Vitamin (MULTIVITAMIN) capsule Take 1 capsule by mouth daily        Omega-3 Fatty Acids (FISH OIL) 1200 MG CAPS Take 1 capsule by mouth daily      pantoprazole (PROTONIX) 40 mg tablet TAKE 1 TABLET BY MOUTH EVERY DAY 30 tablet 0    potassium chloride (KLOR-CON) 20 mEq packet TAKE 20 MEQ BY MOUTH 2 (TWO) TIMES A  packet 4    simvastatin (ZOCOR) 40 mg tablet TAKE 1 TABLET BY MOUTH EVERY DAY IN THE EVENING 90 tablet 1    temazepam (RESTORIL) 30 mg capsule Take 30 mg by mouth daily at bedtime as needed for sleep       triamcinolone (KENALOG) 0 1 % cream Apply topically 2 (two) times a day 30 g 0    Alcohol Swabs (ALCOHOL PREP) PADS by Does not apply route 2 (two) times a day 200 each 0    glucose blood test strip 1 EACH BY OTHER ROUTE 3 (THREE) TIMES A DAY TEST 100 each 11    Incontinence Supply Disposable (BRIEF OVERNIGHT LARGE) MISC by Does not apply route 4 (four) times a day 98 each 3    Insulin Pen Needle (BD Pen Needle Marylu U/F) 32G X 4 MM MISC USE TO INJECT INSULIN 3 TIMES DAILY 300 each 1     No current facility-administered medications for this visit  Allergies   Allergen Reactions    Ibuprofen Nausea Only    Penicillins Other (See Comments)     Pt unsure of reaction       Review of Systems   Constitutional: Negative for appetite change, chills and fever  HENT: Negative for congestion and sore throat  Respiratory: Negative for cough, shortness of breath and wheezing  Cardiovascular: Negative for leg swelling  Gastrointestinal: Negative for abdominal pain, constipation, diarrhea, nausea and vomiting  Genitourinary: Negative for difficulty urinating, dysuria and hematuria  Musculoskeletal: Negative for back pain  Skin: Negative for rash and wound  Neurological: Negative for dizziness and headaches  Psychiatric/Behavioral: Negative for agitation and confusion  All other systems reviewed and are negative  Video Exam    Vitals:    02/01/21 0903   Weight: 118 kg (260 lb)       Physical Exam  Vitals signs reviewed  Constitutional:       General: He is not in acute distress  Comments: Physical exam performed by the patient on my behalf with my guidance as I did virtual Care with the telephone     HENT:      Head: Normocephalic and atraumatic  Mouth/Throat:      Pharynx: No oropharyngeal exudate  Eyes:      General: No scleral icterus  Neck:      Musculoskeletal: Normal range of motion and neck supple  Cardiovascular:      Pulses: Normal pulses  Pulmonary:      Effort: No respiratory distress  Breath sounds: No wheezing  Chest:      Chest wall: No tenderness  Abdominal:      Palpations: Abdomen is soft  There is no mass  Tenderness: There is no abdominal tenderness  Musculoskeletal:         General: No swelling  Skin:     Coloration: Skin is not jaundiced  Neurological:      Mental Status: He is alert and oriented to person, place, and time  Mental status is at baseline     Psychiatric:         Mood and Affect: Mood normal          Behavior: Behavior normal          assessment/plan 67-year-old male with multiple comorbidities including hypertension (x15yrs), diabetes (x25yrs, no DR), obesity, CHF systolic dysfunction, CAD status post AICD/pacemaker, obstructive sleep apnea on CPAP, prostate cancer status post surgical removal  and CKD stage IIIB for routine follow-up  1   CKD stage III BA3:  -Patient has a baseline creatinine of 1 8-1 9mg/dL  Most recent labs show a Creatinine of 2 38 mg/dL on 1/30/21  Renal function  Elevated from baseline unclear if this is CKD progression versus some component of prerenal azotemia  Repeat blood work in 2 weeks  - Likely has underlying CKD secondary to diabetic nodularo glomerular sclerosis plus hypertensive nephrosclerosis plus cardiorenal syndrome plus age-related nephron loss  - SPEP negative  - renal ultrasound from October 16, 2018 shows right kidney 10 7 cm left kidney 11 cm, few simple cysts on the right kidney  - Proteinuria -most recent protein creatinine ratio of 780 mg as of July 2020  - Acid base and lytes stable except for hyponatremia   - Recommend to avoid use of NSAIDs, nephrotoxins  Caution advised with regards to exposure to IV contrast dye    - Patient has nonfunctional AV fistula in place in the left arm that as per him was placed many years ago due to concern for needing dialysis by his previous nephrologist   - Discussed with the patient in depth his renal status, including the possible etiologies for CKD  - Advised the patient that when his GFR is close to 20mL/min then will start discussing about RRT(renal replacement therapy) options such as renal transplant, peritoneal dialysis and hemodialysis  - Informed the patient about the various options for Renal Replacement therapy    - Discussed with the patient how we need to work together to delay the progression of CKD with optimal BP control based on their age and co-morbidities, optimal BS control with HbA1c of <7% and trying to reduce proteinuria by the use of anti-proteinuric agents  -  Kidney smart completed    2  Hypertension:  - Patient is on Coreg 25 mg p o  B i d ,entresto 97/103mg p o  Q day, Lasix 60 mg p o  B i d , Isordil 30 mg p o  T i d , metolazone 5 mg p o  P r n (1 time per week), K-Dur 20 meq p o  Q 12,  -  Strongly advised patient to follow-up with cardiology since he has not seen him in a long time to see if his Lonni Moons may need adjustment, if renal parameters continued to deteriorate we may need to ask Cardiology with regards to its discontinuation   - Goal BP of < 140/90 based on his comorbidities  - Instructed to follow low sodium (2gm)diet   - Advised to hold ACEI/ARBs if patient suffers from dehydration due to gastrointestinal losses due to risk of GAGE secondary to failure to autoregulate  3  Hemoglobin:  - Goal Hb of 10-12 g/dL  - Most recent labs suggestive of  11 9 gm/dL  - No role for iron supplementation at this time  4 CKD-MBD(Mineral Bone Disease)/secondary hyperparathyroidism of renal origin:  - Based on patients CKD stage following is the goal of therapy  - Maintain calcium phosphorus product of < 55   - Stage 3 CKD - Goal Ca 8 5-10 mg/dL , goal Phos 2 7-4 6 mg/dL  , goal iPTH 30-70 pg/mL  - increase calcitriol 0 25 mcg p o  q day  - most recent vitamin-D level of 34  As of October 2020and intact PTH of 160 3 as of January 2021  - check intact PTH vitamin-D at next visit    5  Lipids:  - On fish oil, Zocor  - Goal LDL less than 70  - Management as per PCP    6  Nutrition/obesity:  - Encouraged patient to follow a renal diet comprising of moderate potassium, low phosphorus and protein restriction to 0 8gm/kg  Advise of dietary habits and weight loss    7  DM:  - Advised patient of tight glycemic control in order to delay CKD progression   - A1c of 7 7% in  January 2021, slightly improved from prior  - positive evidence of retinopathy on eye exam on 12/03/2019    8  Prostate cancer:  - Status post prostatectomy    - has upcoming appt with urology    9  Followup:  - Patient is to follow-up in 4 months, with lab work to be performed  In 2 weeks and then again in a few days prior to the visit  Advised patient to call me in 10 days to review the results if they do not hear back from me, as I may have not received the results  I spent 25 minutes with patient today in which greater than 50% of the time was spent in counseling/coordination of care regarding  Patient's blood pressure, patient's diet and patient's follow-up as well as CKD progression and need for renal replacement therapy with CKD progression  VIRTUAL VISIT DISCLAIMER    Trupti Calvert acknowledges that he has consented to an online visit or consultation  He understands that the online visit is based solely on information provided by him, and that, in the absence of a face-to-face physical evaluation by the physician, the diagnosis he receives is both limited and provisional in terms of accuracy and completeness  This is not intended to replace a full medical face-to-face evaluation by the physician  Kristen Al Sr  understands and accepts these terms      Alyce Mcgovern MD, [facility], 2/1/2021, 9:28 AM

## 2021-02-01 NOTE — TELEPHONE ENCOUNTER
Patient called he had a virtual visit this morning   had mention his kidney are getting worse  He wants to know what is his kidney number and A1C level  Please call patient back with the information

## 2021-02-01 NOTE — PATIENT INSTRUCTIONS
-   Get blood work for BMP in 2 weeks  -  Change calcitriol to 0 25 mcg daily  -  Make sure to follow-up with cardiology    - Please call me in 10 days after having your blood work done to review the results if you do not hear back from me or my office, as I may have not received the results  - please remember to perform blood work prior to the next visit  - Please call if the blood pressure top number is greater than 150 or less than 110 consistently  - Please call if you are gaining more than 2lbs in 2 days for adjustment of water pills   ~ Please AVOID the following pain medications  LIST OF NSAIDS (NONSTEROIDAL ANTI-INFLAMMATORY DRUGS) AND REYES-2 INHIBITORS    DIFLUNISAL (DOLOBID)  IBUPROFEN (MOTRIN, ADVIL)  FLURBIPROFEN (ANSAID)  KETOPROFEN (ORUDIS, ORUVAIL)  FENOPROFEN (NALFON)  NABUMETONE (RELAFEN)  PIROXICAM (FELDENE)  NAPROXEN (ALEVE, NAPROSYN, NAPRELAN, ANAPROX)  DICLOFENAC (VOLTAREN, CATAFLAM)  INDOMETHACIN (INDOCIN)  SULINDAC (CLINORIL)  TOLMETIN (TOLETIN)  ETODOLAC (LODINE)  MELOXICAM (MOBIC)  KETOROLAC (TORADOL)  OXAPROZIN (DAYPRO)  CELECOXIB (CELEBREX)    Phosphorus diet  Follow a low phosphorus diet      Avoid these higher phosphorus foods: Choose these lower phosphorus foods:   Milk, pudding or yogurt (from animals and from many soy varieties) Rice milk (unfortified), nondairy creamer (if it doesn't have terms in the ingredients list that contain the letters "phos")   Hard cheeses, ricotta or cottage cheese, fat-free cream cheese Regular and low-fat cream cheese   Ice cream or frozen yogurt Sherbet or frozen fruit pops   Soups made with higher phosphorus ingredients (milk, dried peas, beans, lentils) Soups made with lower phosphorus ingredients (broth- or water-based with other lower phosphorus ingredients)   Whole grains, including whole-grain breads, crackers, cereal, rice and pasta Refined grains, including white bread, crackers, cereals, rice and pasta   Quick breads, biscuits, cornbread, muffins, pancakes or waffles Homemade refined (white) dinner rolls, bagels or English muffins   Dried peas (split, black-eyed), beans (black, garbanzo, lima, kidney, navy, lopez) or lentils Green peas (canned, frozen), green beans or wax beans   Organ meats, walleye, pollock or sardines Lean beef, pork, lamb, poultry or other fish   Nuts and seeds Popcorn   Peanut butter and other nut butters Jam, jelly or honey   Chocolate, including chocolate drinks Carob (chocolate-flavored) candy, hard candy or gumdrops   Stephanie and pepper-type sodas, flavored montilla, bottled teas (if a term in the ingredients list contains the letters "phos") Lemon-lime soda, ginger ale or root beer, plain water     Follow a moderate potassium diet  Exercise to Lose Weight     Exercise and a healthy diet may help you lose weight  Your doctor may suggest specific exercises  EXERCISE IDEAS AND TIPS      Choose low-cost things you enjoy doing, such as walking, bicycling, or exercising to workout videos   Take stairs instead of the elevator   Walk during your lunch break   Park your car further away from work or school   Go to a gym or an exercise class   Start with 5 to 10 minutes of exercise each day  Build up to 30 minutes of exercise 4 to 6 days a week   Wear shoes with good support and comfortable clothes   Stretch before and after working out   Work out until you breathe harder and your heart beats faster   Drink extra water when you exercise   Do not do so much that you hurt yourself, feel dizzy, or get very short of breath  Exercises that burn about 150 calories:    Running 1 ½ miles in 15 minutes   Playing volleyball for 45 to 60 minutes   Washing and waxing a car for 45 to 60 minutes   Playing touch football for 45 minutes   Walking 1 ¾ miles in 35 minutes   Pushing a stroller 1 ½ miles in 30 minutes   Playing basketball for 30 minutes   Raking leaves for 30 minutes   Bicycling 5 miles in 30 minutes   Walking 2 miles in 30 minutes   Dancing for 30 minutes   Shoveling snow for 15 minutes   Swimming laps for 20 minutes   Walking up stairs for 15 minutes   Bicycling 4 miles in 15 minutes   Gardening for 30 to 45 minutes   Jumping rope for 15 minutes  Washing windows or floors for 45 to 60 minutes

## 2021-02-02 NOTE — TELEPHONE ENCOUNTER
Would like a call Dr Jeffery Mariscal regarding his kidneys  Which kidney is bad, left or right? What type of cranberry juice to get? Please call when back in office

## 2021-02-15 NOTE — TELEPHONE ENCOUNTER
----- Message from Augustina Ortiz, [address] sent at 2/15/2021 12:21 PM EST -----  Hi! BMP from 2/15/2021 reviewed and revealed sodium 123  Attempted to call patient on home phone and cell phone to discuss resulted BMP and possible symptoms and medications, however, no answer and message was left on answering machine to return call to office  If patient calls please ask:  1 if patient is loosing weight?, having symptoms of nausea, headaches, confusion, difficulty with thought process or had recent falls? -if yes, please hold further metolazone and Furosamide and get BMP Thursday am  Once BMP reviewed further recommendations will be forth coming  If no symptoms: have him hold further metolazone and furosemide for one day  2  If gaining weight, and has edema, will need to increase diuretics    Please let me know  Jose Avilez

## 2021-02-15 NOTE — TELEPHONE ENCOUNTER
Spoke with both pt and his wife Enedelia regarding his most recent labs  Pt is denying having any sx related to his sodium  He is denying weight loss and is reporting a 10 lb weight gain in 1 week  Pt did report drinking roughly 70 oz of fluids per day  I did discuss pt elizabeth gain and fluid intake with Loidamiriam Loli via TT  Recommendations are as follows  1  Increase lasix to 80 mg TID  2  Restrict fluid intake to 1200 ml (no more than 40 oz per day)  3  Repeat a BMP on Wed 2/17 to reassess  I have discussed with pt symptoms to watch for ( headaches, lethargy, confusion, nausea, muscle cramps/weakness, altered mental status)  Should he begin to experience any of these sx he has been instructed to report to the ED for immediate care  He and his wife Enedelia have verbalized understanding of all that was discussed  They are in agreement with this plan and have no questions or concerns at this time

## 2021-02-16 NOTE — TELEPHONE ENCOUNTER
Called patient at with regards to most recent blood work results  Left message advising him to follow the directions that were given   By prior nurse practitioner with regards to holding metolazone and repeating blood work on Thursday  Advised patient to call if any questions or concerns and to get the blood work done

## 2021-02-17 NOTE — TELEPHONE ENCOUNTER
Most recent blood work from today reviewed and reveals increased sodium to 130 from 123, stable potassium 4 1 and increased creatinine 2 54  from 2 37 with increase in Torsemide to TID dosing  Patient reports weight may be inaccurate secondary to scale not functioning well and unsure of any weight loss  He he reports that he is planning on buying a new scale  Patient denies chest pain, shortness of breath or lower extremity edema  Overall feels well and taking torsemide 80 mg t i d   Encouraged patient to maintain fluid restriction and will check BMP on Monday to ensure stability of renal function    Patient agreed to plan

## 2021-02-22 NOTE — TELEPHONE ENCOUNTER
Updated blood work reviewed creatinine 2 71  Spoke with patient and reports 8-9 lb weight loss with t i d  Dosing of torsemide  Instructed patient to hold 3rd dose today and resume b i d  Dosing tomorrow  Also instructed to patient to call office if he has a 2 lb weight gain in one day or 5 lb weight gain in three days  Patient agreed   Will also call and leave message on answering machine for wife to hear instructions per patient request    Will get BMP next Monday to check renal fucntion

## 2021-03-02 NOTE — TELEPHONE ENCOUNTER
Phone call from patient  He left message on nurse line voice mail  Questions if he has follow up appt with Dr Den Combs    Phone call to patient  Left message on his voice mail  His last ov was 2/13/20  He is overdue for an appt and should resched with Dr Den Combs

## 2021-03-17 NOTE — PROGRESS NOTES
Heart Failure Outpatient Progress Note - Berenice Rasheed Sr  72 y o  male MRN: [id number]    @ Encounter: [id number]      Assessment/Plan:    Patient Active Problem List    Diagnosis Date Noted    Polyneuropathy associated with underlying disease ([facility] ) 11/20/2020    Obesity, morbid ([facility] ) 11/20/2020    Platelets decreased ([place] [address] ) 11/20/2020    SOB (shortness of breath) 06/17/2020    Ventricular tachycardia ([place] [address] ) 03/03/2020    Secondary hyperparathyroidism of renal origin ([facility] ) 03/03/2020    Vitamin D deficiency 01/16/2020    Stable proliferative diabetic retinopathy of right eye associated with type 2 diabetes mellitus ([place] [address] ) 12/05/2019    Preoperative clearance 12/05/2019    Embolism and thrombosis of arteries of the lower extremities ([place] [address] ) 11/14/2019    Skin abrasion 08/20/2019    Epigastric pain 08/20/2019    ICD (implantable cardioverter-defibrillator), dual, in situ 08/13/2019    Bilateral hip pain 08/06/2019    Obesity (BMI 30-39 9) 07/09/2019    Medicare annual wellness visit, subsequent 07/09/2019    Venous insufficiency 05/07/2019    Lumbar pain 03/06/2019    Acute upper respiratory infection 02/26/2019    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 stage 3 chronic kidney disease ([facility] ) 02/14/2019    Primary osteoarthritis of both knees 11/20/2018    Localized, primary osteoarthritis of shoulder region 08/13/2018    Pain in joint of left shoulder 08/13/2018    JOE (obstructive sleep apnea) 05/10/2018    Insomnia 05/10/2018    Erectile dysfunction following radical prostatectomy 04/12/2018    Erectile dysfunction of non-organic origin 10/06/2016    Acute on chronic combined systolic and diastolic CHF (congestive heart failure) ([facility] ) 05/01/2016    Type 2 diabetes mellitus with ESRD (end-stage renal disease) ([place] [address] )     Essential hypertension     Chronic obstructive pulmonary disease (HCC)     CKD stage G3b/A3, GFR 30-44 and albumin creatinine ratio >300 mg/g     Prostate cancer (HCC)     Anxiety and depression     Chronic combined systolic and diastolic CHF, NYHA class 3 ([facility] )     Coronary atherosclerosis 12/19/2014    Peripheral neuropathy 12/19/2014    Obesity with body mass index 30 or greater 10/15/2014    Generalized anxiety disorder 08/12/2014    Hyperlipidemia 08/12/2014     # Chronic Systolic CHF, Stage C< NIGEL II/III  Etiology: NICM  Weight: 249 lbs- this is up from 232 lbs last time I saw him;  261 lbs today    Echocardiogram 1/2/18  LVEF: 40%, high filling pressures  LVIDd: 6 8 cm  RV: normal  MR: mild  PASP:    Echo 5/2/16  LVEF: 30%, grade 2 DD  LVEDd: 6 4 cm    Neurohormonal Blockade:  --Beta-Blocker: coreg 25 mg BID  --ACEi, ARB or ARNi: Entresto 97/103 mg BID, isordil 30 mg TID,     (or SVR reduction)  --Aldosterone Receptor Blocker:  --Diuretic: lasix 40 mg BID with metolazone prn    Sudden Cardiac Death Risk Reduction:  --ICD: MDT single Chamber ICD- Generator change 9/6/19  Interrogation 1/14/21:  < 1%, 1 AF episode- 44 min long- PACs and PVCs cannot exclude AF  Optivol normal  Interrogation: 1/7/20:  < 0 1%    Electrical Resynchronization:  --narrow QRS    Advanced Therapies (If appropriate):   --Inotrope:  --LVAD/Transplant Candidacy:    # HTN- managed well  # JOE on CPAP at night  # DM2- HgA1C 8 5%  On 10/2/19  # CKD3- diabetic glumerolar sclerosis and HTN, cr 1 7 on labs 1/14/20- up to 2 7 on 2/22/21  # Hx of prostate CA  # anxiety and depression  # hyperlipidemia- 7/10/18: LDL 45, HDL 22, Tri 294  Rx: simvastatin 40 mg daily  # ED- has penile prosthesis, regrets as he lost feeling    TODAY'S PLAN:  Continue BID torsemide, will need follow up BMP  Continue GDMT for HFrEF- has room for med up titration  Recommend SGLT2i as part of DM management given his HFrEF- can stop Tradjenta  Follow up echo  --2g sodium diet  - Daily weights    HPI:   73 yo male following up regarding management of chronic systolic congestive heart failure  He was in the hospital with decompensated systolic congestive heart failure in Dec 2016  An echocardiogram was done in the hospital which demonstrated an ejection fraction of 30%  Initially ACE-I removed secondary to chronic kidney disease stage III with baseline creatinine around 1 8  He has comorbidities including chronic kidney disease stage III, hypertension, type 2 diabetes mellitus, obstructive sleep apnea on C Pap at night, prostate cancer status post surgery, asthma     reports no history of MI  He states he had a defibrillator placed a long time ago he says 10-12 years ago but states he never had ICD generator changed  He is still on Benicar  He had fistula surgery for preparation for dialysis about 3 years ago but never needed dialysis  Previously I increased his Entresto to 97/103 mg  He is taking lasix 40 mg BID  Interval History:  Last time increased isordil to 30 mg TID  Interrogation 1/14/21:  < 1%, 1 AF episode- 44 min long- PACs and PVCs cannot exclude AF  Optivol normal  Cr up to 2 7 in Feb and was taking TID torsemide- renal called and instructed to decrease to BID  EKG today - SR, 1st degree AV block, TWI 1 and aVL- unchanged from 2018    Occasional fitzpatrick or chest heaviness  Wt still at 261 lbs    Review of Systems   Constitutional: Negative for activity change, appetite change, fatigue and unexpected weight change  HENT: Negative for congestion and nosebleeds  Eyes: Negative  Respiratory: Positive for chest tightness and shortness of breath  Negative for cough  Cardiovascular: Positive for chest pain  Negative for palpitations and leg swelling  Gastrointestinal: Negative for abdominal distention  Endocrine: Negative  Genitourinary: Negative  Musculoskeletal: Negative  Skin: Negative  Neurological: Negative for dizziness, syncope and weakness  Hematological: Negative  Psychiatric/Behavioral: Negative  Past Medical History:   Diagnosis Date    AICD (automatic cardioverter/defibrillator) present     Anxiety and depression     Arthritis     Asthma     pt denies    CAD (coronary artery disease) 10/10/2014    CKD (chronic kidney disease) stage 3, GFR 30-59 ml/min     COPD (chronic obstructive pulmonary disease) (HCC)     pt denies    CPAP (continuous positive airway pressure) dependence     Depression (emotion)     affective disorder    Diabetic nephropathy (HCC)     Erectile dysfunction     GERD (gastroesophageal reflux disease)     Hypertension     Iron deficiency anemia     Latent tuberculosis     Lumbar herniated disc     low back pain    Neuropathy     bles    Obesity (BMI 30-39  9) 7/9/2019    Prostate cancer ([facility] )     2013- had radiation- had prostatectomy    Sleep apnea     Systolic CHF, chronic (HCC)     Type 2 diabetes mellitus with hyperlipidemia ([facility])     Use of cane as ambulatory aid     Vitamin D deficiency     Wears glasses        Allergies   Allergen Reactions    Ibuprofen Nausea Only    Penicillins Other (See Comments)     Pt unsure of reaction           Current Outpatient Medications:     Alcohol Swabs (ALCOHOL PREP) PADS, by Does not apply route 2 (two) times a day, Disp: 200 each, Rfl: 0    aspirin (ADULT ASPIRIN EC LOW STRENGTH) 81 mg EC tablet, Take 81 mg by mouth daily  , Disp: , Rfl:     calcitriol (ROCALTROL) 0 25 mcg capsule, Take 1 capsule (0 25 mcg total) by mouth daily, Disp: 90 capsule, Rfl: 3    carvedilol (COREG) 25 mg tablet, TAKE 1 TABLET BY MOUTH TWICE A DAY WITH FOOD, Disp: 180 tablet, Rfl: 3    CINNAMON PO, Take 500 mg by mouth daily, Disp: , Rfl:     clonazePAM (KlonoPIN) 0 5 mg tablet, Take 0 5 mg by mouth 2 (two) times a day, Disp: , Rfl:     Coenzyme Q10 (COQ-10) 200 MG CAPS, Take 1 capsule by mouth daily  , Disp: , Rfl:     Entresto  MG TABS, TAKE 1 TABLET BY MOUTH TWICE A DAY, Disp: 60 tablet, Rfl: 5    fluticasone (FLONASE) 50 mcg/act nasal spray, SPRAY 2 SPRAYS INTO EACH NOSTRIL EVERY DAY, Disp: 48 mL, Rfl: 1    furosemide (LASIX) 40 mg tablet, TAKE 1 & 1/2 TABLETS (60 MG TOTAL) BY MOUTH 2 (TWO) TIMES A DAY, Disp: 270 tablet, Rfl: 3    gabapentin (NEURONTIN) 400 mg capsule, TAKE 1 CAPSULE (400 MG TOTAL) BY MOUTH 4 (FOUR) TIMES A DAY, Disp: 360 capsule, Rfl: 0    glucose blood test strip, Use 1 each 3 (three) times a day Test, Disp: 100 each, Rfl: 11    Incontinence Supply Disposable (BRIEF OVERNIGHT LARGE) MISC, by Does not apply route 4 (four) times a day, Disp: 98 each, Rfl: 3    insulin aspart (NovoLOG FlexPen) 100 UNIT/ML injection pen, Inject 15 Units under the skin, Disp: , Rfl:     insulin glargine (TOUJEO MAX) 300 units/mL CONCETRATED U-300 injection pen (2-unit dial), INJECT BY SUBCUTANEOUS ROUTE 65 units in AM, Disp: 5 pen, Rfl: 3    Insulin Pen Needle (BD Pen Needle Marylu U/F) 32G X 4 MM MISC, USE TO INJECT INSULIN 3 TIMES DAILY, Disp: 300 each, Rfl: 1    isosorbide dinitrate (ISORDIL) 20 mg tablet, TAKE 1 5 TABLETS (30 MG TOTAL) BY MOUTH 3 (THREE) TIMES A DAY, Disp: 405 tablet, Rfl: 3    methocarbamol (ROBAXIN) 500 mg tablet, TAKE 1 TABLET BY MOUTH THREE TIMES A DAY, Disp: 90 tablet, Rfl: 0    metolazone (ZAROXOLYN) 5 mg tablet, TAKE 1 TABLET (5 MG TOTAL) BY MOUTH DAILY AS NEEDED (WEIGHT GAIN) WEIGHT GAIN 3 LBS, Disp: 90 tablet, Rfl: 1    Multiple Vitamin (MULTIVITAMIN) capsule, Take 1 capsule by mouth daily  , Disp: , Rfl:     Omega-3 Fatty Acids (FISH OIL) 1200 MG CAPS, Take 1 capsule by mouth daily, Disp: , Rfl:     pantoprazole (PROTONIX) 40 mg tablet, TAKE 1 TABLET BY MOUTH EVERY DAY, Disp: 30 tablet, Rfl: 0    potassium chloride (KLOR-CON) 20 mEq packet, TAKE 20 MEQ BY MOUTH 2 (TWO) TIMES A DAY, Disp: 150 packet, Rfl: 4    simvastatin (ZOCOR) 40 mg tablet, TAKE 1 TABLET BY MOUTH EVERY DAY IN THE EVENING, Disp: 90 tablet, Rfl: 1    temazepam (RESTORIL) 30 mg capsule, Take 30 mg by mouth daily at bedtime as needed for sleep , Disp: , Rfl:     temazepam (RESTORIL) 30 mg capsule, as needed, Disp: , Rfl:     triamcinolone (KENALOG) 0 1 % cream, Apply topically 2 (two) times a day, Disp: 30 g, Rfl: 0    Xiidra 5 % op solution, daily, Disp: , Rfl:     Empagliflozin (Jardiance) 10 MG TABS, Take 1 tablet (10 mg total) by mouth every morning, Disp: 30 tablet, Rfl: 5    Social History     Socioeconomic History    Marital status: /Civil Union     Spouse name: Not on file    Number of children: Not on file    Years of education: Not on file    Highest education level: Not on file   Occupational History    Occupation: DISABLED   Social Needs    Financial resource strain: Not hard at all   [employer] insecurity     Worry: Never true     Inability: Never true    Transportation needs     Medical: No     Non-medical: No   Tobacco Use    Smoking status: Former Smoker     Packs/day: 3 00     Years: 20 00     Pack years: 60 00     Quit date:      Years since quittin 2    Smokeless tobacco: Never Used    Tobacco comment: never a smoker as per [employer]   Substance and Sexual Activity    Alcohol use: Yes     Comment: glass wine daily    Drug use: No    Sexual activity: Not Currently   Lifestyle    Physical activity     Days per week: 0 days     Minutes per session: Not on file    Stress:  Only a little   Relationships    Social connections     Talks on phone: More than three times a week     Gets together: More than three times a week     Attends Confucianism service: 1 to 4 times per year     Active member of club or organization: No     Attends meetings of clubs or organizations: Never     Relationship status:     Intimate partner violence     Fear of current or ex partner: No     Emotionally abused: No     Physically abused: No     Forced sexual activity: No   Other Topics Concern    Not on file   Social History Narrative    Not on file       Family History   Problem Relation Age of Onset    Dementia Mother     No Known Problems Father        Physical Exam:    Vitals:   Vitals:    03/18/21 1514   BP: 154/78     Wt Readings from Last 3 Encounters:   03/18/21 118 kg (261 lb)   02/01/21 118 kg (260 lb)   11/20/20 119 kg (262 lb)         Physical Exam   Constitutional: He is oriented to person, place, and time  He appears well-developed and well-nourished  HENT:   Head: Normocephalic and atraumatic  Eyes: Pupils are equal, round, and reactive to light  EOM are normal    Neck: Normal range of motion  No JVD present  Cardiovascular: Normal rate, regular rhythm and normal heart sounds  No murmur heard  Pulmonary/Chest: Effort normal and breath sounds normal  He has no rales  Abdominal: Soft  Bowel sounds are normal  He exhibits no distension  Musculoskeletal: Normal range of motion  General: No edema  Neurological: He is alert and oriented to person, place, and time  Skin: Skin is warm and dry  He is not diaphoretic  Psychiatric: He has a normal mood and affect         Labs & Results:    Lab Results   Component Value Date    GLUCOSE 238 (H) 04/24/2018    CALCIUM 9 7 02/22/2021     04/24/2018    K 3 6 02/22/2021    CO2 31 (H) 02/22/2021     02/22/2021    BUN 31 (H) 02/22/2021    CREATININE 2 71 (H) 02/22/2021     Lab Results   Component Value Date    WBC 5 12 01/30/2021    HGB 11 9 (L) 01/30/2021    HCT 35 7 (L) 01/30/2021    MCV 88 01/30/2021     01/30/2021     Lab Results   Component Value Date    NTBNP 1,840 (H) 06/17/2020      Lab Results   Component Value Date    CHOL 128 04/24/2018     Lab Results   Component Value Date    HDL 19 (L) 06/30/2020    HDL 30 (L) 03/11/2020    HDL 21 (L) 10/10/2018     Lab Results   Component Value Date    LDLCALC 49 06/30/2020    LDLCALC 62 03/11/2020    LDLCALC 48 10/10/2018     Lab Results   Component Value Date    TRIG 288 (H) 06/30/2020    TRIG 259 (H) 03/11/2020    TRIG 208 (H) 10/10/2018     No results found for: CHOLHDL    EKG personally reviewed by Chito Francis DO  Counseling / Coordination of Care  Time spent today 25 minutes  Greater than 50% of total time was spent with the patient and / or family counseling and / or coordination of care  We discussed diagnoses, most recent studies, tests and any changes in treatment plan    Thank you for the opportunity to participate in the care of this patient        [address] [facility] [place] [facility] [place] [facility]  MEDICAL DIRECTOR OF [facility]

## 2021-03-17 NOTE — PROGRESS NOTES
Received call from patient  Brian Anthony is managing well at home and denies needing additional assistance  He has lost weight recently, weight down to 252  He denies chest pain, sob or LE edema  Wears compression socks daily  Nutritional intake adequate, he is following a diabetic diet  BS are in the range of   He has experienced hypoglycemic episodes in the evening due to not eating enough of a pm snack  We discussed different options for pm snack  He is not exercising but plans to starting walking with the warmer weather  He is taking all medications as prescribed  He recently saw podiatrist and reminded him to schedule ophthalmology appointment if he hasn't done so  Follow up appointments scheduled  No issues or concerns at this time  Will keep in surveillance and f/u in 2 months

## 2021-03-18 NOTE — PATIENT INSTRUCTIONS
Stop Knute Bolls (for your diabetes)    Start taking Jardiance 10 mg daily- this is to help your heart failure and diabetes    Please check daily weights and contact the heart failure program at [phone] if you gain 3 lb in one day or 5 lb in one week  Please limit daily sodium intake to 2000 mg daily  Please limit daily fluid intake to 2000 ml daily  Bring complete list of medications to your follow up appointment

## 2021-03-22 NOTE — PROGRESS NOTES
Assessment/Plan:     61-year-old male with: type 2 diabetes with end-stage renal disease  And diabetic peripheral neuropathy, thrombocytopenia, hyperparathyroidism, prostate cancer, lumbar radiculitis, COPD, lower extremity thrombosis history, allergic rhinitis and epigastric pain  Discussed workup and treatment options with risks and benefits will continue current medications will refer to pain management encouraged continued follow-up with his other specialists discussed supportive care return parameters otherwise follow-up in 6 months    No problem-specific Assessment & Plan notes found for this encounter  Diagnoses and all orders for this visit:    Type 2 diabetes mellitus with ESRD (end-stage renal disease) ([facility] )  -     POCT hemoglobin A1c    Allergic rhinitis due to pollen, unspecified seasonality  -     fluticasone (FLONASE) 50 mcg/act nasal spray; 2 sprays into each nostril daily    Epigastric pain  -     pantoprazole (PROTONIX) 40 mg tablet; Take 1 tablet (40 mg total) by mouth daily    Polyneuropathy associated with underlying disease ([place] [address] )    Embolism and thrombosis of arteries of the lower extremities ([facility])    Chronic obstructive pulmonary disease with acute exacerbation ([facility])    Ventricular tachycardia ([facility])    Obesity, morbid ([facility] )    Type 2 diabetes mellitus with stable proliferative retinopathy of right eye, with long-term current use of insulin ([facility])  -     capsicum (ZOSTRIX) 0 075 % topical cream; Apply topically 3 (three) times a day    Secondary hyperparathyroidism of renal origin ([facility] )    Platelets decreased ([place] [address] )    Prostate cancer (HCC)  -     PSA Total, Diagnostic; Future    Lumbar radiculitis  -     Ambulatory referral to Pain Management; Future    Other orders  -     Cancel: fluticasone (FLONASE) 50 mcg/act nasal spray  -     Cancel: pantoprazole (PROTONIX) 40 mg tablet;  Take 1 tablet (40 mg total) by mouth daily          Subjective:     Chief Complaint   Patient presents with   Rae Follow-up     3-4 months follow up         Patient ID: Monica Hancock  is a 72 y o  male  Patient is a 78-year-old male who presents for follow-up on type 2 diabetes with end-stage renal disease  And diabetic peripheral neuropathy, thrombocytopenia, hyperparathyroidism, prostate cancer, lumbar radiculitis, COPD, lower extremity thrombosis history, allergic rhinitis and epigastric pain patient admits being generally stable on medications except his back is getting worse he would like a referral to specialist no fevers chills nausea vomiting tolerating p o  intake no other complaints at this time      The following portions of the patient's history were reviewed and updated as appropriate: allergies, current medications, past family history, past medical history, past social history, past surgical history and problem list     Review of Systems   Constitutional: Negative  HENT: Negative  Eyes: Negative  Respiratory: Negative  Cardiovascular: Negative  Gastrointestinal: Negative  Endocrine: Negative  Genitourinary: Negative  Musculoskeletal: Positive for back pain  Allergic/Immunologic: Negative  Neurological: Negative  Hematological: Negative  Psychiatric/Behavioral: Negative  All other systems reviewed and are negative  Objective:      /72   Temp 97 8 °F (36 6 °C)   Ht 5' 8" (1 727 m)   Wt 118 kg (261 lb)   BMI 39 68 kg/m²          Physical Exam  Constitutional:       Appearance: He is well-developed  HENT:      Head: Atraumatic  Right Ear: External ear normal       Left Ear: External ear normal    Eyes:      Conjunctiva/sclera: Conjunctivae normal       Pupils: Pupils are equal, round, and reactive to light  Neck:      Musculoskeletal: Normal range of motion  Cardiovascular:      Rate and Rhythm: Normal rate and regular rhythm  Pulses: no weak pulses          Dorsalis pedis pulses are 2+ on the right side and 2+ on the left side  Posterior tibial pulses are 2+ on the right side and 2+ on the left side  Heart sounds: Normal heart sounds  Pulmonary:      Effort: Pulmonary effort is normal  No respiratory distress  Breath sounds: Normal breath sounds  Abdominal:      General: There is no distension  Palpations: Abdomen is soft  Tenderness: There is no abdominal tenderness  There is no guarding or rebound  Musculoskeletal: Normal range of motion  Feet:      Right foot:      Skin integrity: No ulcer, skin breakdown, erythema, warmth, callus or dry skin  Left foot:      Skin integrity: No ulcer, skin breakdown, erythema, warmth, callus or dry skin  Skin:     General: Skin is warm and dry  Neurological:      Mental Status: He is alert and oriented to person, place, and time  Cranial Nerves: No cranial nerve deficit  Psychiatric:         Behavior: Behavior normal          Thought Content: Thought content normal          Judgment: Judgment normal            Patient's shoes and socks removed  Right Foot/Ankle   Right Foot Inspection  Skin Exam: skin normal and skin intact no dry skin, no warmth, no callus, no erythema, no maceration, no abnormal color, no pre-ulcer, no ulcer and no callus                          Toe Exam: ROM and strength within normal limits  Sensory       Monofilament testing: absent  Vascular    The right DP pulse is 2+  The right PT pulse is 2+  Left Foot/Ankle  Left Foot Inspection  Skin Exam: skin normal and skin intactno dry skin, no warmth, no erythema, no maceration, normal color, no pre-ulcer, no ulcer and no callus                         Toe Exam: ROM and strength within normal limits                   Sensory       Monofilament: absent  Vascular    The left DP pulse is 2+  The left PT pulse is 2+  Assign Risk Category:  No deformity present; Loss of protective sensation; No weak pulses       Risk: 1  BMI Counseling: Body mass index is 39 68 kg/m²   The BMI is above normal  Nutrition recommendations include encouraging healthy choices of fruits and vegetables  Exercise recommendations include moderate physical activity 150 minutes/week

## 2021-03-22 NOTE — TELEPHONE ENCOUNTER
Caller is giving information to new supplier for briefs  Patient is in need of more briefs  Spoke with patient, who gave size at extra large and brand is [employer]  Patient receives 6 per box  Patient uses up to 4 daily  New supplier is www  [employer]/referral   Phone: [phone]  Fax: [phone]    Patient would like call back when this is faxed

## 2021-04-27 NOTE — TELEPHONE ENCOUNTER
Called pt  to review his lab work   Pt is aware that his renal function and electrolytes are stables  Pt has no question or concern at this time

## 2021-04-27 NOTE — TELEPHONE ENCOUNTER
----- Message from [address] sent at 4/26/2021  2:49 PM EDT -----  Hi! Please let patient know his recent BMP reviewed and renal function and electrolytes stable    Please maintain appt with Dr Moustapha Bear in June  Thanks  Gómez Clarke

## 2021-04-29 NOTE — TELEPHONE ENCOUNTER
Pt left VM on the nurse line regarding his wife but did not leave  and I could not locate wife in [address] [place] [address] Rd  Left VM to clarify who patient is calling about so we can assist him further

## 2021-04-30 NOTE — PROGRESS NOTES
Contacted patient to inform order for covid-19 test was placed in chart  Advised patient to go to testing site at [facility] or to any care now location  He verbalized understanding  Will contact next week for f/u

## 2021-04-30 NOTE — PROGRESS NOTES
Received vm message from patient, returned call  Cynthia Jay had to take wife to ED yesterday and she was admitted and tested positive for covid-19  He inquired how he can be tested  He is not experiencing symptoms at this time  Instructed him to contact office for order and he can then proceed to one of the testing sites  He verbalized understanding  Instructed him to quarantine for 14 days and monitor for symptoms  He will be notified of test results and if he tests positive, PCP will set up telemedicine visit  He verbalized understanding  Will f/u with him next week

## 2021-05-07 PROBLEM — G93.40 ACUTE ENCEPHALOPATHY: Status: ACTIVE | Noted: 2021-01-01

## 2021-05-07 PROBLEM — N18.9 ACUTE RENAL FAILURE SUPERIMPOSED ON CHRONIC KIDNEY DISEASE (HCC): Status: ACTIVE | Noted: 2021-01-01

## 2021-05-07 PROBLEM — J12.82 PNEUMONIA DUE TO COVID-19 VIRUS: Status: ACTIVE | Noted: 2021-01-01

## 2021-05-07 PROBLEM — J96.01 ACUTE RESPIRATORY FAILURE WITH HYPOXIA (HCC): Status: ACTIVE | Noted: 2021-01-01

## 2021-05-07 PROBLEM — N17.9 ACUTE RENAL FAILURE SUPERIMPOSED ON CHRONIC KIDNEY DISEASE (HCC): Status: ACTIVE | Noted: 2021-01-01

## 2021-05-07 PROBLEM — R77.8 ELEVATED TROPONIN: Status: ACTIVE | Noted: 2021-01-01

## 2021-05-07 PROBLEM — U07.1 PNEUMONIA DUE TO COVID-19 VIRUS: Status: ACTIVE | Noted: 2021-01-01

## 2021-05-07 NOTE — ED NOTES
Respiratory at bedside for ABG and adjusted oxygen to [facility] from [address], [address] [facility]  05/07/21 7139

## 2021-05-07 NOTE — ED PROVIDER NOTES
History  Chief Complaint   Patient presents with    Shortness of Breath     Per EMS pt has been SOB with Lethargy for the past day  Pt is staying with family who all have covid-19      72 y o  M w/h/o COPD, CKD, CHF, CAD, JOE on CPAP p/w generalized weakness  Per EMS, pt has family at home that tested positive for COVID  Having generalized weakness since yesterday and unable to get out of bed today  Pt sat in 70s upon EMS arrival   Improved to 90s on facemask  Pt opens eyes to voice, but does not follow commands  History provided by:  EMS personnel  Fatigue  Severity:  Unable to specify  Duration:  1 day  Timing:  Unable to specify  Progression:  Worsening  Chronicity:  New  Risk factors: congestive heart failure, coronary artery disease and diabetes        Prior to Admission Medications   Prescriptions Last Dose Informant Patient Reported? Taking?    Alcohol Swabs (ALCOHOL PREP) PADS  Self No No   Sig: by Does not apply route 2 (two) times a day   CINNAMON PO  Self Yes No   Sig: Take 500 mg by mouth daily   Coenzyme Q10 (COQ-10) 200 MG CAPS  Self Yes No   Sig: Take 1 capsule by mouth daily     Empagliflozin (Jardiance) 10 MG TABS   No No   Sig: Take 1 tablet (10 mg total) by mouth every morning   Incontinence Supply Disposable (BRIEF OVERNIGHT LARGE) MISC  Self No No   Sig: by Does not apply route 4 (four) times a day   Incontinence Supply Disposable (Cristina Day Dry Comfort Heavy) MISC   No No   Sig: Use 4 (four) times a day   Insulin Pen Needle (BD Pen Needle Marylu U/F) 32G X 4 MM MISC   No No   Sig: USE TO INJECT INSULIN 3 TIMES DAILY   Multiple Vitamin (MULTIVITAMIN) capsule  Self Yes No   Sig: Take 1 capsule by mouth daily     Omega-3 Fatty Acids (FISH OIL) 1200 MG CAPS  Self Yes No   Sig: Take 1 capsule by mouth daily   Xiidra 5 % op solution   Yes No   Sig: daily   aspirin (ADULT ASPIRIN EC LOW STRENGTH) 81 mg EC tablet  Self Yes No   Sig: Take 81 mg by mouth daily     calcitriol (ROCALTROL) 0 25 mcg capsule   No No   Sig: Take 1 capsule (0 25 mcg total) by mouth daily   capsicum (ZOSTRIX) 0 075 % topical cream   No No   Sig: Apply topically 3 (three) times a day   carvedilol (COREG) 25 mg tablet   No No   Sig: TAKE 1 TABLET BY MOUTH TWICE A DAY WITH FOOD   clonazePAM (KlonoPIN) 0 5 mg tablet   Yes No   Sig: Take 0 5 mg by mouth 2 (two) times a day   fluticasone (FLONASE) 50 mcg/act nasal spray   No No   Si sprays into each nostril daily   furosemide (LASIX) 40 mg tablet   No No   Sig: TAKE 1 & 1/2 TABLETS (60 MG TOTAL) BY MOUTH 2 (TWO) TIMES A DAY   gabapentin (NEURONTIN) 400 mg capsule   No No   Sig: TAKE 1 CAPSULE BY MOUTH 4 TIMES A DAY     glucose blood test strip   No No   Sig: Use 1 each 3 (three) times a day Test   insulin aspart (NovoLOG FlexPen) 100 UNIT/ML injection pen   Yes No   Sig: Inject 15 Units under the skin   insulin glargine (TOUJEO MAX) 300 units/mL CONCETRATED U-300 injection pen (2-unit dial)   No No   Sig: INJECT BY SUBCUTANEOUS ROUTE 65 units in AM   isosorbide dinitrate (ISORDIL) 20 mg tablet   No No   Sig: TAKE 1 5 TABLETS (30 MG TOTAL) BY MOUTH 3 (THREE) TIMES A DAY   methocarbamol (ROBAXIN) 500 mg tablet  Self No No   Sig: TAKE 1 TABLET BY MOUTH THREE TIMES A DAY   metolazone (ZAROXOLYN) 5 mg tablet   No No   Sig: TAKE 1 TABLET (5 MG TOTAL) BY MOUTH DAILY AS NEEDED (WEIGHT GAIN) WEIGHT GAIN 3 LBS   pantoprazole (PROTONIX) 40 mg tablet   No No   Sig: Take 1 tablet (40 mg total) by mouth daily   potassium chloride (KLOR-CON) 20 mEq packet   No No   Sig: TAKE 20 MEQ BY MOUTH 2 (TWO) TIMES A DAY   sacubitril-valsartan (Entresto)  MG TABS   No No   Sig: Take 1 tablet by mouth 2 (two) times a day   simvastatin (ZOCOR) 40 mg tablet   No No   Sig: TAKE 1 TABLET BY MOUTH EVERY DAY IN THE EVENING   temazepam (RESTORIL) 30 mg capsule  Self Yes No   Sig: Take 30 mg by mouth daily at bedtime as needed for sleep    temazepam (RESTORIL) 30 mg capsule   Yes No   Sig: as needed triamcinolone (KENALOG) 0 1 % cream   No No   Sig: APPLY TO AFFECTED AREA TWICE A DAY      Facility-Administered Medications: None       Past Medical History:   Diagnosis Date    AICD (automatic cardioverter/defibrillator) present     Anxiety and depression     Arthritis     Asthma     pt denies    CAD (coronary artery disease) 10/10/2014    CKD (chronic kidney disease) stage 3, GFR 30-59 ml/min (HCC)     COPD (chronic obstructive pulmonary disease) (HCC)     pt denies    CPAP (continuous positive airway pressure) dependence     Depression (emotion)     affective disorder    Diabetic nephropathy (HCC)     Erectile dysfunction     GERD (gastroesophageal reflux disease)     Hypertension     Iron deficiency anemia     Latent tuberculosis     Lumbar herniated disc     low back pain    Neuropathy     bles    Obesity (BMI 30-39  9) 7/9/2019    Prostate cancer ([facility] [address] )     2013- had radiation- had prostatectomy    Sleep apnea     Systolic [facility], chronic ([facility])     Type 2 diabetes mellitus with hyperlipidemia ([facility])     Use of cane as ambulatory aid     Vitamin D deficiency     Wears glasses        Past Surgical History:   Procedure Laterality Date    AV FISTULA PLACEMENT      left upper arm and removal    CARDIAC DEFIBRILLATOR PLACEMENT      CARDIAC ICD GENERATOR CHANGE  9/6/2019    CATARACT EXTRACTION Bilateral 10/09/2014    COLONOSCOPY      INGUINAL HERNIA REPAIR Bilateral     OTHER SURGICAL HISTORY Left 10/10/2014    AV Shunt    MO INSERT,INFLATABLE PENILE PROSTHESIS N/A 1/19/2018    Procedure: INSERTION 3 PART PROSTHESIS PENILE;  Surgeon: Grace Parks MD;  [facility];  Service: Urology    PROSTATECTOMY         Family History   Problem Relation Age of Onset    Dementia Mother     No Known Problems Father      I have reviewed and agree with the history as documented      E-Cigarette/Vaping    E-Cigarette Use Never User      E-Cigarette/Vaping Substances    Nicotine No     THC No     CBD No     Flavoring No     Other No     Unknown No      Social History     Tobacco Use    Smoking status: Former Smoker     Packs/day: 3 00     Years: 20 00     Pack years: 60 00     Quit date:      Years since quittin 3    Smokeless tobacco: Never Used    Tobacco comment: never a smoker as per [employer]   Substance Use Topics    Alcohol use: Yes     Comment: glass wine daily    Drug use: No       Review of Systems   Unable to perform ROS: Mental status change   Constitutional: Positive for fatigue  Physical Exam  Physical Exam  Vitals signs and nursing note reviewed  Constitutional:       General: He is not in acute distress  Appearance: He is well-developed  He is not ill-appearing, toxic-appearing or diaphoretic  HENT:      Head: Normocephalic and atraumatic  Eyes:      General: No scleral icterus  Conjunctiva/sclera:      Right eye: Right conjunctiva is not injected  Left eye: Left conjunctiva is not injected  Neck:      Musculoskeletal: Normal range of motion  Vascular: No JVD  Trachea: Trachea normal    Cardiovascular:      Rate and Rhythm: Normal rate and regular rhythm  Pulses: Normal pulses  Heart sounds: Normal heart sounds  No murmur  No friction rub  Pulmonary:      Effort: Pulmonary effort is normal  Tachypnea present  No accessory muscle usage or respiratory distress  Breath sounds: Normal breath sounds  No stridor  No wheezing, rhonchi or rales  Chest:      Chest wall: No tenderness  Abdominal:      General: There is no distension  Palpations: Abdomen is soft  Tenderness: There is no abdominal tenderness  There is no guarding or rebound  Skin:     General: Skin is warm and dry  Coloration: Skin is not pale  Findings: No rash  Neurological:      Mental Status: He is alert  GCS: GCS eye subscore is 3  GCS verbal subscore is 1  GCS motor subscore is 5     Psychiatric:         Behavior: Behavior normal          Vital Signs  ED Triage Vitals   Temperature Pulse Respirations Blood Pressure SpO2   05/07/21 1652 05/07/21 1652 05/07/21 1652 05/07/21 1652 05/07/21 1652   99 6 °F (37 6 °C) 77 (!) 32 127/59 96 %      Temp Source Heart Rate Source Patient Position - Orthostatic VS BP Location FiO2 (%)   05/07/21 1652 05/07/21 1652 05/07/21 1953 05/07/21 1953 --   Oral Monitor Sitting Right arm       Pain Score       05/07/21 1652       5           Vitals:    05/07/21 1900 05/07/21 1930 05/07/21 1953 05/07/21 2058   BP:  111/56 146/69 150/68   Pulse: 72 74 77 72   Patient Position - Orthostatic VS:   Sitting Lying         Visual Acuity      ED Medications  Medications   doxycycline hyclate (VIBRAMYCIN) capsule 100 mg (100 mg Oral Not Given 5/7/21 1954)   ceftriaxone (ROCEPHIN) 1 g/50 mL in dextrose IVPB (0 mg Intravenous Stopped 5/7/21 1952)       Diagnostic Studies  Results Reviewed     Procedure Component Value Units Date/Time    Blood culture #1 [[id number]] Collected: 05/07/21 1712    Lab Status:  In process Specimen: Blood from Hand, Left Updated: 05/07/21 2043    Ferritin [[id number]]     Lab Status: No result Specimen: Blood     C-reactive protein [[id number]]     Lab Status: No result Specimen: Blood     Blood gas, arterial [[id number]]  (Abnormal) Collected: 05/07/21 2001    Lab Status: Final result Specimen: Blood, Arterial from Radial, Right Updated: 05/07/21 2016     pH, Arterial 7 433     pCO2, Arterial 35 0 mm Hg      pO2, Arterial 60 6 mm Hg      HCO3, Arterial 22 9 mmol/L      Base Excess, Arterial -0 9 mmol/L      O2 Content, Arterial 15 9 mL/dL      O2 HGB,Arterial  89 8 %      SOURCE Radial, Right     MILA TEST Yes     Nasal Cannula 5    D-dimer, quantitative [[id number]]  (Abnormal) Collected: 05/07/21 1823    Lab Status: Final result Specimen: Blood from Arm, Left Updated: 05/07/21 2005     D-Dimer, Quant 2 06 ug/ml FEU     NT-BNP PRO [[id number]]  (Abnormal) Collected: 05/07/21 1823    Lab Status: Final result Specimen: Blood from Arm, Left Updated: 05/07/21 1918     NT-proBNP 7,074 pg/mL     Novel Coronavirus (Covid-19),PCR [facility] - 2 Hour Stat [[id number]]  (Abnormal) Collected: 05/07/21 1720    Lab Status: Final result Specimen: Nares from Nasopharyngeal Swab Updated: 05/07/21 1917     SARS-CoV-2 Positive    Narrative: The specimen collection materials, transport medium, and/or testing methodology utilized in the production of these test results have been proven to be reliable in a limited validation with an abbreviated program under the Emergency Utilization Authorization provided by the FDA  Testing reported as "Presumptive positive" will be confirmed with secondary testing to ensure result accuracy  Clinical caution and judgement should be used with the interpretation of these results with consideration of the clinical impression and other laboratory testing  Testing reported as "Positive" or "Negative" has been proven to be accurate according to standard laboratory validation requirements  All testing is performed with control materials showing appropriate reactivity at standard intervals  Ariadnaonchintan Robyn [[id number]]  (Normal) Collected: 05/07/21 1823    Lab Status: Final result Specimen: Blood from Arm, Left Updated: 05/07/21 1913     Protime 14 1 seconds      INR 1 12    APTT [[id number]]  (Normal) Collected: 05/07/21 1823    Lab Status: Final result Specimen: Blood from Arm, Left Updated: 05/07/21 1913     PTT 33 seconds     Lactic acid, plasma [[id number]]  (Normal) Collected: 05/07/21 1823    Lab Status: Final result Specimen: Blood from Arm, Left Updated: 05/07/21 1913     LACTIC ACID 1 1 mmol/L     Narrative:      Result may be elevated if tourniquet was used during collection      Comprehensive metabolic panel [[id number]]  (Abnormal) Collected: 05/07/21 1823    Lab Status: Final result Specimen: Blood from Arm, Left Updated: 05/07/21 1910     Sodium 136 mmol/L      Potassium 3 5 mmol/L      Chloride 100 mmol/L      CO2 28 mmol/L      ANION GAP 8 mmol/L      BUN 50 mg/dL      Creatinine 4 97 mg/dL      Glucose 76 mg/dL      Calcium 7 6 mg/dL      Corrected Calcium 8 6 mg/dL       U/L      ALT 76 U/L      Alkaline Phosphatase 71 U/L      Total Protein 6 7 g/dL      Albumin 2 8 g/dL      Total Bilirubin 0 41 mg/dL      eGFR 13 ml/min/1 73sq m     Narrative:      [place] guidelines for Chronic Kidney Disease (CKD):     Stage 1 with normal or high GFR (GFR > 90 mL/min/1 73 square meters)    Stage 2 Mild CKD (GFR = 60-89 mL/min/1 73 square meters)    Stage 3A Moderate CKD (GFR = 45-59 mL/min/1 73 square meters)    Stage 3B Moderate CKD (GFR = 30-44 mL/min/1 73 square meters)    Stage 4 Severe CKD (GFR = 15-29 mL/min/1 73 square meters)    Stage 5 End Stage CKD (GFR <15 mL/min/1 73 square meters)  Note: GFR calculation is accurate only with a steady state creatinine    Blood culture #2 [[id number]] Collected: 05/07/21 1821    Lab Status:  In process Specimen: Blood from Arm, Left Updated: 05/07/21 1850    CBC and differential [[id number]]  (Abnormal) Collected: 05/07/21 1712    Lab Status: Final result Specimen: Blood from Arm, Left Updated: 05/07/21 1831     WBC 3 75 Thousand/uL      RBC 4 38 Million/uL      Hemoglobin 12 8 g/dL      Hematocrit 39 5 %      MCV 90 fL      MCH 29 2 pg      MCHC 32 4 g/dL      RDW 14 9 %      MPV 12 1 fL      Platelets 96 Thousands/uL      nRBC 0 /100 WBCs      Neutrophils Relative 74 %      Immat GRANS % 0 %      Lymphocytes Relative 17 %      Monocytes Relative 9 %      Eosinophils Relative 0 %      Basophils Relative 0 %      Neutrophils Absolute 2 77 Thousands/µL      Immature Grans Absolute 0 01 Thousand/uL      Lymphocytes Absolute 0 64 Thousands/µL      Monocytes Absolute 0 33 Thousand/µL      Eosinophils Absolute 0 00 Thousand/µL      Basophils Absolute 0 00 Thousands/µL     Troponin I [[id number]]  (Abnormal) Collected: 05/07/21 1712    Lab Status: Final result Specimen: Blood from Arm, Left Updated: 05/07/21 1814     Troponin I 0 72 ng/mL     Procalcitonin with AM Reflex [[id number]] Collected: 05/07/21 1712    Lab Status: In process Specimen: Blood from Arm, Left Updated: 05/07/21 1740                 CT head without contrast   Final Result by Daly Grande MD (05/07 1758)      No acute intracranial abnormality  Microangiopathic changes  Workstation performed: [id number]         CT chest without contrast   Final Result by Daly Grande MD (05/07 1801)   Extensive multilobar infiltrates in a pattern and distribution that could be consistent with provided clinical history of suspected Covid 23                    Workstation performed: [id number]         XR chest 1 view portable    (Results Pending)              Procedures  ECG 12 Lead Documentation Only    Date/Time: 5/7/2021 4:40 PM  Performed by: Micky Gilford, DO  Authorized by: Micky Gilford, DO     Indications / Diagnosis:  Lethargy  Rate:     ECG rate:  76    ECG rate assessment: normal    Rhythm:     Rhythm: sinus rhythm    Ectopy:     Ectopy: none    QRS:     QRS axis:  Normal    QRS intervals:  Normal  ST segments:     ST segments:  Normal  T waves:     T waves: normal      CriticalCare Time  Performed by: Micky Gilford, DO  Authorized by: Micky Gilford, DO     Critical care provider statement:     Critical care time (minutes):  60    Critical care time was exclusive of:  Separately billable procedures and treating other patients and teaching time    Critical care was necessary to treat or prevent imminent or life-threatening deterioration of the following conditions:  Respiratory failure    Critical care was time spent personally by me on the following activities:  Blood draw for specimens, obtaining history from patient or surrogate, development of treatment plan with patient or surrogate, discussions with consultants, evaluation of patient's response to treatment, examination of patient, ordering and performing treatments and interventions, ordering and review of laboratory studies, ordering and review of radiographic studies, re-evaluation of patient's condition and review of old charts    I assumed direction of critical care for this patient from another provider in my specialty: no               ED Course  ED Course as of May 07 2101   Fri May 07, 2021   1734 Pt placed on 5L NC by respiratory  1806 Multifocal infiltrates on CT scan, possible COVID  A/w result of COVID swab  Will order Rocephin  1816 Troponin I(!): 0 72   1911 Baseline 1 7-2 8   Creatinine(!): 4 97                                           MDM    Disposition  Final diagnoses:   Pneumonia due to COVID-19 virus   Acute kidney injury ([facility] [address] )   Hypoxia   Elevated troponin   Generalized weakness     Time reflects when diagnosis was documented in both MDM as applicable and the Disposition within this note     Time User Action Codes Description Comment    5/7/2021  7:42 PM Tung Crystal L Add [U07 1,  J12 82] Pneumonia due to COVID-19 virus     5/7/2021  7:42 PM Tung Crystal L Add [N17 9] Acute kidney injury ([facility] [address] )     5/7/2021  7:43 PM Hildred Pluck Add [R09 02] Hypoxia     5/7/2021  7:43 PM Tung, Crystal L Add [R77 8] Elevated troponin     5/7/2021  7:43 PM Tung, Crystal L Add [R53 1] Generalized weakness       ED Disposition     ED Disposition Condition Date/Time Comment    Admit Stable Fri May 7, 2021  7:43 PM Case was discussed with Kulwinder Child and the patient's admission status was agreed to be Admission Status: inpatient status to the service of Dr Gutierrez Back            Follow-up Information    None         Patient's Medications   Discharge Prescriptions    No medications on file     No discharge procedures on file      PDMP Review     None          ED Provider  Electronically Signed by           Sakshi Fisher 25, DO  05/07/21 2101

## 2021-05-08 NOTE — UTILIZATION REVIEW
Initial Clinical Review    Admission: Date/Time/Statement:   Admission Orders (From admission, onward)     Ordered        05/07/21 2015  Inpatient Admission  Once                   Orders Placed This Encounter   Procedures    Inpatient Admission     Standing Status:   Standing     Number of Occurrences:   1     Order Specific Question:   Level of Care     Answer:   Med Surg [16]     Order Specific Question:   Estimated length of stay     Answer:   More than 2 Midnights     Order Specific Question:   Certification     Answer:   I certify that inpatient services are medically necessary for this patient for a duration of greater than two midnights  See H&P and MD Progress Notes for additional information about the patient's course of treatment  ED Arrival Information     Expected Arrival Acuity Means of Arrival Escorted By Service Admission Type    - 5/7/2021 16:33 Emergent Ambulance [facility] ([address]) Hospitalist Emergency    Arrival Complaint    SOB        Chief Complaint   Patient presents with    Shortness of Breath     Per EMS pt has been SOB with Lethargy for the past day  Pt is staying with family who all have covid-19  Initial Presentation: 72 y o  male with Hx  of diabetes, CHF, obesity, HTN,  GERD, COPD, CKD, CAD  presents to ED with SOB and generalized weakness - unable to get OOB today  Per EMS sats in the 70's -Improved to 90s on facemask  Pt opens eyes to voice, but does not follow commands  Per son -patient's wife was recently positive for COVID-19 and hospitalized and has been living with his daughter for the past few days   Patient has been home alone and son believes he has not been taking care of himself, has not been eating or drinking much for the past few days, not sure if taking his meds  ED Findings: tachypneic,  GCS 9  WBC's 3 75,  Trop 0 72, Cr 4 97 (baseline cr 1 8-1 9),   ,  NTproBNP  7,074, Procalcitonin 1 57 ABG:  Ph 7 433, pco2  35 0,  Po@  606  6  COVID positive  CT Head w/o acute findings  CT Chest w/Extensive multilobar infiltrates in a pattern and distribution that could be consistent w/ hx of suspected Covid 19  Elevated ferritin, crp and d-dimer  Admitted 5/7 to Inpatient [facility] with Severe sepsis due to COVID-19 pneumonia, Acute respiratory failure with hypoxia, ARF on CKD, Acute metabolic encephalopathy, Elevated trops  Placed on moderate covid pathway with IV Remdesivir & Decadron, covid vitamins, IV Rocephin, po doxycycline,  Telemetry, supplemental O2 to maintain sats > 90% - currently on 5 L NC,  Consult Pulm, trend inflamm markers, IVF's, Nephrology consult, hold lasix & zaroxolyn, monitor mental status (normally A&O), trends trops, Cardio consult, cont asa & statins    Day 2: 5/8 More awake today  Currently requiring 6 L nc - wean as able  Per Pulm -Continue current COVID-19 treatment protocol  Given treatment for possible superimposed bacterial infection, would hold off on Actemra  Per Cardio: static elevation of troponin without chest pain or ECG abnormalities in the setting of GAGE and rhabdomyolysis  Hold Entresto and Lasix given acute renal failure  Per Nephrology: Suspect prerenal azotemia in the setting of volume depletion with diuretics, COVID-19 infection, loss of autoregulatory system with Arb (enestro)  relative hypotension, rhabdomyolysis with elevated CPK  Will need to rule out obstructive uropathy  Cr today 4  64  Avoid nephrotoxins  Continue to hold enestro, metolazone Lasix at this time   May need to insert Hodge catheter for more accurate I&O  Change to Plasmalyte in the setting of decreased bicarb and elevated CPK for 12 hours-monitor closely for respiratory status in the setting of history of diastolic congestive heart failure      ED Triage Vitals   Temperature Pulse Respirations Blood Pressure SpO2   05/07/21 1652 05/07/21 1652 05/07/21 1652 05/07/21 1652 05/07/21 1652   99 6 °F (37 6 °C) 77 (!) 32 127/59 96 %      Temp Source Heart Rate Source Patient Position - Orthostatic VS BP Location FiO2 (%)   05/07/21 1652 05/07/21 1652 05/07/21 1953 05/07/21 1953 --   Oral Monitor Sitting Right arm       Pain Score       05/07/21 1652       5          Wt Readings from Last 1 Encounters:   05/07/21 115 kg (253 lb 8 5 oz)     Additional Vital Signs:   05/08/21 07:28:58  100 3 °F (37 9 °C)  73  17  137/59  85  94 %  --  --  --  --  --   05/08/21 01:26:20  98 1 °F (36 7 °C)  71  --  --  --  93 %  44  --  6 L/min  Nasal cannula  --   05/08/21 00:01:19  99 7 °F (37 6 °C)  76  --  --  --  95 %  44  --  6 L/min  Nasal cannula  --   05/07/21 22:43:14  99 8 °F (37 7 °C)  74  --  --  --  91 %  44  --  6 L/min  Nasal cannula  --   05/07/21 21:27:23  100 6 °F (38 1 °C)Abnormal   73  --  --  --  93 %  44  --  6 L/min  Nasal cannula  --   05/07/21 21:09:05  100 6 °F (38 1 °C)Abnormal   74  19  107/60  76  86 %Abnormal   40  --  5 L/min  Nasal cannula  Lying   05/07/21 2058  --  72  22  150/68  --  100 %  --  --  --  None (Room air)  Lying   05/07/21 1953  --  77  20  146/69  --  100 %  40  --  5 L/min  Nasal cannula  Sitting   05/07/21 1930  --  74  24Abnormal   111/56  --  100 %  40  --  5 L/min  Nasal cannula  --   05/07/21 1900  --  72  24Abnormal   --  --  99 %  40  --  5 L/min  Nasal cannula  --   05/07/21 1655  --  --  --  --  --  --  --  15 L/min  --  Non-rebreather mask  --       Pertinent Labs/Diagnostic Test Results:   Results from last 7 days   Lab Units 05/07/21  1720   SARS-COV-2  Positive*     Results from last 7 days   Lab Units 05/08/21  0613 05/07/21  1712   WBC Thousand/uL 3 57* 3 75*   HEMOGLOBIN g/dL 12 6 12 8   HEMATOCRIT % 39 0 39 5   PLATELETS Thousands/uL 98* 96*   NEUTROS ABS Thousands/µL  --  2 77   BANDS PCT % 5  --      Results from last 7 days   Lab Units 05/08/21  0613 05/07/21  1823   SODIUM mmol/L 137 136   POTASSIUM mmol/L 4 0 3 5   CHLORIDE mmol/L 100 100   CO2 mmol/L 16* 28   ANION GAP mmol/L 21* 8   BUN mg/dL 65* 50* CREATININE mg/dL 4 64* 4 97*   EGFR ml/min/1 73sq m 14 13   CALCIUM mg/dL 7 5* 7 6*     Results from last 7 days   Lab Units 05/08/21  0613 05/07/21  1823   AST U/L 181* 182*   ALT U/L 76 76   ALK PHOS U/L 69 71   TOTAL PROTEIN g/dL 6 6 6 7   ALBUMIN g/dL 2 6* 2 8*   TOTAL BILIRUBIN mg/dL 0 34 0 41     Results from last 7 days   Lab Units 05/08/21  1124 05/08/21  0638 05/07/21  2353 05/07/21  2122   POC GLUCOSE mg/dl 286* 157* 91 81     Results from last 7 days   Lab Units 05/08/21  0613 05/07/21  1823   GLUCOSE RANDOM mg/dL 144* 76     Results from last 7 days   Lab Units 05/07/21 2001   PH ART  7 433   PCO2 ART mm Hg 35 0*   PO2 ART mm Hg 60 6*   HCO3 ART mmol/L 22 9   BASE EXC ART mmol/L -0 9   O2 CONTENT ART mL/dL 15 9*   O2 HGB, ARTERIAL % 89 8*   ABG SOURCE  Radial, Right     Results from last 7 days   Lab Units 05/08/21  0613   CK TOTAL U/L 9,220*   CK MB INDEX % <1 0   CK MB ng/mL 4 8     Results from last 7 days   Lab Units 05/08/21  0613 05/08/21  0208 05/07/21  2224 05/07/21  1712   TROPONIN I ng/mL 0 45* 0 53* 0 60* 0 72*     Results from last 7 days   Lab Units 05/08/21  0613 05/07/21  1823   D-DIMER QUANTITATIVE ug/ml FEU 1 89* 2 06*     Results from last 7 days   Lab Units 05/07/21  1823   PROTIME seconds 14 1   INR  1 12   PTT seconds 33     Results from last 7 days   Lab Units 05/07/21  1712   PROCALCITONIN ng/ml 1 57*     Results from last 7 days   Lab Units 05/07/21  1823   LACTIC ACID mmol/L 1 1     Results from last 7 days   Lab Units 05/07/21  1823   NT-PRO BNP pg/mL 7,074*     Results from last 7 days   Lab Units 05/08/21  0613 05/07/21  1823   FERRITIN ng/mL 1,302* 1,018*     Results from last 7 days   Lab Units 05/08/21  0613 05/07/21  1823   CRP mg/L 157 4* 234 1*     Results from last 7 days   Lab Units 05/08/21  0757   CLARITY UA  Clear   COLOR UA  Yellow   SPEC GRAV UA  1 025   PH UA  5 5   GLUCOSE UA mg/dl 100 (1/10%)*   KETONES UA mg/dl Negative   BLOOD UA  Small*   PROTEIN UA mg/dl 100 (2+)*   NITRITE UA  Negative   BILIRUBIN UA  Negative   UROBILINOGEN UA E U /dl 0 2   LEUKOCYTES UA  Trace*   WBC UA /hpf 2-4   RBC UA /hpf None Seen   BACTERIA UA /hpf Moderate*   EPITHELIAL CELLS WET PREP /hpf Occasional     Results from last 7 days   Lab Units 05/07/21  1821 05/07/21  1712   BLOOD CULTURE  Received in Microbiology Lab  Culture in Progress  Received in Microbiology Lab  Culture in Progress  5/7 EKG:   rate:  76      Rhythm: sinus rhythm      Ectopy: none      QRS axis:  Normal    QRS intervals:  Normal    ST segments:  Normal    T waves: normal    5/7 CT Head:  No acute intracranial abnormality   Microangiopathic changes  5/7 CXR:  Multiple bilateral groundglass infiltrates compatible with Covid 19  pneumonia  5/7 CT Chest: Extensive multilobar infiltrates in a pattern and distribution that could be consistent with provided clinical history of suspected Covid 19  ED Treatment:   Medication Administration from 05/07/2021 1633 to 05/07/2021 2103       Date/Time Order Dose Route Action     05/07/2021 1905 ceftriaxone (ROCEPHIN) 1 g/50 mL in dextrose IVPB 1,000 mg Intravenous New Bag        Past Medical History:   Diagnosis Date    AICD (automatic cardioverter/defibrillator) present     Anxiety and depression     Arthritis     Asthma     pt denies    CAD (coronary artery disease) 10/10/2014    CKD (chronic kidney disease) stage 3, GFR 30-59 ml/min (HCC)     COPD (chronic obstructive pulmonary disease) (HCC)     pt denies    CPAP (continuous positive airway pressure) dependence     Depression (emotion)     affective disorder    Diabetic nephropathy (HCC)     Erectile dysfunction     GERD (gastroesophageal reflux disease)     Hypertension     Iron deficiency anemia     Latent tuberculosis     Lumbar herniated disc     low back pain    Neuropathy     bles    Obesity (BMI 30-39  9) 7/9/2019    Prostate cancer ([facility] [address] )     2013- had radiation- had prostatectomy    Sleep apnea  Systolic CHF, chronic (HCC)     Type 2 diabetes mellitus with hyperlipidemia (HCC)     Use of cane as ambulatory aid     Vitamin D deficiency     Wears glasses      Present on Admission:   Hyperlipidemia   Chronic combined systolic and diastolic CHF, NYHA class 3 (HCC)   Obesity with body mass index 30 or greater      Admitting Diagnosis: Shortness of breath [R06 02]  Hypoxia [R09 02]  Elevated troponin [R77 8]  Acute kidney injury ([facility] [address] ) [N17 9]  Generalized weakness [R53 1]  Pneumonia due to COVID-19 virus [U07 1, J12 82]  Age/Sex: 72 y o  male     Admission Orders:  Scheduled Medications:  ascorbic acid, 1,000 mg, Oral, Q12H [facility]  aspirin, 81 mg, Oral, Daily  atorvastatin, 40 mg, Oral, HS  calcitriol, 0 25 mcg, Oral, Daily  carvedilol, 25 mg, Oral, BID With Meals  cefTRIAXone, 1,000 mg, Intravenous, Q24H  cholecalciferol, 2,000 Units, Oral, Daily  clonazePAM, 0 5 mg, Oral, BID  dexamethasone, 6 mg, Intravenous, Q24H  doxycycline, 100 mg, Intravenous, Q12H  famotidine, 10 mg, Oral, Daily  fluticasone, 2 spray, Nasal, Daily  gabapentin, 100 mg, Oral, TID  heparin (porcine), 5,000 Units, Subcutaneous, Q8H ANGEL  insulin glargine, 40 Units, Subcutaneous, QAM  insulin lispro, 1-6 Units, Subcutaneous, HS  insulin lispro, 2-12 Units, Subcutaneous, TID AC  isosorbide dinitrate, 30 mg, Oral, TID  zinc sulfate, 220 mg, Oral, Daily    Followed by  Olivia Kapadia ON 5/15/2021] multivitamin-minerals, 1 tablet, Oral, Daily  pantoprazole, 40 mg, Oral, Early Morning  remdesivir, 100 mg, Intravenous, Q24H    Continuous IV Infusions:  sodium chloride 0 9 % infusion  @ 75 / hr - DC'd 5/8 5/8 -multi-electrolyte, 75 mL/hr, Intravenous, Continuous    PRN Meds:  acetaminophen, 650 mg, Oral, Q6H PRN  calcium carbonate, 1,000 mg, Oral, TID PRN  ondansetron, 4 mg, Intravenous, Q6H PRN    Telemetry  SCDs  IP CONSULT TO PULMONOLOGY  IP CONSULT TO CARDIOLOGY  IP CONSULT TO NEPHROLOGY    Network Utilization Review Department  ATTENTION: Please call with any questions or concerns to [phone] and carefully listen to the prompts so that you are directed to the right person  All voicemails are confidential   Jessica Blair all requests for admission clinical reviews, approved or denied determinations and any other requests to dedicated fax number below belonging to the campus where the patient is receiving treatment   List of dedicated fax numbers for the Facilities:  [address] DENIALS (Administrative/Medical Necessity) [phone]   [address] (Maternity/NICU/Pediatrics) [phone]   [address] Maddie Garcia [id number] [place] [address] Heidi Faith Caruso [address] [place] [address] [phone]

## 2021-05-08 NOTE — SPEECH THERAPY NOTE
Speech Language/Pathology  Speech-Language Pathology Bedside Swallow Evaluation      Patient Name: Gerald Johnston Sr  Today's Date: 5/8/2021     Problem List  Principal Problem:    Acute respiratory failure with hypoxia ([facility])  Active Problems:    Type 2 diabetes mellitus with kidney complication, with long-term current use of insulin ([facility])    Chronic combined systolic and diastolic CHF, NYHA class 3 ([facility])    Obesity with body mass index 30 or greater    Hyperlipidemia    Thrombocytopenia ([facility])    Acute renal failure superimposed on chronic kidney disease ([facility] [address] )    Pneumonia due to COVID-19 virus    Elevated troponin    Acute encephalopathy      Past Medical History  Past Medical History:   Diagnosis Date    AICD (automatic cardioverter/defibrillator) present     Anxiety and depression     Arthritis     Asthma     pt denies    CAD (coronary artery disease) 10/10/2014    CKD (chronic kidney disease) stage 3, GFR 30-59 ml/min ([facility])     COPD (chronic obstructive pulmonary disease) ([facility])     pt denies    CPAP (continuous positive airway pressure) dependence     Depression (emotion)     affective disorder    Diabetic nephropathy ([facility])     Erectile dysfunction     GERD (gastroesophageal reflux disease)     Hypertension     Iron deficiency anemia     Latent tuberculosis     Lumbar herniated disc     low back pain    Neuropathy     bles    Obesity (BMI 30-39  9) 7/9/2019    Prostate cancer ([facility] )     2013- had radiation- had prostatectomy    Sleep apnea     Systolic CHF, chronic ([facility])     Type 2 diabetes mellitus with hyperlipidemia ([facility])     Use of cane as ambulatory aid     Vitamin D deficiency     Wears glasses        Past Surgical History  Past Surgical History:   Procedure Laterality Date    AV FISTULA PLACEMENT      left upper arm and removal    CARDIAC DEFIBRILLATOR PLACEMENT      CARDIAC ICD GENERATOR CHANGE  9/6/2019    CATARACT EXTRACTION Bilateral 10/09/2014    COLONOSCOPY      INGUINAL HERNIA REPAIR Bilateral     OTHER SURGICAL HISTORY Left 10/10/2014    AV Shunt    DC INSERT,INFLATABLE PENILE PROSTHESIS N/A 1/19/2018    Procedure: INSERTION 3 PART PROSTHESIS PENILE;  Surgeon: Genevieve Carvajal MD;  Location: [facility] OR;  Service: Urology    PROSTATECTOMY         Summary   Pt presented with functional appearing oral and pharyngeal stage swallowing skills with materials administered today  Risk/s for Aspiration: Low    Recommended Diet: regular diet and thin liquids   Recommended Form of Meds: As tolerated   Aspiration precautions and swallowing strategies: upright posture, only feed when fully alert and small bites/sips  Other Recommendations: Continue frequent oral care        Current Medical Status  Pt is a 72 y o  male who presented to [facility] with past medical history of diabetes, CHF, obesity, hypertension, GERD, COPD, CKD, CAD who presents with shortness of breath and generalized weakness  Patient is a poor historian secondary to mental status change in acute encephalopathy  Patient did test positive for COVID-19  Denies any chest pain or shortness of breath at time of admission  Unable to determine how long he has been feeling ill  Denies any leg pain or abdominal pain  Denies any nausea, vomiting or diarrhea  Does believe he has not been eating well for the past few days  Called son over the phone, he reports patient's wife was recently positive for COVID-19 and hospitalized  Patient's wife has been living with his daughter for the past few days  Patient has been home alone and son believes he has not been taking care of himself  Notes he has not been eating or drinking much for the past few days  Does not believe he has been taking all of his medications  Son believes patient has become depressed without his wife there to take care of him  Notes he has not been able to get around recently due to weakness       Current Precautions:  Fall  Contact Airborne     Allergies:  No known food allergies    Past medical history:  Please see H&P for details    Special Studies:  CT Chest 5/7/21:   Extensive multilobar infiltrates in a pattern and distribution that could be consistent with provided clinical history of suspected Covid 19  Social/Education/Vocational Hx:  Pt lives home w/ spouse    Swallow Information   Current Risks for Dysphagia & Aspiration: respiratory status, AMS  Current Symptoms/Concerns: rough vocal quality, failed nursing dysphagia screen  Current Diet: regular diet and thin liquids   Baseline Diet: regular diet, thin liquids and pt is poor historian      Baseline Assessment   Behavior/Cognition: alert  Speech/Language Status: able to participate in basic conversation and able to follow commands  Patient Positioning: upright in bed  Pain Status/Interventions/Response to Interventions:  No report of or nonverbal indications of pain  Swallow Mechanism Exam  Facial: symmetrical  Labial: WFL  Lingual: WFL  Velum: symmetrical  Mandible: adequate ROM  Dentition: somewhat limited dentition   Vocal quality:rough   Volitional Cough: strong/productive   Respiratory Status: on 6L O2       Consistencies Assessed and Performance   Consistencies Administered: thin liquids, puree, soft solids and hard solids  Materials administered included applesauce, pancake, hard sandwich cookie    Oral Stage: minimal  Mastication was adequate with the materials administered today, min prolonged  Bolus formation and transfer were functional with no significant oral residue noted  No overt s/s reduced oral control  Pharyngeal Stage: suspected WFL  Swallow Mechanics:  Swallowing initiation appeared prompt  Laryngeal rise was palpated and judged to be within functional limits  No coughing, throat clearing, change in vocal quality or respiratory status noted today       Esophageal Concerns: none reported    Strategies and Efficacy: -    Summary and Recommendations (see above)    Pt appears w/ WFL oropharyngeal swallow skill  No s/s oropharyngeal difficulties       Results Reviewed with: patient and RN     Treatment Recommended: No

## 2021-05-08 NOTE — ASSESSMENT & PLAN NOTE
· GAGE on CKD 3b secondary to COVID-19, pneumonia, and rhabdomyolysis  · Continue IV fluids per nephrology  · Continue holding diuretics    Results from last 7 days   Lab Units 05/08/21  0613 05/07/21  1823   BUN mg/dL 65* 50*   CREATININE mg/dL 4 64* 4 97*   EGFR ml/min/1 73sq m 14 13

## 2021-05-08 NOTE — ASSESSMENT & PLAN NOTE
Wt Readings from Last 3 Encounters:   05/07/21 115 kg (253 lb 8 5 oz)   03/19/21 118 kg (261 lb)   03/18/21 118 kg (261 lb)     Results from last 7 days   Lab Units 05/07/21  1823   NT-PRO BNP pg/mL 7,074*     · Chronic CHF currently holding diuretics and Entresto

## 2021-05-08 NOTE — ASSESSMENT & PLAN NOTE
· Thrombocytopenia secondary to sepsis      Results from last 7 days   Lab Units 05/08/21  0613 05/07/21  1712   PLATELETS Thousands/uL 98* 96*

## 2021-05-08 NOTE — ASSESSMENT & PLAN NOTE
Lab Results   Component Value Date    HGBA1C 6 0 03/19/2021       No results for input(s): POCGLU in the last 72 hours  Blood Sugar Average: Last 72 hrs:  ·  home regimen Toujeo 65 units q a m  And Humalog 15 units BID with meals and Jardiance  · Will place on Lantus 40 units q a m   And Humalog sliding scale with meals  · Adjust per glucose trends

## 2021-05-08 NOTE — ASSESSMENT & PLAN NOTE
· Patient tested positive for COVID-19, wife also sick with virus  · Requiring face mask supplemental oxygen at time of presentation, wean to 5 liters nasal cannula at admission  · Treatment per moderate COVID pathway  · Check inflammatory markers  · Anticoagulation per routine, trend D-dimer  · Started on Rocephin doxycycline, monitor procalcitonin  · Wean oxygen as tolerated  · Vitamin-D, vitamin-C and zinc  · IV Decadron and PO Pepcid   · IV remdesivir   · Blood cultures pending

## 2021-05-08 NOTE — CONSULTS
Consult - Cardiology   Citlalli Myers Sr  72 y o  male MRN: [id number]  Unit/Bed#: [facility] 68 2 -01 Encounter: [id number]        Reason For Consult:                   Assessment:  Acute hypoxic respiratory failure   -70% O2 saturation prior to arrival   Acute COVID pneumonia (POA): Initiated on moderate pathway   -Remdesivir (begun 5/7/21)   -Dexamethasone 6 mg x 6 days (begun 5/7/2021)   -Vitamins C/D/MVI, zinc   CKD 3, GAGE (POA):   -Baseline creat looks to trend mid 2s with recent value of 1 88 and now 4 97 with pre-renal pattern upon arrival   Rhabdomyolysis (POA)   -CPK 9220  non-MI troponin elevation   -static elevation of troponin without chest pain or ECG abnormalities in the setting of GAGE and rhabdomyolysis  Chronic systolic CHF   Estimated dry weight 252 lb  Nonischemic cardiomyopathy   -TTE 4/23/2021: LVEF 40%, diffuse hypokinesia, mod LVH, LVIDd 6 3 cm   -O/P Rx: Coreg 25 mg b i d , Entresto 97/103 b i d , isosorbide 30 mg t i d , and Lasix 60 mg b i d  Indwelling [employer] single-chamber ICD:   -Generator change 9/6/2019   -Interrogation 1/14/2021:  < 1%, 1 AF episode- 44 min long- PACs and PVCs cannot exclude AF  Optivol normal  Hypertension  Dyslipidemia  Pulmonary hypertension  Diabetes  OJE:  Uses CPAP, possibly with supplemental nocturnal oxygen  Bipolar disorder     Discussion / Plan:  # Patient admitted with several days of weakness, cough, dyspnea fatigue with signs of COVID pneumonia present on arrival  # He also presents with signs of rhabdomyolysis with vague history regarding any fall, injury, or immobility    # Presenting labs show evidence of GAGE with pre renal pattern without exam findings of volume overload    · Entresto and Lasix being held in light of GAGE  · Continue follow exam/GFR/Weight with ongoing hydration managed by Nephrology  · Continue Coreg and isosorbide        · Management COVID-19 infection ongoing by primary service with with continued antibiotics given additional concern for possible possible bacterial pneumonia with procalcitonin of 1 57 on arrival        History Of Present Illness:  Jarvis Perez Sr  is a is 60-year-old patient of cardiologist Dr Cherylene Cleaver  His cardiac history is as enumerated above specifically noting nonischemic cardiomyopathy dating back to approximately 2016  He has since then had a stable LVEF typically 30-40% for which he has an indwelling single lead ICD  She is also known to have hypertension, dyslipidemia, diabetes, CKD, and JOE  He was last seen in our office by Dr Gwendolyn Plaza 3/18/2021  Circa that time he had had some rise in his creatinine prompting reduction in his diuretic from t i d  To b i d  With stable exam and weight at 261 lb at the time of his visit  He was on a regimen of Coreg 25 mg b i d , Entresto 97/103 b i d , isosorbide 30 mg t i d , and Lasix 60 mg b i d  Last evening the patient came to emergency department by EMS with reports of worsened dyspnea, and weakness with signs of hypoxia prior to arrival apparently denying any chest pain  He was reportedly confused on arrival vague regarding duration of symptoms but it was reported by family that the patient may have been exposed to [place] and may have in the days prior to admission not reliably been taking all his prescribed medications  EMS had reported O2 saturation the 70s upon arrival   He subsequently been found be COVID-19 positive with signs of pneumonia  Presenting chemistry showed BUN of 50 with creatinine of 4 97 and albumin of 2 8  Among blood work obtained at arrival in [place] profile was initiated with results of 0 72, 0 60, 0 53, 0 45 for which were asked to evaluate the patient  Of note repeat chemistry approximately 12 hours after arrival showed total CPK of 9220 with normal MB and MB fractionation  At the time my interaction with the patient he was lucid and appropriately conversant    Tells me he has had a stable weight at 252 lb and although he is not personally keenly aware of his medical regimen his wife places all his pills in a weekly dispense with the patient indicating that he has has been compliant with all his medications prior to arrival   He may have been some decrease in his appetite and intake of food and liquid  He states he has had recent cough which is been mostly nonproductive and to me he again denies any recent chest pain or increased edema  Past Medical History:        Past Medical History:   Diagnosis Date    AICD (automatic cardioverter/defibrillator) present     Anxiety and depression     Arthritis     Asthma     pt denies    CAD (coronary artery disease) 10/10/2014    CKD (chronic kidney disease) stage 3, GFR 30-59 ml/min (HCC)     COPD (chronic obstructive pulmonary disease) (HCC)     pt denies    CPAP (continuous positive airway pressure) dependence     Depression (emotion)     affective disorder    Di[facility]ropathy ([facility])     Erectile dysfunction     GERD (gastroesophageal reflux disease)     Hypertension     Iron deficiency anemia     Latent tuberculosis     Lumbar herniated disc     low back pain    Neuropathy     bles    Obesity (BMI 30-39  9) 7/9/2019    Prostate cancer ([facility] [address] )     2013- had radiation- had prostatectomy    Sleep apnea     Systolic CHF, chronic (HCC)     Type 2 diabetes mellitus with hyperlipidemia (HCC)     Use of cane as ambulatory aid     Vitamin D deficiency     Wears glasses       Past Surgical History:   Procedure Laterality Date    AV FISTULA PLACEMENT      left upper arm and removal    CARDIAC DEFIBRILLATOR PLACEMENT      CARDIAC ICD GENERATOR CHANGE  9/6/2019    CATARACT EXTRACTION Bilateral 10/09/2014    COLONOSCOPY      INGUINAL HERNIA REPAIR Bilateral     OTHER SURGICAL HISTORY Left 10/10/2014    AV Shunt    KS INSERT,INFLATABLE PENILE PROSTHESIS N/A 1/19/2018    Procedure: INSERTION 3 PART PROSTHESIS PENILE;  Surgeon: Earnest Oglesby MD;  Location: [facility]; Service: Urology    PROSTATECTOMY          Allergy:        Allergies   Allergen Reactions    Ibuprofen Nausea Only    Penicillins Other (See Comments)     Pt unsure of reaction       Medications:       Prior to Admission medications    Medication Sig Start Date End Date Taking?  Authorizing Provider   Alcohol Swabs (ALCOHOL PREP) PADS by Does not apply route 2 (two) times a day 12/28/18   Norma Hinds MD   aspirin (ADULT ASPIRIN EC LOW STRENGTH) 81 mg EC tablet Take 81 mg by mouth daily      Historical Provider, MD   calcitriol (ROCALTROL) 0 25 mcg capsule Take 1 capsule (0 25 mcg total) by mouth daily 2/1/21   Jacqueline Peterson MD   capsicum (ZOSTRIX) 0 075 % topical cream Apply topically 3 (three) times a day 3/19/21   Norma Hinds MD   carvedilol (COREG) 25 mg tablet TAKE 1 TABLET BY MOUTH TWICE A DAY WITH FOOD 11/6/20   Norma Hinds MD   CINNAMON PO Take 500 mg by mouth daily    Historical Provider, MD   clonazePAM (KlonoPIN) 0 5 mg tablet Take 0 5 mg by mouth 2 (two) times a day    Historical Provider, MD   Coenzyme Q10 (COQ-10) 200 MG CAPS Take 1 capsule by mouth daily      Historical Provider, MD   Empagliflozin (Jardiance) 10 MG TABS Take 1 tablet (10 mg total) by mouth every morning 3/18/21   Radha Winslow DO   fluticasone [facility]) 50 mcg/act nasal spray 2 sprays into each nostril daily 3/19/21   Norma Hinds MD   furosemide (LASIX) 40 mg tablet TAKE 1 & 1/2 TABLETS (60 MG TOTAL) BY MOUTH 2 (TWO) TIMES A DAY 11/10/20   Jacqueline Peterson MD   gabapentin (NEURONTIN) 400 mg capsule TAKE 1 CAPSULE BY MOUTH 4 TIMES A DAY  4/19/21   Norma Hinds MD   glucose blood test strip Use 1 each 3 (three) times a day Test 2/9/21   Norma Hinds MD   Incontinence Supply Disposable (BRIEF OVERNIGHT LARGE) MISC by Does not apply route 4 (four) times a day 9/19/19   Norma Hinds MD   Incontinence Supply Disposable (Cristina Day Dry Comfort Heavy) MISC Use 4 (four) times a day 3/23/21   Linus Gunderson Dagoberto Sears MD   insulin aspart (NovoLOG FlexPen) 100 UNIT/ML injection pen Inject 15 Units under the skin 10/21/20   Historical Provider, MD   insulin glargine (TOUJEO MAX) 300 units/mL CONCETRATED U-300 injection pen (2-unit dial) INJECT BY SUBCUTANEOUS ROUTE 65 units in AM 9/16/20   Rah Duncan MD   Insulin Pen Needle (BD Pen Needle Marylu U/F) 32G X 4 MM MISC USE TO INJECT INSULIN 3 TIMES DAILY 1/8/21   Rah Duncan MD   isosorbide dinitrate (ISORDIL) 20 mg tablet TAKE 1 5 TABLETS (30 MG TOTAL) BY MOUTH 3 (THREE) TIMES A DAY 3/10/21   Ana Amaro DO   methocarbamol (ROBAXIN) 500 mg tablet TAKE 1 TABLET BY MOUTH THREE TIMES A DAY 10/15/19   Rah Duncan MD   metolazone (ZAROXOLYN) 5 mg tablet TAKE 1 TABLET (5 MG TOTAL) BY MOUTH DAILY AS NEEDED (WEIGHT GAIN) WEIGHT GAIN 3 LBS 12/21/20   Rah Duncan MD   Multiple Vitamin (MULTIVITAMIN) capsule Take 1 capsule by mouth daily      Historical Provider, MD   Omega-3 Fatty Acids (FISH OIL) 1200 MG CAPS Take 1 capsule by mouth daily    Historical Provider, MD   pantoprazole (PROTONIX) 40 mg tablet Take 1 tablet (40 mg total) by mouth daily 3/19/21   Rah Duncan MD   potassium chloride (KLOR-CON) 20 mEq packet TAKE 20 MEQ BY MOUTH 2 (TWO) TIMES A DAY 9/6/20   All Rubin MD   sacubitril-valsartan Madonna Bold)  MG TABS Take 1 tablet by mouth 2 (two) times a day 3/30/21   Aan Amaro DO   simvastatin (ZOCOR) 40 mg tablet TAKE 1 TABLET BY MOUTH EVERY DAY IN THE EVENING 5/3/21   Rah Duncan MD   temazepam (RESTORIL) 30 mg capsule Take 30 mg by mouth daily at bedtime as needed for sleep     Historical Provider, MD   temazepam (RESTORIL) 30 mg capsule as needed 2/16/21   Historical Provider, MD   triamcinolone (KENALOG) 0 1 % cream APPLY TO AFFECTED AREA TWICE A DAY 3/23/21   MD Evelio Guzman 5 % op solution daily 3/18/21   Historical Provider, MD       Family History:     Family History   Problem Relation Age of Onset  Dementia Mother     No Known Problems Father         Social History:       Social History     Socioeconomic History    Marital status: /Civil Union     Spouse name: None    Number of children: None    Years of education: None    Highest education level: None   Occupational History    Occupation: DISABLED   Social Needs    Financial resource strain: Not hard at all   [place] insecurity     Worry: Never true     Inability: Never true    Transportation needs     Medical: No     Non-medical: No   Tobacco Use    Smoking status: Former Smoker     Packs/day: 3 00     Years: 20 00     Pack years: 60 00     Quit date:      Years since quittin 3    Smokeless tobacco: Never Used    Tobacco comment: never a smoker as per [employer]   Substance and Sexual Activity    Alcohol use: Yes     Comment: glass wine daily    Drug use: No    Sexual activity: Not Currently   Lifestyle    Physical activity     Days per week: 0 days     Minutes per session: None    Stress: Only a little   Relationships    Social connections     Talks on phone: More than three times a week     Gets together: More than three times a week     Attends Hindu service: 1 to 4 times per year     Active member of club or organization: No     Attends meetings of clubs or organizations: Never     Relationship status:     Intimate partner violence     Fear of current or ex partner: No     Emotionally abused: No     Physically abused: No     Forced sexual activity: No   Other Topics Concern    None   Social History Narrative    None       ROS:  Symptoms per HPI  Ambulates with a walker  Patient reports estimated dry weight of 252 lb  The remainder of the review of systems is negative    Exam:  General:  Alert, normally conversant, comfortable appearing  Head: Normocephalic, atraumatic  Eyes:  EOMI  Pupils - equal, round, reactive to accomodation  No icterus  Normal Conjunctiva     Oropharynx: Moist without lesion  Neck: No gross bruit, JVD, thyromegaly, or lymphadenopathy  Heart:  Regular with controlled rate  Difficult to auscultate given environmental noise though no obvious murmur was detected   Lungs: Moderate excursion air exchange with some scattered coarse breath sounds bilaterally  No rales appreciated  Abdomen:  Soft and nontender with normal bowel sounds  No organomegaly or mass  Lower Limbs:  No edema  Pulses[de-identified]  RLE - DP:  <1+                 LLE - DP:  <1+  Musculoskeletal: Independent movement of limbs observed, Formal ROM and strength eval not performed  Neurologic:    Oriented to: person, place, situation  Cranial Nerves: grossly intact - vision, smell, taste, and hearing were not tested  Motor function: grossly normal, symmetric   Sensation: Was not tested    Vitals:    05/07/21 2243 05/08/21 0001 05/08/21 0126 05/08/21 0728   BP:    137/59   BP Location:       Pulse: 74 76 71 73   Resp:    17   Temp: 99 8 °F (37 7 °C) 99 7 °F (37 6 °C) 98 1 °F (36 7 °C) 100 3 °F (37 9 °C)   TempSrc:       SpO2: 91% 95% 93% 94%   Weight:               DATA:      ECG:   Normal sinus rhythm  Inferior infarct , age undetermined  Nonspecific ST abnormality in the lateral leads  Abnormal ECG  Confirmed by Sarah Jaime ([id number]) on 5/7/2021 9:54:29 PM                   -----------------------------------------------------------------------------------------------------------------------------------------------  Echocardiogram:         4/23/2021  SUMMARY     LEFT VENTRICLE:  The ventricle was mildly to moderately dilated  Systolic function was mildly to moderately reduced  Ejection fraction was estimated to be 40 %  There was mild to moderate diffusehypokinesis  Wall thickness was moderately increased  There was mild assymetrical hypertrophy of the septum      LEFT ATRIUM:  The atrium was moderately dilated      MITRAL VALVE:  There was mild to moderate annular calcification    There was mild regurgitation      TRICUSPID VALVE:  There was mild regurgitation      PULMONIC VALVE:  There was mild regurgitation      PERICARDIUM:  A trace pericardial effusion was identified  There was no evidence of hemodynamic compromise       -----------------------------------------------------------------------------------------------------------------------------------------------  Weights: Wt Readings from Last 20 Encounters:   05/07/21 115 kg (253 lb 8 5 oz)   03/19/21 118 kg (261 lb)   03/18/21 118 kg (261 lb)   02/01/21 118 kg (260 lb)   11/20/20 119 kg (262 lb)   10/12/20 115 kg (253 lb)   09/16/20 114 kg (252 lb)   07/15/20 117 kg (257 lb)   07/07/20 116 kg (256 lb)   06/17/20 115 kg (254 lb)   03/03/20 114 kg (252 lb)   02/13/20 113 kg (249 lb)   01/16/20 111 kg (245 lb)   12/04/19 111 kg (244 lb)   11/14/19 113 kg (248 lb 9 6 oz)   10/30/19 110 kg (243 lb)   10/24/19 114 kg (251 lb 6 4 oz)   09/11/19 112 kg (248 lb)   08/20/19 110 kg (243 lb)   08/13/19 111 kg (244 lb)   , Body mass index is 38 55 kg/m²           Lab Studies:    Results from last 7 days   Lab Units 05/08/21  0613 05/08/21  0208 05/07/21  2224   CK TOTAL U/L 8,639*  --   --    TROPONIN I ng/mL 0 45* 0 53* 0 60*   CK MB INDEX % <1 0  --   --             Results from last 7 days   Lab Units 05/08/21  0613 05/07/21  1712   WBC Thousand/uL 3 57* 3 75*   HEMOGLOBIN g/dL 12 6 12 8   HEMATOCRIT % 39 0 39 5   PLATELETS Thousands/uL 98* 96*   ,   Results from last 7 days   Lab Units 05/08/21  0613 05/07/21  1823   POTASSIUM mmol/L 4 0 3 5   CHLORIDE mmol/L 100 100   CO2 mmol/L 16* 28   BUN mg/dL 65* 50*   CREATININE mg/dL 4 64* 4 97*   CALCIUM mg/dL 7 5* 7 6*   ALK PHOS U/L 69 71   ALT U/L 76 76   AST U/L 181* 182*

## 2021-05-08 NOTE — CONSULTS
Consultation - Nephrology   Wava Jacobo  72 y o  male MRN: [id number]  Unit/Bed#: [place] 68 2 -01 Encounter: [id number]    ASSESSMENT and PLAN:  Acute kidney injury (POA):  Etiology: Suspect prerenal azotemia in the setting of volume depletion with diuretics, COVID-19 infection, loss of autoregulatory system with Arb (enestro)  relative hypotension, rhabdomyolysis with elevated CPK  Will need to rule out obstructive uropathy  Assessment:   After review of medical records through [address] [place] [address] it appears that the patient has a baseline Creatinine of previously 1 8-1 9, however was elevated in the mid two range in February of 2021 with EGFR mid 30 to low 40 range   Patient was admitted with a creatinine of 4 97   Patient's creatinine today is at 4 64  Beckey Mangle   Lasix and Zaroxolyn-all on home   Bicarb decreased to 16 with IV fluids   Clinically, patient is not uremic and there is no acute indication for renal replacement therapy (dialysis)  Workup:   Urinalysis with micro reports:  Pending   Previous renal ultrasound from October 16, 2018 reported right kidney 10 7 cm left kidney 11 cm, few simple cysts on the right kidney  Plan:   Avoid nephrotoxins, adjust meds to appropriate GFR   Optimize hemodynamic status to avoid delay in renal recovery   Continue to hold enestro, metolazone Lasix at this time   Will check urinalysis with micro   Patient with [place] catheter-however no I&O is documented   Need accurate I&O and daily weights   Will check bladder scan   May need to insert Hodge catheter for more accurate I&O   Will change to Plasmalyte in the setting of decreased bicarb and elevated CPK for 12 hours-monitor closely for respiratory status in the setting of history of diastolic congestive heart failure   If creatinine continues worsen consider checking renal ultrasound   Will continue monitor I/O, lab values volume status    Chronic kidney disease IIIB:    Etiology: Suspect secondary to diabetic kidney disease, hypertensive nephrosclerosis, cardiorenal syndrome, age-related nephron loss  Assessment and Plan:   After review of medical records through [facility] and Care everywhere it appears that the patient has a baseline Creatinine of 1 8-1 9 however has been running in the mid two range in February of 2021   Patient follows with Dr Jelani Burr Will need follow-up on discharge    Blood pressure/Hypertension:  Assessment and Plan:   Home medications include:  Carvedilol 25 mg b i d , enestro 97/103 mg daily, Isordil 30 mg 3 times daily furosemide 60 mg b i d , Isordil 30 mg t i d , metolazone 5 mg p r n  Weight gain   Will need to evaluate home medications versus what is documented for accurate documentation   Current medications include:  Carvedilol 25 mg 2 times daily, Isordil 30 mg 3 times daily,   Maximize hemodynamics to maintain MAP >65   Avoid hypotension or fluctuations in blood pressure   Will continue to trend    H&H/anemia: Likely of CKD  Assessment and Plan:   Current hemoglobin 12 6   Per primary team   Transfuse if hemoglobin less than 7 0    Acid-base:    Assessment and Plan:  · Decreased bicarb 16-likely secondary to IV normal saline  · Will change to IV isolate  · Will continue to monitor  Electrolytes:  Assessment and Plan:  · Within normal limits  · Will continue trend treat as need  · Currently off potassium supplements since Lasix and Zaroxolyn are on hold  · Potassium 4 0    Other medical Issues:  COVID-19 infection:  · Management per primary team  · Currently on remdesivir, zinc, doxycycline, Decadron    Diabetes II  Chronic diastolic CHF/nonischemic cardiomyopathy with EF 40%:-patient examining fairly euvolemic to dry side-monitor need for IV diuretics p r n      HISTORY OF PRESENT ILLNESS:  Requesting Physician: Yordan Fortune DO  Reason for Consult:  Acute kidney injury    Nevaeh Cota  is a 72 y o  male with past medical history of Chronic Kidney Disease IIIB, hypertension, hyperlipidemia, obesity, diabetes II, history of prostate cancer status post prostatectomy, JOE on CPAP, CAD, diastolic congestive heart failure,  NICM status post AICD with EF of 40% who presents with shortness of breath and worsening lethargy and found to be positive for COVID  Workup revealed elevated creatinine 4 97 and a renal consultation is requested today for assistance in the management of acute kidney injury  Patient was initially treated with IV fluids and holding his diuretics  On discussion the patient is aware he is in the hospital, not quite sure everything going on  He reports his breathing has improved  Denies chest pain, shortness of breath at rest, dizziness, lightheadedness, nausea, vomiting, known urinary issues, fevers, chills or lower extremity edema  PAST MEDICAL HISTORY:  Past Medical History:   Diagnosis Date    AICD (automatic cardioverter/defibrillator) present     Anxiety and depression     Arthritis     Asthma     pt denies    CAD (coronary artery disease) 10/10/2014    CKD (chronic kidney disease) stage 3, GFR 30-59 ml/min (HCC)     COPD (chronic obstructive pulmonary disease) (HCC)     pt denies    CPAP (continuous positive airway pressure) dependence     Depression (emotion)     affective disorder    Diabetic nephropathy (HCC)     Erectile dysfunction     GERD (gastroesophageal reflux disease)     Hypertension     Iron deficiency anemia     Latent tuberculosis     Lumbar herniated disc     low back pain    Neuropathy     bles    Obesity (BMI 30-39  9) 7/9/2019    Prostate cancer ([facility] [address] )     2013- had radiation- had prostatectomy    Sleep apnea     Systolic CHF, chronic (HCC)     Type 2 diabetes mellitus with hyperlipidemia (HCC)     Use of cane as ambulatory aid     Vitamin D deficiency     Wears glasses        PAST SURGICAL HISTORY:  Past Surgical History:   Procedure Laterality Date    AV FISTULA PLACEMENT left upper arm and removal    CARDIAC DEFIBRILLATOR PLACEMENT      CARDIAC ICD GENERATOR CHANGE  2019    CATARACT EXTRACTION Bilateral 10/09/2014    COLONOSCOPY      INGUINAL HERNIA REPAIR Bilateral     OTHER SURGICAL HISTORY Left 10/10/2014    AV Shunt    [place] INSERT,INFLATABLE PENILE PROSTHESIS N/A 2018    Procedure: INSERTION 3 PART PROSTHESIS PENILE;  Surgeon: Alexsandra Masterson MD;  Location: [facility];  Service: Urology    PROSTATECTOMY         ALLERGIES:  Allergies   Allergen Reactions    Ibuprofen Nausea Only    Penicillins Other (See Comments)     Pt unsure of reaction       SOCIAL HISTORY:  Social History     Substance and Sexual Activity   Alcohol Use Yes    Comment: glass wine daily     Social History     Substance and Sexual Activity   Drug Use No     Social History     Tobacco Use   Smoking Status Former Smoker    Packs/day: 3 00    Years: 20 00    Pack years: 60 00    Quit date: 12    Years since quittin 3   Smokeless Tobacco Never Used   Tobacco Comment    never a smoker as per [employer]       FAMILY HISTORY:  Family History   Problem Relation Age of Onset    Dementia Mother     No Known Problems Father        MEDICATIONS:    Current Facility-Administered Medications:     acetaminophen (TYLENOL) tablet 650 mg, 650 mg, Oral, Q6H PRN, Anson Potts Smudde, PA-C    ascorbic acid (VITAMIN C) tablet 1,000 mg, 1,000 mg, Oral, Q12H ANGEL, Eulalia Smudde, PA-C, 1,000 mg at 21 0850    aspirin (ECOTRIN LOW STRENGTH) EC tablet 81 mg, 81 mg, Oral, Daily, Eulalia Smudde, PA-C, 81 mg at 21 0850    atorvastatin (LIPITOR) tablet 40 mg, 40 mg, Oral, HS, Eulalia Smudde, PA-C    calcitriol (ROCALTROL) capsule 0 25 mcg, 0 25 mcg, Oral, Daily, Eulalia Smudde, PA-C, 0 25 mcg at 21 0851    calcium carbonate (TUMS) chewable tablet 1,000 mg, 1,000 mg, Oral, TID PRN, Anson Esquivel, PA-C    carvedilol (COREG) tablet 25 mg, 25 mg, Oral, BID With Meals, Eulalia Smudde, PA-C, 25 mg at 05/08/21 0851    cefTRIAXone (ROCEPHIN) 1,000 mg in dextrose 5 % 50 mL IVPB, 1,000 mg, Intravenous, Q24H, Eulalia Smudde, PA-C    cholecalciferol (VITAMIN D3) tablet 2,000 Units, 2,000 Units, Oral, Daily, Eulalia Smudde, PA-C, 2,000 Units at 05/08/21 0850    clonazePAM (KlonoPIN) tablet 0 5 mg, 0 5 mg, Oral, BID, Eulalia Smudde, PA-C, 0 5 mg at 05/08/21 0850    dexamethasone (DECADRON) injection 6 mg, 6 mg, Intravenous, Q24H, Eulalia Smudde, PA-C, 6 mg at 05/07/21 2237    doxycycline (VIBRAMYCIN) 100 mg in sodium chloride 0 9 % 100 mL IVPB, 100 mg, Intravenous, Q12H, Eulalia Smudde, PA-C, Last Rate: 100 mL/hr at 05/08/21 0841, 100 mg at 05/08/21 0841    famotidine (PEPCID) tablet 10 mg, 10 mg, Oral, Daily, Eulalia Smudde, PA-C, 10 mg at 05/08/21 0851    fluticasone (FLONASE) 50 mcg/act nasal spray 2 spray, 2 spray, Nasal, Daily, Eulalia Smudde, PA-C, 2 spray at 05/08/21 0850    gabapentin (NEURONTIN) capsule 100 mg, 100 mg, Oral, TID, Eulalia Smudde, PA-C, 100 mg at 05/08/21 0850    heparin (porcine) subcutaneous injection 5,000 Units, 5,000 Units, Subcutaneous, Q8H [address], Eulalia Smudde, PA-C, 5,000 Units at 05/08/21 0552    insulin glargine (LANTUS) subcutaneous injection 40 Units 0 4 mL, 40 Units, Subcutaneous, QAM, Eulalia Smudde, PA-C, 40 Units at 05/08/21 0851    insulin lispro (HumaLOG) 100 units/mL subcutaneous injection 1-6 Units, 1-6 Units, Subcutaneous, HS, Eulalia Smudde, PA-C    insulin lispro (HumaLOG) 100 units/mL subcutaneous injection 2-12 Units, 2-12 Units, Subcutaneous, TID AC **AND** Fingerstick Glucose (POCT), , , TID AC, Eulalia Esquivel PA-C    isosorbide dinitrate (ISORDIL) tablet 30 mg, 30 mg, Oral, TID, Eulalia Esquivel PA-C, 30 mg at 05/08/21 0851    zinc sulfate (ZINCATE) capsule 220 mg, 220 mg, Oral, Daily, 220 mg at 05/08/21 0851 **FOLLOWED BY** [START ON 5/15/2021] multivitamin-minerals (CENTRUM ADULTS) tablet 1 tablet, 1 tablet, Oral, Daily, Eulalia Esquivel PA-C    ondansetron [facility] injection 4 mg, 4 mg, Intravenous, Q6H PRN, Hiram Esquivel PA-C    pantoprazole (PROTONIX) EC tablet 40 mg, 40 mg, Oral, Early Morning, Eulalia Esquivel PA-C    [COMPLETED] remdesivir (Veklury) 200 mg in sodium chloride 0 9 % 250 mL IVPB, 200 mg, Intravenous, Q24H, 200 mg at 05/07/21 2236 **FOLLOWED BY** remdesivir (Veklury) 100 mg in sodium chloride 0 9 % 250 mL IVPB, 100 mg, Intravenous, Q24H, Eulalia Esquivel PA-C    REVIEW OF SYSTEMS:  A complete 10 point review of systems was performed and found to be negative unless in the HPI        PHYSICAL EXAM:  Current Weight: Weight - Scale: 115 kg (253 lb 8 5 oz)  First Weight: Weight - Scale: 115 kg (253 lb 8 5 oz)  Vitals:    05/07/21 2243 05/08/21 0001 05/08/21 0126 05/08/21 0728   BP:    137/59   BP Location:       Pulse: 74 76 71 73   Resp:    17   Temp: 99 8 °F (37 7 °C) 99 7 °F (37 6 °C) 98 1 °F (36 7 °C) 100 3 °F (37 9 °C)   TempSrc:       SpO2: 91% 95% 93% 94%   Weight:         No intake or output data in the 24 hours ending 05/08/21 1010  General: conscious, coherent, cooperative, not in acute distress  Skin: no rash, warm and dry  Eyes: pale conjunctivae, anicteric sclerae  ENT: dry lips and mucous membranes  Neck: supple, no JVD noted  Chest:  Essentially clear on auscultation slightly coarse and decreased bases  CVS:  normal rate, regular rhythm, no rub or murmur  Abdomen: soft, non-tender, non-distended, active bowel sounds  :  [place] catheter in place  Extremities: no significant edema of both legs  Neuro: awake and alert  Psych: appropriate affect       Lab Results:   Results from last 7 days   Lab Units 05/08/21  0613 05/07/21  1823 05/07/21  1712   WBC Thousand/uL 3 57*  --  3 75*   HEMOGLOBIN g/dL 12 6  --  12 8   HEMATOCRIT % 39 0  --  39 5   PLATELETS Thousands/uL 98*  --  96*   SODIUM mmol/L 137 136  --    POTASSIUM mmol/L 4 0 3 5  --    CHLORIDE mmol/L 100 100  --    CO2 mmol/L 16* 28  --    BUN mg/dL 65* 50*  --    CREATININE mg/dL 4 64* 4 97*  -- CALCIUM mg/dL 7 5* 7 6*  --    ALK PHOS U/L 69 71  --    ALT U/L 76 76  --    AST U/L 181* 182*  --

## 2021-05-08 NOTE — CONSULTS
Consultation - Pulmonary Medicine   Jimmy Pierce  72 y o  male MRN: [id number]  Unit/Bed#: [place] 2 -01 Encounter: [id number]      Assessment & Plan:  Acute on chronic hypoxic respiratory failure  COVID-19 pneumonia with possible superimposed bacterial pneumonia   GAGE on CKD  JOE on CPAP  CHF    Patient requiring 7 L by nasal cannula  Typically only uses his oxygen nocturnally for JOE  Continue to monitor SpO2 and titrate as able to maintain saturations greater than 89%  Continue treatment per moderate COVID-19 pathway  Vitamin-C, D, zinc, multivitamin  Atorvastatin, famotidine  Dexamethasone x10 days (6 mg)  Remdesivir x5 days  Antibiotics: ceftriaxone & doxycycline given procalcitonin elevation   Actemra: would not pursue at this time given concern for underlying bacterial pneumonia  Anticoagulation: per protocol   Encourage self-proning, activity as tolerated, incentive spirometry  Ongoing monitoring of inflammatory markers and D-dimer  Nephrology following  Patient uses CPAP at home  Not sure what his settings are and says no one from home could bring in his machine  Discussed with SLIM & nursing    History of Present Illness   Physician Requesting Consult: Jaimie Naylor DO  Reason for Consult / Principal Problem: COVID-19  Hx and PE limited by: none  HPI: Jimmy Pierce  is a 72y o  year old male  With past medical history including chronic combined systolic and diastolic congestive heart failure, chronic kidney disease, JOE, obesity, diabetes, hypertension, GERD who presents with  Shortness of breath and weakness  Patient states he started feeling unwell about a week ago in the shortness of breath started yesterday  His wife tested positive for COVID-19 weeks ago and has been staying at his daughter's house  Since then, he has been depressed and not taking good care of himself  He has not been eating or drinking well     He was encephalopathic on admission however this has significantly improved today  At this time, he says that his breathing is comfortable and that he is feeling much better  From a pulmonary standpoint, he denies history underlying COPD or asthma  He does follow with sleep medicine for JOE  He is a former smoker and quit over 40 years ago  He does not use any inhalers  Consults    Review of Systems   Constitutional: Positive for fatigue  Respiratory: Positive for cough and shortness of breath  Cardiovascular: Negative for chest pain and leg swelling  Neurological: Positive for weakness  All other systems reviewed and are negative  Historical Information   Past Medical History:   Diagnosis Date    AICD (automatic cardioverter/defibrillator) present     Anxiety and depression     Arthritis     Asthma     pt denies    CAD (coronary artery disease) 10/10/2014    CKD (chronic kidney disease) stage 3, GFR 30-59 ml/min ([facility])     COPD (chronic obstructive pulmonary disease) ([facility])     pt denies    CPAP (continuous positive airway pressure) dependence     Depression (emotion)     affective disorder    Diabetic nephropathy ([facility])     Erectile dysfunction     GERD (gastroesophageal reflux disease)     Hypertension     Iron deficiency anemia     Latent tuberculosis     Lumbar herniated disc     low back pain    Neuropathy     bles    Obesity (BMI 30-39  9) 7/9/2019    Prostate cancer ([facility] [address] )     2013- had radiation- had prostatectomy    Sleep apnea     Systolic CHF, chronic ([facility])     Type 2 diabetes mellitus with hyperlipidemia ([facility])     Use of cane as ambulatory aid     Vitamin D deficiency     Wears glasses      Past Surgical History:   Procedure Laterality Date    AV FISTULA PLACEMENT      left upper arm and removal    CARDIAC DEFIBRILLATOR PLACEMENT      CARDIAC ICD GENERATOR CHANGE  9/6/2019    CATARACT EXTRACTION Bilateral 10/09/2014    COLONOSCOPY      INGUINAL HERNIA REPAIR Bilateral     OTHER SURGICAL HISTORY Left 10/10/2014    AV Shunt    [place] INSERT,INFLATABLE PENILE PROSTHESIS N/A 2018    Procedure: INSERTION 3 PART PROSTHESIS PENILE;  Surgeon: Chiquis Gayle MD;  Location: [facility] OR;  Service: Urology    PROSTATECTOMY       Social History   Social History     Substance and Sexual Activity   Alcohol Use Yes    Comment: glass wine daily     Social History     Substance and Sexual Activity   Drug Use No     E-Cigarette/Vaping    E-Cigarette Use Never User      E-Cigarette/Vaping Substances    Nicotine No     THC No     CBD No     Flavoring No     Other No     Unknown No      Social History     Tobacco Use   Smoking Status Former Smoker    Packs/day: 3 00    Years: 20 00    Pack years: 60 00    Quit date: 12    Years since quittin 3   Smokeless Tobacco Never Used   Tobacco Comment    never a smoker as per [employer]       Family History:   Family History   Problem Relation Age of Onset    Dementia Mother     No Known Problems Father        Meds/Allergies   all current active meds have been reviewed    Allergies   Allergen Reactions    Ibuprofen Nausea Only    Penicillins Other (See Comments)     Pt unsure of reaction       Objective   Vitals: Blood pressure 137/59, pulse 73, temperature 100 3 °F (37 9 °C), resp  rate 17, weight 115 kg (253 lb 8 5 oz), SpO2 94 %  ,Body mass index is 38 55 kg/m²  Intake/Output Summary (Last 24 hours) at 2021 1119  Last data filed at 2021 1020  Gross per 24 hour   Intake 820 ml   Output --   Net 820 ml     Invasive Devices     Peripheral Intravenous Line            Peripheral IV 21 Right Antecubital less than 1 day          Line            Hemodialysis AV Fistula 19 Upper arm 610 days          Drain            Closed/Suction Drain Anterior Abdomen Bulb 15 Fr  1204 days    External Urinary Catheter Medium less than 1 day                Physical Exam  Vitals signs and nursing note reviewed     Constitutional:       General: He is not in acute distress  Appearance: He is well-developed  He is obese  He is not diaphoretic  HENT:      Head: Normocephalic and atraumatic  Right Ear: External ear normal       Left Ear: External ear normal       Nose: Nose normal    Eyes:      General: No scleral icterus  Right eye: No discharge  Left eye: No discharge  Conjunctiva/sclera: Conjunctivae normal    Neck:      Musculoskeletal: Normal range of motion and neck supple  Trachea: No tracheal deviation  Cardiovascular:      Rate and Rhythm: Normal rate and regular rhythm  Heart sounds: Normal heart sounds  No murmur  No friction rub  No gallop  Pulmonary:      Breath sounds: No wheezing  Comments: Poor inspiratory effort  Decreased breath sounds  Not in respiratory distress  7 L O2 via NC  Abdominal:      General: There is no distension  Tenderness: There is no guarding  Musculoskeletal:         General: No tenderness or deformity  Right lower leg: No edema  Left lower leg: No edema  Skin:     General: Skin is warm and dry  Coloration: Skin is not pale  Findings: No erythema or rash  Neurological:      Mental Status: He is alert and oriented to person, place, and time  Cranial Nerves: No cranial nerve deficit  Motor: No abnormal muscle tone  Psychiatric:         Behavior: Behavior normal          Thought Content: Thought content normal          Judgment: Judgment normal          Lab Results: I have personally reviewed pertinent lab results  Imaging Studies: I have personally reviewed pertinent reports  EKG, Pathology, and Other Studies: I have personally reviewed pertinent reports      VTE Prophylaxis: Heparin    Code Status: Level 1 - Full Code  Advance Directive and Living Will:      Power of :    POLST:

## 2021-05-08 NOTE — ASSESSMENT & PLAN NOTE
Lab Results   Component Value Date    EGFR 13 05/07/2021    EGFR 41 (L) 04/20/2021    EGFR 26 (L) 02/22/2021    CREATININE 4 97 (H) 05/07/2021    CREATININE 1 88 (H) 04/20/2021    CREATININE 2 71 (H) 02/22/2021     · Current elevated at 4 97 at time of admission  · Baseline 1 8-1 9 per last nephrology note  · Home regimen Lasix 60 milligrams b i d  And Zaroxolyn 5 milligrams daily p r n , will hold  · Gentle IV fluids overnight  · Recheck in a m    · Nephrology consult

## 2021-05-08 NOTE — ASSESSMENT & PLAN NOTE
Wt Readings from Last 3 Encounters:   05/07/21 115 kg (253 lb 8 5 oz)   03/19/21 118 kg (261 lb)   03/18/21 118 kg (261 lb)     · History of CHF followed outpatient by Dr Ana Rosa Stapleton   · Home regimen Lasix 60 milligrams b i d , hold given significant GAGE  · Also maintained on Isordil, Entresto and Coreg  · Hold Entresto given GAGE   · Monitor intake and output and daily weights  · Gentle IV fluids, monitor for fluid overload

## 2021-05-08 NOTE — ASSESSMENT & PLAN NOTE
· Patient oriented only to self at time of admission   · Family notes patient is typically alert and oriented   · ABG without signs of hypercapnia   · Did have significant hypoxia at EMS presentation- continue supplemental O2   · Possibly secondary to acute infection   · Monitor mental status while inpatient

## 2021-05-08 NOTE — ASSESSMENT & PLAN NOTE
Lab Results   Component Value Date    HGBA1C 6 0 03/19/2021     Recent Labs     05/07/21  2122 05/07/21  2353 05/08/21  0638 05/08/21  1124   POCGLU 81 91 157* 286*     · Glucose stable on reduced basal insulin  Continue sliding scale

## 2021-05-08 NOTE — ASSESSMENT & PLAN NOTE
· Non MI elevation troponin secondary to respiratory and renal failure  · Appreciate cardiology evaluation    Results from last 7 days   Lab Units 05/08/21  0613 05/08/21  0208 05/07/21  2224 05/07/21  1712   TROPONIN I ng/mL 0 45* 0 53* 0 60* 0 72*

## 2021-05-08 NOTE — PLAN OF CARE
Problem: Potential for Falls  Goal: Patient will remain free of falls  Description: INTERVENTIONS:  - Assess patient frequently for physical needs  -  Identify cognitive and physical deficits and behaviors that affect risk of falls    -  [place] fall precautions as indicated by assessment   - Educate patient/family on patient safety including physical limitations  - Instruct patient to call for assistance with activity based on assessment  - Modify environment to reduce risk of injury  - Consider OT/PT consult to assist with strengthening/mobility  Outcome: Progressing     Problem: Prexisting or High Potential for Compromised Skin Integrity  Goal: Skin integrity is maintained or improved  Description: INTERVENTIONS:  - Identify patients at risk for skin breakdown  - Assess and monitor skin integrity  - Assess and monitor nutrition and hydration status  - Monitor labs   - Assess for incontinence   - Turn and reposition patient  - Assist with mobility/ambulation  - Relieve pressure over bony prominences  - Avoid friction and shearing  - Provide appropriate hygiene as needed including keeping skin clean and dry  - Evaluate need for skin moisturizer/barrier cream  - Collaborate with interdisciplinary team   - Patient/family teaching  - Consider wound care consult   Outcome: Progressing     Problem: PAIN - ADULT  Goal: Verbalizes/displays adequate comfort level or baseline comfort level  Description: Interventions:  - Encourage patient to monitor pain and request assistance  - Assess pain using appropriate pain scale  - Administer analgesics based on type and severity of pain and evaluate response  - Implement non-pharmacological measures as appropriate and evaluate response  - Consider cultural and social influences on pain and pain management  - Notify physician/advanced practitioner if interventions unsuccessful or patient reports new pain  Outcome: Progressing     Problem: INFECTION - ADULT  Goal: Absence or prevention of progression during hospitalization  Description: INTERVENTIONS:  - Assess and monitor for signs and symptoms of infection  - Monitor lab/diagnostic results  - Monitor all insertion sites, i e  indwelling lines, tubes, and drains  - Monitor endotracheal if appropriate and nasal secretions for changes in amount and color  - [place] appropriate cooling/warming therapies per order  - Administer medications as ordered  - Instruct and encourage patient and family to use good hand hygiene technique  - Identify and instruct in appropriate isolation precautions for identified infection/condition  Outcome: Progressing  Goal: Absence of fever/infection during neutropenic period  Description: INTERVENTIONS:  - Monitor WBC    Outcome: Progressing     Problem: SAFETY ADULT  Goal: Patient will remain free of falls  Description: INTERVENTIONS:  - Assess patient frequently for physical needs  -  Identify cognitive and physical deficits and behaviors that affect risk of falls    -  [place] fall precautions as indicated by assessment   - Educate patient/family on patient safety including physical limitations  - Instruct patient to call for assistance with activity based on assessment  - Modify environment to reduce risk of injury  - Consider OT/PT consult to assist with strengthening/mobility  Outcome: Progressing  Goal: Maintain or return to baseline ADL function  Description: INTERVENTIONS:  -  Assess patient's ability to carry out ADLs; assess patient's baseline for ADL function and identify physical deficits which impact ability to perform ADLs (bathing, care of mouth/teeth, toileting, grooming, dressing, etc )  - Assess/evaluate cause of self-care deficits   - Assess range of motion  - Assess patient's mobility; develop plan if impaired  - Assess patient's need for assistive devices and provide as appropriate  - Encourage maximum independence but intervene and supervise when necessary  - Involve family in performance of ADLs  - Assess for home care needs following discharge   - Consider OT consult to assist with ADL evaluation and planning for discharge  - Provide patient education as appropriate  Outcome: Progressing  Goal: Maintain or return mobility status to optimal level  Description: INTERVENTIONS:  - Assess patient's baseline mobility status (ambulation, transfers, stairs, etc )    - Identify cognitive and physical deficits and behaviors that affect mobility  - Identify mobility aids required to assist with transfers and/or ambulation (gait belt, sit-to-stand, lift, walker, cane, etc )  - [place] fall precautions as indicated by assessment  - Record patient progress and toleration of activity level on Mobility SBAR; progress patient to next Phase/Stage  - Instruct patient to call for assistance with activity based on assessment  - Consider rehabilitation consult to assist with strengthening/weightbearing, etc   Outcome: Progressing     Problem: DISCHARGE PLANNING  Goal: Discharge to home or other facility with appropriate resources  Description: INTERVENTIONS:  - Identify barriers to discharge w/patient and caregiver  - Arrange for needed discharge resources and transportation as appropriate  - Identify discharge learning needs (meds, wound care, etc )  - Arrange for interpretive services to assist at discharge as needed  - Refer to Case Management Department for coordinating discharge planning if the patient needs post-hospital services based on physician/advanced practitioner order or complex needs related to functional status, cognitive ability, or social support system  Outcome: Progressing     Problem: Knowledge Deficit  Goal: Patient/family/caregiver demonstrates understanding of disease process, treatment plan, medications, and discharge instructions  Description: Complete learning assessment and assess knowledge base    Interventions:  - Provide teaching at level of understanding  - Provide teaching via preferred learning methods  Outcome: Progressing

## 2021-05-08 NOTE — ASSESSMENT & PLAN NOTE
· Troponin elevated at 0 7  · Likely component of hypoxia and GAGE   · Patient denies chest pain   · EKG relatively unchanged from prior, T wave inversion   · Continue ASA and statin   · Cardiology consult   · Trend troponins   · Telemetry monitoring

## 2021-05-08 NOTE — ASSESSMENT & PLAN NOTE
· Platelet count 96  · Does have history of thrombocytopenia although typically in the 130s to 140s  · Does not appear that patient got the J&J vaccine as there is no documentation in the chart- patient unable to provide reliable insight, tried to contact son but no answer- will attempt again tomorrow   · Continue DVT prophylaxis but consider stopping pending repeat platelet levels

## 2021-05-08 NOTE — PROGRESS NOTES
Unable to fully complete the navigator  Patient confused, unable to fully answer questions  Attempted to contact patient's spouse, Lupe Blanchard and son, Ann Davis , without success  Patient is disoriented to situation and time

## 2021-05-08 NOTE — PROGRESS NOTES
[address] [place] [address]  Progress Note Louisa Ganser Sr  1950, 72 y o  male MRN: [id number]  Unit/Bed#: [place] [address] -[id number] Encounter: [id number]  Primary Care Provider: Cem Strong MD   Date and time admitted to hospital: 5/7/2021  4:33 PM    * [facility]  Assessment & Plan  · Acute respiratory failure/hypoxia secondary to COVID-19 and bacterial pneumonia  · COVID-19:  Continue remdesivir dexamethasone and COVID vitamins  · Bacterial pneumonia:  Given elevated procalcitonin continue ceftriaxone/doxycycline  · At baseline uses oxygen only at night  · Currently on 6 L continue to wean when able    Appreciate pulmonology consultation    Lab Results   Component Value Date    SARSCOV2 Positive (A) 05/07/2021    SARSCOV2 Negative 04/30/2021     Results from last 7 days   Lab Units 05/08/21  0613 05/07/21  1823   D-DIMER QUANTITATIVE ug/ml FEU 1 89* 2 06*   CRP mg/L 157 4* 234 1*   FERRITIN ng/mL 1,302* 1,018*     Results from last 7 days   Lab Units 05/07/21  1712   PROCALCITONIN ng/ml 1 57*       Acute encephalopathy  Assessment & Plan  · Metabolic encephalopathy secondary to renal failure and COVID-19/hypoxia  · Improved today    Elevated troponin  Assessment & Plan  · Non MI elevation troponin secondary to respiratory and renal failure  · Appreciate cardiology evaluation    Results from last 7 days   Lab Units 05/08/21  0613 05/08/21  0208 05/07/21  2224 05/07/21  1712   TROPONIN I ng/mL 0 45* 0 53* 0 60* 0 72*       Acute renal failure superimposed on chronic kidney disease ([facility] )  Assessment & Plan  · GAGE on CKD 3b secondary to COVID-19, pneumonia, and rhabdomyolysis  · Continue IV fluids per nephrology  · Continue holding diuretics    Results from last 7 days   Lab Units 05/08/21  0613 05/07/21  1823   BUN mg/dL 65* 50*   CREATININE mg/dL 4 64* 4 97*   EGFR ml/min/1 73sq m 14 13       Thrombocytopenia (HCC)  Assessment & Plan  · Thrombocytopenia secondary to sepsis  Results from last 7 days   Lab Units 05/08/21  0613 05/07/21  1712   PLATELETS Thousands/uL 98* 96*       Obesity with body mass index 30 or greater  Assessment & Plan  · Body mass index is 38 55 kg/m²  Chronic combined systolic and diastolic CHF, NYHA class 3 (HCC)  Assessment & Plan  Wt Readings from Last 3 Encounters:   05/07/21 115 kg (253 lb 8 5 oz)   03/19/21 118 kg (261 lb)   03/18/21 118 kg (261 lb)     Results from last 7 days   Lab Units 05/07/21  1823   NT-PRO BNP pg/mL 7,074*     · Chronic CHF currently holding diuretics and Entresto    Type 2 diabetes mellitus with kidney complication, with long-term current use of insulin [facility])  Assessment & Plan  Lab Results   Component Value Date    HGBA1C 6 0 03/19/2021     Recent Labs     05/07/21  2122 05/07/21  2353 05/08/21  0638 05/08/21  1124   POCGLU 81 91 157* 286*     · Glucose stable on reduced basal insulin  Continue sliding scale  VTE Pharmacologic Prophylaxis: VTE Score: 6 High Risk (Score >/= 5) - Pharmacological DVT Prophylaxis Ordered: Heparin  Sequential Compression Devices Ordered  Patient Centered Rounds: I have performed bedside rounds with nursing staff today  Discussions with Specialists or Other Care Team Provider:  Pulmonology    Education and Discussions with Family / Patient:  Discussed with son on telephone    Time Spent for Care: 25 mins  More than 50% of total time spent on counseling and coordination of care as described above  Current Length of Stay: 1 day(s)  Current Patient Status: Inpatient   Certification Statement: The patient will continue to require additional inpatient hospital stay due to respiratory failure, kidney injury, COVID  Discharge Plan / Estimated Discharge Date: Anticipate discharge in > 72 hrs to TBD    Code Status: Level 1 - Full Code      Subjective:   Patient seen and examined  Feeling much better today, more awake today      Objective:   Vitals: Blood pressure 137/59, pulse 73, temperature 100 3 °F (37 9 °C), resp  rate 17, weight 115 kg (253 lb 8 5 oz), SpO2 94 %  Physical Exam  Vitals signs reviewed  Constitutional:       General: He is not in acute distress  HENT:      Head: Atraumatic  Cardiovascular:      Rate and Rhythm: Regular rhythm  Heart sounds: Normal heart sounds  Pulmonary:      Effort: Pulmonary effort is normal       Breath sounds: Decreased breath sounds present  No wheezing  Abdominal:      General: Bowel sounds are normal       Palpations: Abdomen is soft  Tenderness: There is no abdominal tenderness  There is no rebound  Musculoskeletal:         General: Swelling present  No tenderness  Skin:     General: Skin is warm and dry  Neurological:      Mental Status: He is alert and oriented to person, place, and time  Cranial Nerves: No cranial nerve deficit     Psychiatric:         Mood and Affect: Mood normal        Additional Data:   Labs:  Results from last 7 days   Lab Units 05/08/21  0613 05/07/21  1823 05/07/21  1712   WBC Thousand/uL 3 57*  --  3 75*   HEMOGLOBIN g/dL 12 6  --  12 8   HEMATOCRIT % 39 0  --  39 5   MCV fL 89  --  90   TOTAL NEUT ABS Thousand/uL 3 32  --   --    BANDS PCT % 5  --   --    PLATELETS Thousands/uL 98*  --  96*   INR   --  1 12  --      Results from last 7 days   Lab Units 05/08/21  0613 05/07/21  1823   SODIUM mmol/L 137 136   POTASSIUM mmol/L 4 0 3 5   CHLORIDE mmol/L 100 100   CO2 mmol/L 16* 28   ANION GAP mmol/L 21* 8   BUN mg/dL 65* 50*   CREATININE mg/dL 4 64* 4 97*   CALCIUM mg/dL 7 5* 7 6*   ALBUMIN g/dL 2 6* 2 8*   TOTAL BILIRUBIN mg/dL 0 34 0 41   ALK PHOS U/L 69 71   ALT U/L 76 76   AST U/L 181* 182*   EGFR ml/min/1 73sq m 14 13   GLUCOSE RANDOM mg/dL 144* 76         Results from last 7 days   Lab Units 05/08/21  0613 05/08/21  0208 05/07/21  2224   CK TOTAL U/L 9,220*  --   --    TROPONIN I ng/mL 0 45* 0 53* 0 60*   CK MB INDEX % <1 0  --   --      Results from last 7 days   Lab Units 05/07/21  1823   NT-PRO BNP pg/mL 7,074*      Results from last 7 days   Lab Units 05/07/21  1823 05/07/21  1712   LACTIC ACID mmol/L 1 1  --    PROCALCITONIN ng/ml  --  1 57*     Results from last 7 days   Lab Units 05/08/21  1124 05/08/21  0638 05/07/21  2353 05/07/21  2122   POC GLUCOSE mg/dl 286* 157* 91 81             * I Have Reviewed All Lab Data Listed Above  Cultures:   Results from last 7 days   Lab Units 05/07/21  1821 05/07/21  1712   BLOOD CULTURE  Received in Microbiology Lab  Culture in Progress  Received in Microbiology Lab  Culture in Progress  Results from last 7 days   Lab Units 05/07/21  1720   SARS-COV-2  Positive*           Lines/Drains:  Invasive Devices     Peripheral Intravenous Line            Peripheral IV 05/07/21 Right Antecubital less than 1 day          Line            Hemodialysis AV Fistula 09/06/19 Upper arm 610 days          Drain            Closed/Suction Drain Anterior Abdomen Bulb 15 Fr  1205 days    External Urinary Catheter Medium less than 1 day              Telemetry:   Telemetry Orders (From admission, onward)             48 Hour Telemetry Monitoring  Continuous x 48 hours     Question:  Reason for 48 Hour Telemetry  Answer:  Acute MI, chest pain - R/O MI, or unstable angina                  Imaging:  Imaging Reports Reviewed Today Include:   Xr Chest 1 View Portable    Result Date: 5/8/2021  Impression: Multiple bilateral groundglass infiltrates compatible with Covid 19  pneumonia  Findings concur with the preliminary report by the referring clinician already in PACS and/or our electronic record EPIC  Workstation performed: [id number]     Ct Head Without Contrast    Result Date: 5/7/2021  Impression: No acute intracranial abnormality  Microangiopathic changes   Workstation performed: [id number]     Ct Chest Without Contrast    Result Date: 5/7/2021  Impression: Extensive multilobar infiltrates in a pattern and distribution that could be consistent with provided clinical history of suspected Covid 19   Workstation performed: [id number]     Scheduled Meds:  Current Facility-Administered Medications   Medication Dose Route Frequency Provider Last Rate    acetaminophen  650 mg Oral Q6H PRN Leon Trujillo PA-C      ascorbic acid  1,000 mg Oral Q12H [facility] Eulalia Esquivel PA-C      aspirin  81 mg Oral Daily Leon Trujillo, NII      atorvastatin  40 mg Oral HS Leon Trujillo PA-C      calcitriol  0 25 mcg Oral Daily Leon Trujillo, PA-MARIAELENA      calcium carbonate  1,000 mg Oral TID PRN Leon Trujillo, NII      carvedilol  25 mg Oral BID With Meals Leon Trujillo PA-C      cefTRIAXone  1,000 mg Intravenous Q24H Leon Trujillo, NII      cholecalciferol  2,000 Units Oral Daily Eulalia Alvin, PA-MARIAELENA      clonazePAM  0 5 mg Oral BID Leon Trujillo, NII      dexamethasone  6 mg Intravenous Q24H Leon Trujillo, NII      doxycycline  100 mg Intravenous Q12H NOEL GomezC 100 mg (05/08/21 0841)    famotidine  10 mg Oral Daily Eulalia Esquivel PA-C      fluticasone  2 spray Nasal Daily Eulalia Esquivel PA-C      gabapentin  100 mg Oral TID Leon Trujillo PA-C      heparin (porcine)  5,000 Units Subcutaneous Q8H [facility] Eulalia Esquivel PA-C      insulin glargine  40 Units Subcutaneous QAM MARU Gomez-MARIAELENA      insulin lispro  1-6 Units Subcutaneous HS Eulalia Esquivel, NII      insulin lispro  2-12 Units Subcutaneous TID AC Eulalia Esquivel PA-C      isosorbide dinitrate  30 mg Oral TID Leon Trujillo PA-C      multi-electrolyte  75 mL/hr Intravenous Continuous [place] Sprout, CRNP 75 mL/hr (05/08/21 1020)    zinc sulfate  220 mg Oral Daily Eulalia Esquivel PA-C      Followed by   Leonora Ramírez ON 5/15/2021] multivitamin-minerals  1 tablet Oral Daily MARU Gomez-MARIAELENA      ondansetron  4 mg Intravenous Q6H PRN Leon Trujillo PA-C      pantoprazole  40 mg Oral Early Morning Leon Trujillo PA-C      remdesivir  100 mg Intravenous Q24H NII [place], [place]  [facility]  Hospitalist    ** Please Note: This note has been constructed using a voice recognition system   **

## 2021-05-08 NOTE — ASSESSMENT & PLAN NOTE
· Acute respiratory failure/hypoxia secondary to COVID-19 and bacterial pneumonia  · COVID-19:  Continue remdesivir dexamethasone and COVID vitamins  · Bacterial pneumonia:  Given elevated procalcitonin continue ceftriaxone/doxycycline  · At baseline uses oxygen only at night  · Currently on 6 L continue to wean when able    Appreciate pulmonology consultation    Lab Results   Component Value Date    SARSCOV2 Positive (A) 05/07/2021    SARSCOV2 Negative 04/30/2021     Results from last 7 days   Lab Units 05/08/21  0613 05/07/21  1823   D-DIMER QUANTITATIVE ug/ml FEU 1 89* 2 06*   CRP mg/L 157 4* 234 1*   FERRITIN ng/mL 1,302* 1,018*     Results from last 7 days   Lab Units 05/07/21  1712   PROCALCITONIN ng/ml 1 57*

## 2021-05-08 NOTE — H&P
[employer]  H&P- Demarco Johnson Sr  1950, 72 y o  male MRN: [id number]  Unit/Bed#: ED 14 Encounter: [id number]  Primary Care Provider: Hiram Sweet MD   Date and time admitted to hospital: 5/7/2021  4:33 PM    * Acute respiratory failure with hypoxia [facility])  Assessment & Plan  · Patient presented to the ED for shortness of breath  · Per EMS patient had an SpO2 in the 70s upon presentation  · Requiring simple mask in ED, weaned to 5 liters nasal cannula at time of admission  · Treatment per moderate COVID pathway  · IV steroids and IV remdesivir  · Continue supplemental oxygen to maintain saturations above 90%  · pulm consult     Pneumonia due to COVID-19 virus  Assessment & Plan  · Patient tested positive for COVID-19, wife also sick with virus  · Requiring face mask supplemental oxygen at time of presentation, wean to 5 liters nasal cannula at admission  · Treatment per moderate COVID pathway  · Check inflammatory markers  · Anticoagulation per routine, trend D-dimer  · Started on Rocephin doxycycline, monitor procalcitonin  · Wean oxygen as tolerated  · Vitamin-D, vitamin-C and zinc  · IV Decadron and PO Pepcid   · IV remdesivir   · Blood cultures pending     Acute renal failure superimposed on chronic kidney disease [facility])  Assessment & Plan  Lab Results   Component Value Date    EGFR 13 05/07/2021    EGFR 41 (L) 04/20/2021    EGFR 26 (L) 02/22/2021    CREATININE 4 97 (H) 05/07/2021    CREATININE 1 88 (H) 04/20/2021    CREATININE 2 71 (H) 02/22/2021     · Current elevated at 4 97 at time of admission  · Baseline 1 8-1 9 per last nephrology note  · Home regimen Lasix 60 milligrams b i d  And Zaroxolyn 5 milligrams daily p r n , will hold  · Gentle IV fluids overnight  · Recheck in a m    · Nephrology consult    Acute encephalopathy  Assessment & Plan  · Patient oriented only to self at time of admission   · Family notes patient is typically alert and oriented   · ABG without signs of hypercapnia   · Did have significant hypoxia at EMS presentation- continue supplemental O2   · Possibly secondary to acute infection   · Monitor mental status while inpatient     Elevated troponin  Assessment & Plan  · Troponin elevated at 0 7  · Likely component of hypoxia and GAGE   · Patient denies chest pain   · EKG relatively unchanged from prior, T wave inversion   · Continue ASA and statin   · Cardiology consult   · Trend troponins   · Telemetry monitoring     Thrombocytopenia (HCC)  Assessment & Plan  · Platelet count 96  · Does have history of thrombocytopenia although typically in the 130s to 140s  · Does not appear that patient got the J&J vaccine as there is no documentation in the chart- patient unable to provide reliable insight, tried to contact son but no answer- will attempt again tomorrow   · Continue DVT prophylaxis but consider stopping pending repeat platelet levels    Hyperlipidemia  Assessment & Plan  · Continue statin     Obesity with body mass index 30 or greater  Assessment & Plan  · Would benefit from diet and lifestyle modifications     Chronic combined systolic and diastolic CHF, NYHA class 3 ([facility])  Assessment & Plan  Wt Readings from Last 3 Encounters:   05/07/21 115 kg (253 lb 8 5 oz)   03/19/21 118 kg (261 lb)   03/18/21 118 kg (261 lb)     · History of CHF followed outpatient by Dr Pawel Pedersen   · Home regimen Lasix 60 milligrams b i d , hold given significant GAGE  · Also maintained on Isordil, Entresto and Coreg  · Hold Entresto given GAGE   · Monitor intake and output and daily weights  · Gentle IV fluids, monitor for fluid overload        Type 2 diabetes mellitus with kidney complication, with long-term current use of insulin ([facility] [address] )  Assessment & Plan  Lab Results   Component Value Date    HGBA1C 6 0 03/19/2021       No results for input(s): POCGLU in the last 72 hours  Blood Sugar Average: Last 72 hrs:  ·  home regimen Toujeo 65 units q a m   And Humalog 15 units BID with meals and Jardiance  · Will place on Lantus 40 units q a m  And Humalog sliding scale with meals  · Adjust per glucose trends    VTE Prophylaxis: Heparin  / sequential compression device   Code Status:  Full code  POLST: POLST form is not discussed and not completed at this time  Discussion with family:  Spoke with son over the phone    Anticipated Length of Stay:  Patient will be admitted on an Inpatient basis with an anticipated length of stay of  more than 2 midnights  Justification for Hospital Stay:  Acute respiratory failure with hypoxia, COVID-19, GAGE    Total Time for Visit, including Counseling / Coordination of Care: 90 minutes  Greater than 50% of this total time spent on direct patient counseling and coordination of care  Chief Complaint:   Shortness of breath and weakness    History of Present Illness:    Gerald Johnston Sr  is a 72 y o  male with past medical history of diabetes, CHF, obesity, hypertension, GERD, COPD, CKD, CAD who presents with shortness of breath and generalized weakness  Patient is a poor historian secondary to mental status change in acute encephalopathy  Patient did test positive for COVID-19  Denies any chest pain or shortness of breath at time of admission  Unable to determine how long he has been feeling ill  Denies any leg pain or abdominal pain  Denies any nausea, vomiting or diarrhea  Does believe he has not been eating well for the past few days  Called son over the phone, he reports patient's wife was recently positive for COVID-19 and hospitalized  Patient's wife has been living with his daughter for the past few days  Patient has been home alone and son believes he has not been taking care of himself  Notes he has not been eating or drinking much for the past few days  Does not believe he has been taking all of his medications  Son believes patient has become depressed without his wife there to take care of him    Notes he has not been able to get around recently due to weakness  Review of Systems:    Review of Systems   Unable to perform ROS: Mental status change       Past Medical and Surgical History:     Past Medical History:   Diagnosis Date    AICD (automatic cardioverter/defibrillator) present     Anxiety and depression     Arthritis     Asthma     pt denies    CAD (coronary artery disease) 10/10/2014    CKD (chronic kidney disease) stage 3, GFR 30-59 ml/min ([facility])     COPD (chronic obstructive pulmonary disease) ([facility] [address] )     pt denies    CPAP (continuous positive airway pressure) dependence     Depression (emotion)     affective disorder    Diabetic nephropathy ([facility])     Erectile dysfunction     GERD (gastroesophageal reflux disease)     Hypertension     Iron deficiency anemia     Latent tuberculosis     Lumbar herniated disc     low back pain    Neuropathy     bles    Obesity (BMI 30-39  9) 7/9/2019    Prostate cancer ([facility] [address] )     2013- had radiation- had prostatectomy    Sleep apnea     Systolic CHF, chronic ([facility])     Type 2 diabetes mellitus with hyperlipidemia ([facility])     Use of cane as ambulatory aid     Vitamin D deficiency     Wears glasses        Past Surgical History:   Procedure Laterality Date    AV FISTULA PLACEMENT      left upper arm and removal    CARDIAC DEFIBRILLATOR PLACEMENT      CARDIAC ICD GENERATOR CHANGE  9/6/2019    CATARACT EXTRACTION Bilateral 10/09/2014    COLONOSCOPY      INGUINAL HERNIA REPAIR Bilateral     OTHER SURGICAL HISTORY Left 10/10/2014    AV Shunt    IN INSERT,INFLATABLE PENILE PROSTHESIS N/A 1/19/2018    Procedure: INSERTION 3 PART PROSTHESIS PENILE;  Surgeon: Karthik Landrum MD;  Location: [facility];  Service: Urology    PROSTATECTOMY         Meds/Allergies:    Prior to Admission medications    Medication Sig Start Date End Date Taking?  Authorizing Provider   Alcohol Swabs (ALCOHOL PREP) PADS by Does not apply route 2 (two) times a day 12/28/18   Chuy Perea MD   aspirin (ADULT ASPIRIN EC LOW STRENGTH) 81 mg EC tablet Take 81 mg by mouth daily      Historical Provider, MD   calcitriol (ROCALTROL) 0 25 mcg capsule Take 1 capsule (0 25 mcg total) by mouth daily 2/1/21   Uri Franks MD   capsicum (ZOSTRIX) 0 075 % topical cream Apply topically 3 (three) times a day 3/19/21   Henry Vaz MD   carvedilol (COREG) 25 mg tablet TAKE 1 TABLET BY MOUTH TWICE A DAY WITH FOOD 11/6/20   Henry Vaz MD   CINNAMON PO Take 500 mg by mouth daily    Historical Provider, MD   clonazePAM (KlonoPIN) 0 5 mg tablet Take 0 5 mg by mouth 2 (two) times a day    Historical Provider, MD   Coenzyme Q10 (COQ-10) 200 MG CAPS Take 1 capsule by mouth daily      Historical Provider, MD   Empagliflozin (Jardiance) 10 MG TABS Take 1 tablet (10 mg total) by mouth every morning 3/18/21   Brian Hoyt,    fluticasone [facility]) 50 mcg/act nasal spray 2 sprays into each nostril daily 3/19/21   Henry Vaz MD   furosemide (LASIX) 40 mg tablet TAKE 1 & 1/2 TABLETS (60 MG TOTAL) BY MOUTH 2 (TWO) TIMES A DAY 11/10/20   Uri Franks MD   gabapentin (NEURONTIN) 400 mg capsule TAKE 1 CAPSULE BY MOUTH 4 TIMES A DAY  4/19/21   Henry Vaz MD   glucose blood test strip Use 1 each 3 (three) times a day Test 2/9/21   Henry Vaz MD   Incontinence Supply Disposable (BRIEF OVERNIGHT LARGE) MISC by Does not apply route 4 (four) times a day 9/19/19   Henry Vaz MD   Incontinence Supply Disposable (Cristina Day Dry Comfort Heavy) MISC Use 4 (four) times a day 3/23/21   Henry Vaz MD   insulin aspart (NovoLOG FlexPen) 100 UNIT/ML injection pen Inject 15 Units under the skin 10/21/20   Historical Provider, MD   insulin glargine (TOUJEO MAX) 300 units/mL CONCETRATED U-300 injection pen (2-unit dial) INJECT BY SUBCUTANEOUS ROUTE 65 units in AM 9/16/20   Henry Vaz MD   Insulin Pen Needle (BD Pen Needle Marylu U/F) 32G X 4 MM MISC USE TO INJECT INSULIN 3 TIMES DAILY 1/8/21   Padmini Hernandez Shanon Leyden, MD   isosorbide dinitrate (ISORDIL) 20 mg tablet TAKE 1 5 TABLETS (30 MG TOTAL) BY MOUTH 3 (THREE) TIMES A DAY 3/10/21   Esther Grey DO   methocarbamol (ROBAXIN) 500 mg tablet TAKE 1 TABLET BY MOUTH THREE TIMES A DAY 10/15/19   Arash Gómez MD   metolazone (ZAROXOLYN) 5 mg tablet TAKE 1 TABLET (5 MG TOTAL) BY MOUTH DAILY AS NEEDED (WEIGHT GAIN) WEIGHT GAIN 3 LBS 12/21/20   Arash Gómez MD   Multiple Vitamin (MULTIVITAMIN) capsule Take 1 capsule by mouth daily      Historical Provider, MD   Omega-3 Fatty Acids (FISH OIL) 1200 MG CAPS Take 1 capsule by mouth daily    Historical Provider, MD   pantoprazole (PROTONIX) 40 mg tablet Take 1 tablet (40 mg total) by mouth daily 3/19/21   Arash Gómez MD   potassium chloride (KLOR-CON) 20 mEq packet TAKE 20 MEQ BY MOUTH 2 (TWO) TIMES A DAY 9/6/20   Melonie Grant MD   sacubitril-valsartan Miryam Grills)  MG TABS Take 1 tablet by mouth 2 (two) times a day 3/30/21   Esther Grey DO   simvastatin (ZOCOR) 40 mg tablet TAKE 1 TABLET BY MOUTH EVERY DAY IN THE EVENING 5/3/21   Arash Gómez MD   temazepam (RESTORIL) 30 mg capsule Take 30 mg by mouth daily at bedtime as needed for sleep     Historical Provider, MD   temazepam (RESTORIL) 30 mg capsule as needed 2/16/21   Historical Provider, MD   triamcinolone (KENALOG) 0 1 % cream APPLY TO AFFECTED AREA TWICE A DAY 3/23/21   Monico Lofty, MD Mandy Hamman 5 % op solution daily 3/18/21   Historical Provider, MD     I have reviewed home medications with patient personally  Allergies:    Allergies   Allergen Reactions    Ibuprofen Nausea Only    Penicillins Other (See Comments)     Pt unsure of reaction       Social History:     Marital Status: /Civil Union   Occupation:  Unknown  Patient Pre-hospital Living Situation:  Home with family  Patient Pre-hospital Level of Mobility:  Ambulatory  Patient Pre-hospital Diet Restrictions:  Diabetic, cardiac, renal  Substance Use History:   Social History     Substance and Sexual Activity   Alcohol Use Yes    Comment: glass wine daily     Social History     Tobacco Use   Smoking Status Former Smoker    Packs/day: 3 00    Years: 20 00    Pack years: 60 00    Quit date: 12    Years since quittin 3   Smokeless Tobacco Never Used   Tobacco Comment    never a smoker as per [employer]     Social History     Substance and Sexual Activity   Drug Use No       Family History:    Family History   Problem Relation Age of Onset    Dementia Mother     No Known Problems Father        Physical Exam:     Vitals:   Blood Pressure: 146/69 (21)  Pulse: 77 (21)  Temperature: 99 6 °F (37 6 °C) (21)  Temp Source: Oral (21)  Respirations: 20 (21)  Weight - Scale: 115 kg (253 lb 8 5 oz) (21)  SpO2: 100 % (21)    Physical Exam  Vitals signs reviewed  Constitutional:       General: He is not in acute distress  Appearance: He is obese  He is ill-appearing  HENT:      Head: Normocephalic and atraumatic  Eyes:      General: No scleral icterus  Conjunctiva/sclera: Conjunctivae normal    Neck:      Musculoskeletal: Neck supple  Cardiovascular:      Rate and Rhythm: Normal rate and regular rhythm  Heart sounds: No murmur  Pulmonary:      Effort: Pulmonary effort is normal  No respiratory distress  Breath sounds: Normal breath sounds  No wheezing  Comments: Tachypnea   Abdominal:      General: Bowel sounds are normal  There is no distension  Palpations: Abdomen is soft  Tenderness: There is no abdominal tenderness  Musculoskeletal:      Right lower leg: No edema  Left lower leg: No edema  Skin:     General: Skin is warm and dry  Neurological:      Mental Status: He is alert  He is disoriented        Comments: Oriented to self    Psychiatric:         Mood and Affect: Mood normal          Behavior: Behavior normal            Additional Data:     Lab Results: I have personally reviewed pertinent reports  Results from last 7 days   Lab Units 05/07/21  1712   WBC Thousand/uL 3 75*   HEMOGLOBIN g/dL 12 8   HEMATOCRIT % 39 5   PLATELETS Thousands/uL 96*   NEUTROS PCT % 74   LYMPHS PCT % 17   MONOS PCT % 9   EOS PCT % 0     Results from last 7 days   Lab Units 05/07/21  1823   SODIUM mmol/L 136   POTASSIUM mmol/L 3 5   CHLORIDE mmol/L 100   CO2 mmol/L 28   BUN mg/dL 50*   CREATININE mg/dL 4 97*   ANION GAP mmol/L 8   CALCIUM mg/dL 7 6*   ALBUMIN g/dL 2 8*   TOTAL BILIRUBIN mg/dL 0 41   ALK PHOS U/L 71   ALT U/L 76   AST U/L 182*   GLUCOSE RANDOM mg/dL 76     Results from last 7 days   Lab Units 05/07/21  1823   INR  1 12             Results from last 7 days   Lab Units 05/07/21  1823   LACTIC ACID mmol/L 1 1       Imaging: I have personally reviewed pertinent reports  CT head without contrast   Final Result by Deja Bueno MD (05/07 0348)      No acute intracranial abnormality  Microangiopathic changes  Workstation performed: [id number]         CT chest without contrast   Final Result by Deja Bueno MD (05/07 1801)   Extensive multilobar infiltrates in a pattern and distribution that could be consistent with provided clinical history of suspected Covid 23  Workstation performed: [id number]         XR chest 1 view portable    (Results Pending)       EKG, Pathology, and Other Studies Reviewed on Admission:   · EKG: NSR inferior T wave inversions   ·     Allscripts / Epic Records Reviewed: Yes     ** Please Note: This note has been constructed using a voice recognition system   **

## 2021-05-08 NOTE — ED NOTES
Admitting provider at bedside       Theresa Valenzuela, [facility]  05/07/21 2003

## 2021-05-09 PROBLEM — R74.01 TRANSAMINITIS: Status: ACTIVE | Noted: 2021-01-01

## 2021-05-09 NOTE — PROGRESS NOTES
Progress Note - Pulmonary   Arnol Johnson Sr  72 y o  male MRN: [id number]  Unit/Bed#: [place] [address] -01 Encounter: [id number]    Assessment/Plan:  1  Acute hypoxemic respiratory failure secondary to COVID-19 pneumonia  · Continue current antibiotics, procalcitonin improving  · Continue COVID-19 protocol, moderate pathway  2  Obstructive sleep apnea  · Uses CPAP with oxygen at nighttime  · AutoSet CPAP ordered for tonight  3  Chronic combined heart failure  · Appears euvolemic  · Follow-up cardiac and renal function    ----------------------------------------------------------------------------------------------------------------------    HPI/Interval History:   Reports that he is doing okay  No increased cough, fever, or chest tightness  Vitals:   Blood pressure 150/92, pulse 70, temperature (!) 97 4 °F (36 3 °C), resp  rate 18, height 5' 8" (1 727 m), weight 115 kg (253 lb 8 5 oz), SpO2 (!) 88 %  ,Body mass index is 38 55 kg/m²  SpO2: (!) 88 %  SpO2 Activity: At Rest  O2 Device: Nasal cannula 6 L    Physical Exam:   Gen:  Comfortable on nasal cannula  HEENT:  No scleral icterus  Oropharynx moist  Neck:  No JVD or adenopathy  Chest:  Clear bilaterally    Shallow inspiratory effort  Cardiac:  Regular  Abdomen:  Soft  Extremities:  No edema      Labs:    CBC:  Results from last 7 days   Lab Units 05/09/21  0442   WBC Thousand/uL 3 24*   HEMOGLOBIN g/dL 11 6*   HEMATOCRIT % 34 4*   PLATELETS Thousands/uL 104*         BMP:   Lab Results   Component Value Date    SODIUM 134 (L) 05/09/2021    K 4 1 05/09/2021    CO2 22 05/09/2021    CL 97 (L) 05/09/2021    BUN 85 (H) 05/09/2021    CREATININE 4 23 (H) 05/09/2021    GLUCOSE 238 (H) 04/24/2018    CALCIUM 7 0 (L) 05/09/2021     Results from last 7 days   Lab Units 05/09/21  0442 05/08/21  0613 05/07/21  1823 05/07/21  1712   D-DIMER QUANTITATIVE ug/ml FEU 1 39* 1 89* 2 06*  --    FERRITIN ng/mL 1,839* 1,302* 1,018*  --    CRP mg/L 120 6* 157 4* 234 1*  -- PROCALCITONIN ng/ml 0 76* 1 22*  --  1 57*       Manuel Amato MD

## 2021-05-09 NOTE — ASSESSMENT & PLAN NOTE
· Likely secondary to viral syndrome    Results from last 7 days   Lab Units 05/09/21  0442 05/08/21  0613 05/07/21  1823   AST U/L 120* 181* 182*   ALT U/L 73 76 76   TOTAL BILIRUBIN mg/dL 0 25 0 34 0 41

## 2021-05-09 NOTE — PHYSICAL THERAPY NOTE
PT EVALUATION    72 y o     [id number]    Shortness of breath [R06 02]  Hypoxia [R09 02]  Elevated troponin [R77 8]  Acute kidney injury ([facility] [address] ) [N17 9]  Generalized weakness [R53 1]  Pneumonia due to COVID-19 virus [U07 1, J12 82]    Past Medical History:   Diagnosis Date    AICD (automatic cardioverter/defibrillator) present     Anxiety and depression     Arthritis     Asthma     pt denies    CAD (coronary artery disease) 10/10/2014    CKD (chronic kidney disease) stage 3, GFR 30-59 ml/min (HCC)     COPD (chronic obstructive pulmonary disease) (HCC)     pt denies    CPAP (continuous positive airway pressure) dependence     Depression (emotion)     affective disorder    Diabetic nephropathy (HCC)     Erectile dysfunction     GERD (gastroesophageal reflux disease)     Hypertension     Iron deficiency anemia     Latent tuberculosis     Lumbar herniated disc     low back pain    Neuropathy     bles    Obesity (BMI 30-39  9) 7/9/2019    [facility]     2013- had radiation- had prostatectomy    Sleep apnea     Systolic CHF, chronic (HCC)     Type 2 diabetes mellitus with hyperlipidemia ([facility])     Use of cane as ambulatory aid     Vitamin D deficiency     Wears glasses          Past Surgical History:   Procedure Laterality Date    AV FISTULA PLACEMENT      left upper arm and removal    CARDIAC DEFIBRILLATOR PLACEMENT      CARDIAC ICD GENERATOR CHANGE  9/6/2019    CATARACT EXTRACTION Bilateral 10/09/2014    COLONOSCOPY      INGUINAL HERNIA REPAIR Bilateral     OTHER SURGICAL HISTORY Left 10/10/2014    AV Shunt    VA INSERT,INFLATABLE PENILE PROSTHESIS N/A 1/19/2018    Procedure: INSERTION 3 PART PROSTHESIS PENILE;  Surgeon: Earnest Oglesby MD;  Location: [facility];  Service: Urology    PROSTATECTOMY          05/09/21 1525   PT Last Visit   PT Visit Date 05/09/21   Note Type   Note type Evaluation   Pain Assessment   Pain Score No Pain   Home Living   Type of [address] Layout Multi-level;Bed/bath upstairs;Stairs to enter with rails  (3 TRIP)   Bathroom Shower/Tub Walk-in shower   Bathroom Toilet Standard   Bathroom Equipment Shower chair   Bathroom Accessibility Accessible   Home Equipment Cane   Additional Comments resides with wife and 2 grandchildren 23 and 25  DRives  Stair glide to 2nd floor  Prior Function   Level of [place] Independent with ADLs and functional mobility; Needs assistance with IADLs   Lives With Spouse; Other (Comment)  (2 grandchildren)   Receives Help From Family   ADL Assistance Independent   IADLs Needs assistance   Falls in the last 6 months 0   Vocational Retired   Comments I PTA with use of cane  Primarily household ambualtion  Restrictions/Precautions   Weight Bearing Precautions Per Order No   Other Precautions Contact/isolation; Airborne/isolation; Chair Alarm; Bed Alarm;Cognitive;Limb alert;Telemetry;O2;Fall Risk   General   Additional Pertinent History Pt is 71 y/o male admitted wt acute resp failure 2* COVID PNA  PT consulted  Activity as toelrated orders  Family/Caregiver Present No   Cognition   Overall Cognitive Status Impaired   Arousal/Participation Alert   Orientation Level Oriented to person;Oriented to place;Oriented to time  (+ month and date  year "12"-? if giving birth year?)   Following Commands Follows one step commands without difficulty   Comments Pleasant  Grateful for therapy assistance  RUE Assessment   RUE Assessment WFL   LUE Assessment   LUE Assessment WFL   RLE Assessment   RLE Assessment WFL  (grossly 4-/5)   LLE Assessment   LLE Assessment WFL  (grossly 4-/5)   Bed Mobility   Supine to Sit 3  Moderate assistance   Additional items Assist x 1; Increased time required;Verbal cues;LE management;HOB elevated; Bedrails   Additional Comments Increased time to complete transitions  O2 sats on 6L range 87-91%   Transfers   Sit to Stand 3  Moderate assistance   Additional items Assist x 1; Increased time required;Verbal cues   Stand to Sit 4  Minimal assistance   Additional items Assist x 1; Increased time required;Verbal cues   Additional Comments Increased time to complete transitions  Cues for hand placement  BP    Ambulation/Elevation   Gait pattern Decreased foot clearance; Improper Weight shift; Inconsistent rubina; Short stride; Excessively slow; Wide HERBERT   Gait Assistance 3  Moderate assist   Additional items Assist x 1;Verbal cues; Tactile cues   Assistive Device Rolling walker   Distance AMb with RW 4'x1  O2 sat OOB in chair 90% following mobility  150/92   Balance   Static Sitting Fair   Dynamic Sitting Fair -   Static Standing Poor +   Dynamic Standing Poor   Ambulatory Poor   Endurance Deficit   Endurance Deficit Yes   Endurance Deficit Description fatigue, weakness  Hypoxia with activity 87/88%-90% on 6L   Activity Tolerance   Activity Tolerance Patient limited by fatigue;Patient tolerated treatment well;Treatment limited secondary to medical complications (Comment)   Medical Staff Made Aware NursePhilip-Noel   Nurse Made Aware yes-   Assessment   Prognosis Good   Problem List Decreased strength;Decreased endurance; Impaired balance;Decreased mobility; Decreased cognition; Impaired judgement;Decreased safety awareness; Obesity   Assessment Margaret Lewis is a 72y o  year old male  With past medical history including chronic combined systolic and diastolic congestive heart failure, bipolar, chronic kidney disease, JOE, obesity, diabetes, hypertension, GERD who presents with  Shortness of breath and weakness  Encephalopathic on admission  COVID + one week prior to admission  Admitted with acute respiratory failure and hypoxia related to COVID 19 PNA  GAGE, non MI troponin elevation, Rhabdomyolysis  PT consulted  UP and OOB as tolerated orders   Goal to maintain 90%  Prior to admission resides with wife and 2 grandchildren in 2 story home with use of stair glide to access 2nd floor    3 [facility] with rail   Drives  Ambulates with cane primarily household distances  Supportive local daughter  Denies falls and reports independence with ADLS  Currently presents with functional limitations related to decreased activity tolerance with increased O2 demand and hypoxia, decreased overall strength, balance, functional mobility and locomotion requiring more restrictive AD of RW support  ModA needed for bed mobiltiy, transfers and to ambulate few feet OOB to chair  O2 sats 87-90% on 6L with mobility  /92  O2 sats OOB in chair on 6L 90%  Gait slowed, decreased foot clearance and step length  Remained seated OOB in chair with alarm in place and LE elevation in recliner  Given impairments and functional decline from baseline will require skilled PT in order to progress and optimize functional outcomes and facilitate return to PLOF  The patient's AM-PAC Basic Mobility Inpatient Short Form Raw Score is 12, Standardized Score is 32 23  A standardized score less than 42 9 suggests the patient may benefit from discharge to post-acute rehabilitation services  Please also refer to the recommendation of the Physical Therapist for safe discharge planning  AT this time given level of impairment, STR is recommended  Goals   Patient Goals get better and go home   STG Expiration Date 05/19/21   Short Term Goal #1 10 days: 1)  Pt will perform bed mobility with [place] demonstrating appropriate technique 100% of the time in order to improve function  2)  Perform all transfers with [place] demonstrating safe and appropriate technique 100% of the time in order to improve ability to negotiate safely in home environment  3) Amb with least restrictive AD > 80'x1 with mod I in order to demonstrate ability to negotiate in home environment  4)  Improve overall strength and balance 1/2 grade in order to optimize ability to perform functional tasks and reduce fall risk  5) Increase activity tolerance to 30 minutes in order to improve endurance to functional tasks  6)  Negotiate stairs using most appropriate technique and Jimy in order to be able to negotiate safely into home environment  7) PT for ongoing patient and family/caregiver education, DME needs and d/c planning in order to promote highest level of function in least restrictive environment  Plan   Treatment/Interventions Functional transfer training;LE strengthening/ROM; Elevations; Therapeutic exercise; Endurance training;Patient/family training;Equipment eval/education; Bed mobility;Gait training; Compensatory technique education;Continued evaluation;Spoke to nursing   PT Frequency Other (Comment)  (4-5x/wk)   Recommendation   PT Discharge Recommendation Post acute rehabilitation services   Equipment Recommended [facility] Recommended Wheeled walker   PT - OK to Discharge No  (to home  Yes to rhb if medically stable   )   AM-PAC Basic Mobility Inpatient   Turning in Bed Without Bedrails 2   Lying on Back to Sitting on Edge of Flat Bed 2   Moving Bed to Chair 2   Standing Up From Chair 2   Walk in Room 2   Climb 3-5 Stairs 2   Basic Mobility Inpatient Raw Score 12   Basic Mobility Standardized Score 32 23     History: co - morbidities, fall risk, use of assistive device, assist for adl's,  multiple lines  Exam: impairments in locomotion, musculoskeletal, balance,posture,cardiac, pulmonary  Clinical: unstable/unpredictable ( fall risk, increased assist from baseline, decreased activity tolerance, hypoxia with increased O2 demand, ongoing management of current conditions, COVID)  Complexity:high    Welby Gottron, PT

## 2021-05-09 NOTE — ASSESSMENT & PLAN NOTE
· Thrombocytopenia secondary to sepsis    Improving    Results from last 7 days   Lab Units 05/09/21  0442 05/08/21  0613 05/07/21  1712   PLATELETS Thousands/uL 104* 98* 96*

## 2021-05-09 NOTE — PROGRESS NOTES
[address] [place] [address]  Progress Note Locoemperatriz Beebe Sr  1950, 72 y o  male MRN: [id number]  Unit/Bed#: [place] [address] -01 Encounter: [id number]  Primary Care Provider: Dov Bardales MD   Date and time admitted to hospital: 5/7/2021  4:33 PM    * Acute respiratory failure with hypoxia ([address] )  Assessment & Plan  · Acute respiratory failure/hypoxia secondary to COVID-19 and bacterial pneumonia  · COVID-19:  Continue remdesivir dexamethasone and COVID vitamins  Cannot use Actemra given treatment for bacterial pneumonia  · Bacterial pneumonia:  Given elevated procalcitonin continue ceftriaxone/doxycycline  · At baseline uses oxygen only at night  · Currently on 6 L continue to wean when able    Appreciate pulmonology consultation    Lab Results   Component Value Date    SARSCOV2 Positive (A) 05/07/2021    SARSCOV2 Negative 04/30/2021     Results from last 7 days   Lab Units 05/09/21  0442 05/08/21  0613 05/07/21  1823   D-DIMER QUANTITATIVE ug/ml FEU 1 39* 1 89* 2 06*   CRP mg/L 120 6* 157 4* 234 1*   FERRITIN ng/mL 1,839* 1,302* 1,018*     Results from last 7 days   Lab Units 05/09/21  0442 05/08/21  0613 05/07/21  1712   PROCALCITONIN ng/ml 0 76* 1 22* 1 57*       Transaminitis  Assessment & Plan  · Likely secondary to viral syndrome    Results from last 7 days   Lab Units 05/09/21  0442 05/08/21  0613 05/07/21  1823   AST U/L 120* 181* 182*   ALT U/L 73 76 76   TOTAL BILIRUBIN mg/dL 0 25 0 34 0 41       Acute encephalopathy  Assessment & Plan  · Metabolic encephalopathy secondary to renal failure and COVID-19/hypoxia  · Appears to be at baseline mentation now    Elevated troponin  Assessment & Plan  · Non MI elevation troponin secondary to respiratory and renal failure  · Appreciate cardiology evaluation    Results from last 7 days   Lab Units 05/08/21  0613 05/08/21  0208 05/07/21  2224 05/07/21  1712   TROPONIN I ng/mL 0 45* 0 53* 0 60* 0 72*       Acute renal failure superimposed on chronic kidney disease ([facility] [address] )  Assessment & Plan  · GAGE on CKD 3b secondary to COVID-19, pneumonia, and rhabdomyolysis  · Continue IV fluids per nephrology  · Continue holding diuretics    Results from last 7 days   Lab Units 05/09/21  0442 05/08/21 2038 05/08/21  0613 05/07/21  1823   BUN mg/dL 85* 79* 65* 50*   CREATININE mg/dL 4 23* 4 23* 4 64* 4 97*   EGFR ml/min/1 73sq m 15 15 14 13       Thrombocytopenia ([facility])  Assessment & Plan  · Thrombocytopenia secondary to sepsis  Improving    Results from last 7 days   Lab Units 05/09/21  0442 05/08/21  0613 05/07/21  1712   PLATELETS Thousands/uL 104* 98* 96*       Hyperlipidemia  Assessment & Plan  · Continue atorvastatin    Obesity with body mass index 30 or greater  Assessment & Plan  · Body mass index is 38 55 kg/m²  Chronic combined systolic and diastolic CHF, NYHA class 3 ([facility])  Assessment & Plan  Wt Readings from Last 3 Encounters:   05/07/21 115 kg (253 lb 8 5 oz)   03/19/21 118 kg (261 lb)   03/18/21 118 kg (261 lb)     Results from last 7 days   Lab Units 05/07/21  1823   NT-PRO BNP pg/mL 7,074*     · Chronic CHF currently holding diuretics and Entresto    Type 2 diabetes mellitus with kidney complication, with long-term current use of insulin [facility])  Assessment & Plan  Lab Results   Component Value Date    HGBA1C 6 0 03/19/2021     Recent Labs     05/08/21  1637 05/08/21  2034 05/09/21  0755 05/09/21  1127   POCGLU 322* 419* 369* 392*     · Initially basal insulin decreased due to encephalopathy and poor intake  Continue sliding scale but will increase glargine and add mealtime lispro given hyperglycemia      VTE Pharmacologic Prophylaxis: VTE Score: 6 High Risk (Score >/= 5) - Pharmacological DVT Prophylaxis Ordered: Heparin  Sequential Compression Devices Ordered  Patient Centered Rounds: I have performed bedside rounds with nursing staff today    Discussions with Specialists or Other Care Team Provider:  Nephrology    Education and Discussions with Family / Patient:  Son on telephone    Time Spent for Care: 25 mins  More than 50% of total time spent on counseling and coordination of care as described above  Current Length of Stay: 2 day(s)  Current Patient Status: Inpatient   Certification Statement: The patient will continue to require additional inpatient hospital stay due to renal failure, respiratory failure due to [place]  Discharge Plan / Estimated Discharge Date: Anticipate discharge in 48-72 hrs to TBD    Code Status: Level 1 - Full Code      Subjective:   Patient seen and examined  Continues to feel better  Does have episodes of hypoxia when sleeping which he reports he uses CPAP at bedtime    Objective:   Vitals: Blood pressure 142/63, pulse 65, temperature (!) 97 4 °F (36 3 °C), resp  rate 18, height 5' 8" (1 727 m), weight 115 kg (253 lb 8 5 oz), SpO2 92 %  Physical Exam  Vitals signs reviewed  Constitutional:       General: He is not in acute distress  HENT:      Head: Atraumatic  Cardiovascular:      Rate and Rhythm: Regular rhythm  Heart sounds: Normal heart sounds  Pulmonary:      Effort: Pulmonary effort is normal       Breath sounds: Decreased breath sounds present  No wheezing  Abdominal:      General: Bowel sounds are normal       Palpations: Abdomen is soft  Tenderness: There is no abdominal tenderness  There is no rebound  Musculoskeletal:         General: Swelling present  No tenderness  Skin:     General: Skin is warm and dry  Neurological:      Mental Status: He is alert and oriented to person, place, and time  Cranial Nerves: No cranial nerve deficit     Psychiatric:         Mood and Affect: Mood normal        Additional Data:   Labs:  Results from last 7 days   Lab Units 05/09/21  0442 05/08/21  0613 05/07/21  1823 05/07/21  1712   WBC Thousand/uL 3 24* 3 57*  --  3 75*   HEMOGLOBIN g/dL 11 6* 12 6  --  12 8   HEMATOCRIT % 34 4* 39 0  --  39 5   MCV fL 86 89  --  90   TOTAL NEUT ABS Thousand/uL  -- 3 32  --   --    BANDS PCT %  --  5  --   --    PLATELETS Thousands/uL 104* 98*  --  96*   INR   --   --  1 12  --      Results from last 7 days   Lab Units 05/09/21 0442 05/08/21 2038 05/08/21 0613 05/07/21  1823   SODIUM mmol/L 134* 134* 137 136   POTASSIUM mmol/L 4 1 4 1 4 0 3 5   CHLORIDE mmol/L 97* 97* 100 100   CO2 mmol/L 22 23 16* 28   ANION GAP mmol/L 15* 14* 21* 8   BUN mg/dL 85* 79* 65* 50*   CREATININE mg/dL 4 23* 4 23* 4 64* 4 97*   CALCIUM mg/dL 7 0* 6 9* 7 5* 7 6*   ALBUMIN g/dL 2 4*  --  2 6* 2 8*   TOTAL BILIRUBIN mg/dL 0 25  --  0 34 0 41   ALK PHOS U/L 76  --  69 71   ALT U/L 73  --  76 76   AST U/L 120*  --  181* 182*   EGFR ml/min/1 73sq m 15 15 14 13   GLUCOSE RANDOM mg/dL 371* 378* 144* 76     Results from last 7 days   Lab Units 05/09/21  0442   MAGNESIUM mg/dL 2 6   PHOSPHORUS mg/dL 5 3*     Results from last 7 days   Lab Units 05/09/21 0442 05/08/21 0613 05/08/21 0208 05/07/21  2224   CK TOTAL U/L 6,420* 9,220*  --   --    TROPONIN I ng/mL  --  0 45* 0 53* 0 60*   CK MB INDEX % <1 0 <1 0  --   --      Results from last 7 days   Lab Units 05/07/21  1823   NT-PRO BNP pg/mL 7,074*      Results from last 7 days   Lab Units 05/09/21 0442 05/08/21 2038 05/07/21  1823   LACTIC ACID mmol/L  --  1 5  --  1 1   PROCALCITONIN ng/ml 0 76*  --    < >  --     < > = values in this interval not displayed  Results from last 7 days   Lab Units 05/09/21 1127 05/09/21 0755 05/08/21 2034 05/08/21 1637 05/08/21 1124 05/08/21 2009 05/07/21  2353 05/07/21  2122   POC GLUCOSE mg/dl 392* 369* 419* 322* 286* 157* 91 81             * I Have Reviewed All Lab Data Listed Above  Cultures:   Results from last 7 days   Lab Units 05/07/21  1821 05/07/21  1712   BLOOD CULTURE  No Growth at 24 hrs  No Growth at 24 hrs         Results from last 7 days   Lab Units 05/07/21  1720   SARS-COV-2  Positive*           Lines/Drains:  Invasive Devices     Peripheral Intravenous Line            Peripheral IV 05/07/21 Right Antecubital 1 day          Line            Hemodialysis AV Fistula 09/06/19 Upper arm 611 days          Drain            Closed/Suction Drain Anterior Abdomen Bulb 15 Fr  1206 days    External Urinary Catheter Medium 1 day              Telemetry:   Telemetry Orders (From admission, onward)             48 Hour Telemetry Monitoring  Continuous x 48 hours     Expiring   Question:  Reason for 48 Hour Telemetry  Answer:  Acute MI, chest pain - R/O MI, or unstable angina                  Imaging:  Imaging Reports Reviewed Today Include:   Xr Chest 1 View Portable    Result Date: 5/8/2021  Impression: Multiple bilateral groundglass infiltrates compatible with Covid 19  pneumonia  Findings concur with the preliminary report by the referring clinician already in PACS and/or our electronic record EPIC  Workstation performed: [id number]     Ct Head Without Contrast    Result Date: 5/7/2021  Impression: No acute intracranial abnormality  Microangiopathic changes  Workstation performed: [id number]     Ct Chest Without Contrast    Result Date: 5/7/2021  Impression: Extensive multilobar infiltrates in a pattern and distribution that could be consistent with provided clinical history of suspected Covid 19   Workstation performed: [id number]     Scheduled Meds:  Current Facility-Administered Medications   Medication Dose Route Frequency Provider Last Rate    acetaminophen  650 mg Oral Q6H PRN Elvira Carbone, PA-MARIAELENA      ascorbic acid  1,000 mg Oral Q12H [address] Eulalia Esquivel PA-C      aspirin  81 mg Oral Daily Elviralisa Carbone, PA-MARIAELENA      atorvastatin  40 mg Oral HS Elvira Carbone PA-C      calcitriol  0 25 mcg Oral Daily Elvira Carbone, PA-MARIAELENA      calcium carbonate  1,000 mg Oral TID PRN Elvira Carbone PA-C      carvedilol  25 mg Oral BID With Meals Elvira Carbone PA-C      cefTRIAXone  1,000 mg Intravenous Q24H Eulalia Esquivel PA-C 1,000 mg (05/08/21 1612)    cholecalciferol  2,000 Units Oral Daily Elvira Carbone PA-C  clonazePAM  0 5 mg Oral BID Leana Round, PA-C      dexamethasone  6 mg Intravenous Q24H Leana Round, PA-C      doxycycline  100 mg Intravenous Q12H Eulalia Smudde, PA-C 100 mg (05/09/21 0900)    famotidine  10 mg Oral Daily Eulalia Smudde, PA-C      fluticasone  2 spray Nasal Daily Eulalia Smudde, PA-C      gabapentin  100 mg Oral TID Leana Round, PA-C      heparin (porcine)  5,000 Units Subcutaneous Q8H [facility] Eulalia Smudde, PA-C      insulin glargine  40 Units Subcutaneous QAM Eulalia Smudde, PA-C      insulin lispro  1-6 Units Subcutaneous HS Eulalia Smudde, PA-C      insulin lispro  2-12 Units Subcutaneous TID AC Eulalia Smudde, PA-C      isosorbide dinitrate  30 mg Oral TID Leana Round, PA-C      multi-electrolyte  50 mL/hr Intravenous Continuous Red DockJOVANYNP 50 mL/hr (05/09/21 1321)    zinc sulfate  220 mg Oral Daily Eulalia Smudde, PA-C      Followed by   Aaron Bullard ON 5/15/2021] multivitamin-minerals  1 tablet Oral Daily Eulalia Smudde, PA-C      ondansetron  4 mg Intravenous Q6H PRN Leana Round, PA-C      pantoprazole  40 mg Oral Early Morning Leana Round, PA-C      remdesivir  100 mg Intravenous Q24H Leana Round, PA-C Stopped (05/08/21 2220)       Juventino Wagoner DO  [facility]  Hospitalist    ** Please Note: This note has been constructed using a voice recognition system   **

## 2021-05-09 NOTE — ASSESSMENT & PLAN NOTE
· Acute respiratory failure/hypoxia secondary to COVID-19 and bacterial pneumonia  · COVID-19:  Continue remdesivir dexamethasone and COVID vitamins  Cannot use Actemra given treatment for bacterial pneumonia  · Bacterial pneumonia:  Given elevated procalcitonin continue ceftriaxone/doxycycline  · At baseline uses oxygen only at night  · Currently on 6 L continue to wean when able    Appreciate pulmonology consultation    Lab Results   Component Value Date    SARSCOV2 Positive (A) 05/07/2021    SARSCOV2 Negative 04/30/2021     Results from last 7 days   Lab Units 05/09/21  0442 05/08/21  0613 05/07/21  1823   D-DIMER QUANTITATIVE ug/ml FEU 1 39* 1 89* 2 06*   CRP mg/L 120 6* 157 4* 234 1*   FERRITIN ng/mL 1,839* 1,302* 1,018*     Results from last 7 days   Lab Units 05/09/21 0442 05/08/21  0613 05/07/21  1712   PROCALCITONIN ng/ml 0 76* 1 22* 1 57*

## 2021-05-09 NOTE — ASSESSMENT & PLAN NOTE
· Metabolic encephalopathy secondary to renal failure and COVID-19/hypoxia  · Appears to be at baseline mentation now

## 2021-05-09 NOTE — ASSESSMENT & PLAN NOTE
Lab Results   Component Value Date    HGBA1C 6 0 03/19/2021     Recent Labs     05/08/21  1637 05/08/21  2034 05/09/21  0755 05/09/21  1127   POCGLU 322* 419* 369* 392*     · Initially basal insulin decreased due to encephalopathy and poor intake    Continue sliding scale but will increase glargine and add mealtime lispro given hyperglycemia

## 2021-05-09 NOTE — PLAN OF CARE
Problem: PHYSICAL THERAPY ADULT  Goal: Performs mobility at highest level of function for planned discharge setting  See evaluation for individualized goals  Description: Treatment/Interventions: Functional transfer training, LE strengthening/ROM, Elevations, Therapeutic exercise, Endurance training, Patient/family training, Equipment eval/education, Bed mobility, Gait training, Compensatory technique education, Continued evaluation, Spoke to nursing  Equipment Recommended: Marge Newell       See flowsheet documentation for full assessment, interventions and recommendations  Note: Prognosis: Good  Problem List: Decreased strength, Decreased endurance, Impaired balance, Decreased mobility, Decreased cognition, Impaired judgement, Decreased safety awareness, Obesity  Assessment: Bita Gross Sr  is a 72y o  year old male  With past medical history including chronic combined systolic and diastolic congestive heart failure, bipolar, chronic kidney disease, JOE, obesity, diabetes, hypertension, GERD who presents with  Shortness of breath and weakness  Encephalopathic on admission  COVID + one week prior to admission  Admitted with acute respiratory failure and hypoxia related to COVID 19 PNA  GAGE, non MI troponin elevation, Rhabdomyolysis  PT consulted  Activity as tolerated orders  Goal to maintain 90%  Prior to admission resides with wife and 2 grandchildren in 2 story home with use of stair glide to access 2nd floor  3 TRIP with rail  Drives  Ambulates with cane primarily household distances  Supportive local daughter  Denies falls and reports independence with ADLS  Currently presents with functional limitations related to decreased activity tolerance with increased O2 demand and hypoxia, decreased overall strength, balance, functional mobility and locomotion requiring more restrictive AD of RW support  ModA needed for bed mobiltiy, transfers and to ambulate few feet OOB to chair   O2 sats 87-90% on 6L with mobility  /92  O2 sats OOB in chair on 6L 90%  Gait slowed, decreased foot clearance and step length  Remained seated OOB in chair with alarm in place and LE elevation in recliner  Given impairments and functional decline from baseline will require skilled PT in order to progress and optimize functional outcomes and facilitate return to PLOF  The patient's AM-PAC Basic Mobility Inpatient Short Form Raw Score is 12, Standardized Score is 32 23  A standardized score less than 42 9 suggests the patient may benefit from discharge to post-acute rehabilitation services  Please also refer to the recommendation of the Physical Therapist for safe discharge planning  AT this time given level of impairment, STR is recommended  PT Discharge Recommendation: Post acute rehabilitation services     PT - OK to Discharge: No(to home  Yes to rhb if medically stable  )    See flowsheet documentation for full assessment

## 2021-05-09 NOTE — PROGRESS NOTES
Progress Note - Nephrology   Kristen Part Sr  72 y o  male MRN: [id number]  Unit/Bed#: [facility] 68 2 -01 Encounter: [id number]    61-year-old male with a history of Chronic Kidney Disease IIIB, hypertension, hyperlipidemia, obesity, diabetes II, JOE on CPAP, CAD, diastolic congestive heart failure status post AICD presented with shortness of breath and found to be positive COVID  Nephrology consulted and following for acute kidney injury    ASSESSMENT and PLAN:  Acute kidney injury (POA):  Etiology: Suspect prerenal azotemia in the setting of volume depletion with diuretics, COVID-19 infection, loss of autoregulatory system with Arb (enestro)  relative hypotension, rhabdomyolysis with elevated CPK  Assessment:  · After review of medical records through [address] [place] [address] it appears that the patient has a baseline Creatinine of previously 1 8-1 9, however was elevated in the mid two range in February of 2021 with EGFR mid 30 to low 40 range  · Patient was admitted with a creatinine of 4 97  · Patient's creatinine today is at 4 23-appears to have plateaued  · Vincenzo Garland   Lasix and Zaroxolyn-all on hold  · Status post 1 L Plasmalyte yesterday  · Had elevated CPK 9220 and 6420  · Clinically, patient is not uremic and there is no acute indication for renal replacement therapy (dialysis)  Workup:  · Urinalysis with micro reports:  sg-1 025, small blood, pH 5 5, plus two proteinuria, 2-4 WBCs  · Previous renal ultrasound from October 16, 2018 reported right kidney 10 7 cm left kidney 11 cm, few simple cysts on the right kidney  Plan:  · Avoid nephrotoxins, adjust meds to appropriate GFR  · Optimize hemodynamic status to avoid delay in renal recovery  · Continue to hold enestro, metolazone Lasix at this time  · Patient with [address] catheter-however I&O not well documented  · Need accurate I&O and daily weights  · Will give additional L Plasmalyte  · Will repeat CPK in a m   · Monitor respiratory cautiously with IV fluids with a history of diastolic heart failure  · If renal function worsens consider checking renal ultrasound  · Will continue monitor I/O, lab values volume status     Chronic kidney disease IIIB:    Etiology:  Suspect secondary to diabetic kidney disease, hypertensive nephrosclerosis, cardiorenal syndrome, age-related nephron loss  Assessment and Plan:  · After review of medical records through [facility] and Care everywhere it appears that the patient has a baseline Creatinine of 1 8-1 9 however has been running in the mid two range in February of 2021  · Patient follows with Dr Milly Grimm  · Will need follow-up on discharge     Blood pressure/Hypertension:  Assessment and Plan:  · Home medications include:  Carvedilol 25 mg b i d , enestro 97/103 mg daily, Isordil 30 mg 3 times daily furosemide 60 mg b i d , Isordil 30 mg t i d , metolazone 5 mg p r n   Weight gain  · Will need to evaluate home medications versus what is documented for accurate documentation  · Current medications include:  Carvedilol 25 mg 2 times daily, Isordil 30 mg 3 times daily,  · Maximize hemodynamics to maintain MAP >65  · Avoid hypotension or fluctuations in blood pressure  · Will continue to trend     H&H/anemia: Likely of CKD  Assessment and Plan:  · Current hemoglobin 11 6  · Per primary team  · Transfuse if hemoglobin less than 7 0     Acid-base:    Assessment and Plan:  § Improved Plasmalyte bicarb currently 22  § Closing anion gap acidosis  § Will give additional IV Plasmalyte x1 L  § Will continue to monitor  Electrolytes:  Assessment and Plan:  § Within normal limits  § Sodium corrects with elevated blood sugar  § Will continue trend treat as need  § Currently off potassium supplements since Lasix and Zaroxolyn are on hold  § Potassium 4 0     Other medical Issues:  COVID-19 infection:  § Management per primary team  § Currently on remdesivir, zinc, doxycycline, Decadron     Diabetes II  Chronic diastolic CHF/nonischemic cardiomyopathy with EF 40%:-patient examining fairly euvolemic to dry side-monitor need for IV diuretics p r n  SUBJECTIVE:  Patient seen and examined at bedside  Patient reports feeling better    Denies chest pain, shortness of breath, dizziness, lightheadedness, nausea vomiting    OBJECTIVE:  Current Weight: Weight - Scale: 115 kg (253 lb 8 5 oz)  Vitals:    05/08/21 1839 05/08/21 2027 05/09/21 0514 05/09/21 0753   BP:  139/62  142/63   BP Location:  Left arm     Pulse:  71 65    Resp:  18     Temp:  98 1 °F (36 7 °C)  (!) 97 4 °F (36 3 °C)   TempSrc:  Oral     SpO2:  91% 92%    Weight:       Height: 5' 8" (1 727 m)          Intake/Output Summary (Last 24 hours) at 5/9/2021 1347  Last data filed at 5/9/2021 1049  Gross per 24 hour   Intake 1450 ml   Output 925 ml   Net 525 ml     General: conscious, coherent, cooperative, not in acute distress  Skin: no rash, warm and dry  Eyes: pale conjunctivae, anicteric sclerae  ENT: dry lips and mucous membranes  Neck: supple, no JVD noted  Chest:  Essentially clear on auscultation slightly coarse and decreased bases  CVS:  normal rate, regular rhythm, no rub or murmur  Abdomen: soft, non-tender, non-distended, active bowel sounds  :  [place] catheter in place  Extremities: no significant edema of both legs  Neuro: awake and alert  Psych: appropriate affect      Medications:    Current Facility-Administered Medications:     acetaminophen (TYLENOL) tablet 650 mg, 650 mg, Oral, Q6H PRN, Liv Esquivel PA-C    ascorbic acid (VITAMIN C) tablet 1,000 mg, 1,000 mg, Oral, Q12H ANGEL, Eulalia Esquivel PA-C, 1,000 mg at 05/09/21 0856    aspirin (ECOTRIN LOW STRENGTH) EC tablet 81 mg, 81 mg, Oral, Daily, Eulalia Esquivel PA-C, 81 mg at 05/09/21 0856    atorvastatin (LIPITOR) tablet 40 mg, 40 mg, Oral, HS, Eulalia Esquivel PA-C, 40 mg at 05/08/21 2149    calcitriol (ROCALTROL) capsule 0 25 mcg, 0 25 mcg, Oral, Daily, Eulalia Esquivel PA-C, 0 25 mcg at 05/09/21 0856    calcium carbonate (TUMS) chewable tablet 1,000 mg, 1,000 mg, Oral, TID PRN, Marvin Ribeiro Smudde, PA-C    carvedilol (COREG) tablet 25 mg, 25 mg, Oral, BID With Meals, Eulalia Smudde, PA-C, 25 mg at 05/09/21 0856    cefTRIAXone (ROCEPHIN) 1,000 mg in dextrose 5 % 50 mL IVPB, 1,000 mg, Intravenous, Q24H, Eulalia Smudde, PA-C, Last Rate: 100 mL/hr at 05/08/21 1612, 1,000 mg at 05/08/21 1612    cholecalciferol (VITAMIN D3) tablet 2,000 Units, 2,000 Units, Oral, Daily, Eulalia Smudde, PA-C, 2,000 Units at 05/09/21 0856    clonazePAM (KlonoPIN) tablet 0 5 mg, 0 5 mg, Oral, BID, Eulalia Smudde, PA-C, 0 5 mg at 05/09/21 0856    dexamethasone (DECADRON) injection 6 mg, 6 mg, Intravenous, Q24H, Eulalia Smudde, PA-C, 6 mg at 05/08/21 2149    doxycycline (VIBRAMYCIN) 100 mg in sodium chloride 0 9 % 100 mL IVPB, 100 mg, Intravenous, Q12H, Eulalia Smudde, PA-C, Last Rate: 100 mL/hr at 05/09/21 0900, 100 mg at 05/09/21 0900    famotidine (PEPCID) tablet 10 mg, 10 mg, Oral, Daily, Eulalia Smudde, PA-C, 10 mg at 05/09/21 0856    fluticasone (FLONASE) 50 mcg/act nasal spray 2 spray, 2 spray, Nasal, Daily, Eulalia Smudde, PA-C, 2 spray at 05/09/21 0856    gabapentin (NEURONTIN) capsule 100 mg, 100 mg, Oral, TID, Eulalia Smudde, PA-C, 100 mg at 05/09/21 0856    heparin (porcine) subcutaneous injection 5,000 Units, 5,000 Units, Subcutaneous, Q8H ANGEL, Eulalia Smudde, PA-C, 5,000 Units at 05/09/21 1321    insulin glargine (LANTUS) subcutaneous injection 40 Units 0 4 mL, 40 Units, Subcutaneous, QAM, Eulalia Esquivel PA-C, 40 Units at 05/09/21 0857    insulin lispro (HumaLOG) 100 units/mL subcutaneous injection 1-6 Units, 1-6 Units, Subcutaneous, HS, Eulalia Esquivel PA-C, 6 Units at 05/08/21 2150    insulin lispro (HumaLOG) 100 units/mL subcutaneous injection 2-12 Units, 2-12 Units, Subcutaneous, TID AC, 10 Units at 05/09/21 1324 **AND** Fingerstick Glucose (POCT), , , TID AC, Eulalia Esquivel PA-C    isosorbide dinitrate (ISORDIL) tablet 30 mg, 30 mg, Oral, TID, Eulalia Esquivel PA-C, 30 mg at 05/09/21 0856    multi-electrolyte (PLASMALYTE-A/ISOLYTE-S PH 7 4) IV solution, 50 mL/hr, Intravenous, Continuous, JIHAN Angel, Last Rate: 50 mL/hr at 05/09/21 1321, 50 mL/hr at 05/09/21 1321    zinc sulfate (ZINCATE) capsule 220 mg, 220 mg, Oral, Daily, 220 mg at 05/09/21 0856 **FOLLOWED BY** [START ON 5/15/2021] multivitamin-minerals (CENTRUM ADULTS) tablet 1 tablet, 1 tablet, Oral, Daily, Eulalia Esquivel PA-C    ondansetron (ZOFRAN) injection 4 mg, 4 mg, Intravenous, Q6H PRN, Yvette Esquivel PA-C    pantoprazole (PROTONIX) EC tablet 40 mg, 40 mg, Oral, Early Morning, Eulalia Esquivel PA-C, 40 mg at 05/09/21 0425    [COMPLETED] remdesivir (Veklury) 200 mg in sodium chloride 0 9 % 250 mL IVPB, 200 mg, Intravenous, Q24H, 200 mg at 05/07/21 2236 **FOLLOWED BY** remdesivir (Veklury) 100 mg in sodium chloride 0 9 % 250 mL IVPB, 100 mg, Intravenous, Q24H, Eulalia Esquivel PA-C, Stopped at 05/08/21 2220    Laboratory Results:  Results from last 7 days   Lab Units 05/09/21  0442 05/08/21  2038 05/08/21  0613 05/07/21  1823 05/07/21  1712   WBC Thousand/uL 3 24*  --  3 57*  --  3 75*   HEMOGLOBIN g/dL 11 6*  --  12 6  --  12 8   HEMATOCRIT % 34 4*  --  39 0  --  39 5   PLATELETS Thousands/uL 104*  --  98*  --  96*   SODIUM mmol/L 134* 134* 137 136  --    POTASSIUM mmol/L 4 1 4 1 4 0 3 5  --    CHLORIDE mmol/L 97* 97* 100 100  --    CO2 mmol/L 22 23 16* 28  --    BUN mg/dL 85* 79* 65* 50*  --    CREATININE mg/dL 4 23* 4 23* 4 64* 4 97*  --    CALCIUM mg/dL 7 0* 6 9* 7 5* 7 6*  --    MAGNESIUM mg/dL 2 6  --   --   --   --    PHOSPHORUS mg/dL 5 3*  --   --   --   --

## 2021-05-09 NOTE — ASSESSMENT & PLAN NOTE
· GAGE on CKD 3b secondary to COVID-19, pneumonia, and rhabdomyolysis  · Continue IV fluids per nephrology  · Continue holding diuretics    Results from last 7 days   Lab Units 05/09/21  0442 05/08/21  2038 05/08/21  0613 05/07/21  1823   BUN mg/dL 85* 79* 65* 50*   CREATININE mg/dL 4 23* 4 23* 4 64* 4 97*   EGFR ml/min/1 73sq m 15 15 14 13

## 2021-05-09 NOTE — PLAN OF CARE
Problem: Potential for Falls  Goal: Patient will remain free of falls  Description: INTERVENTIONS:  - Assess patient frequently for physical needs  -  Identify cognitive and physical deficits and behaviors that affect risk of falls    -  [place] fall precautions as indicated by assessment   - Educate patient/family on patient safety including physical limitations  - Instruct patient to call for assistance with activity based on assessment  - Modify environment to reduce risk of injury  - Consider OT/PT consult to assist with strengthening/mobility  Outcome: Progressing     Problem: Prexisting or High Potential for Compromised Skin Integrity  Goal: Skin integrity is maintained or improved  Description: INTERVENTIONS:  - Identify patients at risk for skin breakdown  - Assess and monitor skin integrity  - Assess and monitor nutrition and hydration status  - Monitor labs   - Assess for incontinence   - Turn and reposition patient  - Assist with mobility/ambulation  - Relieve pressure over bony prominences  - Avoid friction and shearing  - Provide appropriate hygiene as needed including keeping skin clean and dry  - Evaluate need for skin moisturizer/barrier cream  - Collaborate with interdisciplinary team   - Patient/family teaching  - Consider wound care consult   Outcome: Progressing     Problem: PAIN - ADULT  Goal: Verbalizes/displays adequate comfort level or baseline comfort level  Description: Interventions:  - Encourage patient to monitor pain and request assistance  - Assess pain using appropriate pain scale  - Administer analgesics based on type and severity of pain and evaluate response  - Implement non-pharmacological measures as appropriate and evaluate response  - Consider cultural and social influences on pain and pain management  - Notify physician/advanced practitioner if interventions unsuccessful or patient reports new pain  Outcome: Progressing     Problem: INFECTION - ADULT  Goal: Absence or prevention of progression during hospitalization  Description: INTERVENTIONS:  - Assess and monitor for signs and symptoms of infection  - Monitor lab/diagnostic results  - Monitor all insertion sites, i e  indwelling lines, tubes, and drains  - Monitor endotracheal if appropriate and nasal secretions for changes in amount and color  - [place] appropriate cooling/warming therapies per order  - Administer medications as ordered  - Instruct and encourage patient and family to use good hand hygiene technique  - Identify and instruct in appropriate isolation precautions for identified infection/condition  Outcome: Progressing  Goal: Absence of fever/infection during neutropenic period  Description: INTERVENTIONS:  - Monitor WBC    Outcome: Progressing     Problem: SAFETY ADULT  Goal: Patient will remain free of falls  Description: INTERVENTIONS:  - Assess patient frequently for physical needs  -  Identify cognitive and physical deficits and behaviors that affect risk of falls    -  [place] fall precautions as indicated by assessment   - Educate patient/family on patient safety including physical limitations  - Instruct patient to call for assistance with activity based on assessment  - Modify environment to reduce risk of injury  - Consider OT/PT consult to assist with strengthening/mobility  Outcome: Progressing  Goal: Maintain or return to baseline ADL function  Description: INTERVENTIONS:  -  Assess patient's ability to carry out ADLs; assess patient's baseline for ADL function and identify physical deficits which impact ability to perform ADLs (bathing, care of mouth/teeth, toileting, grooming, dressing, etc )  - Assess/evaluate cause of self-care deficits   - Assess range of motion  - Assess patient's mobility; develop plan if impaired  - Assess patient's need for assistive devices and provide as appropriate  - Encourage maximum independence but intervene and supervise when necessary  - Involve family in performance of ADLs  - Assess for home care needs following discharge   - Consider OT consult to assist with ADL evaluation and planning for discharge  - Provide patient education as appropriate  Outcome: Progressing  Goal: Maintain or return mobility status to optimal level  Description: INTERVENTIONS:  - Assess patient's baseline mobility status (ambulation, transfers, stairs, etc )    - Identify cognitive and physical deficits and behaviors that affect mobility  - Identify mobility aids required to assist with transfers and/or ambulation (gait belt, sit-to-stand, lift, walker, cane, etc )  - [place] fall precautions as indicated by assessment  - Record patient progress and toleration of activity level on Mobility SBAR; progress patient to next Phase/Stage  - Instruct patient to call for assistance with activity based on assessment  - Consider rehabilitation consult to assist with strengthening/weightbearing, etc   Outcome: Progressing     Problem: DISCHARGE PLANNING  Goal: Discharge to home or other facility with appropriate resources  Description: INTERVENTIONS:  - Identify barriers to discharge w/patient and caregiver  - Arrange for needed discharge resources and transportation as appropriate  - Identify discharge learning needs (meds, wound care, etc )  - Arrange for interpretive services to assist at discharge as needed  - Refer to Case Management Department for coordinating discharge planning if the patient needs post-hospital services based on physician/advanced practitioner order or complex needs related to functional status, cognitive ability, or social support system  Outcome: Progressing     Problem: Knowledge Deficit  Goal: Patient/family/caregiver demonstrates understanding of disease process, treatment plan, medications, and discharge instructions  Description: Complete learning assessment and assess knowledge base    Interventions:  - Provide teaching at level of understanding  - Provide teaching via preferred learning methods  Outcome: Progressing     Problem: Nutrition/Hydration-ADULT  Goal: Nutrient/Hydration intake appropriate for improving, restoring or maintaining nutritional needs  Description: Monitor and assess patient's nutrition/hydration status for malnutrition  Collaborate with interdisciplinary team and initiate plan and interventions as ordered  Monitor patient's weight and dietary intake as ordered or per policy  Utilize nutrition screening tool and intervene as necessary  Determine patient's food preferences and provide high-protein, high-caloric foods as appropriate       INTERVENTIONS:  - Monitor oral intake, urinary output, labs, and treatment plans  - Assess nutrition and hydration status and recommend course of action  - Evaluate amount of meals eaten  - Assist patient with eating if necessary   - Allow adequate time for meals  - Recommend/ encourage appropriate diets, oral nutritional supplements, and vitamin/mineral supplements  - Order, calculate, and assess calorie counts as needed  - Recommend, monitor, and adjust tube feedings and TPN/PPN based on assessed needs  - Assess need for intravenous fluids  - Provide specific nutrition/hydration education as appropriate  - Include patient/family/caregiver in decisions related to nutrition  Outcome: Progressing

## 2021-05-10 PROBLEM — R11.10 VOMITING: Status: ACTIVE | Noted: 2021-01-01

## 2021-05-10 NOTE — ASSESSMENT & PLAN NOTE
· Thrombocytopenia secondary to sepsis    Improving    Results from last 7 days   Lab Units 05/10/21  0620 05/09/21  0442 05/08/21  0613 05/07/21  1712   PLATELETS Thousands/uL 129* 104* 98* 96*   Monitor platelet count, improved

## 2021-05-10 NOTE — UTILIZATION REVIEW
Inpatient Admission Authorization Request   NOTIFICATION OF INPATIENT ADMISSION/INPATIENT AUTHORIZATION REQUEST   SERVICING FACILITY:   [address] [place] [address], [place] [address] [facility]  Tax ID: [id number]  NPI: [id number]  Place of Service: Inpatient [facility]  [address]  Place of Service Code: 24     ATTENDING PROVIDER:  Attending Name and NPI#: Rodneywhitney Carlos [[id number]]  Address: [address] [place] [address], [facility], [facility]  Phone: [phone]     UTILIZATION REVIEW CONTACT:  Marni Haro Utilization   Network Utilization Review Department  Phone: [phone]  Fax: [phone]  Email: [email or web address]     PHYSICIAN ADVISORY SERVICES:  FOR [id number] REVIEW - MEDICAL NECESSITY DENIAL  Phone: [phone]  Fax: [phone]  Email: [email or web address]     TYPE OF REQUEST:  Inpatient Status     ADMISSION INFORMATION:  ADMISSION DATE/TIME: 5/7/21 2015  PATIENT DIAGNOSIS CODE/DESCRIPTION:  Shortness of breath [R06 02]  Hypoxia [R09 02]  Elevated troponin [R77 8]  Acute kidney injury ([facility] [address] ) [N17 9]  Generalized weakness [R53 1]  Pneumonia due to COVID-19 virus [U07 1, J12 82]  DISCHARGE DATE/TIME: No discharge date for patient encounter  DISCHARGE DISPOSITION (IF DISCHARGED): Home/Self Care     IMPORTANT INFORMATION:  Please contact the Alessandro Pereira directly with any questions or concerns regarding this request  Department voicemails are confidential     Send requests for admission clinical reviews, concurrent reviews, approvals, and administrative denials due to lack of clinical to fax [phone]  WDL

## 2021-05-10 NOTE — PLAN OF CARE
Problem: Potential for Falls  Goal: Patient will remain free of falls  Description: INTERVENTIONS:  - Assess patient frequently for physical needs  -  Identify cognitive and physical deficits and behaviors that affect risk of falls    -  [place] fall precautions as indicated by assessment   - Educate patient/family on patient safety including physical limitations  - Instruct patient to call for assistance with activity based on assessment  - Modify environment to reduce risk of injury  - Consider OT/PT consult to assist with strengthening/mobility  Outcome: Progressing     Problem: Prexisting or High Potential for Compromised Skin Integrity  Goal: Skin integrity is maintained or improved  Description: INTERVENTIONS:  - Identify patients at risk for skin breakdown  - Assess and monitor skin integrity  - Assess and monitor nutrition and hydration status  - Monitor labs   - Assess for incontinence   - Turn and reposition patient  - Assist with mobility/ambulation  - Relieve pressure over bony prominences  - Avoid friction and shearing  - Provide appropriate hygiene as needed including keeping skin clean and dry  - Evaluate need for skin moisturizer/barrier cream  - Collaborate with interdisciplinary team   - Patient/family teaching  - Consider wound care consult   Outcome: Progressing     Problem: PAIN - ADULT  Goal: Verbalizes/displays adequate comfort level or baseline comfort level  Description: Interventions:  - Encourage patient to monitor pain and request assistance  - Assess pain using appropriate pain scale  - Administer analgesics based on type and severity of pain and evaluate response  - Implement non-pharmacological measures as appropriate and evaluate response  - Consider cultural and social influences on pain and pain management  - Notify physician/advanced practitioner if interventions unsuccessful or patient reports new pain  Outcome: Progressing     Problem: INFECTION - ADULT  Goal: Absence or prevention of progression during hospitalization  Description: INTERVENTIONS:  - Assess and monitor for signs and symptoms of infection  - Monitor lab/diagnostic results  - Monitor all insertion sites, i e  indwelling lines, tubes, and drains  - Monitor endotracheal if appropriate and nasal secretions for changes in amount and color  - [place] appropriate cooling/warming therapies per order  - Administer medications as ordered  - Instruct and encourage patient and family to use good hand hygiene technique  - Identify and instruct in appropriate isolation precautions for identified infection/condition  Outcome: Progressing  Goal: Absence of fever/infection during neutropenic period  Description: INTERVENTIONS:  - Monitor WBC    Outcome: Progressing     Problem: SAFETY ADULT  Goal: Patient will remain free of falls  Description: INTERVENTIONS:  - Assess patient frequently for physical needs  -  Identify cognitive and physical deficits and behaviors that affect risk of falls    -  [place] fall precautions as indicated by assessment   - Educate patient/family on patient safety including physical limitations  - Instruct patient to call for assistance with activity based on assessment  - Modify environment to reduce risk of injury  - Consider OT/PT consult to assist with strengthening/mobility  Outcome: Progressing  Goal: Maintain or return to baseline ADL function  Description: INTERVENTIONS:  -  Assess patient's ability to carry out ADLs; assess patient's baseline for ADL function and identify physical deficits which impact ability to perform ADLs (bathing, care of mouth/teeth, toileting, grooming, dressing, etc )  - Assess/evaluate cause of self-care deficits   - Assess range of motion  - Assess patient's mobility; develop plan if impaired  - Assess patient's need for assistive devices and provide as appropriate  - Encourage maximum independence but intervene and supervise when necessary  - Involve family in performance of ADLs  - Assess for home care needs following discharge   - Consider OT consult to assist with ADL evaluation and planning for discharge  - Provide patient education as appropriate  Outcome: Progressing  Goal: Maintain or return mobility status to optimal level  Description: INTERVENTIONS:  - Assess patient's baseline mobility status (ambulation, transfers, stairs, etc )    - Identify cognitive and physical deficits and behaviors that affect mobility  - Identify mobility aids required to assist with transfers and/or ambulation (gait belt, sit-to-stand, lift, walker, cane, etc )  - [place] fall precautions as indicated by assessment  - Record patient progress and toleration of activity level on Mobility SBAR; progress patient to next Phase/Stage  - Instruct patient to call for assistance with activity based on assessment  - Consider rehabilitation consult to assist with strengthening/weightbearing, etc   Outcome: Progressing     Problem: DISCHARGE PLANNING  Goal: Discharge to home or other facility with appropriate resources  Description: INTERVENTIONS:  - Identify barriers to discharge w/patient and caregiver  - Arrange for needed discharge resources and transportation as appropriate  - Identify discharge learning needs (meds, wound care, etc )  - Arrange for interpretive services to assist at discharge as needed  - Refer to Case Management Department for coordinating discharge planning if the patient needs post-hospital services based on physician/advanced practitioner order or complex needs related to functional status, cognitive ability, or social support system  Outcome: Progressing     Problem: Knowledge Deficit  Goal: Patient/family/caregiver demonstrates understanding of disease process, treatment plan, medications, and discharge instructions  Description: Complete learning assessment and assess knowledge base    Interventions:  - Provide teaching at level of understanding  - Provide teaching via preferred learning methods  Outcome: Progressing     Problem: Nutrition/Hydration-ADULT  Goal: Nutrient/Hydration intake appropriate for improving, restoring or maintaining nutritional needs  Description: Monitor and assess patient's nutrition/hydration status for malnutrition  Collaborate with interdisciplinary team and initiate plan and interventions as ordered  Monitor patient's weight and dietary intake as ordered or per policy  Utilize nutrition screening tool and intervene as necessary  Determine patient's food preferences and provide high-protein, high-caloric foods as appropriate       INTERVENTIONS:  - Monitor oral intake, urinary output, labs, and treatment plans  - Assess nutrition and hydration status and recommend course of action  - Evaluate amount of meals eaten  - Assist patient with eating if necessary   - Allow adequate time for meals  - Recommend/ encourage appropriate diets, oral nutritional supplements, and vitamin/mineral supplements  - Order, calculate, and assess calorie counts as needed  - Recommend, monitor, and adjust tube feedings and TPN/PPN based on assessed needs  - Assess need for intravenous fluids  - Provide specific nutrition/hydration education as appropriate  - Include patient/family/caregiver in decisions related to nutrition  Outcome: Progressing

## 2021-05-10 NOTE — RESPIRATORY THERAPY NOTE
Pt waved me into his room CPAP was off; refused to wear CPAP , attempted several times to have him wear his nasal cannula, refused stating that "yolanda was a liar" and that he would not use oxygen even for a short while  Told pt he may  Need a breathing tube if he refused and may even die    He called me a liar and then I notified nursing of the situation

## 2021-05-10 NOTE — ASSESSMENT & PLAN NOTE
Wt Readings from Last 3 Encounters:   05/07/21 115 kg (253 lb 8 5 oz)   03/19/21 118 kg (261 lb)   03/18/21 118 kg (261 lb)   Patient with elevated BNP in the setting of renal failure  Weights appear stable  Monitor  Results from last 7 days   Lab Units 05/07/21  1823   NT-PRO BNP pg/mL 7,074*     · Chronic CHF currently holding diuretics and Entresto

## 2021-05-10 NOTE — PROGRESS NOTES
Patient had removed cpap, RT tried to convince him to place it back on and wear it through the night  Patient refused  Patient was also refusing to wear his nasal cannula  Acting more confused than the start of my shift  He also pulled off his telemetry and masimo  Patient needed to be placed in soft restraints around his wrists  Oxygen, telemetry, and masimo were able to be placed back on  O2 saturation is back up to 92%  Message was sent to on call SLIM to notify

## 2021-05-10 NOTE — ASSESSMENT & PLAN NOTE
· Likely secondary to viral syndrome    Results from last 7 days   Lab Units 05/10/21  0613 05/09/21  0442 05/08/21  0613 05/07/21  1823   AST U/L 101* 120* 181* 182*   ALT U/L 67 73 76 76   TOTAL BILIRUBIN mg/dL 0 35 0 25 0 34 0 41

## 2021-05-10 NOTE — PROGRESS NOTES
NEPHROLOGY PROGRESS NOTE   Tristan Woodall Sr  80 y o  male MRN: [id number]  Unit/Bed#: [place] 68 2 -01 Encounter: [id number]  Reason for Consult: GAGE (POA)    PLAN:   -GAGE CKD stage IIIB, multifactorial with progression to ATN, creatinine peaked at 4 97, creatinine continues to improve  Received 1 L of isolate yesterday  Arb and diuretics remain on hold   -patient with vomiting this morning after eating breakfast   -hyperphosphatemia, in the setting of GAGE, will continue to monitor and trend  Diet modified to low phosphorus  If remains elevated, consider starting on phosphorus binder   -elevated CK level, improving with hydration  -hypertension, overall acceptable, above goal this morning  Continue antihypertensives as below  Arb and diuretic remain on hold  -AGMA, resolved  Continue to monitor    -Hx of CHF, diuretics remain on hold  Examining fairly euvolemic  -COVID-19 pneumonia, continue treatment per primary care team   Pulmonary following  Continues on antibiotics  ASSESSMENT/PLAN:  GAGE (POA) on CKD stage IIIB:  Suspected prerenal azotemia as well as Arb and diuretic use with possible progression to ATN  -presented with creatinine of 4 97   -baseline creatinine 1 8-1 9   -follows with Dr Florentino Gray    -received 1 L of isolate on 05/09   -continue to Arb and diuretics  -no urgent indication for RRT  -UA showed small blood, 2+ protein, 2-4 wbc's   -previous renal ultrasound in 2018 showed few simple cyst on right kidney   -if renal function worsens, consider repeating renal ultrasound   -continue to avoid nephrotoxins, hypotension, IV contrast   -strict I/O  Continues with external urinary catheter  Hyperphosphatemia: in the setting of GAGE  -continue to monitor  If remains elevated, will start on phos binder    -will place on low phos diet  Elevated CK level:    -received 1 L of Plasma-Lyte yesterday  -repeat CK level improving      Hypertension:  Blood pressure fluctuating, above goal this morning   -outpatient medications:  Carvedilol 25 mg b i d , Entresto 97/103 mg daily, Isordil 30 mg 3 times per day, furosemide 60 mg b i d , Isordil 30 mg t i d , and metolazone p r n  For weight gain  -currently on carvedilol 25 mg 2 times per day, Isordil 30 mg 3 times per day  -Arb and diuretic remain on hold   -avoid hypotension or high fluctuations in blood pressure   -recommend holding parameters antihypertensive for systolic blood pressure less than 130  AGMA:  -lactic acid level normal   -continue to monitor  History of CHF:  Examining fairly euvolemic   EF of 40%  -diuretic and Entresto remain on hold  -continue daily weights, fluid restriction, I/O     -received fluid yesterday  COVID-19/pneumonia:  On supplemental oxygen and CPAP at night   -pulmonary team following   -continues on antibiotics  -continues on COVID pathway  Encephalopathy:  Suspected secondary to above  Awake and alert this morning  Obstructive sleep apnea:  Continue CPAP at night  Patient intermittently refusing depending on mental status  MBD in CKD:  -continues on calcitriol 0 25 mcg daily and vitamin-D 2000 units daily  Other:  Hyperlipidemia, obesity, diabetes  Disposition:  Requiring additional stay due to medical needs  SUBJECTIVE:  Parts of the exam were done by primary/nursing service and were discussed with our team due to infection control prevention measures related to COVID 19  The patient was viewed through the glass window  He is out of bed in the chair  He remains on supplemental oxygen  Per PCA, the patient vomited profusely after eating breakfast this morning      OBJECTIVE:  Current Weight: Weight - Scale: 115 kg (253 lb 8 5 oz)  Vitals:    05/09/21 2244 05/09/21 2317 05/10/21 0701 05/10/21 0739   BP:  141/66 (!) 177/82    BP Location:  Right arm     Pulse:  69 72    Resp:  18 (!) 24    Temp:  (!) 97 °F (36 1 °C) (!) 97 2 °F (36 2 °C)    TempSrc:  Axillary     SpO2: 95% (!) 89% (!) 81% 90%   Weight:       Height:           Intake/Output Summary (Last 24 hours) at 5/10/2021 0275  Last data filed at 5/10/2021 1423  Gross per 24 hour   Intake 730 ml   Output 2650 ml   Net -1920 ml     General: NAD  Skin: warm, dry, intact, no rash  HEENT: Moist mucous membranes, sclera anicteric, normocephalic, atraumatic  Neck: No apparent JVD appreciated  Chest: lung sounds decreased, coarse B/L, on O2   CVS:Regular rate and rhythm, no murmer   Abdomen: Soft, round, non-tender, +BS, external urinary catheter  Extremities: No B/L LE edema present  Neuro: alert   Psych: appropriate mood and affect     Medications:    Current Facility-Administered Medications:     acetaminophen (TYLENOL) tablet 650 mg, 650 mg, Oral, Q6H PRN, Khandallah Smudde, PA-C    ascorbic acid (VITAMIN C) tablet 1,000 mg, 1,000 mg, Oral, Q12H ANGEL, Eulalia Smudde, PA-C, 1,000 mg at 05/10/21 0821    aspirin (ECOTRIN LOW STRENGTH) EC tablet 81 mg, 81 mg, Oral, Daily, Eulalia Smudde, PA-C, 81 mg at 05/10/21 9015    atorvastatin (LIPITOR) tablet 40 mg, 40 mg, Oral, HS, Eulalia Smudde, PA-C, 40 mg at 05/09/21 2212    calcitriol (ROCALTROL) capsule 0 25 mcg, 0 25 mcg, Oral, Daily, Eulalia Smudde, PA-C, 0 25 mcg at 05/10/21 0825    calcium carbonate (TUMS) chewable tablet 1,000 mg, 1,000 mg, Oral, TID PRN, Khandallah Smudde, PA-C    carvedilol (COREG) tablet 25 mg, 25 mg, Oral, BID With Meals, Khandallah Smudde, PA-C, 25 mg at 05/10/21 0821    cefTRIAXone (ROCEPHIN) 1,000 mg in dextrose 5 % 50 mL IVPB, 1,000 mg, Intravenous, Q24H, Eulalia Smudde, PA-C, Last Rate: 100 mL/hr at 05/09/21 1712, 1,000 mg at 05/09/21 1712    cholecalciferol (VITAMIN D3) tablet 2,000 Units, 2,000 Units, Oral, Daily, Eulalia Esquivel PA-C, 2,000 Units at 05/10/21 4720    clonazePAM (KlonoPIN) tablet 0 5 mg, 0 5 mg, Oral, BID, Eulalia Esquivel PA-C, 0 5 mg at 05/10/21 0821    dexamethasone (DECADRON) injection 6 mg, 6 mg, Intravenous, Q24H, Eulalia Esquivel PA-C, 6 mg at 05/09/21 2212    doxycycline hyclate (VIBRAMYCIN) capsule 100 mg, 100 mg, Oral, Q12H [facility], 100 mg at 05/10/21 0821    famotidine (PEPCID) tablet 10 mg, 10 mg, Oral, Daily, Eulalia Smudde, PA-C, 10 mg at 05/10/21 0825    fluticasone (FLONASE) 50 mcg/act nasal spray 2 spray, 2 spray, Nasal, Daily, Eulalia Smudde, PA-C, 2 spray at 05/10/21 0824    gabapentin (NEURONTIN) capsule 100 mg, 100 mg, Oral, TID, Eulalia Smudde, PA-C, 100 mg at 05/10/21 6067    heparin (porcine) subcutaneous injection 5,000 Units, 5,000 Units, Subcutaneous, Q8H [facility], Eulalia Smudde, PA-C, 5,000 Units at 05/10/21 0506    insulin glargine (LANTUS) subcutaneous injection 35 Units 0 35 mL, 35 Units, Subcutaneous, Q12H [facility], 35 Units at 05/10/21 0825    insulin lispro (HumaLOG) 100 units/mL subcutaneous injection 1-6 Units, 1-6 Units, Subcutaneous, HS, Eulalia Smudde, PA-C, 5 Units at 05/09/21 2211    insulin lispro (HumaLOG) 100 units/mL subcutaneous injection 15 Units, 15 Units, Subcutaneous, TID With Meals, Daphney Angelucci, DO, 15 Units at 05/10/21 0820    insulin lispro (HumaLOG) 100 units/mL subcutaneous injection 2-12 Units, 2-12 Units, Subcutaneous, TID AC, 2 Units at 05/10/21 0820 **AND** Fingerstick Glucose (POCT), , , TID AC, Eulalia Smudde, PA-C    isosorbide dinitrate (ISORDIL) tablet 30 mg, 30 mg, Oral, TID, Eulalia Smudde, PA-C, 30 mg at 05/10/21 0825    multi-electrolyte (PLASMALYTE-A/ISOLYTE-S PH 7 4) IV solution, 75 mL/hr, Intravenous, Once, Lupe Sears MD, Stopped at 05/10/21 0710    zinc sulfate (ZINCATE) capsule 220 mg, 220 mg, Oral, Daily, 220 mg at 05/10/21 0821 **FOLLOWED BY** [START ON 5/15/2021] multivitamin-minerals (CENTRUM ADULTS) tablet 1 tablet, 1 tablet, Oral, Daily, Eulalia Esquivel PA-C    ondansetron (ZOFRAN) injection 4 mg, 4 mg, Intravenous, Q6H PRN, Eulalia Esquivel PA-C    pantoprazole (PROTONIX) EC tablet 40 mg, 40 mg, Oral, Early Morning, Eulalia Esquivel PA-C, 40 mg at 05/10/21 0507    [COMPLETED] remdesivir (Veklury) 200 mg in sodium chloride 0 9 % 250 mL IVPB, 200 mg, Intravenous, Q24H, 200 mg at 05/07/21 2236 **FOLLOWED BY** remdesivir (Veklury) 100 mg in sodium chloride 0 9 % 250 mL IVPB, 100 mg, Intravenous, Q24H, Eulalia Esquivel PA-C, Stopped at 05/09/21 2247    Laboratory Results:  Results from last 7 days   Lab Units 05/10/21  0620 05/10/21  0613 05/09/21  0442 05/08/21 2038 05/08/21  0613   WBC Thousand/uL 6 15  --  3 24*  --  3 57*   HEMOGLOBIN g/dL 11 9*  --  11 6*  --  12 6   HEMATOCRIT % 35 4*  --  34 4*  --  39 0   PLATELETS Thousands/uL 129*  --  104*  --  98*   SODIUM mmol/L  --  139 134* 134* 137   POTASSIUM mmol/L  --  3 8 4 1 4 1 4 0   CHLORIDE mmol/L  --  101 97* 97* 100   CO2 mmol/L  --  24 22 23 16*   BUN mg/dL  --  88* 85* 79* 65*   CREATININE mg/dL  --  3 17* 4 23* 4 23* 4 64*   CALCIUM mg/dL  --  7 4* 7 0* 6 9* 7 5*   MAGNESIUM mg/dL  --   --  2 6  --   --    PHOSPHORUS mg/dL  --   --  5 3*  --   --    ALK PHOS U/L  --  78 76  --  69   ALT U/L  --  67 73  --  76   AST U/L  --  101* 120*  --  181*

## 2021-05-10 NOTE — ASSESSMENT & PLAN NOTE
Lab Results   Component Value Date    HGBA1C 6 0 03/19/2021     Recent Labs     05/09/21  2101 05/10/21  0754 05/10/21  1053 05/10/21  1618   POCGLU 325* 161* 196* 176*     · Initially basal insulin decreased due to encephalopathy and poor intake    Continue sliding scale but will increase glargine and add mealtime lispro given hyperglycemia

## 2021-05-10 NOTE — ASSESSMENT & PLAN NOTE
· GAGE on CKD 3b secondary to COVID-19, pneumonia, and rhabdomyolysis  · Continue IV fluids per nephrology  · Continue holding diuretics  · Renal function slightly improved    Results from last 7 days   Lab Units 05/10/21  0613 05/09/21 0442 05/08/21 2038 05/08/21 0613 05/07/21  1823   BUN mg/dL 88* 85* 79* 65* 50*   CREATININE mg/dL 3 17* 4 23* 4 23* 4 64* 4 97*   EGFR ml/min/1 73sq m 22 15 15 14 13

## 2021-05-10 NOTE — ASSESSMENT & PLAN NOTE
Patient with vomiting episode - KUB unremarkable for any obstructive pathology  Advanced diet as tolerated  Consider nasogastric tube for persistent vomiting  Suspect GI symptoms related to COVID-19 infection  Continue acid suppression

## 2021-05-10 NOTE — OCCUPATIONAL THERAPY NOTE
Occupational Therapy Evaluation     Patient Name: Gurwinder Thomas Sr  Today's Date: 5/10/2021  Problem List  Principal Problem:    Acute respiratory failure with hypoxia (HCC)  Active Problems:    Type 2 diabetes mellitus with kidney complication, with long-term current use of insulin (HCC)    Chronic combined systolic and diastolic CHF, NYHA class 3 (HCC)    Obesity with body mass index 30 or greater    Hyperlipidemia    Thrombocytopenia (HCC)    Acute renal failure superimposed on chronic kidney disease (HCC)    Elevated troponin    Acute encephalopathy    Transaminitis    Past Medical History  Past Medical History:   Diagnosis Date    AICD (automatic cardioverter/defibrillator) present     Anxiety and depression     Arthritis     Asthma     pt denies    CAD (coronary artery disease) 10/10/2014    CKD (chronic kidney disease) stage 3, GFR 30-59 ml/min (HCC)     COPD (chronic obstructive pulmonary disease) (HCC)     pt denies    CPAP (continuous positive airway pressure) dependence     Depression (emotion)     affective disorder    Diabetic nephropathy (HCC)     Erectile dysfunction     GERD (gastroesophageal reflux disease)     Hypertension     Iron deficiency anemia     Latent tuberculosis     Lumbar herniated disc     low back pain    Neuropathy     bles    Obesity (BMI 30-39  9) 7/9/2019    Prostate cancer ([facility] [address] )     2013- had radiation- had prostatectomy    Sleep apnea     Systolic CHF, chronic (HCC)     Type 2 diabetes mellitus with hyperlipidemia (HCC)     Use of cane as ambulatory aid     Vitamin D deficiency     Wears glasses      Past Surgical History  Past Surgical History:   Procedure Laterality Date    AV FISTULA PLACEMENT      left upper arm and removal    CARDIAC DEFIBRILLATOR PLACEMENT      CARDIAC ICD GENERATOR CHANGE  9/6/2019    CATARACT EXTRACTION Bilateral 10/09/2014    COLONOSCOPY      INGUINAL HERNIA REPAIR Bilateral     OTHER SURGICAL HISTORY Left 10/10/2014    AV Shunt    DE INSERT,INFLATABLE PENILE PROSTHESIS N/A 1/19/2018    Procedure: INSERTION 3 PART PROSTHESIS PENILE;  Surgeon: Subhash Billingsley MD;  Location: [facility];  Service: Urology    PROSTATECTOMY             05/10/21 2932   Note Type   Note type Evaluation   Restrictions/Precautions   Weight Bearing Precautions Per Order No   Other Precautions Contact/isolation; Airborne/isolation;Cognitive; Chair Alarm; Bed Alarm;Limb alert;Telemetry;O2;Fall Risk  (6L O2)   Pain Assessment   Pain Assessment Tool Pain Assessment not indicated - pt denies pain   Pain Score No Pain   Home Living   Type of Home House   Home Layout Multi-level;Bed/bath upstairs;Stairs to enter with rails  (3 TRIP)   Bathroom Shower/Tub Walk-in shower   Bathroom Toilet Standard   Bathroom Equipment Shower chair   Bathroom Accessibility Accessible   Home Equipment Cane   Additional Comments Pt lives with spouse and and 2 grandchildren (23and 25years old) in a multi-level house with 3 TRIP and stair glide to 2nd floor bed/bath  Prior Function   Level of [place] Independent with ADLs and functional mobility; Needs assistance with IADLs   Lives With Spouse; Other (Comment)  (2 grandchildren)   Receives Help From Family   ADL Assistance Independent   IADLs Needs assistance   Falls in the last 6 months 0   Vocational Retired   Comments At baseline, pt was I w/ ADLs and functional mobility/transfers w/ use of SPC, required assist w/ IADLs, (+) , and denies falls PTA  Lifestyle   Autonomy At baseline, pt was I w/ ADLs and functional mobility/transfers w/ use of SPC, required assist w/ IADLs, (+) , and denies falls PTA     Reciprocal Relationships Spouse, dtr, grandchildren   Service to Others Retired   Intrinsic Gratification Watching TV   Psychosocial   Psychosocial ([address]) WDL   ADL   Where Assessed Chair   Eating Assistance 5  Supervision/Setup   Grooming Assistance 5  [address] [facility] 4  [facility] Assistance   LB Bathing Assistance 3  Moderate Assistance   UB Dressing Assistance 4  Minimal Assistance   LB Dressing Assistance 3  Moderate Assistance   Toileting Assistance  3  Moderate Assistance   Functional Assistance 3  Moderate Assistance   Bed Mobility   Supine to Sit Unable to assess   Sit to Supine 4  Minimal assistance   Additional items Assist x 1; Increased time required;Verbal cues;LE management   Additional Comments Pt lying supine at end of session w/ PT present  All needs met and pt reports no further questions for OT at this time   Transfers   Sit to Stand 4  Minimal assistance   Additional items Assist x 1; Increased time required;Verbal cues   Stand to Sit 4  Minimal assistance   Additional items Assist x 1; Armrests; Increased time required;Verbal cues   Additional Comments Cues for safe technique and hand placement   Functional Mobility   Functional Mobility 4  Minimal assistance   Additional Comments Assist x1; SPO2 post-mobility: 81-91% on 6L O2   Additional items Rolling walker   Balance   Static Sitting Fair   Dynamic Sitting Fair -   Static Standing Poor +   Dynamic Standing Poor   Ambulatory Poor   Activity Tolerance   Activity Tolerance Treatment limited secondary to medical complications (Comment); Patient limited by fatigue   Medical Staff Made Aware Chelsi Mcknight PTA   Nurse Made Aware yes; Juana Vyas, SONAM   RUE Assessment   RUE Assessment [facility]   RU Strength   RUE Overall Strength Within Functional Limits - able to perform ADL tasks with strength  (4/5 throughout)   LUE Assessment   LUE Assessment [facility]   LUE Strength   LUE Overall Strength Within Functional Limits - able to perform ADL tasks with strength  (4/5 throughout)   Hand Function   Gross Motor Coordination Functional   Fine Motor Coordination Functional   Sensation   Light Touch Partial deficits in the RLE;Partial deficits in the LLE   Proprioception   Proprioception No apparent deficits   Vision - Complex Assessment   Acuity Able to read clock/calendar on wall without difficulty; Able to read employee name badge without difficulty   Perception   Inattention/Neglect Appears intact   Cognition   Overall Cognitive Status Impaired   Arousal/Participation Alert; Cooperative   Attention Attends with cues to redirect   Orientation Level Oriented to person;Oriented to time;Disoriented to place   Memory Decreased recall of precautions;Decreased recall of recent events;Decreased short term memory   Following Commands Follows one step commands with increased time or repetition   Comments Pleasant and cooperative  Limited insight into deficits, decreased safety awareness   Assessment   Limitation Decreased ADL status; Decreased UE strength;Decreased Safe judgement during ADL;Decreased cognition;Decreased endurance;Decreased self-care trans;Decreased high-level ADLs   Prognosis Good   Assessment Pt is a 80 y o  male seen for OT evaluation s/p adm to [address] on 5/7/2021 w/ SOB and lethargy and dx'd w/ Acute respiratory failure with hypoxia 2* COVID-19 and bacterial pneumonia, elevated troponin, and ARF superimposed on CKD  Comorbidities affecting pts functional performance include a significant PMH of AICD present, Anxiety, Arthritis, Asthma, CAD, CKD stage 3, COPD, Depression, Iron deficiency anemia, Lumbar herniated disc, Neuropathy, Obesity, Prostate CA, and systolic CHF  Pt with active OT orders and activity orders for Up and OOB as tolerated   Pt lives with spouse and and 2 grandchildren (22 and 25years old) in a multi-level house with 3 TRIP and stair glide to 2nd floor bed/bath  At baseline, pt was I w/ ADLs and functional mobility/transfers w/ use of SPC, required assist w/ IADLs, (+) , and denies falls PTA   Upon evaluation, pt currently requires Min A for UB ADLs, Mod A for LB ADLs, Mod A for toileting, Min A for bed mobility, Min A for transfers, and Min A of 2 for functional mobility 2* the following deficits impacting occupational performance: weakness, decreased strength, decreased balance, decreased tolerance, impaired problem solving and decreased safety awareness  These impairments, as well at pts steps to enter environment, difficulty performing ADLS and limited insight into deficits limit pts ability to safely engage in all baseline areas of occupation  The patient's raw score on the AM-PAC Daily Activity inpatient short form is 16, standardized score is 35 96, less than 39 4  Patients at this level are likely to benefit from discharge to post-acute rehabilitation services  Please refer to the recommendation of the Occupational Therapist for safe discharge planning  Based on the aforementioned OT evaluation, functional performance deficits, and assessments, pt has been identified as a Moderate complexity evaluation  Pt to continue to benefit from continued acute OT services during hospital stay to address defined deficits and to maximize level of functional independence in the following Occupational Performance areas: grooming, bathing/shower, toilet hygiene, dressing, medication management, health maintenance, functional mobility, community mobility, clothing management and social participation  From OT standpoint, recommend STR upon D/C  OT will continue to follow pt 3-5x/wk to address the following goals to  w/in 10-14 days:   Goals   Patient Goals To go home today   LTG Time Frame 10-14   Long Term Goal Please refer to LTGs listed below   Plan   Treatment Interventions ADL retraining;Functional transfer training;UE strengthening/ROM; Endurance training;Patient/family training;Equipment evaluation/education; Compensatory technique education; Activityengagement; Energy conservation   Goal Expiration Date 21   OT Treatment Day 0   OT Frequency 3-5x/wk   Recommendation   OT Discharge Recommendation Post acute rehabilitation services   OT - OK to Discharge Yes  (when medically cleared to rehab)   -[facility] Daily Activity Inpatient Lower Body Dressing 2   Bathing 2   Toileting 2   Upper Body Dressing 3   Grooming 3   Eating 4   Daily Activity Raw Score 16   Daily Activity Standardized Score (Calc for Raw Score >=11) 35 96   AM-PAC Applied Cognition Inpatient   Following a Speech/Presentation 4   Understanding Ordinary Conversation 4   Taking Medications 2   Remembering Where Things Are Placed or Put Away 3   Remembering List of 4-5 Errands 2   Taking Care of Complicated Tasks 2   Applied Cognition Raw Score 17   Applied Cognition Standardized Score 36 52        GOALS    1  Pt will improve activity tolerance to G for min 30 min txment sessions for increase engagement in functional tasks    2  Pt will complete bed mobility at a Mod I level w/ G balance/safety demonstrated to decrease caregiver assistance required     3  Pt will complete UB dressing/self care w/ mod I using adaptive device and DME as needed     4  Pt will complete LB dressing/self care w/ mod I using adaptive device and DME as needed    5  Pt will complete toileting w/ mod I w/ G hygiene/thoroughness using DME as needed    6  Pt will improve functional transfers to Mod I on/off all surfaces using DME as needed w/ G balance/safety     7  Pt will improve functional mobility during ADL/IADL/leisure tasks to Mod I using DME as needed w/ G balance/safety     8  Pt will be attentive 100% of the time during ongoing cognitive assessment w/ G participation to assist w/ safe d/c planning/recommendations    9  Pt will demonstrate G carryover of pt/caregiver education and training as appropriate w/o cues w/ good tolerance to increase safety during functional tasks    10  Pt will demonstrate 100% carryover of energy conservation techniques t/o functional I/ADL/leisure tasks w/o cues s/p skilled education to increase endurance during functional tasks    11  Pt will increase BUE strength by 1MM grade via AROM exercises to increase independence in ADLs and transfers    12   Pt will increase standing tolerance to 8-10 mins with Fair+ dynamic standing balance to increase safety during participation in ADLs       Winter Hicks, OTR/L

## 2021-05-10 NOTE — PROGRESS NOTES
Progress Note - Pulmonary   Angela Malone Sr  80 y o  male MRN: [id number]  Unit/Bed#: [place] [address] -01 Encounter: [id number]    Assessment/Plan:  1  Acute hypoxemic respiratory failure secondary to COVID-19 pneumonia  · CT on the 6 L nasal cannula   · Wean as able  2  COVID-19 viral pneumonia  · Continue current treatment protocol   · A ferritin has continued to rise but other inflammatory markers improving   · Remains clinically stable  3  Obstructive sleep apnea  · Use CPAP with oxygen last night he did well with this  4  Chronic combined heart failure with acute kidney injury on presentation  · These do appear euvolemic    ----------------------------------------------------------------------------------------------------------------------    HPI/Interval History:   Sitting in a chair, comfortable  Reports that he slept well last night  He has no chest pain  Minimal cough  Vitals:   Blood pressure (!) 177/82, pulse 72, temperature (!) 97 2 °F (36 2 °C), resp  rate (!) 24, height 5' 8" (1 727 m), weight 115 kg (253 lb 8 5 oz), SpO2 90 %  ,Body mass index is 38 55 kg/m²  SpO2: 90 %  SpO2 Activity: At Rest  O2 Device: Nasal cannula 6 L    Physical Exam:   Gen:  Pleasant, no distress  HEENT:  Is nonicteric sclera  Oropharynx moist  Neck:  Stocky  No JVD  Chest:  Symmetric chest wall excursion    Few crackles posteriorly at the base  Cardiac:  Regular, no murmur  Abdomen:  Soft, obese  Extremities:  No significant edema      Labs:    CBC:  Results from last 7 days   Lab Units 05/10/21  0620   WBC Thousand/uL 6 15   HEMOGLOBIN g/dL 11 9*   HEMATOCRIT % 35 4*   PLATELETS Thousands/uL 129*         BMP:   Lab Results   Component Value Date    SODIUM 139 05/10/2021    K 3 8 05/10/2021    CO2 24 05/10/2021     05/10/2021    BUN 88 (H) 05/10/2021    CREATININE 3 17 (H) 05/10/2021    GLUCOSE 238 (H) 04/24/2018    CALCIUM 7 4 (L) 05/10/2021       Results from last 7 days   Lab Units 05/10/21  4812 05/09/21  0442 05/08/21  9411   D-DIMER QUANTITATIVE ug/ml FEU 1 39* 1 39* 1 89*   FERRITIN ng/mL 2,421* 1,839* 1,302*   CRP mg/L 66 7* 120 6* 157 4*   PROCALCITONIN ng/ml 0 44* 0 76* 1 22*           Kamille Brown MD

## 2021-05-10 NOTE — ASSESSMENT & PLAN NOTE
· Acute respiratory failure/hypoxia secondary to COVID-19 and bacterial pneumonia  · COVID-19:  Continue remdesivir dexamethasone and COVID vitamins     · Bacterial pneumonia:  Given elevated procalcitonin continue ceftriaxone/doxycycline  · Remdesivir day 2   · Decadron day 2   · Vitamin-C, zinc, vitamin-D  · Trend inflammatory markers   · Trend procalcitonin, currently on ceftriaxone and azithromycin, likely a component of bacterial pneumonia with COVID-19 infection    Lab Results   Component Value Date    SARSCOV2 Positive (A) 05/07/2021    SARSCOV2 Negative 04/30/2021     Results from last 7 days   Lab Units 05/10/21  0613 05/09/21 0442 05/08/21  0613 05/07/21  1823   D-DIMER QUANTITATIVE ug/ml FEU 1 39* 1 39* 1 89* 2 06*   CRP mg/L 66 7* 120 6* 157 4* 234 1*   FERRITIN ng/mL 2,421* 1,839* 1,302* 1,018*     Results from last 7 days   Lab Units 05/10/21  0613 05/09/21 0442 05/08/21  0613 05/07/21  1712   PROCALCITONIN ng/ml 0 44* 0 76* 1 22* 1 57*

## 2021-05-10 NOTE — PLAN OF CARE
Problem: PHYSICAL THERAPY ADULT  Goal: Performs mobility at highest level of function for planned discharge setting  See evaluation for individualized goals  Description: Treatment/Interventions: Functional transfer training, LE strengthening/ROM, Elevations, Therapeutic exercise, Endurance training, Patient/family training, Equipment eval/education, Bed mobility, Gait training, Compensatory technique education, Continued evaluation, Spoke to nursing  Equipment Recommended: Parul Garcia       See flowsheet documentation for full assessment, interventions and recommendations  Outcome: Progressing  Note: Prognosis: Good  Problem List: Decreased strength, Decreased range of motion, Decreased endurance, Impaired balance, Decreased mobility, Decreased cognition, Impaired judgement, Decreased safety awareness, Obesity, Decreased skin integrity  Assessment: Pt  seated in bedside chair upon my arrival  Pt  reporting fatigue, however agreeable to therapeutic intervention  Performance of HEP with cues provided for proper completion  Noted O2 saturation between 81-91% on 6L throughout treatment session  RN made aware at end of treatment session  Pt  progressed with transfers requiring A of therapist with cues for hand placement/technique  Pt  progressed with limited amb  trial with use of RW and cues provided for LE sequencing  Pt  limited by fatigue, requiring quick return to bed  Pt  repositioned supine in bed at end of treatment session with bed alarm active  PT will continue to recommend d/c to rehab when medically stable for continued improvement of noted impairments  Barriers to Discharge: Inaccessible home environment, Decreased caregiver support        PT Discharge Recommendation: Post acute rehabilitation services     PT - OK to Discharge: Yes(if d/c to rehab when medically stable )    See flowsheet documentation for full assessment

## 2021-05-10 NOTE — PROGRESS NOTES
[address] [place] [address]  Progress Note Lawtenishancjuliette Guzman Sr  1950, 80 y o  male MRN: [id number]  Unit/Bed#: [place] [address] -01 Encounter: [id number]  Primary Care Provider: Arash Gómez MD   Date and time admitted to hospital: 5/7/2021  4:33 PM    Vomiting  Assessment & Plan  Patient with vomiting episode - KUB unremarkable for any obstructive pathology  Advanced diet as tolerated  Consider nasogastric tube for persistent vomiting  Suspect GI symptoms related to COVID-19 infection  Continue acid suppression    Transaminitis  Assessment & Plan  · Likely secondary to viral syndrome    Results from last 7 days   Lab Units 05/10/21  0613 05/09/21  0442 05/08/21  0613 05/07/21  1823   AST U/L 101* 120* 181* 182*   ALT U/L 67 73 76 76   TOTAL BILIRUBIN mg/dL 0 35 0 25 0 34 0 41       Acute encephalopathy  Assessment & Plan  · Metabolic encephalopathy secondary to renal failure and COVID-19/hypoxia  · Appears to be at baseline mentation now   · Restraints discontinued  · Suspect hypoxia as etiology of his altered mental status  ·     Elevated troponin  Assessment & Plan  · Non MI elevation troponin secondary to respiratory and renal failure  · Appreciate cardiology evaluation     Results from last 7 days   Lab Units 05/08/21  0613 05/08/21  0208 05/07/21  2224 05/07/21  1712   TROPONIN I ng/mL 0 45* 0 53* 0 60* 0 72*       Acute renal failure superimposed on chronic kidney disease (HCC)  Assessment & Plan  · GAGE on CKD 3b secondary to COVID-19, pneumonia, and rhabdomyolysis  · Continue IV fluids per nephrology  · Continue holding diuretics  · Renal function slightly improved    Results from last 7 days   Lab Units 05/10/21  0613 05/09/21  0442 05/08/21  2038 05/08/21  0613 05/07/21  1823   BUN mg/dL 88* 85* 79* 65* 50*   CREATININE mg/dL 3 17* 4 23* 4 23* 4 64* 4 97*   EGFR ml/min/1 73sq m 22 15 15 14 13       Thrombocytopenia (HCC)  Assessment & Plan  · Thrombocytopenia secondary to sepsis  Improving    Results from last 7 days   Lab Units 05/10/21  0620 05/09/21  0442 05/08/21  0613 05/07/21  1712   PLATELETS Thousands/uL 129* 104* 98* 96*   Monitor platelet count, improved    Hyperlipidemia  Assessment & Plan  · Continue atorvastatin    Obesity with body mass index 30 or greater  Assessment & Plan  · Body mass index is 38 55 kg/m²  · Significant risk factor for COVID-19    Chronic combined systolic and diastolic CHF, NYHA class 3 (Formerly [facility])  Assessment & Plan  Wt Readings from Last 3 Encounters:   05/07/21 115 kg (253 lb 8 5 oz)   03/19/21 118 kg (261 lb)   03/18/21 118 kg (261 lb)   Patient with elevated BNP in the setting of renal failure  Weights appear stable  Monitor  Results from last 7 days   Lab Units 05/07/21  1823   NT-PRO BNP pg/mL 7,074*     · Chronic CHF currently holding diuretics and Entresto    Type 2 diabetes mellitus with kidney complication, with long-term current use of insulin [facility])  Assessment & Plan  Lab Results   Component Value Date    HGBA1C 6 0 03/19/2021     Recent Labs     05/09/21  2101 05/10/21  0754 05/10/21  1053 05/10/21  1618   POCGLU 325* 161* 196* 176*     · Initially basal insulin decreased due to encephalopathy and poor intake  Continue sliding scale but will increase glargine and add mealtime lispro given hyperglycemia    * Acute respiratory failure with hypoxia (Former[facility])  Assessment & Plan  · Acute respiratory failure/hypoxia secondary to COVID-19 and bacterial pneumonia  · COVID-19:  Continue remdesivir dexamethasone and COVID vitamins     · Bacterial pneumonia:  Given elevated procalcitonin continue ceftriaxone/doxycycline  · Remdesivir day 2   · Decadron day 2   · Vitamin-C, zinc, vitamin-D  · Trend inflammatory markers   · Trend procalcitonin, currently on ceftriaxone and azithromycin, likely a component of bacterial pneumonia with COVID-19 infection    Lab Results   Component Value Date    SARSCOV2 Positive (A) 05/07/2021    SARSCOV2 Negative 04/30/2021 Results from last 7 days   Lab Units 05/10/21  0613 05/09/21  0442 05/08/21  0613 05/07/21  1823   D-DIMER QUANTITATIVE ug/ml FEU 1 39* 1 39* 1 89* 2 06*   CRP mg/L 66 7* 120 6* 157 4* 234 1*   FERRITIN ng/mL 2,421* 1,839* 1,302* 1,018*     Results from last 7 days   Lab Units 05/10/21  0613 05/09/21 0442 05/08/21  0613 05/07/21  1712   PROCALCITONIN ng/ml 0 44* 0 76* 1 22* 1 57*         [facility] Internal Medicine Progress Note  Patient: Citlalli Myers Sr  80 y o  male   MRN: [id number]  PCP: Alfredo Joseph MD  Unit/Bed#: [facility] -01 Encounter: [id number]  Date Of Visit: 05/10/21    Assessment:    Principal Problem:    Acute respiratory failure with hypoxia ([facility] [address] )  Active Problems:    Type 2 diabetes mellitus with kidney complication, with long-term current use of insulin (HCC)    Chronic combined systolic and diastolic CHF, NYHA class 3 (HCC)    Obesity with body mass index 30 or greater    Hyperlipidemia    Thrombocytopenia (HCC)    Acute renal failure superimposed on chronic kidney disease (HCC)    Elevated troponin    Acute encephalopathy    Transaminitis    Vomiting      Plan:    · Continue current treatment  · KUB  ·        VTE Pharmacologic Prophylaxis:   Pharmacologic: Heparin  Mechanical VTE Prophylaxis in Place: Yes    Patient Centered Rounds: I have performed bedside rounds with nursing staff today  Discussions with Specialists or Other Care Team Provider:  Case management    Education and Discussions with Family / Patient:  Patient's son at 5:44 p m  Time Spent for Care: 30 minutes  More than 50% of total time spent on counseling and coordination of care as described above      Current Length of Stay: 3 day(s)    Current Patient Status: Inpatient   Certification Statement: The patient will continue to require additional inpatient hospital stay due to Acute hypoxemic respiratory failure secondary to COVID-19    Discharge Plan / Estimated Discharge Date:  72 hours    Code Status: Level 1 - Full Code      Subjective:   Patient seen examined, very poor appetite  Had episode of projectile vomiting  Denies any abdominal pain  No diarrhea    Still reports significant shortness of breath difficulty breathing    A complete and comprehensive 14 point organ system review has been performed and all other systems are negative other than stated above  Objective:     Vitals:   Temp (24hrs), Av 1 °F (36 2 °C), Min:97 °F (36 1 °C), Max:97 2 °F (36 2 °C)    Temp:  [97 °F (36 1 °C)-97 2 °F (36 2 °C)] 97 °F (36 1 °C)  HR:  [63-72] 69  Resp:  [18-24] 21  BP: (141-177)/(65-82) 146/68  SpO2:  [81 %-95 %] 92 %  Body mass index is 38 55 kg/m²  Input and Output Summary (last 24 hours):        Intake/Output Summary (Last 24 hours) at 5/10/2021 1739  Last data filed at 5/10/2021 9659  Gross per 24 hour   Intake 250 ml   Output 2050 ml   Net -1800 ml       Physical Exam:     General: well appearing, mild respiratory distress, obese  HEENT: atraumatic, PERRLA, moist mucosa, normal pharynx, normal tonsils and adenoids, normal tongue, no fluid in sinuses  Neck: Trachea midline, no carotid bruit, no masses  Respiratory: normal chest wall expansion, CTA B, no r/r/w, no rubs  Cardiovascular: RRR, no m/r/g, Normal S1 and S2  Abdomen: Soft, non-tender, non-distended, normal bowel sounds in all quadrants, no hepatosplenomegaly, no tympany  Rectal: deferred  Musculoskeletal: normal ROM in upper and lower extremities  Integumentary: warm, dry, and pink, with no rash, purpura, or petechia  Heme/Lymph: no lymphadenopathy, no bruises  Neurological: Cranial Nerves II-XII grossly intact, no tics, normal sensation to pressure and light touch  Psychiatric: cooperative with normal mood, affect, and cognition      Additional Data:     Labs:    Results from last 7 days   Lab Units 05/10/21  0620  21  0613 21  1712   WBC Thousand/uL 6 15   < > 3 57* 3 75*   HEMOGLOBIN g/dL 11 9*   < > 12 6 12 8   HEMATOCRIT % 35 4* < > 39 0 39 5   PLATELETS Thousands/uL 129*   < > 98* 96*   NEUTROS PCT %  --   --   --  74   LYMPHS PCT %  --   --   --  17   LYMPHO PCT %  --   --  3*  --    MONOS PCT %  --   --   --  9   MONO PCT %  --   --  2*  --    EOS PCT %  --   --  2 0    < > = values in this interval not displayed  Results from last 7 days   Lab Units 05/10/21  0613   POTASSIUM mmol/L 3 8   CHLORIDE mmol/L 101   CO2 mmol/L 24   BUN mg/dL 88*   CREATININE mg/dL 3 17*   CALCIUM mg/dL 7 4*   ALK PHOS U/L 78   ALT U/L 67   AST U/L 101*     Results from last 7 days   Lab Units 05/07/21  1823   INR  1 12       * I Have Reviewed All Lab Data Listed Above  * Additional Pertinent Lab Tests Reviewed: Carlene 67 Admission Reviewed    Imaging:    Imaging Reports Reviewed Today Include:  CT of the chest on admission  Imaging Personally Reviewed by Myself Includes:  CT of the chest on admission    Recent Cultures (last 7 days):     Results from last 7 days   Lab Units 05/07/21  1821 05/07/21  1712   BLOOD CULTURE  No Growth at 48 hrs  No Growth at 48 hrs         Last 24 Hours Medication List:   Current Facility-Administered Medications   Medication Dose Route Frequency Provider Last Rate    acetaminophen  650 mg Oral Q6H PRN Pimento Drivers, PA-C      ascorbic acid  1,000 mg Oral Q12H [address] Eulalia Smudde, PA-C      aspirin  81 mg Oral Daily Mary Ann Drivers, PA-C      atorvastatin  40 mg Oral HS Mary Ann Drivers, PA-C      calcitriol  0 25 mcg Oral Daily Pimento Drivers, PA-C      calcium carbonate  1,000 mg Oral TID PRN Mary Ann Drivers, PA-C      carvedilol  25 mg Oral BID With Meals Mary Ann Drivers, PA-C      cefTRIAXone  1,000 mg Intravenous Q24H Eulalia Smudde, PA-C 1,000 mg (05/10/21 1626)    cholecalciferol  2,000 Units Oral Daily Eulalia Smudde, PA-C      clonazePAM  0 5 mg Oral BID Eulalia Smudde, PA-C      dexamethasone  6 mg Intravenous Q24H Eulalia Smudde, PA-C      doxycycline hyclate  100 mg Oral Q12H [address] Yareli Jamesci, DO  famotidine  10 mg Oral Daily Eulalia Smastrid PA-C      fluticasone  2 spray Nasal Daily Eulalia Esquivel, PA-C      gabapentin  100 mg Oral TID Governor Manish PA-C      heparin (porcine)  5,000 Units Subcutaneous Q8H [facility] Eulalia Esquivel, PA-C      insulin glargine  35 Units Subcutaneous Q12H [facility] Nomi Wesley DO      insulin lispro  1-6 Units Subcutaneous HS Eulalia Alvin, PA-C      insulin lispro  15 Units Subcutaneous TID With Meals Nomi Wesley DO      insulin lispro  2-12 Units Subcutaneous TID AC MARU Gomez-C      isosorbide dinitrate  30 mg Oral TID Governor Manish PA-C      multi-electrolyte  1,000 mL Intravenous Once Jannet Kate Paris DO      zinc sulfate  220 mg Oral Daily Eulalia Esquivel PA-C      Followed by   Nayan Wagoner ON 5/15/2021] multivitamin-minerals  1 tablet Oral Daily Eulalia Esquivel PA-C      ondansetron  4 mg Intravenous Q6H PRN Governor Manish PA-C      pantoprazole  40 mg Oral Early Morning Governor Manish PA-C      remdesivir  100 mg Intravenous Q24H Governor Manish PA-C Stopped (05/09/21 6687)        Today, Patient Was Seen By: Ricardo Case DO    ** Please Note: This note was completed in part utilizing [email or web address] Direct Software  Grammatical errors, random word insertions, spelling mistakes, and incomplete sentences may be an occasional consequence of this system secondary to software limitations, ambient noise, and hardware issues  If you have any questions or concerns about the content, text, or information contained within the body of this dictation, please contact the provider for clarification   **

## 2021-05-10 NOTE — PLAN OF CARE
Problem: Potential for Falls  Goal: Patient will remain free of falls  Description: INTERVENTIONS:  - Assess patient frequently for physical needs  -  Identify cognitive and physical deficits and behaviors that affect risk of falls    -  [place] fall precautions as indicated by assessment   - Educate patient/family on patient safety including physical limitations  - Instruct patient to call for assistance with activity based on assessment  - Modify environment to reduce risk of injury  - Consider OT/PT consult to assist with strengthening/mobility  Outcome: Progressing     Problem: Prexisting or High Potential for Compromised Skin Integrity  Goal: Skin integrity is maintained or improved  Description: INTERVENTIONS:  - Identify patients at risk for skin breakdown  - Assess and monitor skin integrity  - Assess and monitor nutrition and hydration status  - Monitor labs   - Assess for incontinence   - Turn and reposition patient  - Assist with mobility/ambulation  - Relieve pressure over bony prominences  - Avoid friction and shearing  - Provide appropriate hygiene as needed including keeping skin clean and dry  - Evaluate need for skin moisturizer/barrier cream  - Collaborate with interdisciplinary team   - Patient/family teaching  - Consider wound care consult   Outcome: Progressing     Problem: PAIN - ADULT  Goal: Verbalizes/displays adequate comfort level or baseline comfort level  Description: Interventions:  - Encourage patient to monitor pain and request assistance  - Assess pain using appropriate pain scale  - Administer analgesics based on type and severity of pain and evaluate response  - Implement non-pharmacological measures as appropriate and evaluate response  - Consider cultural and social influences on pain and pain management  - Notify physician/advanced practitioner if interventions unsuccessful or patient reports new pain  Outcome: Progressing     Problem: INFECTION - ADULT  Goal: Absence or prevention of progression during hospitalization  Description: INTERVENTIONS:  - Assess and monitor for signs and symptoms of infection  - Monitor lab/diagnostic results  - Monitor all insertion sites, i e  indwelling lines, tubes, and drains  - Monitor endotracheal if appropriate and nasal secretions for changes in amount and color  - [place] appropriate cooling/warming therapies per order  - Administer medications as ordered  - Instruct and encourage patient and family to use good hand hygiene technique  - Identify and instruct in appropriate isolation precautions for identified infection/condition  Outcome: Progressing     Problem: SAFETY ADULT  Goal: Patient will remain free of falls  Description: INTERVENTIONS:  - Assess patient frequently for physical needs  -  Identify cognitive and physical deficits and behaviors that affect risk of falls    -  [place] fall precautions as indicated by assessment   - Educate patient/family on patient safety including physical limitations  - Instruct patient to call for assistance with activity based on assessment  - Modify environment to reduce risk of injury  - Consider OT/PT consult to assist with strengthening/mobility  Outcome: Progressing  Goal: Maintain or return to baseline ADL function  Description: INTERVENTIONS:  -  Assess patient's ability to carry out ADLs; assess patient's baseline for ADL function and identify physical deficits which impact ability to perform ADLs (bathing, care of mouth/teeth, toileting, grooming, dressing, etc )  - Assess/evaluate cause of self-care deficits   - Assess range of motion  - Assess patient's mobility; develop plan if impaired  - Assess patient's need for assistive devices and provide as appropriate  - Encourage maximum independence but intervene and supervise when necessary  - Involve family in performance of ADLs  - Assess for home care needs following discharge   - Consider OT consult to assist with ADL evaluation and planning for discharge  - Provide patient education as appropriate  Outcome: Progressing  Goal: Maintain or return mobility status to optimal level  Description: INTERVENTIONS:  - Assess patient's baseline mobility status (ambulation, transfers, stairs, etc )    - Identify cognitive and physical deficits and behaviors that affect mobility  - Identify mobility aids required to assist with transfers and/or ambulation (gait belt, sit-to-stand, lift, walker, cane, etc )  - [place] fall precautions as indicated by assessment  - Record patient progress and toleration of activity level on Mobility SBAR; progress patient to next Phase/Stage  - Instruct patient to call for assistance with activity based on assessment  - Consider rehabilitation consult to assist with strengthening/weightbearing, etc   Outcome: Progressing     Problem: DISCHARGE PLANNING  Goal: Discharge to home or other facility with appropriate resources  Description: INTERVENTIONS:  - Identify barriers to discharge w/patient and caregiver  - Arrange for needed discharge resources and transportation as appropriate  - Identify discharge learning needs (meds, wound care, etc )  - Arrange for interpretive services to assist at discharge as needed  - Refer to Case Management Department for coordinating discharge planning if the patient needs post-hospital services based on physician/advanced practitioner order or complex needs related to functional status, cognitive ability, or social support system  Outcome: Progressing     Problem: Knowledge Deficit  Goal: Patient/family/caregiver demonstrates understanding of disease process, treatment plan, medications, and discharge instructions  Description: Complete learning assessment and assess knowledge base    Interventions:  - Provide teaching at level of understanding  - Provide teaching via preferred learning methods  Outcome: Progressing     Problem: Nutrition/Hydration-ADULT  Goal: Nutrient/Hydration intake appropriate for improving, restoring or maintaining nutritional needs  Description: Monitor and assess patient's nutrition/hydration status for malnutrition  Collaborate with interdisciplinary team and initiate plan and interventions as ordered  Monitor patient's weight and dietary intake as ordered or per policy  Utilize nutrition screening tool and intervene as necessary  Determine patient's food preferences and provide high-protein, high-caloric foods as appropriate       INTERVENTIONS:  - Monitor oral intake, urinary output, labs, and treatment plans  - Assess nutrition and hydration status and recommend course of action  - Evaluate amount of meals eaten  - Assist patient with eating if necessary   - Allow adequate time for meals  - Recommend/ encourage appropriate diets, oral nutritional supplements, and vitamin/mineral supplements  - Order, calculate, and assess calorie counts as needed  - Recommend, monitor, and adjust tube feedings and TPN/PPN based on assessed needs  - Assess need for intravenous fluids  - Provide specific nutrition/hydration education as appropriate  - Include patient/family/caregiver in decisions related to nutrition  Outcome: Progressing     Problem: INFECTION - ADULT  Goal: Absence of fever/infection during neutropenic period  Description: INTERVENTIONS:  - Monitor WBC    Outcome: Completed

## 2021-05-10 NOTE — UTILIZATION REVIEW
Initial Clinical Review    Admission: Date/Time/Statement:   Admission Orders (From admission, onward)     Ordered        05/07/21 2015  Inpatient Admission  Once                   Orders Placed This Encounter   Procedures    Inpatient Admission     Standing Status:   Standing     Number of Occurrences:   1     Order Specific Question:   Level of Care     Answer:   Med Surg [16]     Order Specific Question:   Estimated length of stay     Answer:   More than 2 Midnights     Order Specific Question:   Certification     Answer:   I certify that inpatient services are medically necessary for this patient for a duration of greater than two midnights  See H&P and MD Progress Notes for additional information about the patient's course of treatment  ED Arrival Information     Expected Arrival Acuity Means of Arrival Escorted By Service Admission Type    - 5/7/2021 16:33 Emergent Ambulance [facility] ([address]) Hospitalist Emergency    Arrival Complaint    SOB        Chief Complaint   Patient presents with    Shortness of Breath     Per EMS pt has been SOB with Lethargy for the past day  Pt is staying with family who all have covid-19  Initial Presentation: 72 y o  male with Hx  of diabetes, CHF, obesity, HTN,  GERD, COPD, CKD, CAD  presents to ED with SOB and generalized weakness - unable to get OOB today  Per EMS sats in the 70's -Improved to 90s on facemask  Pt opens eyes to voice, but does not follow commands  Per son -patient's wife was recently positive for COVID-19 and hospitalized and has been living with his daughter for the past few days   Patient has been home alone and son believes he has not been taking care of himself, has not been eating or drinking much for the past few days, not sure if taking his meds  ED Findings: tachypneic,  GCS 9  WBC's 3 75,  Trop 0 72, Cr 4 97 (baseline cr 1 8-1 9),   ,  NTproBNP  7,074, Procalcitonin 1 57 ABG:  Ph 7 433, pco2  35 0,  Po@  606  6  COVID positive  CT Head w/o acute findings  CT Chest w/Extensive multilobar infiltrates in a pattern and distribution that could be consistent w/ hx of suspected Covid 19  Elevated ferritin, crp and d-dimer  Admitted 5/7 to Inpatient MS with Severe sepsis due to COVID-19 pneumonia, Acute respiratory failure with hypoxia, ARF on CKD, Acute metabolic encephalopathy, Elevated trops  Placed on moderate covid pathway with IV Remdesivir & Decadron, covid vitamins, IV Rocephin, po doxycycline,  Telemetry, supplemental O2 to maintain sats > 90% - currently on 5 L NC,  Consult Pulm, trend inflamm markers, IVF's, Nephrology consult, hold lasix & zaroxolyn, monitor mental status (normally A&O), trends trops, Cardio consult, cont asa & statins    Day 2: 5/8 More awake today  Currently requiring 6 L nc - wean as able  Per Pulm -Continue current COVID-19 treatment protocol  Given treatment for possible superimposed bacterial infection, would hold off on Actemra  Per Cardio: static elevation of troponin without chest pain or ECG abnormalities in the setting of GAGE and rhabdomyolysis  Hold Entresto and Lasix given acute renal failure  Per Nephrology: Suspect prerenal azotemia in the setting of volume depletion with diuretics, COVID-19 infection, loss of autoregulatory system with Arb (enestro)  relative hypotension, rhabdomyolysis with elevated CPK  Will need to rule out obstructive uropathy  Cr today 4  64  Avoid nephrotoxins  Continue to hold enestro, metolazone Lasix at this time   May need to insert Hodge catheter for more accurate I&O  Change to Plasmalyte in the setting of decreased bicarb and elevated CPK for 12 hours-monitor closely for respiratory status in the setting of history of diastolic congestive heart failure      ED Triage Vitals   Temperature Pulse Respirations Blood Pressure SpO2   05/07/21 1652 05/07/21 1652 05/07/21 1652 05/07/21 1652 05/07/21 1652   99 6 °F (37 6 °C) 77 (!) 32 127/59 96 %      Temp Source Heart Rate Source Patient Position - Orthostatic VS BP Location FiO2 (%)   05/07/21 1652 05/07/21 1652 05/07/21 1953 05/07/21 1953 --   Oral Monitor Sitting Right arm       Pain Score       05/07/21 1652       5          Wt Readings from Last 1 Encounters:   05/07/21 115 kg (253 lb 8 5 oz)     Additional Vital Signs:   05/08/21 07:28:58  100 3 °F (37 9 °C)  73  17  137/59  85  94 %  --  --  --  --  --   05/08/21 01:26:20  98 1 °F (36 7 °C)  71  --  --  --  93 %  44  --  6 L/min  Nasal cannula  --   05/08/21 00:01:19  99 7 °F (37 6 °C)  76  --  --  --  95 %  44  --  6 L/min  Nasal cannula  --   05/07/21 22:43:14  99 8 °F (37 7 °C)  74  --  --  --  91 %  44  --  6 L/min  Nasal cannula  --   05/07/21 21:27:23  100 6 °F (38 1 °C)Abnormal   73  --  --  --  93 %  44  --  6 L/min  Nasal cannula  --   05/07/21 21:09:05  100 6 °F (38 1 °C)Abnormal   74  19  107/60  76  86 %Abnormal   40  --  5 L/min  Nasal cannula  Lying   05/07/21 2058  --  72  22  150/68  --  100 %  --  --  --  None (Room air)  Lying   05/07/21 1953  --  77  20  146/69  --  100 %  40  --  5 L/min  Nasal cannula  Sitting   05/07/21 1930  --  74  24Abnormal   111/56  --  100 %  40  --  5 L/min  Nasal cannula  --   05/07/21 1900  --  72  24Abnormal   --  --  99 %  40  --  5 L/min  Nasal cannula  --   05/07/21 1655  --  --  --  --  --  --  --  15 L/min  --  Non-rebreather mask  --       Pertinent Labs/Diagnostic Test Results:   Results from last 7 days   Lab Units 05/07/21  1720   SARS-COV-2  Positive*     Results from last 7 days   Lab Units 05/10/21  0620 05/09/21 0442 05/08/21 0613 05/07/21  1712   WBC Thousand/uL 6 15 3 24* 3 57* 3 75*   HEMOGLOBIN g/dL 11 9* 11 6* 12 6 12 8   HEMATOCRIT % 35 4* 34 4* 39 0 39 5   PLATELETS Thousands/uL 129* 104* 98* 96*   NEUTROS ABS Thousands/µL  --   --   --  2 77   BANDS PCT %  --   --  5  --      Results from last 7 days   Lab Units 05/10/21  0613 05/09/21 0442 05/08/21  2038 05/08/21  7736 05/07/21  1823 SODIUM mmol/L 139 134* 134* 137 136   POTASSIUM mmol/L 3 8 4 1 4 1 4 0 3 5   CHLORIDE mmol/L 101 97* 97* 100 100   CO2 mmol/L 24 22 23 16* 28   ANION GAP mmol/L 14* 15* 14* 21* 8   BUN mg/dL 88* 85* 79* 65* 50*   CREATININE mg/dL 3 17* 4 23* 4 23* 4 64* 4 97*   EGFR ml/min/1 73sq m 22 15 15 14 13   CALCIUM mg/dL 7 4* 7 0* 6 9* 7 5* 7 6*   MAGNESIUM mg/dL  --  2 6  --   --   --    PHOSPHORUS mg/dL  --  5 3*  --   --   --      Results from last 7 days   Lab Units 05/10/21  0613 05/09/21  0442 05/08/21  0613 05/07/21  1823   AST U/L 101* 120* 181* 182*   ALT U/L 67 73 76 76   ALK PHOS U/L 78 76 69 71   TOTAL PROTEIN g/dL 6 2* 6 2* 6 6 6 7   ALBUMIN g/dL 2 5* 2 4* 2 6* 2 8*   TOTAL BILIRUBIN mg/dL 0 35 0 25 0 34 0 41     Results from last 7 days   Lab Units 05/10/21  1053 05/10/21  0754 05/09/21 2101 05/09/21  1613 05/09/21  1127 05/09/21  0755 05/08/21  2034 05/08/21  1637 05/08/21  1124 05/08/21  0638 05/07/21  2353 05/07/21  2122   POC GLUCOSE mg/dl 196* 161* 325* 437* 392* 369* 419* 322* 286* 157* 91 81     Results from last 7 days   Lab Units 05/10/21  0613 05/09/21 0442 05/08/21 2038 05/08/21  0613 05/07/21  1823   GLUCOSE RANDOM mg/dL 152* 371* 378* 144* 76     Results from last 7 days   Lab Units 05/07/21 2001   PH ART  7 433   PCO2 ART mm Hg 35 0*   PO2 ART mm Hg 60 6*   HCO3 ART mmol/L 22 9   BASE EXC ART mmol/L -0 9   O2 CONTENT ART mL/dL 15 9*   O2 HGB, ARTERIAL % 89 8*   ABG SOURCE  Radial, Right     Results from last 7 days   Lab Units 05/10/21  0613 05/09/21  0442 05/08/21  0613   CK TOTAL U/L 4,375* 6,420* 9,220*   CK MB INDEX % <1 0 <1 0 <1 0   CK MB ng/mL 7 9* 7 0* 4 8     Results from last 7 days   Lab Units 05/08/21  0613 05/08/21  0208 05/07/21  2224 05/07/21  1712   TROPONIN I ng/mL 0 45* 0 53* 0 60* 0 72*     Results from last 7 days   Lab Units 05/10/21  0613 05/09/21  0442 05/08/21  0613 05/07/21  1823   D-DIMER QUANTITATIVE ug/ml FEU 1 39* 1 39* 1 89* 2 06*     Results from last 7 days Lab Units 05/07/21  1823   PROTIME seconds 14 1   INR  1 12   PTT seconds 33     Results from last 7 days   Lab Units 05/10/21  0613 05/09/21  0442 05/08/21  0613 05/07/21  1712   PROCALCITONIN ng/ml 0 44* 0 76* 1 22* 1 57*     Results from last 7 days   Lab Units 05/08/21  2038 05/07/21  1823   LACTIC ACID mmol/L 1 5 1 1     Results from last 7 days   Lab Units 05/07/21  1823   NT-PRO BNP pg/mL 7,074*     Results from last 7 days   Lab Units 05/10/21  0613 05/09/21  0442 05/08/21  0613   FERRITIN ng/mL 2,421* 1,839* 1,302*     Results from last 7 days   Lab Units 05/10/21  0613 05/09/21  0442 05/08/21  0613 05/07/21  1823   CRP mg/L 66 7* 120 6* 157 4* 234 1*     Results from last 7 days   Lab Units 05/08/21  0757   CLARITY UA  Clear   COLOR UA  Yellow   SPEC GRAV UA  1 025   PH UA  5 5   GLUCOSE UA mg/dl 100 (1/10%)*   KETONES UA mg/dl Negative   BLOOD UA  Small*   PROTEIN UA mg/dl 100 (2+)*   NITRITE UA  Negative   BILIRUBIN UA  Negative   UROBILINOGEN UA E U /dl 0 2   LEUKOCYTES UA  Trace*   WBC UA /hpf 2-4   RBC UA /hpf None Seen   BACTERIA UA /hpf Moderate*   EPITHELIAL CELLS WET PREP /hpf Occasional     Results from last 7 days   Lab Units 05/07/21  1821 05/07/21  1712   BLOOD CULTURE  No Growth at 48 hrs  No Growth at 48 hrs      5/7 EKG:   rate:  76      Rhythm: sinus rhythm      Ectopy: none      QRS axis:  Normal    QRS intervals:  Normal    ST segments:  Normal    T waves: normal    5/7 CT Head:  No acute intracranial abnormality   Microangiopathic changes  5/7 CXR:  Multiple bilateral groundglass infiltrates compatible with Covid 19  pneumonia  5/7 CT Chest: Extensive multilobar infiltrates in a pattern and distribution that could be consistent with provided clinical history of suspected Covid 19       ED Treatment:   Medication Administration from 05/07/2021 1633 to 05/07/2021 2103       Date/Time Order Dose Route Action     05/07/2021 1905 ceftriaxone (ROCEPHIN) 1 g/50 mL in dextrose IVPB 1,000 mg Intravenous New Bag        Past Medical History:   Diagnosis Date    AICD (automatic cardioverter/defibrillator) present     Anxiety and depression     Arthritis     Asthma     pt denies    CAD (coronary artery disease) 10/10/2014    CKD (chronic kidney disease) stage 3, GFR 30-59 ml/min ([facility]     COPD (chronic obstructive pulmonary disease) ([facility])     pt denies    CPAP (continuous positive airway pressure) dependence     Depression (emotion)     affective disorder    Diabetic nephropathy ([facility]     Erectile dysfunction     GERD (gastroesophageal reflux disease)     Hypertension     Iron deficiency anemia     Latent tuberculosis     Lumbar herniated disc     low back pain    Neuropathy     bles    Obesity (BMI 30-39  9) 7/9/2019    Prostate cancer ([facility] )     2013- had radiation- had prostatectomy    Sleep apnea     Systolic CHF, chronic ([facility])     Type 2 diabetes mellitus with hyperlipidemia ([facility])     Use of cane as ambulatory aid     Vitamin D deficiency     Wears glasses      Present on Admission:   Hyperlipidemia   Chronic combined systolic and diastolic CHF, NYHA class 3 ([facility])   Obesity with body mass index 30 or greater   Transaminitis      Admitting Diagnosis: Shortness of breath [R06 02]  Hypoxia [R09 02]  Elevated troponin [R77 8]  [facility] ) [N17 9]  Generalized weakness [R53 1]  Pneumonia due to COVID-19 virus [U07 1, J12 82]  Age/Sex: 80 y o  male     Admission Orders:  Scheduled Medications:  ascorbic acid, 1,000 mg, Oral, Q12H [address]  aspirin, 81 mg, Oral, Daily  atorvastatin, 40 mg, Oral, HS  calcitriol, 0 25 mcg, Oral, Daily  carvedilol, 25 mg, Oral, BID With Meals  cefTRIAXone, 1,000 mg, Intravenous, Q24H  cholecalciferol, 2,000 Units, Oral, Daily  clonazePAM, 0 5 mg, Oral, BID  dexamethasone, 6 mg, Intravenous, Q24H  doxycycline, 100 mg, Intravenous, Q12H  famotidine, 10 mg, Oral, Daily  fluticasone, 2 spray, Nasal, Daily  gabapentin, 100 mg, Oral, TID  heparin (porcine), 5,000 Units, Subcutaneous, Q8H [facility]  insulin glargine, 40 Units, Subcutaneous, QAM  insulin lispro, 1-6 Units, Subcutaneous, HS  insulin lispro, 2-12 Units, Subcutaneous, TID AC  isosorbide dinitrate, 30 mg, Oral, TID  zinc sulfate, 220 mg, Oral, Daily    Followed by  Brynn Arroyo ON 5/15/2021] multivitamin-minerals, 1 tablet, Oral, Daily  pantoprazole, 40 mg, Oral, Early Morning  remdesivir, 100 mg, Intravenous, Q24H    Continuous IV Infusions:  sodium chloride 0 9 % infusion  @ 75 / hr - DC'd 5/8 5/8 -multi-electrolyte, 75 mL/hr, Intravenous, Continuous    PRN Meds:  acetaminophen, 650 mg, Oral, Q6H PRN  calcium carbonate, 1,000 mg, Oral, TID PRN  ondansetron, 4 mg, Intravenous, Q6H PRN    Telemetry  SCDs  IP CONSULT TO PULMONOLOGY  IP CONSULT TO CARDIOLOGY  IP CONSULT TO NEPHROLOGY    Network Utilization Review Department  ATTENTION: Please call with any questions or concerns to [phone] and carefully listen to the prompts so that you are directed to the right person  All voicemails are confidential   Peter Guaman all requests for admission clinical reviews, approved or denied determinations and any other requests to dedicated fax number below belonging to the campus where the patient is receiving treatment   List of dedicated fax numbers for the Facilities:  [address] DENIALS (Administrative/Medical Necessity) [phone]   [address] (Maternity/NICU/Pediatrics) [phone]   [address] [phone]   [address] Maddie Garcia 1761 [id number] [place] [address] Heidi Caruso 1481 [phone]   Angela J[address] [facility] [address] [place] [id number] [phone]

## 2021-05-10 NOTE — ASSESSMENT & PLAN NOTE
· Metabolic encephalopathy secondary to renal failure and COVID-19/hypoxia  · Appears to be at baseline mentation now   · Restraints discontinued  · Suspect hypoxia as etiology of his altered mental status  ·

## 2021-05-10 NOTE — PHYSICAL THERAPY NOTE
Physical Therapy Progress Note     05/10/21 1435   PT Last Visit   PT Visit Date 05/10/21   Note Type   Note Type Treatment   Pain Assessment   Pain Assessment Tool Pain Assessment not indicated - pt denies pain   Pain Score No Pain   Restrictions/Precautions   Weight Bearing Precautions Per Order No   Other Precautions Contact/isolation; Airborne/isolation; Fall Risk;Multiple lines;O2;Telemetry;Limb alert;Cognitive; Chair Alarm; Bed Alarm   General   Chart Reviewed Yes   Response to Previous Treatment Patient reporting fatigue but able to participate  Family/Caregiver Present No   Subjective   Subjective Willing to participate in therapy this PM    Bed Mobility   Sit to Supine 4  Minimal assistance   Additional items HOB elevated; Bedrails;Assist x 1;Leg ; Increased time required;LE management;Verbal cues   Transfers   Sit to Stand 4  Minimal assistance   Additional items Assist x 1;Bedrails; Increased time required;Verbal cues;Armrests   Stand to Sit 4  Minimal assistance   Additional items Assist x 1; Armrests; Increased time required;Verbal cues   Ambulation/Elevation   Gait pattern Decreased foot clearance; Forward Flexion; Short stride; Step to;Excessively slow; Inconsistent rubina; Shuffling   Gait Assistance 4  Minimal assist   Additional items Verbal cues; Tactile cues; Assist x 2   Assistive Device Rolling walker   Distance 10'   Balance   Static Sitting Fair   Dynamic Sitting Fair -   Static Standing Poor +   Dynamic Standing Poor   Ambulatory Poor   Endurance Deficit   Endurance Deficit Yes   Endurance Deficit Description fatigue/weakness   Activity Tolerance   Activity Tolerance Patient limited by fatigue   Medical Staff Made [address], [address]    Nurse Made Aware Yes   Exercises   TKR Sitting;10 reps;AAROM; Bilateral   Assessment   Prognosis Good   Problem List Decreased strength;Decreased range of motion;Decreased endurance; Impaired balance;Decreased mobility; Decreased cognition; Impaired judgement;Decreased safety awareness; Obesity; Decreased skin integrity   Assessment Pt  seated in bedside chair upon my arrival  Pt  reporting fatigue, however agreeable to therapeutic intervention  Performance of HEP with cues provided for proper completion  Noted O2 saturation between 81-91% on 6L throughout treatment session  RN made aware at end of treatment session  Pt  progressed with transfers requiring A of therapist with cues for hand placement/technique  Pt  progressed with limited amb  trial with use of RW and cues provided for LE sequencing  Pt  limited by fatigue, requiring quick return to bed  Pt  repositioned supine in bed at end of treatment session with bed alarm active  PT will continue to recommend d/c to rehab when medically stable for continued improvement of noted impairments  Barriers to Discharge Inaccessible home environment;Decreased caregiver support   Goals   Patient Goals To go home today  STG Expiration Date 05/19/21   PT Treatment Day 1   Plan   Treatment/Interventions Functional transfer training;LE strengthening/ROM; Therapeutic exercise; Endurance training;Gait training;Bed mobility;Spoke to nursing;Spoke to case management;OT   Progress Progressing toward goals   PT Frequency Other (Comment)  (4-5x/wk)   Recommendation   PT Discharge Recommendation Post acute rehabilitation services   Equipment Recommended [facility] Recommended Wheeled walker   PT - OK to Discharge Yes  (if d/c to rehab when medically stable  )   AM-PAC Basic Mobility Inpatient   Turning in Bed Without Bedrails 3   Lying on Back to Sitting on Edge of Flat Bed 3   Moving Bed to Chair 2   Standing Up From Chair 3   Walk in Room 2   Climb 3-5 Stairs 2   Basic Mobility Inpatient Raw Score 15   Basic Mobility Standardized Score 36 97     An AM-PAC Basic Mobility standardized score less than 42 9 suggests the patient may benefit from discharge to post-acute rehab services      Anastasiia Castro, PTA

## 2021-05-11 NOTE — PROGRESS NOTES
Progress Note - Pulmonary   Rennis He Sr  80 y o  male MRN: [id number]  Unit/Bed#: [place] [address] -01 Encounter: [id number]    Assessment/Plan:    1  Acute hypoxic respiratory failure likely secondary to COVID-19       -  hypoxia continues to worsen-currently 11 L 90%, patient does not wear home O2       -  continue saturations greater than 89%       -  pulmonary toilet:  Deep breathing cough, OOB as tolerated, IS Q 1 hr       -  patient will likely need oxygen upon discharge    2  COVID-19- 5/7/2021       -  5/10/2021- completed 4 days 6 mg IV Decadron       -  5/11/2021- initiated 0 1 mg/kg IV Decadron b i d        -  5/11/2021- completed 5 days of remdesivir       -  CRP- 52 8       -  D-dimer 1 39       -  procalcitonin trending down 0 44- Day #5 Rocephin/doxycycline    Patient's hypoxia continues to worsen, will increase IV Decadron, repeat imaging, will need to consider diuresing    3  Mild acute on chronic combined systolic and diastolic CHF w/ mild PHTN likle WHO group II with resolving GAGE on CKD likely secondary to rhabdomylosis       -  patient may have a component of volume overload given recent fluid resuscitation       -  Echo 4/2021- EF 40%      PA pressure- 29 mmHg       -  nephrology following-   baseline creatinine 1 8-1 9       -  will discuss about diuresing        Chief Complaint:    "I think I feel better"    Subjective:    Hien Metzger was comfortably sitting in his bed  Upon examination patient was disoriented x3  Patient was noted to be in soft limb restraints secondary to patient's safety  Patient currently denies any fever, chills, hemoptysis, headaches, night sweats, pleuritic chest pain, or palpitations  Objective:    Vitals: Blood pressure 165/66, pulse 75, temperature (!) 97 3 °F (36 3 °C), resp  rate (!) 28, height 5' 8" (1 727 m), weight 115 kg (253 lb 8 5 oz), SpO2 90 %  11L,Body mass index is 38 55 kg/m²        Intake/Output Summary (Last 24 hours) at 5/11/2021 1220  Last data filed at 5/11/2021 1111  Gross per 24 hour   Intake 420 ml   Output 1025 ml   Net -605 ml       Invasive Devices     Peripheral Intravenous Line            Peripheral IV 05/09/21 Dorsal (posterior); Right Forearm 1 day          Line            Hemodialysis AV Fistula 09/06/19 Upper arm 613 days          Drain            Closed/Suction Drain Anterior Abdomen Bulb 15 Fr  1208 days    External Urinary Catheter Medium 3 days                Physical Exam:   Physical Exam  Constitutional:       General: He is not in acute distress  Appearance: Normal appearance  He is normal weight  He is not ill-appearing  HENT:      Head: Normocephalic and atraumatic  Nose: Nose normal  No congestion or rhinorrhea  Mouth/Throat:      Mouth: Mucous membranes are dry  Pharynx: Oropharynx is clear  No oropharyngeal exudate or posterior oropharyngeal erythema  Neck:      Musculoskeletal: Normal range of motion and neck supple  No neck rigidity or muscular tenderness  Cardiovascular:      Rate and Rhythm: Normal rate and regular rhythm  Pulses: Normal pulses  Heart sounds: Normal heart sounds  No murmur  No friction rub  No gallop  Pulmonary:      Effort: Pulmonary effort is normal  No tachypnea, bradypnea, accessory muscle usage or respiratory distress  Breath sounds: Decreased air movement present  No stridor or transmitted upper airway sounds  Decreased breath sounds present  No wheezing, rhonchi or rales  Comments: Diminished breath sounds throughout lung fields  Chest:      Chest wall: No tenderness  Abdominal:      General: Abdomen is flat  Bowel sounds are normal  There is no distension  Palpations: Abdomen is soft  There is no mass  Musculoskeletal: Normal range of motion  General: No swelling or tenderness  Skin:     General: Skin is warm and dry  Coloration: Skin is not jaundiced or pale  Neurological:      General: No focal deficit present        Mental Status: He is alert  Mental status is at baseline  He is disoriented  Psychiatric:         Mood and Affect: Mood normal          Behavior: Behavior normal          Labs:    I have personally reviewed pertinent lab results CBC:   Lab Results   Component Value Date    WBC 9 22 05/11/2021    HGB 12 2 05/11/2021    HCT 36 0 (L) 05/11/2021    MCV 86 05/11/2021     05/11/2021    MCH 29 1 05/11/2021    MCHC 33 9 05/11/2021    RDW 14 7 05/11/2021    MPV 12 4 05/11/2021   , CMP:   Lab Results   Component Value Date    SODIUM 143 05/11/2021    K 3 8 05/11/2021     05/11/2021    CO2 24 05/11/2021    BUN 76 (H) 05/11/2021    CREATININE 2 20 (H) 05/11/2021    CALCIUM 7 5 (L) 05/11/2021     (H) 05/11/2021    ALT 70 05/11/2021    ALKPHOS 89 05/11/2021    EGFR 34 05/11/2021       Imaging and other studies: I have personally reviewed pertinent films in PACS     Chest CT 5/7/2021- extensive multi lobular infiltrates

## 2021-05-11 NOTE — ASSESSMENT & PLAN NOTE
· Thrombocytopenia secondary to sepsis    Improving    Results from last 7 days   Lab Units 05/11/21  0625 05/10/21  0620 05/09/21  0442 05/08/21  0613 05/07/21  1712   PLATELETS Thousands/uL 149 129* 104* 98* 96*   Monitor platelet count, improved

## 2021-05-11 NOTE — ASSESSMENT & PLAN NOTE
· GAGE on CKD 3b secondary to COVID-19, pneumonia, and rhabdomyolysis  · Continue IV fluids per nephrology  · Continue holding diuretics  · Renal function slightly improved    Results from last 7 days   Lab Units 05/11/21  0625 05/10/21  9626 05/09/21  0442 05/08/21 2038 05/08/21  0613 05/07/21  1823   BUN mg/dL 76* 88* 85* 79* 65* 50*   CREATININE mg/dL 2 20* 3 17* 4 23* 4 23* 4 64* 4 97*   EGFR ml/min/1 73sq m 34 22 15 15 14 13

## 2021-05-11 NOTE — ASSESSMENT & PLAN NOTE
· Metabolic encephalopathy secondary to renal failure and COVID-19/hypoxia  · Appears to be at baseline mentation now   · Restraints discontinued  · Suspect hypoxia as etiology of his altered mental status  · Given 1 dose of Zyprexa today due to delirium from COVID-19

## 2021-05-11 NOTE — ASSESSMENT & PLAN NOTE
· Likely secondary to viral syndrome    Results from last 7 days   Lab Units 05/11/21  0625 05/10/21  0613 05/09/21  0442 05/08/21  0613 05/07/21  1823   AST U/L 106* 101* 120* 181* 182*   ALT U/L 70 67 73 76 76   TOTAL BILIRUBIN mg/dL 0 39 0 35 0 25 0 34 0 41

## 2021-05-11 NOTE — PROGRESS NOTES
Patient became agitated again tonight  Hard to redirect  Unable to follow commands  Very confused and impulsive  A control team needed to be called to help redirect the patient back into bed  Soft wrist restraints needed to be reapplied again tonight for the patients safety of leaving his oxygen on  Order was placed by MARU Antoine

## 2021-05-11 NOTE — NURSING NOTE
Patients FBG= 67 at 0828  Patient needed much encouragement to drink 4 ounces of juice  Recheck at 0915= 99  Pt given 4 ounces of applesauce with medications and did take it but refused breakfast meal  Humalog 15 units Held , attending, Dr Malena Tafoya updated/made aware  Instructed to give 20 units of Lantus instead of ordered 35 units by attending

## 2021-05-11 NOTE — QUICK NOTE
Patient with worsening oxygen requirements in the setting of lost intravenous access and COVID-19 infection  He has been saturating intermittently in the low 90s  Discussed with nursing, will place on step-down level 2  Evaluated earlier by Pulmonary Medicine, and did review chest x-ray  with evidence of worsening consolidation on the left  Did receive intravenous fluid earlier, attempted to give dose of oral Lasix although patient did refuse due to loss of IV access    Will plan for high-flow oxygen at this time, throat threshold for transfer to the intensive care unit under levels 1 step down should the patient's oxygen requirements increased significantly  At this time he has not received intravenous Decadron  High risk situation, family updated         DO OWEN

## 2021-05-11 NOTE — PROGRESS NOTES
NEPHROLOGY PROGRESS NOTE   Angelo Brooks Sr  80 y o  male MRN: [id number]  Unit/Bed#: [facility] 68 2 -01 Encounter: [id number]  Reason for Consult: GAGE (POA) on CKD IIIB    Parts of the exam were done by primary/nursing service and were discussed with our team due to infection control prevention measures related to COVID 19  PLAN:  -acute kidney injury, received 1 L of isolate yesterday  Creatinine continues to improve  Arb and diuretics remain on hold  Consider additional fluids if appetite remains poor and vomiting persists  -hyperphosphatemia, repeat phos 3 4  Lifted phos restriction in diet    -elevated CK level, CK level improving with hydration  -COVID-19 infection, further care per primary team  Remains on 10 L  -hypertension, blood pressure above goal at times  Amlodipine 2 5 mg po daily added  Continues on carvedilol and Isordil   -vomiting, refused breakfast this morning  ASSESSMENT/PLAN:  GAGE (POA) on CKD stage IIIB:  Suspected prerenal azotemia as well as Arb and diuretic use with possible progression to ATN  -presented with creatinine of 4 97   -baseline creatinine 1 8-1 9   -follows with Dr Yung Madrid    -received 1 L of isolate on 05/09 and 05/10    -creatinine continues to improve, 2 2 today    -consider additional fluids if appetite remains poor with continued vomiting    -continue to Arb and diuretics  -no urgent indication for RRT  -UA showed small blood, 2+ protein, 2-4 wbc's   -previous renal ultrasound in 2018 showed few simple cyst on right kidney   -if renal function worsens, consider repeating renal ultrasound   -continue to avoid nephrotoxins, hypotension, IV contrast   -strict I/O  Continues with external urinary catheter      Hyperphosphatemia: in the setting of GAGE  -continue to monitor  Repeat phos level 3 4    -will place on low phos diet       Elevated CK level:    -received 1 L of Plasma-Lyte yesterday    -repeat CK level improving      Hypertension:  Blood pressure fluctuating, above goal at times  -outpatient medications:  Carvedilol 25 mg b i d , Entresto 97/103 mg daily, Isordil 30 mg 3 times per day, furosemide 60 mg b i d , Isordil 30 mg t i d , and metolazone p r n  For weight gain  -currently on carvedilol 25 mg 2 times per day, Isordil 30 mg 3 times per day  -Arb and diuretic remain on hold  -will add amlodipine 2 5 mg daily   -avoid hypotension or high fluctuations in blood pressure   -recommend holding parameters antihypertensive for systolic blood pressure less than 130      AGMA: (resolved)  -lactic acid level normal   -continue to monitor       History of CHF:  Examining fairly euvolemic   EF of 40%  -diuretic and Entresto remain on hold  -continue daily weights, fluid restriction, I/O     -received fluid yesterday      COVID-19/pneumonia:  On supplemental oxygen and CPAP at night   -pulmonary team following   -continues on antibiotics  -continues on COVID pathway      Encephalopathy:  Suspected secondary to above  Awake and alert this morning      Obstructive sleep apnea:  Continue CPAP at night  Patient intermittently refusing depending on mental status      MBD in CKD:  -continues on calcitriol 0 25 mcg daily and vitamin-D 2000 units daily  Vomiting:  Refusing breakfast this morning  -KUB negative for obstruction   -continue diet as tolerated      Other:  Hyperlipidemia, obesity, diabetes        Disposition:  Requiring additional stay due to medical needs  SUBJECTIVE:  The patient was viewed through the glass window  He has seen attempting to eat his breakfast with the PCA  The PC reports he is refusing his breakfast this morning but he is drinking liquids  She denies any vomiting episodes this morning  The patient is awake and alert  He remains on 10 L of O2      OBJECTIVE:  Current Weight: Weight - Scale: 115 kg (253 lb 8 5 oz)  Vitals:    05/10/21 1554 05/10/21 1614 05/10/21 2052 05/10/21 2257   BP: 143/65 146/68 155/68 156/70   BP Location:    Right arm   Pulse: 68 69 68 74   Resp: 20 21 20 22   Temp: (!) 97 1 °F (36 2 °C) (!) 97 °F (36 1 °C) (!) 97 3 °F (36 3 °C) (!) 97 4 °F (36 3 °C)   TempSrc:    Oral   SpO2: 90% 92% (!) 88% 90%   Weight:       Height:           Intake/Output Summary (Last 24 hours) at 5/11/2021 0813  Last data filed at 5/11/2021 6662  Gross per 24 hour   Intake 240 ml   Output 1350 ml   Net -1110 ml     General: NAD  Skin: warm, dry, intact, no rash  HEENT: Moist mucous membranes, sclera anicteric, normocephalic, atraumatic  Neck: No apparent JVD appreciated  Chest: lung sounds clear B/L, on O2  CVS:Regular rate and rhythm, no murmer   Abdomen: Soft, round, non-tender, +BS, obese  Extremities: No B/L LE edema present  Neuro: alert, periods of confusion  Psych: appropriate mood and affect     Medications:    Current Facility-Administered Medications:     acetaminophen (TYLENOL) tablet 650 mg, 650 mg, Oral, Q6H PRN, Bg Heap Smudde, PA-C    ascorbic acid (VITAMIN C) tablet 1,000 mg, 1,000 mg, Oral, Q12H ANGEL, Eulalia Smudde, PA-C, 1,000 mg at 05/10/21 2137    aspirin (ECOTRIN LOW STRENGTH) EC tablet 81 mg, 81 mg, Oral, Daily, Eulalia Smudde, PA-C, 81 mg at 05/10/21 2633    atorvastatin (LIPITOR) tablet 40 mg, 40 mg, Oral, HS, Eulalia Smudde, PA-C, 40 mg at 05/10/21 2137    calcitriol (ROCALTROL) capsule 0 25 mcg, 0 25 mcg, Oral, Daily, Eulalia Smudde, PA-C, 0 25 mcg at 05/10/21 0825    calcium carbonate (TUMS) chewable tablet 1,000 mg, 1,000 mg, Oral, TID PRN, Bg Heap Smudde, PA-C    carvedilol (COREG) tablet 25 mg, 25 mg, Oral, BID With Meals, Eulalia Smudde, PA-C, 25 mg at 05/10/21 1626    cefTRIAXone (ROCEPHIN) 1,000 mg in dextrose 5 % 50 mL IVPB, 1,000 mg, Intravenous, Q24H, Eulalia Esquivel PA-C, Last Rate: 100 mL/hr at 05/10/21 1626, 1,000 mg at 05/10/21 1626    cholecalciferol (VITAMIN D3) tablet 2,000 Units, 2,000 Units, Oral, Daily, Eulalia Esquivel PA-C, 2,000 Units at 05/10/21 0821    clonazePAM (KlonoPIN) tablet 0 5 mg, 0 5 mg, Oral, BID, Eulalia Esquivel, PA-C, 0 5 mg at 05/10/21 2137    dexamethasone (DECADRON) injection 6 mg, 6 mg, Intravenous, Q24H, Eulalia Esquivel, PA-C, 6 mg at 05/10/21 2138    doxycycline hyclate (VIBRAMYCIN) capsule 100 mg, 100 mg, Oral, Q12H [facility], 100 mg at 05/10/21 2137    famotidine (PEPCID) tablet 10 mg, 10 mg, Oral, Daily, Eulalia Esquivel, PA-C, 10 mg at 05/10/21 0825    fluticasone (FLONASE) 50 mcg/act nasal spray 2 spray, 2 spray, Nasal, Daily, Eulalia Esquivel, PA-C, 2 spray at 05/10/21 0824    gabapentin (NEURONTIN) capsule 100 mg, 100 mg, Oral, TID, Eulalia Esquivel, PA-C, 100 mg at 05/10/21 2137    heparin (porcine) subcutaneous injection 5,000 Units, 5,000 Units, Subcutaneous, Q8H [facility], Eulalia Esquivel PA-C, 5,000 Units at 05/11/21 0555    insulin glargine (LANTUS) subcutaneous injection 35 Units 0 35 mL, 35 Units, Subcutaneous, Q12H [facility], 35 Units at 05/10/21 2137    insulin lispro (HumaLOG) 100 units/mL subcutaneous injection 1-6 Units, 1-6 Units, Subcutaneous, HS, Eulalia Esquivel PA-C, 5 Units at 05/09/21 2211    insulin lispro (HumaLOG) 100 units/mL subcutaneous injection 15 Units, 15 Units, Subcutaneous, TID With Meals, [facility], , 15 Units at 05/10/21 0820    insulin lispro (HumaLOG) 100 units/mL subcutaneous injection 2-12 Units, 2-12 Units, Subcutaneous, TID AC, 2 Units at 05/10/21 1711 **AND** Fingerstick Glucose (POCT), , , TID AC, Eulaliabrii Esquivel PA-C    isosorbide dinitrate (ISORDIL) tablet 30 mg, 30 mg, Oral, TID, Eulalia Esquivel PA-C, 30 mg at 05/10/21 2224    zinc sulfate (ZINCATE) capsule 220 mg, 220 mg, Oral, Daily, 220 mg at 05/10/21 0821 **FOLLOWED BY** [START ON 5/15/2021] multivitamin-minerals (CENTRUM ADULTS) tablet 1 tablet, 1 tablet, Oral, Daily, Eulalia Esquivel PA-C    ondansetron (ZOFRAN) injection 4 mg, 4 mg, Intravenous, Q6H PRN, Eulalia Esquivel PA-C, 4 mg at 05/10/21 0955    pantoprazole (PROTONIX) EC tablet 40 mg, 40 mg, Oral, Early Morning, Eulalia Esquivel PA-C, 40 mg at 05/11/21 0555    [COMPLETED] remdesivir (Veklury) 200 mg in sodium chloride 0 9 % 250 mL IVPB, 200 mg, Intravenous, Q24H, 200 mg at 05/07/21 2236 **FOLLOWED BY** remdesivir (Veklury) 100 mg in sodium chloride 0 9 % 250 mL IVPB, 100 mg, Intravenous, Q24H, Eulalia Esquivel PA-C, Last Rate: 0 mL/hr at 05/09/21 2247, 100 mg at 05/10/21 2142    Laboratory Results:  Results from last 7 days   Lab Units 05/11/21  0625 05/10/21  0620 05/10/21  0613 05/09/21  0442   WBC Thousand/uL 9 22 6 15  --  3 24*   HEMOGLOBIN g/dL 12 2 11 9*  --  11 6*   HEMATOCRIT % 36 0* 35 4*  --  34 4*   PLATELETS Thousands/uL 149 129*  --  104*   SODIUM mmol/L 143  --  139 134*   POTASSIUM mmol/L 3 8  --  3 8 4 1   CHLORIDE mmol/L 107  --  101 97*   CO2 mmol/L 24  --  24 22   BUN mg/dL 76*  --  88* 85*   CREATININE mg/dL 2 20*  --  3 17* 4 23*   CALCIUM mg/dL 7 5*  --  7 4* 7 0*   MAGNESIUM mg/dL  --   --   --  2 6   PHOSPHORUS mg/dL 3 4  --   --  5 3*   ALK PHOS U/L 89  --  78 76   ALT U/L 70  --  67 73   AST U/L 106*  --  101* 120*

## 2021-05-11 NOTE — ASSESSMENT & PLAN NOTE
· Acute respiratory failure/hypoxia secondary to COVID-19 and bacterial pneumonia  · COVID-19:  Continue remdesivir dexamethasone and COVID vitamins     · Bacterial pneumonia:  Given elevated procalcitonin continue ceftriaxone/doxycycline  · Remdesivir day 4  · Decadron day 4 - Dose increased to BID dosing on 5/11/2021  · Trial of Lasix  · Vitamin-C, zinc, vitamin-D  · Trend inflammatory markers   · Trend procalcitonin, currently on ceftriaxone and azithromycin, likely a component of bacterial pneumonia with COVID-19 infection    Lab Results   Component Value Date    SARSCOV2 Positive (A) 05/07/2021    SARSCOV2 Negative 04/30/2021     Results from last 7 days   Lab Units 05/11/21  0625 05/10/21  0613 05/09/21 0442 05/08/21  0613 05/07/21  1823   D-DIMER QUANTITATIVE ug/ml FEU  --  1 39* 1 39* 1 89* 2 06*   CRP mg/L 52 8* 66 7* 120 6* 157 4* 234 1*   FERRITIN ng/mL  --  2,421* 1,839* 1,302* 1,018*     Results from last 7 days   Lab Units 05/10/21  0613 05/09/21  0442 05/08/21  0613 05/07/21  1712   PROCALCITONIN ng/ml 0 44* 0 76* 1 22* 1 57*

## 2021-05-11 NOTE — NURSING NOTE
IV access lost earlier this shift at 1405 when attempting to administer IV Decadron, as site was leaking  IV stopped  This nurse attempted IV access along with another RN x 2 with no success  Hospital supervisor aware to send staff with ultrasound to place IV as pt is hard to access especially with left arm limb alert  IV decadron and IV ceftriaxone had to be held due to no IV access  Dr Marian Valencia, attending, aware of same  Patients pulse ox in 70- lows 80's , oxygen increased to 15 liters and pt remains in low to mid 80's, attending aware of increased oxygen requirements  High flow oxygen ordered and respiratory aware and up to the floor to initiate high flow  Pt ate small amount for dinner but refused breakfast and lunch meals  Staff continues to encourage food and fluid intake  Patient refused PO lasix at 1752 which dr estrada ordered due to worsening chest xray  Dr Marian Valencia made aware of refusal of po medication  Pt alert with confusion to place/time and situation today

## 2021-05-11 NOTE — OCCUPATIONAL THERAPY NOTE
Occupational Therapy Note:    Patient Name: Reba Spencer Sr  Today's Date: 5/11/2021    Attempted to see pt for OT treatment session this PM  Chart reviewed  Spoke to nursing  Pt w/ increased confusion and agitation, currently in soft limb restraints  Not appropriate for therapeutic intervention  Will cx and attempt at later time as medically appropriate      DRAKE Vasques/LEWIS

## 2021-05-11 NOTE — PLAN OF CARE
Problem: Potential for Falls  Goal: Patient will remain free of falls  Description: INTERVENTIONS:  - Assess patient frequently for physical needs  -  Identify cognitive and physical deficits and behaviors that affect risk of falls    -  [place] fall precautions as indicated by assessment   - Educate patient/family on patient safety including physical limitations  - Instruct patient to call for assistance with activity based on assessment  - Modify environment to reduce risk of injury  - Consider OT/PT consult to assist with strengthening/mobility  5/10/2021 2328 by Ariane Espinal RN  Outcome: Progressing  5/10/2021 2327 by Ariane Espinal RN  Outcome: Progressing     Problem: Prexisting or High Potential for Compromised Skin Integrity  Goal: Skin integrity is maintained or improved  Description: INTERVENTIONS:  - Identify patients at risk for skin breakdown  - Assess and monitor skin integrity  - Assess and monitor nutrition and hydration status  - Monitor labs   - Assess for incontinence   - Turn and reposition patient  - Assist with mobility/ambulation  - Relieve pressure over bony prominences  - Avoid friction and shearing  - Provide appropriate hygiene as needed including keeping skin clean and dry  - Evaluate need for skin moisturizer/barrier cream  - Collaborate with interdisciplinary team   - Patient/family teaching  - Consider wound care consult   5/10/2021 2328 by Ariane Espinal RN  Outcome: Progressing  5/10/2021 2327 by Ariane Espinal RN  Outcome: Progressing     Problem: PAIN - ADULT  Goal: Verbalizes/displays adequate comfort level or baseline comfort level  Description: Interventions:  - Encourage patient to monitor pain and request assistance  - Assess pain using appropriate pain scale  - Administer analgesics based on type and severity of pain and evaluate response  - Implement non-pharmacological measures as appropriate and evaluate response  - Consider cultural and social influences on pain and pain management  - Notify physician/advanced practitioner if interventions unsuccessful or patient reports new pain  5/10/2021 2328 by Derrick Aguilera RN  Outcome: Progressing  5/10/2021 2327 by Derrick Aguilera RN  Outcome: Progressing     Problem: INFECTION - ADULT  Goal: Absence or prevention of progression during hospitalization  Description: INTERVENTIONS:  - Assess and monitor for signs and symptoms of infection  - Monitor lab/diagnostic results  - Monitor all insertion sites, i e  indwelling lines, tubes, and drains  - Monitor endotracheal if appropriate and nasal secretions for changes in amount and color  - [place] appropriate cooling/warming therapies per order  - Administer medications as ordered  - Instruct and encourage patient and family to use good hand hygiene technique  - Identify and instruct in appropriate isolation precautions for identified infection/condition  5/10/2021 2328 by Derrick Aguilera RN  Outcome: Progressing  5/10/2021 2327 by Derrick Aguilera RN  Outcome: Progressing     Problem: SAFETY ADULT  Goal: Patient will remain free of falls  Description: INTERVENTIONS:  - Assess patient frequently for physical needs  -  Identify cognitive and physical deficits and behaviors that affect risk of falls    -  [place] fall precautions as indicated by assessment   - Educate patient/family on patient safety including physical limitations  - Instruct patient to call for assistance with activity based on assessment  - Modify environment to reduce risk of injury  - Consider OT/PT consult to assist with strengthening/mobility  5/10/2021 2328 by Derrick Aguilera RN  Outcome: Progressing  5/10/2021 2327 by Derrick Aguilera RN  Outcome: Progressing  Goal: Maintain or return to baseline ADL function  Description: INTERVENTIONS:  -  Assess patient's ability to carry out ADLs; assess patient's baseline for ADL function and identify physical deficits which impact ability to perform ADLs (bathing, care of mouth/teeth, toileting, grooming, dressing, etc )  - Assess/evaluate cause of self-care deficits   - Assess range of motion  - Assess patient's mobility; develop plan if impaired  - Assess patient's need for assistive devices and provide as appropriate  - Encourage maximum independence but intervene and supervise when necessary  - Involve family in performance of ADLs  - Assess for home care needs following discharge   - Consider OT consult to assist with ADL evaluation and planning for discharge  - Provide patient education as appropriate  5/10/2021 2328 by Primo Schreiber RN  Outcome: Progressing  5/10/2021 2327 by Primo Schreiber RN  Outcome: Progressing  Goal: Maintain or return mobility status to optimal level  Description: INTERVENTIONS:  - Assess patient's baseline mobility status (ambulation, transfers, stairs, etc )    - Identify cognitive and physical deficits and behaviors that affect mobility  - Identify mobility aids required to assist with transfers and/or ambulation (gait belt, sit-to-stand, lift, walker, cane, etc )  - [place] fall precautions as indicated by assessment  - Record patient progress and toleration of activity level on Mobility SBAR; progress patient to next Phase/Stage  - Instruct patient to call for assistance with activity based on assessment  - Consider rehabilitation consult to assist with strengthening/weightbearing, etc   5/10/2021 2328 by Primo Schreiber RN  Outcome: Progressing  5/10/2021 2327 by Primo Schreiber RN  Outcome: Progressing     Problem: DISCHARGE PLANNING  Goal: Discharge to home or other facility with appropriate resources  Description: INTERVENTIONS:  - Identify barriers to discharge w/patient and caregiver  - Arrange for needed discharge resources and transportation as appropriate  - Identify discharge learning needs (meds, wound care, etc )  - Arrange for interpretive services to assist at discharge as needed  - Refer to Case Management Department for coordinating discharge planning if the patient needs post-hospital services based on physician/advanced practitioner order or complex needs related to functional status, cognitive ability, or social support system  5/10/2021 2328 by Luis Cee RN  Outcome: Progressing  5/10/2021 2327 by Luis Cee RN  Outcome: Progressing     Problem: Knowledge Deficit  Goal: Patient/family/caregiver demonstrates understanding of disease process, treatment plan, medications, and discharge instructions  Description: Complete learning assessment and assess knowledge base  Interventions:  - Provide teaching at level of understanding  - Provide teaching via preferred learning methods  5/10/2021 2328 by Luis Cee RN  Outcome: Progressing  5/10/2021 2327 by Luis Cee RN  Outcome: Progressing     Problem: Nutrition/Hydration-ADULT  Goal: Nutrient/Hydration intake appropriate for improving, restoring or maintaining nutritional needs  Description: Monitor and assess patient's nutrition/hydration status for malnutrition  Collaborate with interdisciplinary team and initiate plan and interventions as ordered  Monitor patient's weight and dietary intake as ordered or per policy  Utilize nutrition screening tool and intervene as necessary  Determine patient's food preferences and provide high-protein, high-caloric foods as appropriate       INTERVENTIONS:  - Monitor oral intake, urinary output, labs, and treatment plans  - Assess nutrition and hydration status and recommend course of action  - Evaluate amount of meals eaten  - Assist patient with eating if necessary   - Allow adequate time for meals  - Recommend/ encourage appropriate diets, oral nutritional supplements, and vitamin/mineral supplements  - Order, calculate, and assess calorie counts as needed  - Recommend, monitor, and adjust tube feedings and TPN/PPN based on assessed needs  - Assess need for intravenous fluids  - Provide specific nutrition/hydration education as appropriate  - Include patient/family/caregiver in decisions related to nutrition  5/10/2021 2328 by Derrick Aguilera RN  Outcome: Progressing  5/10/2021 2327 by Derrick Aguilera RN  Outcome: Progressing     Problem: SAFETY,RESTRAINT: NV/NON-SELF DESTRUCTIVE BEHAVIOR  Goal: Remains free of harm/injury (restraint for non violent/non self-detsructive behavior)  Description: INTERVENTIONS:  - Instruct patient/family regarding restraint use   - Assess and monitor physiologic and psychological status   - Provide interventions and comfort measures to meet assessed patient needs   - Identify and implement measures to help patient regain control  - Assess readiness for release of restraint   Outcome: Progressing  Goal: Returns to optimal restraint-free functioning  Description: INTERVENTIONS:  - Assess the patient's behavior and symptoms that indicate continued need for restraint  - Identify and implement measures to help patient regain control  - Assess readiness for release of restraint   Outcome: Progressing

## 2021-05-11 NOTE — PHYSICAL THERAPY NOTE
Physical Therapy Cancellation Note    Attempted to see pt  This PM, however after discussion with RN pt  Not appropriate for therapeutic intervention due to increased agitation with restraints currently in place  Will cancel for today and continue to follow as appropriate       Ping Adler PTA

## 2021-05-11 NOTE — ASSESSMENT & PLAN NOTE
Lab Results   Component Value Date    HGBA1C 6 0 03/19/2021     Recent Labs     05/11/21  0828 05/11/21  0915 05/11/21  1107 05/11/21  1556   POCGLU 67 99 110 94     · Initially basal insulin decreased due to encephalopathy and poor intake    Continue sliding scale but will increase glargine and add mealtime lispro given hyperglycemia

## 2021-05-11 NOTE — PROGRESS NOTES
[address]  Progress Note Tatianna Skaggs Sr  1950, 80 y o  male MRN: [id number]  Unit/Bed#: [place] [address] -01 Encounter: [id number]  Primary Care Provider: Desiree Solis MD   Date and time admitted to hospital: 5/7/2021  4:33 PM    Vomiting  Assessment & Plan  Patient with vomiting episode - KUB unremarkable for any obstructive pathology  Advanced diet as tolerated  Consider nasogastric tube for persistent vomiting  Suspect GI symptoms related to COVID-19 infection  Continue acid suppression    Transaminitis  Assessment & Plan  · Likely secondary to viral syndrome    Results from last 7 days   Lab Units 05/11/21  0625 05/10/21  0613 05/09/21  0442 05/08/21  0613 05/07/21  1823   AST U/L 106* 101* 120* 181* 182*   ALT U/L 70 67 73 76 76   TOTAL BILIRUBIN mg/dL 0 39 0 35 0 25 0 34 0 41       Acute encephalopathy  Assessment & Plan  · Metabolic encephalopathy secondary to renal failure and COVID-19/hypoxia  · Appears to be at baseline mentation now   · Restraints discontinued  · Suspect hypoxia as etiology of his altered mental status  · Given 1 dose of Zyprexa today due to delirium from COVID-19    Elevated troponin  Assessment & Plan  · Non MI elevation troponin secondary to respiratory and renal failure  · Appreciate cardiology evaluation     Results from last 7 days   Lab Units 05/08/21  0613 05/08/21  0208 05/07/21  2224 05/07/21  1712   TROPONIN I ng/mL 0 45* 0 53* 0 60* 0 72*       Acute renal failure superimposed on chronic kidney disease (HCC)  Assessment & Plan  · GAGE on CKD 3b secondary to COVID-19, pneumonia, and rhabdomyolysis  · Continue IV fluids per nephrology  · Continue holding diuretics  · Renal function slightly improved    Results from last 7 days   Lab Units 05/11/21  0625 05/10/21  0613 05/09/21  0442 05/08/21  2038 05/08/21  0613 05/07/21  1823   BUN mg/dL 76* 88* 85* 79* 65* 50*   CREATININE mg/dL 2 20* 3 17* 4 23* 4 23* 4 64* 4 97*   EGFR ml/min/1 73sq m 34 22 15 15 14 13       Thrombocytopenia (HCC)  Assessment & Plan  · Thrombocytopenia secondary to sepsis  Improving    Results from last 7 days   Lab Units 05/11/21  0625 05/10/21  0620 05/09/21  0442 05/08/21  0613 05/07/21  1712   PLATELETS Thousands/uL 149 129* 104* 98* 96*   Monitor platelet count, improved    Hyperlipidemia  Assessment & Plan  · Continue atorvastatin    Obesity with body mass index 30 or greater  Assessment & Plan  · Body mass index is 38 55 kg/m²  · Significant risk factor for COVID-19    Chronic combined systolic and diastolic CHF, NYHA class 3 (HCC)  Assessment & Plan  Wt Readings from Last 3 Encounters:   05/07/21 115 kg (253 lb 8 5 oz)   03/19/21 118 kg (261 lb)   03/18/21 118 kg (261 lb)   Patient with elevated BNP in the setting of renal failure  Weights appear stable  Monitor  Results from last 7 days   Lab Units 05/07/21  1823   NT-PRO BNP pg/mL 7,074*     · Chronic CHF currently holding diuretics and Entresto    Type 2 diabetes mellitus with kidney complication, with long-term current use of insulin [facility])  Assessment & Plan  Lab Results   Component Value Date    HGBA1C 6 0 03/19/2021     Recent Labs     05/11/21  0828 05/11/21  0915 05/11/21  1107 05/11/21  1556   POCGLU 67 99 110 94     · Initially basal insulin decreased due to encephalopathy and poor intake  Continue sliding scale but will increase glargine and add mealtime lispro given hyperglycemia    * Acute respiratory failure with hypoxia (HCC)  Assessment & Plan  · Acute respiratory failure/hypoxia secondary to COVID-19 and bacterial pneumonia  · COVID-19:  Continue remdesivir dexamethasone and COVID vitamins     · Bacterial pneumonia:  Given elevated procalcitonin continue ceftriaxone/doxycycline  · Remdesivir day 4  · Decadron day 4 - Dose increased to BID dosing on 5/11/2021  · Trial of Lasix  · Vitamin-C, zinc, vitamin-D  · Trend inflammatory markers   · Trend procalcitonin, currently on ceftriaxone and azithromycin, likely a component of bacterial pneumonia with COVID-19 infection    Lab Results   Component Value Date    SARSCOV2 Positive (A) 05/07/2021    SARSCOV2 Negative 04/30/2021     Results from last 7 days   Lab Units 05/11/21  0625 05/10/21  0613 05/09/21 0442 05/08/21  0613 05/07/21  1823   D-DIMER QUANTITATIVE ug/ml FEU  --  1 39* 1 39* 1 89* 2 06*   CRP mg/L 52 8* 66 7* 120 6* 157 4* 234 1*   FERRITIN ng/mL  --  2,421* 1,839* 1,302* 1,018*     Results from last 7 days   Lab Units 05/10/21  0613 05/09/21 0442 05/08/21 0613 05/07/21  1712   PROCALCITONIN ng/ml 0 44* 0 76* 1 22* 1 57*     [facility] Internal Medicine Progress Note  Patient: Luci Clements  80 y o  male   MRN: [id number]  PCP: Irene Jay MD  [facility] Encounter: [id number]  Date Of Visit: 05/11/21    Assessment:    Principal Problem:    Acute respiratory failure with hypoxia ([facility] [address] )  Active Problems:    Type 2 diabetes mellitus with kidney complication, with long-term current use of insulin (HCC)    Chronic combined systolic and diastolic CHF, NYHA class 3 (HCC)    Obesity with body mass index 30 or greater    Hyperlipidemia    Thrombocytopenia (HCC)    Acute renal failure superimposed on chronic kidney disease (HCC)    Elevated troponin    Acute encephalopathy    Transaminitis    Vomiting      Plan:    · Continue Remdesivir  · Continue Decadron  · Encourage eating  · Long way to go       VTE Pharmacologic Prophylaxis:   Pharmacologic: Heparin  Mechanical VTE Prophylaxis in Place: Yes    Patient Centered Rounds: I have performed bedside rounds with nursing staff today  Discussions with Specialists or Other Care Team Provider:  Case management    Education and Discussions with Family / Patient: Patient's son, at 4:30 pm   He requests FMLA paperwork    Time Spent for Care: 30 minutes  More than 50% of total time spent on counseling and coordination of care as described above      Current Length of Stay: 4 day(s)    Current Patient Status: Inpatient   Certification Statement: The patient will continue to require additional inpatient hospital stay due to Acute hypoxemic respiratory failure secondary to COVID-19    Discharge Plan / Estimated Discharge Date: To be determined    Code Status: Level 1 - Full Code      Subjective:   Patient seen examined, no acute complaints  Intermittently confused throughout the day  Has required intermittent restraints due to interference with medical equipment  Not eating and skipping meals    A complete and comprehensive 14 point organ system review has been performed and all other systems are negative other than stated above  Objective:     Vitals:   Temp (24hrs), Av 8 °F (36 °C), Min:96 °F (35 6 °C), Max:97 4 °F (36 3 °C)    Temp:  [96 °F (35 6 °C)-97 4 °F (36 3 °C)] 96 2 °F (35 7 °C)  HR:  [68-75] 72  Resp:  [20-28] 22  BP: (155-173)/(66-78) 173/78  SpO2:  [81 %-90 %] 81 %  Body mass index is 38 55 kg/m²  Input and Output Summary (last 24 hours):        Intake/Output Summary (Last 24 hours) at 2021 1641  Last data filed at 2021 1111  Gross per 24 hour   Intake 420 ml   Output 1025 ml   Net -605 ml       Physical Exam:     General: well appearing, no acute distress  HEENT: atraumatic, PERRLA, moist mucosa, normal pharynx, normal tonsils and adenoids, normal tongue, no fluid in sinuses  Neck: Trachea midline, no carotid bruit, no masses  Respiratory: normal chest wall expansion, CTA B, no r/r/w, no rubs  Cardiovascular: RRR, no m/r/g, Normal S1 and S2  Abdomen: Soft, non-tender, non-distended, normal bowel sounds in all quadrants, no hepatosplenomegaly, no tympany  Rectal: deferred  Musculoskeletal: normal ROM in upper and lower extremities  Integumentary: warm, dry, and pink, with no rash, purpura, or petechia  Heme/Lymph: no lymphadenopathy, no bruises  Neurological: Cranial Nerves II-XII grossly intact, no tics, normal sensation to pressure and light touch  Psychiatric: cooperative with normal mood, affect, and cognition      Additional Data:     Labs:    Results from last 7 days   Lab Units 05/11/21  0625  05/08/21  0613 05/07/21  1712   WBC Thousand/uL 9 22   < > 3 57* 3 75*   HEMOGLOBIN g/dL 12 2   < > 12 6 12 8   HEMATOCRIT % 36 0*   < > 39 0 39 5   PLATELETS Thousands/uL 149   < > 98* 96*   NEUTROS PCT %  --   --   --  74   LYMPHS PCT %  --   --   --  17   LYMPHO PCT %  --   --  3*  --    MONOS PCT %  --   --   --  9   MONO PCT %  --   --  2*  --    EOS PCT %  --   --  2 0    < > = values in this interval not displayed  Results from last 7 days   Lab Units 05/11/21  0625   POTASSIUM mmol/L 3 8   CHLORIDE mmol/L 107   CO2 mmol/L 24   BUN mg/dL 76*   CREATININE mg/dL 2 20*   CALCIUM mg/dL 7 5*   ALK PHOS U/L 89   ALT U/L 70   AST U/L 106*     Results from last 7 days   Lab Units 05/07/21  1823   INR  1 12       * I Have Reviewed All Lab Data Listed Above  * Additional Pertinent Lab Tests Reviewed: All Labs For Current Hospital Admission Reviewed    Imaging:    Imaging Reports Reviewed Today Include: Worsening left lower lobe opacity  Imaging Personally Reviewed by Myself Includes:  Worsening left lower infiltrate    Recent Cultures (last 7 days):     Results from last 7 days   Lab Units 05/07/21  1821 05/07/21  1712   BLOOD CULTURE  No Growth at 72 hrs  No Growth at 72 hrs         Last 24 Hours Medication List:   Current Facility-Administered Medications   Medication Dose Route Frequency Provider Last Rate    acetaminophen  650 mg Oral Q6H PRN Dana NII Flores      amLODIPine  2 5 mg Oral Daily JIHAN Nguyen      ascorbic acid  1,000 mg Oral Q12H [facility] Eulalia Esquivel PA-C      aspirin  81 mg Oral Daily Dana NII Flores      atorvastatin  40 mg Oral HS Dana NII Flores      calcitriol  0 25 mcg Oral Daily Dana AspMARU rivera-MARIAELENA      calcium carbonate  1,000 mg Oral TID PRN Dana NII Flores      carvedilol  25 mg Oral BID With Meals Lunda Enmanuel, PA-C      cefTRIAXone  1,000 mg Intravenous Q24H Eulalia Smudde, PA-C 1,000 mg (05/10/21 1626)    cholecalciferol  2,000 Units Oral Daily Eulalia Smudde, PA-C      clonazePAM  0 5 mg Oral BID Lunda Enmanuel, PA-C      dexamethasone  0 1 mg/kg Intravenous Q12H [address] Christobal Irmak, CRNP 11 5 mg (05/11/21 1348)    doxycycline hyclate  100 mg Oral Q12H [address] Nomi Wesley DO      famotidine  10 mg Oral Daily Eulalia Smudde, PA-C      fluticasone  2 spray Nasal Daily Eulalia Smudde, PA-C      gabapentin  100 mg Oral TID Lunda Enmanuel, PA-C      heparin (porcine)  5,000 Units Subcutaneous Q8H [address] Eulalia Smudde, PA-C      insulin glargine  20 Units Subcutaneous Q12H [address] Brando Jensen DO      insulin lispro  1-6 Units Subcutaneous HS Eulalia Smudde, PA-C      insulin lispro  15 Units Subcutaneous TID With Meals Nomi Wesley DO      insulin lispro  2-12 Units Subcutaneous TID AC Eulalia Shande, PA-C      isosorbide dinitrate  30 mg Oral TID Lunda Enmanuel, PA-C      zinc sulfate  220 mg Oral Daily Eulalia Smlarryde, PA-C      Followed by   Lul Srinivasan ON 5/15/2021] multivitamin-minerals  1 tablet Oral Daily Eulalia Smudde, PA-C      ondansetron  4 mg Intravenous Q6H PRN Lunda Enmanuel, PA-C      pantoprazole  40 mg Oral Early Morning Lunda Enmanuel, PA-C      remdesivir  100 mg Intravenous Q24H Eulalia Smudde, PA-C 0 mg (05/09/21 2247)        Today, Patient Was Seen By: Apollo Mcelroy DO    ** Please Note: This note was completed in part utilizing M-Modal Fluency Direct Software  Grammatical errors, random word insertions, spelling mistakes, and incomplete sentences may be an occasional consequence of this system secondary to software limitations, ambient noise, and hardware issues  If you have any questions or concerns about the content, text, or information contained within the body of this dictation, please contact the provider for clarification   **

## 2021-05-12 PROBLEM — U07.1 COVID-19: Status: ACTIVE | Noted: 2021-01-01

## 2021-05-12 PROBLEM — U07.1 ACUTE HYPOXEMIC RESPIRATORY FAILURE DUE TO COVID-19 (HCC): Status: ACTIVE | Noted: 2021-01-01

## 2021-05-12 NOTE — PROCEDURES
Central Line Insertion    Date/Time: 5/12/2021 11:44 AM  Performed by: Karly An RN  Authorized by: Janina Thorpe DO     Patient location:  Bedside  Consent:     Consent obtained:  Verbal    Consent given by:  Patient  Universal protocol:     Procedure explained and questions answered to patient or proxy's satisfaction: yes      Relevant documents present and verified: yes      Test results available and properly labeled: yes      Radiology Images displayed and confirmed  If images not available, report reviewed: yes      Required blood products, implants, devices, and special equipment available: yes      Site/side marked: yes      Immediately prior to procedure, a time out was called: yes      Patient identity confirmed:  Verbally with patient, arm band and hospital-assigned identification number  Pre-procedure details:     Hand hygiene: Hand hygiene performed prior to insertion      Sterile barrier technique: All elements of maximal sterile technique followed      Skin preparation:  ChloraPrep    Skin preparation agent: Skin preparation agent completely dried prior to procedure    Indications:     Central line indications: no peripheral vascular access    Procedure details:     Location:  Right internal jugular    Vessel type: vein      Laterality:  Right    Approach: percutaneous technique used      Catheter type:  Double lumen    Catheter size:  5 Fr    Landmarks identified: yes      Ultrasound guidance: yes      Ultrasound image availability:  Images available in PACS    Sterile ultrasound techniques: Sterile gel and sterile probe covers were used      Number of attempts:  1    Successful placement: yes      Vessel of catheter tip end:  SVC  Post-procedure details:     Post-procedure:  Dressing applied and line sutured    Assessment:  Blood return through all ports, no pneumothorax on x-ray and free fluid flow    Patient tolerance of procedure:   Tolerated well, no immediate complications

## 2021-05-12 NOTE — PROGRESS NOTES
picc consult: chart reviewed and plan of care discussed with Primary RN Anastasiia and Dr Amadou Serrano  Pt with hx of CKD with LUE AV fistual and removal  IR consulted for access

## 2021-05-12 NOTE — PHYSICAL THERAPY NOTE
Physical Therapy Cancellation Note    Attempted to see pt , however after discussion with RN  Pt  Not appropriate for therapeutic intervention due to current medical status  On HFNC with restraints applied as pt  Attempting to remove oxygen  Will cancel and continue to follow as appropriate       eTna Ontiveros, PTA

## 2021-05-12 NOTE — ASSESSMENT & PLAN NOTE
Patient with vomiting episode - KUB unremarkable for any obstructive pathology  Suspect GI symptoms related to COVID-19 infection  Continue acid suppression

## 2021-05-12 NOTE — QUICK NOTE
QUICK NOTE - Deterioration Index  Arnol Johnson Sr  80 y o  male MRN: [id number]  Unit/Bed#: [place] [address] [place] [address]-01 Encounter: [id number]    Date Paged: 21  Time Paged: [phone]  Room #: 1  Arrival Time: 10:40  Deterioration index score at time of page: 56 6  %  Spoke with Anastasiia RN from primary team  Need to escalate level of care: no     PROBLEMS resulting in high DI score:    Acute hypoxic respiratory failure   COVID-19 positive   Acute metabolic encephalopathy      PLAN:     Continue on high flow nasal    Is in restraints and removing oxygen    If o2 saturation continues to decline will need transfer to the icu    Please contact critical care via [employer] with any questions or concerns       Vitals:   Vitals:    21 0350 21 0800 21 1018 21 1020   BP:  162/88 101/62 101/62   BP Location:    Right arm   Pulse: (!) 54 55 75 74   Resp: 18 19  (!) 32   Temp:  (!) 95 7 °F (35 4 °C) (!) 97 2 °F (36 2 °C) (!) 97 2 °F (36 2 °C)   TempSrc:  Oral  Oral   SpO2: 92% 93% (!) 88% (!) 82%   Weight:       Height:           Respiratory:  SpO2: SpO2: (!) 82 %, SpO2 Activity: SpO2 Activity: At Rest, SpO2 Device: O2 Device: High flow nasal cannula  Nasal Cannula O2 Flow Rate (L/min): 15 L/min    Temperature: Temp (24hrs), Av 6 °F (35 9 °C), Min:95 7 °F (35 4 °C), Max:97 2 °F (36 2 °C)  Current: Temperature: (!) 97 2 °F (36 2 °C)    Labs:   Results from last 7 days   Lab Units 21  0625 05/10/21  0620 21  0442 21  0613 21  1712   WBC Thousand/uL 9 22 6 15 3 24* 3 57* 3 75*   HEMOGLOBIN g/dL 12 2 11 9* 11 6* 12 6 12 8   HEMATOCRIT % 36 0* 35 4* 34 4* 39 0 39 5   PLATELETS Thousands/uL 149 129* 104* 98* 96*   NEUTROS PCT %  --   --   --   --  74   MONOS PCT %  --   --   --   --  9   MONO PCT %  --   --   --  2*  --      Results from last 7 days   Lab Units 21  0625 05/10/21  0613 21  0442   SODIUM mmol/L 143 139 134*   POTASSIUM mmol/L 3 8 3 8 4 1   CHLORIDE mmol/L 107 101 97*   CO2 mmol/L 24 24 22   BUN mg/dL 76* 88* 85*   CREATININE mg/dL 2 20* 3 17* 4 23*   CALCIUM mg/dL 7 5* 7 4* 7 0*   ALK PHOS U/L 89 78 76   ALT U/L 70 67 73   AST U/L 106* 101* 120*     Results from last 7 days   Lab Units 05/09/21  0442   MAGNESIUM mg/dL 2 6     Results from last 7 days   Lab Units 05/08/21  2038 05/07/21  1823   LACTIC ACID mmol/L 1 5 1 1     Results from last 7 days   Lab Units 05/08/21  0613 05/08/21  0208 05/07/21  2224 05/07/21  1712   TROPONIN I ng/mL 0 45* 0 53* 0 60* 0 72*     Results from last 7 days   Lab Units 05/10/21  0613 05/09/21  0442 05/08/21  0613 05/07/21  1712   PROCALCITONIN ng/ml 0 44* 0 76* 1 22* 1 57*       Code Status: Level 1 - Full Code

## 2021-05-12 NOTE — PLAN OF CARE
Problem: Potential for Falls  Goal: Patient will remain free of falls  Description: INTERVENTIONS:  - Assess patient frequently for physical needs  -  Identify cognitive and physical deficits and behaviors that affect risk of falls  -  [place] fall precautions as indicated by assessment   - Educate patient/family on patient safety including physical limitations  - Instruct patient to call for assistance with activity based on assessment  - Modify environment to reduce risk of injury  - Consider OT/PT consult to assist with strengthening/mobility  Outcome: Progressing     Problem: SAFETY ADULT  Goal: Patient will remain free of falls  Description: INTERVENTIONS:  - Assess patient frequently for physical needs  -  Identify cognitive and physical deficits and behaviors that affect risk of falls    -  [place] fall precautions as indicated by assessment   - Educate patient/family on patient safety including physical limitations  - Instruct patient to call for assistance with activity based on assessment  - Modify environment to reduce risk of injury  - Consider OT/PT consult to assist with strengthening/mobility  Outcome: Progressing

## 2021-05-12 NOTE — ASSESSMENT & PLAN NOTE
· GAGE on CKD 3b secondary to COVID-19, pneumonia, and rhabdomyolysis  · Continue IV fluids per nephrology  · Trial of diuresis if respiratory status worsens  · Renal function slightly improved    Results from last 7 days   Lab Units 05/11/21  0625 05/10/21  4524 05/09/21  0442 05/08/21 2038 05/08/21  0613 05/07/21  1823   BUN mg/dL 76* 88* 85* 79* 65* 50*   CREATININE mg/dL 2 20* 3 17* 4 23* 4 23* 4 64* 4 97*   EGFR ml/min/1 73sq m 34 22 15 15 14 13

## 2021-05-12 NOTE — PROGRESS NOTES
NEPHROLOGY PROGRESS NOTE   Angelo Brooks Sr  80 y o  male MRN: [id number]  Unit/Bed#: [facility] [facility] 2 -01 Encounter: [id number]  Reason for Consult:  Acute kidney injury    ASSESSMENT/PLAN:  1  Acute kidney injury on chronic kidney disease likely secondary to prerenal azotemia plus angiotensin receptor blocker use  2  Chronic kidney disease with baseline creatinine 1 8-1 9  3  Rhabdomyolysis, overall has improved  4  Hypertension, remains labile, avoid ACE-inhibitor or angiotensin receptor blocker at this time  5  Cardiomyopathy with ejection fraction 40%, volume status appears stable, Lasix as needed, continue to hold Entresto  6  COVID-19 infection, as per primary service currently now on high-flow oxygen  7  Intermittent nausea and vomiting    PLAN:  · Unfortunate patient without IV access, planning for central line to be placed by Interventional Radiology  · Await laboratory studies  · Volume status appears stable suspect worsening dyspnea secondary to COVID-19  · Will consider gentle IV fluids given poor oral intake    SUBJECTIVE:  Seen examined  Patient appears confused  Currently now on high-flow oxygen  Complains of being cold  Review of Systems    OBJECTIVE:  Current Weight: Weight - Scale: 115 kg (253 lb 8 5 oz)  Vitals:    05/12/21 0350 05/12/21 0800 05/12/21 1018 05/12/21 1020   BP:  162/88 101/62 101/62   BP Location:       Pulse: (!) 54 55 75 74   Resp: 18 19  (!) 32   Temp:  (!) 95 7 °F (35 4 °C) (!) 97 2 °F (36 2 °C) (!) 97 2 °F (36 2 °C)   TempSrc:  Oral     SpO2: 92% 93% (!) 88% (!) 82%   Weight:       Height:           Intake/Output Summary (Last 24 hours) at 5/12/2021 1027  Last data filed at 5/11/2021 1720  Gross per 24 hour   Intake 360 ml   Output 25 ml   Net 335 ml       Physical Exam  Constitutional:       Appearance: He is obese  He is ill-appearing  HENT:      Head: Normocephalic and atraumatic  Eyes:      General: No scleral icterus    Cardiovascular:      Rate and Rhythm: Normal rate and regular rhythm  Pulmonary:      Effort: Accessory muscle usage present  Comments: Coarse breath sounds bilaterally  Abdominal:      General: There is distension  Palpations: Abdomen is soft  Tenderness: There is no abdominal tenderness  Musculoskeletal:      Right lower leg: No edema  Left lower leg: No edema  Skin:     General: Skin is warm and dry  Findings: No rash  Neurological:      Mental Status: He is alert  He is disoriented           Medications:    Current Facility-Administered Medications:     acetaminophen (TYLENOL) tablet 650 mg, 650 mg, Oral, Q6H PRN, Yvette Esquivel, PA-C    amLODIPine (NORVASC) tablet 2 5 mg, 2 5 mg, Oral, Daily, Oneida Martínez, CRNP, 2 5 mg at 05/12/21 0839    ascorbic acid (VITAMIN C) tablet 1,000 mg, 1,000 mg, Oral, Q12H ANGEL, Eulalia Esquivel, PA-C, 1,000 mg at 05/12/21 0838    aspirin (ECOTRIN LOW STRENGTH) EC tablet 81 mg, 81 mg, Oral, Daily, Eulalia Esquivel, PA-C, 81 mg at 05/12/21 0839    atorvastatin (LIPITOR) tablet 40 mg, 40 mg, Oral, HS, Eulalia Smastrid, PA-C, 40 mg at 05/10/21 2137    calcitriol (ROCALTROL) capsule 0 25 mcg, 0 25 mcg, Oral, Daily, Eulalia Esquivel, PA-C, 0 25 mcg at 05/12/21 0840    calcium carbonate (TUMS) chewable tablet 1,000 mg, 1,000 mg, Oral, TID PRN, Yvette Esquivel, PA-C    carvedilol (COREG) tablet 25 mg, 25 mg, Oral, BID With Meals, Eulalia Esquivel, PA-C, 25 mg at 05/12/21 0830    cefTRIAXone (ROCEPHIN) 1,000 mg in dextrose 5 % 50 mL IVPB, 1,000 mg, Intravenous, Q24H, Eulalia Esquivel, PA-C, Stopped at 05/11/21 2127    cholecalciferol (VITAMIN D3) tablet 2,000 Units, 2,000 Units, Oral, Daily, Eulalia Alvin, PA-C, 2,000 Units at 05/12/21 0838    clonazePAM (KlonoPIN) tablet 0 5 mg, 0 5 mg, Oral, BID, Eulalia Esquivel PA-C, 0 5 mg at 05/12/21 0838    dexamethasone (DECADRON) 11 5 mg in sodium chloride 0 9 % 50 mL IVPB, 0 1 mg/kg, Intravenous, Q12H [facility], JIHAN Mohan, Stopped at 05/11/21 1405    doxycycline hyclate (VIBRAMYCIN) capsule 100 mg, 100 mg, Oral, Q12H [address], Nomi Lupillo, DO, 100 mg at 05/12/21 0838    famotidine (PEPCID) tablet 10 mg, 10 mg, Oral, Daily, Eulalia Smudde, PA-C, 10 mg at 05/12/21 0839    fluticasone (FLONASE) 50 mcg/act nasal spray 2 spray, 2 spray, Nasal, Daily, Eulalia Smudde, PA-C, 2 spray at 05/12/21 0840    furosemide (LASIX) tablet 80 mg, 80 mg, Oral, Once, Brando Jensen, DO    gabapentin (NEURONTIN) capsule 100 mg, 100 mg, Oral, TID, Eulalia Smudde, PA-C, 100 mg at 05/12/21 0838    heparin (porcine) subcutaneous injection 5,000 Units, 5,000 Units, Subcutaneous, Q8H [address], Eulalia Smudde, PA-C, 5,000 Units at 05/11/21 1312    insulin glargine (LANTUS) subcutaneous injection 20 Units 0 2 mL, 20 Units, Subcutaneous, Q12H [address], Brando Jensen, DO    insulin lispro (HumaLOG) 100 units/mL subcutaneous injection 1-6 Units, 1-6 Units, Subcutaneous, HS, Eulalia Smudde, PA-C, 5 Units at 05/09/21 2211    insulin lispro (HumaLOG) 100 units/mL subcutaneous injection 15 Units, 15 Units, Subcutaneous, TID With Meals, Diaz Ferris DO, Stopped at 05/11/21 0836    insulin lispro (HumaLOG) 100 units/mL subcutaneous injection 2-12 Units, 2-12 Units, Subcutaneous, TID AC, 2 Units at 05/10/21 1711 **AND** Fingerstick Glucose (POCT), , , TID AC, Eulalia Smudde, PA-C    isosorbide dinitrate (ISORDIL) tablet 30 mg, 30 mg, Oral, TID, Eulalia Smudde, PA-C, 30 mg at 05/12/21 0840    zinc sulfate (ZINCATE) capsule 220 mg, 220 mg, Oral, Daily, 220 mg at 05/12/21 0839 **FOLLOWED BY** [START ON 5/15/2021] multivitamin-minerals (CENTRUM ADULTS) tablet 1 tablet, 1 tablet, Oral, Daily, Eulalia Shande, PA-C    ondansetron (ZOFRAN) injection 4 mg, 4 mg, Intravenous, Q6H PRN, Eulalia Smudde, PA-C, 4 mg at 05/10/21 0935    pantoprazole (PROTONIX) EC tablet 40 mg, 40 mg, Oral, Early Morning, Eulalia Smudde, PA-C, 40 mg at 05/11/21 0555    [COMPLETED] remdesivir (Veklury) 200 mg in sodium chloride 0 9 % 250 mL IVPB, 200 mg, Intravenous, Q24H, 200 mg at 05/07/21 2236 **FOLLOWED BY** remdesivir (Veklury) 100 mg in sodium chloride 0 9 % 250 mL IVPB, 100 mg, Intravenous, Q24H, Eulalia Esquivel PA-C, Last Rate: 0 mL/hr at 05/09/21 2247, 100 mg at 05/10/21 2142    Laboratory Results:  Results from last 7 days   Lab Units 05/11/21  0625 05/10/21  0620 05/10/21  0613 05/09/21  0442 05/08/21  2038 05/08/21  0613 05/07/21  1823 05/07/21  1712   WBC Thousand/uL 9 22 6 15  --  3 24*  --  3 57*  --  3 75*   HEMOGLOBIN g/dL 12 2 11 9*  --  11 6*  --  12 6  --  12 8   HEMATOCRIT % 36 0* 35 4*  --  34 4*  --  39 0  --  39 5   PLATELETS Thousands/uL 149 129*  --  104*  --  98*  --  96*   POTASSIUM mmol/L 3 8  --  3 8 4 1 4 1 4 0 3 5  --    CHLORIDE mmol/L 107  --  101 97* 97* 100 100  --    CO2 mmol/L 24  --  24 22 23 16* 28  --    BUN mg/dL 76*  --  88* 85* 79* 65* 50*  --    CREATININE mg/dL 2 20*  --  3 17* 4 23* 4 23* 4 64* 4 97*  --    CALCIUM mg/dL 7 5*  --  7 4* 7 0* 6 9* 7 5* 7 6*  --    MAGNESIUM mg/dL  --   --   --  2 6  --   --   --   --    PHOSPHORUS mg/dL 3 4  --   --  5 3*  --   --   --   --

## 2021-05-12 NOTE — PROGRESS NOTES
[address] [place] [address]  Progress Note Sarai Ross Sr  1950, 80 y o  male MRN: [id number]  Unit/Bed#: [place] [address] -01 Encounter: [id number]  Primary Care Provider: Rah Duncan MD   Date and time admitted to hospital: 5/7/2021  4:33 PM    Vomiting  Assessment & Plan  Patient with vomiting episode - KUB unremarkable for any obstructive pathology  Suspect GI symptoms related to COVID-19 infection  Continue acid suppression    Transaminitis  Assessment & Plan  · Likely secondary to viral syndrome    Results from last 7 days   Lab Units 05/11/21  0625 05/10/21  7964 05/09/21  0442 05/08/21  0613 05/07/21  1823   AST U/L 106* 101* 120* 181* 182*   ALT U/L 70 67 73 76 76   TOTAL BILIRUBIN mg/dL 0 39 0 35 0 25 0 34 0 41       Acute encephalopathy  Assessment & Plan  · Metabolic encephalopathy secondary to renal failure and COVID-19/hypoxia  · Significantly more confused over the last 24 hours  · Restraints discontinued  · Suspect hypoxia as etiology of his altered mental status  · Given 1 dose of Zyprexa today due to delirium from COVID-19  · Will obtain ABG    Elevated troponin  Assessment & Plan  · Non MI elevation troponin secondary to respiratory and renal failure  · Appreciate cardiology evaluation     Results from last 7 days   Lab Units 05/08/21  0613 05/08/21  0208 05/07/21  2224 05/07/21  1712   TROPONIN I ng/mL 0 45* 0 53* 0 60* 0 72*       Acute renal failure superimposed on chronic kidney disease (HCC)  Assessment & Plan  · GAGE on CKD 3b secondary to COVID-19, pneumonia, and rhabdomyolysis  · Continue IV fluids per nephrology  · Trial of diuresis if respiratory status worsens  · Renal function slightly improved    Results from last 7 days   Lab Units 05/11/21  0625 05/10/21  0613 05/09/21  0442 05/08/21  2038 05/08/21  0613 05/07/21  1823   BUN mg/dL 76* 88* 85* 79* 65* 50*   CREATININE mg/dL 2 20* 3 17* 4 23* 4 23* 4 64* 4 97*   EGFR ml/min/1 73sq m 34 22 15 15 14 13 Thrombocytopenia (HCC)  Assessment & Plan  · Thrombocytopenia secondary to sepsis  Improving    Results from last 7 days   Lab Units 05/11/21  0625 05/10/21  0620 05/09/21  0442 05/08/21  0613 05/07/21  1712   PLATELETS Thousands/uL 149 129* 104* 98* 96*   Monitor platelet count, improved    Hyperlipidemia  Assessment & Plan  · Continue atorvastatin    Obesity with body mass index 30 or greater  Assessment & Plan  · Body mass index is 38 55 kg/m²  · Significant risk factor for COVID-19    Chronic combined systolic and diastolic CHF, NYHA class 3 (HCC)  Assessment & Plan  Wt Readings from Last 3 Encounters:   05/07/21 115 kg (253 lb 8 5 oz)   03/19/21 118 kg (261 lb)   03/18/21 118 kg (261 lb)   Patient with elevated BNP in the setting of renal failure  Difficult to determine volume status  Monitor  Results from last 7 days   Lab Units 05/07/21  1823   NT-PRO BNP pg/mL 7,074*       Type 2 diabetes mellitus with kidney complication, with long-term current use of insulin [facility])  Assessment & Plan  Lab Results   Component Value Date    HGBA1C 6 0 03/19/2021     Recent Labs     05/11/21 2026 05/12/21  0844 05/12/21  0933 05/12/21  1158   POCGLU 75 49* 68 109     · Initially basal insulin decreased due to encephalopathy and poor intake  · Has had intermittent hypoglycemia    * Acute respiratory failure with hypoxia (HCC)  Assessment & Plan  · Acute respiratory failure/hypoxia secondary to COVID-19 and bacterial pneumonia  · Now requiring high-flow oxygen- extremely high risk situation  · Chest x-ray significantly worse- likely will need ICU transfer  · COVID-19:  Continue remdesivir dexamethasone and COVID vitamins     · Bacterial pneumonia:  Given elevated procalcitonin continue ceftriaxone/doxycycline  · Remdesivir day 5  · Decadron day 5 - Dose increased to BID dosing on 5/11/2021  · Trial of Lasix, once IV access can be obtained  · Vitamin-C, zinc, vitamin-D  · Trend inflammatory markers   · Trend procalcitonin, currently on ceftriaxone and azithromycin, likely a component of bacterial pneumonia with COVID-19 infection    Lab Results   Component Value Date    SARSCOV2 Positive (A) 05/07/2021    SARSCOV2 Negative 04/30/2021     Results from last 7 days   Lab Units 05/11/21  0625 05/10/21  0613 05/09/21 0442 05/08/21  0613 05/07/21  1823   D-DIMER QUANTITATIVE ug/ml FEU  --  1 39* 1 39* 1 89* 2 06*   CRP mg/L 52 8* 66 7* 120 6* 157 4* 234 1*   FERRITIN ng/mL  --  2,421* 1,839* 1,302* 1,018*     Results from last 7 days   Lab Units 05/10/21  0613 05/09/21 0442 05/08/21  0613 05/07/21  1712   PROCALCITONIN ng/ml 0 44* 0 76* 1 22* 1 57*         [facility] Internal Medicine Progress Note  Patient: Laura Garcia  80 y o  male   MRN: [id number]  PCP: Henry Vaz MD  Unit/Bed#: [place] [facility] 2 -01 Encounter: [id number]  Date Of Visit: 05/12/21    Assessment:    Principal Problem:    Acute respiratory failure with hypoxia ([facility] [address] )  Active Problems:    Type 2 diabetes mellitus with kidney complication, with long-term current use of insulin (HCC)    Chronic combined systolic and diastolic CHF, NYHA class 3 (HCC)    Obesity with body mass index 30 or greater    Hyperlipidemia    Thrombocytopenia (HCC)    Acute renal failure superimposed on chronic kidney disease (HCC)    Elevated troponin    Acute encephalopathy    Transaminitis    Vomiting      Plan:    · Interventional Radiology consultation for intravenous access  · Continue high-flow oxygen  · Patient continues to decompensate - will require ICU  · Followed by Pulmonary will defer  · High risk for intubation      Total critical care time 60 minutes  Total critical care time documented does not include time spent on separately billed procedures or the services of  nurse practioners or physician assistants  I have personally seen and examined the patient  I have reviewed all diagnostic interpretations and treatment plans as written   I was present for the key portions of any procedures performed and the inclusive time noted in any critical care statement  Critical care time includes patient management by me, time spent at the patients bedside, time to review lab and imaging results, discussing patient care, documentation in the medical record, and time spent with the family or caregiver  Meets Critical care criteria based on  Organ System affected pulmonary  Time Spent 60 minutes  Action taken is high-flow oxygen, intravenous steroids, without this the patient has a life threatening condition for which rapid deterioation is imminent and will lead to potential death if untreated         VTE Pharmacologic Prophylaxis:   Pharmacologic: Heparin  Mechanical VTE Prophylaxis in Place: Yes    Patient Centered Rounds: I have performed bedside rounds with nursing staff today  Discussions with Specialists or Other Care Team Provider:  Pulmonary    Education and Discussions with Family / Patient:  Patient's son    Time Spent for Care: 45 minutes  More than 50% of total time spent on counseling and coordination of care as described above  Current Length of Stay: 5 day(s)    Current Patient Status: Inpatient   Certification Statement: The patient will continue to require additional inpatient hospital stay due to Acute hypoxemic respiratory failure secondary to COVID-19    Discharge Plan / Estimated Discharge Date: To be determined    Code Status: Level 1 - Full Code      Subjective:   Patient seen and examined, significantly lethargic  Overnight no IV access could be and obtained  The patient did have hypoglycemia  His chest x-ray from yesterday was personally reviewed and officially read by Radiology as worsening pulmonary infiltrate  The patient now remains on high-flow oxygen, he requires intermittent restraints due to interference with medical devices    Case discussed with Pulmonary physician assistant as well as Nephrology      A complete and comprehensive 14 point organ system review has been performed and all other systems are negative other than stated above  Objective:     Vitals:   Temp (24hrs), Av 6 °F (35 9 °C), Min:95 7 °F (35 4 °C), Max:97 2 °F (36 2 °C)    Temp:  [95 7 °F (35 4 °C)-97 2 °F (36 2 °C)] 97 2 °F (36 2 °C)  HR:  [54-75] 74  Resp:  [18-32] 32  BP: (101-173)/(62-88) 101/62  SpO2:  [80 %-97 %] 82 %  Body mass index is 38 55 kg/m²  Input and Output Summary (last 24 hours): Intake/Output Summary (Last 24 hours) at 2021 1225  Last data filed at 2021 1101  Gross per 24 hour   Intake 480 ml   Output --   Net 480 ml       Physical Exam:     General:  Appears ill, in moderate respiratory distress  HEENT: atraumatic, PERRLA, moist mucosa, normal pharynx, normal tonsils and adenoids, normal tongue, no fluid in sinuses  Neck: Trachea midline, no carotid bruit, no masses  Respiratory:  Rhonchorous lung sounds  Cardiovascular: RRR, no m/r/g, Normal S1 and S2  Abdomen: Soft, non-tender, non-distended, normal bowel sounds in all quadrants, no hepatosplenomegaly, no tympany  Rectal: deferred  Musculoskeletal: normal ROM in upper and lower extremities  Integumentary: warm, dry, and pink, with no rash, purpura, or petechia  Heme/Lymph: no lymphadenopathy, no bruises  Neurological: Cranial Nerves II-XII grossly intact, no tics, normal sensation to pressure and light touch  Psychiatric:  Confused      Additional Data:     Labs:    Results from last 7 days   Lab Units 21  0625  21  0613 21  1712   WBC Thousand/uL 9 22   < > 3 57* 3 75*   HEMOGLOBIN g/dL 12 2   < > 12 6 12 8   HEMATOCRIT % 36 0*   < > 39 0 39 5   PLATELETS Thousands/uL 149   < > 98* 96*   NEUTROS PCT %  --   --   --  74   LYMPHS PCT %  --   --   --  17   LYMPHO PCT %  --   --  3*  --    MONOS PCT %  --   --   --  9   MONO PCT %  --   --  2*  --    EOS PCT %  --   --  2 0    < > = values in this interval not displayed       Results from last 7 days   Lab Units 05/11/21  0625   POTASSIUM mmol/L 3 8   CHLORIDE mmol/L 107   CO2 mmol/L 24   BUN mg/dL 76*   CREATININE mg/dL 2 20*   CALCIUM mg/dL 7 5*   ALK PHOS U/L 89   ALT U/L 70   AST U/L 106*     Results from last 7 days   Lab Units 05/07/21  1823   INR  1 12       * I Have Reviewed All Lab Data Listed Above  * Additional Pertinent Lab Tests Reviewed: [address] Admission Reviewed    Imaging:    Imaging Reports Reviewed Today Include:  Chest x-ray  Imaging Personally Reviewed by Myself Includes:  Chest x-ray    Recent Cultures (last 7 days):     Results from last 7 days   Lab Units 05/07/21  1821 05/07/21  1712   BLOOD CULTURE  No Growth After 4 Days  No Growth After 4 Days         Last 24 Hours Medication List:   Current Facility-Administered Medications   Medication Dose Route Frequency Provider Last Rate    acetaminophen  650 mg Oral Q6H PRN Glenard Caridad, PA-C      amLODIPine  2 5 mg Oral Daily JIHAN Ragsdale      ascorbic acid  1,000 mg Oral Q12H [address] Eulalia Alvin, PA-C      aspirin  81 mg Oral Daily [facility], PA-C      atorvastatin  40 mg Oral HS Glenard Caridad, PA-C      calcitriol  0 25 mcg Oral Daily Glenard Caridad, PA-C      calcium carbonate  1,000 mg Oral TID PRN [facility], PA-C      carvedilol  25 mg Oral BID With Meals [facility], PA-C      cefTRIAXone  1,000 mg Intravenous Q24H Glenard Caridad, PA-C Stopped (05/11/21 2127)    cholecalciferol  2,000 Units Oral Daily Eulalia Smudde, PA-C      clonazePAM  0 5 mg Oral BID Glenard Caridad, PA-C      dexamethasone  0 1 mg/kg Intravenous Q12H [address] JIHAN Lopez Stopped (05/11/21 1405)    doxycycline hyclate  100 mg Oral Q12H [address] Nomi Wesley,       famotidine  10 mg Oral Daily Eulalia Smudde, PA-C      fluticasone  2 spray Nasal Daily Eulalia Smudde, PA-C      furosemide  80 mg Oral Once Brando Jensen DO      gabapentin  100 mg Oral TID Federica Pal Shande, PA-C      heparin (porcine)  5,000 Units Subcutaneous Q8H [facility] Eulalia Esquivel PA-C      insulin glargine  20 Units Subcutaneous Q12H [facility] Brando Jiang DO      insulin lispro  1-6 Units Subcutaneous HS NOEL GomezC      insulin lispro  15 Units Subcutaneous TID With Meals Nomi Wesley DO      insulin lispro  2-12 Units Subcutaneous TID AC Eulalia Esquivel PA-C      isosorbide dinitrate  30 mg Oral TID MARU Piper-C      zinc sulfate  220 mg Oral Daily MARU Gomez-C      Followed by   Authur Sever ON 5/15/2021] multivitamin-minerals  1 tablet Oral Daily MARU Gomez-C      ondansetron  4 mg Intravenous Q6H PRN MARU Piper-C      pantoprazole  40 mg Oral Early Morning MARU Piper-C      remdesivir  100 mg Intravenous Q24H MARU Gomez-C 0 mg (05/09/21 9052)        Today, Patient Was Seen By: Donato Liriano DO    ** Please Note: This note was completed in part utilizing M-Modal Fluency Direct Software  Grammatical errors, random word insertions, spelling mistakes, and incomplete sentences may be an occasional consequence of this system secondary to software limitations, ambient noise, and hardware issues  If you have any questions or concerns about the content, text, or information contained within the body of this dictation, please contact the provider for clarification   **

## 2021-05-12 NOTE — OCCUPATIONAL THERAPY NOTE
Occupational Therapy Note:    Patient Name: Arnol Johnson Sr  Today's Date: 5/12/2021    Chart reviewed  Spoke to [place]  Pt now on HFNC and on restraints 2* pt attempting to remove oxygen  Not appropriate for therapeutic intervention  Will cx and attempt at later time as schedule permits      DRAKE Ji/LEWIS

## 2021-05-12 NOTE — ASSESSMENT & PLAN NOTE
· Acute respiratory failure/hypoxia secondary to COVID-19 and bacterial pneumonia  · Now requiring high-flow oxygen- extremely high risk situation  · Chest x-ray significantly worse- likely will need ICU transfer  · COVID-19:  Continue remdesivir dexamethasone and COVID vitamins     · Bacterial pneumonia:  Given elevated procalcitonin continue ceftriaxone/doxycycline  · Remdesivir day 5  · Decadron day 5 - Dose increased to BID dosing on 5/11/2021  · Trial of Lasix, once IV access can be obtained  · Vitamin-C, zinc, vitamin-D  · Trend inflammatory markers   · Trend procalcitonin, currently on ceftriaxone and azithromycin, likely a component of bacterial pneumonia with COVID-19 infection    Lab Results   Component Value Date    SARSCOV2 Positive (A) 05/07/2021    SARSCOV2 Negative 04/30/2021     Results from last 7 days   Lab Units 05/11/21  0625 05/10/21  0613 05/09/21  0442 05/08/21  0613 05/07/21  1823   D-DIMER QUANTITATIVE ug/ml FEU  --  1 39* 1 39* 1 89* 2 06*   CRP mg/L 52 8* 66 7* 120 6* 157 4* 234 1*   FERRITIN ng/mL  --  2,421* 1,839* 1,302* 1,018*     Results from last 7 days   Lab Units 05/10/21  0613 05/09/21  0442 05/08/21  0613 05/07/21  1712   PROCALCITONIN ng/ml 0 44* 0 76* 1 22* 1 57*

## 2021-05-12 NOTE — ASSESSMENT & PLAN NOTE
Wt Readings from Last 3 Encounters:   05/07/21 115 kg (253 lb 8 5 oz)   03/19/21 118 kg (261 lb)   03/18/21 118 kg (261 lb)   Patient with elevated BNP in the setting of renal failure  Difficult to determine volume status  Monitor  Results from last 7 days   Lab Units 05/07/21  1823   NT-PRO BNP pg/mL 7,074*

## 2021-05-12 NOTE — ASSESSMENT & PLAN NOTE
Lab Results   Component Value Date    HGBA1C 6 0 03/19/2021     Recent Labs     05/11/21 2026 05/12/21  0844 05/12/21  0933 05/12/21  1158   POCGLU 75 49* 68 109     · Initially basal insulin decreased due to encephalopathy and poor intake      · Has had intermittent hypoglycemia

## 2021-05-12 NOTE — ASSESSMENT & PLAN NOTE
· Metabolic encephalopathy secondary to renal failure and COVID-19/hypoxia  · Significantly more confused over the last 24 hours  · Restraints discontinued  · Suspect hypoxia as etiology of his altered mental status  · Given 1 dose of Zyprexa today due to delirium from COVID-19  · Will obtain ABG

## 2021-05-12 NOTE — CONSULTS
INTERPROFESSIONAL (PHONE) CONSULTATION - Interventional Radiology  Nevaeh Cota  80 y o  male MRN: [id number]  [facility] [address] Encounter: [id number]    IR has been consulted to evaluate the patient, determine the appropriate procedure, and whether or not a procedure can and should be performed regarding the care of Nevaeh Cota       We were consulted by Hospitalist concerning patient's need for IV access, and to possibly perform a non-tunneled IJ central line placement if medically appropriate for the patient  Consults  05/12/21      Assessment/Recommendation:   69-year-old male with COVID 19 infection requires IV access  - plan for non tunneled IJ central line placement today      Total time spent in review of data, discussion with requesting provider and rendering advice was 10 min  Thank you for allowing Interventional Radiology to participate in the care of Blas Mao     Please don't hesitate to call or TigerText us with any questions       Kaylee Linares MD

## 2021-05-13 NOTE — OCCUPATIONAL THERAPY NOTE
Occupational Therapy Note:    Patient Name: Reba Spencer Sr  Today's Date: 5/13/2021    Attempted to see pt for OT treatment session this PM  Spoke to nursing  Pt not appropriate for therapeutic intervention 2* medical status  Pt remains on HFNC and restraints 2* attempts to remove oxygen  Will cx and attempt at later time as medically appropriate      Sana Mosley OTR/LEWIS

## 2021-05-13 NOTE — ASSESSMENT & PLAN NOTE
Lab Results   Component Value Date    HGBA1C 6 0 03/19/2021       Recent Labs     05/12/21  0933 05/12/21  1158 05/12/21  1558 05/12/21  1651   POCGLU 68 109 52* 102       Blood Sugar Average: Last 72 hrs:  (P) 659 [id number]     · On jardiance and insulin at home  · Home hold regimen in favor of SSI while NPO

## 2021-05-13 NOTE — PROGRESS NOTES
NEPHROLOGY PROGRESS NOTE   Erlinda Ramirez Sr  80 y o  male MRN: [id number]  Unit/Bed#: ICU 03 Encounter: [id number]  Reason for Consult: GAGE    ASSESSMENT/PLAN:  1  Acute kidney injury on chronic kidney disease likely secondary to prerenal azotemia plus angiotensin receptor blocker use  2  Chronic kidney disease with baseline creatinine 1 8-1 9  3  Rhabdomyolysis, improving currently 979  4  Hypertension, remains labile, avoid ACE-inhibitor or angiotensin receptor blocker at this time  5  Cardiomyopathy with ejection fraction 40%, volume status appears stable, Lasix as needed, continue to hold Entresto  6  COVID-19 infection, as per primary service currently now on high-flow oxygen  7  Intermittent nausea and vomiting   8  Encephalopathy, multifactorial    PLAN:  · Overall renal function remains stable creatinine 1 9  · Volume status overall appears acceptable  · Continue with IV fluids as per primary service  · No other changes in his current regimen    SUBJECTIVE:  Seen and examined  Patient awakens with stimuli answers simple questions  Remains on high-flow oxygen  Reportedly agitated at times  Review of Systems    OBJECTIVE:  Current Weight: Weight - Scale: 114 kg (251 lb 15 8 oz)  Vitals:    05/13/21 0400 05/13/21 0437 05/13/21 0537 05/13/21 0700   BP:  117/52 166/73    Pulse:  58 60    Resp:  (!) 38 13    Temp:    (!) 97 1 °F (36 2 °C)   TempSrc:    Temporal   SpO2: 93% 95% 97%    Weight:       Height:           Intake/Output Summary (Last 24 hours) at 5/13/2021 1125  Last data filed at 5/13/2021 1023  Gross per 24 hour   Intake 215 21 ml   Output 1525 ml   Net -1309 79 ml       Physical Exam  Constitutional:       Appearance: He is obese  HENT:      Head: Normocephalic and atraumatic  Cardiovascular:      Rate and Rhythm: Normal rate and regular rhythm  Pulmonary:      Effort: Pulmonary effort is normal       Comments: Coarse breath sounds  Abdominal:      General: There is distension  Palpations: Abdomen is soft  Tenderness: There is no guarding  Musculoskeletal:      Right lower leg: No edema  Left lower leg: No edema  Skin:     Findings: No rash  Neurological:      Mental Status: He is alert  He is disoriented           Medications:    Current Facility-Administered Medications:     acetaminophen (TYLENOL) tablet 650 mg, 650 mg, Oral, Q6H PRN, La K Romana, CRNP    amLODIPine (NORVASC) tablet 2 5 mg, 2 5 mg, Oral, Daily, Mechelle Flood Bilofsky, CRNP, 2 5 mg at 05/13/21 0906    ascorbic acid (VITAMIN C) tablet 1,000 mg, 1,000 mg, Oral, Q12H [facility], La K Osvaldoofsky, CRNP, 1,000 mg at 05/13/21 1021    aspirin (ECOTRIN LOW STRENGTH) EC tablet 81 mg, 81 mg, Oral, Daily, Mechelle Yaya Bilofsky, CRNP, 81 mg at 05/13/21 1017    atorvastatin (LIPITOR) tablet 40 mg, 40 mg, Oral, HS, Mechelle Flood Bilofsky, CRNP, 40 mg at 05/12/21 2031    bisacodyl (DULCOLAX) rectal suppository 10 mg, 10 mg, Rectal, Daily PRN, JIHAN Mei    calcitriol (ROCALTROL) capsule 0 25 mcg, 0 25 mcg, Oral, Daily, Mechelle Flood Bilofsky, CRNP, 0 25 mcg at 05/13/21 1017    carvedilol (COREG) tablet 25 mg, 25 mg, Oral, BID With Meals, Mechelle Yaya Biloftrinidad, CRNP, 25 mg at 05/13/21 0906    cefTRIAXone (ROCEPHIN) 1,000 mg in dextrose 5 % 50 mL IVPB, 1,000 mg, Intravenous, Q24H, JIHAN Lopez, Last Rate: 100 mL/hr at 05/12/21 1645, 1,000 mg at 05/12/21 1645    cholecalciferol (VITAMIN D3) tablet 2,000 Units, 2,000 Units, Oral, Daily, Mechelle Flood Bilofsky, CRNP, 2,000 Units at 05/13/21 1022    dexamethasone (DECADRON) 11 5 mg in sodium chloride 0 9 % 50 mL IVPB, 0 1 mg/kg, Intravenous, Q12H [facility], JIHAN Riley, Last Rate: 100 mL/hr at 05/13/21 0957, 11 5 mg at 05/13/21 0957    dexmedetomidine (PRECEDEX) 400 mcg in sodium chloride 0 9 % 100 mL infusion, 0 1-0 7 mcg/kg/hr, Intravenous, Titrated, JIHAN Mei, Stopped at 05/13/21 1023    doxycycline hyclate (VIBRAMYCIN) capsule 100 mg, 100 mg, Oral, Q12H [facility], JIHAN Miller, 100 mg at 05/13/21 1017    epoprostenol (VELETRI) 30,000 ng/mL in sodium chloride 0 9 % 50 mL inhalation solution, 6 25-50 ng/kg/min (Ideal), Inhalation, Titrated, JIHAN Miller    famotidine (PEPCID) tablet 10 mg, 10 mg, Oral, Daily, La K Romana, JIHAN, 10 mg at 05/13/21 1017    fluticasone (FLONASE) 50 mcg/act nasal spray 2 spray, 2 spray, Nasal, Daily, Weyman  Romana, JIHAN, 2 spray at 05/12/21 0840    gabapentin (NEURONTIN) capsule 100 mg, 100 mg, Oral, TID, La K Romana, CRNP, 100 mg at 05/13/21 1017    heparin (porcine) subcutaneous injection 5,000 Units, 5,000 Units, Subcutaneous, Q8H [address], JIHAN Riley, 5,000 Units at 05/13/21 0530    insulin lispro (HumaLOG) 100 units/mL subcutaneous injection 2-12 Units, 2-12 Units, Subcutaneous, Q6H [address], 2 Units at 05/13/21 0530 **AND** Fingerstick Glucose (POCT), , , Q6H, JIHAN Porter    isosorbide dinitrate (ISORDIL) tablet 30 mg, 30 mg, Oral, TID, JIHAN Riley, 30 mg at 05/13/21 1031    lactated ringers infusion, 100 mL/hr, Intravenous, Continuous, JIHAN Lopez, Last Rate: 100 mL/hr at 05/13/21 1006, 100 mL/hr at 05/13/21 1006    zinc sulfate (ZINCATE) capsule 220 mg, 220 mg, Oral, Daily, 220 mg at 05/13/21 1023 **FOLLOWED BY** [START ON 5/15/2021] multivitamin-minerals (CENTRUM ADULTS) tablet 1 tablet, 1 tablet, Oral, Daily, JIHAN Riley    ondansetron (ZOFRAN) injection 4 mg, 4 mg, Intravenous, Q6H PRN, La K Bilofsky, CRNP, 4 mg at 05/10/21 0935    pantoprazole (PROTONIX) EC tablet 40 mg, 40 mg, Oral, Early Morning, JIHAN Riley, 40 mg at 05/13/21 0530    polyethylene glycol (MIRALAX) packet 17 g, 17 g, Oral, Daily, JIHAN Porter, 17 g at 05/13/21 1024    senna-docusate sodium (SENOKOT S) 8 6-50 mg per tablet 1 tablet, 1 tablet, Oral, HS, JIHAN Porter    Laboratory Results:  Results from last 7 days   Lab Units 05/13/21  0430 05/12/21 2029 05/11/21  1872 05/10/21  0505 05/10/21  0966 05/09/21  0442 05/08/21  2038 05/08/21  0613  05/07/21  1712   WBC Thousand/uL 7 46 9 93 9 22 6 15  --  3 24*  --  3 57*  --  3 75*   HEMOGLOBIN g/dL 11 3* 11 8* 12 2 11 9*  --  11 6*  --  12 6  --  12 8   HEMATOCRIT % 34 9* 35 4* 36 0* 35 4*  --  34 4*  --  39 0  --  39 5   PLATELETS Thousands/uL 140* 143* 149 129*  --  104*  --  98*  --  96*   POTASSIUM mmol/L 4 2 4 5 3 8  --  3 8 4 1 4 1 4 0   < >  --    CHLORIDE mmol/L 109* 110* 107  --  101 97* 97* 100   < >  --    CO2 mmol/L 28 28 24  --  24 22 23 16*   < >  --    BUN mg/dL 50* 50* 76*  --  88* 85* 79* 65*   < >  --    CREATININE mg/dL 1 90* 1 77* 2 20*  --  3 17* 4 23* 4 23* 4 64*   < >  --    CALCIUM mg/dL 7 9* 7 7* 7 5*  --  7 4* 7 0* 6 9* 7 5*   < >  --    MAGNESIUM mg/dL  --   --   --   --   --  2 6  --   --   --   --    PHOSPHORUS mg/dL  --   --  3 4  --   --  5 3*  --   --   --   --     < > = values in this interval not displayed

## 2021-05-13 NOTE — PROGRESS NOTES
Late documentation  Pt was seen and examined by me at [phone] today  Progress Note - Pulmonary   Norlene Marker Sr  80 y o  male MRN: [id number]  Unit/Bed#: ICU 03 Encounter: [id number]      Assessment and Plan:  Acute hypoxic respiratory failure due to COVID pneumonia  COVID19 pneumonia  Suspected superimposed bacterial pneumonia  GAGE on CKD   JOE  Chronic combined heart failure, EF 33%  Metabolic encephalopathy  Obesity due to excess calorie intake with serious comorbidity and BMI 38 55    - Worsening hypoxia; CXR with diffuse infiltrates due to COVID   - I am concerned given ongoing encephalopathy and worsening hypoxia that he is deteriorating  He requires a higher level of care and I have discussed with Dr Cipriano Merino to transfer him to the ICU   - Hypoglycemia likely due to insulin administration with limited PO intake  Also consider sepsis or new infection  - check blood cultures, UA, and AM procalcitonin  - Follow inflammatory markers  - No obvious cause of encephalopathy  May need MRI brain but too hypoxic at this point    - I am leaning towards discontinuing steroids in case they are causing steroid psychosis  However, he has severe COVID with severe hypoxia and steroids may be of benefit  - Hold all mind altering and sedating medications otherwise  - DVT Px    Discussed with Dr Cipriano Merino and ICU staff  Chief complaint:  I'm all right    Subjective:   Found sitting up in bed  Confused and mumbling his address when I where he is  Hypoxic earlier and now requiring high-flow oxygen at 100%  Hypoglycemia, receiving juice  Objective:   Afebrile  Vitals: Blood pressure (!) 188/73, pulse 79, temperature 98 8 °F (37 1 °C), temperature source Oral, resp  rate 18, height 5' 8" (1 727 m), weight 115 kg (253 lb 8 5 oz), SpO2 92 %  , 55L 100%, Body mass index is 38 55 kg/m²        Intake/Output Summary (Last 24 hours) at 5/12/2021 2116  Last data filed at 5/12/2021 1101  Gross per 24 hour   Intake 120 ml   Output --   Net 120 ml     Physical Exam   GEN: no acute distress, confused, drinking orange juice  HEENT: NCAT, EOMI, MMM  CVS: Regular, no m/r/g  LUNGS: coarse b/l bs  ABD: soft, nd, nt  EXT: No c/c/e  NEURO: No focal deficits  MS: Moving all extremities  SKIN: warm, dry  PSYCH: calm, cooperative      Labs: I have personally reviewed pertinent lab results  Results from last 7 days   Lab Units 05/12/21 2029 05/11/21  0625 05/10/21  0620  05/08/21  0613 05/07/21  1712   WBC Thousand/uL 9 93 9 22 6 15   < > 3 57* 3 75*   HEMOGLOBIN g/dL 11 8* 12 2 11 9*   < > 12 6 12 8   HEMATOCRIT % 35 4* 36 0* 35 4*   < > 39 0 39 5   PLATELETS Thousands/uL 143* 149 129*   < > 98* 96*   NEUTROS PCT %  --   --   --   --   --  74   MONOS PCT %  --   --   --   --   --  9   MONO PCT %  --   --   --   --  2*  --     < > = values in this interval not displayed        Results from last 7 days   Lab Units 05/11/21  0625 05/10/21  0613 05/09/21  0442   POTASSIUM mmol/L 3 8 3 8 4 1   CHLORIDE mmol/L 107 101 97*   CO2 mmol/L 24 24 22   BUN mg/dL 76* 88* 85*   CREATININE mg/dL 2 20* 3 17* 4 23*   CALCIUM mg/dL 7 5* 7 4* 7 0*   ALK PHOS U/L 89 78 76   ALT U/L 70 67 73   AST U/L 106* 101* 120*     Results from last 7 days   Lab Units 05/09/21  0442   MAGNESIUM mg/dL 2 6     Results from last 7 days   Lab Units 05/11/21  0625 05/09/21  0442   PHOSPHORUS mg/dL 3 4 5 3*      Results from last 7 days   Lab Units 05/07/21  1823   INR  1 12   PTT seconds 33     Results from last 7 days   Lab Units 05/08/21  2038   LACTIC ACID mmol/L 1 5     0   Lab Value Date/Time    TROPONINI 0 45 (H) 05/08/2021 0613    TROPONINI 0 53 (H) 05/08/2021 0208    TROPONINI 0 60 (H) 05/07/2021 2224    TROPONINI 0 72 (H) 05/07/2021 1712    TROPONINI 0 06 (H) 12/20/2016 2135    TROPONINI 0 07 (H) 12/20/2016 1753    TROPONINI 0 06 (H) 12/20/2016 1541    TROPONINI 0 06 (H) 12/20/2016 1029     Labs per my review reveal elevated creatinine improved; hypoalbuminemia; anemia  Elevated procalcitonin trending down  Elevated ddimer trended up    Microbiology:  5/7 blood cultures x 2 NGTD    Imaging and other studies: I have personally reviewed pertinent films in PACS  CXR per my review shows R TLC in place with bilateral infiltrates  DARRON Mas [facility]own [facility]

## 2021-05-13 NOTE — ASSESSMENT & PLAN NOTE
· In the setting of covid 19   · Initial positive 5/7 with unclear symptom onset  · Escalating oxygen requirements, now on HFNC 50L 100% Fio2 +- NRB  · CXR with bilateral pulmonary opacities  · Decadron total D5, BID dosing D3  · Continue vitamins  · Completed remdesivir  · Given Actemra 5/13 2/2 increasing Fio2 requirements and elevated CRP, started bowel regimen  · Given 60mg IV Lasix, continue diurese PRN for possible volume component of hypoxia  · Continue ABX D6 total, resend procal tomorrow, consider d/c if negative x 2

## 2021-05-13 NOTE — ASSESSMENT & PLAN NOTE
Wt Readings from Last 3 Encounters:   05/07/21 115 kg (253 lb 8 5 oz)   03/19/21 118 kg (261 lb)   03/18/21 118 kg (261 lb)     · BNP 7000 on admission  · 4/21 ECHO: The ventricle was mildly to moderately dilated  Systolic function was mildly to moderately reduced  Ejection fraction was estimated to be 40 %  There was mild to moderate diffusehypokinesis    · Monitor volume status  · Continue norvasc, coreg, isordil; possibly need conversion to IV medications it patient unable to take PO  · Diuretics as above

## 2021-05-13 NOTE — PHYSICAL THERAPY NOTE
Physical Therapy Cancellation Note    Attempted to see pt , however after discussion with RN  Pt  Not appropriate for therapeutic intervention due to current medical status  On HFNC with restraints applied as pt  Attempting to remove oxygen  Will cancel and continue to follow as appropriate       Tenisha Mario, PTA

## 2021-05-13 NOTE — ASSESSMENT & PLAN NOTE
· Present on admission, now with increasing confusion and agitation  · CT head: No acute intracranial abnormality  Microangiopathic changes    · Etiology unclear: covid 19/sepsis/hypoxia vs  delerium vs  Uremia less likely  · Neuro checks  · Avoid deliogenic medications  · Frequent reorientations   · Fall precautions  · Continue precedex, wean as tolerated  · Consider MRI  · Ammonia 38

## 2021-05-13 NOTE — PLAN OF CARE
Problem: Potential for Falls  Goal: Patient will remain free of falls  Description: INTERVENTIONS:  - Assess patient frequently for physical needs  -  Identify cognitive and physical deficits and behaviors that affect risk of falls    -  [place] fall precautions as indicated by assessment   - Educate patient/family on patient safety including physical limitations  - Instruct patient to call for assistance with activity based on assessment  - Modify environment to reduce risk of injury  - Consider OT/PT consult to assist with strengthening/mobility  Outcome: Progressing     Problem: Prexisting or High Potential for Compromised Skin Integrity  Goal: Skin integrity is maintained or improved  Description: INTERVENTIONS:  - Identify patients at risk for skin breakdown  - Assess and monitor skin integrity  - Assess and monitor nutrition and hydration status  - Monitor labs   - Assess for incontinence   - Turn and reposition patient  - Assist with mobility/ambulation  - Relieve pressure over bony prominences  - Avoid friction and shearing  - Provide appropriate hygiene as needed including keeping skin clean and dry  - Evaluate need for skin moisturizer/barrier cream  - Collaborate with interdisciplinary team   - Patient/family teaching  - Consider wound care consult   Outcome: Progressing     Problem: PAIN - ADULT  Goal: Verbalizes/displays adequate comfort level or baseline comfort level  Description: Interventions:  - Encourage patient to monitor pain and request assistance  - Assess pain using appropriate pain scale  - Administer analgesics based on type and severity of pain and evaluate response  - Implement non-pharmacological measures as appropriate and evaluate response  - Consider cultural and social influences on pain and pain management  - Notify physician/advanced practitioner if interventions unsuccessful or patient reports new pain  Outcome: Progressing     Problem: INFECTION - ADULT  Goal: Absence or prevention of progression during hospitalization  Description: INTERVENTIONS:  - Assess and monitor for signs and symptoms of infection  - Monitor lab/diagnostic results  - Monitor all insertion sites, i e  indwelling lines, tubes, and drains  - Monitor endotracheal if appropriate and nasal secretions for changes in amount and color  - [place] appropriate cooling/warming therapies per order  - Administer medications as ordered  - Instruct and encourage patient and family to use good hand hygiene technique  - Identify and instruct in appropriate isolation precautions for identified infection/condition  Outcome: Progressing     Problem: SAFETY ADULT  Goal: Patient will remain free of falls  Description: INTERVENTIONS:  - Assess patient frequently for physical needs  -  Identify cognitive and physical deficits and behaviors that affect risk of falls    -  [place] fall precautions as indicated by assessment   - Educate patient/family on patient safety including physical limitations  - Instruct patient to call for assistance with activity based on assessment  - Modify environment to reduce risk of injury  - Consider OT/PT consult to assist with strengthening/mobility  Outcome: Progressing  Goal: Maintain or return to baseline ADL function  Description: INTERVENTIONS:  -  Assess patient's ability to carry out ADLs; assess patient's baseline for ADL function and identify physical deficits which impact ability to perform ADLs (bathing, care of mouth/teeth, toileting, grooming, dressing, etc )  - Assess/evaluate cause of self-care deficits   - Assess range of motion  - Assess patient's mobility; develop plan if impaired  - Assess patient's need for assistive devices and provide as appropriate  - Encourage maximum independence but intervene and supervise when necessary  - Involve family in performance of ADLs  - Assess for home care needs following discharge   - Consider OT consult to assist with ADL evaluation and planning for discharge  - Provide patient education as appropriate  Outcome: Progressing  Goal: Maintain or return mobility status to optimal level  Description: INTERVENTIONS:  - Assess patient's baseline mobility status (ambulation, transfers, stairs, etc )    - Identify cognitive and physical deficits and behaviors that affect mobility  - Identify mobility aids required to assist with transfers and/or ambulation (gait belt, sit-to-stand, lift, walker, cane, etc )  - [place] fall precautions as indicated by assessment  - Record patient progress and toleration of activity level on Mobility SBAR; progress patient to next Phase/Stage  - Instruct patient to call for assistance with activity based on assessment  - Consider rehabilitation consult to assist with strengthening/weightbearing, etc   Outcome: Progressing     Problem: DISCHARGE PLANNING  Goal: Discharge to home or other facility with appropriate resources  Description: INTERVENTIONS:  - Identify barriers to discharge w/patient and caregiver  - Arrange for needed discharge resources and transportation as appropriate  - Identify discharge learning needs (meds, wound care, etc )  - Arrange for interpretive services to assist at discharge as needed  - Refer to Case Management Department for coordinating discharge planning if the patient needs post-hospital services based on physician/advanced practitioner order or complex needs related to functional status, cognitive ability, or social support system  Outcome: Progressing     Problem: Knowledge Deficit  Goal: Patient/family/caregiver demonstrates understanding of disease process, treatment plan, medications, and discharge instructions  Description: Complete learning assessment and assess knowledge base    Interventions:  - Provide teaching at level of understanding  - Provide teaching via preferred learning methods  Outcome: Progressing     Problem: Nutrition/Hydration-ADULT  Goal: Nutrient/Hydration intake appropriate for improving, restoring or maintaining nutritional needs  Description: Monitor and assess patient's nutrition/hydration status for malnutrition  Collaborate with interdisciplinary team and initiate plan and interventions as ordered  Monitor patient's weight and dietary intake as ordered or per policy  Utilize nutrition screening tool and intervene as necessary  Determine patient's food preferences and provide high-protein, high-caloric foods as appropriate       INTERVENTIONS:  - Monitor oral intake, urinary output, labs, and treatment plans  - Assess nutrition and hydration status and recommend course of action  - Evaluate amount of meals eaten  - Assist patient with eating if necessary   - Allow adequate time for meals  - Recommend/ encourage appropriate diets, oral nutritional supplements, and vitamin/mineral supplements  - Order, calculate, and assess calorie counts as needed  - Recommend, monitor, and adjust tube feedings and TPN/PPN based on assessed needs  - Assess need for intravenous fluids  - Provide specific nutrition/hydration education as appropriate  - Include patient/family/caregiver in decisions related to nutrition  Outcome: Progressing     Problem: SAFETY,RESTRAINT: NV/NON-SELF DESTRUCTIVE BEHAVIOR  Goal: Remains free of harm/injury (restraint for non violent/non self-detsructive behavior)  Description: INTERVENTIONS:  - Instruct patient/family regarding restraint use   - Assess and monitor physiologic and psychological status   - Provide interventions and comfort measures to meet assessed patient needs   - Identify and implement measures to help patient regain control  - Assess readiness for release of restraint   Outcome: Progressing  Goal: Returns to optimal restraint-free functioning  Description: INTERVENTIONS:  - Assess the patient's behavior and symptoms that indicate continued need for restraint  - Identify and implement measures to help patient regain control  - Assess readiness for release of restraint   Outcome: Progressing

## 2021-05-13 NOTE — ASSESSMENT & PLAN NOTE
Lab Results   Component Value Date    EGFR 44 05/12/2021    EGFR 34 05/11/2021    EGFR 22 05/10/2021    CREATININE 1 77 (H) 05/12/2021    CREATININE 2 20 (H) 05/11/2021    CREATININE 3 17 (H) 05/10/2021     · Hx of CKD III, baseline sCr 1 8 - 1 9  · GAGE on presentation with sCr of 4 97  · Now improved to 1 77  · On lasix and metolazone at home, hold now for targeted diuresis  · Etiology: Likely prerenal azotemia with RAAS blockade  · Hold ace/arb  · Improving with IVF  · Continue to trend  · Avoid nephrotoxins/hypotension/hypoxia    · Monitor I and O

## 2021-05-13 NOTE — PROGRESS NOTES
[address] [place] [address]  Progress Note Locoemperatriz Beebe Sr  1950, 80 y o  male MRN: [id number]  Unit/Bed#: ICU 03 Encounter: [id number]  Primary Care Provider: Dov Bardales MD   Date and time admitted to hospital: 5/7/2021  4:33 PM    * Acute hypoxemic respiratory failure due to COVID-19 [facility])  Assessment & Plan  · In the setting of covid 19   · Initial positive 5/7 with unclear symptom onset  · Escalating oxygen requirements, now on HFNC 50L 100% Fio2 +- NRB  · CXR with bilateral pulmonary opacities  · Decadron total D5, BID dosing D3  · Continue vitamins  · Completed remdesivir  · Given Actemra 5/13 2/2 increasing Fio2 requirements and elevated CRP, started bowel regimen  · Given 60mg IV Lasix, continue diurese PRN for possible volume component of hypoxia  · Continue ABX D6 total, resend procal tomorrow, consider d/c if negative x 2    Acute encephalopathy  Assessment & Plan  · Present on admission, now with increasing confusion and agitation  · CT head: No acute intracranial abnormality  Microangiopathic changes  · Etiology unclear: covid 19/sepsis/hypoxia vs  delerium vs  Uremia less likely  · Neuro checks  · Avoid deliogenic medications  · Frequent reorientations   · Fall precautions  · Continue precedex, wean as tolerated  · Consider MRI  · Ammonia 38    Acute renal failure superimposed on chronic kidney disease [facility])  Assessment & Plan  Lab Results   Component Value Date    EGFR 44 05/12/2021    EGFR 34 05/11/2021    EGFR 22 05/10/2021    CREATININE 1 77 (H) 05/12/2021    CREATININE 2 20 (H) 05/11/2021    CREATININE 3 17 (H) 05/10/2021     · Hx of CKD III, baseline sCr 1 8 - 1 9  · GAGE on presentation with sCr of 4 97  · Now improved to 1 77  · On lasix and metolazone at home, hold now for targeted diuresis  · Etiology: Likely prerenal azotemia with RAAS blockade  · Hold ace/arb  · Improving with IVF  · Continue to trend  · Avoid nephrotoxins/hypotension/hypoxia    · Monitor I and O    Chronic combined systolic and diastolic CHF, NYHA class 3 (HCC)  Assessment & Plan  Wt Readings from Last 3 Encounters:   05/07/21 115 kg (253 lb 8 5 oz)   03/19/21 118 kg (261 lb)   03/18/21 118 kg (261 lb)     · BNP 7000 on admission  · 4/21 ECHO: The ventricle was mildly to moderately dilated  Systolic function was mildly to moderately reduced  Ejection fraction was estimated to be 40 %  There was mild to moderate diffusehypokinesis    · Monitor volume status  · Continue norvasc, coreg, isordil; possibly need conversion to IV medications it patient unable to take PO  · Diuretics as above        Thrombocytopenia (HCC)  Assessment & Plan  · Presented with platelets of 96  · Improving to 149  · In setting of sepsis, covid 19    Transaminitis  Assessment & Plan  · Mild AST and alk phos elevations  · In setting of covid 19  · Trend    Elevated troponin  Assessment & Plan  · Non MI elevated troponin  · Peaked at 0 6, no longer trending  · Cards consulted, demand ischemia 2/2 critical illness  · Continue ASA 81mg PO    Type 2 diabetes mellitus with kidney complication, with long-term current use of insulin [facility])  Assessment & Plan  Lab Results   Component Value Date    HGBA1C 6 0 03/19/2021       Recent Labs     05/12/21  0933 05/12/21  1158 05/12/21  1558 05/12/21  1651   POCGLU 68 109 52* 102       Blood Sugar Average: Last 72 hrs:  (P) 451 [id number]     · On jardiance and insulin at home  · Home hold regimen in favor of SSI while NPO    Obesity with body mass index 30 or greater  Assessment & Plan  · Nutrition consult when able    Hyperlipidemia  Assessment & Plan  · Continue lipitor      ----------------------------------------------------------------------------------------  HPI/24hr events:   · Transferred to ICU for increased oxygen requirements  · Given Actemra 2/2 increasing Fio2 demands and rising CRP  · Continued encephalopathy/agitation requiring precedex infusion    Disposition: Continue Stepdown Level 1 level of care   Code Status: Level 1 - Full Code  ---------------------------------------------------------------------------------------  SUBJECTIVE  Na    Review of Systems   Unable to perform ROS: Mental status change   All other systems reviewed and are negative       ---------------------------------------------------------------------------------------  OBJECTIVE    Vitals   Vitals:    21 0137 21 0337 21 0400 21 0437   BP: 141/63 135/75  117/52   Pulse: 60 72  58   Resp: 21 (!) 24  (!) 38   Temp:  98 8 °F (37 1 °C)     TempSrc:  Temporal     SpO2: 96% 91% 93% 95%   Weight:  114 kg (251 lb 15 8 oz)     Height:         Temp (24hrs), Av 8 °F (36 6 °C), Min:95 7 °F (35 4 °C), Max:98 8 °F (37 1 °C)  Current: Temperature: 98 8 °F (37 1 °C)          Respiratory:  SpO2: SpO2: 95 %  Nasal Cannula O2 Flow Rate (L/min): 15 L/min    Invasive/non-invasive ventilation settings   Respiratory    Lab Data (Last 4 hours)    None         O2/Vent Data (Last 4 hours)      400          Non-Invasive Ventilation Mode HFNC (High flow)                   Physical Exam  Vitals signs and nursing note reviewed  Constitutional:       Appearance: He is obese  He is ill-appearing  HENT:      Head: Normocephalic and atraumatic  Mouth/Throat:      Mouth: Mucous membranes are dry  Cardiovascular:      Rate and Rhythm: Normal rate and regular rhythm  Pulmonary:      Effort: Tachypnea present  No respiratory distress  Breath sounds: No wheezing or rales  Abdominal:      General: There is no distension  Palpations: Abdomen is soft  Musculoskeletal:      Right lower leg: No edema  Left lower leg: No edema  Skin:     General: Skin is warm and dry  Capillary Refill: Capillary refill takes less than 2 seconds  Neurological:      GCS: GCS eye subscore is 4  GCS verbal subscore is 4  GCS motor subscore is 6        Comments: Sedated on precedex infusion  Confused  Not oriented to place/situation/date  Moves all extremities         Laboratory and Diagnostics:  Results from last 7 days   Lab Units 05/13/21 0430 05/12/21 2029 05/11/21  9582 05/10/21  0620 05/09/21 0442 05/08/21  0613 05/07/21  1712   WBC Thousand/uL 7 46 9 93 9 22 6 15 3 24* 3 57* 3 75*   HEMOGLOBIN g/dL 11 3* 11 8* 12 2 11 9* 11 6* 12 6 12 8   HEMATOCRIT % 34 9* 35 4* 36 0* 35 4* 34 4* 39 0 39 5   PLATELETS Thousands/uL 140* 143* 149 129* 104* 98* 96*   NEUTROS PCT %  --   --   --   --   --   --  74   BANDS PCT %  --   --   --   --   --  5  --    MONOS PCT %  --   --   --   --   --   --  9   MONO PCT %  --   --   --   --   --  2*  --      Results from last 7 days   Lab Units 05/13/21 0430 05/12/21 2029 05/11/21  0625 05/10/21  6320 05/09/21 0442 05/08/21 2038 05/08/21  0613 05/07/21  1823   SODIUM mmol/L 144 146* 143 139 134* 134* 137 136   POTASSIUM mmol/L 4 2 4 5 3 8 3 8 4 1 4 1 4 0 3 5   CHLORIDE mmol/L 109* 110* 107 101 97* 97* 100 100   CO2 mmol/L 28 28 24 24 22 23 16* 28   ANION GAP mmol/L 7 8 12 14* 15* 14* 21* 8   BUN mg/dL 50* 50* 76* 88* 85* 79* 65* 50*   CREATININE mg/dL 1 90* 1 77* 2 20* 3 17* 4 23* 4 23* 4 64* 4 97*   CALCIUM mg/dL 7 9* 7 7* 7 5* 7 4* 7 0* 6 9* 7 5* 7 6*   GLUCOSE RANDOM mg/dL 199* 127 71 152* 371* 378* 144* 76   ALT U/L 57 60 70 67 73  --  76 76   AST U/L 61* 74* 106* 101* 120*  --  181* 182*   ALK PHOS U/L 126* 123* 89 78 76  --  69 71   ALBUMIN g/dL 2 3* 2 4* 2 6* 2 5* 2 4*  --  2 6* 2 8*   TOTAL BILIRUBIN mg/dL 0 63 0 56 0 39 0 35 0 25  --  0 34 0 41     Results from last 7 days   Lab Units 05/11/21  0625 05/09/21  0442   MAGNESIUM mg/dL  --  2 6   PHOSPHORUS mg/dL 3 4 5 3*      Results from last 7 days   Lab Units 05/07/21  1823   INR  1 12   PTT seconds 33      Results from last 7 days   Lab Units 05/08/21  0613 05/08/21  0208 05/07/21  2224 05/07/21  1712   TROPONIN I ng/mL 0 45* 0 53* 0 60* 0 72*     Results from last 7 days   Lab Units 05/08/21 2038 05/07/21 1823   LACTIC ACID mmol/L 1 5 1 1     ABG:  Results from last 7 days   Lab Units 05/12/21 2023   PH ART  7 535*   PCO2 ART mm Hg 31 3*   PO2 ART mm Hg 53 2*   HCO3 ART mmol/L 25 9   BASE EXC ART mmol/L 3 9   ABG SOURCE  Radial, Left     VBG:  Results from last 7 days   Lab Units 05/12/21 2023   ABG SOURCE  Radial, Left     Results from last 7 days   Lab Units 05/10/21  0613 05/09/21  0442 05/08/21  0613 05/07/21  1712   PROCALCITONIN ng/ml 0 44* 0 76* 1 22* 1 57*       Micro  Results from last 7 days   Lab Units 05/07/21 1821 05/07/21  1712   BLOOD CULTURE  No Growth After 5 Days  No Growth After 5 Days  EKG NSR rate 64 on telemetry   Imaging: I have personally reviewed pertinent reports  Intake and Output  I/O       05/11 0701 - 05/12 0700 05/12 0701 - 05/13 0700    P  O  540 120    IV Piggyback  50    Total Intake(mL/kg) 540 (4 7) 170 (1 5)    Urine (mL/kg/hr) 25 (0) 200 (0 1)    Total Output 25 200    Net +515 -30          Unmeasured Urine Occurrence 5 x 2 x          Height and Weights   Height: 5' 8" (172 7 cm)  IBW (Ideal Body Weight): 68 4 kg  Body mass index is 38 31 kg/m²  Weight (last 2 days)     Date/Time   Weight    05/13/21 0337   114 (251 99)                Nutrition       Diet Orders   (From admission, onward)             Start     Ordered    05/12/21 1845  Diet NPO  Diet effective now     Question Answer Comment   Diet Type NPO    RD to adjust diet per protocol?  Yes        05/12/21 1844                  Active Medications  Scheduled Meds:  Current Facility-Administered Medications   Medication Dose Route Frequency Provider Last Rate    acetaminophen  650 mg Oral Q6H PRN Cleta Lowers Bilofsky, CRNP      amLODIPine  2 5 mg Oral Daily La K Bilofsky, CRNP      ascorbic acid  1,000 mg Oral Q12H [address] La K Bilofsky, CRNP      aspirin  81 mg Oral Daily Cleta Lowers Bilofsky, CRNP      atorvastatin  40 mg Oral HS La K Bilofsky, CRNP      bisacodyl  10 mg Rectal Daily PRN JIHAN Burgos      calcitriol  0 25 mcg Oral Daily JIHAN Person      carvedilol  25 mg Oral BID With Meals JIHAN Person      cefTRIAXone  1,000 mg Intravenous Q24H JIHAN Person 1,000 mg (05/12/21 1645)    cholecalciferol  2,000 Units Oral Daily [facility] JIHAN Avendano      dexamethasone  0 1 mg/kg Intravenous Q12H [facility] JIHAN Avendano 11 5 mg (05/12/21 2231)    dexmedetomidine  0 1-0 7 mcg/kg/hr Intravenous Titrated JIHAN Burgos 0 1 mcg/kg/hr (05/13/21 0310)    doxycycline hyclate  100 mg Oral Q12H [facility] JIHAN Riley      famotidine  10 mg Oral Daily [facility] OsvaldoJIHAN abdi      fluticasone  2 spray Nasal Daily [facility]      gabapentin  100 mg Oral TID JIHAN Person      heparin (porcine)  5,000 Units Subcutaneous Q8H [facility] JIHAN Riley      insulin lispro  2-12 Units Subcutaneous Q6H [facility] JIHAN Burgos      isosorbide dinitrate  30 mg Oral TID JIHAN Person      zinc sulfate  220 mg Oral Daily JIHAN Riley      Followed by   Kate Jansen ON 5/15/2021] multivitamin-minerals  1 tablet Oral Daily JIHAN Riley      ondansetron  4 mg Intravenous Q6H PRN [facility] JIHAN Avendano      pantoprazole  40 mg Oral Early Morning JIHAN Riley      polyethylene glycol  17 g Oral Daily JIHAN Burgos      senna-docusate sodium  1 tablet Oral HS JIHAN Burgos       Continuous Infusions:  dexmedetomidine, 0 1-0 7 mcg/kg/hr, Last Rate: 0 1 mcg/kg/hr (05/13/21 0310)      PRN Meds:   acetaminophen, 650 mg, Q6H PRN  bisacodyl, 10 mg, Daily PRN  ondansetron, 4 mg, Q6H PRN        Invasive Devices Review  Invasive Devices     Central Venous Catheter Line            CVC Central Lines 05/12/21 Double less than 1 day          Line            Hemodialysis AV Fistula 09/06/19 Upper arm 615 days          Drain            Closed/Suction Drain Anterior Abdomen Bulb 15 Fr  1209 days External Urinary Catheter Medium less than 1 day                Rationale for remaining devices: IV medications, Lab draws  ---------------------------------------------------------------------------------------  Advance Directive and Living Will:      Power of :    POLST:    ---------------------------------------------------------------------------------------  Care Time Delivered:   No Critical Care time spent       JIHAN Gardner      Portions of the record may have been created with voice recognition software  Occasional wrong word or "sound a like" substitutions may have occurred due to the inherent limitations of voice recognition software    Read the chart carefully and recognize, using context, where substitutions have occurred

## 2021-05-13 NOTE — ASSESSMENT & PLAN NOTE
· Non MI elevated troponin  · Peaked at 0 6, no longer trending  · Cards consulted, demand ischemia 2/2 critical illness  · Continue ASA 81mg PO

## 2021-05-14 PROBLEM — K59.00 CONSTIPATION: Status: ACTIVE | Noted: 2021-01-01

## 2021-05-14 PROBLEM — E87.0 HYPERNATREMIA: Status: ACTIVE | Noted: 2021-01-01

## 2021-05-14 NOTE — PROGRESS NOTES
NEPHROLOGY PROGRESS NOTE   Josie Pollack Sr  80 y o  male MRN: [id number]  Unit/Bed#: ICU 03 Encounter: [id number]  Reason for Consult:  Acute kidney injury    ASSESSMENT/PLAN:  1  Acute kidney injury on chronic kidney disease likely secondary to prerenal azotemia plus angiotensin receptor blocker use  2  Chronic kidney disease with baseline creatinine 1 8-1 9  3  Mild hyponatremia, 147, D5 at 50 cc/hour  4  Suspect some degree of volume overload, Lasix 80 IV x1  5  Rhabdomyolysis, improving currently 979  6  Hypertension, remains labile, avoid ACE-inhibitor or angiotensin receptor blocker at this time  7  Cardiomyopathy with ejection fraction 40%, volume status appears stable, Lasix as needed, continue to hold Entresto  8  COVID-19 infection, as per primary service currently now on high-flow oxygen  9  Intermittent nausea and vomiting   10  Encephalopathy, multifactorial, continues to wax and wane    PLAN:  · Overall renal function appears fairly stable   · Difficult to determine patient's volume status, appears slightly overloaded on exam today  Recommend Lasix 80 IV x1   · Given hyponatremia diuresis, D5W at 50 cc/hour   · Continue monitor renal function closely  ·  discussed with Critical Care    SUBJECTIVE:   seen examined  Patient remains confused at times  Remains on high-flow oxygen plus non-rebreather mask  Review of Systems    OBJECTIVE:  Current Weight: Weight - Scale: 111 kg (244 lb 14 9 oz)  Vitals:    05/14/21 0541 05/14/21 0700 05/14/21 0723 05/14/21 0912   BP:    (!) 183/82   Pulse:       Resp:       Temp:   (!) 97 °F (36 1 °C)    TempSrc:   Temporal    SpO2:  97%     Weight: 111 kg (244 lb 14 9 oz)      Height:           Intake/Output Summary (Last 24 hours) at 5/14/2021 1217  Last data filed at 5/14/2021 1000  Gross per 24 hour   Intake 2313 49 ml   Output 440 ml   Net 1873 49 ml       Physical Exam  Constitutional:       Appearance: He is not ill-appearing     HENT:      Head: Normocephalic and atraumatic  Eyes:      General: No scleral icterus  Cardiovascular:      Rate and Rhythm: Normal rate and regular rhythm  Pulmonary:      Effort: Pulmonary effort is normal       Breath sounds: Rales present  Abdominal:      General: There is distension  Palpations: Abdomen is soft  Tenderness: There is no abdominal tenderness  Musculoskeletal:      Right lower leg: No edema  Left lower leg: No edema  Skin:     General: Skin is warm and dry  Findings: No rash  Neurological:      Mental Status: He is alert  He is disoriented           Medications:    Current Facility-Administered Medications:     acetaminophen (TYLENOL) tablet 650 mg, 650 mg, Oral, Q6H PRN, JIHAN Hutson    [START ON 5/15/2021] amLODIPine (NORVASC) tablet 5 mg, 5 mg, Oral, Daily, Hallie Alex MD    ascorbic acid (VITAMIN C) tablet 1,000 mg, 1,000 mg, Oral, Q12H [facility], JIHAN Riley, 1,000 mg at 05/14/21 0913    aspirin (ECOTRIN LOW STRENGTH) EC tablet 81 mg, 81 mg, Oral, Daily, JIHAN Hutson, 81 mg at 05/14/21 0913    atorvastatin (LIPITOR) tablet 40 mg, 40 mg, Oral, HS, JIHAN Hutson, 40 mg at 05/13/21 2101    bisacodyl (DULCOLAX) rectal suppository 10 mg, 10 mg, Rectal, Daily PRN, JIHAN Tyson    calcitriol (ROCALTROL) capsule 0 25 mcg, 0 25 mcg, Oral, Daily, JIHAN Hutson, 0 25 mcg at 05/14/21 0913    carvedilol (COREG) tablet 25 mg, 25 mg, Oral, BID With Meals, JIHAN Hutson, 25 mg at 05/14/21 0913    cholecalciferol (VITAMIN D3) tablet 2,000 Units, 2,000 Units, Oral, Daily, JIHAN Hutson, 2,000 Units at 05/14/21 0913    dexamethasone (DECADRON) 11 5 mg in sodium chloride 0 9 % 50 mL IVPB, 0 1 mg/kg, Intravenous, Q12H [facility], JIHAN Riley, Stopped at 05/14/21 0957    dexmedetomidine (PRECEDEX) 400 mcg in sodium chloride 0 9 % 100 mL infusion, 0 1-0 7 mcg/kg/hr, Intravenous, Titrated, JIHAN Tyson, Last Rate: 5 8 mL/hr at 05/14/21 0547, 0 2 mcg/kg/hr at 05/14/21 0547    dextrose 5 % infusion, 50 mL/hr, Intravenous, Continuous, Diann Johnson MD    enoxaparin (LOVENOX) subcutaneous injection 30 mg, 30 mg, Subcutaneous, Q12H [facility], Diann Johnson MD    fluticasone [facility] [place] [place]) 50 mcg/act nasal spray 2 spray, 2 spray, Nasal, Daily, Namon Breath JIHAN Avendano, 2 spray at 05/14/21 0914    gabapentin (NEURONTIN) capsule 100 mg, 100 mg, Oral, TID, La K Osvaldooftrinidad, CRNP, 100 mg at 05/14/21 0913    hydrALAZINE (APRESOLINE) injection 5 mg, 5 mg, Intravenous, Q6H PRN, JIHAN Lynch, 5 mg at 05/14/21 0044    insulin lispro (HumaLOG) 100 units/mL subcutaneous injection 2-12 Units, 2-12 Units, Subcutaneous, Q6H [facility], 4 Units at 05/14/21 0007 **AND** Fingerstick Glucose (POCT), , , Q6H, JIHAN Lynch    isosorbide dinitrate (ISORDIL) tablet 30 mg, 30 mg, Oral, TID, La K Bilofsky, CRNP, 30 mg at 05/14/21 0913    [COMPLETED] zinc sulfate (ZINCATE) capsule 220 mg, 220 mg, Oral, Daily, 220 mg at 05/14/21 0913 **FOLLOWED BY** [START ON 5/15/2021] multivitamin-minerals (CENTRUM ADULTS) tablet 1 tablet, 1 tablet, Oral, Daily, La K Osvaldooftrinidad, CRNP    ondansetron (ZOFRAN) injection 4 mg, 4 mg, Intravenous, Q6H PRN, La K Osvaldooftrinidad, CRNP, 4 mg at 05/10/21 0935    pantoprazole (PROTONIX) EC tablet 40 mg, 40 mg, Oral, Early Morning, La K Osvaldooftrinidad, CRNP, 40 mg at 05/14/21 0508    polyethylene glycol (MIRALAX) packet 17 g, 17 g, Oral, Daily, JIHAN Lynch, 17 g at 05/14/21 0912    senna-docusate sodium (SENOKOT S) 8 6-50 mg per tablet 2 tablet, 2 tablet, Oral, BID, Diann Johnson MD    Laboratory Results:  Results from last 7 days   Lab Units 05/14/21  0536 05/13/21  1519 05/13/21  0430 05/12/21  2029 05/11/21  0625 05/10/21  0620 05/10/21  1500 05/09/21  0442  05/08/21  0613   WBC Thousand/uL 11 93*  --  7 46 9 93 9 22 6 15  --  3 24*  --  3 57*   HEMOGLOBIN g/dL 11 2*  --  11 3* 11 8* 12 2 11 9*  --  11 6*  --  12 6 HEMATOCRIT % 34 3*  --  34 9* 35 4* 36 0* 35 4*  --  34 4*  --  39 0   PLATELETS Thousands/uL 163  --  140* 143* 149 129*  --  104*  --  98*   POTASSIUM mmol/L 4 5 4 2 4 2 4 5 3 8  --  3 8 4 1   < > 4 0   CHLORIDE mmol/L 110* 108 109* 110* 107  --  101 97*   < > 100   CO2 mmol/L 27 27 28 28 24  --  24 22   < > 16*   BUN mg/dL 52* 52* 50* 50* 76*  --  88* 85*   < > 65*   CREATININE mg/dL 1 68* 1 84* 1 90* 1 77* 2 20*  --  3 17* 4 23*   < > 4 64*   CALCIUM mg/dL 8 0* 7 9* 7 9* 7 7* 7 5*  --  7 4* 7 0*   < > 7 5*   MAGNESIUM mg/dL  --  2 7*  --   --   --   --   --  2 6  --   --    PHOSPHORUS mg/dL  --   --   --   --  3 4  --   --  5 3*  --   --     < > = values in this interval not displayed

## 2021-05-14 NOTE — ASSESSMENT & PLAN NOTE
· In the setting of covid 19   · Initial positive 5/7 with unclear symptom onset  · Escalating oxygen requirements, now on HFNC 50L 100% Fio2 +- NRB  · CXR with bilateral pulmonary opacities  · Decadron total D6, BID dosing D4  · Continue vitamins  · Completed remdesivir  · Given Actemra 5/13 2/2 increasing Fio2 requirements and elevated CRP, started bowel regimen  · Completed 7 days of ABX, procal decreasing, monitor off antibiotics

## 2021-05-14 NOTE — PROGRESS NOTES
[address] [place] [address]  Progress Note Paula Salinas Sr  1950, 80 y o  male MRN: [id number]  Unit/Bed#: ICU 03 Encounter: [id number]  Primary Care Provider: Frederico Sacks, MD   Date and time admitted to hospital: 5/7/2021  4:33 PM    * Acute hypoxemic respiratory failure due to COVID-19 [facility])  Assessment & Plan  · In the setting of covid 19   · Initial positive 5/7 with unclear symptom onset  · Escalating oxygen requirements, now on HFNC 50L 100% Fio2 +- NRB  · CXR with bilateral pulmonary opacities  · Decadron total D6, BID dosing D4  · Continue vitamins  · Completed remdesivir  · Given Actemra 5/13 2/2 increasing Fio2 requirements and elevated CRP, started bowel regimen  · Completed 7 days of ABX, procal decreasing, monitor off antibiotics    Acute encephalopathy  Assessment & Plan  · Present on admission, now with increasing confusion and agitation  · Repeat CT head 5/13: No significant interval change since prior examination  Stable advanced chronic microvascular ischemic change and chronic right cerebellar lacunar infarct  · Etiology unclear: covid 19/sepsis/hypoxia vs  delerium vs  Uremia less likely  · Neuro checks  · Avoid deliogenic medications  · Frequent reorientations   · Fall precautions  · Weaned off precedex due to bradycardia/complete HB    Consider lower dose if needed and adjustment of coreg dosing  · Consider MRI when able    Acute renal failure superimposed on chronic kidney disease [facility])  Assessment & Plan  Lab Results   Component Value Date    EGFR 42 05/13/2021    EGFR 40 05/13/2021    EGFR 44 05/12/2021    CREATININE 1 84 (H) 05/13/2021    CREATININE 1 90 (H) 05/13/2021    CREATININE 1 77 (H) 05/12/2021     · Hx of CKD III, baseline sCr 1 8 - 1 9  · GAGE on presentation with sCr of 4 97  · Now 1 84  · On lasix and metolazone at home, hold now for targeted diuresis  · Etiology: Likely prerenal azotemia with RAAS blockade  · Hold ace/arb  · Continue to trend  · Avoid nephrotoxins/hypotension/hypoxia  · Monitor I and O    Chronic combined systolic and diastolic CHF, NYHA class 3 (HCC)  Assessment & Plan  Wt Readings from Last 3 Encounters:   05/13/21 114 kg (251 lb 15 8 oz)   03/19/21 118 kg (261 lb)   03/18/21 118 kg (261 lb)     · BNP 7000 on admission  · 4/21 ECHO: The ventricle was mildly to moderately dilated  Systolic function was mildly to moderately reduced  Ejection fraction was estimated to be 40 %  There was mild to moderate diffusehypokinesis    · Monitor volume status  · Continue norvasc, coreg, isordil; possibly need conversion to IV medications it patient unable to take PO  · Diuretics as above        Thrombocytopenia (HCC)  Assessment & Plan  · Presented with platelets of 96  · Improving to 140  · In setting of sepsis, covid 19    Transaminitis  Assessment & Plan  · Mild AST and alk phos elevations  · In setting of covid 19  · Trend    Type 2 diabetes mellitus with kidney complication, with long-term current use of insulin [facility])  Assessment & Plan  Lab Results   Component Value Date    HGBA1C 6 0 03/19/2021       Recent Labs     05/12/21  1651 05/13/21  0339 05/13/21  1224 05/13/21 2027   POCGLU 102 193* 262* 244*       Blood Sugar Average: Last 72 hrs:  (P) 862 [id number]     · On jardiance and insulin at home  · Home hold regimen in favor of SSI while NPO    Obesity with body mass index 30 or greater  Assessment & Plan  · Nutrition consult when able    Hyperlipidemia  Assessment & Plan  · Continue lipitor    ----------------------------------------------------------------------------------------  HPI/24hr events:   · Weaned briefly to midflow 15l NC but then requiring HFNC 50L 100%  · Continued episodes of agitation/confusion    Disposition: Continue Stepdown Level 1 level of care   Code Status: Level 1 - Full Code  ---------------------------------------------------------------------------------------  SUBJECTIVE  NA    Review of Systems   Unable to perform ROS: Mental status change       ---------------------------------------------------------------------------------------  OBJECTIVE    Vitals   Vitals:    21 0310 21 0410 21 0510 21 0541   BP: (!) 176/77 150/67 169/73    Pulse: 92 76 80    Resp: (!) 27 15 17    Temp:       TempSrc:       SpO2: (!) 83% 100% 100%    Weight:    111 kg (244 lb 14 9 oz)   Height:         Temp (24hrs), Av 9 °F (36 1 °C), Min:96 1 °F (35 6 °C), Max:97 3 °F (36 3 °C)  Current: Temperature: (!) 97 1 °F (36 2 °C)          Respiratory:  SpO2: SpO2: 100 %  Nasal Cannula O2 Flow Rate (L/min): 55 L/min    Invasive/non-invasive ventilation settings   Respiratory    Lab Data (Last 4 hours)    None         O2/Vent Data (Last 4 hours)       0235          Non-Invasive Ventilation Mode HFNC (High flow)                   Physical Exam  Vitals signs and nursing note reviewed  Constitutional:       Appearance: He is obese  He is ill-appearing  HENT:      Head: Normocephalic and atraumatic  Mouth/Throat:      Mouth: Mucous membranes are dry  Neck:      Musculoskeletal: Neck supple  Cardiovascular:      Rate and Rhythm: Normal rate and regular rhythm  Musculoskeletal:      Right lower leg: No edema  Left lower leg: No edema  Skin:     General: Skin is warm and dry  Capillary Refill: Capillary refill takes less than 2 seconds  Neurological:      General: No focal deficit present  Mental Status: He is disoriented and confused  GCS: GCS eye subscore is 4  GCS verbal subscore is 4  GCS motor subscore is 6        Comments: Not oriented to place/date/situation  Moves all extremities         Laboratory and Diagnostics:  Results from last 7 days   Lab Units 21  0536 21  0430 21  0625 05/10/21  0620 21  0442 21  0613 21  1712   WBC Thousand/uL 11 93* 7 46 9 93 9 22 6 15 3 24* 3 57* 3 75*   HEMOGLOBIN g/dL 11 2* 11 3* 11 8* 12 2 11 9* 11 6* 12 6 12 8   HEMATOCRIT % 34 3* 34 9* 35 4* 36 0* 35 4* 34 4* 39 0 39 5   PLATELETS Thousands/uL 163 140* 143* 149 129* 104* 98* 96*   NEUTROS PCT %  --   --   --   --   --   --   --  74   BANDS PCT %  --   --   --   --   --   --  5  --    MONOS PCT %  --   --   --   --   --   --   --  9   MONO PCT %  --   --   --   --   --   --  2*  --      Results from last 7 days   Lab Units 05/13/21  1519 05/13/21  0430 05/12/21  2029 05/11/21  0625 05/10/21  4713 05/09/21 0442 05/08/21 2038 05/08/21 0613 05/07/21  1823   SODIUM mmol/L 144 144 146* 143 139 134* 134* 137 136   POTASSIUM mmol/L 4 2 4 2 4 5 3 8 3 8 4 1 4 1 4 0 3 5   CHLORIDE mmol/L 108 109* 110* 107 101 97* 97* 100 100   CO2 mmol/L 27 28 28 24 24 22 23 16* 28   ANION GAP mmol/L 9 7 8 12 14* 15* 14* 21* 8   BUN mg/dL 52* 50* 50* 76* 88* 85* 79* 65* 50*   CREATININE mg/dL 1 84* 1 90* 1 77* 2 20* 3 17* 4 23* 4 23* 4 64* 4 97*   CALCIUM mg/dL 7 9* 7 9* 7 7* 7 5* 7 4* 7 0* 6 9* 7 5* 7 6*   GLUCOSE RANDOM mg/dL 234* 199* 127 71 152* 371* 378* 144* 76   ALT U/L  --  57 60 70 67 73  --  76 76   AST U/L  --  61* 74* 106* 101* 120*  --  181* 182*   ALK PHOS U/L  --  126* 123* 89 78 76  --  69 71   ALBUMIN g/dL  --  2 3* 2 4* 2 6* 2 5* 2 4*  --  2 6* 2 8*   TOTAL BILIRUBIN mg/dL  --  0 63 0 56 0 39 0 35 0 25  --  0 34 0 41     Results from last 7 days   Lab Units 05/13/21 1519 05/11/21 0625 05/09/21  0442   MAGNESIUM mg/dL 2 7*  --  2 6   PHOSPHORUS mg/dL  --  3 4 5 3*      Results from last 7 days   Lab Units 05/07/21  1823   INR  1 12   PTT seconds 33      Results from last 7 days   Lab Units 05/08/21  0613 05/08/21  0208 05/07/21  2224 05/07/21  1712   TROPONIN I ng/mL 0 45* 0 53* 0 60* 0 72*     Results from last 7 days   Lab Units 05/08/21 2038 05/07/21  1823   LACTIC ACID mmol/L 1 5 1 1     ABG:  Results from last 7 days   Lab Units 05/12/21 2023   PH ART  7 535*   PCO2 ART mm Hg 31 3*   PO2 ART mm Hg 53 2*   HCO3 ART mmol/L 25 9   BASE EXC ART mmol/L 3 9   ABG SOURCE  Radial, Left     VBG:  Results from last 7 days   Lab Units 05/12/21  2023   ABG SOURCE  Radial, Left     Results from last 7 days   Lab Units 05/12/21  2233 05/10/21  0613 05/09/21  0442 05/08/21  0613 05/07/21  1712   PROCALCITONIN ng/ml 0 15 0 44* 0 76* 1 22* 1 57*       Micro  Results from last 7 days   Lab Units 05/13/21  0438 05/07/21  1821 05/07/21  1712   BLOOD CULTURE  Received in Microbiology Lab  Culture in Progress  Received in Microbiology Lab  Culture in Progress  No Growth After 5 Days  No Growth After 5 Days  EKG: NSR rate 85 on telemetry  Imaging: I have personally reviewed pertinent reports  Intake and Output  I/O       05/12 0701 - 05/13 0700 05/13 0701 - 05/14 0700    P  O  120 1197    I V  (mL/kg) 55 2 (0 5) 221 (1 9)    IV Piggyback 160 50    Total Intake(mL/kg) 335 2 (2 9) 1468 (12 9)    Urine (mL/kg/hr) 1050 (0 4) 975 (0 4)    Total Output 1050 975    Net -714 8 +493          Unmeasured Urine Occurrence 2 x 1 x          Height and Weights   Height: 5' 8" (172 7 cm)  IBW (Ideal Body Weight): 68 4 kg  Body mass index is 37 24 kg/m²  Weight (last 2 days)     Date/Time   Weight    05/14/21 0541   111 (244 93)    05/13/21 0337   114 (251 99)                Nutrition       Diet Orders   (From admission, onward)             Start     Ordered    05/13/21 2100  Dietary nutrition supplements  Once     Question Answer Comment   Select Supplement: Nepro-Mixed Berry    Select Supplement: Nepro-Vanilla    Frequency 10, 2, HS        05/13/21 2101    05/13/21 0938  Diet Dysphagia/Modified Consistency; Dysphagia 3-Dental Soft; Thin Liquid  Diet effective now     Question Answer Comment   Diet Type Dysphagia/Modified Consistency    Dysphagia/Modified Consistency Dysphagia 3-Dental Soft    Liquid Modifier Thin Liquid    RD to adjust diet per protocol?  Yes        05/13/21 6030                  Active Medications  Scheduled Meds:  Current Facility-Administered Medications   Medication Dose Route Frequency Provider Last Rate    acetaminophen  650 mg Oral Q6H PRN JIHAN Dietrich      amLODIPine  2 5 mg Oral Daily JIHAN Riley      ascorbic acid  1,000 mg Oral Q12H [facility] JIHAN Riley      aspirin  81 mg Oral Daily JIHAN Dietrich      atorvastatin  40 mg Oral HS JIHAN Riley      bisacodyl  10 mg Rectal Daily PRN JIHAN Lawton      calcitriol  0 25 mcg Oral Daily JIHAN Dietrich      carvedilol  25 mg Oral BID With Meals JIHAN Avelar      cholecalciferol  2,000 Units Oral Daily JIHAN Avelar      dexamethasone  0 1 mg/kg Intravenous Q12H [facility] Srinathor JIHAN Pal 11 5 mg (05/13/21 2007)    dexmedetomidine  0 1-0 7 mcg/kg/hr Intravenous Titrated JIHAN Lawton 0 2 mcg/kg/hr (05/14/21 0547)    famotidine  20 mg Oral Daily JIHAN Larson      fluticasone  2 spray Nasal Daily JIHAN Dietrich      gabapentin  100 mg Oral TID JIHAN Avelar      heparin (porcine)  5,000 Units Subcutaneous Q8H [facility] JIHAN Riley      hydrALAZINE  5 mg Intravenous Q6H PRN JIHAN Lawton      insulin lispro  2-12 Units Subcutaneous Q6H [facility] JIHAN Lawton      isosorbide dinitrate  30 mg Oral TID JIHAN Dietrich      lactated ringers  50 mL/hr Intravenous Continuous Kriss L Dostal, DO 50 mL/hr (05/14/21 0046)    zinc sulfate  220 mg Oral Daily JIHAN Riley      Followed by   Authur Sever ON 5/15/2021] multivitamin-minerals  1 tablet Oral Daily JIHAN Riley      ondansetron  4 mg Intravenous Q6H PRN JIHAN Dietrich      pantoprazole  40 mg Oral Early Morning JIHAN Riley      polyethylene glycol  17 g Oral Daily JIHAN Lawton      senna-docusate sodium  1 tablet Oral HS JIHAN Lawton       Continuous Infusions:  dexmedetomidine, 0 1-0 7 mcg/kg/hr, Last Rate: 0 2 mcg/kg/hr (05/14/21 1403)  lactated ringers, 50 mL/hr, Last Rate: 50 mL/hr (05/14/21 0046)      PRN Meds:   acetaminophen, 650 mg, Q6H PRN  bisacodyl, 10 mg, Daily PRN  hydrALAZINE, 5 mg, Q6H PRN  ondansetron, 4 mg, Q6H PRN        Invasive Devices Review  Invasive Devices     Central Venous Catheter Line            CVC Central Lines 05/12/21 Double 1 day          Line            Hemodialysis AV Fistula 09/06/19 Upper arm 616 days          Drain            Closed/Suction Drain Anterior Abdomen Bulb 15 Fr  1210 days    External Urinary Catheter Medium 1 day                Rationale for remaining devices: IV medications, HFNC  ---------------------------------------------------------------------------------------  Advance Directive and Living Will:      Power of :    POLST:    ---------------------------------------------------------------------------------------  Care Time Delivered:   No Critical Care time spent       JIHAN Lopez      Portions of the record may have been created with voice recognition software  Occasional wrong word or "sound a like" substitutions may have occurred due to the inherent limitations of voice recognition software    Read the chart carefully and recognize, using context, where substitutions have occurred

## 2021-05-14 NOTE — ASSESSMENT & PLAN NOTE
· Present on admission, now with increasing confusion and agitation  · Repeat CT head 5/13: No significant interval change since prior examination  Stable advanced chronic microvascular ischemic change and chronic right cerebellar lacunar infarct    · Etiology unclear: covid 19/sepsis/hypoxia vs  delerium vs  Uremia less likely  · Neuro checks  · Avoid deliogenic medications  · Frequent reorientations   · Fall precautions  · Developed bradycardia/heart block on higher dose precedex, restarted on a low dose (0 1 mcg) on 5/15 d/t increasing agitation; wean as tolerated  · Consider addition of seroquel to wean from precedex  · Consider MRI when able

## 2021-05-14 NOTE — ASSESSMENT & PLAN NOTE
Wt Readings from Last 3 Encounters:   05/13/21 114 kg (251 lb 15 8 oz)   03/19/21 118 kg (261 lb)   03/18/21 118 kg (261 lb)     · BNP 7000 on admission  · 4/21 ECHO: The ventricle was mildly to moderately dilated  Systolic function was mildly to moderately reduced  Ejection fraction was estimated to be 40 %  There was mild to moderate diffusehypokinesis    · Monitor volume status  · Continue norvasc, coreg, isordil; possibly need conversion to IV medications it patient unable to take PO  · Diuretics as above

## 2021-05-14 NOTE — PLAN OF CARE
Problem: Potential for Falls  Goal: Patient will remain free of falls  Description: INTERVENTIONS:  - Assess patient frequently for physical needs  -  Identify cognitive and physical deficits and behaviors that affect risk of falls    -  [place] fall precautions as indicated by assessment   - Educate patient/family on patient safety including physical limitations  - Instruct patient to call for assistance with activity based on assessment  - Modify environment to reduce risk of injury  - Consider OT/PT consult to assist with strengthening/mobility  Outcome: Not Progressing     Problem: Prexisting or High Potential for Compromised Skin Integrity  Goal: Skin integrity is maintained or improved  Description: INTERVENTIONS:  - Identify patients at risk for skin breakdown  - Assess and monitor skin integrity  - Assess and monitor nutrition and hydration status  - Monitor labs   - Assess for incontinence   - Turn and reposition patient  - Assist with mobility/ambulation  - Relieve pressure over bony prominences  - Avoid friction and shearing  - Provide appropriate hygiene as needed including keeping skin clean and dry  - Evaluate need for skin moisturizer/barrier cream  - Collaborate with interdisciplinary team   - Patient/family teaching  - Consider wound care consult   Outcome: Not Progressing     Problem: PAIN - ADULT  Goal: Verbalizes/displays adequate comfort level or baseline comfort level  Description: Interventions:  - Encourage patient to monitor pain and request assistance  - Assess pain using appropriate pain scale  - Administer analgesics based on type and severity of pain and evaluate response  - Implement non-pharmacological measures as appropriate and evaluate response  - Consider cultural and social influences on pain and pain management  - Notify physician/advanced practitioner if interventions unsuccessful or patient reports new pain  Outcome: Not Progressing     Problem: INFECTION - ADULT  Goal: Absence or prevention of progression during hospitalization  Description: INTERVENTIONS:  - Assess and monitor for signs and symptoms of infection  - Monitor lab/diagnostic results  - Monitor all insertion sites, i e  indwelling lines, tubes, and drains  - Monitor endotracheal if appropriate and nasal secretions for changes in amount and color  - [place] appropriate cooling/warming therapies per order  - Administer medications as ordered  - Instruct and encourage patient and family to use good hand hygiene technique  - Identify and instruct in appropriate isolation precautions for identified infection/condition  Outcome: Not Progressing     Problem: SAFETY ADULT  Goal: Patient will remain free of falls  Description: INTERVENTIONS:  - Assess patient frequently for physical needs  -  Identify cognitive and physical deficits and behaviors that affect risk of falls    -  [place] fall precautions as indicated by assessment   - Educate patient/family on patient safety including physical limitations  - Instruct patient to call for assistance with activity based on assessment  - Modify environment to reduce risk of injury  - Consider OT/PT consult to assist with strengthening/mobility  Outcome: Not Progressing  Goal: Maintain or return to baseline ADL function  Description: INTERVENTIONS:  -  Assess patient's ability to carry out ADLs; assess patient's baseline for ADL function and identify physical deficits which impact ability to perform ADLs (bathing, care of mouth/teeth, toileting, grooming, dressing, etc )  - Assess/evaluate cause of self-care deficits   - Assess range of motion  - Assess patient's mobility; develop plan if impaired  - Assess patient's need for assistive devices and provide as appropriate  - Encourage maximum independence but intervene and supervise when necessary  - Involve family in performance of ADLs  - Assess for home care needs following discharge   - Consider OT consult to assist with ADL evaluation and planning for discharge  - Provide patient education as appropriate  Outcome: Not Progressing  Goal: Maintain or return mobility status to optimal level  Description: INTERVENTIONS:  - Assess patient's baseline mobility status (ambulation, transfers, stairs, etc )    - Identify cognitive and physical deficits and behaviors that affect mobility  - Identify mobility aids required to assist with transfers and/or ambulation (gait belt, sit-to-stand, lift, walker, cane, etc )  - [place] fall precautions as indicated by assessment  - Record patient progress and toleration of activity level on Mobility SBAR; progress patient to next Phase/Stage  - Instruct patient to call for assistance with activity based on assessment  - Consider rehabilitation consult to assist with strengthening/weightbearing, etc   Outcome: Not Progressing     Problem: DISCHARGE PLANNING  Goal: Discharge to home or other facility with appropriate resources  Description: INTERVENTIONS:  - Identify barriers to discharge w/patient and caregiver  - Arrange for needed discharge resources and transportation as appropriate  - Identify discharge learning needs (meds, wound care, etc )  - Arrange for interpretive services to assist at discharge as needed  - Refer to Case Management Department for coordinating discharge planning if the patient needs post-hospital services based on physician/advanced practitioner order or complex needs related to functional status, cognitive ability, or social support system  Outcome: Not Progressing     Problem: Knowledge Deficit  Goal: Patient/family/caregiver demonstrates understanding of disease process, treatment plan, medications, and discharge instructions  Description: Complete learning assessment and assess knowledge base    Interventions:  - Provide teaching at level of understanding  - Provide teaching via preferred learning methods  Outcome: Not Progressing     Problem: Nutrition/Hydration-ADULT  Goal: Nutrient/Hydration intake appropriate for improving, restoring or maintaining nutritional needs  Description: Monitor and assess patient's nutrition/hydration status for malnutrition  Collaborate with interdisciplinary team and initiate plan and interventions as ordered  Monitor patient's weight and dietary intake as ordered or per policy  Utilize nutrition screening tool and intervene as necessary  Determine patient's food preferences and provide high-protein, high-caloric foods as appropriate       INTERVENTIONS:  - Monitor oral intake, urinary output, labs, and treatment plans  - Assess nutrition and hydration status and recommend course of action  - Evaluate amount of meals eaten  - Assist patient with eating if necessary   - Allow adequate time for meals  - Recommend/ encourage appropriate diets, oral nutritional supplements, and vitamin/mineral supplements  - Order, calculate, and assess calorie counts as needed  - Recommend, monitor, and adjust tube feedings and TPN/PPN based on assessed needs  - Assess need for intravenous fluids  - Provide specific nutrition/hydration education as appropriate  - Include patient/family/caregiver in decisions related to nutrition  Outcome: Not Progressing     Problem: SAFETY,RESTRAINT: NV/NON-SELF DESTRUCTIVE BEHAVIOR  Goal: Remains free of harm/injury (restraint for non violent/non self-detsructive behavior)  Description: INTERVENTIONS:  - Instruct patient/family regarding restraint use   - Assess and monitor physiologic and psychological status   - Provide interventions and comfort measures to meet assessed patient needs   - Identify and implement measures to help patient regain control  - Assess readiness for release of restraint   Outcome: Not Progressing  Goal: Returns to optimal restraint-free functioning  Description: INTERVENTIONS:  - Assess the patient's behavior and symptoms that indicate continued need for restraint  - Identify and implement measures to help patient regain control  - Assess readiness for release of restraint   Outcome: Not Progressing

## 2021-05-14 NOTE — ASSESSMENT & PLAN NOTE
· Bowel regimen increased  · Senna 2 tabs BID, Miralax daily + prn dulcolax (1 dose given on 5/14)  · BM 5/14  · Consider adding lactulose as needed

## 2021-05-14 NOTE — ASSESSMENT & PLAN NOTE
· In the setting of covid 19   · Initial positive 5/7 with unclear symptom onset  · Escalating oxygen requirements, now on HFNC 50L 100% Fio2 +- NRB  · CXR with bilateral pulmonary opacities  · Decadron total D7, BID dosing D5  · Continue vitamins  · Completed remdesivir, BM 5/14  · Given Actemra 5/13 2/2 increasing Fio2 requirements and elevated CRP, started bowel regimen  · Completed 7 days of ABX, procal decreasing, monitor off antibiotics

## 2021-05-14 NOTE — ASSESSMENT & PLAN NOTE
· Present on admission, now with increasing confusion and agitation  · Repeat CT head 5/13: No significant interval change since prior examination  Stable advanced chronic microvascular ischemic change and chronic right cerebellar lacunar infarct  · Etiology unclear: covid 19/sepsis/hypoxia vs  delerium vs  Uremia less likely  · Neuro checks  · Avoid deliogenic medications  · Frequent reorientations   · Fall precautions  · Weaned off precedex due to bradycardia/complete HB    Consider lower dose if needed and adjustment of coreg dosing  · Consider MRI when able

## 2021-05-14 NOTE — ASSESSMENT & PLAN NOTE
Lab Results   Component Value Date    EGFR 42 05/13/2021    EGFR 40 05/13/2021    EGFR 44 05/12/2021    CREATININE 1 84 (H) 05/13/2021    CREATININE 1 90 (H) 05/13/2021    CREATININE 1 77 (H) 05/12/2021     · Hx of CKD III, baseline sCr 1 8 - 1 9  · GAGE on presentation with sCr of 4 97  · Now 1 84  · On lasix and metolazone at home, hold now for targeted diuresis  · Etiology: Likely prerenal azotemia with RAAS blockade  · Hold ace/arb  · Continue to trend  · Avoid nephrotoxins/hypotension/hypoxia    · Monitor I and O

## 2021-05-14 NOTE — PLAN OF CARE
Problem: Potential for Falls  Goal: Patient will remain free of falls  Description: INTERVENTIONS:  - Assess patient frequently for physical needs  -  Identify cognitive and physical deficits and behaviors that affect risk of falls    -  [place] fall precautions as indicated by assessment   - Educate patient/family on patient safety including physical limitations  - Instruct patient to call for assistance with activity based on assessment  - Modify environment to reduce risk of injury  - Consider OT/PT consult to assist with strengthening/mobility  Outcome: Progressing     Problem: Prexisting or High Potential for Compromised Skin Integrity  Goal: Skin integrity is maintained or improved  Description: INTERVENTIONS:  - Identify patients at risk for skin breakdown  - Assess and monitor skin integrity  - Assess and monitor nutrition and hydration status  - Monitor labs   - Assess for incontinence   - Turn and reposition patient  - Assist with mobility/ambulation  - Relieve pressure over bony prominences  - Avoid friction and shearing  - Provide appropriate hygiene as needed including keeping skin clean and dry  - Evaluate need for skin moisturizer/barrier cream  - Collaborate with interdisciplinary team   - Patient/family teaching  - Consider wound care consult   Outcome: Progressing     Problem: PAIN - ADULT  Goal: Verbalizes/displays adequate comfort level or baseline comfort level  Description: Interventions:  - Encourage patient to monitor pain and request assistance  - Assess pain using appropriate pain scale  - Administer analgesics based on type and severity of pain and evaluate response  - Implement non-pharmacological measures as appropriate and evaluate response  - Consider cultural and social influences on pain and pain management  - Notify physician/advanced practitioner if interventions unsuccessful or patient reports new pain  Outcome: Progressing     Problem: INFECTION - ADULT  Goal: Absence or prevention of progression during hospitalization  Description: INTERVENTIONS:  - Assess and monitor for signs and symptoms of infection  - Monitor lab/diagnostic results  - Monitor all insertion sites, i e  indwelling lines, tubes, and drains  - Monitor endotracheal if appropriate and nasal secretions for changes in amount and color  - [place] appropriate cooling/warming therapies per order  - Administer medications as ordered  - Instruct and encourage patient and family to use good hand hygiene technique  - Identify and instruct in appropriate isolation precautions for identified infection/condition  Outcome: Progressing     Problem: SAFETY ADULT  Goal: Patient will remain free of falls  Description: INTERVENTIONS:  - Assess patient frequently for physical needs  -  Identify cognitive and physical deficits and behaviors that affect risk of falls    -  [place] fall precautions as indicated by assessment   - Educate patient/family on patient safety including physical limitations  - Instruct patient to call for assistance with activity based on assessment  - Modify environment to reduce risk of injury  - Consider OT/PT consult to assist with strengthening/mobility  Outcome: Progressing  Goal: Maintain or return to baseline ADL function  Description: INTERVENTIONS:  -  Assess patient's ability to carry out ADLs; assess patient's baseline for ADL function and identify physical deficits which impact ability to perform ADLs (bathing, care of mouth/teeth, toileting, grooming, dressing, etc )  - Assess/evaluate cause of self-care deficits   - Assess range of motion  - Assess patient's mobility; develop plan if impaired  - Assess patient's need for assistive devices and provide as appropriate  - Encourage maximum independence but intervene and supervise when necessary  - Involve family in performance of ADLs  - Assess for home care needs following discharge   - Consider OT consult to assist with ADL evaluation and planning for discharge  - Provide patient education as appropriate  Outcome: Progressing  Goal: Maintain or return mobility status to optimal level  Description: INTERVENTIONS:  - Assess patient's baseline mobility status (ambulation, transfers, stairs, etc )    - Identify cognitive and physical deficits and behaviors that affect mobility  - Identify mobility aids required to assist with transfers and/or ambulation (gait belt, sit-to-stand, lift, walker, cane, etc )  - [place] fall precautions as indicated by assessment  - Record patient progress and toleration of activity level on Mobility SBAR; progress patient to next Phase/Stage  - Instruct patient to call for assistance with activity based on assessment  - Consider rehabilitation consult to assist with strengthening/weightbearing, etc   Outcome: Progressing     Problem: DISCHARGE PLANNING  Goal: Discharge to home or other facility with appropriate resources  Description: INTERVENTIONS:  - Identify barriers to discharge w/patient and caregiver  - Arrange for needed discharge resources and transportation as appropriate  - Identify discharge learning needs (meds, wound care, etc )  - Arrange for interpretive services to assist at discharge as needed  - Refer to Case Management Department for coordinating discharge planning if the patient needs post-hospital services based on physician/advanced practitioner order or complex needs related to functional status, cognitive ability, or social support system  Outcome: Progressing     Problem: Knowledge Deficit  Goal: Patient/family/caregiver demonstrates understanding of disease process, treatment plan, medications, and discharge instructions  Description: Complete learning assessment and assess knowledge base    Interventions:  - Provide teaching at level of understanding  - Provide teaching via preferred learning methods  Outcome: Progressing     Problem: Nutrition/Hydration-ADULT  Goal: Nutrient/Hydration intake appropriate for improving, restoring or maintaining nutritional needs  Description: Monitor and assess patient's nutrition/hydration status for malnutrition  Collaborate with interdisciplinary team and initiate plan and interventions as ordered  Monitor patient's weight and dietary intake as ordered or per policy  Utilize nutrition screening tool and intervene as necessary  Determine patient's food preferences and provide high-protein, high-caloric foods as appropriate       INTERVENTIONS:  - Monitor oral intake, urinary output, labs, and treatment plans  - Assess nutrition and hydration status and recommend course of action  - Evaluate amount of meals eaten  - Assist patient with eating if necessary   - Allow adequate time for meals  - Recommend/ encourage appropriate diets, oral nutritional supplements, and vitamin/mineral supplements  - Order, calculate, and assess calorie counts as needed  - Recommend, monitor, and adjust tube feedings and TPN/PPN based on assessed needs  - Assess need for intravenous fluids  - Provide specific nutrition/hydration education as appropriate  - Include patient/family/caregiver in decisions related to nutrition  Outcome: Progressing     Problem: SAFETY,RESTRAINT: NV/NON-SELF DESTRUCTIVE BEHAVIOR  Goal: Remains free of harm/injury (restraint for non violent/non self-detsructive behavior)  Description: INTERVENTIONS:  - Instruct patient/family regarding restraint use   - Assess and monitor physiologic and psychological status   - Provide interventions and comfort measures to meet assessed patient needs   - Identify and implement measures to help patient regain control  - Assess readiness for release of restraint   Outcome: Progressing  Goal: Returns to optimal restraint-free functioning  Description: INTERVENTIONS:  - Assess the patient's behavior and symptoms that indicate continued need for restraint  - Identify and implement measures to help patient regain control  - Assess readiness for release of restraint   Outcome: Progressing

## 2021-05-15 PROBLEM — K59.00 CONSTIPATION: Status: RESOLVED | Noted: 2021-01-01 | Resolved: 2021-01-01

## 2021-05-15 PROBLEM — D69.6 THROMBOCYTOPENIA (HCC): Status: RESOLVED | Noted: 2020-01-01 | Resolved: 2021-01-01

## 2021-05-15 NOTE — ASSESSMENT & PLAN NOTE
Lab Results   Component Value Date    HGBA1C 6 0 03/19/2021       Recent Labs     05/14/21  0523 05/14/21  1151 05/14/21  1746 05/15/21  0010   POCGLU 131 186* 255* 277*       Blood Sugar Average: Last 72 hrs:  (P) 628 [id number]     · On jardiance and insulin at home  · Home hold regimen in favor of SSI

## 2021-05-15 NOTE — PROGRESS NOTES
[address] [place] [address]  Progress Note Cherry Isabel Sr  1950, 80 y o  male MRN: [id number]  Unit/Bed#: ICU 03 Encounter: [id number]  Primary Care Provider: Sukh Hendrickson MD   Date and time admitted to hospital: 5/7/2021  4:33 PM    * Acute hypoxemic respiratory failure due to COVID-19 [facility])  Assessment & Plan  · In the setting of covid 19   · Initial positive 5/7 with unclear symptom onset  · Escalating oxygen requirements, now on HFNC 50L 100% Fio2 +- NRB  · CXR with bilateral pulmonary opacities  · Decadron total D7, BID dosing D5  · Continue vitamins  · Completed remdesivir, BM 5/14  · Given Actemra 5/13 2/2 increasing Fio2 requirements and elevated CRP, started bowel regimen  · Completed 7 days of ABX, procal decreasing, monitor off antibiotics    Acute encephalopathy  Assessment & Plan  · Present on admission, now with increasing confusion and agitation  · Repeat CT head 5/13: No significant interval change since prior examination  Stable advanced chronic microvascular ischemic change and chronic right cerebellar lacunar infarct    · Etiology unclear: covid 19/sepsis/hypoxia vs  delerium vs  Uremia less likely  · Neuro checks  · Avoid deliogenic medications  · Frequent reorientations   · Fall precautions  · Developed bradycardia/heart block on higher dose precedex, restarted on a low dose (0 1 mcg) on 5/15 d/t increasing agitation; wean as tolerated  · Consider addition of seroquel to wean from precedex  · Consider MRI when able    Acute renal failure superimposed on chronic kidney disease [facility])  Assessment & Plan  Lab Results   Component Value Date    EGFR 47 05/14/2021    EGFR 42 05/13/2021    EGFR 40 05/13/2021    CREATININE 1 68 (H) 05/14/2021    CREATININE 1 84 (H) 05/13/2021    CREATININE 1 90 (H) 05/13/2021     · Hx of CKD III, baseline sCr 1 8 - 1 9  · GAGE on presentation with sCr of 4 97  · Now 1 68  · On lasix and metolazone at home  · Lasix 80 mg x 1 dose given on 5/14 with 4L UO  · Etiology: Likely prerenal azotemia with RAAS blockade  · Hold ace/arb  · Continue to trend  · Avoid nephrotoxins/hypotension/hypoxia  · Monitor I and O    Chronic combined systolic and diastolic CHF, NYHA class 3 (HCC)  Assessment & Plan  Wt Readings from Last 3 Encounters:   05/14/21 111 kg (244 lb 14 9 oz)   03/19/21 118 kg (261 lb)   03/18/21 118 kg (261 lb)     · BNP 7000 on admission  · 4/21 ECHO: The ventricle was mildly to moderately dilated  Systolic function was mildly to moderately reduced  Ejection fraction was estimated to be 40 %  There was mild to moderate diffusehypokinesis    · Monitor volume status  · Continue norvasc, coreg, isordil  · Diuretics as above        Thrombocytopenia (HCC)  Assessment & Plan  · Presented with platelets of 96  · Improving to 163  · In setting of sepsis, covid 19    Transaminitis  Assessment & Plan  · Mild AST and alk phos elevations  · In setting of covid 19  · Trend    Type 2 diabetes mellitus with kidney complication, with long-term current use of insulin [facility])  Assessment & Plan  Lab Results   Component Value Date    HGBA1C 6 0 03/19/2021       Recent Labs     05/14/21  0523 05/14/21  1151 05/14/21  1746 05/15/21  0010   POCGLU 131 186* 255* 277*       Blood Sugar Average: Last 72 hrs:  (P) 557 [id number]     · On jardiance and insulin at home  · Home hold regimen in favor of SSI     Obesity with body mass index 30 or greater  Assessment & Plan  · Nutrition consult when able    Hyperlipidemia  Assessment & Plan  · Continue lipitor    Constipation  Assessment & Plan  · Bowel regimen increased  · Senna 2 tabs BID, Miralax daily + prn dulcolax (1 dose given on 5/14)  · BM 5/14  · Consider adding lactulose as needed    Hypernatremia  Assessment & Plan  · Mild, with most recent sodium of 147  · IVF fluids changed to d5 @ 50 ml  · F/u with repeat BMP     ----------------------------------------------------------------------------------------  HPI/24hr events:  · BM   · Continued episodes of agitation/confusion      Disposition: Continue Stepdown Level 1 level of care   Code Status: Level 1 - Full Code  ---------------------------------------------------------------------------------------  SUBJECTIVE  Unable to complete, confused    Review of Systems   Unable to perform ROS: Mental status change       ---------------------------------------------------------------------------------------  OBJECTIVE    Vitals   Vitals:    05/15/21 0011 05/15/21 0100 05/15/21 0200 05/15/21 0236   BP:  139/62 147/73    Pulse:  (!) 54 (!) 52    Resp:  18 18    Temp: (!) 96 7 °F (35 9 °C)      TempSrc: Temporal      SpO2:  96% 98% 97%   Weight:       Height:         Temp (24hrs), Av 8 °F (36 °C), Min:96 7 °F (35 9 °C), Max:97 °F (36 1 °C)  Current: Temperature: (!) 96 7 °F (35 9 °C)          Respiratory:  SpO2: SpO2: 97 %  HFNC 50L 100%    Invasive/non-invasive ventilation settings   Respiratory    Lab Data (Last 4 hours)    None         O2/Vent Data (Last 4 hours)      05/15 0236          Non-Invasive Ventilation Mode HFNC (High flow)                   Physical Exam  Vitals signs and nursing note reviewed  Constitutional:       Appearance: He is ill-appearing  He is not diaphoretic  HENT:      Head: Normocephalic and atraumatic  Mouth/Throat:      Mouth: Mucous membranes are dry  Neck:      Musculoskeletal: Neck supple  Cardiovascular:      Rate and Rhythm: Normal rate  Pulmonary:      Effort: Pulmonary effort is normal  No respiratory distress  Breath sounds: No wheezing or rales  Abdominal:      General: Abdomen is flat  Palpations: Abdomen is soft  Tenderness: There is no abdominal tenderness  Musculoskeletal:      Right lower leg: No edema  Left lower leg: No edema  Skin:     General: Skin is warm and dry  Capillary Refill: Capillary refill takes less than 2 seconds  Neurological:      GCS: GCS eye subscore is 4   GCS verbal subscore is 4  GCS motor subscore is 6        Comments: Confused  Not oriented to date/location  Oriented to self  Intermittent agitation  No recall of events/names  Moves all extremities  Intermittently follows verbal commands         Laboratory and Diagnostics:  Results from last 7 days   Lab Units 05/15/21  0358 05/14/21  0536 05/13/21  0430 05/12/21 2029 05/11/21  0625 05/10/21  0620 05/09/21  0442 05/08/21  0613   WBC Thousand/uL 10 09 11 93* 7 46 9 93 9 22 6 15 3 24* 3 57*   HEMOGLOBIN g/dL 11 1* 11 2* 11 3* 11 8* 12 2 11 9* 11 6* 12 6   HEMATOCRIT % 33 1* 34 3* 34 9* 35 4* 36 0* 35 4* 34 4* 39 0   PLATELETS Thousands/uL 173 163 140* 143* 149 129* 104* 98*   NEUTROS PCT % 88*  --   --   --   --   --   --   --    BANDS PCT %  --   --   --   --   --   --   --  5   MONOS PCT % 6  --   --   --   --   --   --   --    MONO PCT %  --   --   --   --   --   --   --  2*     Results from last 7 days   Lab Units 05/15/21  0358 05/14/21 0536 05/13/21  1519 05/13/21  0430 05/12/21 2029 05/11/21  0625 05/10/21  0613 05/09/21  0442   SODIUM mmol/L 144 147* 144 144 146* 143 139 134*   POTASSIUM mmol/L 4 0 4 5 4 2 4 2 4 5 3 8 3 8 4 1   CHLORIDE mmol/L 107 110* 108 109* 110* 107 101 97*   CO2 mmol/L 27 27 27 28 28 24 24 22   ANION GAP mmol/L 10 10 9 7 8 12 14* 15*   BUN mg/dL 54* 52* 52* 50* 50* 76* 88* 85*   CREATININE mg/dL 1 80* 1 68* 1 84* 1 90* 1 77* 2 20* 3 17* 4 23*   CALCIUM mg/dL 8 0* 8 0* 7 9* 7 9* 7 7* 7 5* 7 4* 7 0*   GLUCOSE RANDOM mg/dL 183* 141* 234* 199* 127 71 152* 371*   ALT U/L 56 59  --  57 60 70 67 73   AST U/L 55* 57*  --  61* 74* 106* 101* 120*   ALK PHOS U/L 220* 172*  --  126* 123* 89 78 76   ALBUMIN g/dL 2 5* 2 4*  --  2 3* 2 4* 2 6* 2 5* 2 4*   TOTAL BILIRUBIN mg/dL 0 58 0 58  --  0 63 0 56 0 39 0 35 0 25     Results from last 7 days   Lab Units 05/15/21  0359 05/13/21  1519 05/11/21  0625 05/09/21  0442   MAGNESIUM mg/dL 2 6 2 7*  --  2 6   PHOSPHORUS mg/dL  --   --  3 4 5 3*           Results from last 7 days   Lab Units 05/08/21  0613   TROPONIN I ng/mL 0 45*     Results from last 7 days   Lab Units 05/08/21 2038   LACTIC ACID mmol/L 1 5     ABG:  Results from last 7 days   Lab Units 05/12/21 2023   PH ART  7 535*   PCO2 ART mm Hg 31 3*   PO2 ART mm Hg 53 2*   HCO3 ART mmol/L 25 9   BASE EXC ART mmol/L 3 9   ABG SOURCE  Radial, Left     VBG:  Results from last 7 days   Lab Units 05/12/21 2023   ABG SOURCE  Radial, Left     Results from last 7 days   Lab Units 05/14/21  0536 05/12/21  2233 05/10/21  0613 05/09/21  0442 05/08/21  0613   PROCALCITONIN ng/ml 0 18 0 15 0 44* 0 76* 1 22*       Micro  Results from last 7 days   Lab Units 05/13/21  0438   BLOOD CULTURE  No Growth at 24 hrs  No Growth at 24 hrs  EKG: NSR rate 62 on telemetry  Imaging: I have personally reviewed pertinent reports  Intake and Output  I/O       05/13 0701 - 05/14 0700 05/14 0701 - 05/15 0700    P  O  1197 430    I V  (mL/kg) 774 1 (7) 1187 2 (10 7)    IV Piggyback 50 120    Total Intake(mL/kg) 2021 1 (18 2) 1737 2 (15 7)    Urine (mL/kg/hr) 975 (0 4) 4295 (1 6)    Total Output 975 4295    Net +1046 1 -2557 8          Unmeasured Urine Occurrence 2 x 1 x          Height and Weights   Height: 5' 8" (172 7 cm)  IBW (Ideal Body Weight): 68 4 kg  Body mass index is 37 24 kg/m²  Weight (last 2 days)     Date/Time   Weight    05/14/21 0541   111 (244 93)    05/13/21 0337   114 (251 99)                Nutrition       Diet Orders   (From admission, onward)             Start     Ordered    05/13/21 2100  Dietary nutrition supplements  Once     Question Answer Comment   Select Supplement: Nepro-Mixed Berry    Select Supplement: Nepro-Vanilla    Frequency 10, 2, HS        05/13/21 2101    05/13/21 0938  Diet Dysphagia/Modified Consistency; Dysphagia 3-Dental Soft;  Thin Liquid  Diet effective now     Question Answer Comment   Diet Type Dysphagia/Modified Consistency    Dysphagia/Modified Consistency Dysphagia 3-Dental Soft    Liquid Modifier Thin Liquid    RD to adjust diet per protocol?  Yes        05/13/21 0829                  Active Medications  Scheduled Meds:  Current Facility-Administered Medications   Medication Dose Route Frequency Provider Last Rate    acetaminophen  650 mg Oral Q6H PRN JIHAN Junior      amLODIPine  5 mg Oral Daily Jagdish Villafana MD      aspirin  81 mg Oral Daily JIHAN Junior      atorvastatin  40 mg Oral HS [place] JIHAN Zapata      bisacodyl  10 mg Rectal Daily PRN JIHAN Michael      calcitriol  0 25 mcg Oral Daily JIHAN Junior      carvedilol  25 mg Oral BID With Meals Diann FiguresJIHAN      cholecalciferol  2,000 Units Oral Daily Diann JIHAN Moreno      dexamethasone  0 1 mg/kg Intravenous Q12H [address] JIHAN Junior Stopped (05/14/21 2220)    dexmedetomidine  0 1-0 7 mcg/kg/hr Intravenous Titrated JIHAN Michael 0 25 mcg/kg/hr (05/15/21 0401)    dextrose  50 mL/hr Intravenous Continuous Jagdish Villafana MD 50 mL/hr (05/15/21 0401)    enoxaparin  30 mg Subcutaneous Q12H [address] Jagdish Villafana MD      fluticasone  2 spray Nasal Daily JIHAN Junior      gabapentin  100 mg Oral TID Diann JIHAN Moreno      hydrALAZINE  10 mg Intravenous Q6H PRN JIHAN Michael      insulin lispro  2-12 Units Subcutaneous Q6H [address] JIHAN Michael      isosorbide dinitrate  30 mg Oral TID Diann FiguresJIHAN      multivitamin-minerals  1 tablet Oral Daily JIHAN Junior      ondansetron  4 mg Intravenous Q6H PRN JIHAN Junior      pantoprazole  40 mg Oral Early Morning JIHAN Riley      polyethylene glycol  17 g Oral Daily JIHAN Michael      senna-docusate sodium  2 tablet Oral BID Jagdish Villafana MD       Continuous Infusions:  dexmedetomidine, 0 1-0 7 mcg/kg/hr, Last Rate: 0 25 mcg/kg/hr (05/15/21 0401)  dextrose, 50 mL/hr, Last Rate: 50 mL/hr (05/15/21 0401)      PRN Meds:   acetaminophen, 650 mg, Q6H PRN  bisacodyl, 10 mg, Daily PRN  hydrALAZINE, 10 mg, Q6H PRN  ondansetron, 4 mg, Q6H PRN        Invasive Devices Review  Invasive Devices     Peripherally Inserted Central Catheter Line            PICC Line -- days          Central Venous Catheter Line            CVC Central Lines 05/12/21 Double 2 days          Line            Hemodialysis AV Fistula 09/06/19 Upper arm 617 days          Drain            Closed/Suction Drain Anterior Abdomen Bulb 15 Fr  1211 days                Rationale for remaining devices: IV medications  ---------------------------------------------------------------------------------------  Advance Directive and Living Will:      Power of :    POLST:    ---------------------------------------------------------------------------------------  Care Time Delivered:   No Critical Care time spent       JIHAN Rubio      Portions of the record may have been created with voice recognition software  Occasional wrong word or "sound a like" substitutions may have occurred due to the inherent limitations of voice recognition software    Read the chart carefully and recognize, using context, where substitutions have occurred

## 2021-05-15 NOTE — PLAN OF CARE
Problem: Potential for Falls  Goal: Patient will remain free of falls  Description: INTERVENTIONS:  - Assess patient frequently for physical needs  -  Identify cognitive and physical deficits and behaviors that affect risk of falls    -  [place] fall precautions as indicated by assessment   - Educate patient/family on patient safety including physical limitations  - Instruct patient to call for assistance with activity based on assessment  - Modify environment to reduce risk of injury  - Consider OT/PT consult to assist with strengthening/mobility  5/15/2021 1013 by Mehrdad Cuenca RN  Outcome: Progressing  5/15/2021 1013 by Mehrdad Cuenca RN  Outcome: Progressing  5/15/2021 1013 by Mehrdad Cuenca RN  Outcome: Progressing  5/15/2021 1011 by Mehrdad Cuenca RN  Outcome: Progressing

## 2021-05-15 NOTE — PROGRESS NOTES
NEPHROLOGY PROGRESS NOTE   Marilyn Wynn Sr  80 y o  male MRN: [id number]  Unit/Bed#: ICU 03 Encounter: [id number]  Reason for Consult: GAGE on CKD    Parts of the exam were done by primary/nursing service and were discussed with our team due to infection control prevention measures related to COVID 19  The patient was viewed through the glass window  PLAN:  -acute kidney injury, creatinine currently at baseline  Continue Lasix dosing as needed  Holding Arb  -hypertension, blood pressure stable and acceptable  Continues on amlodipine 5 mg daily  May increase as needed  -hypernatremia, improving with D5W  Encourage oral intake    -rhabdo, improving  Will check CK level in the morning   -cardiomyopathy:  With EF of 40%  Received 80 mg IV Lasix time 1 5/14  Will continue to dose Lasix as needed  -COVID-19/pneumonia, further care per Critical Care team   Remains on high-flow oxygen and non-rebreather with BiPAP at night  ASSESSMENT/PLAN:  Acute kidney injury on CKD IIIB:  Suspected prerenal azotemia plus Arb use as well as rhabdomyolysis  -baseline creatinine 1 8-1 9   -follows with Dr Fahad Rowan    -previous imaging showed few simple renal cysts  -UA showed small blood, 2+ protein, 0-1 RBCs, 4-10 WBCs  -previous AVF placement, nonfunctioning   -continue to avoid nephrotoxins, hypotension, IV contrast   -I/O  Mild hypernatremia: improving   -urine output greater than 5 L    -continues on D5 at 50 cc/hour     -encourage oral intake  Hypertension:  Overall stable and acceptable   -goal systolic blood pressure less than 140/90   -continue to hold Arb at this time   -continue on amlodipine 5 mg daily  May increase to 10 mg as needed  Also on Coreg 25 mg 2 times per day and isosorbide 30 mg 3 times per day   -avoid hypotension or high fluctuations in blood pressure   -recommend holding parameters antihypertensive for systolic blood pressure less than 130  Rhabdo:  Improving    Will continue to trend  Checking CK level in am      Volume overload/cardiomyopathy:  EF of 40%  -received 80 mg IV Lasix x1 5/14   -will continue to dose Lasix as needed  Will hold today    -poor oral intake    -Entresto remains on hold  COVID-19/suspected PNA:  Further treatment per Critical Care team   Continues on high-flow oxygen and non-rebreather  Bipap at night  Encephalopathy:  With waxing and waning of mental status  Periods of agitation  -CT negative for acute findings   -blood cultures and urinalysis negative  Other:  Obesity, diabetes, history of prostate cancer status post prostatectomy         Disposition:  Requiring additional stay due to medical needs  SUBJECTIVE:  The patient was viewed through the glass window  He remains on non-rebreather and high-flow oxygen  He is restless in his bed      OBJECTIVE:  Current Weight: Weight - Scale: 111 kg (245 lb 2 4 oz)  Vitals:    05/15/21 1059 05/15/21 1100 05/15/21 1142 05/15/21 1200   BP:  155/94  149/70   Pulse:  60  (!) 46   Resp:  21 19   Temp: (!) 97 1 °F (36 2 °C)      TempSrc: Temporal      SpO2:  94% 96% 96%   Weight:       Height:           Intake/Output Summary (Last 24 hours) at 5/15/2021 1246  Last data filed at 5/15/2021 1126  Gross per 24 hour   Intake 1927 59 ml   Output 5254 ml   Net -3326 41 ml     General:  Ill appearance  Skin: warm, dry, intact, no rash  HEENT:  Dry mucous membranes, sclera anicteric, normocephalic, atraumatic  Neck: No apparent JVD appreciated  Chest:  On non-rebreather on high-flow oxygen  CVS:Regular rate and rhythm, no murmer   Abdomen: Soft, round, non-tender, +BS, obese  Extremities: No B/L LE edema present  Neuro: alert   Psych: appropriate mood and affect     Medications:    Current Facility-Administered Medications:     acetaminophen (TYLENOL) tablet 650 mg, 650 mg, Oral, Q6H PRN, JIHAN Riley    amLODIPine (NORVASC) tablet 5 mg, 5 mg, Oral, Daily, Diann Johnson MD, 5 mg at 05/15/21 0908    aspirin (ECOTRIN LOW STRENGTH) EC tablet 81 mg, 81 mg, Oral, Daily, Sergio Given OsvaldoofJIHAN abdi, 81 mg at 05/15/21 0904    atorvastatin (LIPITOR) tablet 40 mg, 40 mg, Oral, HS, Sergio Given Bilofsky, CRNP, 40 mg at 05/14/21 2138    bisacodyl (DULCOLAX) rectal suppository 10 mg, 10 mg, Rectal, Daily PRN, JIHAN Clarke, 10 mg at 05/14/21 1255    calcitriol (ROCALTROL) capsule 0 25 mcg, 0 25 mcg, Oral, Daily, Sergio Given Bilofsky, CRNP, 0 25 mcg at 05/15/21 0907    carvedilol (COREG) tablet 25 mg, 25 mg, Oral, BID With Meals, JOVANY WhittakerNP, 25 mg at 05/15/21 0748    cholecalciferol (VITAMIN D3) tablet 2,000 Units, 2,000 Units, Oral, Daily, Sergio Given OsvaldoofJIHAN abdi, 2,000 Units at 05/15/21 0905    dexamethasone (DECADRON) 11 5 mg in sodium chloride 0 9 % 50 mL IVPB, 0 1 mg/kg, Intravenous, Q12H [address], Al K JIHAN Avendano, Stopped at 05/15/21 0957    dexmedetomidine (PRECEDEX) 400 mcg in sodium chloride 0 9 % 100 mL infusion, 0 1-0 7 mcg/kg/hr, Intravenous, Titrated, JIHAN Clarke, Last Rate: 2 9 mL/hr at 05/15/21 1217, 0 1 mcg/kg/hr at 05/15/21 1217    dextrose 5 % infusion, 50 mL/hr, Intravenous, Continuous, Mitchel Horton MD, Last Rate: 50 mL/hr at 05/15/21 0900, 50 mL/hr at 05/15/21 0900    enoxaparin (LOVENOX) subcutaneous injection 30 mg, 30 mg, Subcutaneous, Q12H [address], Mitchel Horton MD, 30 mg at 05/15/21 0906    epoprostenol (VELETRI) 30,000 ng/mL in sodium chloride 0 9 % 50 mL inhalation solution, 6 25-50 ng/kg/min (Ideal), Inhalation, Titrated, JIHAN Mena, Last Rate: 6 8 mL/hr at 05/15/21 1140, 49 708 ng/kg/min at 05/15/21 1140    fluticasone (FLONASE) 50 mcg/act nasal spray 2 spray, 2 spray, Nasal, Daily, Sergio Given JIHAN Avendano, 2 spray at 05/15/21 0858    gabapentin (NEURONTIN) capsule 100 mg, 100 mg, Oral, TID, Sergio Given JIHAN Avendano, 100 mg at 05/15/21 0907    hydrALAZINE (APRESOLINE) injection 10 mg, 10 mg, Intravenous, Q6H PRN, JIHAN Clarke, 10 mg at 05/14/21 2222   insulin glargine (LANTUS) subcutaneous injection 10 Units 0 1 mL, 10 Units, Subcutaneous, HS, JIHAN Krishna    insulin lispro (HumaLOG) 100 units/mL subcutaneous injection 2-12 Units, 2-12 Units, Subcutaneous, Q6H [address], 2 Units at 05/15/21 1221 **AND** Fingerstick Glucose (POCT), , , Q6H, JIHAN Carpio    isosorbide dinitrate (ISORDIL) tablet 30 mg, 30 mg, Oral, TID, JIHAN Riley, 30 mg at 05/15/21 0904    [COMPLETED] zinc sulfate (ZINCATE) capsule 220 mg, 220 mg, Oral, Daily, 220 mg at 05/14/21 0913 **FOLLOWED BY** multivitamin-minerals (CENTRUM ADULTS) tablet 1 tablet, 1 tablet, Oral, Daily, Margtimte Gabriel JIHAN Avendano, 1 tablet at 05/15/21 0929    ondansetron (ZOFRAN) injection 4 mg, 4 mg, Intravenous, Q6H PRN, Margarite Gabriel JIHAN Avendano, 4 mg at 05/10/21 0935    pantoprazole (PROTONIX) EC tablet 40 mg, 40 mg, Oral, Early Morning, JIHAN Riley, 40 mg at 05/15/21 0541    polyethylene glycol (MIRALAX) packet 17 g, 17 g, Oral, Daily, JIHAN Carpio, 17 g at 05/15/21 2148    senna-docusate sodium (SENOKOT S) 8 6-50 mg per tablet 2 tablet, 2 tablet, Oral, BID, Jimi Sigala MD, 2 tablet at 05/15/21 7902    Laboratory Results:  Results from last 7 days   Lab Units 05/15/21  0359 05/15/21  0358 05/14/21  0536 05/13/21  1519 05/13/21  0430  05/11/21  0625  05/09/21  0442   WBC Thousand/uL  --  10 09 11 93*  --  7 46   < > 9 22   < > 3 24*   HEMOGLOBIN g/dL  --  11 1* 11 2*  --  11 3*   < > 12 2   < > 11 6*   HEMATOCRIT %  --  33 1* 34 3*  --  34 9*   < > 36 0*   < > 34 4*   PLATELETS Thousands/uL  --  173 163  --  140*   < > 149   < > 104*   SODIUM mmol/L  --  144 147* 144 144   < > 143   < > 134*   POTASSIUM mmol/L  --  4 0 4 5 4 2 4 2   < > 3 8   < > 4 1   CHLORIDE mmol/L  --  107 110* 108 109*   < > 107   < > 97*   CO2 mmol/L  --  27 27 27 28   < > 24   < > 22   BUN mg/dL  --  54* 52* 52* 50*   < > 76*   < > 85*   CREATININE mg/dL  --  1 80* 1 68* 1 84* 1 90*   < > 2 20*   < > 4 23*   CALCIUM mg/dL  --  8 0* 8 0* 7 9* 7 9*   < > 7 5*   < > 7 0*   MAGNESIUM mg/dL 2 6  --   --  2 7*  --   --   --   --  2 6   PHOSPHORUS mg/dL  --   --   --   --   --   --  3 4  --  5 3*   ALK PHOS U/L  --  220* 172*  --  126*   < > 89   < > 76   ALT U/L  --  56 59  --  57   < > 70   < > 73   AST U/L  --  55* 57*  --  61*   < > 106*   < > 120*    < > = values in this interval not displayed

## 2021-05-15 NOTE — PLAN OF CARE
Problem: Potential for Falls  Goal: Patient will remain free of falls  Description: INTERVENTIONS:  - Assess patient frequently for physical needs  -  Identify cognitive and physical deficits and behaviors that affect risk of falls    -  [place] fall precautions as indicated by assessment   - Educate patient/family on patient safety including physical limitations  - Instruct patient to call for assistance with activity based on assessment  - Modify environment to reduce risk of injury  - Consider OT/PT consult to assist with strengthening/mobility  5/15/2021 1013 by Brittaney Andrew RN  Outcome: Progressing  5/15/2021 1013 by Brittaney Andrew RN  Outcome: Progressing  5/15/2021 1013 by Brittaney Andrew RN  Outcome: Progressing  5/15/2021 1011 by Brittaney Andrew RN  Outcome: Progressing     Problem: Prexisting or High Potential for Compromised Skin Integrity  Goal: Skin integrity is maintained or improved  Description: INTERVENTIONS:  - Identify patients at risk for skin breakdown  - Assess and monitor skin integrity  - Assess and monitor nutrition and hydration status  - Monitor labs   - Assess for incontinence   - Turn and reposition patient  - Assist with mobility/ambulation  - Relieve pressure over bony prominences  - Avoid friction and shearing  - Provide appropriate hygiene as needed including keeping skin clean and dry  - Evaluate need for skin moisturizer/barrier cream  - Collaborate with interdisciplinary team   - Patient/family teaching  - Consider wound care consult   5/15/2021 1013 by Brittaney Andrew RN  Outcome: Progressing  5/15/2021 1013 by Brittaney Andrew RN  Outcome: Progressing  5/15/2021 1013 by rBittaney Andrew RN  Outcome: Progressing  5/15/2021 1011 by Brittaney Andrew RN  Outcome: Progressing     Problem: PAIN - ADULT  Goal: Verbalizes/displays adequate comfort level or baseline comfort level  Description: Interventions:  - Encourage patient to monitor pain and request assistance  - Assess pain using appropriate pain scale  - Administer analgesics based on type and severity of pain and evaluate response  - Implement non-pharmacological measures as appropriate and evaluate response  - Consider cultural and social influences on pain and pain management  - Notify physician/advanced practitioner if interventions unsuccessful or patient reports new pain  5/15/2021 1013 by Kam Ann RN  Outcome: Progressing  5/15/2021 1013 by Kam Ann RN  Outcome: Progressing  5/15/2021 1013 by Kam Ann RN  Outcome: Progressing  5/15/2021 1011 by Kam Ann RN  Outcome: Progressing     Problem: INFECTION - ADULT  Goal: Absence or prevention of progression during hospitalization  Description: INTERVENTIONS:  - Assess and monitor for signs and symptoms of infection  - Monitor lab/diagnostic results  - Monitor all insertion sites, i e  indwelling lines, tubes, and drains  - Monitor endotracheal if appropriate and nasal secretions for changes in amount and color  - [place] appropriate cooling/warming therapies per order  - Administer medications as ordered  - Instruct and encourage patient and family to use good hand hygiene technique  - Identify and instruct in appropriate isolation precautions for identified infection/condition  5/15/2021 1013 by Kam Ann RN  Outcome: Progressing  5/15/2021 1013 by Kam Ann RN  Outcome: Progressing  5/15/2021 1013 by Kam Ann RN  Outcome: Progressing  5/15/2021 1011 by Kam Ann RN  Outcome: Progressing     Problem: SAFETY ADULT  Goal: Patient will remain free of falls  Description: INTERVENTIONS:  - Assess patient frequently for physical needs  -  Identify cognitive and physical deficits and behaviors that affect risk of falls    -  [place] fall precautions as indicated by assessment   - Educate patient/family on patient safety including physical limitations  - Instruct patient to call for assistance with activity based on assessment  - Modify environment to reduce risk of injury  - Consider OT/PT consult to assist with strengthening/mobility  5/15/2021 1013 by Kyle Moran RN  Outcome: Progressing  5/15/2021 1013 by Kyle Moran RN  Outcome: Progressing  5/15/2021 1013 by Kyle Moran RN  Outcome: Progressing  5/15/2021 1011 by Kyle Moran RN  Outcome: Progressing  Goal: Maintain or return to baseline ADL function  Description: INTERVENTIONS:  -  Assess patient's ability to carry out ADLs; assess patient's baseline for ADL function and identify physical deficits which impact ability to perform ADLs (bathing, care of mouth/teeth, toileting, grooming, dressing, etc )  - Assess/evaluate cause of self-care deficits   - Assess range of motion  - Assess patient's mobility; develop plan if impaired  - Assess patient's need for assistive devices and provide as appropriate  - Encourage maximum independence but intervene and supervise when necessary  - Involve family in performance of ADLs  - Assess for home care needs following discharge   - Consider OT consult to assist with ADL evaluation and planning for discharge  - Provide patient education as appropriate  5/15/2021 1013 by Kyle Moran RN  Outcome: Progressing  5/15/2021 1013 by Kyle Moran RN  Outcome: Progressing  5/15/2021 1013 by Kyle Moran RN  Outcome: Progressing  5/15/2021 1011 by Kyle Moran RN  Outcome: Progressing  Goal: Maintain or return mobility status to optimal level  Description: INTERVENTIONS:  - Assess patient's baseline mobility status (ambulation, transfers, stairs, etc )    - Identify cognitive and physical deficits and behaviors that affect mobility  - Identify mobility aids required to assist with transfers and/or ambulation (gait belt, sit-to-stand, lift, walker, cane, etc )  - [place] fall precautions as indicated by assessment  - Record patient progress and toleration of activity level on Mobility SBAR; progress patient to next Phase/Stage  - Instruct patient to call for assistance with activity based on assessment  - Consider rehabilitation consult to assist with strengthening/weightbearing, etc   5/15/2021 1013 by Sindi Linares RN  Outcome: Progressing  5/15/2021 1013 by Sindi Linares RN  Outcome: Progressing  5/15/2021 1013 by Sindi Linares RN  Outcome: Progressing  5/15/2021 1011 by Sindi Linares RN  Outcome: Progressing     Problem: DISCHARGE PLANNING  Goal: Discharge to home or other facility with appropriate resources  Description: INTERVENTIONS:  - Identify barriers to discharge w/patient and caregiver  - Arrange for needed discharge resources and transportation as appropriate  - Identify discharge learning needs (meds, wound care, etc )  - Arrange for interpretive services to assist at discharge as needed  - Refer to Case Management Department for coordinating discharge planning if the patient needs post-hospital services based on physician/advanced practitioner order or complex needs related to functional status, cognitive ability, or social support system  5/15/2021 1013 by Sindi Linares RN  Outcome: Progressing  5/15/2021 1013 by Sindi Linares RN  Outcome: Progressing  5/15/2021 1013 by Sindi Linares RN  Outcome: Progressing  5/15/2021 1011 by Sindi Linares RN  Outcome: Progressing     Problem: Knowledge Deficit  Goal: Patient/family/caregiver demonstrates understanding of disease process, treatment plan, medications, and discharge instructions  Description: Complete learning assessment and assess knowledge base    Interventions:  - Provide teaching at level of understanding  - Provide teaching via preferred learning methods  5/15/2021 1013 by Sindi Linares RN  Outcome: Progressing  5/15/2021 1013 by Sindi Linares RN  Outcome: Progressing  5/15/2021 1013 by Sindi Linares RN  Outcome: Progressing  5/15/2021 1011 by Sindi Linares RN  Outcome: Progressing     Problem: Nutrition/Hydration-ADULT  Goal: Nutrient/Hydration intake appropriate for improving, restoring or maintaining nutritional needs  Description: Monitor and assess patient's nutrition/hydration status for malnutrition  Collaborate with interdisciplinary team and initiate plan and interventions as ordered  Monitor patient's weight and dietary intake as ordered or per policy  Utilize nutrition screening tool and intervene as necessary  Determine patient's food preferences and provide high-protein, high-caloric foods as appropriate       INTERVENTIONS:  - Monitor oral intake, urinary output, labs, and treatment plans  - Assess nutrition and hydration status and recommend course of action  - Evaluate amount of meals eaten  - Assist patient with eating if necessary   - Allow adequate time for meals  - Recommend/ encourage appropriate diets, oral nutritional supplements, and vitamin/mineral supplements  - Order, calculate, and assess calorie counts as needed  - Recommend, monitor, and adjust tube feedings and TPN/PPN based on assessed needs  - Assess need for intravenous fluids  - Provide specific nutrition/hydration education as appropriate  - Include patient/family/caregiver in decisions related to nutrition  5/15/2021 1013 by Dia Bustamante RN  Outcome: Progressing  5/15/2021 1013 by Dia Bustamante RN  Outcome: Progressing  5/15/2021 1013 by Dia Bustamante RN  Outcome: Progressing  5/15/2021 1011 by Dia Bustamante RN  Outcome: Progressing     Problem: SAFETY,RESTRAINT: NV/NON-SELF DESTRUCTIVE BEHAVIOR  Goal: Remains free of harm/injury (restraint for non violent/non self-detsructive behavior)  Description: INTERVENTIONS:  - Instruct patient/family regarding restraint use   - Assess and monitor physiologic and psychological status   - Provide interventions and comfort measures to meet assessed patient needs   - Identify and implement measures to help patient regain control  - Assess readiness for release of restraint   5/15/2021 1013 by Mariah Rosa RN  Outcome: Progressing  5/15/2021 1013 by Mariah Rosa RN  Outcome: Progressing  5/15/2021 1013 by Mariah Rosa RN  Outcome: Progressing  5/15/2021 1011 by Mariah Rosa RN  Outcome: Progressing  Goal: Returns to optimal restraint-free functioning  Description: INTERVENTIONS:  - Assess the patient's behavior and symptoms that indicate continued need for restraint  - Identify and implement measures to help patient regain control  - Assess readiness for release of restraint   5/15/2021 1013 by Mariah Rosa RN  Outcome: Progressing  5/15/2021 1013 by Mariah Rosa RN  Outcome: Progressing  5/15/2021 1013 by Mariah Rosa RN  Outcome: Progressing  5/15/2021 1011 by Mariah Rosa RN  Outcome: Progressing     Problem: RESPIRATORY - ADULT  Goal: Achieves optimal ventilation and oxygenation  Description: INTERVENTIONS:  - Assess for changes in respiratory status  - Assess for changes in mentation and behavior  - Position to facilitate oxygenation and minimize respiratory effort  - Oxygen administered by appropriate delivery if ordered  - Initiate smoking cessation education as indicated  - Encourage broncho-pulmonary hygiene including cough, deep breathe, Incentive Spirometry  - Assess the need for suctioning and aspirate as needed  - Assess and instruct to report SOB or any respiratory difficulty  - Respiratory Therapy support as indicated  5/15/2021 1013 by Mariah Rosa RN  Outcome: Progressing  5/15/2021 1013 by Mariah Rosa RN  Outcome: Progressing  5/15/2021 1013 by Mariah Rosa RN  Outcome: Progressing     Problem: GASTROINTESTINAL - ADULT  Goal: Maintains or returns to baseline bowel function  Description: INTERVENTIONS:  - Assess bowel function  - Encourage oral fluids to ensure adequate hydration  - Administer IV fluids if ordered to ensure adequate hydration  - Administer ordered medications as needed  - Encourage mobilization and activity  - Consider nutritional services referral to assist patient with adequate nutrition and appropriate food choices  5/15/2021 1013 by Kassy Hauser RN  Outcome: Progressing  5/15/2021 1013 by Kassy Hauser RN  Outcome: Progressing  5/15/2021 1013 by Kassy Hauser RN  Outcome: Progressing     Problem: GENITOURINARY - ADULT  Goal: Maintains or returns to baseline urinary function  Description: INTERVENTIONS:  - Assess urinary function  - Encourage oral fluids to ensure adequate hydration if ordered  - Administer IV fluids as ordered to ensure adequate hydration  - Administer ordered medications as needed  - Offer frequent toileting  - Follow urinary retention protocol if ordered  5/15/2021 1013 by Kassy Hauser RN  Outcome: Progressing  5/15/2021 1013 by Kassy Hauser RN  Outcome: Progressing  5/15/2021 1013 by Kassy Hauser RN  Outcome: Progressing     Problem: METABOLIC, FLUID AND ELECTROLYTES - ADULT  Goal: Electrolytes maintained within normal limits  Description: INTERVENTIONS:  - Monitor labs and assess patient for signs and symptoms of electrolyte imbalances  - Administer electrolyte replacement as ordered  - Monitor response to electrolyte replacements, including repeat lab results as appropriate  - Instruct patient on fluid and nutrition as appropriate  5/15/2021 1013 by Kassy Hauser RN  Outcome: Progressing  5/15/2021 1013 by Kassy Hauser RN  Outcome: Progressing  5/15/2021 1013 by Kassy Hauser RN  Outcome: Progressing  Goal: Glucose maintained within target range  Description: INTERVENTIONS:  - Monitor Blood Glucose as ordered  - Assess for signs and symptoms of hyperglycemia and hypoglycemia  - Administer ordered medications to maintain glucose within target range  - Assess nutritional intake and initiate nutrition service referral as needed  5/15/2021 1013 by Kassy Hauser RN  Outcome: Progressing  5/15/2021 1013 by Kassy Hauser RN  Outcome: Progressing  5/15/2021 1013 by Julia Lindo RN  Outcome: Progressing

## 2021-05-15 NOTE — ASSESSMENT & PLAN NOTE
Wt Readings from Last 3 Encounters:   05/14/21 111 kg (244 lb 14 9 oz)   03/19/21 118 kg (261 lb)   03/18/21 118 kg (261 lb)     · BNP 7000 on admission  · 4/21 ECHO: The ventricle was mildly to moderately dilated  Systolic function was mildly to moderately reduced  Ejection fraction was estimated to be 40 %  There was mild to moderate diffusehypokinesis    · Monitor volume status  · Continue norvasc, coreg, isordil  · Diuretics as above

## 2021-05-16 NOTE — PROGRESS NOTES
NEPHROLOGY PROGRESS NOTE   Gerald Johnston Sr  80 y o  male MRN: [id number]  Unit/Bed#: ICU 03 Encounter: [id number]  Reason for Consult: GAGE on CKD IIIB    Parts of the exam were done by primary/nursing service and were discussed with our team due to infection control prevention measures related to COVID 19  Discussed care with nursing staff  PLAN:   -acute kidney injury, renal function improved to baseline  Continue Lasix as needed for net negative balance  -mild hyponatremia, improved with D5 administration  Currently on D5 at 50 cc/hours  WIll discontinue    -hypertension, blood pressure remains acceptable, increase amlodipine to 10 mg daily  Continue carvedilol  Avoid hypotension   -rhabdomyolysis, CK level is trending downward   -acute hypoxic respiratory failure/COVID-19 pneumonia, continues on non-rebreather with high-flow oxygen and BiPAP at night  Continue Lasix as needed  ASSESSMENT/PLAN:  Acute kidney injury on CKD IIIB:  Suspected prerenal azotemia plus Arb use as well as rhabdomyolysis  -baseline creatinine 1 8-1 9   -creatinine currently within baseline    -follows with Dr Naina Monahan    -previous imaging showed few simple renal cysts  -UA showed small blood, 2+ protein, 0-1 RBCs, 4-10 WBCs  -previous AVF placement, nonfunctioning   -continue to avoid nephrotoxins, hypotension, IV contrast   -I/O      Mild hypernatremia: improving to 141   -urine output greater than 5 L    -continues on D5 at 50 cc/hour  Will discontinue    -encourage oral intake      Hypertension:  Overall stable, above goal/   -goal systolic blood pressure less than 140/90   -continue to hold Arb at this time   -continue on amlodipine, increase to 10 mg po daily    -also on Coreg 25 mg 2 times per day and isosorbide 30 mg 3 times per day   -avoid hypotension or high fluctuations in blood pressure   -recommend holding parameters antihypertensive for systolic blood pressure less than 130       Rhabdo:  Improving    Will continue to trend       Volume overload/cardiomyopathy:  EF of 40%  -received 80 mg IV Lasix x1 5/14   -will continue to dose Lasix as needed    -poor oral intake    -Entresto remains on hold      COVID-19/suspected PNA:  Further treatment per Critical Care team   Continues on high-flow oxygen and non-rebreather  Bipap at night    -chest x-ray with bilateral pulmonary opacities      Encephalopathy:  With waxing and waning of mental status  Periods of agitation  -CT negative for acute findings   -blood cultures and urinalysis negative      Other:  Obesity, diabetes, history of prostate cancer status post prostatectomy  Disposition:  Requiring additional stay due to medical needs  SUBJECTIVE:  The patient has few to the glass window  He is attempting to prone in the bed  Remains on supplemental oxygen      OBJECTIVE:  Current Weight: Weight - Scale: 110 kg (242 lb 1 oz)  Vitals:    05/16/21 0600 05/16/21 0701 05/16/21 0800 05/16/21 0806   BP: (!) 173/63  160/76 160/76   BP Location:   Right arm    Pulse: 78 74 80    Resp: (!) 28 22     Temp:       TempSrc:       SpO2: 97% 95% 97%    Weight:       Height:           Intake/Output Summary (Last 24 hours) at 5/16/2021 1021  Last data filed at 5/16/2021 0601  Gross per 24 hour   Intake 1419 35 ml   Output 1640 ml   Net -220 65 ml     General:  ill appearance  Skin: warm, dry, intact, no rash  HEENT: dry mucous membranes, sclera anicteric, normocephalic, atraumatic  Neck: No apparent JVD appreciated  Chest: on O2  CVS:Regular rate and rhythm, no murmer   Abdomen: Soft, round, non-tender, +BS  Extremities: No B/L LE edema present, obese  Neuro: alert   Psych: appropriate mood and affect     Medications:    Current Facility-Administered Medications:     acetaminophen (TYLENOL) tablet 650 mg, 650 mg, Oral, Q6H PRN, JIHAN Riley    amLODIPine (NORVASC) tablet 5 mg, 5 mg, Oral, Daily, Haley Romero MD, 5 mg at 05/16/21 0810    aspirin (ECOTRIN LOW STRENGTH) EC tablet 81 mg, 81 mg, Oral, Daily, Johan Avendano, JIHAN, 81 mg at 05/16/21 0810    atorvastatin (LIPITOR) tablet 40 mg, 40 mg, Oral, HS, Leveda Smitha Avendano, JOVANYNP, 40 mg at 05/15/21 2205    bisacodyl (DULCOLAX) rectal suppository 10 mg, 10 mg, Rectal, Daily PRN, JIHAN Echeverria, 10 mg at 05/14/21 1255    calcitriol (ROCALTROL) capsule 0 25 mcg, 0 25 mcg, Oral, Daily, Johan Avendano, JIHAN, 0 25 mcg at 05/16/21 0808    carvedilol (COREG) tablet 25 mg, 25 mg, Oral, BID With Meals, JIHAN Holly, 25 mg at 05/16/21 0810    cholecalciferol (VITAMIN D3) tablet 2,000 Units, 2,000 Units, Oral, Daily, JIHAN Holly, 2,000 Units at 05/16/21 0808    dexamethasone (DECADRON) 11 5 mg in sodium chloride 0 9 % 50 mL IVPB, 0 1 mg/kg, Intravenous, Q12H [address], [place] JIHAN Zapata, Last Rate: 100 mL/hr at 05/15/21 2013, 11 5 mg at 05/15/21 2013    dextrose 5 % infusion, 50 mL/hr, Intravenous, Continuous, Abhilash Tracy MD, Last Rate: 50 mL/hr at 05/16/21 0601, 50 mL/hr at 05/16/21 0601    enoxaparin (LOVENOX) subcutaneous injection 30 mg, 30 mg, Subcutaneous, Q12H [address], Abhilash Tracy MD, 30 mg at 05/16/21 0811    epoprostenol (VELETRI) 30,000 ng/mL in sodium chloride 0 9 % 50 mL inhalation solution, 6 25-50 ng/kg/min (Ideal), Inhalation, Titrated, JIHAN Haider, Last Rate: 6 8 mL/hr at 05/16/21 0752, 49 708 ng/kg/min at 05/16/21 0752    fluticasone (FLONASE) 50 mcg/act nasal spray 2 spray, 2 spray, Nasal, Daily, JIHAN Cabrales, 2 spray at 05/15/21 0858    gabapentin (NEURONTIN) capsule 100 mg, 100 mg, Oral, TID, JIHAN Cabrales, 100 mg at 05/16/21 0809    hydrALAZINE (APRESOLINE) injection 10 mg, 10 mg, Intravenous, Q6H PRN, JIHAN Echeverria, 10 mg at 05/16/21 0328    insulin glargine (LANTUS) subcutaneous injection 10 Units 0 1 mL, 10 Units, Subcutaneous, HS, JIHAN Haider, 10 Units at 05/15/21 2111    insulin lispro (HumaLOG) 100 units/mL subcutaneous injection 2-12 Units, 2-12 Units, Subcutaneous, Q6H [address], 4 Units at 05/16/21 8393 **AND** Fingerstick Glucose (POCT), , , Q6H, JIHAN Clark    isosorbide dinitrate (ISORDIL) tablet 30 mg, 30 mg, Oral, TID, Blondell Goldberg Bilofsky, CRNP, 30 mg at 05/16/21 0806    melatonin tablet 6 mg, 6 mg, Oral, HS, JIHAN Mckeon, 6 mg at 05/15/21 2249    [COMPLETED] zinc sulfate (ZINCATE) capsule 220 mg, 220 mg, Oral, Daily, 220 mg at 05/14/21 0913 **FOLLOWED BY** multivitamin-minerals (CENTRUM ADULTS) tablet 1 tablet, 1 tablet, Oral, Daily, Blondell Goldberg Bilofsky, CRNP, 1 tablet at 05/16/21 0809    ondansetron (ZOFRAN) injection 4 mg, 4 mg, Intravenous, Q6H PRN, Blondell Goldberg Bilofsky, CRNP, 4 mg at 05/10/21 0935    pantoprazole (PROTONIX) EC tablet 40 mg, 40 mg, Oral, Early Morning, JIHAN Riley, 40 mg at 05/16/21 0504    polyethylene glycol (MIRALAX) packet 17 g, 17 g, Oral, Daily, JIHAN Clark, 17 g at 05/16/21 1073    senna-docusate sodium (SENOKOT S) 8 6-50 mg per tablet 2 tablet, 2 tablet, Oral, BID, Sruthi Calvillo MD, 2 tablet at 05/16/21 1678    Laboratory Results:  Results from last 7 days   Lab Units 05/16/21  0337 05/15/21  0359 05/15/21  0358 05/14/21  0536 05/13/21  1519 05/13/21  0430  05/11/21  0625   WBC Thousand/uL 13 05*  --  10 09 11 93*  --  7 46   < > 9 22   HEMOGLOBIN g/dL 11 6*  --  11 1* 11 2*  --  11 3*   < > 12 2   HEMATOCRIT % 35 8*  --  33 1* 34 3*  --  34 9*   < > 36 0*   PLATELETS Thousands/uL 183  --  173 163  --  140*   < > 149   SODIUM mmol/L 141  --  144 147* 144 144   < > 143   POTASSIUM mmol/L 4 6  --  4 0 4 5 4 2 4 2   < > 3 8   CHLORIDE mmol/L 106  --  107 110* 108 109*   < > 107   CO2 mmol/L 26  --  27 27 27 28   < > 24   BUN mg/dL 53*  --  54* 52* 52* 50*   < > 76*   CREATININE mg/dL 1 76*  --  1 80* 1 68* 1 84* 1 90*   < > 2 20*   CALCIUM mg/dL 8 0*  --  8 0* 8 0* 7 9* 7 9*   < > 7 5*   MAGNESIUM mg/dL  --  2 6  --   --  2 7*  --   --   --    PHOSPHORUS mg/dL  --   -- --   --   --   --   --  3 4   ALK PHOS U/L  --   --  220* 172*  --  126*   < > 89   ALT U/L  --   --  56 59  --  57   < > 70   AST U/L  --   --  55* 57*  --  61*   < > 106*    < > = values in this interval not displayed

## 2021-05-16 NOTE — ASSESSMENT & PLAN NOTE
Wt Readings from Last 3 Encounters:   05/15/21 111 kg (245 lb 2 4 oz)   03/19/21 118 kg (261 lb)   03/18/21 118 kg (261 lb)     · BNP 7000 on admission  · 4/21 ECHO: The ventricle was mildly to moderately dilated  Systolic function was mildly to moderately reduced  Ejection fraction was estimated to be 40 %  There was mild to moderate diffusehypokinesis    · Monitor volume status  · Continue norvasc, coreg, isordil  · Diuretics as above

## 2021-05-16 NOTE — PROGRESS NOTES
[address] [address]  Progress Note Maryanne Salinas Sr  1950, 80 y o  male MRN: [id number]  Unit/Bed#: ICU 03 Encounter: [id number]  Primary Care Provider: Lamont Go MD   Date and time admitted to hospital: 5/7/2021  4:33 PM    * Acute hypoxemic respiratory failure due to COVID-19 [facility])  Assessment & Plan  · In the setting of covid 19   · Initial positive 5/7 with unclear symptom onset  · Escalating oxygen requirements, now on HFNC 50L 100% Fio2 +- NRB  · CXR with bilateral pulmonary opacities  · Decadron total D7, BID dosing D6  · Lovenox 30 Q12-> increase to 1mg/kg BID in setting of Ddimer of 2 88  · Continue vitamins  · Completed remdesivir  · Given Actemra 5/13 2/2 increasing Fio2 requirements and elevated CRP  · +BM 5/15  · Completed 7 days of ABX, procal decreasing, monitor off antibiotics  · Continue iVeletri     Results from last 7 days   Lab Units 05/16/21  0338 05/16/21  0331 05/14/21  0536 05/13/21  0430 05/12/21 2029 05/11/21  0625 05/10/21  0613   FERRITIN ng/mL  --   --  1,696*  --   --   --  2,421*   CRP mg/L  --  35 5* 86 0* 182 0* 290 0* 52 8* 66 7*   D-DIMER QUANTITATIVE ug/ml FEU 2 88*  --  1 78* 1 49* 1 58*  --  1 39*         Acute encephalopathy  Assessment & Plan  · Present on admission, now with increasing confusion and agitation  · Repeat CT head 5/13: No significant interval change since prior examination  Stable advanced chronic microvascular ischemic change and chronic right cerebellar lacunar infarct    · Etiology unclear: covid 19/sepsis/hypoxia vs  delerium vs  Uremia less likely  · Baseline AO x 3  · Neuro checks  · Avoid deliogenic medications  · Frequent reorientations   · Fall precautions  · Precedex discontinued 5/15 due to prolonged QTc  · CAM ICU daily  · Melatonin HS  · Repeat Ammonia this AM (nl at 20umol/L    Acute renal failure superimposed on chronic kidney disease [facility])  Assessment & Plan  Lab Results   Component Value Date    EGFR 43 05/15/2021    EGFR 47 05/14/2021    EGFR 42 05/13/2021    CREATININE 1 80 (H) 05/15/2021    CREATININE 1 68 (H) 05/14/2021    CREATININE 1 84 (H) 05/13/2021     · Hx of CKD III, baseline sCr 1 8 - 1 9  · GAGE on presentation with sCr of 4 97  · Now 1 68  · On lasix and metolazone at home  · Lasix 80 mg x 1 dose given on 5/14 with 4L UO  · Continue PRN dosing for goal net netative  · Etiology: Likely prerenal azotemia with RAAS blockade  · Hold ace/arb  · Continue to trend  · Avoid nephrotoxins/hypotension/hypoxia  · Monitor I and O    Chronic combined systolic and diastolic CHF, NYHA class 3 (HCC)  Assessment & Plan  Wt Readings from Last 3 Encounters:   05/15/21 111 kg (245 lb 2 4 oz)   03/19/21 118 kg (261 lb)   03/18/21 118 kg (261 lb)     · BNP 7000 on admission  · 4/21 ECHO: The ventricle was mildly to moderately dilated  Systolic function was mildly to moderately reduced  Ejection fraction was estimated to be 40 %  There was mild to moderate diffusehypokinesis    · Monitor volume status  · Continue norvasc, coreg, isordil  · Diuretics as above          Thrombocytopenia (HCC)-resolved as of 5/15/2021  Assessment & Plan  · Presented with platelets of 96  · Improving to 163  · In setting of sepsis, covid 19    Type 2 diabetes mellitus with kidney complication, with long-term current use of insulin [facility])  Assessment & Plan  Lab Results   Component Value Date    HGBA1C 6 0 03/19/2021       Recent Labs     05/15/21  0546 05/15/21  1152 05/15/21  1740 05/15/21  2303   POCGLU 159* 158* 170* 174*       Blood Sugar Average: Last 72 hrs:  (P) 505 [id number]     · On jardiance and insulin at home  · Home hold regimen in favor of Lantus 10 unit HS and SSI      Obesity with body mass index 30 or greater  Assessment & Plan  · Nutrition consult when able  · Known risk factor for severity of COVID     Hyperlipidemia  Assessment & Plan  · Continue lipitor     Hypernatremia  Assessment & Plan    · IVF fluids changed to d5 @ 50 ml  · F/u with repeat BMP     Constipation-resolved as of 5/15/2021  Assessment & Plan  · Bowel regimen increased  · Senna 2 tabs BID, Miralax daily + prn dulcolax (1 dose given on )  · BM   · Consider adding lactulose as needed      ----------------------------------------------------------------------------------------  HPI/24hr events:   Pt requiring HFNC and NRB  Confused but refused BIPAP    Disposition: Continue Stepdown Level 1 level of care   Code Status: Level 1 - Full Code  ---------------------------------------------------------------------------------------  SUBJECTIVE  Pt confused, unable to provide    Review of Systems   Unable to perform ROS: Mental status change     Review of systems was reviewed and negative unless stated above in HPI/24-hour events   ---------------------------------------------------------------------------------------  OBJECTIVE    Vitals   Vitals:    21 0450 21 0452 21 0459 21 0500   BP:       Pulse: 74 72 92 70   Resp: (!) 24 (!) 23 (!) 38 19   Temp:       TempSrc:       SpO2: 93% 93% (!) 71% 94%   Weight:       Height:         Temp (24hrs), Av 5 °F (36 4 °C), Min:97 1 °F (36 2 °C), Max:97 9 °F (36 6 °C)  Current: Temperature: 97 5 °F (36 4 °C)          Respiratory:  SpO2: SpO2: 94 % HFNC 100%/55LPM with NRB in place    Invasive/non-invasive ventilation settings   Respiratory    Lab Data (Last 4 hours)    None         O2/Vent Data (Last 4 hours)      05 0300          Non-Invasive Ventilation Mode HFNC (High flow)                   Physical Exam  Constitutional:       Appearance: He is ill-appearing  HENT:      Head: Normocephalic and atraumatic  Mouth/Throat:      Mouth: Mucous membranes are dry  Eyes:      General:         Right eye: No discharge  Left eye: No discharge  Pupils: Pupils are equal, round, and reactive to light  Neck:      Musculoskeletal: Normal range of motion     Cardiovascular:      Rate and Rhythm: Normal rate and regular rhythm  Pulses: Normal pulses  Heart sounds: No murmur  No friction rub  No gallop  Pulmonary:      Effort: Tachypnea present  Breath sounds: Decreased breath sounds present  No wheezing or rhonchi  Abdominal:      Comments: Obese, round soft, nondistended, positive bowel sounds x4, nontender on palpation, positive bowel movement overnight   Genitourinary:     Comments: Patient incontinent of urine  Skin:     General: Skin is warm  Capillary Refill: Capillary refill takes less than 2 seconds  Nails: There is no clubbing       Neurological:      Comments: Patient follows simple commands, oriented to person only, reports, yes" to all questions on my exam however when respiratory attempted to place patient on CPAP machine he refused         Laboratory and Diagnostics:  Results from last 7 days   Lab Units 05/16/21  0337 05/15/21  0358 05/14/21  0536 05/13/21  0430 05/12/21 2029 05/11/21  0625 05/10/21  0620   WBC Thousand/uL 13 05* 10 09 11 93* 7 46 9 93 9 22 6 15   HEMOGLOBIN g/dL 11 6* 11 1* 11 2* 11 3* 11 8* 12 2 11 9*   HEMATOCRIT % 35 8* 33 1* 34 3* 34 9* 35 4* 36 0* 35 4*   PLATELETS Thousands/uL 183 173 163 140* 143* 149 129*   NEUTROS PCT % 93* 88*  --   --   --   --   --    MONOS PCT % 4 6  --   --   --   --   --      Results from last 7 days   Lab Units 05/15/21  0358 05/14/21  0536 05/13/21  1519 05/13/21  0430 05/12/21 2029 05/11/21  0625 05/10/21  0613   SODIUM mmol/L 144 147* 144 144 146* 143 139   POTASSIUM mmol/L 4 0 4 5 4 2 4 2 4 5 3 8 3 8   CHLORIDE mmol/L 107 110* 108 109* 110* 107 101   CO2 mmol/L 27 27 27 28 28 24 24   ANION GAP mmol/L 10 10 9 7 8 12 14*   BUN mg/dL 54* 52* 52* 50* 50* 76* 88*   CREATININE mg/dL 1 80* 1 68* 1 84* 1 90* 1 77* 2 20* 3 17*   CALCIUM mg/dL 8 0* 8 0* 7 9* 7 9* 7 7* 7 5* 7 4*   GLUCOSE RANDOM mg/dL 183* 141* 234* 199* 127 71 152*   ALT U/L 56 59  --  57 60 70 67   AST U/L 55* 57*  --  61* 74* 106* 101*   ALK PHOS U/L 220* 172*  --  126* 123* 89 78   ALBUMIN g/dL 2 5* 2 4*  --  2 3* 2 4* 2 6* 2 5*   TOTAL BILIRUBIN mg/dL 0 58 0 58  --  0 63 0 56 0 39 0 35     Results from last 7 days   Lab Units 05/15/21  0359 05/13/21  1519 05/11/21  0625   MAGNESIUM mg/dL 2 6 2 7*  --    PHOSPHORUS mg/dL  --   --  3 4                   ABG:  Results from last 7 days   Lab Units 05/15/21  1626   PH ART  7 516*   PCO2 ART mm Hg 29 7*   PO2 ART mm Hg 59 6*   HCO3 ART mmol/L 23 5   BASE EXC ART mmol/L 1 3   ABG SOURCE  Radial, Right     VBG:  Results from last 7 days   Lab Units 05/15/21  1626   ABG SOURCE  Radial, Right     Results from last 7 days   Lab Units 05/14/21  0536 05/12/21  2233 05/10/21  0613   PROCALCITONIN ng/ml 0 18 0 15 0 44*       Micro  Results from last 7 days   Lab Units 05/13/21  0438   BLOOD CULTURE  No Growth at 48 hrs  No Growth at 48 hrs  EKG:  Sinus rhythm on tele  Imaging:  No new images this morning    Intake and Output  I/O       05/14 0701 - 05/15 0700 05/15 0701 - 05/16 0700    P  O  590 340    I V  (mL/kg) 1529 (13 8) 1147 2 (10 4)    IV Piggyback 120 130    Total Intake(mL/kg) 2239 (20 2) 1617 2 (14 7)    Urine (mL/kg/hr) 5360 (2) 1908 (0 7)    Stool  0    Total Output 5360 1908    Net -3121 -290 8          Unmeasured Urine Occurrence 1 x 1 x    Unmeasured Stool Occurrence  3 x            Height and Weights   Height: 5' 8" (172 7 cm)  IBW (Ideal Body Weight): 68 4 kg  Body mass index is 36 81 kg/m²  Weight (last 2 days)     Date/Time   Weight    05/16/21 0400   110 (242 07)    05/15/21 0558   111 (245 15)    05/14/21 0541   111 (244 93)                Nutrition       Diet Orders   (From admission, onward)             Start     Ordered    05/13/21 2100  Dietary nutrition supplements  Once     Question Answer Comment   Select Supplement: Nepro-Mixed Berry    Select Supplement: Nepro-Vanilla    Frequency 10, 2, HS        05/13/21 2101    05/13/21 0938  Diet Dysphagia/Modified Consistency;  Dysphagia 3-Dental Soft; Thin Liquid  Diet effective now     Question Answer Comment   Diet Type Dysphagia/Modified Consistency    Dysphagia/Modified Consistency Dysphagia 3-Dental Soft    Liquid Modifier Thin Liquid    RD to adjust diet per protocol?  Yes        05/13/21 5284                  Active Medications  Scheduled Meds:  Current Facility-Administered Medications   Medication Dose Route Frequency Provider Last Rate    acetaminophen  650 mg Oral Q6H PRN JIHAN Palafox      amLODIPine  5 mg Oral Daily Chelo Villanueva MD      aspirin  81 mg Oral Daily JIHAN Palafox      atorvastatin  40 mg Oral HS [place] JIHAN Avendano      bisacodyl  10 mg Rectal Daily PRN JIHAN Villagomez      calcitriol  0 25 mcg Oral Daily JIHAN Palafox      carvedilol  25 mg Oral BID With Meals Elisa JIHAN Reed      cholecalciferol  2,000 Units Oral Daily Elisa FormicJIHAN teresa      dexamethasone  0 1 mg/kg Intravenous Q12H [facility] JIHAN Palafox 11 5 mg (05/15/21 2013)    dextrose  50 mL/hr Intravenous Continuous Chelo Villanueva MD 50 mL/hr (05/16/21 0401)    enoxaparin  30 mg Subcutaneous Q12H [facility] Chelo Villanueva MD      epoprostenol (VELETRI) inhalation solution 93838 ng/mL  6 25-50 ng/kg/min (Ideal) Inhalation Titrated JIHAN Burton 49 708 ng/kg/min (05/16/21 0039)    fluticasone  2 spray Nasal Daily JIHAN Palafox      gabapentin  100 mg Oral TID Elisa FormicJIHAN teresa      hydrALAZINE  10 mg Intravenous Q6H PRN JIHAN Villagomez      insulin glargine  10 Units Subcutaneous HS JIHAN Burton      insulin lispro  2-12 Units Subcutaneous Q6H [facility] JIHAN Villagomez      isosorbide dinitrate  30 mg Oral TID JIHAN Palafox      melatonin  6 mg Oral HS GleJIHAN Brothers      multivitamin-minerals  1 tablet Oral Daily JIHAN Palafox      ondansetron  4 mg Intravenous Q6H PRN ElisaJIHAN Ibarra      pantoprazole  40 mg Oral Early Morning Navarro Ernandez JIHAN Avendano      polyethylene glycol  17 g Oral Daily JIHAN Riggs      senna-docusate sodium  2 tablet Oral BID Haley Romero MD       Continuous Infusions:  dextrose, 50 mL/hr, Last Rate: 50 mL/hr (05/16/21 0401)  epoprostenol (VELETRI) inhalation solution 89455 ng/mL, 6 25-50 ng/kg/min (Ideal), Last Rate: 49 708 ng/kg/min (05/16/21 0039)      PRN Meds:   acetaminophen, 650 mg, Q6H PRN  bisacodyl, 10 mg, Daily PRN  hydrALAZINE, 10 mg, Q6H PRN  ondansetron, 4 mg, Q6H PRN        Invasive Devices Review  Invasive Devices     Peripherally Inserted Central Catheter Line            PICC Line -- days          Central Venous Catheter Line            CVC Central Lines 05/12/21 Double 3 days          Line            Hemodialysis AV Fistula 09/06/19 Upper arm 618 days          Drain            Closed/Suction Drain Anterior Abdomen Bulb 15 Fr  1212 days                Rationale for remaining devices:  Medically necessary  ---------------------------------------------------------------------------------------  Advance Directive and Living Will:      Power of :    POLST:    ---------------------------------------------------------------------------------------  Care Time Delivered:   No Critical Care time spent       JIHAN Interiano      Portions of the record may have been created with voice recognition software  Occasional wrong word or "sound a like" substitutions may have occurred due to the inherent limitations of voice recognition software    Read the chart carefully and recognize, using context, where substitutions have occurred

## 2021-05-16 NOTE — ASSESSMENT & PLAN NOTE
· Present on admission, now with increasing confusion and agitation  · Repeat CT head 5/13: No significant interval change since prior examination  Stable advanced chronic microvascular ischemic change and chronic right cerebellar lacunar infarct    · Etiology unclear: covid 19/sepsis/hypoxia vs  delerium vs  Uremia less likely  · Baseline AO x 3  · Neuro checks  · Avoid deliogenic medications  · Frequent reorientations   · Fall precautions  · Precedex discontinued 5/15 due to prolonged QTc  · CAM ICU daily  · Melatonin HS  · Repeat Ammonia this AM

## 2021-05-16 NOTE — ASSESSMENT & PLAN NOTE
· In the setting of covid 19   · Initial positive 5/7 with unclear symptom onset  · Escalating oxygen requirements, now on HFNC 50L 100% Fio2 +- NRB  · CXR with bilateral pulmonary opacities  · Decadron total D7, BID dosing D6  · Continue vitamins  · Completed remdesivir  · Given Actemra 5/13 2/2 increasing Fio2 requirements and elevated CRP  · +BM 5/15  · Completed 7 days of ABX, procal decreasing, monitor off antibiotics  · Continue iVeletri

## 2021-05-16 NOTE — ASSESSMENT & PLAN NOTE
Lab Results   Component Value Date    HGBA1C 6 0 03/19/2021       Recent Labs     05/15/21  0546 05/15/21  1152 05/15/21  1740 05/15/21  2303   POCGLU 159* 158* 170* 174*       Blood Sugar Average: Last 72 hrs:  (P) 836 [id number]     · On jardiance and insulin at home  · Home hold regimen in favor of Lantus 10 unit HS and SSI

## 2021-05-17 NOTE — ASSESSMENT & PLAN NOTE
Wt Readings from Last 3 Encounters:   05/17/21 106 kg (234 lb 9 1 oz)   03/19/21 118 kg (261 lb)   03/18/21 118 kg (261 lb)     · BNP 7000 on admission  · 4/21 ECHO: The ventricle was mildly to moderately dilated  Systolic function was mildly to moderately reduced  Ejection fraction was estimated to be 40 %  There was mild to moderate diffuse hypokinesis    · Monitor volume status  · He was placed on a continuous lasix infusion with good response  · Currently on levophed given need for sedation

## 2021-05-17 NOTE — PROGRESS NOTES
Checked with nursing staff on how patient is doing today  Patient is intubated doing poorly today  Will continue to follow patient

## 2021-05-17 NOTE — PROGRESS NOTES
NEPHROLOGY PROGRESS NOTE   Lotus Francisco Sr  80 y o  male MRN: [id number]  Unit/Bed#: ICU 03 Encounter: [id number]      ASSESSMENT & PLAN    1  GAGE (POA) on CKD  o  baseline creatinine 1 8-1 9  o  admission creatinine was 4 97  o  etiology  Multifactorial- pre renal azotemia/ auto regulatory failure from ARB/ rhabdomyolysis with ATN  o  renal function did improve creatinine went to 1 7 2nd hit now creatinine up to 1 9 unfortunately patient hypotensive and required intubation  o  continue to monitor urine output  o  p r n  Lasix  o   Consider CT of the chest non con for pulmonary edema  o  if oliguric with volume overload may need renal replacement therapy in the setting of hypotension and volume overload with respiratory distress  o  will monitor closely and discussed with ICU    2  Electrolytes/Acid Base  o  no anion gap  o  current electrolytes are acceptable    3  Hypotension- in the setting of respiratory failure,  Acute kidney injury and COVID pneumonia  o  may have been propofol induced  o  maps are in the 60s  o  likely  Will require pressors  o  continue to keep maps above 65    4   Anemia of  Chronic illness  o  continue to trend hemoglobin    5   acute hypoxemic respiratory failure due to COVID-19  o  unfortunately intubated  o  on going ICU treatment    6   chronic combined systolic and diastolic congestive heart failure  o  EF is 40% with diffuse hypokinesis  o  avoid BP lowering medication  o  if blood pressures are better could consider diuresis    DISCUSSION     unfortunately patient is clinically worsening and required intubation   with some oliguria as well   will monitor urine output and for renal replacement therapy needs   discussed with ICU team    SUBJECTIVE:     patient was seen today   remains critically ill in the ICU    OBJECTIVE:  Current Weight: Weight - Scale: 106 kg (234 lb 9 1 oz)  @  Vitals:    05/17/21 0709 05/17/21 0734 05/17/21 0800 05/17/21 0900   BP: 112/62 (!) 67/46  (!) 07/86 Pulse: 92 80 70 (!) 52   Resp: (!) 26 15 16 17   Temp:       TempSrc:       SpO2: (!) 65% (!) 81% (!) 82% (!) 86%   Weight:       Height:           Intake/Output Summary (Last 24 hours) at 5/17/2021 0934  Last data filed at 5/17/2021 0501  Gross per 24 hour   Intake 1320 58 ml   Output 933 ml   Net 387 58 ml     Weight (last 2 days)     Date/Time   Weight    05/17/21 0600   106 (234 57)    05/17/21 0300   106 (234 57)    05/16/21 0532   110 (242 07)    05/16/21 0400   110 (242 07)    05/15/21 0558   111 (245 15)            Seen and examined through glass window in ICU room- per COVID protocol  General:  Critically ill  Eyes:  pallor  ENT:  ET tube in place with an oral mucosa that is moist  Neck: supple, no JVD  Chest:  Remains on the ventilator  CVS:  No tachycardia on the monitor  Abdomen:  Distended abdomen  Extremities:  Lower extremity edema  Neuro:  sedated      Medications:    Current Facility-Administered Medications:     acetaminophen (TYLENOL) tablet 650 mg, 650 mg, Oral, Q6H PRN, JIHAN Riley    amLODIPine (NORVASC) tablet 10 mg, 10 mg, Oral, Daily, JIHAN Krueger    cisatracurium (NIMBEX) 200 mg in  mL, 0 1-5 mcg/kg/min, Intravenous, Titrated, Last Rate: 3 2 mL/hr at 05/17/21 0900, 0 2 mcg/kg/min at 05/17/21 0900 **AND** artificial tear (LUBRIFRESH P M ) ophthalmic ointment, , Both Eyes, Q2H, JIHAN Willis    atorvastatin (LIPITOR) tablet 40 mg, 40 mg, Oral, HS, JIHAN Riley, 40 mg at 05/15/21 2205    bisacodyl (DULCOLAX) rectal suppository 10 mg, 10 mg, Rectal, Daily PRN, Dory Martinez, JOVANYNP, 10 mg at 05/14/21 1255    calcitriol (ROCALTROL) capsule 0 25 mcg, 0 25 mcg, Oral, Daily, RosamariaJIHAN Montano, 0 25 mcg at 05/16/21 0808    carvedilol (COREG) tablet 25 mg, 25 mg, Oral, BID With Meals, RosamariaJIHAN Montano, 25 mg at 05/16/21 0810    chlorhexidine (PERIDEX) 0 12 % oral rinse 15 mL, 15 mL, Mouth/Throat, Q12H [address], NOEL HareC   cholecalciferol (VITAMIN D3) tablet 2,000 Units, 2,000 Units, Oral, Daily, JIHAN Meyers, 2,000 Units at 05/16/21 0808    dexamethasone (DECADRON) 11 5 mg in sodium chloride 0 9 % 50 mL IVPB, 0 1 mg/kg, Intravenous, Q12H [address], JIHAN Riley, Last Rate: 100 mL/hr at 05/16/21 2015, 11 5 mg at 05/16/21 2015    dexmedetomidine (PRECEDEX) 400 mcg in sodium chloride 0 9 % 100 mL infusion, 0 1-0 7 mcg/kg/hr, Intravenous, Titrated, Peggy Fleischer, CRNP, Stopped at 05/17/21 3804    dextrose 5 % and sodium chloride 0 45 % infusion, 50 mL/hr, Intravenous, Continuous, Peggy Fleischer, CRNP, Last Rate: 50 mL/hr at 05/17/21 0501, 50 mL/hr at 05/17/21 0501    epoprostenol (VELETRI) 30,000 ng/mL in sodium chloride 0 9 % 50 mL inhalation solution, 6 25-50 ng/kg/min (Ideal), Inhalation, Titrated, Peggy Fleischer, CRNP, Last Rate: 6 8 mL/hr at 05/17/21 0530, 49 708 ng/kg/min at 05/17/21 0530    fentaNYL 1000 mcg in sodium chloride 0 9% 100mL infusion, 50 mcg/hr, Intravenous, Continuous, JIHAN Lucia, Last Rate: 5 mL/hr at 05/17/21 0918, 50 mcg/hr at 05/17/21 0918    fluticasone (FLONASE) 50 mcg/act nasal spray 2 spray, 2 spray, Nasal, Daily, JIHAN Hale, 2 spray at 05/16/21 0900    furosemide (LASIX) injection 80 mg, 80 mg, Intravenous, Once, JIHAN Lucia    gabapentin (NEURONTIN) capsule 100 mg, 100 mg, Oral, TID, JIHAN Riley, 100 mg at 05/16/21 0809    heparin (porcine) 25,000 units in 0 45% NaCl 250 mL infusion (premix), 3-20 Units/kg/hr (Order-Specific), Intravenous, Titrated, Iesha Gooden PA-C    heparin (porcine) injection 2,000 Units, 2,000 Units, Intravenous, Q1H PRN, Vinny Vázquez PA-C    heparin (porcine) injection 4,000 Units, 4,000 Units, Intravenous, Q1H PRN, Vinny Vázquez PA-C    hydrALAZINE (APRESOLINE) injection 10 mg, 10 mg, Intravenous, Q6H PRN, JIHAN Silva, 10 mg at 05/16/21 0328    insulin glargine (LANTUS) subcutaneous injection 10 Units 0 1 mL, 10 Units, Subcutaneous, HS, Cruz Nayely, JOVANYNP, 10 Units at 05/16/21 2127    insulin lispro (HumaLOG) 100 units/mL subcutaneous injection 2-12 Units, 2-12 Units, Subcutaneous, Q6H [facility] **AND** Fingerstick Glucose (POCT), , , Q6H, Iesha Gooden PA-C    isosorbide dinitrate (ISORDIL) tablet 30 mg, 30 mg, Oral, TID, JIHAN Riley, 30 mg at 05/16/21 0806    LORazepam (ATIVAN) injection 1 mg, 1 mg, Intravenous, Q4H PRN, Cruz FusJIHAN, 1 mg at 05/17/21 0340    [COMPLETED] zinc sulfate (ZINCATE) capsule 220 mg, 220 mg, Oral, Daily, 220 mg at 05/14/21 0913 **FOLLOWED BY** multivitamin-minerals (CENTRUM ADULTS) tablet 1 tablet, 1 tablet, Oral, Daily, JIHAN Dietrich, 1 tablet at 05/16/21 0809    NOREPINEPHRINE 4 MG  ML NSS (CMPD ORDER) infusion, 1-30 mcg/min, Intravenous, Titrated, JIHAN Riley, Last Rate: 26 3 mL/hr at 05/17/21 0915, 7 mcg/min at 05/17/21 0915    omeprazole (PRILOSEC) suspension 2 mg/mL, 20 mg, Oral, Daily, Iesha Gooden PA-C    ondansetron (ZOFRAN) injection 4 mg, 4 mg, Intravenous, Q6H PRN, JIHAN Riley, 4 mg at 05/10/21 0935    polyethylene glycol (MIRALAX) packet 17 g, 17 g, Oral, Daily, JIHAN Lawton, 17 g at 05/16/21 0811    propofol (DIPRIVAN) 1000 mg in 100 mL infusion (premix), 5-50 mcg/kg/min, Intravenous, Titrated, Iesha Gooden PA-C, Last Rate: 31 8 mL/hr at 05/17/21 0823, 50 mcg/kg/min at 05/17/21 0823    senna-docusate sodium (SENOKOT S) 8 6-50 mg per tablet 2 tablet, 2 tablet, Oral, BID, Randy Wellington MD, 2 tablet at 05/16/21 0807    Invasive Devices:      Lab Results:   Results from last 7 days   Lab Units 05/17/21  0428 05/16/21  0337 05/15/21  0359 05/15/21  0358 05/14/21  0536 05/13/21  1519 05/13/21  0438 05/13/21  0430 05/12/21  2233 05/12/21 2029 05/11/21  0625   WBC Thousand/uL 19 38* 13 05*  --  10 09 11 93*  --   --  7 46  --  9 93 9 22   HEMOGLOBIN g/dL 10 4* 11 6*  --  11 1* 11 2*  --   -- 11 3*  --  11 8* 12 2   HEMATOCRIT % 31 8* 35 8*  --  33 1* 34 3*  --   --  34 9*  --  35 4* 36 0*   PLATELETS Thousands/uL 112* 183  --  173 163  --   --  140*  --  143* 149   POTASSIUM mmol/L 4 7 4 6  --  4 0 4 5 4 2  --  4 2  --  4 5 3 8   CHLORIDE mmol/L 110* 106  --  107 110* 108  --  109*  --  110* 107   CO2 mmol/L 28 26  --  27 27 27  --  28  --  28 24   BUN mg/dL 65* 53*  --  54* 52* 52*  --  50*  --  50* 76*   CREATININE mg/dL 1 93* 1 76*  --  1 80* 1 68* 1 84*  --  1 90*  --  1 77* 2 20*   CALCIUM mg/dL 7 7* 8 0*  --  8 0* 8 0* 7 9*  --  7 9*  --  7 7* 7 5*   MAGNESIUM mg/dL  --   --  2 6  --   --  2 7*  --   --   --   --   --    PHOSPHORUS mg/dL  --   --   --   --   --   --   --   --   --   --  3 4   ALK PHOS U/L 253*  --   --  220* 172*  --   --  126*  --  123* 89   ALT U/L 44  --   --  56 59  --   --  57  --  60 70   AST U/L 62*  --   --  55* 57*  --   --  61*  --  74* 106*   BLOOD CULTURE   --   --   --   --   --   --  No Growth After 4 Days  No Growth After 4 Days  --   --   --   --    LEUKOCYTES UA   --   --   --   --   --   --   --   --  Negative  --   --    BLOOD UA   --   --   --   --   --   --   --   --  Small*  --   --          Portions of the record may have been created with voice recognition software  Occasional wrong word or "sound a like" substitutions may have occurred due to the inherent limitations of voice recognition software  Read the chart carefully and recognize, using context, where substitutions have occurred  If you have any questions, please contact the dictating provider

## 2021-05-17 NOTE — ASSESSMENT & PLAN NOTE
Lab Results   Component Value Date    HGBA1C 6 0 03/19/2021       Recent Labs     05/17/21  0021 05/17/21  0618 05/17/21  1135 05/17/21  1822   POCGLU 180* 187* 253* 266*       Blood Sugar Average: Last 72 hrs:  (P) 713 [id number]     · On jardiance and insulin at home  · Home hold regimen in favor of Lantus 10 unit HS and SSI    · Our goal will be to maintain his blood glucose between 140 and 180

## 2021-05-17 NOTE — ASSESSMENT & PLAN NOTE
Lab Results   Component Value Date    EGFR 40 05/17/2021    EGFR 44 05/16/2021    EGFR 43 05/15/2021    CREATININE 1 93 (H) 05/17/2021    CREATININE 1 76 (H) 05/16/2021    CREATININE 1 80 (H) 05/15/2021     · Hx of CKD III, baseline sCr 1 8 - 1 9  · GAGE on presentation with sCr of 4 97  · On lasix and metolazone at home  · Placed on a lasix infusion on 5/17 with good response  · Monitor I and O

## 2021-05-17 NOTE — PROGRESS NOTES
[address] [place] [address]  Progress Note Jesus Donald Sr  1950, 80 y o  male MRN: [id number]  Unit/Bed#: ICU 03 Encounter: [id number]  Primary Care Provider: Cameron Greenberg MD   Date and time admitted to hospital: 5/7/2021  4:33 PM    * Acute hypoxemic respiratory failure due to COVID-19 [facility])  Assessment & Plan  · In the setting of covid 19   · Initial positive 5/7 with unclear symptom onset  · Intubated on 5/17 secondary to progressive respiratory failure  · May require pronation therapy  · CXR with bilateral pulmonary opacities  · Decadron total D7, BID dosing D6  · Continue vitamins  · Completed remdesivir  · Given Actemra 5/13 2/2 increasing Fio2 requirements and elevated CRP  · +BM 5/15  · Completed 7 days of ABX, procal decreasing, monitor off antibiotics  · Continue iVeletri     Acute encephalopathy  Assessment & Plan  · Present on admission, now with increasing confusion and agitation  · Repeat CT head 5/13: No significant interval change since prior examination  Stable advanced chronic microvascular ischemic change and chronic right cerebellar lacunar infarct  · Etiology unclear: covid 19/sepsis/hypoxia vs  delerium vs  Uremia less likely  · Baseline AO x 3  · Neuro checks  · Currently intubated and sedated with propofol and fentanyl    Acute renal failure superimposed on chronic kidney disease [facility])  Assessment & Plan  Lab Results   Component Value Date    EGFR 40 05/17/2021    EGFR 44 05/16/2021    EGFR 43 05/15/2021    CREATININE 1 93 (H) 05/17/2021    CREATININE 1 76 (H) 05/16/2021    CREATININE 1 80 (H) 05/15/2021     · Hx of CKD III, baseline sCr 1 8 - 1 9  · GAGE on presentation with sCr of 4 97  · Now 1 68  · On lasix and metolazone at home  · Lasix 80 mg x 1 dose given on 5/14 with 4L UO  · Continue PRN dosing for goal net netative  · Etiology: Likely prerenal azotemia with RAAS blockade  · Hold ace/arb  · Continue to trend  · Avoid nephrotoxins/hypotension/hypoxia  · Monitor I and O    Chronic combined systolic and diastolic CHF, NYHA class 3 (HCC)  Assessment & Plan  Wt Readings from Last 3 Encounters:   05/17/21 106 kg (234 lb 9 1 oz)   03/19/21 118 kg (261 lb)   03/18/21 118 kg (261 lb)     · BNP 7000 on admission  · 4/21 ECHO: The ventricle was mildly to moderately dilated  Systolic function was mildly to moderately reduced  Ejection fraction was estimated to be 40 %  There was mild to moderate diffuse hypokinesis    · Monitor volume status  · He may benefit form additional diuresis  · Continue norvasc, coreg, isordil  · Diuretics as above          Transaminitis  Assessment & Plan  · Mild AST and alk phos elevations  · In setting of covid 19  · Trend    Type 2 diabetes mellitus with kidney complication, with long-term current use of insulin [facility])  Assessment & Plan  Lab Results   Component Value Date    HGBA1C 6 0 03/19/2021       Recent Labs     05/16/21  0617 05/16/21  1146 05/16/21  1805 05/17/21  0021   POCGLU 218* 161* 196* 180*       Blood Sugar Average: Last 72 hrs:  (P) 188 75     · On jardiance and insulin at home  · Home hold regimen in favor of Lantus 10 unit HS and SSI    · Our goal will be to maintain his blood glucose between 140 and 180    Obesity with body mass index 30 or greater  Assessment & Plan  · Nutrition consult when able  · Known risk factor for severity of COVID     Hyperlipidemia  Assessment & Plan  · Continue lipitor     Hypernatremia  Assessment & Plan  · Will add free water flushes to his tube feedings  · F/u with repeat BMP       ----------------------------------------------------------------------------------------  HPI/24hr events: Decompensated int he AM, required emergent intubation    Disposition: Continue Critical Care   Code Status: Level 1 - Full Code  ---------------------------------------------------------------------------------------  SUBJECTIVE  Decompensated overnight, required intubation    Review of Systems  Review of systems was unable to be performed secondary to intubated and sedated  ---------------------------------------------------------------------------------------  OBJECTIVE    Vitals   Vitals:    21 0648 21 0700 21 0709 21 0734   BP:  114/62 112/62 (!) 67/46   Pulse: 90 82 92 80   Resp: (!) 27 18 (!) 26 15   Temp:       TempSrc:       SpO2: (!) 27% (!) 58% (!) 65% (!) 81%   Weight:       Height:         Temp (24hrs), Av 1 °F (36 2 °C), Min:97 °F (36 1 °C), Max:97 2 °F (36 2 °C)  Current: Temperature: (!) 97 2 °F (36 2 °C)          Respiratory:  SpO2 Activity: SpO2 Activity: At Rest, SpO2 Device: O2 Device: BiPAP  Nasal Cannula O2 Flow Rate (L/min): 15 L/min    Invasive/non-invasive ventilation settings   Respiratory    Lab Data (Last 4 hours)    None         O2/Vent Data (Last 4 hours)    None                Physical Exam  Constitutional:       Appearance: He is obese  He is ill-appearing  HENT:      Head: Normocephalic  Mouth/Throat:      Mouth: Mucous membranes are moist    Eyes:      Pupils: Pupils are equal, round, and reactive to light  Neck:      Musculoskeletal: Neck supple  Cardiovascular:      Rate and Rhythm: Normal rate  Pulmonary:      Effort: Pulmonary effort is normal       Breath sounds: Rales present  Abdominal:      Tenderness: There is no abdominal tenderness  Musculoskeletal:         General: No swelling  Lymphadenopathy:      Cervical: No cervical adenopathy  Skin:     General: Skin is warm and dry  Neurological:      General: No focal deficit present  Mental Status: He is oriented to person, place, and time           Laboratory and Diagnostics:  Results from last 7 days   Lab Units 21  0428 21  0337 05/15/21  0358 21  0536 21  0430 21  0625   WBC Thousand/uL 19 38* 13 05* 10 09 11 93* 7 46 9 93 9 22   HEMOGLOBIN g/dL 10 4* 11 6* 11 1* 11 2* 11 3* 11 8* 12 2   HEMATOCRIT % 31 8* 35 8* 33 1* 34 3* 34 9* 35 4* 36 0*   PLATELETS Thousands/uL 112* 183 173 163 140* 143* 149   NEUTROS PCT %  --  93* 88*  --   --   --   --    MONOS PCT %  --  4 6  --   --   --   --      Results from last 7 days   Lab Units 05/17/21  0428 05/16/21  0337 05/15/21  0358 05/14/21  0536 05/13/21  1519 05/13/21  0430 05/12/21 2029 05/11/21  0625   SODIUM mmol/L 143 141 144 147* 144 144 146* 143   POTASSIUM mmol/L 4 7 4 6 4 0 4 5 4 2 4 2 4 5 3 8   CHLORIDE mmol/L 110* 106 107 110* 108 109* 110* 107   CO2 mmol/L 28 26 27 27 27 28 28 24   ANION GAP mmol/L 5 9 10 10 9 7 8 12   BUN mg/dL 65* 53* 54* 52* 52* 50* 50* 76*   CREATININE mg/dL 1 93* 1 76* 1 80* 1 68* 1 84* 1 90* 1 77* 2 20*   CALCIUM mg/dL 7 7* 8 0* 8 0* 8 0* 7 9* 7 9* 7 7* 7 5*   GLUCOSE RANDOM mg/dL 213* 227* 183* 141* 234* 199* 127 71   ALT U/L 44  --  56 59  --  57 60 70   AST U/L 62*  --  55* 57*  --  61* 74* 106*   ALK PHOS U/L 253*  --  220* 172*  --  126* 123* 89   ALBUMIN g/dL 2 2*  --  2 5* 2 4*  --  2 3* 2 4* 2 6*   TOTAL BILIRUBIN mg/dL 0 82  --  0 58 0 58  --  0 63 0 56 0 39     Results from last 7 days   Lab Units 05/15/21  0359 05/13/21  1519 05/11/21  0625   MAGNESIUM mg/dL 2 6 2 7*  --    PHOSPHORUS mg/dL  --   --  3 4                   ABG:  Results from last 7 days   Lab Units 05/16/21  1508   PH ART  7 384   PCO2 ART mm Hg 35 4*   PO2 ART mm Hg 43 9*   HCO3 ART mmol/L 20 7*   BASE EXC ART mmol/L -3 7   ABG SOURCE  Radial, Right     VBG:  Results from last 7 days   Lab Units 05/16/21  1508   ABG SOURCE  Radial, Right     Results from last 7 days   Lab Units 05/14/21  0536 05/12/21  2233   PROCALCITONIN ng/ml 0 18 0 15       Micro  Results from last 7 days   Lab Units 05/13/21  0438   BLOOD CULTURE  No Growth at 72 hrs  No Growth at 72 hrs  EKG:   Imaging: I have personally reviewed pertinent reports     and I have personally reviewed pertinent films in PACS    Intake and Output  I/O       05/15 0701 - 05/16 0700 05/16 0701 - 05/17 0700 05/17 0701 - 05/18 0700    P  O  340      I V  (mL/kg) 1247 2 (11 3) 1270 6 (12)     IV Piggyback 130 50     Total Intake(mL/kg) 1717 2 (15 6) 1320 6 (12 5)     Urine (mL/kg/hr) 1908 (0 7) 933 (0 4)     Stool 0      Total Output 1908 933     Net -190 8 +387  6            Unmeasured Urine Occurrence 1 x 3 x     Unmeasured Stool Occurrence 3 x            Height and Weights   Height: 5' 8" (172 7 cm)  IBW (Ideal Body Weight): 68 4 kg  Body mass index is 35 67 kg/m²  Weight (last 2 days)     Date/Time   Weight    05/17/21 0600   106 (234 57)    05/17/21 0300   106 (234 57)    05/16/21 0532   110 (242 07)    05/16/21 0400   110 (242 07)    05/15/21 0558   111 (245 15)                Nutrition       Diet Orders   (From admission, onward)             Start     Ordered    05/17/21 0703  Diet NPO  Diet effective now     Question Answer Comment   Diet Type NPO    RD to adjust diet per protocol?  Yes        05/17/21 0703    05/13/21 2100  Dietary nutrition supplements  Once     Question Answer Comment   Select Supplement: Nepro-Mixed Berry    Select Supplement: Nepro-Vanilla    Frequency 10, 2, HS        05/13/21 2101                  Active Medications  Scheduled Meds:  Current Facility-Administered Medications   Medication Dose Route Frequency Provider Last Rate    acetaminophen  650 mg Oral Q6H PRN JIHAN Tijerina      amLODIPine  10 mg Oral Daily CarrieJIHAN Hernandez      atorvastatin  40 mg Oral HS [place] JIHAN Avendano      bisacodyl  10 mg Rectal Daily PRN JIHAN Rubio      calcitriol  0 25 mcg Oral Daily Tamea Amour, JIHAN      carvedilol  25 mg Oral BID With Meals Tamea AmourJIHAN      chlorhexidine  15 mL Mouth/Throat Q12H [address], PA-C      cholecalciferol  2,000 Units Oral Daily Tamea Amour, JIHAN      dexamethasone  0 1 mg/kg Intravenous Q12H [address] Rosamaria JIHAN Gaspar 11 5 mg (05/16/21 2015)    dexmedetomidine  0 1-0 7 mcg/kg/hr Intravenous Titrated JIHAN Shelby 0 45 mcg/kg/hr (05/17/21 0501)    dextrose 5 % and sodium chloride 0 45 %  50 mL/hr Intravenous Continuous Kandi Rist, CRNP 50 mL/hr (05/17/21 0501)    enoxaparin  1 mg/kg Subcutaneous Q12H [facility] Kandi Markham, CRNP      epoprostenol (VELETRI) inhalation solution 10602 ng/mL  6 25-50 ng/kg/min (Ideal) Inhalation Titrated Kandi Rist, CRNP 49 708 ng/kg/min (05/17/21 0530)    fluticasone  2 spray Nasal Daily Sergio Given JIHAN Avendano      gabapentin  100 mg Oral TID JIHAN Whittaker      hydrALAZINE  10 mg Intravenous Q6H PRN JIHAN Clarke      insulin glargine  10 Units Subcutaneous HS Kandi Markham, CRNP      insulin lispro  2-12 Units Subcutaneous Q6H [facility] [place], [place]      isosorbide dinitrate  30 mg Oral TID Sergio Given JIHAN Avendano      LORazepam  1 mg Intravenous Q4H PRN Kandi Markham, JOVANYNP      multivitamin-minerals  1 tablet Oral Daily Sergio Given JIHAN Avendano      omeprazole (PRILOSEC) suspension 2 mg/mL  20 mg Oral Daily NII Law      ondansetron  4 mg Intravenous Q6H PRN JIHAN Whittaker      polyethylene glycol  17 g Oral Daily JIHAN Clarke      propofol  5-50 mcg/kg/min Intravenous Titrated NII Law 50 mcg/kg/min (05/17/21 5963)    senna-docusate sodium  2 tablet Oral BID Mitchel Horton MD       Continuous Infusions:  dexmedetomidine, 0 1-0 7 mcg/kg/hr, Last Rate: 0 45 mcg/kg/hr (05/17/21 0501)  dextrose 5 % and sodium chloride 0 45 %, 50 mL/hr, Last Rate: 50 mL/hr (05/17/21 0501)  epoprostenol (VELETRI) inhalation solution 56243 ng/mL, 6 25-50 ng/kg/min (Ideal), Last Rate: 49 708 ng/kg/min (05/17/21 0530)  propofol, 5-50 mcg/kg/min, Last Rate: 50 mcg/kg/min (05/17/21 0711)      PRN Meds:   acetaminophen, 650 mg, Q6H PRN  bisacodyl, 10 mg, Daily PRN  hydrALAZINE, 10 mg, Q6H PRN  LORazepam, 1 mg, Q4H PRN  ondansetron, 4 mg, Q6H PRN        Invasive Devices Review  Invasive Devices     Peripherally Inserted Central Catheter Line            PICC Line -- days          Central Venous Catheter Line            CVC Central Lines 05/12/21 Double 4 days          Line            Hemodialysis AV Fistula 09/06/19 Upper arm 619 days                Rationale for remaining devices:   ---------------------------------------------------------------------------------------  Advance Directive and Living Will:      Power of :    POLST:    ---------------------------------------------------------------------------------------  Care Time Delivered:         JIHAN Becerra      Portions of the record may have been created with voice recognition software  Occasional wrong word or "sound a like" substitutions may have occurred due to the inherent limitations of voice recognition software    Read the chart carefully and recognize, using context, where substitutions have occurred

## 2021-05-17 NOTE — ASSESSMENT & PLAN NOTE
· Present on admission, now with increasing confusion and agitation  · Repeat CT head 5/13: No significant interval change since prior examination  Stable advanced chronic microvascular ischemic change and chronic right cerebellar lacunar infarct    · Etiology unclear: covid 19/sepsis/hypoxia vs  delerium vs  Uremia less likely  · Baseline AO x 3  · Neuro checks  · Currently intubated and sedated with propofol and fentanyl

## 2021-05-17 NOTE — ASSESSMENT & PLAN NOTE
· In the setting of covid 19   · Initial positive 5/7 with unclear symptom onset  · Intubated on 5/17 secondary to progressive respiratory failure  · May require pronation therapy  · CXR with bilateral pulmonary opacities  · Decadron total D7, BID dosing D6  · Continue vitamins  · Completed remdesivir  · Given Actemra 5/13 2/2 increasing Fio2 requirements and elevated CRP  · +BM 5/15  · Completed 7 days of ABX, procal decreasing, monitor off antibiotics  · Continue iVeletri

## 2021-05-17 NOTE — PROGRESS NOTES
PT currently intubated, on propofol at 31 8mL/hr, receiving [email or web address], consider discontinuing  D5 once able to initiate TF recommend [email or web address], add 2 packs of prosource, water flushes 200mL q 4hrs provides total of 1823cal, 70g pro, 1783mL

## 2021-05-17 NOTE — PROGRESS NOTES
[address]  Progress Note Salvador Alonzo Sr  1950, 80 y o  male MRN: [id number]  Unit/Bed#: ICU 03 Encounter: [id number]  Primary Care Provider: Nila Torres MD   Date and time admitted to hospital: 5/7/2021  4:33 PM    * Acute hypoxemic respiratory failure due to COVID-19 [facility])  Assessment & Plan  · In the setting of covid 19   · Initial positive 5/7 with unclear symptom onset  · Intubated on 5/17 secondary to progressive respiratory failure  · Proned 6PM on 5/17  · Following pronation order set  · CXR with bilateral pulmonary opacities  · Decadron total D7, BID dosing D6  · Continue vitamins  · Completed remdesivir  · Given Actemra 5/13 2/2 increasing Fio2 requirements and elevated CRP  · +BM 5/15  · Completed initial course of antibiotics but procal was elevated on 5/17 with an increase in WBC and progressive respiratory failure  · Started on cefepime and vanco for now  · We will trend the procalcitnin level  · Continue iVeletri     Acute encephalopathy  Assessment & Plan  · Present on admission  · Repeat CT head 5/13: No significant interval change since prior examination  Stable advanced chronic microvascular ischemic change and chronic right cerebellar lacunar infarct    · Etiology unclear: covid 19/sepsis/hypoxia vs  delerium vs  Uremia less likely  · Baseline AO x 3  · Currently intubated and sedated with propofol and fentanyl and under NMB agents    Acute renal failure superimposed on chronic kidney disease [facility])  Assessment & Plan  Lab Results   Component Value Date    EGFR 40 05/17/2021    EGFR 44 05/16/2021    EGFR 43 05/15/2021    CREATININE 1 93 (H) 05/17/2021    CREATININE 1 76 (H) 05/16/2021    CREATININE 1 80 (H) 05/15/2021     · Hx of CKD III, baseline sCr 1 8 - 1 9  · GAGE on presentation with sCr of 4 97  · On lasix and metolazone at home  · Placed on a lasix infusion on 5/17 with good response  · Monitor I and O    Chronic combined systolic and diastolic CHF, NYHA class 3 (HCC)  Assessment & Plan  Wt Readings from Last 3 Encounters:   05/17/21 106 kg (234 lb 9 1 oz)   03/19/21 118 kg (261 lb)   03/18/21 118 kg (261 lb)     · BNP 7000 on admission  · 4/21 ECHO: The ventricle was mildly to moderately dilated  Systolic function was mildly to moderately reduced  Ejection fraction was estimated to be 40 %  There was mild to moderate diffuse hypokinesis  · Monitor volume status  · He was placed on a continuous lasix infusion with good response  · Currently on levophed given need for sedation        Transaminitis  Assessment & Plan  · Mild AST and alk phos elevations  · In setting of covid 19  · Trend    Type 2 diabetes mellitus with kidney complication, with long-term current use of insulin [facility])  Assessment & Plan  Lab Results   Component Value Date    HGBA1C 6 0 03/19/2021       Recent Labs     05/17/21  0021 05/17/21  0618 05/17/21  1135 05/17/21  1822   POCGLU 180* 187* 253* 266*       Blood Sugar Average: Last 72 hrs:  (P) 970 [id number]     · On jardiance and insulin at home  · Home hold regimen in favor of Lantus 10 unit HS and SSI    · Our goal will be to maintain his blood glucose between 140 and 180    Obesity with body mass index 30 or greater  Assessment & Plan  · Nutrition consult when able  · Known risk factor for severity of COVID     Hyperlipidemia  Assessment & Plan  · Continue lipitor     Hypernatremia  Assessment & Plan  · Will add free water flushes to his tube feedings  · F/u with repeat BMP     ----------------------------------------------------------------------------------------  HPI/24hr events: proned at [facility] on the 17th, remains intubated, sedated and under NMB agents      Disposition: Continue Critical Care   Code Status: Level 1 - Full Code  ---------------------------------------------------------------------------------------  SUBJECTIVE  Intubated and sedated    Review of Systems  Review of systems was unable to be performed secondary to intubation and sedation  ---------------------------------------------------------------------------------------  OBJECTIVE    Vitals   Vitals:    21 0623 21 0630 21 0715 21 0730   BP:       Pulse:  58  60   Resp:  (!) 25  (!) 25   Temp: 98 2 °F (36 8 °C) 98 2 °F (36 8 °C) 97 5 °F (36 4 °C) 97 5 °F (36 4 °C)   TempSrc:    Bladder   SpO2:  99%  99%   Weight:       Height:         Temp (24hrs), Av 4 °F (36 3 °C), Min:93 2 °F (34 °C), Max:99 3 °F (37 4 °C)  Current: Temperature: 97 5 °F (36 4 °C)  Arterial Line BP: 130/44  Arterial Line MAP (mmHg): 66 mmHg    Respiratory:  SpO2 Activity: SpO2 Activity: At Rest, SpO2 Device: O2 Device: Ventilator  Nasal Cannula O2 Flow Rate (L/min): 15 L/min    Invasive/non-invasive ventilation settings   Respiratory    Lab Data (Last 4 hours)       0615  0718          pH, Arterial       7 394          7 428          pCO2, Arterial       15 2          30 3          pO2, Arterial       183 6          207 7          HCO3, Arterial       9 1          19 6          Base Excess, Arterial       -14 8          -3 9               O2/Vent Data     None                Physical Exam  Constitutional:       Appearance: He is obese  He is ill-appearing  HENT:      Head: Normocephalic  Mouth/Throat:      Mouth: Mucous membranes are moist    Neck:      Musculoskeletal: Neck supple  Cardiovascular:      Rate and Rhythm: Normal rate  Pulmonary:      Breath sounds: Rales present  Abdominal:      General: There is distension  Tenderness: There is no abdominal tenderness  Musculoskeletal:         General: No swelling  Lymphadenopathy:      Cervical: No cervical adenopathy  Skin:     General: Skin is warm and dry     Neurological:      Comments: Intubated, sedated and under NMB agents         Laboratory and Diagnostics:  Results from last 7 days   Lab Units 21  0442 21  8600 21  1907 05/15/21  0358 21  0536 05/13/21  0430 05/12/21 2029   WBC Thousand/uL 22 65* 19 38* 13 05* 10 09 11 93* 7 46 9 93   HEMOGLOBIN g/dL 9 7* 10 4* 11 6* 11 1* 11 2* 11 3* 11 8*   HEMATOCRIT % 29 4* 31 8* 35 8* 33 1* 34 3* 34 9* 35 4*   PLATELETS Thousands/uL 107* 112* 183 173 163 140* 143*   NEUTROS PCT %  --   --  93* 88*  --   --   --    BANDS PCT % 9*  --   --   --   --   --   --    MONOS PCT %  --   --  4 6  --   --   --    MONO PCT % 2*  --   --   --   --   --   --      Results from last 7 days   Lab Units 05/18/21  0719 05/18/21  0442 05/17/21  0428 05/16/21  0337 05/15/21  0358 05/14/21  0536 05/13/21  1519 05/13/21  0430 05/12/21 2029   SODIUM mmol/L 141 143 143 141 144 147* 144 144 146*   POTASSIUM mmol/L 3 8 4 1 4 7 4 6 4 0 4 5 4 2 4 2 4 5   CHLORIDE mmol/L 105 107 110* 106 107 110* 108 109* 110*   CO2 mmol/L 24 24 28 26 27 27 27 28 28   ANION GAP mmol/L 12 12 5 9 10 10 9 7 8   BUN mg/dL 71* 70* 65* 53* 54* 52* 52* 50* 50*   CREATININE mg/dL 2 41* 2 32* 1 93* 1 76* 1 80* 1 68* 1 84* 1 90* 1 77*   CALCIUM mg/dL 7 5* 7 5* 7 7* 8 0* 8 0* 8 0* 7 9* 7 9* 7 7*   GLUCOSE RANDOM mg/dL 278* 290* 213* 227* 183* 141* 234* 199* 127   ALT U/L  --  36 44  --  56 59  --  57 60   AST U/L  --  20 62*  --  55* 57*  --  61* 74*   ALK PHOS U/L  --  177* 253*  --  220* 172*  --  126* 123*   ALBUMIN g/dL  --  2 5* 2 2*  --  2 5* 2 4*  --  2 3* 2 4*   TOTAL BILIRUBIN mg/dL  --  0 65 0 82  --  0 58 0 58  --  0 63 0 56     Results from last 7 days   Lab Units 05/15/21  0359 05/13/21  1519   MAGNESIUM mg/dL 2 6 2 7*      Results from last 7 days   Lab Units 05/18/21  0442 05/17/21  1344   INR   --  1 49*   PTT seconds 138* 28              ABG:  Results from last 7 days   Lab Units 05/18/21  0718   PH ART  7 428   PCO2 ART mm Hg 30 3*   PO2 ART mm Hg 207 7*   HCO3 ART mmol/L 19 6*   BASE EXC ART mmol/L -3 9   ABG SOURCE  Line, Arterial     VBG:  Results from last 7 days   Lab Units 05/18/21  0718   ABG SOURCE  Line, Arterial     Results from last 7 days Lab Units 05/17/21  1344 05/14/21  0536 05/12/21  2233   PROCALCITONIN ng/ml 3 60* 0 18 0 15       Micro  Results from last 7 days   Lab Units 05/13/21  0438   BLOOD CULTURE  No Growth After 4 Days  No Growth After 4 Days  EKG:   Imaging: I have personally reviewed pertinent reports  and I have personally reviewed pertinent films in PACS    Intake and Output  I/O       05/16 0701 - 05/17 0700 05/17 0701 - 05/18 0700    P  O       I V  (mL/kg) 1270 6 (12) 1235 (11 7)    NG/GT  60    IV Piggyback 50 300    Total Intake(mL/kg) 1320 6 (12 5) 1595 (15)    Urine (mL/kg/hr) 933 (0 4) 1660 (1 2)    Stool      Total Output 933 1660    Net +387 6 -65          Unmeasured Urine Occurrence 3 x           Height and Weights   Height: 5' 8" (172 7 cm)  IBW (Ideal Body Weight): 68 4 kg  Body mass index is 35 43 kg/m²  Weight (last 2 days)     Date/Time   Weight    05/18/21 0600   106 (233 03)    05/17/21 0600   106 (234 57)    05/17/21 0300   106 (234 57)    05/16/21 0532   110 (242 07)    05/16/21 0400   110 (242 07)                Nutrition       Diet Orders   (From admission, onward)             Start     Ordered    05/17/21 1103  Diet Enteral/Parenteral; Tube Feeding No Oral Diet; Jevity 1 2 Micheal; Continuous; 30; Prosource TF- Two Packets; 200; Water; Every 4 hours  Diet effective now     Question Answer Comment   Diet Type Enteral/Parenteral    Enteral/Parenteral Tube Feeding No Oral Diet    Tube Feeding Formula: Jevity 1 2 Micheal    Bolus/Cyclic/Continuous Continuous    Tube Feeding Goal Rate (mL/hr): 30    Prosource Protein Liquid - No Carb Prosource TF- Two Packets    Tube Feeding flush (mL): 200    Water Flush type: Water    Water flush frequency: Every 4 hours    RD to adjust diet per protocol?  Yes        05/17/21 1102                  Active Medications  Scheduled Meds:  Current Facility-Administered Medications   Medication Dose Route Frequency Provider Last Rate    acetaminophen  650 mg Oral Q6H PRN Luis Alberto Foss Bilofskskyler, CRNP      cisatracurium (NIMBEX) in 0 9% sodium chloride infusion  0 1-5 mcg/kg/min Intravenous Titrated Vladimir Rued, CRNP 1 mcg/kg/min (05/18/21 1718)    And    artificial tear   Both Eyes Q2H Vladimir Rued, CRNP      atorvastatin  40 mg Oral HS La K Bilofsky, CRNP      bisacodyl  10 mg Rectal Daily PRN Port Protection Gauss, CRNP      calcitriol  0 25 mcg Oral Daily Leveda [place] Bilofsky, CRNP      cefepime  2,000 mg Intravenous Q12H Vladimir Rued, CRNP 2,000 mg (05/17/21 2048)    chlorhexidine  15 mL Mouth/Throat Q12H [address], PA-C      cholecalciferol  2,000 Units Oral Daily Mai Navi, CRNP      dexamethasone  0 1 mg/kg Intravenous Q12H [address] Leveda [place] Bilofsky, CRNP 11 5 mg (05/17/21 2147)    dextrose 5 % and sodium chloride 0 45 %  50 mL/hr Intravenous Continuous Rhoda Serratod, CRNP 50 mL/hr (05/18/21 1915)    epoprostenol (VELETRI) inhalation solution 31979 ng/mL  6 25-50 ng/kg/min (Ideal) Inhalation Titrated Rhoda Serratod, CRNP 49 708 ng/kg/min (05/18/21 0257)    fentaNYL  50 mcg/hr Intravenous Continuous Vladimir Kabaed, CRNP 75 mcg/hr (05/18/21 0236)    fluticasone  2 spray Nasal Daily [place] Bilofsky, CRNP      furosemide  20 mg/hr Intravenous Continuous Maria Luisa Jeffrey, PA-C 20 mg/hr (05/18/21 0051)    gabapentin  100 mg Oral TID Mai Navi, CRNP      heparin (porcine)  3-20 Units/kg/hr (Order-Specific) Intravenous Titrated Maria Luisa Jeffrey, PA-C 8 1 Units/kg/hr (05/18/21 0615)    heparin (porcine)  2,000 Units Intravenous Q1H PRN Maria Luisa Jeffrey, PA-C      heparin (porcine)  4,000 Units Intravenous Q1H PRN Maria Luisa Jeffrey, PA-C      insulin lispro  5-25 Units Subcutaneous Q6H [address] Iesha Gooden PA-C      LORazepam  1 mg Intravenous Q4H PRN JIHAN Haider      metroNIDAZOLE  500 mg Intravenous Jemal Jackson MD      multivitamin-minerals  1 tablet Oral Daily JIHAN Holly      norepinephrine  1-30 mcg/min Intravenous Titrated Yumiko Walden Hal Iyer MD 3 mcg/min (05/18/21 0456)    omeprazole (PRILOSEC) suspension 2 mg/mL  20 mg Oral Daily Rosetta Hsu PA-C      ondansetron  4 mg Intravenous Q6H PRN JIHAN Person      polyethylene glycol  17 g Oral Daily JIHAN Burgos      propofol  5-50 mcg/kg/min Intravenous Titrated Rosetta Hsu PA-C 50 mcg/kg/min (05/18/21 1536)    senna-docusate sodium  2 tablet Oral BID Coralee Habermann, MD      vancomycin  1,750 mg Intravenous Q24H Shakira JIHAN Luna 1,750 mg (05/17/21 2109)     Continuous Infusions:  cisatracurium (NIMBEX) in 0 9% sodium chloride infusion, 0 1-5 mcg/kg/min, Last Rate: 1 mcg/kg/min (05/18/21 0619)  dextrose 5 % and sodium chloride 0 45 %, 50 mL/hr, Last Rate: 50 mL/hr (05/18/21 0620)  epoprostenol (VELETRI) inhalation solution 22552 ng/mL, 6 25-50 ng/kg/min (Ideal), Last Rate: 49 708 ng/kg/min (05/18/21 0257)  fentaNYL, 50 mcg/hr, Last Rate: 75 mcg/hr (05/18/21 0236)  furosemide, 20 mg/hr, Last Rate: 20 mg/hr (05/18/21 0051)  heparin (porcine), 3-20 Units/kg/hr (Order-Specific), Last Rate: 8 1 Units/kg/hr (05/18/21 0615)  norepinephrine, 1-30 mcg/min, Last Rate: 3 mcg/min (05/18/21 0456)  propofol, 5-50 mcg/kg/min, Last Rate: 50 mcg/kg/min (05/18/21 0621)      PRN Meds:   acetaminophen, 650 mg, Q6H PRN  bisacodyl, 10 mg, Daily PRN  heparin (porcine), 2,000 Units, Q1H PRN  heparin (porcine), 4,000 Units, Q1H PRN  LORazepam, 1 mg, Q4H PRN  ondansetron, 4 mg, Q6H PRN        Invasive Devices Review  Invasive Devices     Peripherally Inserted Central Catheter Line            PICC Line -- days          Central Venous Catheter Line            CVC Central Lines 05/12/21 Double 5 days          Arterial Line            Arterial Line 05/17/21 Radial less than 1 day          Line            Hemodialysis AV Fistula 09/06/19 Upper arm 620 days          Drain            Urethral Catheter Temperature probe 1 day    NG/OG/Enteral Tube Orogastric less than 1 day          Airway ETT  1 day                Rationale for remaining devices:   ---------------------------------------------------------------------------------------  Advance Directive and Living Will:      Power of :    POLST:    ---------------------------------------------------------------------------------------  Care Time Delivered:         JIHAN Rowe      Portions of the record may have been created with voice recognition software  Occasional wrong word or "sound a like" substitutions may have occurred due to the inherent limitations of voice recognition software    Read the chart carefully and recognize, using context, where substitutions have occurred

## 2021-05-17 NOTE — PLAN OF CARE
Problem: Nutrition/Hydration-ADULT  Goal: Nutrient/Hydration intake appropriate for improving, restoring or maintaining nutritional needs  Description: Monitor and assess patient's nutrition/hydration status for malnutrition  Collaborate with interdisciplinary team and initiate plan and interventions as ordered  Monitor patient's weight and dietary intake as ordered or per policy  Utilize nutrition screening tool and intervene as necessary  Determine patient's food preferences and provide high-protein, high-caloric foods as appropriate  INTERVENTIONS:  - Monitor oral intake, urinary output, labs, and treatment plans  - Assess nutrition and hydration status and recommend course of action  - Evaluate amount of meals eaten  - Assist patient with eating if necessary   - Allow adequate time for meals  - Recommend/ encourage appropriate diets, oral nutritional supplements, and vitamin/mineral supplements  - Order, calculate, and assess calorie counts as needed  - Recommend, monitor, and adjust tube feedings and TPN/PPN based on assessed needs  - Assess need for intravenous fluids  - Provide specific nutrition/hydration education as appropriate  - Include patient/family/caregiver in decisions related to nutrition  Outcome: Not Progressing   PT currently intubated, on propofol at 31 8mL/hr, receiving [email or web address], can d/c D5 once able to initiate TF recommend [email or web address], add 2 packs of prosource, water flushes 200mL q 4hrs provides total of 1823cal, 70g pro, 1783mL

## 2021-05-17 NOTE — ASSESSMENT & PLAN NOTE
Wt Readings from Last 3 Encounters:   05/17/21 106 kg (234 lb 9 1 oz)   03/19/21 118 kg (261 lb)   03/18/21 118 kg (261 lb)     · BNP 7000 on admission  · 4/21 ECHO: The ventricle was mildly to moderately dilated  Systolic function was mildly to moderately reduced  Ejection fraction was estimated to be 40 %  There was mild to moderate diffuse hypokinesis    · Monitor volume status  · He may benefit form additional diuresis  · Continue norvasc, coreg, isordil  · Diuretics as above

## 2021-05-17 NOTE — SOCIAL WORK
CM called the patients son to do a general SW assessment  The patient is LOS day 9  He is not a re-admission  His HRR is 38 in the YELLOW zone and he would require a TCM appt at discharge  He was admitted for worsening symptoms of COVID 19  He is currently intubated (this morning)  The patient lives with his wife and two nephews in a three story home  He has a stair lift up to the 2nd floor  He uses a cane in the community and no AD at home  He is independent with ADLs  He drives  No hx of VNA  No hx of STR  His PCP is [facility]  He uses [facility] on [address] for rx needs  No D&A use  No MH History  He has no formal poa, his healthcare representative would be his wife,  Hattie  Son needed [place] paperwork done for his quarantine and for any time off he needs to take for his father  CM had it faxed here, it was completed, CM faxed back to his , Yolanda Carney [phone] and a copy was also emailed to the son at [email or web address]    CM following for discharge needs

## 2021-05-17 NOTE — ASSESSMENT & PLAN NOTE
Lab Results   Component Value Date    HGBA1C 6 0 03/19/2021       Recent Labs     05/16/21  0617 05/16/21  1146 05/16/21  1805 05/17/21  0021   POCGLU 218* 161* 196* 180*       Blood Sugar Average: Last 72 hrs:  (P) 188 75     · On jardiance and insulin at home  · Home hold regimen in favor of Lantus 10 unit HS and SSI    · Our goal will be to maintain his blood glucose between 140 and 180

## 2021-05-17 NOTE — ASSESSMENT & PLAN NOTE
· In the setting of covid 19   · Initial positive 5/7 with unclear symptom onset  · Intubated on 5/17 secondary to progressive respiratory failure  · Proned 6PM on 5/17  · Following pronation order set  · CXR with bilateral pulmonary opacities  · Decadron total D7, BID dosing D6  · Continue vitamins  · Completed remdesivir  · Given Actemra 5/13 2/2 increasing Fio2 requirements and elevated CRP  · +BM 5/15  · Completed initial course of antibiotics but procal was elevated on 5/17 with an increase in WBC and progressive respiratory failure  · Started on cefepime and vanco for now  · We will trend the procalcitnin level  · Continue iVeletri

## 2021-05-17 NOTE — ASSESSMENT & PLAN NOTE
· Present on admission  · Repeat CT head 5/13: No significant interval change since prior examination  Stable advanced chronic microvascular ischemic change and chronic right cerebellar lacunar infarct    · Etiology unclear: covid 19/sepsis/hypoxia vs  delerium vs  Uremia less likely  · Baseline AO x 3  · Currently intubated and sedated with propofol and fentanyl and under NMB agents

## 2021-05-17 NOTE — PROGRESS NOTES
05/17/21 1100   Clinical Encounter Type   Visited With Health care provider   Routine Visit Follow-up   Continue Visiting Yes

## 2021-05-17 NOTE — ASSESSMENT & PLAN NOTE
Lab Results   Component Value Date    EGFR 40 05/17/2021    EGFR 44 05/16/2021    EGFR 43 05/15/2021    CREATININE 1 93 (H) 05/17/2021    CREATININE 1 76 (H) 05/16/2021    CREATININE 1 80 (H) 05/15/2021     · Hx of CKD III, baseline sCr 1 8 - 1 9  · GAGE on presentation with sCr of 4 97  · Now 1 68  · On lasix and metolazone at home  · Lasix 80 mg x 1 dose given on 5/14 with 4L UO  · Continue PRN dosing for goal net netative  · Etiology: Likely prerenal azotemia with RAAS blockade  · Hold ace/arb  · Continue to trend  · Avoid nephrotoxins/hypotension/hypoxia     · Monitor I and O

## 2021-05-17 NOTE — PROGRESS NOTES
Bentley's respiratory status required intubation  Ivett OSPINA  at bedside ,  intubated using 20mg  Etomidate ivp and 100 mg succs ivp as ordered  # 7 5 ET tube installed at 23cm     See procedure note  !0mg Vecuronium given for sedation

## 2021-05-17 NOTE — PLAN OF CARE
Problem: Potential for Falls  Goal: Patient will remain free of falls  Description: INTERVENTIONS:  - Assess patient frequently for physical needs  -  Identify cognitive and physical deficits and behaviors that affect risk of falls    -  [place] fall precautions as indicated by assessment   - Educate patient/family on patient safety including physical limitations  - Instruct patient to call for assistance with activity based on assessment  - Modify environment to reduce risk of injury  - Consider OT/PT consult to assist with strengthening/mobility  Outcome: Progressing     Problem: Prexisting or High Potential for Compromised Skin Integrity  Goal: Skin integrity is maintained or improved  Description: INTERVENTIONS:  - Identify patients at risk for skin breakdown  - Assess and monitor skin integrity  - Assess and monitor nutrition and hydration status  - Monitor labs   - Assess for incontinence   - Turn and reposition patient  - Assist with mobility/ambulation  - Relieve pressure over bony prominences  - Avoid friction and shearing  - Provide appropriate hygiene as needed including keeping skin clean and dry  - Evaluate need for skin moisturizer/barrier cream  - Collaborate with interdisciplinary team   - Patient/family teaching  - Consider wound care consult   Outcome: Progressing     Problem: PAIN - ADULT  Goal: Verbalizes/displays adequate comfort level or baseline comfort level  Description: Interventions:  - Encourage patient to monitor pain and request assistance  - Assess pain using appropriate pain scale  - Administer analgesics based on type and severity of pain and evaluate response  - Implement non-pharmacological measures as appropriate and evaluate response  - Consider cultural and social influences on pain and pain management  - Notify physician/advanced practitioner if interventions unsuccessful or patient reports new pain  Outcome: Progressing     Problem: INFECTION - ADULT  Goal: Absence or prevention of progression during hospitalization  Description: INTERVENTIONS:  - Assess and monitor for signs and symptoms of infection  - Monitor lab/diagnostic results  - Monitor all insertion sites, i e  indwelling lines, tubes, and drains  - Monitor endotracheal if appropriate and nasal secretions for changes in amount and color  - [place] appropriate cooling/warming therapies per order  - Administer medications as ordered  - Instruct and encourage patient and family to use good hand hygiene technique  - Identify and instruct in appropriate isolation precautions for identified infection/condition  Outcome: Progressing     Problem: SAFETY ADULT  Goal: Patient will remain free of falls  Description: INTERVENTIONS:  - Assess patient frequently for physical needs  -  Identify cognitive and physical deficits and behaviors that affect risk of falls    -  [place] fall precautions as indicated by assessment   - Educate patient/family on patient safety including physical limitations  - Instruct patient to call for assistance with activity based on assessment  - Modify environment to reduce risk of injury  - Consider OT/PT consult to assist with strengthening/mobility  Outcome: Progressing  Goal: Maintain or return to baseline ADL function  Description: INTERVENTIONS:  -  Assess patient's ability to carry out ADLs; assess patient's baseline for ADL function and identify physical deficits which impact ability to perform ADLs (bathing, care of mouth/teeth, toileting, grooming, dressing, etc )  - Assess/evaluate cause of self-care deficits   - Assess range of motion  - Assess patient's mobility; develop plan if impaired  - Assess patient's need for assistive devices and provide as appropriate  - Encourage maximum independence but intervene and supervise when necessary  - Involve family in performance of ADLs  - Assess for home care needs following discharge   - Consider OT consult to assist with ADL evaluation and planning for discharge  - Provide patient education as appropriate  Outcome: Progressing  Goal: Maintain or return mobility status to optimal level  Description: INTERVENTIONS:  - Assess patient's baseline mobility status (ambulation, transfers, stairs, etc )    - Identify cognitive and physical deficits and behaviors that affect mobility  - Identify mobility aids required to assist with transfers and/or ambulation (gait belt, sit-to-stand, lift, walker, cane, etc )  - [place] fall precautions as indicated by assessment  - Record patient progress and toleration of activity level on Mobility SBAR; progress patient to next Phase/Stage  - Instruct patient to call for assistance with activity based on assessment  - Consider rehabilitation consult to assist with strengthening/weightbearing, etc   Outcome: Progressing     Problem: DISCHARGE PLANNING  Goal: Discharge to home or other facility with appropriate resources  Description: INTERVENTIONS:  - Identify barriers to discharge w/patient and caregiver  - Arrange for needed discharge resources and transportation as appropriate  - Identify discharge learning needs (meds, wound care, etc )  - Arrange for interpretive services to assist at discharge as needed  - Refer to Case Management Department for coordinating discharge planning if the patient needs post-hospital services based on physician/advanced practitioner order or complex needs related to functional status, cognitive ability, or social support system  Outcome: Progressing     Problem: Knowledge Deficit  Goal: Patient/family/caregiver demonstrates understanding of disease process, treatment plan, medications, and discharge instructions  Description: Complete learning assessment and assess knowledge base    Interventions:  - Provide teaching at level of understanding  - Provide teaching via preferred learning methods  Outcome: Progressing     Problem: Nutrition/Hydration-ADULT  Goal: Nutrient/Hydration intake appropriate for improving, restoring or maintaining nutritional needs  Description: Monitor and assess patient's nutrition/hydration status for malnutrition  Collaborate with interdisciplinary team and initiate plan and interventions as ordered  Monitor patient's weight and dietary intake as ordered or per policy  Utilize nutrition screening tool and intervene as necessary  Determine patient's food preferences and provide high-protein, high-caloric foods as appropriate       INTERVENTIONS:  - Monitor oral intake, urinary output, labs, and treatment plans  - Assess nutrition and hydration status and recommend course of action  - Evaluate amount of meals eaten  - Assist patient with eating if necessary   - Allow adequate time for meals  - Recommend/ encourage appropriate diets, oral nutritional supplements, and vitamin/mineral supplements  - Order, calculate, and assess calorie counts as needed  - Recommend, monitor, and adjust tube feedings and TPN/PPN based on assessed needs  - Assess need for intravenous fluids  - Provide specific nutrition/hydration education as appropriate  - Include patient/family/caregiver in decisions related to nutrition  Outcome: Progressing     Problem: SAFETY,RESTRAINT: NV/NON-SELF DESTRUCTIVE BEHAVIOR  Goal: Remains free of harm/injury (restraint for non violent/non self-detsructive behavior)  Description: INTERVENTIONS:  - Instruct patient/family regarding restraint use   - Assess and monitor physiologic and psychological status   - Provide interventions and comfort measures to meet assessed patient needs   - Identify and implement measures to help patient regain control  - Assess readiness for release of restraint   Outcome: Progressing  Goal: Returns to optimal restraint-free functioning  Description: INTERVENTIONS:  - Assess the patient's behavior and symptoms that indicate continued need for restraint  - Identify and implement measures to help patient regain control  - Assess readiness for release of restraint   Outcome: Progressing     Problem: RESPIRATORY - ADULT  Goal: Achieves optimal ventilation and oxygenation  Description: INTERVENTIONS:  - Assess for changes in respiratory status  - Assess for changes in mentation and behavior  - Position to facilitate oxygenation and minimize respiratory effort  - Oxygen administered by appropriate delivery if ordered  - Initiate smoking cessation education as indicated  - Encourage broncho-pulmonary hygiene including cough, deep breathe, Incentive Spirometry  - Assess the need for suctioning and aspirate as needed  - Assess and instruct to report SOB or any respiratory difficulty  - Respiratory Therapy support as indicated  Outcome: Progressing     Problem: GASTROINTESTINAL - ADULT  Goal: Maintains or returns to baseline bowel function  Description: INTERVENTIONS:  - Assess bowel function  - Encourage oral fluids to ensure adequate hydration  - Administer IV fluids if ordered to ensure adequate hydration  - Administer ordered medications as needed  - Encourage mobilization and activity  - Consider nutritional services referral to assist patient with adequate nutrition and appropriate food choices  Outcome: Progressing     Problem: GENITOURINARY - ADULT  Goal: Maintains or returns to baseline urinary function  Description: INTERVENTIONS:  - Assess urinary function  - Encourage oral fluids to ensure adequate hydration if ordered  - Administer IV fluids as ordered to ensure adequate hydration  - Administer ordered medications as needed  - Offer frequent toileting  - Follow urinary retention protocol if ordered  Outcome: Progressing     Problem: METABOLIC, FLUID AND ELECTROLYTES - ADULT  Goal: Electrolytes maintained within normal limits  Description: INTERVENTIONS:  - Monitor labs and assess patient for signs and symptoms of electrolyte imbalances  - Administer electrolyte replacement as ordered  - Monitor response to electrolyte replacements, including repeat lab results as appropriate  - Instruct patient on fluid and nutrition as appropriate  Outcome: Progressing  Goal: Glucose maintained within target range  Description: INTERVENTIONS:  - Monitor Blood Glucose as ordered  - Assess for signs and symptoms of hyperglycemia and hypoglycemia  - Administer ordered medications to maintain glucose within target range  - Assess nutritional intake and initiate nutrition service referral as needed  Outcome: Progressing

## 2021-05-18 NOTE — PROGRESS NOTES
05/18/21 1100   Clinical Encounter Type   Visited With Health care provider   Routine Visit Follow-up   Continue Visiting Yes

## 2021-05-18 NOTE — PLAN OF CARE
Problem: Potential for Falls  Goal: Patient will remain free of falls  Description: INTERVENTIONS:  - Assess patient frequently for physical needs  -  Identify cognitive and physical deficits and behaviors that affect risk of falls    -  [place] fall precautions as indicated by assessment   - Educate patient/family on patient safety including physical limitations  - Instruct patient to call for assistance with activity based on assessment  - Modify environment to reduce risk of injury  - Consider OT/PT consult to assist with strengthening/mobility  Outcome: Progressing     Problem: Prexisting or High Potential for Compromised Skin Integrity  Goal: Skin integrity is maintained or improved  Description: INTERVENTIONS:  - Identify patients at risk for skin breakdown  - Assess and monitor skin integrity  - Assess and monitor nutrition and hydration status  - Monitor labs   - Assess for incontinence   - Turn and reposition patient  - Assist with mobility/ambulation  - Relieve pressure over bony prominences  - Avoid friction and shearing  - Provide appropriate hygiene as needed including keeping skin clean and dry  - Evaluate need for skin moisturizer/barrier cream  - Collaborate with interdisciplinary team   - Patient/family teaching  - Consider wound care consult   Outcome: Progressing     Problem: PAIN - ADULT  Goal: Verbalizes/displays adequate comfort level or baseline comfort level  Description: Interventions:  - Encourage patient to monitor pain and request assistance  - Assess pain using appropriate pain scale  - Administer analgesics based on type and severity of pain and evaluate response  - Implement non-pharmacological measures as appropriate and evaluate response  - Consider cultural and social influences on pain and pain management  - Notify physician/advanced practitioner if interventions unsuccessful or patient reports new pain  Outcome: Progressing     Problem: SAFETY ADULT  Goal: Patient will remain free of falls  Description: INTERVENTIONS:  - Assess patient frequently for physical needs  -  Identify cognitive and physical deficits and behaviors that affect risk of falls    -  [place] fall precautions as indicated by assessment   - Educate patient/family on patient safety including physical limitations  - Instruct patient to call for assistance with activity based on assessment  - Modify environment to reduce risk of injury  - Consider OT/PT consult to assist with strengthening/mobility  Outcome: Progressing  Goal: Maintain or return to baseline ADL function  Description: INTERVENTIONS:  -  Assess patient's ability to carry out ADLs; assess patient's baseline for ADL function and identify physical deficits which impact ability to perform ADLs (bathing, care of mouth/teeth, toileting, grooming, dressing, etc )  - Assess/evaluate cause of self-care deficits   - Assess range of motion  - Assess patient's mobility; develop plan if impaired  - Assess patient's need for assistive devices and provide as appropriate  - Encourage maximum independence but intervene and supervise when necessary  - Involve family in performance of ADLs  - Assess for home care needs following discharge   - Consider OT consult to assist with ADL evaluation and planning for discharge  - Provide patient education as appropriate  Outcome: Progressing  Goal: Maintain or return mobility status to optimal level  Description: INTERVENTIONS:  - Assess patient's baseline mobility status (ambulation, transfers, stairs, etc )    - Identify cognitive and physical deficits and behaviors that affect mobility  - Identify mobility aids required to assist with transfers and/or ambulation (gait belt, sit-to-stand, lift, walker, cane, etc )  - [place] fall precautions as indicated by assessment  - Record patient progress and toleration of activity level on Mobility SBAR; progress patient to next Phase/Stage  - Instruct patient to call for assistance with activity based on assessment  - Consider rehabilitation consult to assist with strengthening/weightbearing, etc   Outcome: Progressing     Problem: DISCHARGE PLANNING  Goal: Discharge to home or other facility with appropriate resources  Description: INTERVENTIONS:  - Identify barriers to discharge w/patient and caregiver  - Arrange for needed discharge resources and transportation as appropriate  - Identify discharge learning needs (meds, wound care, etc )  - Arrange for interpretive services to assist at discharge as needed  - Refer to Case Management Department for coordinating discharge planning if the patient needs post-hospital services based on physician/advanced practitioner order or complex needs related to functional status, cognitive ability, or social support system  Outcome: Progressing     Problem: Nutrition/Hydration-ADULT  Goal: Nutrient/Hydration intake appropriate for improving, restoring or maintaining nutritional needs  Description: Monitor and assess patient's nutrition/hydration status for malnutrition  Collaborate with interdisciplinary team and initiate plan and interventions as ordered  Monitor patient's weight and dietary intake as ordered or per policy  Utilize nutrition screening tool and intervene as necessary  Determine patient's food preferences and provide high-protein, high-caloric foods as appropriate       INTERVENTIONS:  - Monitor oral intake, urinary output, labs, and treatment plans  - Assess nutrition and hydration status and recommend course of action  - Evaluate amount of meals eaten  - Assist patient with eating if necessary   - Allow adequate time for meals  - Recommend/ encourage appropriate diets, oral nutritional supplements, and vitamin/mineral supplements  - Order, calculate, and assess calorie counts as needed  - Recommend, monitor, and adjust tube feedings and TPN/PPN based on assessed needs  - Assess need for intravenous fluids  - Provide specific nutrition/hydration education as appropriate  - Include patient/family/caregiver in decisions related to nutrition  Outcome: Progressing     Problem: SAFETY,RESTRAINT: NV/NON-SELF DESTRUCTIVE BEHAVIOR  Goal: Remains free of harm/injury (restraint for non violent/non self-detsructive behavior)  Description: INTERVENTIONS:  - Instruct patient/family regarding restraint use   - Assess and monitor physiologic and psychological status   - Provide interventions and comfort measures to meet assessed patient needs   - Identify and implement measures to help patient regain control  - Assess readiness for release of restraint   Outcome: Progressing  Goal: Returns to optimal restraint-free functioning  Description: INTERVENTIONS:  - Assess the patient's behavior and symptoms that indicate continued need for restraint  - Identify and implement measures to help patient regain control  - Assess readiness for release of restraint   Outcome: Progressing     Problem: RESPIRATORY - ADULT  Goal: Achieves optimal ventilation and oxygenation  Description: INTERVENTIONS:  - Assess for changes in respiratory status  - Assess for changes in mentation and behavior  - Position to facilitate oxygenation and minimize respiratory effort  - Oxygen administered by appropriate delivery if ordered  - Initiate smoking cessation education as indicated  - Encourage broncho-pulmonary hygiene including cough, deep breathe, Incentive Spirometry  - Assess the need for suctioning and aspirate as needed  - Assess and instruct to report SOB or any respiratory difficulty  - Respiratory Therapy support as indicated  Outcome: Progressing     Problem: GASTROINTESTINAL - ADULT  Goal: Maintains or returns to baseline bowel function  Description: INTERVENTIONS:  - Assess bowel function  - Encourage oral fluids to ensure adequate hydration  - Administer IV fluids if ordered to ensure adequate hydration  - Administer ordered medications as needed  - Encourage mobilization and activity  - Consider nutritional services referral to assist patient with adequate nutrition and appropriate food choices  Outcome: Progressing     Problem: GENITOURINARY - ADULT  Goal: Maintains or returns to baseline urinary function  Description: INTERVENTIONS:  - Assess urinary function  - Encourage oral fluids to ensure adequate hydration if ordered  - Administer IV fluids as ordered to ensure adequate hydration  - Administer ordered medications as needed  - Offer frequent toileting  - Follow urinary retention protocol if ordered  Outcome: Progressing     Problem: METABOLIC, FLUID AND ELECTROLYTES - ADULT  Goal: Electrolytes maintained within normal limits  Description: INTERVENTIONS:  - Monitor labs and assess patient for signs and symptoms of electrolyte imbalances  - Administer electrolyte replacement as ordered  - Monitor response to electrolyte replacements, including repeat lab results as appropriate  - Instruct patient on fluid and nutrition as appropriate  Outcome: Progressing  Goal: Glucose maintained within target range  Description: INTERVENTIONS:  - Monitor Blood Glucose as ordered  - Assess for signs and symptoms of hyperglycemia and hypoglycemia  - Administer ordered medications to maintain glucose within target range  - Assess nutritional intake and initiate nutrition service referral as needed  Outcome: Progressing

## 2021-05-18 NOTE — PROGRESS NOTES
While the patient was in a prone position he developed cardiac arrhythmias with a 1st degree AV block  EKG and rhythm shows a rate of 65  A line and palpable pulse reveals a rate of 32  The patient has persistently been on Levophed 3 mcg with no changes  Otherwise oxygenation and BP are stable  The patient was urgently placed into a supine position  Just prior to moving the patient he again converted into NSR with perfusion  Overnight the patient was placed on antibiotics for suspected septic shock  The critical care attending was at bedside

## 2021-05-18 NOTE — PROGRESS NOTES
Vancomycin IV Pharmacy-to-Dose Consultation    Demacro Johnson Sr  is a 80 y o  male who is currently receiving Vancomycin IV with management by the Pharmacy Consult service  Assessment/Plan:  The patient was reviewed  Renal function is worsening with rising creatinine but has been diuresing well on Lasix infusion  No signs or symptoms of nephrotoxicity and/or infusion reactions were documented in the chart  Potential Nephrotoxicity Factors:  Medications: diuretics, vasopressors  Patient Factors: hypotension, renal dysfunction, elderly    Based on todays assessment, discontinue standing vancomycin (day # 2) with a plan for random level to be drawn at 2100 on 5/18  Will order vancomycin 1,250 mg IV PRN for level < 20  We will continue to follow the patients culture results and clinical progress daily      Dinesh Gillis, PharmD, [address] [facility] and [facility] Medicine Clinical Pharmacist  [phone] or via [facility]

## 2021-05-18 NOTE — PROGRESS NOTES
Vancomycin Assessment    Arlyce Simmonds Sr  is a 80 y o  male who is currently receiving Vancomycin 1750mg IV q24h for Pneumonia     Relevant clinical data and objective history reviewed:  Creatinine   Date Value Ref Range Status   05/17/2021 1 93 (H) 0 60 - 1 30 mg/dL Final     Comment:     Standardized to IDMS reference method   05/16/2021 1 76 (H) 0 60 - 1 30 mg/dL Final     Comment:     Standardized to IDMS reference method   05/15/2021 1 80 (H) 0 60 - 1 30 mg/dL Final     Comment:     Standardized to IDMS reference method     BP (!) 86/47   Pulse 60   Temp (!) 96 8 °F (36 °C)   Resp 16   Ht 5' 8" (1 727 m)   Wt 106 kg (234 lb 9 1 oz)   SpO2 100%   BMI 35 67 kg/m²   I/O last 3 completed shifts: In: 3233 5 [I V :2823 5; NG/GT:60; IV Piggyback:350]  Out: 8912 [Urine:2593]  Lab Results   Component Value Date/Time    BUN 65 (H) 05/17/2021 04:28 AM    BUN 23 04/24/2018 09:46 AM    WBC 19 38 (H) 05/17/2021 04:28 AM    WBC 4 6 04/24/2018 09:46 AM    HGB 10 4 (L) 05/17/2021 04:28 AM    HGB 13 1 (L) 04/24/2018 09:46 AM    HCT 31 8 (L) 05/17/2021 04:28 AM    HCT 40 1 (L) 04/24/2018 09:46 AM    MCV 89 05/17/2021 04:28 AM    MCV 88 04/24/2018 09:46 AM     (L) 05/17/2021 04:28 AM     04/24/2018 09:46 AM     Temp Readings from Last 3 Encounters:   05/17/21 (!) 96 8 °F (36 °C)   03/19/21 97 8 °F (36 6 °C)   11/20/20 (!) 97 4 °F (36 3 °C)     Vancomycin Days of Therapy: 1    Assessment/Plan  The patient is currently on vancomycin utilizing scheduled dosing based on adjusted body weight  Baseline risks associated with therapy include: pre-existing renal impairment, concomitant nephrotoxic medications, advanced age, and dehydration  The patient is currently receiving 1750mg IV q24h and is clinically appropriate and dose will be continued  Pharmacy will also follow closely for s/sx of nephrotoxicity, infusion reactions, and appropriateness of therapy  BMP and CBC will be ordered per protocol    Plan for trough as patient approaches steady state, prior to the 4th  dose at 2030 on 5-  Due to infection severity, will target a trough of 15-20 (appropriate for most indications)   Pharmacy will continue to follow the patients culture results and clinical progress daily      Varhgese Solis, Pharmacist

## 2021-05-18 NOTE — UTILIZATION REVIEW
Continued Stay Review    Date:    5/18/21                          Current Patient Class:   Inpatient  Current Level of Care:    ICU    HPI:71 y o  male initially admitted on    5/7   With  Severe sepsis D/T  COVID  19 pneumonia    5/17  Intubated  Due to progressive respiratory failure    Assessment/Plan:   5/18  Remains on vent and sedated, under  NMB  Agents  Continue  Self proning  Remains on  SULTANA  And IV  Decadron  Started on lasix drip on  5/17 with good response  Continue to monitor labs  Procalcitonin  Elevated  On  5/17, WBC  Elevated  And  Restarted  SULTANA      Continue to monitor volume status and I & O       Vital Signs:   05/18/21 0630  98 2 °F (36 8 °C)  58  25Abnormal   --  --  120/42  62 mmHg  99 %  --  --  --  --  --  --  --   05/18/21 0623  98 2 °F (36 8 °C)  --  --  --  --  --  --  --  --  --  --  --  --  --  --   05/18/21 0600  98 2 °F (36 8 °C)  70  25Abnormal   --  --  96/38  54 mmHg  97 %  --  --  --  --  --  --  --   05/18/21 0530  98 2 °F (36 8 °C)  66  25Abnormal   --  --  156/48  72 mmHg  100 %  --  --  --  --  --  --  --   05/18/21 0518  98 2 °F (36 8 °C)  --  --  --  --  --  --  --  --  --  --  --  --  --  --   05/18/21 0500  98 2 °F (36 8 °C)  66  26Abnormal   --  --  140/44  66 mmHg  100 %  --  --  --  --  --  --  --   05/18/21 0430  98 6 °F (37 °C)  66  25Abnormal   --  --  150/48  72 mmHg  100 %  --  --  --  --  --  --  --   05/18/21 0400  99 °F (37 2 °C)  66  25Abnormal   --  --  146/46  68 mmHg  100 %  --  --  --  --  --  --  --   05/18/21 0330  99 3 °F (37 4 °C)  78  25Abnormal   --  --  96/38  50 mmHg  100 %  --  --  --  --  --  --  --   05/18/21 0300  99 °F (37 2 °C)  86  25Abnormal   --  --  154/52  76 mmHg  98 %  --  --  --  --  --  --  --   05/18/21 0241  --  --  --  --  --  --  --  100 %  --  --  --  --  --  --  --   05/18/21 0230  99 °F (37 2 °C)  92  25Abnormal   --  --  180/66  90 mmHg  98 %  --  --  --  --  --  --  --   05/18/21 0200  99 °F (37 2 °C)  88 25Abnormal   --  --  152/106  116 mmHg  89 %Abnormal   --                   Pertinent Labs/Diagnostic Results:  U/S  GB   ( 5/17)      Cholelithiasis without wall thickening or pericholecystic fluid   Sonographic Valencia's sign cannot be accurately assessed because patient is intubated, limiting ultrasound sensitivity for acute cholecystitis  CXR  ( 5/17)     New ET tube present in satisfactory position  Progressive bilateral densely consolidated lung  Results from last 7 days   Lab Units 05/18/21 0442 05/17/21 0428 05/16/21  0337 05/15/21  0358 05/14/21  0536   WBC Thousand/uL 22 65* 19 38* 13 05* 10 09 11 93*   HEMOGLOBIN g/dL 9 7* 10 4* 11 6* 11 1* 11 2*   HEMATOCRIT % 29 4* 31 8* 35 8* 33 1* 34 3*   PLATELETS Thousands/uL 107* 112* 183 173 163   NEUTROS ABS Thousands/µL  --   --  12 07* 8 96*  --    BANDS PCT % 9*  --   --   --   --          Results from last 7 days   Lab Units 05/18/21  0719 05/18/21 0442 05/17/21 0428 05/16/21  0337 05/15/21  0359 05/15/21  0358  05/13/21  1519   SODIUM mmol/L 141 143 143 141  --  144   < > 144   POTASSIUM mmol/L 3 8 4 1 4 7 4 6  --  4 0   < > 4 2   CHLORIDE mmol/L 105 107 110* 106  --  107   < > 108   CO2 mmol/L 24 24 28 26  --  27   < > 27   ANION GAP mmol/L 12 12 5 9  --  10   < > 9   BUN mg/dL 71* 70* 65* 53*  --  54*   < > 52*   CREATININE mg/dL 2 41* 2 32* 1 93* 1 76*  --  1 80*   < > 1 84*   EGFR ml/min/1 73sq m 30 32 40 44  --  43   < > 42   CALCIUM mg/dL 7 5* 7 5* 7 7* 8 0*  --  8 0*   < > 7 9*   MAGNESIUM mg/dL  --   --   --   --  2 6  --   --  2 7*    < > = values in this interval not displayed       Results from last 7 days   Lab Units 05/18/21 0442 05/17/21 0428 05/16/21  0331 05/15/21  0358 05/14/21  0536 05/13/21  0430   AST U/L 20 62*  --  55* 57* 61*   ALT U/L 36 44  --  56 59 57   ALK PHOS U/L 177* 253*  --  220* 172* 126*   TOTAL PROTEIN g/dL 5 4* 5 3*  --  6 3* 6 3* 6 2*   ALBUMIN g/dL 2 5* 2 2*  --  2 5* 2 4* 2 3*   TOTAL BILIRUBIN mg/dL 0 65 0 82  --  0 58 0 58 0 63   AMMONIA umol/L  --   --  20  --   --  38*     Results from last 7 days   Lab Units 05/18/21  0551 05/17/21  2358 05/17/21  1822 05/17/21  1135 05/17/21  0618 05/17/21  0021 05/16/21  1805 05/16/21  1146 05/16/21  0617 05/15/21  2303 05/15/21  1740 05/15/21  1152   POC GLUCOSE mg/dl 305* 280* 266* 253* 187* 180* 196* 161* 218* 174* 170* 158*     Results from last 7 days   Lab Units 05/18/21  0719 05/18/21  0442 05/17/21  0428 05/16/21  0337 05/15/21  0358 05/14/21  0536 05/13/21  1519 05/13/21  0430 05/12/21  2029   GLUCOSE RANDOM mg/dL 278* 290* 213* 227* 183* 141* 234* 199* 127          Results from last 7 days   Lab Units 05/18/21  0718 05/18/21  0615 05/18/21  0251  05/15/21  1626 05/15/21  1143 05/12/21  2023   PH ART  7 428 7 394 7 425   < > 7 516* 7 508* 7 535*   PCO2 ART mm Hg 30 3* 15 2* 37 9   < > 29 7* 31 7* 31 3*   PO2 ART mm Hg 207 7* 183 6* 195 2*   < > 59 6* 50 5* 53 2*   HCO3 ART mmol/L 19 6* 9 1* 24 3   < > 23 5 24 6 25 9   BASE EXC ART mmol/L -3 9 -14 8 0 1   < > 1 3 2 0 3 9   O2 CONTENT ART mL/dL 14 6* 6 8* 14 8*   < > 15 6* 13 8* 17 1   O2 HGB, ARTERIAL % 98 4* 98 1* 98 3*   < > 90 4* 85 8* 87 6*   ABG SOURCE  Line, Arterial Line, Arterial Line, Arterial   < > Radial, Right Radial, Right Radial, Left   NON VENT TYPE HFNC   --   --   --   --  HFNC Flow HFNC Flow HFNC Flow   HFNC FLOW LPM   --   --   --   --   --   --  55    < > = values in this interval not displayed               Results from last 7 days   Lab Units 05/16/21  0337 05/13/21  0430   CK TOTAL U/L 816* 979*   CK MB INDEX % <1 0 <1 0   CK MB ng/mL 6 3* 3 2         Results from last 7 days   Lab Units 05/16/21  0338 05/14/21  0536 05/13/21  0430 05/12/21 2029   D-DIMER QUANTITATIVE ug/ml FEU 2 88* 1 78* 1 49* 1 58*     Results from last 7 days   Lab Units 05/18/21  0442 05/17/21  1344   PROTIME seconds  --  17 7*   INR   --  1 49*   PTT seconds 138* 28         Results from last 7 days   Lab Units 05/17/21  1344 05/14/21  0536 05/12/21  2233   PROCALCITONIN ng/ml 3 60* 0 18 0 15                     Results from last 7 days   Lab Units 05/14/21  0536   FERRITIN ng/mL 1,696*             Results from last 7 days   Lab Units 05/16/21  0331 05/14/21  0536 05/13/21  0430 05/12/21  2029   CRP mg/L 35 5* 86 0* 182 0* 290 0*                 Medications:   Scheduled Medications:  artificial tear, , Both Eyes, Q2H  atorvastatin, 40 mg, Oral, HS  calcitriol, 0 25 mcg, Oral, Daily  cefepime, 2,000 mg, Intravenous, Q12H  chlorhexidine, 15 mL, Mouth/Throat, Q12H ANGEL  cholecalciferol, 2,000 Units, Oral, Daily  dexamethasone, 0 1 mg/kg, Intravenous, Q12H ANGEL  fluticasone, 2 spray, Nasal, Daily  gabapentin, 100 mg, Oral, TID  insulin lispro, 5-25 Units, Subcutaneous, Q6H ANGEL  metroNIDAZOLE, 500 mg, Intravenous, Q8H  multivitamin-minerals, 1 tablet, Oral, Daily  omeprazole (PRILOSEC) suspension 2 mg/mL, 20 mg, Oral, Daily  polyethylene glycol, 17 g, Oral, Daily  senna-docusate sodium, 2 tablet, Oral, BID      Continuous IV Infusions:  cisatracurium (NIMBEX) in 0 9% sodium chloride infusion, 0 1-5 mcg/kg/min, Intravenous, Titrated  dextrose 5 % and sodium chloride 0 45 %, 50 mL/hr, Intravenous, Continuous  epoprostenol (VELETRI) inhalation solution 28240 ng/mL, 6 25-50 ng/kg/min (Ideal), Inhalation, Titrated  fentaNYL, 50 mcg/hr, Intravenous, Continuous  furosemide, 20 mg/hr, Intravenous, Continuous  heparin (porcine), 3-20 Units/kg/hr (Order-Specific), Intravenous, Titrated  norepinephrine, 1-30 mcg/min, Intravenous, Titrated  propofol, 5-50 mcg/kg/min, Intravenous, Titrated      PRN Meds:  acetaminophen, 650 mg, Oral, Q6H PRN  bisacodyl, 10 mg, Rectal, Daily PRN  heparin (porcine), 2,000 Units, Intravenous, Q1H PRN  heparin (porcine), 4,000 Units, Intravenous, Q1H PRN  LORazepam, 1 mg, Intravenous, Q4H PRN  ondansetron, 4 mg, Intravenous, Q6H PRN        Discharge Plan: TBD      Network Utilization Review Department  ATTENTION: Please call with any questions or concerns to [phone] and carefully listen to the prompts so that you are directed to the right person  All voicemails are confidential   [facility] all requests for admission clinical reviews, approved or denied determinations and any other requests to dedicated fax number below belonging to the campus where the patient is receiving treatment   List of dedicated fax numbers for the Facilities:  [address] DENIALS (Administrative/Medical Necessity) [phone]   [address] (Maternity/NICU/Pediatrics) [phone]   [address] [id number] [place] [address] [address] Car[address] [place] [address] [phone]

## 2021-05-18 NOTE — PROGRESS NOTES
NEPHROLOGY PROGRESS NOTE   Savannah Michaud Sr  80 y o  male MRN: [id number]  Unit/Bed#: ICU 03 Encounter: [id number]      ASSESSMENT & PLAN    1  GAGE (POA) on CKD  ? baseline creatinine 1 8-1 9-->2 4  ? admission creatinine was 4 97  ? etiology  Multifactorial- pre renal azotemia/ auto regulatory failure from ARB/ rhabdomyolysis with ATN  ? renal function did improve creatinine went to 1 7 2nd hit now creatinine up to 1 9 unfortunately patient hypotensive and required intubation  ?  continue to monitor urine output  ? Holding Lasix today given urine output is acceptable  ?  if oliguric with volume overload may need renal replacement therapy in the setting of hypotension and volume overload with respiratory distress  ?  will monitor closely and discussed with ICU     2  Electrolytes/Acid Base  ?  no anion gap  ?  current electrolytes are acceptable     3  Hypotension- in the setting of respiratory failure,  Acute kidney injury and COVID pneumonia  ? This has improved  ?  continue to keep maps above 65     4  Anemia of  Chronic illness  ? continue to trend hemoglobin     5   acute hypoxemic respiratory failure due to COVID-19  ? unfortunately intubated  ?  on going ICU treatment     6   chronic combined systolic and diastolic congestive heart failure  ? EF is 40% with diffuse hypokinesis  ?   avoid BP lowering medication  ?  if blood pressures are better could consider diuresis    SUBJECTIVE:  Patient remains critically ill was in the prone position when seen, oxygenation remains poor  Urine output has improved blood pressures are stable    OBJECTIVE:  Current Weight: Weight - Scale: 106 kg (233 lb 0 4 oz)  @  Vitals:    05/18/21 0623 05/18/21 0630 05/18/21 0715 05/18/21 0730   BP:       Pulse:  58  60   Resp:  (!) 25  (!) 25   Temp: 98 2 °F (36 8 °C) 98 2 °F (36 8 °C) 97 5 °F (36 4 °C) 97 5 °F (36 4 °C)   TempSrc:    Bladder   SpO2:  99%  99%   Weight:       Height:           Intake/Output Summary (Last 24 hours) at 5/18/2021 1049  Last data filed at 5/18/2021 0600  Gross per 24 hour   Intake 3378 25 ml   Output 3800 ml   Net -421 75 ml     Weight (last 2 days)     Date/Time   Weight    05/18/21 0600   106 (233 03)    05/17/21 0600   106 (234 57)    05/17/21 0300   106 (234 57)    05/16/21 0532   110 (242 07)    05/16/21 0400   110 (242 07)            Per COVID protocol and source control patient was examined from outside his glass door  General:  Critically ill  Eyes:  Pallor  ENT: lips and mucous membranes moist  Neck: supple, no JVD  Chest:  Prone  CVS: normal heart rate on the monitor  Abdomen: soft, non-tender, non-distended, normoactive bowel sounds  Extremities: no edema of both legs  Skin: no rash  Neuro:  Sedated      Medications:    Current Facility-Administered Medications:     acetaminophen (TYLENOL) tablet 650 mg, 650 mg, Oral, Q6H PRN, JIHAN Riley    cisatracurium (NIMBEX) 200 mg in  mL, 0 1-5 mcg/kg/min, Intravenous, Titrated, Last Rate: 15 9 mL/hr at 05/18/21 0619, 1 mcg/kg/min at 05/18/21 1828 **AND** artificial tear (LUBRIFRESH P M ) ophthalmic ointment, , Both Eyes, Q2H, JIHAN Leroy, Given at 05/18/21 0800    atorvastatin (LIPITOR) tablet 40 mg, 40 mg, Oral, HS, JIHAN Miller, 40 mg at 05/17/21 2137    bisacodyl (DULCOLAX) rectal suppository 10 mg, 10 mg, Rectal, Daily PRN, JIHAN Marti, 10 mg at 05/14/21 1255    calcitriol (ROCALTROL) capsule 0 25 mcg, 0 25 mcg, Oral, Daily, JIHAN Miller, 0 25 mcg at 05/18/21 0900    cefepime (MAXIPIME) 2 g/50 mL dextrose IVPB, 2,000 mg, Intravenous, Q12H, JIHAN Leroy, Last Rate: 100 mL/hr at 05/17/21 2048, 2,000 mg at 05/17/21 2048    chlorhexidine (PERIDEX) 0 12 % oral rinse 15 mL, 15 mL, Mouth/Throat, Q12H [facility], Iesha Gooden PA-C, 15 mL at 05/18/21 0900    cholecalciferol (VITAMIN D3) tablet 2,000 Units, 2,000 Units, Oral, Daily, JIHAN Miller, 2,000 Units at 05/18/21 0900   dexamethasone (DECADRON) 11 5 mg in sodium chloride 0 9 % 50 mL IVPB, 0 1 mg/kg, Intravenous, Q12H [address], LaJOVANY HerediaNP, Last Rate: 100 mL/hr at 05/18/21 1000, 11 5 mg at 05/18/21 1000    dextrose 5 % and sodium chloride 0 45 % infusion, 50 mL/hr, Intravenous, Continuous, Cinthya Fountain CRNP, Last Rate: 50 mL/hr at 05/18/21 0620, 50 mL/hr at 05/18/21 0620    epoprostenol (VELETRI) 30,000 ng/mL in sodium chloride 0 9 % 50 mL inhalation solution, 6 25-50 ng/kg/min (Ideal), Inhalation, Titrated, Varunaris Fountain CRNP, Last Rate: 6 8 mL/hr at 05/18/21 0854, 49 708 ng/kg/min at 05/18/21 0854    fentaNYL 1000 mcg in sodium chloride 0 9% 100mL infusion, 50 mcg/hr, Intravenous, Continuous, JOVANY OlmsteadNP, Last Rate: 5 mL/hr at 05/18/21 1011, 50 mcg/hr at 05/18/21 1011    fluticasone (FLONASE) 50 mcg/act nasal spray 2 spray, 2 spray, Nasal, Daily, JIHAN Boykin, 2 spray at 05/17/21 0900    furosemide (LASIX) 500 mg infusion 50 mL, 20 mg/hr, Intravenous, Continuous, IeshaMARU Mackenzie-C, Last Rate: 2 mL/hr at 05/18/21 0051, 20 mg/hr at 05/18/21 0051    gabapentin (NEURONTIN) capsule 100 mg, 100 mg, Oral, TID, La JOVANY ZapataNP, 100 mg at 05/18/21 0900    heparin (porcine) 25,000 units in 0 45% NaCl 250 mL infusion (premix), 3-20 Units/kg/hr (Order-Specific), Intravenous, Titrated, Ieshagerson Gooden PA-C, Last Rate: 7 3 mL/hr at 05/18/21 0615, 8 1 Units/kg/hr at 05/18/21 0615    heparin (porcine) injection 2,000 Units, 2,000 Units, Intravenous, Q1H PRN, Emmie Valdovinos PA-C    heparin (porcine) injection 4,000 Units, 4,000 Units, Intravenous, Q1H PRN, Emmie Valdovinos PA-C    insulin lispro (HumaLOG) 100 units/mL subcutaneous injection 5-25 Units, 5-25 Units, Subcutaneous, Q6H [address], Iesha Gooden PA-C, 15 Units at 05/18/21 1047    LORazepam (ATIVAN) injection 1 mg, 1 mg, Intravenous, Q4H PRN, JIHAN Maxwell, 1 mg at 05/18/21 0209    metroNIDAZOLE (FLAGYL) IVPB (premix) 500 mg 100 mL, 500 mg, Intravenous, Q8H, Aylin Ramos MD    [COMPLETED] zinc sulfate (ZINCATE) capsule 220 mg, 220 mg, Oral, Daily, 220 mg at 05/14/21 0913 **FOLLOWED BY** multivitamin-minerals (CENTRUM ADULTS) tablet 1 tablet, 1 tablet, Oral, Daily, JIHAN Riley, 1 tablet at 05/18/21 0900    NOREPINEPHRINE 4 MG  ML NSS (CMPD ORDER) infusion, 1-30 mcg/min, Intravenous, Titrated, Aylin Ramos MD, Last Rate: 11 3 mL/hr at 05/18/21 0456, 3 mcg/min at 05/18/21 0456    omeprazole (PRILOSEC) suspension 2 mg/mL, 20 mg, Oral, Daily, Iesha Gooden PA-C, 20 mg at 05/18/21 0900    ondansetron (ZOFRAN) injection 4 mg, 4 mg, Intravenous, Q6H PRN, JIHAN Zapata, 4 mg at 05/10/21 0935    polyethylene glycol (MIRALAX) packet 17 g, 17 g, Oral, Daily, JIHAN Bishop, 17 g at 05/18/21 0900    propofol (DIPRIVAN) 1000 mg in 100 mL infusion (premix), 5-50 mcg/kg/min, Intravenous, Titrated, Iesha Gooden PA-C, Last Rate: 31 8 mL/hr at 05/18/21 1011, 50 mcg/kg/min at 05/18/21 1011    senna-docusate sodium (SENOKOT S) 8 6-50 mg per tablet 2 tablet, 2 tablet, Oral, BID, Aylin Ramos MD, 2 tablet at 05/18/21 0900    Invasive Devices:   Urethral Catheter Temperature probe (Active)   Reasons to continue Urinary Catheter  Accurate I&O assessment in critically ill patients (48 hr  max) 05/17/21 2000   Goal for Removal Remove after 48 hrs of I/O monitoring 05/17/21 2000   Site Assessment Clean;Skin intact 05/17/21 2000   Collection Container Standard drainage bag 05/17/21 2000   Securement Method Securing device (Describe) 05/17/21 2000   Output (mL) 800 mL 05/18/21 0600     Lab Results:   Results from last 7 days   Lab Units 05/18/21  0719 05/18/21  0442 05/17/21  0428 05/16/21  0337 05/15/21  0359 05/15/21  0358 05/14/21  0536 05/13/21  1519 05/13/21  0438 05/13/21  0430 05/12/21  2233   WBC Thousand/uL  --  22 65* 19 38* 13 05*  --  10 09 11 93*  --   --  7 46  --    HEMOGLOBIN g/dL  --  9 7* 10 4* 11 6* --  11 1* 11 2*  --   --  11 3*  --    HEMATOCRIT %  --  29 4* 31 8* 35 8*  --  33 1* 34 3*  --   --  34 9*  --    PLATELETS Thousands/uL  --  107* 112* 183  --  173 163  --   --  140*  --    POTASSIUM mmol/L 3 8 4 1 4 7 4 6  --  4 0 4 5 4 2  --  4 2  --    CHLORIDE mmol/L 105 107 110* 106  --  107 110* 108  --  109*  --    CO2 mmol/L 24 24 28 26  --  27 27 27  --  28  --    BUN mg/dL 71* 70* 65* 53*  --  54* 52* 52*  --  50*  --    CREATININE mg/dL 2 41* 2 32* 1 93* 1 76*  --  1 80* 1 68* 1 84*  --  1 90*  --    CALCIUM mg/dL 7 5* 7 5* 7 7* 8 0*  --  8 0* 8 0* 7 9*  --  7 9*  --    MAGNESIUM mg/dL  --   --   --   --  2 6  --   --  2 7*  --   --   --    ALK PHOS U/L  --  177* 253*  --   --  220* 172*  --   --  126*  --    ALT U/L  --  36 44  --   --  56 59  --   --  57  --    AST U/L  --  20 62*  --   --  55* 57*  --   --  61*  --    BLOOD CULTURE   --   --   --   --   --   --   --   --  No Growth After 5 Days  No Growth After 5 Days  --   --    LEUKOCYTES UA   --   --   --   --   --   --   --   --   --   --  Negative   BLOOD UA   --   --   --   --   --   --   --   --   --   --  Small*         Portions of the record may have been created with voice recognition software  Occasional wrong word or "sound a like" substitutions may have occurred due to the inherent limitations of voice recognition software  Read the chart carefully and recognize, using context, where substitutions have occurred  If you have any questions, please contact the dictating provider

## 2021-05-18 NOTE — PROCEDURES
POC Cardiac US    Date/Time: 5/18/2021 3:15 PM  Performed by: Jean-Paul Christianson MD  Authorized by:  Jean-Paul Christianson MD     Patient location:  ICU  Procedure details:     Exam Type:  Diagnostic    Indications: hypotension, suspected volume depletion and respiratory distress      Assessment / Evaluation for: cardiac function, pericardial effusion and intravascular volume status      Exam Type: initial exam      Image quality: diagnostic      Image availability:  Images available in PACS, video obtained and still images obtained  Patient Details:     Cardiac Rhythm:  Regular    Medications: norepinephrine infusion      Mechanical ventilation: Yes    Cardiac findings:     Echo technique: limited 2D      Views obtained: parasternal long axis, parasternal short axis and subcostal      Pericardial effusion: trace      Tamponade physiology: absent      Wall motion: hypodynamic      LV systolic function: depressed      RV dilation: none    Pulmonary findings:     Left Lung Findings: left lung sliding      Right lung findings: right lung sliding      B-lines: more than 3    IVC findings:     IVC Size: dilated      IVC Size comment:  2 4 cm    IVC Inspiratory Collapse: absent      Maximum IVC Diameter (cm):  2 47  Interpretation:     Fluid Status:  Hypervolemic

## 2021-05-18 NOTE — PLAN OF CARE
Problem: Potential for Falls  Goal: Patient will remain free of falls  Description: INTERVENTIONS:  - Assess patient frequently for physical needs  -  Identify cognitive and physical deficits and behaviors that affect risk of falls    -  [place] fall precautions as indicated by assessment   - Educate patient/family on patient safety including physical limitations  - Instruct patient to call for assistance with activity based on assessment  - Modify environment to reduce risk of injury  - Consider OT/PT consult to assist with strengthening/mobility  Outcome: Not Progressing     Problem: Prexisting or High Potential for Compromised Skin Integrity  Goal: Skin integrity is maintained or improved  Description: INTERVENTIONS:  - Identify patients at risk for skin breakdown  - Assess and monitor skin integrity  - Assess and monitor nutrition and hydration status  - Monitor labs   - Assess for incontinence   - Turn and reposition patient  - Assist with mobility/ambulation  - Relieve pressure over bony prominences  - Avoid friction and shearing  - Provide appropriate hygiene as needed including keeping skin clean and dry  - Evaluate need for skin moisturizer/barrier cream  - Collaborate with interdisciplinary team   - Patient/family teaching  - Consider wound care consult   Outcome: Not Progressing     Problem: PAIN - ADULT  Goal: Verbalizes/displays adequate comfort level or baseline comfort level  Description: Interventions:  - Encourage patient to monitor pain and request assistance  - Assess pain using appropriate pain scale  - Administer analgesics based on type and severity of pain and evaluate response  - Implement non-pharmacological measures as appropriate and evaluate response  - Consider cultural and social influences on pain and pain management  - Notify physician/advanced practitioner if interventions unsuccessful or patient reports new pain  Outcome: Not Progressing     Problem: INFECTION - ADULT  Goal: Absence or prevention of progression during hospitalization  Description: INTERVENTIONS:  - Assess and monitor for signs and symptoms of infection  - Monitor lab/diagnostic results  - Monitor all insertion sites, i e  indwelling lines, tubes, and drains  - Monitor endotracheal if appropriate and nasal secretions for changes in amount and color  - [place] appropriate cooling/warming therapies per order  - Administer medications as ordered  - Instruct and encourage patient and family to use good hand hygiene technique  - Identify and instruct in appropriate isolation precautions for identified infection/condition  Outcome: Not Progressing     Problem: SAFETY ADULT  Goal: Patient will remain free of falls  Description: INTERVENTIONS:  - Assess patient frequently for physical needs  -  Identify cognitive and physical deficits and behaviors that affect risk of falls    -  [place] fall precautions as indicated by assessment   - Educate patient/family on patient safety including physical limitations  - Instruct patient to call for assistance with activity based on assessment  - Modify environment to reduce risk of injury  - Consider OT/PT consult to assist with strengthening/mobility  Outcome: Not Progressing  Goal: Maintain or return to baseline ADL function  Description: INTERVENTIONS:  -  Assess patient's ability to carry out ADLs; assess patient's baseline for ADL function and identify physical deficits which impact ability to perform ADLs (bathing, care of mouth/teeth, toileting, grooming, dressing, etc )  - Assess/evaluate cause of self-care deficits   - Assess range of motion  - Assess patient's mobility; develop plan if impaired  - Assess patient's need for assistive devices and provide as appropriate  - Encourage maximum independence but intervene and supervise when necessary  - Involve family in performance of ADLs  - Assess for home care needs following discharge   - Consider OT consult to assist with ADL evaluation and planning for discharge  - Provide patient education as appropriate  Outcome: Not Progressing  Goal: Maintain or return mobility status to optimal level  Description: INTERVENTIONS:  - Assess patient's baseline mobility status (ambulation, transfers, stairs, etc )    - Identify cognitive and physical deficits and behaviors that affect mobility  - Identify mobility aids required to assist with transfers and/or ambulation (gait belt, sit-to-stand, lift, walker, cane, etc )  - [place] fall precautions as indicated by assessment  - Record patient progress and toleration of activity level on Mobility SBAR; progress patient to next Phase/Stage  - Instruct patient to call for assistance with activity based on assessment  - Consider rehabilitation consult to assist with strengthening/weightbearing, etc   Outcome: Not Progressing     Problem: DISCHARGE PLANNING  Goal: Discharge to home or other facility with appropriate resources  Description: INTERVENTIONS:  - Identify barriers to discharge w/patient and caregiver  - Arrange for needed discharge resources and transportation as appropriate  - Identify discharge learning needs (meds, wound care, etc )  - Arrange for interpretive services to assist at discharge as needed  - Refer to Case Management Department for coordinating discharge planning if the patient needs post-hospital services based on physician/advanced practitioner order or complex needs related to functional status, cognitive ability, or social support system  Outcome: Not Progressing     Problem: Knowledge Deficit  Goal: Patient/family/caregiver demonstrates understanding of disease process, treatment plan, medications, and discharge instructions  Description: Complete learning assessment and assess knowledge base    Interventions:  - Provide teaching at level of understanding  - Provide teaching via preferred learning methods  Outcome: Not Progressing     Problem: Nutrition/Hydration-ADULT  Goal: Nutrient/Hydration intake appropriate for improving, restoring or maintaining nutritional needs  Description: Monitor and assess patient's nutrition/hydration status for malnutrition  Collaborate with interdisciplinary team and initiate plan and interventions as ordered  Monitor patient's weight and dietary intake as ordered or per policy  Utilize nutrition screening tool and intervene as necessary  Determine patient's food preferences and provide high-protein, high-caloric foods as appropriate       INTERVENTIONS:  - Monitor oral intake, urinary output, labs, and treatment plans  - Assess nutrition and hydration status and recommend course of action  - Evaluate amount of meals eaten  - Assist patient with eating if necessary   - Allow adequate time for meals  - Recommend/ encourage appropriate diets, oral nutritional supplements, and vitamin/mineral supplements  - Order, calculate, and assess calorie counts as needed  - Recommend, monitor, and adjust tube feedings and TPN/PPN based on assessed needs  - Assess need for intravenous fluids  - Provide specific nutrition/hydration education as appropriate  - Include patient/family/caregiver in decisions related to nutrition  Outcome: Not Progressing     Problem: SAFETY,RESTRAINT: NV/NON-SELF DESTRUCTIVE BEHAVIOR  Goal: Remains free of harm/injury (restraint for non violent/non self-detsructive behavior)  Description: INTERVENTIONS:  - Instruct patient/family regarding restraint use   - Assess and monitor physiologic and psychological status   - Provide interventions and comfort measures to meet assessed patient needs   - Identify and implement measures to help patient regain control  - Assess readiness for release of restraint   Outcome: Not Progressing  Goal: Returns to optimal restraint-free functioning  Description: INTERVENTIONS:  - Assess the patient's behavior and symptoms that indicate continued need for restraint  - Identify and implement measures to help patient regain control  - Assess readiness for release of restraint   Outcome: Not Progressing     Problem: RESPIRATORY - ADULT  Goal: Achieves optimal ventilation and oxygenation  Description: INTERVENTIONS:  - Assess for changes in respiratory status  - Assess for changes in mentation and behavior  - Position to facilitate oxygenation and minimize respiratory effort  - Oxygen administered by appropriate delivery if ordered  - Initiate smoking cessation education as indicated  - Encourage broncho-pulmonary hygiene including cough, deep breathe, Incentive Spirometry  - Assess the need for suctioning and aspirate as needed  - Assess and instruct to report SOB or any respiratory difficulty  - Respiratory Therapy support as indicated  Outcome: Not Progressing     Problem: GASTROINTESTINAL - ADULT  Goal: Maintains or returns to baseline bowel function  Description: INTERVENTIONS:  - Assess bowel function  - Encourage oral fluids to ensure adequate hydration  - Administer IV fluids if ordered to ensure adequate hydration  - Administer ordered medications as needed  - Encourage mobilization and activity  - Consider nutritional services referral to assist patient with adequate nutrition and appropriate food choices  Outcome: Not Progressing     Problem: GENITOURINARY - ADULT  Goal: Maintains or returns to baseline urinary function  Description: INTERVENTIONS:  - Assess urinary function  - Encourage oral fluids to ensure adequate hydration if ordered  - Administer IV fluids as ordered to ensure adequate hydration  - Administer ordered medications as needed  - Offer frequent toileting  - Follow urinary retention protocol if ordered  Outcome: Not Progressing     Problem: METABOLIC, FLUID AND ELECTROLYTES - ADULT  Goal: Electrolytes maintained within normal limits  Description: INTERVENTIONS:  - Monitor labs and assess patient for signs and symptoms of electrolyte imbalances  - Administer electrolyte replacement as ordered  - Monitor response to electrolyte replacements, including repeat lab results as appropriate  - Instruct patient on fluid and nutrition as appropriate  Outcome: Not Progressing  Goal: Glucose maintained within target range  Description: INTERVENTIONS:  - Monitor Blood Glucose as ordered  - Assess for signs and symptoms of hyperglycemia and hypoglycemia  - Administer ordered medications to maintain glucose within target range  - Assess nutritional intake and initiate nutrition service referral as needed  Outcome: Not Progressing

## 2021-05-18 NOTE — QUICK NOTE
I spoke with the patient's son regarding his father's current status  He is aware of his persistent hypotension on pressors as well as starting antibiotics for a potential infection  All questions were answered at this time

## 2021-05-19 PROBLEM — A41.9 SEVERE SEPSIS (HCC): Status: ACTIVE | Noted: 2021-01-01

## 2021-05-19 PROBLEM — R65.20 SEVERE SEPSIS (HCC): Status: ACTIVE | Noted: 2021-01-01

## 2021-05-19 PROBLEM — R00.1 BRADYCARDIA: Status: ACTIVE | Noted: 2021-01-01

## 2021-05-19 NOTE — PLAN OF CARE
Problem: Potential for Falls  Goal: Patient will remain free of falls  Description: INTERVENTIONS:  - Assess patient frequently for physical needs  -  Identify cognitive and physical deficits and behaviors that affect risk of falls    -  [place] fall precautions as indicated by assessment   - Educate patient/family on patient safety including physical limitations  - Instruct patient to call for assistance with activity based on assessment  - Modify environment to reduce risk of injury  - Consider OT/PT consult to assist with strengthening/mobility  Outcome: Progressing     Problem: Prexisting or High Potential for Compromised Skin Integrity  Goal: Skin integrity is maintained or improved  Description: INTERVENTIONS:  - Identify patients at risk for skin breakdown  - Assess and monitor skin integrity  - Assess and monitor nutrition and hydration status  - Monitor labs   - Assess for incontinence   - Turn and reposition patient  - Assist with mobility/ambulation  - Relieve pressure over bony prominences  - Avoid friction and shearing  - Provide appropriate hygiene as needed including keeping skin clean and dry  - Evaluate need for skin moisturizer/barrier cream  - Collaborate with interdisciplinary team   - Patient/family teaching  - Consider wound care consult   Outcome: Progressing     Problem: PAIN - ADULT  Goal: Verbalizes/displays adequate comfort level or baseline comfort level  Description: Interventions:  - Encourage patient to monitor pain and request assistance  - Assess pain using appropriate pain scale  - Administer analgesics based on type and severity of pain and evaluate response  - Implement non-pharmacological measures as appropriate and evaluate response  - Consider cultural and social influences on pain and pain management  - Notify physician/advanced practitioner if interventions unsuccessful or patient reports new pain  Outcome: Progressing     Problem: INFECTION - ADULT  Goal: Absence or prevention of progression during hospitalization  Description: INTERVENTIONS:  - Assess and monitor for signs and symptoms of infection  - Monitor lab/diagnostic results  - Monitor all insertion sites, i e  indwelling lines, tubes, and drains  - Monitor endotracheal if appropriate and nasal secretions for changes in amount and color  - [place] appropriate cooling/warming therapies per order  - Administer medications as ordered  - Instruct and encourage patient and family to use good hand hygiene technique  - Identify and instruct in appropriate isolation precautions for identified infection/condition  Outcome: Progressing     Problem: SAFETY ADULT  Goal: Patient will remain free of falls  Description: INTERVENTIONS:  - Assess patient frequently for physical needs  -  Identify cognitive and physical deficits and behaviors that affect risk of falls    -  [place] fall precautions as indicated by assessment   - Educate patient/family on patient safety including physical limitations  - Instruct patient to call for assistance with activity based on assessment  - Modify environment to reduce risk of injury  - Consider OT/PT consult to assist with strengthening/mobility  Outcome: Progressing  Goal: Maintain or return to baseline ADL function  Description: INTERVENTIONS:  -  Assess patient's ability to carry out ADLs; assess patient's baseline for ADL function and identify physical deficits which impact ability to perform ADLs (bathing, care of mouth/teeth, toileting, grooming, dressing, etc )  - Assess/evaluate cause of self-care deficits   - Assess range of motion  - Assess patient's mobility; develop plan if impaired  - Assess patient's need for assistive devices and provide as appropriate  - Encourage maximum independence but intervene and supervise when necessary  - Involve family in performance of ADLs  - Assess for home care needs following discharge   - Consider OT consult to assist with ADL evaluation and planning for discharge  - Provide patient education as appropriate  Outcome: Progressing  Goal: Maintain or return mobility status to optimal level  Description: INTERVENTIONS:  - Assess patient's baseline mobility status (ambulation, transfers, stairs, etc )    - Identify cognitive and physical deficits and behaviors that affect mobility  - Identify mobility aids required to assist with transfers and/or ambulation (gait belt, sit-to-stand, lift, walker, cane, etc )  - [place] fall precautions as indicated by assessment  - Record patient progress and toleration of activity level on Mobility SBAR; progress patient to next Phase/Stage  - Instruct patient to call for assistance with activity based on assessment  - Consider rehabilitation consult to assist with strengthening/weightbearing, etc   Outcome: Progressing     Problem: DISCHARGE PLANNING  Goal: Discharge to home or other facility with appropriate resources  Description: INTERVENTIONS:  - Identify barriers to discharge w/patient and caregiver  - Arrange for needed discharge resources and transportation as appropriate  - Identify discharge learning needs (meds, wound care, etc )  - Arrange for interpretive services to assist at discharge as needed  - Refer to Case Management Department for coordinating discharge planning if the patient needs post-hospital services based on physician/advanced practitioner order or complex needs related to functional status, cognitive ability, or social support system  Outcome: Progressing     Problem: Knowledge Deficit  Goal: Patient/family/caregiver demonstrates understanding of disease process, treatment plan, medications, and discharge instructions  Description: Complete learning assessment and assess knowledge base    Interventions:  - Provide teaching at level of understanding  - Provide teaching via preferred learning methods  Outcome: Progressing     Problem: Nutrition/Hydration-ADULT  Goal: Nutrient/Hydration intake appropriate for improving, restoring or maintaining nutritional needs  Description: Monitor and assess patient's nutrition/hydration status for malnutrition  Collaborate with interdisciplinary team and initiate plan and interventions as ordered  Monitor patient's weight and dietary intake as ordered or per policy  Utilize nutrition screening tool and intervene as necessary  Determine patient's food preferences and provide high-protein, high-caloric foods as appropriate       INTERVENTIONS:  - Monitor oral intake, urinary output, labs, and treatment plans  - Assess nutrition and hydration status and recommend course of action  - Evaluate amount of meals eaten  - Assist patient with eating if necessary   - Allow adequate time for meals  - Recommend/ encourage appropriate diets, oral nutritional supplements, and vitamin/mineral supplements  - Order, calculate, and assess calorie counts as needed  - Recommend, monitor, and adjust tube feedings and TPN/PPN based on assessed needs  - Assess need for intravenous fluids  - Provide specific nutrition/hydration education as appropriate  - Include patient/family/caregiver in decisions related to nutrition  Outcome: Progressing     Problem: SAFETY,RESTRAINT: NV/NON-SELF DESTRUCTIVE BEHAVIOR  Goal: Remains free of harm/injury (restraint for non violent/non self-detsructive behavior)  Description: INTERVENTIONS:  - Instruct patient/family regarding restraint use   - Assess and monitor physiologic and psychological status   - Provide interventions and comfort measures to meet assessed patient needs   - Identify and implement measures to help patient regain control  - Assess readiness for release of restraint   Outcome: Progressing  Goal: Returns to optimal restraint-free functioning  Description: INTERVENTIONS:  - Assess the patient's behavior and symptoms that indicate continued need for restraint  - Identify and implement measures to help patient regain control  - Assess readiness for release of restraint   Outcome: Progressing     Problem: RESPIRATORY - ADULT  Goal: Achieves optimal ventilation and oxygenation  Description: INTERVENTIONS:  - Assess for changes in respiratory status  - Assess for changes in mentation and behavior  - Position to facilitate oxygenation and minimize respiratory effort  - Oxygen administered by appropriate delivery if ordered  - Initiate smoking cessation education as indicated  - Encourage broncho-pulmonary hygiene including cough, deep breathe, Incentive Spirometry  - Assess the need for suctioning and aspirate as needed  - Assess and instruct to report SOB or any respiratory difficulty  - Respiratory Therapy support as indicated  Outcome: Progressing     Problem: GASTROINTESTINAL - ADULT  Goal: Maintains or returns to baseline bowel function  Description: INTERVENTIONS:  - Assess bowel function  - Encourage oral fluids to ensure adequate hydration  - Administer IV fluids if ordered to ensure adequate hydration  - Administer ordered medications as needed  - Encourage mobilization and activity  - Consider nutritional services referral to assist patient with adequate nutrition and appropriate food choices  Outcome: Progressing     Problem: GENITOURINARY - ADULT  Goal: Maintains or returns to baseline urinary function  Description: INTERVENTIONS:  - Assess urinary function  - Encourage oral fluids to ensure adequate hydration if ordered  - Administer IV fluids as ordered to ensure adequate hydration  - Administer ordered medications as needed  - Offer frequent toileting  - Follow urinary retention protocol if ordered  Outcome: Progressing     Problem: METABOLIC, FLUID AND ELECTROLYTES - ADULT  Goal: Electrolytes maintained within normal limits  Description: INTERVENTIONS:  - Monitor labs and assess patient for signs and symptoms of electrolyte imbalances  - Administer electrolyte replacement as ordered  - Monitor response to electrolyte replacements, including repeat lab results as appropriate  - Instruct patient on fluid and nutrition as appropriate  Outcome: Progressing  Goal: Glucose maintained within target range  Description: INTERVENTIONS:  - Monitor Blood Glucose as ordered  - Assess for signs and symptoms of hyperglycemia and hypoglycemia  - Administer ordered medications to maintain glucose within target range  - Assess nutritional intake and initiate nutrition service referral as needed  Outcome: Progressing

## 2021-05-19 NOTE — PROGRESS NOTES
[address] [place] [address]  Progress Note Cheryl Hinojosa Sr  1950, 80 y o  male MRN: [id number]  Unit/Bed#: ICU 03 Encounter: [id number]  Primary Care Provider: Raleigh Colón MD   Date and time admitted to hospital: 5/7/2021  4:33 PM    * Acute hypoxemic respiratory failure due to COVID-19 [facility])  Assessment & Plan  · In the setting of covid 19   · Initial positive 5/7 with unclear symptom onset  · Intubated on 5/17 secondary to progressive respiratory failure  · Proned 6PM on 5/17- supine 5/18 with maintenance of saturations > 90  · Following pronation order set  · CXR with bilateral pulmonary opacities  · Decadron total D7, BID dosing D6  · Continue vitamins  · Completed remdesivir  · Given Actemra 5/13 2/2 increasing Fio2 requirements and elevated CRP  · +BM 5/15  · Completed initial course of antibiotics but procal was elevated on 5/17 with an increase in WBC and progressive respiratory failure  · Started on cefepime, vanco and flagyl for now  · We will trend the procalcitnin level- it was elevated to 3 06, now trending down to 1 38  · Continue iVeletri     Acute encephalopathy  Assessment & Plan  · Present on admission  · Repeat CT head 5/13: No significant interval change since prior examination  Stable advanced chronic microvascular ischemic change and chronic right cerebellar lacunar infarct    · Etiology unclear: covid 19/sepsis/hypoxia vs  delerium vs  Uremia less likely  · Baseline AO x 3  · Currently intubated and sedated with propofol and fentanyl and under NMB agents    Acute renal failure superimposed on chronic kidney disease [facility])  Assessment & Plan  Lab Results   Component Value Date    EGFR 30 05/18/2021    EGFR 30 05/18/2021    EGFR 32 05/18/2021    CREATININE 2 42 (H) 05/18/2021    CREATININE 2 41 (H) 05/18/2021    CREATININE 2 32 (H) 05/18/2021     · Hx of CKD III, baseline sCr 1 8 - 1 9  · GAGE on presentation with sCr of 4 97  · On lasix and metolazone at home  · Placed on a lasix infusion on 5/17 with good response  · Monitor I and O  · Need to monitor his renal indices closely as they have been slowly rising in the setting of the lasix infusion    Chronic combined systolic and diastolic CHF, NYHA class 3 (HCC)  Assessment & Plan  Wt Readings from Last 3 Encounters:   05/18/21 106 kg (233 lb 0 4 oz)   03/19/21 118 kg (261 lb)   03/18/21 118 kg (261 lb)     · BNP 7000 on admission  · 4/21 ECHO: The ventricle was mildly to moderately dilated  Systolic function was mildly to moderately reduced  Ejection fraction was estimated to be 40 %  There was mild to moderate diffuse hypokinesis    · Monitor volume status  · He was placed on a continuous lasix infusion with good response  · Will need to watch his creatinine as it has slowly been climbing  · Currently on levophed given need for sedation        Transaminitis  Assessment & Plan  · Mild AST and alk phos elevations  · In setting of covid 19  · Trend    Type 2 diabetes mellitus with kidney complication, with long-term current use of insulin [facility])  Assessment & Plan  Lab Results   Component Value Date    HGBA1C 6 0 03/19/2021       Recent Labs     05/18/21  1046 05/18/21  1405 05/18/21  1757 05/18/21 2036   POCGLU 280* 237* 178* 200*       Blood Sugar Average: Last 72 hrs:  (P) 215 5     · On jardiance and insulin at home  · Home hold regimen in favor of Lantus 10 unit HS and SSI    · Our goal will be to maintain his blood glucose between 140 and 180    Obesity with body mass index 30 or greater  Assessment & Plan  · Nutrition consult when able  · Known risk factor for severity of COVID     Hyperlipidemia  Assessment & Plan  · Continue lipitor     Hypernatremia  Assessment & Plan  · Will add free water flushes to his tube feedings  · F/u with repeat BMP     ----------------------------------------------------------------------------------------  HPI/24hr events: Was noted to be in second degree HB and was externally paced for a short time, now in sinus rythm    Disposition: Continue Critical Care   Code Status: Level 1 - Full Code  ---------------------------------------------------------------------------------------  SUBJECTIVE  Intubated and sedated    Review of Systems  Review of systems was unable to be performed secondary to intubated and sedated  ---------------------------------------------------------------------------------------  OBJECTIVE    Vitals   Vitals:    21 0500 21 0530 21 0600 21 0630   BP:       Pulse: 64 64 64 68   Resp: (!) 25 (!) 25 (!) 25 (!) 25   Temp: 97 9 °F (36 6 °C) 98 2 °F (36 8 °C) 98 6 °F (37 °C) 99 °F (37 2 °C)   TempSrc:       SpO2: 98% 97% 97% 96%   Weight:   110 kg (241 lb 13 5 oz)    Height:         Temp (24hrs), Av 3 °F (35 7 °C), Min:95 4 °F (35 2 °C), Max:99 °F (37 2 °C)  Current: Temperature: 99 °F (37 2 °C)  Arterial Line BP: 142/34  Arterial Line MAP (mmHg): 56 mmHg    Respiratory:  SpO2 Activity: SpO2 Activity: At Rest, SpO2 Device: O2 Device: Ventilator  Nasal Cannula O2 Flow Rate (L/min): 15 L/min    Invasive/non-invasive ventilation settings   Respiratory    Lab Data (Last 4 hours)       0612            pH, Arterial       7 400           pCO2, Arterial       35 9           pO2, Arterial       105 1           HCO3, Arterial       21 7           Base Excess, Arterial       -2 6                O2/Vent Data     None                Physical Exam  Constitutional:       Appearance: He is obese  He is ill-appearing  HENT:      Head: Normocephalic  Mouth/Throat:      Mouth: Mucous membranes are moist    Eyes:      Pupils: Pupils are equal, round, and reactive to light  Neck:      Musculoskeletal: Neck supple  Cardiovascular:      Rate and Rhythm: Normal rate  Pulmonary:      Effort: Pulmonary effort is normal       Breath sounds: Rales present  Abdominal:      General: There is distension  Musculoskeletal:         General: No swelling     Lymphadenopathy: Cervical: No cervical adenopathy  Skin:     General: Skin is warm and dry  Neurological:      Comments: Intubated, sedated and under NMB agents         Laboratory and Diagnostics:  Results from last 7 days   Lab Units 05/19/21  0609 05/18/21 0442 05/17/21  0428 05/16/21  0337 05/15/21  0358 05/14/21  0536 05/13/21  0430   WBC Thousand/uL 28 84* 22 65* 19 38* 13 05* 10 09 11 93* 7 46   HEMOGLOBIN g/dL 9 9* 9 7* 10 4* 11 6* 11 1* 11 2* 11 3*   HEMATOCRIT % 30 2* 29 4* 31 8* 35 8* 33 1* 34 3* 34 9*   PLATELETS Thousands/uL 104* 107* 112* 183 173 163 140*   NEUTROS PCT % 94*  --   --  93* 88*  --   --    BANDS PCT %  --  9*  --   --   --   --   --    MONOS PCT % 1*  --   --  4 6  --   --    MONO PCT %  --  2*  --   --   --   --   --      Results from last 7 days   Lab Units 05/18/21 2042 05/18/21  0719 05/18/21 0442 05/17/21 0428 05/16/21  0337 05/15/21  0358 05/14/21  0536  05/13/21  0430 05/12/21 2029   SODIUM mmol/L 141 141 143 143 141 144 147*   < > 144 146*   POTASSIUM mmol/L 3 2* 3 8 4 1 4 7 4 6 4 0 4 5   < > 4 2 4 5   CHLORIDE mmol/L 105 105 107 110* 106 107 110*   < > 109* 110*   CO2 mmol/L 25 24 24 28 26 27 27   < > 28 28   ANION GAP mmol/L 11 12 12 5 9 10 10   < > 7 8   BUN mg/dL 72* 71* 70* 65* 53* 54* 52*   < > 50* 50*   CREATININE mg/dL 2 42* 2 41* 2 32* 1 93* 1 76* 1 80* 1 68*   < > 1 90* 1 77*   CALCIUM mg/dL 7 2* 7 5* 7 5* 7 7* 8 0* 8 0* 8 0*   < > 7 9* 7 7*   GLUCOSE RANDOM mg/dL 201* 278* 290* 213* 227* 183* 141*   < > 199* 127   ALT U/L  --   --  36 44  --  56 59  --  57 60   AST U/L  --   --  20 62*  --  55* 57*  --  61* 74*   ALK PHOS U/L  --   --  177* 253*  --  220* 172*  --  126* 123*   ALBUMIN g/dL  --   --  2 5* 2 2*  --  2 5* 2 4*  --  2 3* 2 4*   TOTAL BILIRUBIN mg/dL  --   --  0 65 0 82  --  0 58 0 58  --  0 63 0 56    < > = values in this interval not displayed       Results from last 7 days   Lab Units 05/15/21  0359 05/13/21  1519   MAGNESIUM mg/dL 2 6 2 7*      Results from last 7 days   Lab Units 05/19/21  0028 05/18/21  1832 05/18/21  1152 05/18/21  0442 05/17/21  1344   INR   --   --   --   --  1 49*   PTT seconds 62* 66* 110* 138* 28              ABG:  Results from last 7 days   Lab Units 05/19/21  0612   PH ART  7 400   PCO2 ART mm Hg 35 9*   PO2 ART mm Hg 105 1   HCO3 ART mmol/L 21 7*   BASE EXC ART mmol/L -2 6   ABG SOURCE  Line, Arterial     VBG:  Results from last 7 days   Lab Units 05/19/21  0612   ABG SOURCE  Line, Arterial     Results from last 7 days   Lab Units 05/18/21  0442 05/17/21  1344 05/14/21  0536 05/12/21  2233   PROCALCITONIN ng/ml 1 38* 3 60* 0 18 0 15       Micro  Results from last 7 days   Lab Units 05/18/21  1110 05/13/21  0438   BLOOD CULTURE  Received in Microbiology Lab  Culture in Progress  Received in Microbiology Lab  Culture in Progress  No Growth After 5 Days  No Growth After 5 Days  EKG:   Imaging: I have personally reviewed pertinent reports  and I have personally reviewed pertinent films in PACS    Intake and Output  I/O       05/17 0701 - 05/18 0700 05/18 0701 - 05/19 0700    I V  (mL/kg) 2818 5 (26 6) 1100 (10 4)    NG/ 60    IV Piggyback 900 450    Total Intake(mL/kg) 3848 5 (36 3) 1610 (15 2)    Urine (mL/kg/hr) 3910 (1 5) 2150 (1 4)    Total Output 3910 2150    Net -61 5 -540                Height and Weights   Height: 5' 8" (172 7 cm)  IBW (Ideal Body Weight): 68 4 kg  Body mass index is 36 77 kg/m²  Weight (last 2 days)     Date/Time   Weight    05/19/21 0600   110 (241 84)    05/18/21 0600   106 (233 03)    05/17/21 0600   106 (234 57)    05/17/21 0300   106 (234 57)                Nutrition       Diet Orders   (From admission, onward)             Start     Ordered    05/17/21 1103  Diet Enteral/Parenteral; Tube Feeding No Oral Diet; Jevity 1 2 Micheal; Continuous; 30; Prosource TF- Two Packets; 200;  Water; Every 4 hours  Diet effective now     Question Answer Comment   Diet Type Enteral/Parenteral    Enteral/Parenteral Tube Feeding No Oral Diet    Tube Feeding Formula: Jevity 1 2 Micheal    Bolus/Cyclic/Continuous Continuous    Tube Feeding Goal Rate (mL/hr): 30    Prosource Protein Liquid - No Carb Prosource TF- Two Packets    Tube Feeding flush (mL): 200    Water Flush type: Water    Water flush frequency: Every 4 hours    RD to adjust diet per protocol?  Yes        05/17/21 1102                  Active Medications  Scheduled Meds:  Current Facility-Administered Medications   Medication Dose Route Frequency Provider Last Rate    acetaminophen  650 mg Oral Q6H PRN JIHAN Farley      cisatracurium (NIMBEX) in 0 9% sodium chloride infusion  0 1-5 mcg/kg/min Intravenous Titrated Rosa JOVANY JacobsNP 1 mcg/kg/min (05/18/21 1922)    And    artificial tear   Both Eyes Q2H RosaJIHAN Webb      atorvastatin  40 mg Oral HS [place] JIHAN Avendano      bisacodyl  10 mg Rectal Daily PRN JIHAN Ortega      cefepime  2,000 mg Intravenous Q12H [place] Webb[place] 2,000 mg (05/18/21 2023)    chlorhexidine  15 mL Mouth/Throat Q12H [address], PA-C      cholecalciferol  2,000 Units Oral Daily JIHAN Silva      dexamethasone  0 1 mg/kg Intravenous Q12H [address] JIHAN Farley 11 5 mg (05/18/21 2027)    epoprostenol (VELETRI) inhalation solution 15343 ng/mL  6 25-50 ng/kg/min (Ideal) Inhalation Titrated JIHAN Kenny 49 708 ng/kg/min (05/19/21 0346)    fentaNYL  75 mcg/hr Intravenous Continuous Iesha Gooden, PA-C 100 mcg/hr (05/18/21 2116)    furosemide  20 mg/hr Intravenous Continuous Iesha Gooden, PA-C 20 mg/hr (05/19/21 0445)    gabapentin  100 mg Oral TID JIHAN Farley      heparin (porcine)  3-20 Units/kg/hr (Order-Specific) Intravenous Titrated [place]ole Galan, PA-C 6 Units/kg/hr (05/19/21 0145)    heparin (porcine)  2,000 Units Intravenous Q1H PRN Tito Galan PA-C      heparin (porcine)  4,000 Units Intravenous Q1H PRN Tito Galan PA-C      insulin regular (HumuLIN R,NovoLIN R) infusion  0 3-21 Units/hr Intravenous Titrated [facility], PAChristinaC 9 Units/hr (05/19/21 0510)    LORazepam  1 mg Intravenous Q4H PRN JIHAN Zambrano      metroNIDAZOLE  500 mg Intravenous Q8H Gala Jaramillo  mg (05/19/21 0018)    multivitamin-minerals  1 tablet Oral Daily [address] [facility], CRNP      norepinephrine  1-30 mcg/min Intravenous Titrated Gala Jaramillo MD 14 mcg/min (05/19/21 0629)    omeprazole (PRILOSEC) suspension 2 mg/mL  20 mg Oral Daily NOEL GonzalezC      ondansetron  4 mg Intravenous Q6H PRN [address] [facility], CRNP      polyethylene glycol  17 g Oral Daily JIHAN Shi      propofol  5-50 mcg/kg/min Intravenous Titrated [facility], PATL 50 mcg/kg/min (05/19/21 0448)    senna-docusate sodium  2 tablet Oral BID Gala Jaramillo MD      vancomycin  15 mg/kg (Adjusted) Intravenous Daily PRN Franchesca Navarro DO 1,250 mg (05/18/21 2202)     Continuous Infusions:  cisatracurium (NIMBEX) in 0 9% sodium chloride infusion, 0 1-5 mcg/kg/min, Last Rate: 1 mcg/kg/min (05/18/21 1922)  epoprostenol (VELETRI) inhalation solution 71555 ng/mL, 6 25-50 ng/kg/min (Ideal), Last Rate: 49 708 ng/kg/min (05/19/21 0346)  fentaNYL, 75 mcg/hr, Last Rate: 100 mcg/hr (05/18/21 2116)  furosemide, 20 mg/hr, Last Rate: 20 mg/hr (05/19/21 0445)  heparin (porcine), 3-20 Units/kg/hr (Order-Specific), Last Rate: 6 Units/kg/hr (05/19/21 0145)  insulin regular (HumuLIN R,NovoLIN R) infusion, 0 3-21 Units/hr, Last Rate: 9 Units/hr (05/19/21 0510)  norepinephrine, 1-30 mcg/min, Last Rate: 14 mcg/min (05/19/21 5548)  propofol, 5-50 mcg/kg/min, Last Rate: 50 mcg/kg/min (05/19/21 6785)      PRN Meds:   acetaminophen, 650 mg, Q6H PRN  bisacodyl, 10 mg, Daily PRN  heparin (porcine), 2,000 Units, Q1H PRN  heparin (porcine), 4,000 Units, Q1H PRN  LORazepam, 1 mg, Q4H PRN  ondansetron, 4 mg, Q6H PRN  vancomycin, 15 mg/kg (Adjusted), Daily PRN        Invasive Devices Review  Invasive Devices     Peripherally Inserted Central Catheter Line            PICC Line -- days          Central Venous Catheter Line            CVC Central Lines 05/12/21 Double 6 days          Peripheral Intravenous Line            Peripheral IV 05/18/21 Right Arm less than 1 day          Arterial Line            Arterial Line 05/17/21 Radial 1 day          Line            Hemodialysis AV Fistula 09/06/19 Upper arm 621 days          Drain            NG/OG/Enteral Tube Orogastric 1 day    Urethral Catheter Temperature probe 1 day          Airway            ETT  1 day                Rationale for remaining devices:   ---------------------------------------------------------------------------------------  Advance Directive and Living Will:      Power of :    POLST:    ---------------------------------------------------------------------------------------  Care Time Delivered:         JIHAN Hudson      Portions of the record may have been created with voice recognition software  Occasional wrong word or "sound a like" substitutions may have occurred due to the inherent limitations of voice recognition software    Read the chart carefully and recognize, using context, where substitutions have occurred

## 2021-05-19 NOTE — ASSESSMENT & PLAN NOTE
Wt Readings from Last 3 Encounters:   05/18/21 106 kg (233 lb 0 4 oz)   03/19/21 118 kg (261 lb)   03/18/21 118 kg (261 lb)     · BNP 7000 on admission  · 4/21 ECHO: The ventricle was mildly to moderately dilated  Systolic function was mildly to moderately reduced  Ejection fraction was estimated to be 40 %  There was mild to moderate diffuse hypokinesis    · Monitor volume status  · He was placed on a continuous lasix infusion with good response  · Will need to watch his creatinine as it has slowly been climbing  · Currently on levophed given need for sedation

## 2021-05-19 NOTE — CONSULTS
Consult - Cardiology   Marylee Cheek Sr  80 y o  male MRN: [id number]  Unit/Bed#: ICU 03 Encounter: [id number]        Reason For Consult:  Heart block                 Assessment:  Acute hypoxic respiratory failure (POA)              -70% O2 saturation prior to arrival        -transfer to the ICU 5/12, and intubation 5/17  Acute COVID pneumonia (POA):     -Initially positive 5/17              -completed Remdesivir (begun 5/7/21)   -Daily Decadron initiated 5/7/2021, then switched to b i d  Dosing 5/11/2021 and is currently ongoing   -Actemra given 5/13 (ICU day 2)              -Vitamins C/D/MVI, zinc         -completed a course of antibiotics in early hospitalization  Subsequent elevation of procalcitonin & leukocytosis 5/17/2021-- cefepime &Vanco begun 5/18; Flagyl added 519  CKD 3, GAGE (POA):              -Baseline creat looks to trend mid 2s with recent outpatient value of 1 88 (4 97 with pre-renal pattern upon arrival)   Creatinine had improved to 1 76 (5/16) with subsequent worsening -Now 2 53 with BUN 79  Rhabdomyolysis (POA):  Resolved              -5/8/2021 CPK 9220 --> 816 (5/16/2)  non-MI troponin elevation (POA)              -static elevation ( 0 72, 0 60, 0 53, 0 45 ) without chest pain or ECG abnormalities in the setting of GAGE and rhabdomyolysis  Chronic systolic CHF              Estimated dry weight 252 lb  Nonischemic cardiomyopathy              -TTE 4/23/2021: LVEF 40%, diffuse hypokinesia, mod LVH, LVIDd 6 3 cm              -O/P Rx: Coreg 25 mg b i d , Entresto 97/103 b i d , isosorbide 30 mg t i d , and Lasix 60 mg b i d  Indwelling [employer] single-chamber ICD (not MRI conditional):              -Generator change 9/6/2019              -Interrogation 1/14/2021:  program lower rate limit= 40 bpm    < 1%, 1 AF episode- 44 min long- PACs and PVCs cannot exclude AF   Optivol normal  Hypertension:  Dyslipidemia  Pulmonary hypertension  Diabetes  JOE:  Uses CPAP, possibly with supplemental nocturnal oxygen  Bipolar disorder    Discussion / Plan:  # patient with intermittent (but frequent) high-grade AV block (see example below)  # In situ single lead ICD with appropriate pacing at lower rate limit-40 beats per minute  # review of current data suggests that even during periods of AV dyssynchrony blood pressure is not significantly alter    · Repeat echocardiogram just completed with review forthcoming   · Do not feel repeat attempts at transcutaneous pacing would be beneficial as same affect could more effectively be delivered by raising lower rate limit on internal device ~~> however, review of current telemetry with concurrent arterial line waveforms does not demonstrate much of a change in systolic blood pressure during episodes of heart block, additionally noting that to restore AV synchrony an atrial wire would have to be placed in the cath lab which is not currently feasible nor likely to be of benefit  History Of Present Illness:  Mehdi Emerson Sr  is a is 81-year-old patient of cardiologist Dr Jose Marsh  His cardiac history is as enumerated above specifically noting nonischemic cardiomyopathy dating back to approximately 2016  He has since then had a stable LVEF typically 30-40% for which he has an indwelling single lead ICD  She is also known to have hypertension, dyslipidemia, diabetes, CKD, and JOE  He was last seen in our office by Dr Kirti Peter 3/18/2021  Around that time he had had some rise in his creatinine prompting reduction in his diuretic from t i d  to b i d  with stable exam and weight of 261 lb at the time of his visit    He was on a regimen of Coreg 25 mg b i d , Entresto 97/103 b i d , isosorbide 30 mg t i d , and Lasix 60 mg b i d      This gentleman is currently hospitalized having presented on 5/7/2021 with complaints of several days of generalized weakness/fatigue, cough and dyspnea with evidence of hypoxia and radiographic suggestion of COVID pneumonia with positive test for the same on the day of arrival   He also had presented with signs of rhabdomyolysis and GAGE with unclear history of injury, fall, or immobility  It was in that context the patient had abnormal troponin levels for which we had seen him for consultation on 5/8/2021 concluding this to be non MI elevation and at which point we sign off of his case  On 5/12/2021, the patient had some emesis and worsening hypoxia culminating in his transfer to the ICU with later intubation on 5/17  During the patient's time in the ICU he is reported to have had evidence of Mobitz 2 heart block prompting our consultation  After personal review of his telemetry does appear that since this time in the ICU he has had intermittent but frequent evidence of high-grade AV block  He was at 1 point tried on transcutaneous pacing which reportedly required high MA to get sufficient capture  Looking at his telemetry with concurrent arterial waveforms it does not look like there is a significant change in his BP during episodes of AV block  He presently remains intubated and is on norepinephrine with adequate blood pressure  Past Medical History:        Past Medical History:   Diagnosis Date    AICD (automatic cardioverter/defibrillator) present     Anxiety and depression     Arthritis     Asthma     pt denies    CAD (coronary artery disease) 10/10/2014    CKD (chronic kidney disease) stage 3, GFR 30-59 ml/min ([facility])     COPD (chronic obstructive pulmonary disease) ([facility])     pt denies    CPAP (continuous positive airway pressure) dependence     Depression (emotion)     affective disorder    Diabetic nephropathy ([facility])     Erectile dysfunction     GERD (gastroesophageal reflux disease)     Hypertension     Iron deficiency anemia     Latent tuberculosis     Lumbar herniated disc     low back pain    Neuropathy     bles    Obesity (BMI 30-39  9) 7/9/2019    Prostate cancer ([facility] [address] )     2013- had radiation- had prostatectomy  Sleep apnea     Systolic CHF, chronic (HCC)     Type 2 diabetes mellitus with hyperlipidemia (HCC)     Use of cane as ambulatory aid     Vitamin D deficiency     Wears glasses       Past Surgical History:   Procedure Laterality Date    AV FISTULA PLACEMENT      left upper arm and removal    CARDIAC DEFIBRILLATOR PLACEMENT      CARDIAC ICD GENERATOR CHANGE  9/6/2019    CATARACT EXTRACTION Bilateral 10/09/2014    COLONOSCOPY      INGUINAL HERNIA REPAIR Bilateral     IR NON-TUNNELED CENTRAL LINE PLACEMENT  5/12/2021    OTHER SURGICAL HISTORY Left 10/10/2014    AV Shunt    UT INSERT,INFLATABLE PENILE PROSTHESIS N/A 1/19/2018    Procedure: INSERTION 3 PART PROSTHESIS PENILE;  Surgeon: Sheryle Chambers, MD;  Location: [facility];  Service: Urology    PROSTATECTOMY          Allergy:        Allergies   Allergen Reactions    Ibuprofen Nausea Only    Penicillins Other (See Comments)     Pt unsure of reaction       Medications:       Prior to Admission medications    Medication Sig Start Date End Date Taking?  Authorizing Provider   Alcohol Swabs (ALCOHOL PREP) PADS by Does not apply route 2 (two) times a day 12/28/18   Sukh Hendrickson MD   aspirin (ADULT ASPIRIN EC LOW STRENGTH) 81 mg EC tablet Take 81 mg by mouth daily      Historical Provider, MD   calcitriol (ROCALTROL) 0 25 mcg capsule Take 1 capsule (0 25 mcg total) by mouth daily 2/1/21   Adelita Angelo MD   capsicum (ZOSTRIX) 0 075 % topical cream Apply topically 3 (three) times a day 3/19/21   Sukh Hendrickson MD   carvedilol (COREG) 25 mg tablet TAKE 1 TABLET BY MOUTH TWICE A DAY WITH FOOD 11/6/20   Sukh Hendrickson MD   CINNAMON PO Take 500 mg by mouth daily    Historical Provider, MD   clonazePAM (KlonoPIN) 0 5 mg tablet Take 0 5 mg by mouth 2 (two) times a day    Historical Provider, MD   Coenzyme Q10 (COQ-10) 200 MG CAPS Take 1 capsule by mouth daily      Historical Provider, MD   Empagliflozin (Jardiance) 10 MG TABS Take 1 tablet (10 mg total) by mouth every morning 3/18/21   Dewey Atkins DO   fluticasone [place]) 50 mcg/act nasal spray 2 sprays into each nostril daily 3/19/21   Dov Bardales MD   furosemide (LASIX) 40 mg tablet TAKE 1 & 1/2 TABLETS (60 MG TOTAL) BY MOUTH 2 (TWO) TIMES A DAY 11/10/20   Crow Astudillo MD   gabapentin (NEURONTIN) 400 mg capsule TAKE 1 CAPSULE BY MOUTH 4 TIMES A DAY  4/19/21   Dov Bardales MD   glucose blood test strip Use 1 each 3 (three) times a day Test 2/9/21   Dov Bardales MD   Incontinence Supply Disposable (BRIEF OVERNIGHT LARGE) MISC by Does not apply route 4 (four) times a day 9/19/19   Dov Bardales MD   Incontinence Supply Disposable (Cristina Day Dry Comfort Heavy) MISC Use 4 (four) times a day 3/23/21   Dov Bardales MD   insulin aspart (NovoLOG FlexPen) 100 UNIT/ML injection pen Inject 15 Units under the skin 10/21/20   Historical Provider, MD   insulin glargine (TOUJEO MAX) 300 units/mL CONCETRATED U-300 injection pen (2-unit dial) INJECT BY SUBCUTANEOUS ROUTE 65 units in AM 9/16/20   Dov Bardales MD   Insulin Pen Needle (BD Pen Needle Marylu U/F) 32G X 4 MM MISC USE TO INJECT INSULIN 3 TIMES DAILY 1/8/21   Dov Bardales MD   isosorbide dinitrate (ISORDIL) 20 mg tablet TAKE 1 5 TABLETS (30 MG TOTAL) BY MOUTH 3 (THREE) TIMES A DAY 3/10/21   Dewey Atkins DO   methocarbamol (ROBAXIN) 500 mg tablet TAKE 1 TABLET BY MOUTH THREE TIMES A DAY 10/15/19   Dov Bardales MD   metolazone (ZAROXOLYN) 5 mg tablet TAKE 1 TABLET (5 MG TOTAL) BY MOUTH DAILY AS NEEDED (WEIGHT GAIN) WEIGHT GAIN 3 LBS 12/21/20   Dov Bardales MD   Multiple Vitamin (MULTIVITAMIN) capsule Take 1 capsule by mouth daily      Historical Provider, MD   Omega-3 Fatty Acids (FISH OIL) 1200 MG CAPS Take 1 capsule by mouth daily    Historical Provider, MD   pantoprazole (PROTONIX) 40 mg tablet Take 1 tablet (40 mg total) by mouth daily 3/19/21   Dov Bardales MD   potassium chloride (KLOR-CON) 20 mEq packet TAKE 20 MEQ BY MOUTH 2 (TWO) TIMES A DAY 20   Adelita Angelo MD   sacubitril-valsartan [place] Virgilus)  MG TABS Take 1 tablet by mouth 2 (two) times a day 3/30/21   Shelia Saint, DO   simvastatin (ZOCOR) 40 mg tablet TAKE 1 TABLET BY MOUTH EVERY DAY IN THE EVENING 5/3/21   Sukh Hendrickson MD   temazepam (RESTORIL) 30 mg capsule Take 30 mg by mouth daily at bedtime as needed for sleep     Historical Provider, MD   temazepam (RESTORIL) 30 mg capsule as needed 21   Historical Provider, MD   triamcinolone (KENALOG) 0 1 % cream APPLY TO AFFECTED AREA TWICE A DAY 3/23/21   MD Kamar Gomez 5 % op solution daily 3/18/21   Historical Provider, MD       Family History:     Family History   Problem Relation Age of Onset    Dementia Mother     No Known Problems Father         Social History:       Social History     Socioeconomic History    Marital status: /Civil Union     Spouse name: None    Number of children: None    Years of education: None    Highest education level: None   Occupational History    Occupation: DISABLED   Social Needs    Financial resource strain: Not hard at all   [place] insecurity     Worry: Never true     Inability: Never true    Transportation needs     Medical: No     Non-medical: No   Tobacco Use    Smoking status: Former Smoker     Packs/day: 3 00     Years: 20 00     Pack years: 60 00     Quit date:      Years since quittin 4    Smokeless tobacco: Never Used    Tobacco comment: never a smoker as per [employer]   Substance and Sexual Activity    Alcohol use: Yes     Comment: glass wine daily    Drug use: No    Sexual activity: Not Currently   Lifestyle    Physical activity     Days per week: 0 days     Minutes per session: None    Stress:  Only a little   Relationships    Social connections     Talks on phone: More than three times a week     Gets together: More than three times a week     Attends Sikhism service: 1 to 4 times per year Active member of club or organization: No     Attends meetings of clubs or organizations: Never     Relationship status:     Intimate partner violence     Fear of current or ex partner: No     Emotionally abused: No     Physically abused: No     Forced sexual activity: No   Other Topics Concern    None   Social History Narrative    None       ROS:  Not presently obtainable as the patient is intubated and sedated    Exam:  General:  Intubated and sedated  Head: Normocephalic, atraumatic  Eyes:  Pupils - equal, round  Oropharynx:  Not examined  Neck:  Indwelling catheter and dressing precluding evaluation of JVD  No thyromegaly, or lymphadenopathy  Heart:  Regular with controlled rate  Breath sounds over shadowing cardiac tones though no gross murmur appreciated  Lungs:  Overall rather clear at present  Abdomen:  Soft audible bowel sounds   No organomegaly or mass  Lower Limbs:  Scant edema of distal dependent lower extremities  Pulses[de-identified]  RLE - DP:  1-2+                 LLE - DP:  1-2+  Musculoskeletal:  Grossly symmetrical  Neurologic:  Not tested in this sedated/paralyzed patient    Vitals:    05/19/21 0800 05/19/21 0830 05/19/21 0900 05/19/21 0930   BP:       Pulse: 60 56 (!) 52 (!) 54   Resp: (!) 25 (!) 25 (!) 25 (!) 25   Temp: 99 °F (37 2 °C) 99 °F (37 2 °C) 98 6 °F (37 °C) 98 6 °F (37 °C)   TempSrc: Bladder      SpO2: 97% 97% 96% 96%   Weight:       Height:               DATA:      ECG:   Marked sinus bradycardia With high degree of heart block and 4-1 conduction  T wave abnormality, consider inferolateral ischemia  Abnormal ECG   Confirmed by Deven Najera ([id number]) on 5/19/2021 8:10:15 AM                     -----------------------------------------------------------------------------------------------------------------------------------------------  Telemetry: -----------------------------------------------------------------------------------------------------------------------------------------------    Weights: Wt Readings from Last 20 Encounters:   05/19/21 110 kg (241 lb 13 5 oz)   03/19/21 118 kg (261 lb)   03/18/21 118 kg (261 lb)   02/01/21 118 kg (260 lb)   11/20/20 119 kg (262 lb)   10/12/20 115 kg (253 lb)   09/16/20 114 kg (252 lb)   07/15/20 117 kg (257 lb)   07/07/20 116 kg (256 lb)   06/17/20 115 kg (254 lb)   03/03/20 114 kg (252 lb)   02/13/20 113 kg (249 lb)   01/16/20 111 kg (245 lb)   12/04/19 111 kg (244 lb)   11/14/19 113 kg (248 lb 9 6 oz)   10/30/19 110 kg (243 lb)   10/24/19 114 kg (251 lb 6 4 oz)   09/11/19 112 kg (248 lb)   08/20/19 110 kg (243 lb)   08/13/19 111 kg (244 lb)   , Body mass index is 36 77 kg/m²           Lab Studies:    Results from last 7 days   Lab Units 05/16/21  0337 05/13/21  0430   CK TOTAL U/L 816* 979*   CK MB INDEX % <1 0 <1 0            Results from last 7 days   Lab Units 05/19/21  0609 05/18/21  0442 05/17/21  0428   WBC Thousand/uL 28 84* 22 65* 19 38*   HEMOGLOBIN g/dL 9 9* 9 7* 10 4*   HEMATOCRIT % 30 2* 29 4* 31 8*   PLATELETS Thousands/uL 104* 107* 112*   ,   Results from last 7 days   Lab Units 05/19/21  0609 05/18/21  2042 05/18/21  0719 05/18/21  0442 05/17/21  0428   POTASSIUM mmol/L 3 3* 3 2* 3 8 4 1 4 7   CHLORIDE mmol/L 103 105 105 107 110*   CO2 mmol/L 24 25 24 24 28   BUN mg/dL 79* 72* 71* 70* 65*   CREATININE mg/dL 2 53* 2 42* 2 41* 2 32* 1 93*   CALCIUM mg/dL 7 5* 7 2* 7 5* 7 5* 7 7*   ALK PHOS U/L 150*  --   --  177* 253*   ALT U/L 32  --   --  36 44   AST U/L 18  --   --  20 62*

## 2021-05-19 NOTE — QUICK NOTE
I updated the patient's son, Alireza Davis, on his father's current status  He is aware of his cardiac arrhythmias and the possible need for a pacemaker upgrade  All questions were answered at this time  We will continue to update him on a daily basis

## 2021-05-19 NOTE — PROGRESS NOTES
Vancomycin IV Pharmacy-to-Dose Consultation    Dagoberto Yanes Sr  is a 80 y o  male who is currently receiving Vancomycin IV with management by the Pharmacy Consult service  Assessment/Plan:  The patient was reviewed  Vancomycin Random level = 12 9 ug/mL,(5/18/21)  Based on todays assessment, will give Vancomycin IV 1250mg once tonight for level < 20,  with a plan for random level to be drawn at 0900 on 5-19-21  Dose to be determined based on the level  We will continue to follow the patients culture results and clinical progress daily      Alonso Soares, Pharmacist

## 2021-05-19 NOTE — PROGRESS NOTES
NEPHROLOGY PROGRESS NOTE   Bita Gross Sr  80 y o  male MRN: [id number]  Unit/Bed#: ICU 03 Encounter: [id number]      ASSESSMENT & PLAN    1  GAGE (POA) on CKD  ?  baseline creatinine 1 8-1 9-->2 4-->2 53  ?  admission creatinine was 4 97  ? etiology  Multifactorial- pre renal azotemia/ auto regulatory failure from ARB/ rhabdomyolysis with ATN   ? renal function did improve creatinine went to 1 7 2nd hit now creatinine up to 1 9 unfortunately patient hypotensive and required intubation  ?  continue to monitor urine output  ? Lasix gtt restarted and negative fluid balance  ?  if oliguric with volume overload may need renal replacement therapy in the and volume overload with respiratory distress-currently no indication for dialysis  ? will monitor closely and discussed with ICU     2  Electrolytes/Acid Base  ?  no anion gap  ?  current electrolytes are acceptable     3  Hypotension- in the setting of respiratory failure,  Acute kidney injury and COVID pneumonia  ? This has improved  ?  continue to keep maps above 65     4   Anemia of  Chronic illness  ?  continue to trend hemoglobin     5   acute hypoxemic respiratory failure due to COVID-19  ?  unfortunately intubated  ?  on going ICU treatment     6   chronic combined systolic and diastolic congestive heart failure  ?  EF is 40% with diffuse hypokinesis  ?  avoid BP lowering medication  ?  continue diuresis as tolerated    DISCUSSION    Currently tolerating negative fluid balance  BP acceptable    SUBJECTIVE:    No acute changes from yesterday, did require temporary pacing  Remains critically ill    OBJECTIVE:  Current Weight: Weight - Scale: 110 kg (241 lb 13 5 oz)  @  Vitals:    05/19/21 1000 05/19/21 1030 05/19/21 1100 05/19/21 1124   BP:       Pulse: (!) 50 (!) 50 58 56   Resp: (!) 25 (!) 25 (!) 25 (!) 26   Temp: 98 2 °F (36 8 °C) 98 2 °F (36 8 °C) 98 2 °F (36 8 °C) 98 2 °F (36 8 °C)   TempSrc:    Bladder   SpO2: 98% 99% 98% 97%   Weight:       Height: Intake/Output Summary (Last 24 hours) at 5/19/2021 1148  Last data filed at 5/19/2021 1113  Gross per 24 hour   Intake 4683 7 ml   Output 4085 ml   Net 598 7 ml     Weight (last 2 days)     Date/Time   Weight    05/19/21 0600   110 (241 84)    05/18/21 0600   106 (233 03)    05/17/21 0600   106 (234 57)    05/17/21 0300   106 (234 57)            Seen from glass window due to COVID protocol, decrease exposure  General: critically ill appearing  Eyes: pallor  ENT: ET tube in place  Neck: supple, no JVD  Chest: remains on ventilator  CVS: normal heart rate, no friction rub  Abdomen: distended  Extremities: + edema in face and upper arms, le covered  Skin: no rash  Neuro: sedated      Medications:    Current Facility-Administered Medications:     acetaminophen (TYLENOL) tablet 650 mg, 650 mg, Oral, Q6H PRN, JIHAN Riley    cisatracurium (NIMBEX) 200 mg in  mL, 0 1-5 mcg/kg/min, Intravenous, Titrated, Last Rate: 8 mL/hr at 05/19/21 1113, 0 5 mcg/kg/min at 05/19/21 1113 **AND** artificial tear (LUBRIFRESH P M ) ophthalmic ointment, , Both Eyes, Q2H, JIHAN Stubbs, 1 application at 26/16/88 1000    atorvastatin (LIPITOR) tablet 40 mg, 40 mg, Oral, HS, JIHAN Riley, 40 mg at 05/18/21 2217    bisacodyl (DULCOLAX) rectal suppository 10 mg, 10 mg, Rectal, Daily PRN, Stanley Saint, CRNP, 10 mg at 05/14/21 1255    cefepime (MAXIPIME) 2 g/50 mL dextrose IVPB, 2,000 mg, Intravenous, Q12H, JIHAN Stubbs, Stopped at 05/19/21 0815    chlorhexidine (PERIDEX) 0 12 % oral rinse 15 mL, 15 mL, Mouth/Throat, Q12H [facility], Iesha Gooden PA-C, 15 mL at 05/19/21 0801    cholecalciferol (VITAMIN D3) tablet 2,000 Units, 2,000 Units, Oral, Daily, JIHAN Santillan, 2,000 Units at 05/19/21 0801    dexamethasone (DECADRON) 11 5 mg in sodium chloride 0 9 % 50 mL IVPB, 0 1 mg/kg, Intravenous, Q12H [facility], JIHAN Riley, Stopped at 05/19/21 0950    epoprostenol (VELETRI) 30,000 ng/mL in sodium chloride 0 9 % 50 mL inhalation solution, 6 25-50 ng/kg/min (Ideal), Inhalation, Titrated, JOVANY EsquivelNP, Last Rate: 6 8 mL/hr at 05/19/21 1117, 49 708 ng/kg/min at 05/19/21 1117    fentaNYL 1000 mcg in sodium chloride 0 9% 100mL infusion, 75 mcg/hr, Intravenous, Continuous, Iesha Gooden, PA-C, Last Rate: 10 mL/hr at 05/19/21 0823, 100 mcg/hr at 05/19/21 0823    furosemide (LASIX) 500 mg infusion 50 mL, 20 mg/hr, Intravenous, Continuous, Iesha Gooden, PA-C, Last Rate: 2 mL/hr at 05/19/21 0445, 20 mg/hr at 05/19/21 0445    gabapentin (NEURONTIN) capsule 100 mg, 100 mg, Oral, TID, [place], CRNP, 100 mg at 05/19/21 0801    heparin (porcine) 25,000 units in 0 45% NaCl 250 mL infusion (premix), 3-20 Units/kg/hr (Order-Specific), Intravenous, Titrated, [place] Gooden, PA-C, Last Rate: 7 2 mL/hr at 05/19/21 0745, 8 Units/kg/hr at 05/19/21 0745    heparin (porcine) injection 2,000 Units, 2,000 Units, Intravenous, Q1H PRN, MARU Garza-C, 2,000 Units at 05/19/21 0747    heparin (porcine) injection 4,000 Units, 4,000 Units, Intravenous, Q1H PRN, MARU Garza-C    insulin regular (HumuLIN R,NovoLIN R) 1 Units/mL in sodium chloride 0 9 % 100 mL infusion, 0 3-21 Units/hr, Intravenous, Titrated, [place] Gooden, PA-C, Last Rate: 6 mL/hr at 05/19/21 0757, 6 Units/hr at 05/19/21 0757    LORazepam (ATIVAN) injection 1 mg, 1 mg, Intravenous, Q4H PRN, JOVANY EsquivelNP, 1 mg at 05/18/21 1419    metroNIDAZOLE (FLAGYL) IVPB (premix) 500 mg 100 mL, 500 mg, Intravenous, Q8H, Joya Guzman MD, Stopped at 05/19/21 0900    [COMPLETED] zinc sulfate (ZINCATE) capsule 220 mg, 220 mg, Oral, Daily, 220 mg at 05/14/21 0913 **FOLLOWED BY** multivitamin-minerals (CENTRUM ADULTS) tablet 1 tablet, 1 tablet, Oral, Daily, JIHAN Medley, 1 tablet at 05/19/21 0801    norepinephrine (LEVOPHED) 8 mg (DOUBLE CONCENTRATION) IV in sodium chloride 0 9% 250 mL, 1-30 mcg/min, Intravenous, Titrated, [place] Sailors, CRNP    omeprazole (PRILOSEC) suspension 2 mg/mL, 20 mg, Oral, Daily, Iesha Gooden PA-C, 20 mg at 05/19/21 0920    ondansetron (ZOFRAN) injection 4 mg, 4 mg, Intravenous, Q6H PRN, JIHAN Liao Se, 4 mg at 05/10/21 0935    polyethylene glycol (MIRALAX) packet 17 g, 17 g, Oral, Daily, JIHAN Lopez, 17 g at 05/19/21 0801    propofol (DIPRIVAN) 1000 mg in 100 mL infusion (premix), 5-50 mcg/kg/min, Intravenous, Titrated, Iesha Gooden PA-C, Last Rate: 31 8 mL/hr at 05/19/21 1106, 50 mcg/kg/min at 05/19/21 1106    senna-docusate sodium (SENOKOT S) 8 6-50 mg per tablet 2 tablet, 2 tablet, Oral, BID, Garo Villagran MD, 2 tablet at 05/19/21 0801    vancomycin (VANCOCIN) 1,250 mg in sodium chloride 0 9 % 250 mL IVPB, 15 mg/kg (Adjusted), Intravenous, Daily PRN, Kriss Royal DO, Last Rate: 166 7 mL/hr at 05/18/21 2202, 1,250 mg at 05/18/21 2202    Invasive Devices:   Urethral Catheter Temperature probe (Active)   Reasons to continue Urinary Catheter  Accurate I&O assessment in critically ill patients (48 hr  max) 05/19/21 0800   Goal for Removal Voiding trial when ambulation improves 05/19/21 0800   Site Assessment Clean;Skin intact 05/19/21 0800   Collection Container Standard drainage bag 05/19/21 0800   Securement Method Securing device (Describe) 05/19/21 0800   Output (mL) 285 mL 05/19/21 1113     Lab Results:   Results from last 7 days   Lab Units 05/19/21  0609 05/18/21  2042 05/18/21  1151 05/18/21  1110 05/18/21  0719 05/18/21  0442 05/17/21  0428 05/16/21  0337 05/15/21  0359 05/15/21  0358 05/14/21  0536 05/13/21  1519 05/13/21  0438  05/12/21  2233   WBC Thousand/uL 28 84*  --   --   --   --  22 65* 19 38* 13 05*  --  10 09 11 93*  --   --    < >  --    HEMOGLOBIN g/dL 9 9*  --   --   --   --  9 7* 10 4* 11 6*  --  11 1* 11 2*  --   --    < >  --    HEMATOCRIT % 30 2*  --   --   --   --  29 4* 31 8* 35 8*  --  33 1* 34 3*  --   --    < >  -- PLATELETS Thousands/uL 104*  --   --   --   --  107* 112* 183  --  173 163  --   --    < >  --    POTASSIUM mmol/L 3 3* 3 2*  --   --  3 8 4 1 4 7 4 6  --  4 0 4 5 4 2  --    < >  --    CHLORIDE mmol/L 103 105  --   --  105 107 110* 106  --  107 110* 108  --    < >  --    CO2 mmol/L 24 25  --   --  24 24 28 26  --  27 27 27  --    < >  --    BUN mg/dL 79* 72*  --   --  71* 70* 65* 53*  --  54* 52* 52*  --    < >  --    CREATININE mg/dL 2 53* 2 42*  --   --  2 41* 2 32* 1 93* 1 76*  --  1 80* 1 68* 1 84*  --    < >  --    CALCIUM mg/dL 7 5* 7 2*  --   --  7 5* 7 5* 7 7* 8 0*  --  8 0* 8 0* 7 9*  --    < >  --    MAGNESIUM mg/dL  --   --   --   --   --   --   --   --  2 6  --   --  2 7*  --   --   --    ALK PHOS U/L 150*  --   --   --   --  177* 253*  --   --  220* 172*  --   --    < >  --    ALT U/L 32  --   --   --   --  36 44  --   --  56 59  --   --    < >  --    AST U/L 18  --   --   --   --  20 62*  --   --  55* 57*  --   --    < >  --    BLOOD CULTURE   --   --   --  Received in Microbiology Lab  Culture in Progress  Received in Microbiology Lab  Culture in Progress  --   --   --   --   --   --   --   --  No Growth After 5 Days  No Growth After 5 Days  --   --    LEUKOCYTES UA   --   --  Trace*  --   --   --   --   --   --   --   --   --   --   --  Negative   BLOOD UA   --   --  Negative  --   --   --   --   --   --   --   --   --   --   --  Small*    < > = values in this interval not displayed  Portions of the record may have been created with voice recognition software  Occasional wrong word or "sound a like" substitutions may have occurred due to the inherent limitations of voice recognition software  Read the chart carefully and recognize, using context, where substitutions have occurred  If you have any questions, please contact the dictating provider

## 2021-05-19 NOTE — ASSESSMENT & PLAN NOTE
Lab Results   Component Value Date    EGFR 30 05/18/2021    EGFR 30 05/18/2021    EGFR 32 05/18/2021    CREATININE 2 42 (H) 05/18/2021    CREATININE 2 41 (H) 05/18/2021    CREATININE 2 32 (H) 05/18/2021     · Hx of CKD III, baseline sCr 1 8 - 1 9  · GAGE on presentation with sCr of 4 97  · On lasix and metolazone at home  · Placed on a lasix infusion on 5/17 with good response  · Monitor I and O  · Need to monitor his renal indices closely as they have been slowly rising in the setting of the lasix infusion

## 2021-05-19 NOTE — ASSESSMENT & PLAN NOTE
· In the setting of covid 19   · Initial positive 5/7 with unclear symptom onset  · Intubated on 5/17 secondary to progressive respiratory failure  · Proned 6PM on 5/17- supine 5/18 with maintenance of saturations > 90  · Following pronation order set  · CXR with bilateral pulmonary opacities  · Decadron total D7, BID dosing D6  · Continue vitamins  · Completed remdesivir  · Given Actemra 5/13 2/2 increasing Fio2 requirements and elevated CRP  · +BM 5/15  · Completed initial course of antibiotics but procal was elevated on 5/17 with an increase in WBC and progressive respiratory failure  · Started on cefepime, vanco and flagyl for now  · We will trend the procalcitnin level- it was elevated to 3 06, now trending down to 1 38  · Continue iVeletri

## 2021-05-19 NOTE — ASSESSMENT & PLAN NOTE
Lab Results   Component Value Date    HGBA1C 6 0 03/19/2021       Recent Labs     05/18/21  1046 05/18/21  1405 05/18/21  1757 05/18/21 2036   POCGLU 280* 237* 178* 200*       Blood Sugar Average: Last 72 hrs:  (P) 215 5     · On jardiance and insulin at home  · Home hold regimen in favor of Lantus 10 unit HS and SSI    · Our goal will be to maintain his blood glucose between 140 and 180

## 2021-05-19 NOTE — PROGRESS NOTES
Vancomycin IV Pharmacy-to-Dose Consultation    Melissa Orozco Sr  is a 80 y o  male who is currently receiving Vancomycin IV with management by the Pharmacy Consult service  Assessment/Plan:  The patient was reviewed  Renal function is stable and no signs or symptoms of nephrotoxicity and/or infusion reactions were documented in the chart  Potential Nephrotoxicity Factors:  Medications: diuretics, vasopressors  Patient Factors: renal dysfunction, hypotension, elderly    Based on todays assessment, continue current vancomycin (day # 3) dosing of 1,250 mg PRN vancomycin level < 20, with a plan for random level to be drawn with AM labs on 5/20  We will continue to follow the patients culture results and clinical progress daily      Johnna Rice, PharmD, [facility] Clinical Pharmacist  [phone] or via [facility]

## 2021-05-20 NOTE — ASSESSMENT & PLAN NOTE
Lab Results   Component Value Date    HGBA1C 6 0 03/19/2021       Recent Labs     05/19/21  1337 05/19/21  1649 05/19/21  1809 05/19/21 1956   POCGLU 154* 164* 161* 138       Blood Sugar Average: Last 72 hrs:  (P) 205 16     · On jardiance and insulin at home  · Home hold regimen in favor of Lantus 10 unit HS and SSI    · Our goal will be to maintain his blood glucose between 140 and 180  · Continue insulin infusion

## 2021-05-20 NOTE — ASSESSMENT & PLAN NOTE
· With transient second degree heart blocks  · Cardiology following  · External pacemaker not beneficial at this point per cardiology  · Currently on levophed for blood pressure support  · Single ICD with appropriate pacing at 40 bpm  · Consider increasing lower rate limit of ICD

## 2021-05-20 NOTE — CONSULTS
Vancomycin therapy has been discontinued  Pharmacy will sign off  Thank you for this consult  Please do not hesitate to call us with questions or re-consult us if the need arises       Jannette Kussmaul, PharmD, [facility] Clinical Pharmacist  [phone] or via [facility]

## 2021-05-20 NOTE — ASSESSMENT & PLAN NOTE
· Present on admission  · Repeat CT head 5/13: No significant interval change since prior examination  Stable advanced chronic microvascular ischemic change and chronic right cerebellar lacunar infarct    · Etiology unclear: covid 19/sepsis/hypoxia vs  delerium vs  Uremia less likely  · Baseline AO x 3  · Currently intubated and sedated with propofol and fentanyl

## 2021-05-20 NOTE — ASSESSMENT & PLAN NOTE
Lab Results   Component Value Date    EGFR 27 05/19/2021    EGFR 29 05/19/2021    EGFR 30 05/18/2021    CREATININE 2 69 (H) 05/19/2021    CREATININE 2 53 (H) 05/19/2021    CREATININE 2 42 (H) 05/18/2021     · Hx of CKD III, baseline sCr 1 8 - 1 9  · GAGE on presentation with sCr of 4 97  · On lasix and metolazone at home  · Placed on a lasix infusion 10 mg/hr on 5/17 with good response  · Monitor I and O  · Need to monitor his renal indices closely as they have been slowly rising in the setting of the lasix infusion

## 2021-05-20 NOTE — ASSESSMENT & PLAN NOTE
Wt Readings from Last 3 Encounters:   05/19/21 110 kg (241 lb 13 5 oz)   03/19/21 118 kg (261 lb)   03/18/21 118 kg (261 lb)     · BNP 7000 on admission  · 4/21 ECHO: The ventricle was mildly to moderately dilated  Systolic function was mildly to moderately reduced  Ejection fraction was estimated to be 40 %  There was mild to moderate diffuse hypokinesis    · Monitor volume status  · He was placed on a continuous lasix infusion with good response  · Will need to watch his creatinine as it has slowly been climbing  · Currently on levophed given need for sedation

## 2021-05-20 NOTE — ASSESSMENT & PLAN NOTE
· In the setting of covid 19   · Initial positive 5/7 with unclear symptom onset  · Intubated on 5/17 secondary to progressive respiratory failure  · Proned 6PM on 5/17- supine 5/18 with maintenance of saturations > 90  · Following pronation order set  · CXR with bilateral pulmonary opacities  · Decadron total D7, BID dosing D7  · Continue vitamins  · Completed remdesivir  · Given Actemra 5/13 2/2 increasing Fio2 requirements and elevated CRP  · Completed initial course of antibiotics but procal was elevated on 5/17 with an increase in WBC and progressive respiratory failure  · Started on cefepime, and flagyl   Vanco discontinued  · We will trend the procalcitnin level  · Continue iVeletri

## 2021-05-20 NOTE — PROGRESS NOTES
Progress Note - Cardiology   Gurwinder Ochoa Sr  80 y o  male MRN: [id number]  Unit/Bed#: ICU 03 Encounter: [id number]        Problem List:  Principal Problem:    Acute hypoxemic respiratory failure due to COVID-19 [facility])  Active Problems:    Type 2 diabetes mellitus with kidney complication, with long-term current use of insulin (HCC)    Chronic combined systolic and diastolic CHF, NYHA class 3 (HCC)    Obesity with body mass index 30 or greater    Hyperlipidemia    Thrombocytopenia (HCC)    Acute renal failure superimposed on chronic kidney disease (HCC)    Acute encephalopathy    Transaminitis    Hypernatremia    Severe sepsis (HCC)    Bradycardia      Asessment:  1  Acute hypoxic respiratory failure, transfer to ICU 5/12, intubated 5/17  2  Acute COVID-19 pneumonia, initially positive 05/07/2021  3  GAGE, creatinine now trending in the mid 2s  4  Transient high-grade AV block on tele 05/17/2021 and 05/18/2021  5  NICM, EF 25-30% on 05/19/2021  6  Chronic systolic CHF, dry weight around 252  7  Non MI troponin elevation, peak 0 72  8  Hypertension  9  Single-chamber Medtronic ICD in place     Plan/ Discussion:  · No further episodes of heart block (2nd or third-degree) noted on telemetry over the last 24 hours  Pacing rate for ICD is presumably set at 40 and if he does decompensate from a rhythm point of view we can increase the rate to 60-70  Does have occasional paced beats on tele  · Echo yesterday showed EF 25-30% decreased from prior (40% for the last few years)  Suspect may be secondary to acute illness will plan to repeat limited echo once overall clinical picture gets better    · Remains intubated and critically ill, ICU team following for COVID and Nephrology on board for renal disease    Subjective:   Intubated and sedated    Vitals:  Vitals:    05/19/21 0600 05/20/21 0600   Weight: 110 kg (241 lb 13 5 oz) 111 kg (243 lb 13 3 oz)   ,  Vitals:    05/20/21 1315 05/20/21 1330 05/20/21 1345 05/20/21 1400 BP:       Pulse: 82 80 74 88   Resp: (!) 32 (!) 33 (!) 26 (!) 39   Temp: 97 5 °F (36 4 °C) 97 9 °F (36 6 °C) 97 9 °F (36 6 °C) 97 9 °F (36 6 °C)   TempSrc:       SpO2: (!) 87% (!) 83% (!) 83% (!) 86%   Weight:       Height:           Exam:  Seen from the hallway  Intubated and sedated           Telemetry:       Normal sinus rhythm, Heart Rate 60-80  No 2nd or third-degree heart block    Medications:    Current Facility-Administered Medications:     acetaminophen (TYLENOL) tablet 650 mg, 650 mg, Oral, Q6H PRN, JIHAN Riley    atorvastatin (LIPITOR) tablet 40 mg, 40 mg, Oral, HS, JIHAN Riley, 40 mg at 05/19/21 2202    bisacodyl (DULCOLAX) rectal suppository 10 mg, 10 mg, Rectal, Daily PRN, Casandra Hodgkins, CRNP, 10 mg at 05/14/21 1255    cefepime (MAXIPIME) 2 g/50 mL dextrose IVPB, 2,000 mg, Intravenous, Q12H, JIHAN Schreiber, Stopped at 05/20/21 0830    chlorhexidine (PERIDEX) 0 12 % oral rinse 15 mL, 15 mL, Mouth/Throat, Q12H [facility], 15 mL at 05/20/21 0820    cholecalciferol (VITAMIN D3) tablet 2,000 Units, 2,000 Units, Oral, Daily, Meryle Jacquet Bilofsky, CRNP, 2,000 Units at 05/20/21 0820    dexamethasone (DECADRON) 11 5 mg in sodium chloride 0 9 % 50 mL IVPB, 0 1 mg/kg, Intravenous, Q12H [facility], JIHAN Riley, Stopped at 05/20/21 1000    epoprostenol (VELETRI) 30,000 ng/mL in sodium chloride 0 9 % 50 mL inhalation solution, 6 25-50 ng/kg/min (Ideal), Inhalation, Titrated, JIHAN Elkins, Last Rate: 6 8 mL/hr at 05/20/21 0848, 49 708 ng/kg/min at 05/20/21 0848    fentaNYL 1000 mcg in sodium chloride 0 9% 100mL infusion, 75 mcg/hr, Intravenous, Continuous, Iesha Gooden PA-C, Last Rate: 10 mL/hr at 05/20/21 0516, 100 mcg/hr at 05/20/21 0516    furosemide (LASIX) 500 mg infusion 50 mL, 10 mg/hr, Intravenous, Continuous, Casandra Hodgkins, CRNP, Last Rate: 1 mL/hr at 05/19/21 1356, 10 mg/hr at 05/19/21 1356    gabapentin (NEURONTIN) capsule 100 mg, 100 mg, Oral, TID, JIHAN Boykin, 100 mg at 05/20/21 0820    heparin (porcine) subcutaneous injection 5,000 Units, 5,000 Units, Subcutaneous, Q8H [facility], Iesha Gooden PA-C    insulin regular (HumuLIN R,NovoLIN R) 1 Units/mL in sodium chloride 0 9 % 100 mL infusion, 0 3-21 Units/hr, Intravenous, Titrated, Iesha Gooden PA-C, Last Rate: 6 mL/hr at 05/20/21 0613, 6 Units/hr at 05/20/21 2482    LORazepam (ATIVAN) injection 1 mg, 1 mg, Intravenous, Q4H PRN, JIHAN Maxwell, 1 mg at 05/18/21 1419    metroNIDAZOLE (FLAGYL) IVPB (premix) 500 mg 100 mL, 500 mg, Intravenous, Q8H, Genaro Moreno MD, Stopped at 05/20/21 0910    midodrine (PROAMATINE) tablet 5 mg, 5 mg, Oral, TID AC, Iesha Gooden PA-C, 5 mg at 05/20/21 1143    [COMPLETED] zinc sulfate (ZINCATE) capsule 220 mg, 220 mg, Oral, Daily, 220 mg at 05/14/21 0913 **FOLLOWED BY** multivitamin-minerals (CENTRUM ADULTS) tablet 1 tablet, 1 tablet, Oral, Daily, JIHAN Boykin, 1 tablet at 05/20/21 0820    norepinephrine (LEVOPHED) 8 mg (DOUBLE CONCENTRATION) IV in sodium chloride 0 9% 250 mL, 1-30 mcg/min, Intravenous, Titrated, JIHAN Love, Stopped at 05/20/21 1200    omeprazole (PRILOSEC) suspension 2 mg/mL, 20 mg, Oral, Daily, Iesha Gooden PA-C, 20 mg at 05/20/21 0820    ondansetron (ZOFRAN) injection 4 mg, 4 mg, Intravenous, Q6H PRN, JIHAN Boykin, 4 mg at 05/10/21 0935    polyethylene glycol (MIRALAX) packet 17 g, 17 g, Oral, Daily, JIHAN Love, 17 g at 05/19/21 0801    potassium chloride 40 mEq IVPB (premix), 40 mEq, Intravenous, Q2H, Iesha Gooden PA-C, Last Rate: 50 mL/hr at 05/20/21 1312, 40 mEq at 05/20/21 1312    propofol (DIPRIVAN) 1000 mg in 100 mL infusion (premix), 5-50 mcg/kg/min, Intravenous, Titrated, Iesha Gooden PA-C, Last Rate: 25 4 mL/hr at 05/20/21 1414, 40 mcg/kg/min at 05/20/21 1414    senna-docusate sodium (SENOKOT S) 8 6-50 mg per tablet 2 tablet, 2 tablet, Oral, BID, Marisol Dobson MD, 2 tablet at 05/19/21 0801      Labs/Data:        Results from last 7 days   Lab Units 05/20/21  0628 05/19/21  0609 05/18/21  0442   WBC Thousand/uL 21 41* 28 84* 22 65*   HEMOGLOBIN g/dL 9 0* 9 9* 9 7*   HEMATOCRIT % 26 4* 30 2* 29 4*   PLATELETS Thousands/uL 85* 104* 107*     Results from last 7 days   Lab Units 05/20/21  0628 05/19/21  1810 05/19/21  1112 05/19/21  0609  05/16/21  0337   POTASSIUM mmol/L 3 3* 3 3*  --  3 3*   < > 4 6   CHLORIDE mmol/L 102 103  --  103   < > 106   CO2 mmol/L 27 25  --  24   < > 26   BUN mg/dL 85* 83*  --  79*   < > 53*   CK TOTAL U/L  --   --   --   --   --  816*   TROPONIN I ng/mL  --   --  0 07*  --   --   --    CK MB INDEX %  --   --   --   --   --  <1 0    < > = values in this interval not displayed

## 2021-05-20 NOTE — PROGRESS NOTES
[address] [place] [address]  Progress Note Ritchie Craft Sr  1950, 80 y o  male MRN: [id number]  Unit/Bed#: ICU 03 Encounter: [id number]  Primary Care Provider: Henry Vaz MD   Date and time admitted to hospital: 5/7/2021  4:33 PM    * Acute hypoxemic respiratory failure due to COVID-19 [facility])  Assessment & Plan  · In the setting of covid 19   · Initial positive 5/7 with unclear symptom onset  · Intubated on 5/17 secondary to progressive respiratory failure  · Proned 6PM on 5/17- supine 5/18 with maintenance of saturations > 90  · Following pronation order set  · CXR with bilateral pulmonary opacities  · Decadron total D7, BID dosing D7  · Continue vitamins  · Completed remdesivir  · Given Actemra 5/13 2/2 increasing Fio2 requirements and elevated CRP  · Completed initial course of antibiotics but procal was elevated on 5/17 with an increase in WBC and progressive respiratory failure  · Started on cefepime, and flagyl  Vanco discontinued  · We will trend the procalcitnin level  · Continue iVeletri     Bradycardia  Assessment & Plan  · With transient second degree heart blocks  · Cardiology following  · External pacemaker not beneficial at this point per cardiology  · Currently on levophed for blood pressure support  · Single ICD with appropriate pacing at 40 bpm  · Consider increasing lower rate limit of ICD    Acute encephalopathy  Assessment & Plan  · Present on admission  · Repeat CT head 5/13: No significant interval change since prior examination  Stable advanced chronic microvascular ischemic change and chronic right cerebellar lacunar infarct    · Etiology unclear: covid 19/sepsis/hypoxia vs  delerium vs  Uremia less likely  · Baseline AO x 3  · Currently intubated and sedated with propofol and fentanyl     Acute renal failure superimposed on chronic kidney disease [facility])  Assessment & Plan  Lab Results   Component Value Date    EGFR 27 05/19/2021    EGFR 29 05/19/2021    EGFR 30 05/18/2021    CREATININE 2 69 (H) 05/19/2021    CREATININE 2 53 (H) 05/19/2021    CREATININE 2 42 (H) 05/18/2021     · Hx of CKD III, baseline sCr 1 8 - 1 9  · GAGE on presentation with sCr of 4 97  · On lasix and metolazone at home  · Placed on a lasix infusion 10 mg/hr on 5/17 with good response  · Monitor I and O  · Need to monitor his renal indices closely as they have been slowly rising in the setting of the lasix infusion    Chronic combined systolic and diastolic CHF, NYHA class 3 (HCC)  Assessment & Plan  Wt Readings from Last 3 Encounters:   05/19/21 110 kg (241 lb 13 5 oz)   03/19/21 118 kg (261 lb)   03/18/21 118 kg (261 lb)     · BNP 7000 on admission  · 4/21 ECHO: The ventricle was mildly to moderately dilated  Systolic function was mildly to moderately reduced  Ejection fraction was estimated to be 40 %  There was mild to moderate diffuse hypokinesis    · Monitor volume status  · He was placed on a continuous lasix infusion with good response  · Will need to watch his creatinine as it has slowly been climbing  · Currently on levophed given need for sedation        Thrombocytopenia (HCC)  Assessment & Plan  · Presented with platelets of 96  · Improving to 163  · In setting of sepsis, covid 19    Transaminitis  Assessment & Plan  · Mild AST and alk phos elevations  · In setting of covid 19  · Trend    Type 2 diabetes mellitus with kidney complication, with long-term current use of insulin [facility])  Assessment & Plan  Lab Results   Component Value Date    HGBA1C 6 0 03/19/2021       Recent Labs     05/19/21  1337 05/19/21  1649 05/19/21  1809 05/19/21  1956   POCGLU 154* 164* 161* 138       Blood Sugar Average: Last 72 hrs:  (P) 205 16     · On jardiance and insulin at home  · Home hold regimen in favor of Lantus 10 unit HS and SSI    · Our goal will be to maintain his blood glucose between 140 and 180  · Continue insulin infusion    Obesity with body mass index 30 or greater  Assessment & Plan  · Nutrition consult when able  · Known risk factor for severity of COVID     Hyperlipidemia  Assessment & Plan  · Continue lipitor     Hypernatremia  Assessment & Plan  · Will add free water flushes to his tube feedings - improved  · F/u with repeat BMP         ----------------------------------------------------------------------------------------  HPI/24hr events: No events overnight  Labs pending this morning  Remained off nimbex infusion  Oxygenation remains adequate    Disposition: Continue Critical Care   Code Status: Level 1 - Full Code  ---------------------------------------------------------------------------------------  SUBJECTIVE      Review of Systems   Unable to perform ROS: Intubated     Review of systems was reviewed and negative unless stated above in HPI/24-hour events   ---------------------------------------------------------------------------------------  OBJECTIVE    Vitals   Vitals:    21 0330 21 0400 21 0430 21 0500   BP:       Pulse: (!) 52 (!) 52 (!) 52 (!) 50   Resp: (!) 25 (!) 25 (!) 25 (!) 25   Temp: 97 9 °F (36 6 °C) 97 5 °F (36 4 °C) 97 5 °F (36 4 °C) (!) 97 2 °F (36 2 °C)   TempSrc:       SpO2: 96% 96% 97% 97%   Weight:       Height:         Temp (24hrs), Av 1 °F (36 7 °C), Min:97 2 °F (36 2 °C), Max:99 °F (37 2 °C)  Current: Temperature: (!) 97 2 °F (36 2 °C)  Arterial Line BP: 156/44  Arterial Line MAP (mmHg): 68 mmHg    Respiratory:  SpO2: SpO2: 97 %  Nasal Cannula O2 Flow Rate (L/min): 15 L/min    Invasive/non-invasive ventilation settings   Respiratory    Lab Data (Last 4 hours)    None         O2/Vent Data (Last 4 hours)    None                Physical Exam  Vitals signs reviewed  Constitutional:       Appearance: He is obese  He is ill-appearing  Interventions: He is intubated  HENT:      Head: Normocephalic and atraumatic        Nose: Nose normal       Mouth/Throat:      Mouth: Mucous membranes are moist    Eyes:      Pupils: Pupils are equal, round, and reactive to light  Neck:      Musculoskeletal: Normal range of motion and neck supple  Cardiovascular:      Rate and Rhythm: Regular rhythm  Bradycardia present  Pulses: Normal pulses  Heart sounds: Normal heart sounds  Pulmonary:      Effort: He is intubated  Breath sounds: Decreased breath sounds present  Abdominal:      General: Abdomen is flat  Bowel sounds are normal  There is no distension  Palpations: Abdomen is soft  Tenderness: There is no abdominal tenderness  Musculoskeletal: Normal range of motion  Skin:     General: Skin is warm and dry  Neurological:      General: No focal deficit present           Laboratory and Diagnostics:  Results from last 7 days   Lab Units 05/19/21  0609 05/18/21  0442 05/17/21  0428 05/16/21  0337 05/15/21  0358 05/14/21  0536   WBC Thousand/uL 28 84* 22 65* 19 38* 13 05* 10 09 11 93*   HEMOGLOBIN g/dL 9 9* 9 7* 10 4* 11 6* 11 1* 11 2*   HEMATOCRIT % 30 2* 29 4* 31 8* 35 8* 33 1* 34 3*   PLATELETS Thousands/uL 104* 107* 112* 183 173 163   NEUTROS PCT % 94*  --   --  93* 88*  --    BANDS PCT %  --  9*  --   --   --   --    MONOS PCT % 1*  --   --  4 6  --    MONO PCT %  --  2*  --   --   --   --      Results from last 7 days   Lab Units 05/19/21  1810 05/19/21  0609 05/18/21  2042 05/18/21  0719 05/18/21 0442 05/17/21  0428 05/16/21  0337 05/15/21  0358 05/14/21  0536   SODIUM mmol/L 140 140 141 141 143 143 141 144 147*   POTASSIUM mmol/L 3 3* 3 3* 3 2* 3 8 4 1 4 7 4 6 4 0 4 5   CHLORIDE mmol/L 103 103 105 105 107 110* 106 107 110*   CO2 mmol/L 25 24 25 24 24 28 26 27 27   ANION GAP mmol/L 12 13 11 12 12 5 9 10 10   BUN mg/dL 83* 79* 72* 71* 70* 65* 53* 54* 52*   CREATININE mg/dL 2 69* 2 53* 2 42* 2 41* 2 32* 1 93* 1 76* 1 80* 1 68*   CALCIUM mg/dL 7 7* 7 5* 7 2* 7 5* 7 5* 7 7* 8 0* 8 0* 8 0*   GLUCOSE RANDOM mg/dL 148* 192* 201* 278* 290* 213* 227* 183* 141*   ALT U/L  --  32  --   --  36 44  --  56 59   AST U/L  --  18  --   --  20 62*  --  55* 57*   ALK PHOS U/L  --  150*  --   --  177* 253*  --  220* 172*   ALBUMIN g/dL  --  2 4*  --   --  2 5* 2 2*  --  2 5* 2 4*   TOTAL BILIRUBIN mg/dL  --  0 60  --   --  0 65 0 82  --  0 58 0 58     Results from last 7 days   Lab Units 05/15/21  0359 05/13/21  1519   MAGNESIUM mg/dL 2 6 2 7*      Results from last 7 days   Lab Units 05/19/21  2017 05/19/21  1331 05/19/21  0610 05/19/21  0028 05/18/21  1832 05/18/21  1152 05/18/21  0442 05/17/21  1344   INR   --   --   --   --   --   --   --  1 49*   PTT seconds 59* 101* 53* 62* 66* 110* 138* 28      Results from last 7 days   Lab Units 05/19/21  1112   TROPONIN I ng/mL 0 07*         ABG:  Results from last 7 days   Lab Units 05/19/21  0612   PH ART  7 400   PCO2 ART mm Hg 35 9*   PO2 ART mm Hg 105 1   HCO3 ART mmol/L 21 7*   BASE EXC ART mmol/L -2 6   ABG SOURCE  Line, Arterial     VBG:  Results from last 7 days   Lab Units 05/19/21  0612   ABG SOURCE  Line, Arterial     Results from last 7 days   Lab Units 05/19/21  0609 05/18/21  0442 05/17/21  1344 05/14/21  0536   PROCALCITONIN ng/ml 0 97* 1 38* 3 60* 0 18       Micro  Results from last 7 days   Lab Units 05/18/21  1152 05/18/21  1110   BLOOD CULTURE   --  No Growth at 24 hrs  No Growth at 24 hrs  SPUTUM CULTURE  Culture too young- will reincubate  --    GRAM STAIN RESULT  No Epithelial cells seen  Rare Polys  No bacteria seen  --    MRSA CULTURE ONLY  Culture results to follow  --        EKG: SB  Imaging: I have personally reviewed pertinent reports        Intake and Output  I/O       05/18 0701 - 05/19 0700 05/19 0701 - 05/20 0700    I V  (mL/kg) 2387 2 (21 7) 1816 4 (16 5)    NG/ 2253    IV Piggyback 800 500    Total Intake(mL/kg) 4147 2 (37 7) 4569 4 (41 5)    Urine (mL/kg/hr) 3650 (1 4) 3350 (1 3)    Stool  0    Total Output 3650 3350    Net +497 2 +1219 4          Unmeasured Stool Occurrence  2 x          Height and Weights   Height: 5' 8" (172 7 cm)  IBW (Ideal Body Weight): 68 4 kg Body mass index is 36 77 kg/m²  Weight (last 2 days)     Date/Time   Weight    05/19/21 0600   110 (241 84)    05/18/21 0600   106 (233 03)                Nutrition       Diet Orders   (From admission, onward)             Start     Ordered    05/17/21 1103  Diet Enteral/Parenteral; Tube Feeding No Oral Diet; Jevity 1 2 Micheal; Continuous; 30; Prosource TF- Two Packets; 200; Water; Every 4 hours  Diet effective now     Question Answer Comment   Diet Type Enteral/Parenteral    Enteral/Parenteral Tube Feeding No Oral Diet    Tube Feeding Formula: Jevity 1 2 Micheal    Bolus/Cyclic/Continuous Continuous    Tube Feeding Goal Rate (mL/hr): 30    Prosource Protein Liquid - No Carb Prosource TF- Two Packets    Tube Feeding flush (mL): 200    Water Flush type: Water    Water flush frequency: Every 4 hours    RD to adjust diet per protocol?  Yes        05/17/21 1102                  Active Medications  Scheduled Meds:  Current Facility-Administered Medications   Medication Dose Route Frequency Provider Last Rate    acetaminophen  650 mg Oral Q6H PRN JIHAN Rucker      atorvastatin  40 mg Oral HS [place] K JIHAN Avendano      bisacodyl  10 mg Rectal Daily PRN Jelly Para, CRNP      cefepime  2,000 mg Intravenous Q12H [place] Bun, JOVANYNP 2,000 mg (05/19/21 1949)    chlorhexidine  15 mL Mouth/Throat Q12H [facility] Iesha Russell PA-C      cholecalciferol  2,000 Units Oral Daily JIHAN Rucker      dexamethasone  0 1 mg/kg Intravenous Q12H [facility] JIHAN Rucker 11 5 mg (05/19/21 2012)    epoprostenol (VELETRI) inhalation solution 43271 ng/mL  6 25-50 ng/kg/min (Ideal) Inhalation Titrated Lorin Schwab, CRNP 49 708 ng/kg/min (05/20/21 0133)    fentaNYL  75 mcg/hr Intravenous Continuous Iesha Gooden PA-C 100 mcg/hr (05/19/21 2043)    furosemide  10 mg/hr Intravenous Continuous Jelly ParaJOVANYNP 10 mg/hr (05/19/21 1356)    gabapentin  100 mg Oral TID Yolande Avendano CRNP      heparin (porcine) 3-20 Units/kg/hr (Order-Specific) Intravenous Titrated Gleen Gasrolando, PA-C 8 Units/kg/hr (05/19/21 2129)    heparin (porcine)  2,000 Units Intravenous Q1H PRN Felecia Fang, PA-C      heparin (porcine)  4,000 Units Intravenous Q1H PRN Glenicole Gasrolando, PA-C      insulin regular (HumuLIN R,NovoLIN R) infusion  0 3-21 Units/hr Intravenous Titrated Gleen Gasrolando, PA-C 3 Units/hr (05/20/21 0405)    LORazepam  1 mg Intravenous Q4H PRN JIHAN Diamond      metroNIDAZOLE  500 mg Intravenous Ernesto Chavez  mg (05/20/21 0008)    multivitamin-minerals  1 tablet Oral Daily JIHAN Junior      norepinephrine  1-30 mcg/min Intravenous Titrated JIHAN Michael 4 mcg/min (05/19/21 2204)    omeprazole (PRILOSEC) suspension 2 mg/mL  20 mg Oral Daily Iesha Gooden PA-C      ondansetron  4 mg Intravenous Q6H PRN JIHAN Junior      polyethylene glycol  17 g Oral Daily JIHAN Michael      propofol  5-50 mcg/kg/min Intravenous Titrated Felecia Fang, PA-C 50 mcg/kg/min (05/20/21 0155)    senna-docusate sodium  2 tablet Oral BID Jagdish Villafana MD      vancomycin  15 mg/kg (Adjusted) Intravenous Daily PRN Halibrii Sweeney, DO 1,250 mg (05/18/21 2202)     Continuous Infusions:  epoprostenol (VELETRI) inhalation solution 81107 ng/mL, 6 25-50 ng/kg/min (Ideal), Last Rate: 49 708 ng/kg/min (05/20/21 0133)  fentaNYL, 75 mcg/hr, Last Rate: 100 mcg/hr (05/19/21 2043)  furosemide, 10 mg/hr, Last Rate: 10 mg/hr (05/19/21 1356)  heparin (porcine), 3-20 Units/kg/hr (Order-Specific), Last Rate: 8 Units/kg/hr (05/19/21 2129)  insulin regular (HumuLIN R,NovoLIN R) infusion, 0 3-21 Units/hr, Last Rate: 3 Units/hr (05/20/21 0405)  norepinephrine, 1-30 mcg/min, Last Rate: 4 mcg/min (05/19/21 2204)  propofol, 5-50 mcg/kg/min, Last Rate: 50 mcg/kg/min (05/20/21 0155)      PRN Meds:   acetaminophen, 650 mg, Q6H PRN  bisacodyl, 10 mg, Daily PRN  heparin (porcine), 2,000 Units, Q1H PRN  heparin (porcine), 4,000 Units, Q1H PRN  LORazepam, 1 mg, Q4H PRN  ondansetron, 4 mg, Q6H PRN  vancomycin, 15 mg/kg (Adjusted), Daily PRN        Invasive Devices Review  Invasive Devices     Peripherally Inserted Central Catheter Line            PICC Line -- days          Central Venous Catheter Line            CVC Central Lines 05/12/21 Double 7 days          Peripheral Intravenous Line            Peripheral IV 05/18/21 Right Arm 1 day          Arterial Line            Arterial Line 05/17/21 Radial 2 days          Line            Hemodialysis AV Fistula 09/06/19 Upper arm 622 days          Drain            NG/OG/Enteral Tube Orogastric 2 days    Urethral Catheter Temperature probe 2 days          Airway            ETT  2 days                Rationale for remaining devices:   ---------------------------------------------------------------------------------------  Advance Directive and Living Will:      Power of :    POLST:    ---------------------------------------------------------------------------------------  Care Time Delivered:   Upon my evaluation, this patient had a high probability of imminent or life-threatening deterioration due to COVID pneumonia, which required my direct attention, intervention, and personal management  I have personally provided 35 minutes ([phone] of critical care time, exclusive of procedures, teaching, family meetings, and any prior time recorded by providers other than myself  JIHAN Johnson Cap      Portions of the record may have been created with voice recognition software  Occasional wrong word or "sound a like" substitutions may have occurred due to the inherent limitations of voice recognition software    Read the chart carefully and recognize, using context, where substitutions have occurred

## 2021-05-20 NOTE — QUICK NOTE
The patient's son, Helena Lee, was updated on his father's current status  He is aware of his persistent hypoxia on the ventilator  All questions were answered at this time

## 2021-05-20 NOTE — PLAN OF CARE
Problem: Potential for Falls  Goal: Patient will remain free of falls  Description: INTERVENTIONS:  - Assess patient frequently for physical needs  -  Identify cognitive and physical deficits and behaviors that affect risk of falls    -  [place] fall precautions as indicated by assessment   - Educate patient/family on patient safety including physical limitations  - Instruct patient to call for assistance with activity based on assessment  - Modify environment to reduce risk of injury  - Consider OT/PT consult to assist with strengthening/mobility  Outcome: Progressing     Problem: Prexisting or High Potential for Compromised Skin Integrity  Goal: Skin integrity is maintained or improved  Description: INTERVENTIONS:  - Identify patients at risk for skin breakdown  - Assess and monitor skin integrity  - Assess and monitor nutrition and hydration status  - Monitor labs   - Assess for incontinence   - Turn and reposition patient  - Assist with mobility/ambulation  - Relieve pressure over bony prominences  - Avoid friction and shearing  - Provide appropriate hygiene as needed including keeping skin clean and dry  - Evaluate need for skin moisturizer/barrier cream  - Collaborate with interdisciplinary team   - Patient/family teaching  - Consider wound care consult   Outcome: Progressing     Problem: PAIN - ADULT  Goal: Verbalizes/displays adequate comfort level or baseline comfort level  Description: Interventions:  - Encourage patient to monitor pain and request assistance  - Assess pain using appropriate pain scale  - Administer analgesics based on type and severity of pain and evaluate response  - Implement non-pharmacological measures as appropriate and evaluate response  - Consider cultural and social influences on pain and pain management  - Notify physician/advanced practitioner if interventions unsuccessful or patient reports new pain  Outcome: Progressing     Problem: INFECTION - ADULT  Goal: Absence or prevention of progression during hospitalization  Description: INTERVENTIONS:  - Assess and monitor for signs and symptoms of infection  - Monitor lab/diagnostic results  - Monitor all insertion sites, i e  indwelling lines, tubes, and drains  - Monitor endotracheal if appropriate and nasal secretions for changes in amount and color  - [place] appropriate cooling/warming therapies per order  - Administer medications as ordered  - Instruct and encourage patient and family to use good hand hygiene technique  - Identify and instruct in appropriate isolation precautions for identified infection/condition  Outcome: Not Progressing     Problem: SAFETY ADULT  Goal: Patient will remain free of falls  Description: INTERVENTIONS:  - Assess patient frequently for physical needs  -  Identify cognitive and physical deficits and behaviors that affect risk of falls    -  [place] fall precautions as indicated by assessment   - Educate patient/family on patient safety including physical limitations  - Instruct patient to call for assistance with activity based on assessment  - Modify environment to reduce risk of injury  - Consider OT/PT consult to assist with strengthening/mobility  Outcome: Progressing  Goal: Maintain or return to baseline ADL function  Description: INTERVENTIONS:  -  Assess patient's ability to carry out ADLs; assess patient's baseline for ADL function and identify physical deficits which impact ability to perform ADLs (bathing, care of mouth/teeth, toileting, grooming, dressing, etc )  - Assess/evaluate cause of self-care deficits   - Assess range of motion  - Assess patient's mobility; develop plan if impaired  - Assess patient's need for assistive devices and provide as appropriate  - Encourage maximum independence but intervene and supervise when necessary  - Involve family in performance of ADLs  - Assess for home care needs following discharge   - Consider OT consult to assist with ADL evaluation and planning for discharge  - Provide patient education as appropriate  Outcome: Not Progressing  Goal: Maintain or return mobility status to optimal level  Description: INTERVENTIONS:  - Assess patient's baseline mobility status (ambulation, transfers, stairs, etc )    - Identify cognitive and physical deficits and behaviors that affect mobility  - Identify mobility aids required to assist with transfers and/or ambulation (gait belt, sit-to-stand, lift, walker, cane, etc )  - [place] fall precautions as indicated by assessment  - Record patient progress and toleration of activity level on Mobility SBAR; progress patient to next Phase/Stage  - Instruct patient to call for assistance with activity based on assessment  - Consider rehabilitation consult to assist with strengthening/weightbearing, etc   Outcome: Not Progressing     Problem: DISCHARGE PLANNING  Goal: Discharge to home or other facility with appropriate resources  Description: INTERVENTIONS:  - Identify barriers to discharge w/patient and caregiver  - Arrange for needed discharge resources and transportation as appropriate  - Identify discharge learning needs (meds, wound care, etc )  - Arrange for interpretive services to assist at discharge as needed  - Refer to Case Management Department for coordinating discharge planning if the patient needs post-hospital services based on physician/advanced practitioner order or complex needs related to functional status, cognitive ability, or social support system  Outcome: Progressing     Problem: Knowledge Deficit  Goal: Patient/family/caregiver demonstrates understanding of disease process, treatment plan, medications, and discharge instructions  Description: Complete learning assessment and assess knowledge base    Interventions:  - Provide teaching at level of understanding  - Provide teaching via preferred learning methods  Outcome: Progressing     Problem: Nutrition/Hydration-ADULT  Goal: Nutrient/Hydration intake appropriate for improving, restoring or maintaining nutritional needs  Description: Monitor and assess patient's nutrition/hydration status for malnutrition  Collaborate with interdisciplinary team and initiate plan and interventions as ordered  Monitor patient's weight and dietary intake as ordered or per policy  Utilize nutrition screening tool and intervene as necessary  Determine patient's food preferences and provide high-protein, high-caloric foods as appropriate       INTERVENTIONS:  - Monitor oral intake, urinary output, labs, and treatment plans  - Assess nutrition and hydration status and recommend course of action  - Evaluate amount of meals eaten  - Assist patient with eating if necessary   - Allow adequate time for meals  - Recommend/ encourage appropriate diets, oral nutritional supplements, and vitamin/mineral supplements  - Order, calculate, and assess calorie counts as needed  - Recommend, monitor, and adjust tube feedings and TPN/PPN based on assessed needs  - Assess need for intravenous fluids  - Provide specific nutrition/hydration education as appropriate  - Include patient/family/caregiver in decisions related to nutrition  Outcome: Progressing     Problem: SAFETY,RESTRAINT: NV/NON-SELF DESTRUCTIVE BEHAVIOR  Goal: Remains free of harm/injury (restraint for non violent/non self-detsructive behavior)  Description: INTERVENTIONS:  - Instruct patient/family regarding restraint use   - Assess and monitor physiologic and psychological status   - Provide interventions and comfort measures to meet assessed patient needs   - Identify and implement measures to help patient regain control  - Assess readiness for release of restraint   Outcome: Progressing  Goal: Returns to optimal restraint-free functioning  Description: INTERVENTIONS:  - Assess the patient's behavior and symptoms that indicate continued need for restraint  - Identify and implement measures to help patient regain control  - Assess readiness for release of restraint   Outcome: Progressing     Problem: RESPIRATORY - ADULT  Goal: Achieves optimal ventilation and oxygenation  Description: INTERVENTIONS:  - Assess for changes in respiratory status  - Assess for changes in mentation and behavior  - Position to facilitate oxygenation and minimize respiratory effort  - Oxygen administered by appropriate delivery if ordered  - Initiate smoking cessation education as indicated  - Encourage broncho-pulmonary hygiene including cough, deep breathe, Incentive Spirometry  - Assess the need for suctioning and aspirate as needed  - Assess and instruct to report SOB or any respiratory difficulty  - Respiratory Therapy support as indicated  Outcome: Not Progressing     Problem: GASTROINTESTINAL - ADULT  Goal: Maintains or returns to baseline bowel function  Description: INTERVENTIONS:  - Assess bowel function  - Encourage oral fluids to ensure adequate hydration  - Administer IV fluids if ordered to ensure adequate hydration  - Administer ordered medications as needed  - Encourage mobilization and activity  - Consider nutritional services referral to assist patient with adequate nutrition and appropriate food choices  Outcome: Progressing     Problem: GENITOURINARY - ADULT  Goal: Maintains or returns to baseline urinary function  Description: INTERVENTIONS:  - Assess urinary function  - Encourage oral fluids to ensure adequate hydration if ordered  - Administer IV fluids as ordered to ensure adequate hydration  - Administer ordered medications as needed  - Offer frequent toileting  - Follow urinary retention protocol if ordered  Outcome: Progressing     Problem: METABOLIC, FLUID AND ELECTROLYTES - ADULT  Goal: Electrolytes maintained within normal limits  Description: INTERVENTIONS:  - Monitor labs and assess patient for signs and symptoms of electrolyte imbalances  - Administer electrolyte replacement as ordered  - Monitor response to electrolyte replacements, including repeat lab results as appropriate  - Instruct patient on fluid and nutrition as appropriate  Outcome: Progressing  Goal: Glucose maintained within target range  Description: INTERVENTIONS:  - Monitor Blood Glucose as ordered  - Assess for signs and symptoms of hyperglycemia and hypoglycemia  - Administer ordered medications to maintain glucose within target range  - Assess nutritional intake and initiate nutrition service referral as needed  Outcome: Progressing     Problem: METABOLIC, FLUID AND ELECTROLYTES - ADULT  Goal: Electrolytes maintained within normal limits  Description: INTERVENTIONS:  - Monitor labs and assess patient for signs and symptoms of electrolyte imbalances  - Administer electrolyte replacement as ordered  - Monitor response to electrolyte replacements, including repeat lab results as appropriate  - Instruct patient on fluid and nutrition as appropriate  Outcome: Progressing  Goal: Glucose maintained within target range  Description: INTERVENTIONS:  - Monitor Blood Glucose as ordered  - Assess for signs and symptoms of hyperglycemia and hypoglycemia  - Administer ordered medications to maintain glucose within target range  - Assess nutritional intake and initiate nutrition service referral as needed  Outcome: Progressing

## 2021-05-20 NOTE — PLAN OF CARE
Problem: Potential for Falls  Goal: Patient will remain free of falls  Description: INTERVENTIONS:  - Assess patient frequently for physical needs  -  Identify cognitive and physical deficits and behaviors that affect risk of falls    -  [place] fall precautions as indicated by assessment   - Educate patient/family on patient safety including physical limitations  - Instruct patient to call for assistance with activity based on assessment  - Modify environment to reduce risk of injury  - Consider OT/PT consult to assist with strengthening/mobility  Outcome: Progressing     Problem: Prexisting or High Potential for Compromised Skin Integrity  Goal: Skin integrity is maintained or improved  Description: INTERVENTIONS:  - Identify patients at risk for skin breakdown  - Assess and monitor skin integrity  - Assess and monitor nutrition and hydration status  - Monitor labs   - Assess for incontinence   - Turn and reposition patient  - Assist with mobility/ambulation  - Relieve pressure over bony prominences  - Avoid friction and shearing  - Provide appropriate hygiene as needed including keeping skin clean and dry  - Evaluate need for skin moisturizer/barrier cream  - Collaborate with interdisciplinary team   - Patient/family teaching  - Consider wound care consult   Outcome: Progressing     Problem: PAIN - ADULT  Goal: Verbalizes/displays adequate comfort level or baseline comfort level  Description: Interventions:  - Encourage patient to monitor pain and request assistance  - Assess pain using appropriate pain scale  - Administer analgesics based on type and severity of pain and evaluate response  - Implement non-pharmacological measures as appropriate and evaluate response  - Consider cultural and social influences on pain and pain management  - Notify physician/advanced practitioner if interventions unsuccessful or patient reports new pain  Outcome: Progressing     Problem: INFECTION - ADULT  Goal: Absence or prevention of progression during hospitalization  Description: INTERVENTIONS:  - Assess and monitor for signs and symptoms of infection  - Monitor lab/diagnostic results  - Monitor all insertion sites, i e  indwelling lines, tubes, and drains  - Monitor endotracheal if appropriate and nasal secretions for changes in amount and color  - [place] appropriate cooling/warming therapies per order  - Administer medications as ordered  - Instruct and encourage patient and family to use good hand hygiene technique  - Identify and instruct in appropriate isolation precautions for identified infection/condition  Outcome: Progressing     Problem: SAFETY ADULT  Goal: Patient will remain free of falls  Description: INTERVENTIONS:  - Assess patient frequently for physical needs  -  Identify cognitive and physical deficits and behaviors that affect risk of falls    -  [place] fall precautions as indicated by assessment   - Educate patient/family on patient safety including physical limitations  - Instruct patient to call for assistance with activity based on assessment  - Modify environment to reduce risk of injury  - Consider OT/PT consult to assist with strengthening/mobility  Outcome: Progressing  Goal: Maintain or return to baseline ADL function  Description: INTERVENTIONS:  -  Assess patient's ability to carry out ADLs; assess patient's baseline for ADL function and identify physical deficits which impact ability to perform ADLs (bathing, care of mouth/teeth, toileting, grooming, dressing, etc )  - Assess/evaluate cause of self-care deficits   - Assess range of motion  - Assess patient's mobility; develop plan if impaired  - Assess patient's need for assistive devices and provide as appropriate  - Encourage maximum independence but intervene and supervise when necessary  - Involve family in performance of ADLs  - Assess for home care needs following discharge   - Consider OT consult to assist with ADL evaluation and planning for discharge  - Provide patient education as appropriate  Outcome: Progressing  Goal: Maintain or return mobility status to optimal level  Description: INTERVENTIONS:  - Assess patient's baseline mobility status (ambulation, transfers, stairs, etc )    - Identify cognitive and physical deficits and behaviors that affect mobility  - Identify mobility aids required to assist with transfers and/or ambulation (gait belt, sit-to-stand, lift, walker, cane, etc )  - [place] fall precautions as indicated by assessment  - Record patient progress and toleration of activity level on Mobility SBAR; progress patient to next Phase/Stage  - Instruct patient to call for assistance with activity based on assessment  - Consider rehabilitation consult to assist with strengthening/weightbearing, etc   Outcome: Progressing     Problem: DISCHARGE PLANNING  Goal: Discharge to home or other facility with appropriate resources  Description: INTERVENTIONS:  - Identify barriers to discharge w/patient and caregiver  - Arrange for needed discharge resources and transportation as appropriate  - Identify discharge learning needs (meds, wound care, etc )  - Arrange for interpretive services to assist at discharge as needed  - Refer to Case Management Department for coordinating discharge planning if the patient needs post-hospital services based on physician/advanced practitioner order or complex needs related to functional status, cognitive ability, or social support system  Outcome: Progressing     Problem: Knowledge Deficit  Goal: Patient/family/caregiver demonstrates understanding of disease process, treatment plan, medications, and discharge instructions  Description: Complete learning assessment and assess knowledge base    Interventions:  - Provide teaching at level of understanding  - Provide teaching via preferred learning methods  Outcome: Progressing     Problem: Nutrition/Hydration-ADULT  Goal: Nutrient/Hydration intake appropriate for improving, restoring or maintaining nutritional needs  Description: Monitor and assess patient's nutrition/hydration status for malnutrition  Collaborate with interdisciplinary team and initiate plan and interventions as ordered  Monitor patient's weight and dietary intake as ordered or per policy  Utilize nutrition screening tool and intervene as necessary  Determine patient's food preferences and provide high-protein, high-caloric foods as appropriate       INTERVENTIONS:  - Monitor oral intake, urinary output, labs, and treatment plans  - Assess nutrition and hydration status and recommend course of action  - Evaluate amount of meals eaten  - Assist patient with eating if necessary   - Allow adequate time for meals  - Recommend/ encourage appropriate diets, oral nutritional supplements, and vitamin/mineral supplements  - Order, calculate, and assess calorie counts as needed  - Recommend, monitor, and adjust tube feedings and TPN/PPN based on assessed needs  - Assess need for intravenous fluids  - Provide specific nutrition/hydration education as appropriate  - Include patient/family/caregiver in decisions related to nutrition  Outcome: Progressing     Problem: SAFETY,RESTRAINT: NV/NON-SELF DESTRUCTIVE BEHAVIOR  Goal: Remains free of harm/injury (restraint for non violent/non self-detsructive behavior)  Description: INTERVENTIONS:  - Instruct patient/family regarding restraint use   - Assess and monitor physiologic and psychological status   - Provide interventions and comfort measures to meet assessed patient needs   - Identify and implement measures to help patient regain control  - Assess readiness for release of restraint   Outcome: Progressing  Goal: Returns to optimal restraint-free functioning  Description: INTERVENTIONS:  - Assess the patient's behavior and symptoms that indicate continued need for restraint  - Identify and implement measures to help patient regain control  - Assess readiness for release of restraint   Outcome: Progressing     Problem: RESPIRATORY - ADULT  Goal: Achieves optimal ventilation and oxygenation  Description: INTERVENTIONS:  - Assess for changes in respiratory status  - Assess for changes in mentation and behavior  - Position to facilitate oxygenation and minimize respiratory effort  - Oxygen administered by appropriate delivery if ordered  - Initiate smoking cessation education as indicated  - Encourage broncho-pulmonary hygiene including cough, deep breathe, Incentive Spirometry  - Assess the need for suctioning and aspirate as needed  - Assess and instruct to report SOB or any respiratory difficulty  - Respiratory Therapy support as indicated  Outcome: Progressing     Problem: GASTROINTESTINAL - ADULT  Goal: Maintains or returns to baseline bowel function  Description: INTERVENTIONS:  - Assess bowel function  - Encourage oral fluids to ensure adequate hydration  - Administer IV fluids if ordered to ensure adequate hydration  - Administer ordered medications as needed  - Encourage mobilization and activity  - Consider nutritional services referral to assist patient with adequate nutrition and appropriate food choices  Outcome: Progressing     Problem: GENITOURINARY - ADULT  Goal: Maintains or returns to baseline urinary function  Description: INTERVENTIONS:  - Assess urinary function  - Encourage oral fluids to ensure adequate hydration if ordered  - Administer IV fluids as ordered to ensure adequate hydration  - Administer ordered medications as needed  - Offer frequent toileting  - Follow urinary retention protocol if ordered  Outcome: Progressing     Problem: METABOLIC, FLUID AND ELECTROLYTES - ADULT  Goal: Electrolytes maintained within normal limits  Description: INTERVENTIONS:  - Monitor labs and assess patient for signs and symptoms of electrolyte imbalances  - Administer electrolyte replacement as ordered  - Monitor response to electrolyte replacements, including repeat lab results as appropriate  - Instruct patient on fluid and nutrition as appropriate  Outcome: Progressing  Goal: Glucose maintained within target range  Description: INTERVENTIONS:  - Monitor Blood Glucose as ordered  - Assess for signs and symptoms of hyperglycemia and hypoglycemia  - Administer ordered medications to maintain glucose within target range  - Assess nutritional intake and initiate nutrition service referral as needed  Outcome: Progressing

## 2021-05-20 NOTE — PROGRESS NOTES
NEPHROLOGY PROGRESS NOTE   Gerald Johnston Sr  80 y o  male MRN: [id number]  Unit/Bed#: ICU 03 Encounter: [id number]      ASSESSMENT & PLAN    1  GAGE (POA) on CKD  ? baseline creatinine 1 8-1 9-->2 4-->2 53-->2 64  ? admission creatinine was 4 97-and the recent rise in serum creatinine is likely related to diuresis  ? etiology  Multifactorial- pre renal azotemia/ auto regulatory failure from ARB/ rhabdomyolysis with ATN   ? renal function did improve creatinine went to 1 7 2nd hit now creatinine up to 1 9 unfortunately patient hypotensive and required intubation  ?  continue to monitor urine output  ? Lasix gtt restarted and being titrated for an even to slightly negative balance  ?  if oliguric with volume overload may need renal replacement therapy in the and volume overload with respiratory distress-currently no indication for dialysis  ? will monitor closely and discussed with ICU     2  Electrolytes/Acid Base  ?  no anion gap  ?  current electrolytes are acceptable     3  Hypotension- in the setting of respiratory failure,  Acute kidney injury and COVID pneumonia  ? This has improved  ?  continue to keep maps above 65     4   Anemia of  Chronic illness  ?  continue to trend hemoglobin     5   acute hypoxemic respiratory failure due to COVID-19  ?  unfortunately intubated  ?  on going ICU treatment     6   chronic combined systolic and diastolic congestive heart failure  ?  EF is 40% with diffuse hypokinesis  ?  avoid BP lowering medication  ?  continue diuresis as tolerated    DISCUSSION    Continue current management monitor electrolytes in renal function the setting of diuresis      SUBJECTIVE:    Patient remains critically ill with good urine output    OBJECTIVE:  Current Weight: Weight - Scale: 111 kg (243 lb 13 3 oz)  @  Vitals:    05/20/21 0830 05/20/21 0900 05/20/21 0930 05/20/21 1000   BP:       Pulse: 76 90 (!) 54 (!) 50   Resp: (!) 25 (!) 25 (!) 25 (!) 26   Temp: (!) 97 2 °F (36 2 °C) (!) 97 2 °F (36 2 °C) (!) 97 2 °F (36 2 °C) (!) 96 8 °F (36 °C)   TempSrc:       SpO2: 99% 98% 97% 98%   Weight:       Height:           Intake/Output Summary (Last 24 hours) at 5/20/2021 1124  Last data filed at 5/20/2021 1058  Gross per 24 hour   Intake 4424 44 ml   Output 3750 ml   Net 674 44 ml     Weight (last 2 days)     Date/Time   Weight    05/20/21 0600   111 (243 83)    05/19/21 0600   110 (241 84)    05/18/21 0600   106 (233 03)            Patient seen through glass window secondary to covid precautions  General: Critically Ill   Eyes: conjunctivae pink, anicteric sclerae  ENT: ET tube in place  Neck: supple, no JVD  Chest: Remains on the ventilator  CVS: no tachycardia on the monitor  Abdomen: distended abdomen  Extremities: UE edema  Skin: no rash  Neuro sedated      Medications:    Current Facility-Administered Medications:     acetaminophen (TYLENOL) tablet 650 mg, 650 mg, Oral, Q6H PRN, JIHAN Matthews    atorvastatin (LIPITOR) tablet 40 mg, 40 mg, Oral, HS, LaJIHAN Heredia, 40 mg at 05/19/21 2202    bisacodyl (DULCOLAX) rectal suppository 10 mg, 10 mg, Rectal, Daily PRN, JIHAN Strong, 10 mg at 05/14/21 1255    cefepime (MAXIPIME) 2 g/50 mL dextrose IVPB, 2,000 mg, Intravenous, Q12H, JIHAN Menon, Stopped at 05/20/21 0830    chlorhexidine (PERIDEX) 0 12 % oral rinse 15 mL, 15 mL, Mouth/Throat, Q12H [facility], Iesha GoodenNII javier, 15 mL at 05/20/21 0820    cholecalciferol (VITAMIN D3) tablet 2,000 Units, 2,000 Units, Oral, Daily, JIHAN Sapp, 2,000 Units at 05/20/21 0820    dexamethasone (DECADRON) 11 5 mg in sodium chloride 0 9 % 50 mL IVPB, 0 1 mg/kg, Intravenous, Q12H [facility], La Avendano, CRNP, Stopped at 05/20/21 1000    epoprostenol (VELETRI) 30,000 ng/mL in sodium chloride 0 9 % 50 mL inhalation solution, 6 25-50 ng/kg/min (Ideal), Inhalation, Titrated, Marlyne Common, CRNP, Last Rate: 6 8 mL/hr at 05/20/21 0848, 49 708 ng/kg/min at 05/20/21 0848    fentaNYL 1000 mcg in sodium chloride 0 9% 100mL infusion, 75 mcg/hr, Intravenous, Continuous, Iesha Gooden PA-C, Last Rate: 10 mL/hr at 05/20/21 0516, 100 mcg/hr at 05/20/21 0516    furosemide (LASIX) 500 mg infusion 50 mL, 10 mg/hr, Intravenous, Continuous, Amene JOVANY LozoyaNP, Last Rate: 1 mL/hr at 05/19/21 1356, 10 mg/hr at 05/19/21 1356    gabapentin (NEURONTIN) capsule 100 mg, 100 mg, Oral, TID, La K JOVANY AvendanoNP, 100 mg at 05/20/21 0820    heparin (porcine) subcutaneous injection 5,000 Units, 5,000 Units, Subcutaneous, Q8H [address], Iesha Gooden PA-C    insulin regular (HumuLIN R,NovoLIN R) 1 Units/mL in sodium chloride 0 9 % 100 mL infusion, 0 3-21 Units/hr, Intravenous, Titrated, Iesha Gooden PA-C, Last Rate: 6 mL/hr at 05/20/21 0613, 6 Units/hr at 05/20/21 2723    LORazepam (ATIVAN) injection 1 mg, 1 mg, Intravenous, Q4H PRN, JIHAN Krishna, 1 mg at 05/18/21 1419    metroNIDAZOLE (FLAGYL) IVPB (premix) 500 mg 100 mL, 500 mg, Intravenous, Q8H, Jimi Sigala MD, Stopped at 05/20/21 0910    midodrine (PROAMATINE) tablet 5 mg, 5 mg, Oral, TID AC, Iesha Gooden PA-C    [COMPLETED] zinc sulfate (ZINCATE) capsule 220 mg, 220 mg, Oral, Daily, 220 mg at 05/14/21 0913 **FOLLOWED BY** multivitamin-minerals (CENTRUM ADULTS) tablet 1 tablet, 1 tablet, Oral, Daily, Margarite Gabriel JIHAN Avendano, 1 tablet at 05/20/21 0820    norepinephrine (LEVOPHED) 8 mg (DOUBLE CONCENTRATION) IV in sodium chloride 0 9% 250 mL, 1-30 mcg/min, Intravenous, Titrated, JIHAN Carpio, Last Rate: 2 8 mL/hr at 05/20/21 0750, 1 5 mcg/min at 05/20/21 0750    omeprazole (PRILOSEC) suspension 2 mg/mL, 20 mg, Oral, Daily, Iesha Gooden PA-C, 20 mg at 05/20/21 0820    ondansetron (ZOFRAN) injection 4 mg, 4 mg, Intravenous, Q6H PRN, JIHAN Kline, 4 mg at 05/10/21 0935    polyethylene glycol (MIRALAX) packet 17 g, 17 g, Oral, Daily, JIHAN Carpio, 17 g at 05/19/21 0801    potassium chloride 40 mEq IVPB (premix), 40 mEq, Intravenous, Q2H, Iesha Gooden PA-C, Last Rate: 50 mL/hr at 05/20/21 1101, 40 mEq at 05/20/21 1101    propofol (DIPRIVAN) 1000 mg in 100 mL infusion (premix), 5-50 mcg/kg/min, Intravenous, Titrated, Iesha Gooden PA-C, Last Rate: 28 6 mL/hr at 05/20/21 1046, 45 mcg/kg/min at 05/20/21 1046    senna-docusate sodium (SENOKOT S) 8 6-50 mg per tablet 2 tablet, 2 tablet, Oral, BID, Paz Skinner MD, 2 tablet at 05/19/21 0801    Invasive Devices:   Urethral Catheter Temperature probe (Active)   Reasons to continue Urinary Catheter  Accurate I&O assessment in critically ill patients (48 hr  max) 05/20/21 0830   Goal for Removal Remove after 48 hrs of I/O monitoring 05/20/21 0830   Site Assessment Clean;Skin intact 05/20/21 0830   Collection Container Standard drainage bag 05/20/21 0830   Securement Method Tape 05/20/21 0830   Output (mL) 135 mL 05/20/21 1000     Lab Results:   Results from last 7 days   Lab Units 05/20/21  0628 05/19/21  1810 05/19/21  0609 05/18/21  2042 05/18/21  1151 05/18/21  1110 05/18/21  0719 05/18/21  0442 05/17/21  0428 05/16/21  0337 05/15/21  0359 05/15/21  0358 05/14/21  0536  05/13/21  1519   WBC Thousand/uL 21 41*  --  28 84*  --   --   --   --  22 65* 19 38* 13 05*  --  10 09 11 93*   < >  --    HEMOGLOBIN g/dL 9 0*  --  9 9*  --   --   --   --  9 7* 10 4* 11 6*  --  11 1* 11 2*   < >  --    HEMATOCRIT % 26 4*  --  30 2*  --   --   --   --  29 4* 31 8* 35 8*  --  33 1* 34 3*   < >  --    PLATELETS Thousands/uL 85*  --  104*  --   --   --   --  107* 112* 183  --  173 163   < >  --    POTASSIUM mmol/L 3 3* 3 3* 3 3* 3 2*  --   --  3 8 4 1 4 7 4 6  --  4 0 4 5  --  4 2   CHLORIDE mmol/L 102 103 103 105  --   --  105 107 110* 106  --  107 110*  --  108   CO2 mmol/L 27 25 24 25  --   --  24 24 28 26  --  27 27  --  27   BUN mg/dL 85* 83* 79* 72*  --   --  71* 70* 65* 53*  --  54* 52*  --  52*   CREATININE mg/dL 2 64* 2 69* 2 53* 2 42*  --   --  2 41* 2 32* 1 93* 1 76* --  1 80* 1 68*  --  1 84*   CALCIUM mg/dL 7 6* 7 7* 7 5* 7 2*  --   --  7 5* 7 5* 7 7* 8 0*  --  8 0* 8 0*  --  7 9*   MAGNESIUM mg/dL 2 5  --   --   --   --   --   --   --   --   --  2 6  --   --   --  2 7*   PHOSPHORUS mg/dL 4 5*  --   --   --   --   --   --   --   --   --   --   --   --   --   --    ALK PHOS U/L  --   --  150*  --   --   --   --  177* 253*  --   --  220* 172*  --   --    ALT U/L  --   --  32  --   --   --   --  36 44  --   --  56 59  --   --    AST U/L  --   --  18  --   --   --   --  20 62*  --   --  55* 57*  --   --    BLOOD CULTURE   --   --   --   --   --  No Growth at 24 hrs  No Growth at 24 hrs   --   --   --   --   --   --   --   --   --    LEUKOCYTES UA   --   --   --   --  Trace*  --   --   --   --   --   --   --   --   --   --    BLOOD UA   --   --   --   --  Negative  --   --   --   --   --   --   --   --   --   --     < > = values in this interval not displayed  Portions of the record may have been created with voice recognition software  Occasional wrong word or "sound a like" substitutions may have occurred due to the inherent limitations of voice recognition software  Read the chart carefully and recognize, using context, where substitutions have occurred  If you have any questions, please contact the dictating provider

## 2021-05-21 PROBLEM — R79.89 ELEVATED LIVER FUNCTION TESTS: Status: ACTIVE | Noted: 2021-01-01

## 2021-05-21 PROBLEM — E87.0 HYPERNATREMIA: Status: RESOLVED | Noted: 2021-01-01 | Resolved: 2021-01-01

## 2021-05-21 NOTE — PLAN OF CARE
Problem: Potential for Falls  Goal: Patient will remain free of falls  Description: INTERVENTIONS:  - Assess patient frequently for physical needs  -  Identify cognitive and physical deficits and behaviors that affect risk of falls    -  [place] fall precautions as indicated by assessment   - Educate patient/family on patient safety including physical limitations  - Instruct patient to call for assistance with activity based on assessment  - Modify environment to reduce risk of injury  - Consider OT/PT consult to assist with strengthening/mobility  Outcome: Progressing     Problem: Prexisting or High Potential for Compromised Skin Integrity  Goal: Skin integrity is maintained or improved  Description: INTERVENTIONS:  - Identify patients at risk for skin breakdown  - Assess and monitor skin integrity  - Assess and monitor nutrition and hydration status  - Monitor labs   - Assess for incontinence   - Turn and reposition patient  - Assist with mobility/ambulation  - Relieve pressure over bony prominences  - Avoid friction and shearing  - Provide appropriate hygiene as needed including keeping skin clean and dry  - Evaluate need for skin moisturizer/barrier cream  - Collaborate with interdisciplinary team   - Patient/family teaching  - Consider wound care consult   Outcome: Progressing     Problem: PAIN - ADULT  Goal: Verbalizes/displays adequate comfort level or baseline comfort level  Description: Interventions:  - Encourage patient to monitor pain and request assistance  - Assess pain using appropriate pain scale  - Administer analgesics based on type and severity of pain and evaluate response  - Implement non-pharmacological measures as appropriate and evaluate response  - Consider cultural and social influences on pain and pain management  - Notify physician/advanced practitioner if interventions unsuccessful or patient reports new pain  Outcome: Progressing     Problem: INFECTION - ADULT  Goal: Absence or prevention of progression during hospitalization  Description: INTERVENTIONS:  - Assess and monitor for signs and symptoms of infection  - Monitor lab/diagnostic results  - Monitor all insertion sites, i e  indwelling lines, tubes, and drains  - Monitor endotracheal if appropriate and nasal secretions for changes in amount and color  - [place] appropriate cooling/warming therapies per order  - Administer medications as ordered  - Instruct and encourage patient and family to use good hand hygiene technique  - Identify and instruct in appropriate isolation precautions for identified infection/condition  Outcome: Progressing     Problem: SAFETY ADULT  Goal: Patient will remain free of falls  Description: INTERVENTIONS:  - Assess patient frequently for physical needs  -  Identify cognitive and physical deficits and behaviors that affect risk of falls    -  [place] fall precautions as indicated by assessment   - Educate patient/family on patient safety including physical limitations  - Instruct patient to call for assistance with activity based on assessment  - Modify environment to reduce risk of injury  - Consider OT/PT consult to assist with strengthening/mobility  Outcome: Progressing  Goal: Maintain or return to baseline ADL function  Description: INTERVENTIONS:  -  Assess patient's ability to carry out ADLs; assess patient's baseline for ADL function and identify physical deficits which impact ability to perform ADLs (bathing, care of mouth/teeth, toileting, grooming, dressing, etc )  - Assess/evaluate cause of self-care deficits   - Assess range of motion  - Assess patient's mobility; develop plan if impaired  - Assess patient's need for assistive devices and provide as appropriate  - Encourage maximum independence but intervene and supervise when necessary  - Involve family in performance of ADLs  - Assess for home care needs following discharge   - Consider OT consult to assist with ADL evaluation and planning for discharge  - Provide patient education as appropriate  Outcome: Progressing  Goal: Maintain or return mobility status to optimal level  Description: INTERVENTIONS:  - Assess patient's baseline mobility status (ambulation, transfers, stairs, etc )    - Identify cognitive and physical deficits and behaviors that affect mobility  - Identify mobility aids required to assist with transfers and/or ambulation (gait belt, sit-to-stand, lift, walker, cane, etc )  - [place] fall precautions as indicated by assessment  - Record patient progress and toleration of activity level on Mobility SBAR; progress patient to next Phase/Stage  - Instruct patient to call for assistance with activity based on assessment  - Consider rehabilitation consult to assist with strengthening/weightbearing, etc   Outcome: Progressing     Problem: DISCHARGE PLANNING  Goal: Discharge to home or other facility with appropriate resources  Description: INTERVENTIONS:  - Identify barriers to discharge w/patient and caregiver  - Arrange for needed discharge resources and transportation as appropriate  - Identify discharge learning needs (meds, wound care, etc )  - Arrange for interpretive services to assist at discharge as needed  - Refer to Case Management Department for coordinating discharge planning if the patient needs post-hospital services based on physician/advanced practitioner order or complex needs related to functional status, cognitive ability, or social support system  Outcome: Progressing     Problem: Knowledge Deficit  Goal: Patient/family/caregiver demonstrates understanding of disease process, treatment plan, medications, and discharge instructions  Description: Complete learning assessment and assess knowledge base    Interventions:  - Provide teaching at level of understanding  - Provide teaching via preferred learning methods  Outcome: Progressing     Problem: Nutrition/Hydration-ADULT  Goal: Nutrient/Hydration intake appropriate for improving, restoring or maintaining nutritional needs  Description: Monitor and assess patient's nutrition/hydration status for malnutrition  Collaborate with interdisciplinary team and initiate plan and interventions as ordered  Monitor patient's weight and dietary intake as ordered or per policy  Utilize nutrition screening tool and intervene as necessary  Determine patient's food preferences and provide high-protein, high-caloric foods as appropriate       INTERVENTIONS:  - Monitor oral intake, urinary output, labs, and treatment plans  - Assess nutrition and hydration status and recommend course of action  - Evaluate amount of meals eaten  - Assist patient with eating if necessary   - Allow adequate time for meals  - Recommend/ encourage appropriate diets, oral nutritional supplements, and vitamin/mineral supplements  - Order, calculate, and assess calorie counts as needed  - Recommend, monitor, and adjust tube feedings and TPN/PPN based on assessed needs  - Assess need for intravenous fluids  - Provide specific nutrition/hydration education as appropriate  - Include patient/family/caregiver in decisions related to nutrition  Outcome: Progressing     Problem: SAFETY,RESTRAINT: NV/NON-SELF DESTRUCTIVE BEHAVIOR  Goal: Remains free of harm/injury (restraint for non violent/non self-detsructive behavior)  Description: INTERVENTIONS:  - Instruct patient/family regarding restraint use   - Assess and monitor physiologic and psychological status   - Provide interventions and comfort measures to meet assessed patient needs   - Identify and implement measures to help patient regain control  - Assess readiness for release of restraint   Outcome: Progressing  Goal: Returns to optimal restraint-free functioning  Description: INTERVENTIONS:  - Assess the patient's behavior and symptoms that indicate continued need for restraint  - Identify and implement measures to help patient regain control  - Assess readiness for release of restraint   Outcome: Progressing     Problem: RESPIRATORY - ADULT  Goal: Achieves optimal ventilation and oxygenation  Description: INTERVENTIONS:  - Assess for changes in respiratory status  - Assess for changes in mentation and behavior  - Position to facilitate oxygenation and minimize respiratory effort  - Oxygen administered by appropriate delivery if ordered  - Initiate smoking cessation education as indicated  - Encourage broncho-pulmonary hygiene including cough, deep breathe, Incentive Spirometry  - Assess the need for suctioning and aspirate as needed  - Assess and instruct to report SOB or any respiratory difficulty  - Respiratory Therapy support as indicated  Outcome: Progressing     Problem: GASTROINTESTINAL - ADULT  Goal: Maintains or returns to baseline bowel function  Description: INTERVENTIONS:  - Assess bowel function  - Encourage oral fluids to ensure adequate hydration  - Administer IV fluids if ordered to ensure adequate hydration  - Administer ordered medications as needed  - Encourage mobilization and activity  - Consider nutritional services referral to assist patient with adequate nutrition and appropriate food choices  Outcome: Progressing     Problem: GENITOURINARY - ADULT  Goal: Maintains or returns to baseline urinary function  Description: INTERVENTIONS:  - Assess urinary function  - Encourage oral fluids to ensure adequate hydration if ordered  - Administer IV fluids as ordered to ensure adequate hydration  - Administer ordered medications as needed  - Offer frequent toileting  - Follow urinary retention protocol if ordered  Outcome: Progressing     Problem: METABOLIC, FLUID AND ELECTROLYTES - ADULT  Goal: Electrolytes maintained within normal limits  Description: INTERVENTIONS:  - Monitor labs and assess patient for signs and symptoms of electrolyte imbalances  - Administer electrolyte replacement as ordered  - Monitor response to electrolyte replacements, including repeat lab results as appropriate  - Instruct patient on fluid and nutrition as appropriate  Outcome: Progressing  Goal: Glucose maintained within target range  Description: INTERVENTIONS:  - Monitor Blood Glucose as ordered  - Assess for signs and symptoms of hyperglycemia and hypoglycemia  - Administer ordered medications to maintain glucose within target range  - Assess nutritional intake and initiate nutrition service referral as needed  Outcome: Progressing     Problem: CARDIOVASCULAR - ADULT  Goal: Maintains optimal cardiac output and hemodynamic stability  Description: INTERVENTIONS:  - Monitor I/O, vital signs and rhythm  - Monitor for S/S and trends of decreased cardiac output  - Administer and titrate ordered vasoactive medications to optimize hemodynamic stability  - Assess quality of pulses, skin color and temperature  - Assess for signs of decreased coronary artery perfusion  - Instruct patient to report change in severity of symptoms  Outcome: Progressing  Goal: Absence of cardiac dysrhythmias or at baseline rhythm  Description: INTERVENTIONS:  - Continuous cardiac monitoring, vital signs, obtain 12 lead EKG if ordered  - Administer antiarrhythmic and heart rate control medications as ordered  - Monitor electrolytes and administer replacement therapy as ordered  Outcome: Progressing     Problem: HEMATOLOGIC - ADULT  Goal: Maintains hematologic stability  Description: INTERVENTIONS  - Assess for signs and symptoms of bleeding or hemorrhage  - Monitor labs  - Administer supportive blood products/factors as ordered and appropriate  Outcome: Progressing

## 2021-05-21 NOTE — ASSESSMENT & PLAN NOTE
Recent Labs     05/19/21  0609 05/20/21  0628 05/21/21  0355   * 85* 85*       · Likely secondary to sepsis and antibiotic use  · No evidence of active bleeding    · Monitor CBC

## 2021-05-21 NOTE — ASSESSMENT & PLAN NOTE
· With transient second degree heart blocks  · Cardiology following  · External pacemaker not beneficial at this point per cardiology  · Off vasopressors  · Single ICD with appropriate pacing at 40 bpm  · Consider increasing lower rate limit of ICD to 50-60 bpm per cardiology recommendations if heart block or arrhyhtmia develops again

## 2021-05-21 NOTE — ASSESSMENT & PLAN NOTE
· Secondary to COVID-19 infection  · Initial positive 5/7 with unclear symptom onset  · Intubated on 5/17 secondary to progressive respiratory failure  · Proned 6PM on 5/17- supine 5/18 with maintenance of saturations > 90  · CXR with bilateral pulmonary opacities  · Decadron   · Continue vitamins  · Completed remdesivir  · Given Actemra 5/13 due to increasing Fio2 requirements and elevated CRP  · Completed initial course of antibiotics but procal was elevated on 5/17 with an increase in WBC and progressive respiratory failure  · Started on cefepime, and flagyl   Vanco discontinued  · Continue iVeletri   · Continue diuresis with lasix infusion and metolazone

## 2021-05-21 NOTE — ASSESSMENT & PLAN NOTE
· Present on admission  · Repeat CT head 5/13: No significant interval change since prior examination  Stable advanced chronic microvascular ischemic change and chronic right cerebellar lacunar infarct  · Etiology unclear: covid 19/sepsis/hypoxia vs  delerium vs  Uremia less likely  · Baseline AO x 3  · Currently intubated and sedated with propofol and fentanyl  Unable to assess mental status

## 2021-05-21 NOTE — PROGRESS NOTES
[address] [place] [address]  Progress Note Teri Loera Sr  1950, 80 y o  male MRN: [id number]  Unit/Bed#: ICU 03 Encounter: [id number]  Primary Care Provider: Carrie Coughlin MD   Date and time admitted to hospital: 5/7/2021  4:33 PM      * Acute hypoxemic respiratory failure due to COVID-19 [facility])  Assessment & Plan  · Secondary to COVID-19 infection  · Initial positive 5/7 with unclear symptom onset  · Intubated on 5/17 secondary to progressive respiratory failure  · Proned 6PM on 5/17- supine 5/18 with maintenance of saturations > 90  · CXR with bilateral pulmonary opacities  · Decadron   · Continue vitamins  · Completed remdesivir  · Given Actemra 5/13 due to increasing Fio2 requirements and elevated CRP  · Completed initial course of antibiotics but procal was elevated on 5/17 with an increase in WBC and progressive respiratory failure  · Started on cefepime, and flagyl  Vanco discontinued  · Continue iVeletri   · Continue diuresis with lasix infusion and metolazone      Bradycardia  Assessment & Plan  · With transient second degree heart blocks  · Cardiology following  · External pacemaker not beneficial at this point per cardiology  · Off vasopressors  · Single ICD with appropriate pacing at 40 bpm  · Consider increasing lower rate limit of ICD to 50-60 bpm per cardiology recommendations if heart block or arrhyhtmia develops again  Severe sepsis ([facility] [address] )  Assessment & Plan  · Patient originally with hypothermia, leukocytosis and hypotension requiring vasopressors  · Currently on Cefazolin  Total antibiotic day 5  · Leukocytosis improving  · Procalcitonin trending down  Peaked at 3 6  · Blood cultures with no growth to date     · Monitor CBC with differential  · Monitor blood cultures    Elevated liver function tests  Assessment & Plan  Recent Labs     05/19/21  0609   AST 18   ALT 32   ALKPHOS 150*   TBILI 0 60       · Improving  · Right upper quadrant ultrasound with evidence of cholelithiasis without wall thickening or pericholecystic fluid  · Continue to monitor    Acute encephalopathy  Assessment & Plan  · Present on admission  · Repeat CT head 5/13: No significant interval change since prior examination  Stable advanced chronic microvascular ischemic change and chronic right cerebellar lacunar infarct  · Etiology unclear: covid 19/sepsis/hypoxia vs  delerium vs  Uremia less likely  · Baseline AO x 3  · Currently intubated and sedated with propofol and fentanyl  Unable to assess mental status  Acute renal failure superimposed on chronic kidney disease [facility])  Assessment & Plan  Recent Labs     05/20/21  0628 05/20/21  1817 05/21/21  0355   CREATININE 2 64* 2 76* 2 73*   EGFR 27 26 26     Estimated Creatinine Clearance: 30 3 mL/min (A) (by C-G formula based on SCr of 2 73 mg/dL (H))  · Hx of CKD III, baseline sCr 1 8 - 1 9  · GAGE on presentation with sCr of 4 97  · Etiology multifactorial secondary to pre renal azotemia, ATN and rhabdomyolysis present on admission  · On lasix and metolazone at home  · Placed on a lasix infusion 10 mg/hr on 5/17 with good response  · Monitor I and O  · Need to monitor his renal indices closely as they have been slowly rising in the setting of the lasix infusion    Thrombocytopenia [facility])  Assessment & Plan  Recent Labs     05/19/21  0609 05/20/21  0628 05/21/21  0355   * 85* 85*       · Likely secondary to sepsis and antibiotic use  · No evidence of active bleeding    · Monitor CBC    Hyperlipidemia  Assessment & Plan  · Continue lipitor     Obesity with body mass index 30 or greater  Assessment & Plan  · Nutrition consult when able  · Known risk factor for severity of COVID     Chronic combined systolic and diastolic CHF, NYHA class 3 (HCC)  Assessment & Plan  Wt Readings from Last 3 Encounters:   05/21/21 110 kg (242 lb 8 1 oz)   03/19/21 118 kg (261 lb)   03/18/21 118 kg (261 lb)     · BNP 7000 on admission  · 4/21 ECHO: The ventricle was mildly to moderately dilated  Systolic function was mildly to moderately reduced  Ejection fraction was estimated to be 40 %  There was mild to moderate diffuse hypokinesis  · Monitor volume status  · He was placed on a continuous lasix infusion with good response  · Will need to watch his creatinine as it has slowly been climbing  · No longer requiring vasopressors  Type 2 diabetes mellitus with kidney complication, with long-term current use of insulin [facility])  Assessment & Plan  Lab Results   Component Value Date    HGBA1C 6 0 03/19/2021       Recent Labs     05/21/21  0011 05/21/21  0225 05/21/21  0353 05/21/21  0534   POCGLU 131 127 100 152*       Blood Sugar Average: Last 72 hrs:  (P) 175 [id number]     · On jardiance and insulin at home  · Continue Lantus 10 unit HS and SSI    · Our goal will be to maintain his blood glucose between 140 and 180  · Continue insulin infusion    Hypernatremia-resolved as of 5/21/2021  Assessment & Plan  Recent Labs     05/20/21  0628 05/20/21  1817 05/21/21  0355   SODIUM 138 140 140       · Resolved  · F/u with repeat BMP         ----------------------------------------------------------------------------------------  HPI/24hr events: Hospital day 14  Vent day 5  Current settings [address] 26/450/60/14  Levophed has been discontinued  Patient is on Lasix gtt, propofol and fentanyl  Patient had events of hypoxia overnight that improved after suctioning of copious secretions through the ETT  No other overnight events reported      Disposition: Continue Critical Care   Code Status: Level 1 - Full Code  ---------------------------------------------------------------------------------------  SUBJECTIVE  Intubated and sedated    Review of Systems   Unable to perform ROS: Intubated       ---------------------------------------------------------------------------------------  OBJECTIVE    Vitals   Vitals:    05/21/21 5560 05/21/21 9516 05/21/21 0614 05/21/21 0616   BP: Pulse: 90 98 92 84   Resp: (!) 26 (!) 35 (!) 25 (!) 25   Temp:       TempSrc:       SpO2: 97% (!) 84% (!) 82% 99%   Weight:       Height:         Temp (24hrs), Av °F (36 7 °C), Min:96 8 °F (36 °C), Max:99 °F (37 2 °C)  Current: Temperature: 99 °F (37 2 °C)  Arterial Line BP: 108/38  Arterial Line MAP (mmHg): 54 mmHg    Respiratory:  SpO2: SpO2: 99 %, SpO2 Activity: SpO2 Activity: At Rest, SpO2 Device: O2 Device: Ventilator  Nasal Cannula O2 Flow Rate (L/min): 15 L/min    Invasive/non-invasive ventilation settings   Respiratory    Lab Data (Last 4 hours)    None         O2/Vent Data (Last 4 hours)    None                Physical Exam  Vitals signs reviewed  Constitutional:       Appearance: He is obese  He is ill-appearing  Interventions: He is intubated  HENT:      Head: Normocephalic and atraumatic  Nose: Nose normal       Mouth/Throat:      Mouth: Mucous membranes are moist    Eyes:      Pupils: Pupils are equal, round, and reactive to light  Neck:      Musculoskeletal: Normal range of motion and neck supple  Cardiovascular:      Rate and Rhythm: Normal rate and regular rhythm  Pulses: Normal pulses  Heart sounds: Normal heart sounds  Pulmonary:      Effort: He is intubated  Breath sounds: Decreased breath sounds present  No wheezing  Abdominal:      General: Abdomen is flat  Bowel sounds are normal  There is no distension  Palpations: Abdomen is soft  Tenderness: There is no abdominal tenderness  Musculoskeletal: Normal range of motion  Skin:     General: Skin is warm and dry     Neurological:      Comments: Sedated   Psychiatric:      Comments: BROCK due to sedation            Laboratory and Diagnostics:  Results from last 7 days   Lab Units 21  0355 21  0628 21  0609 21  0442 21  0428 21  0337 05/15/21  0358   WBC Thousand/uL 15 98* 21 41* 28 84* 22 65* 19 38* 13 05* 10 09   HEMOGLOBIN g/dL 9 1* 9 0* 9 9* 9 7* 10 4* 11 6* 11 1*   HEMATOCRIT % 27 6* 26 4* 30 2* 29 4* 31 8* 35 8* 33 1*   PLATELETS Thousands/uL 85* 85* 104* 107* 112* 183 173   NEUTROS PCT % 91*  --  94*  --   --  93* 88*   BANDS PCT %  --   --   --  9*  --   --   --    MONOS PCT % 4  --  1*  --   --  4 6   MONO PCT %  --  4  --  2*  --   --   --      Results from last 7 days   Lab Units 05/21/21  0355 05/20/21  1817 05/20/21  0628 05/19/21  1810 05/19/21  0609 05/18/21  2042 05/18/21  0719 05/18/21  0442 05/17/21  0428  05/15/21  0358   SODIUM mmol/L 140 140 138 140 140 141 141 143 143   < > 144   POTASSIUM mmol/L 3 7 4 0 3 3* 3 3* 3 3* 3 2* 3 8 4 1 4 7   < > 4 0   CHLORIDE mmol/L 104 104 102 103 103 105 105 107 110*   < > 107   CO2 mmol/L 26 25 27 25 24 25 24 24 28   < > 27   ANION GAP mmol/L 10 11 9 12 13 11 12 12 5   < > 10   BUN mg/dL 99* 97* 85* 83* 79* 72* 71* 70* 65*   < > 54*   CREATININE mg/dL 2 73* 2 76* 2 64* 2 69* 2 53* 2 42* 2 41* 2 32* 1 93*   < > 1 80*   CALCIUM mg/dL 7 7* 7 6* 7 6* 7 7* 7 5* 7 2* 7 5* 7 5* 7 7*   < > 8 0*   GLUCOSE RANDOM mg/dL 114 144* 176* 148* 192* 201* 278* 290* 213*   < > 183*   ALT U/L  --   --   --   --  32  --   --  36 44  --  56   AST U/L  --   --   --   --  18  --   --  20 62*  --  55*   ALK PHOS U/L  --   --   --   --  150*  --   --  177* 253*  --  220*   ALBUMIN g/dL  --   --   --   --  2 4*  --   --  2 5* 2 2*  --  2 5*   TOTAL BILIRUBIN mg/dL  --   --   --   --  0 60  --   --  0 65 0 82  --  0 58    < > = values in this interval not displayed  Results from last 7 days   Lab Units 05/21/21  0355 05/20/21  0628 05/15/21  0359   MAGNESIUM mg/dL 2 5 2 5 2 6   PHOSPHORUS mg/dL 4 5* 4 5*  --       Results from last 7 days   Lab Units 05/20/21  1008 05/20/21  0409 05/19/21 2017 05/19/21  1331 05/19/21  0610 05/19/21  0028 05/18/21  1832  05/17/21  1344   INR   --   --   --   --   --   --   --   --  1 49*   PTT seconds 94* 67* 59* 101* 53* 62* 66*   < > 28    < > = values in this interval not displayed        Results from last 7 days   Lab Units 05/19/21  1112   TROPONIN I ng/mL 0 07*         ABG:  Results from last 7 days   Lab Units 05/19/21  0612   PH ART  7 400   PCO2 ART mm Hg 35 9*   PO2 ART mm Hg 105 1   HCO3 ART mmol/L 21 7*   BASE EXC ART mmol/L -2 6   ABG SOURCE  Line, Arterial     VBG:  Results from last 7 days   Lab Units 05/19/21  0612   ABG SOURCE  Line, Arterial     Results from last 7 days   Lab Units 05/20/21  0628 05/19/21  0609 05/18/21  0442 05/17/21  1344   PROCALCITONIN ng/ml 0 73* 0 97* 1 38* 3 60*       Micro  Results from last 7 days   Lab Units 05/18/21  1152 05/18/21  1110   BLOOD CULTURE   --  No Growth at 48 hrs  No Growth at 48 hrs  SPUTUM CULTURE  3+ Growth of   --    GRAM STAIN RESULT  No Epithelial cells seen  Rare Polys  No bacteria seen  --    MRSA CULTURE ONLY  No Methicillin Resistant Staphlyococcus aureus (MRSA) isolated  --          Imaging: I have personally reviewed pertinent reports  Intake and Output  I/O       05/19 0701 - 05/20 0700 05/20 0701 - 05/21 0700 05/21 0701 - 05/22 0700    I V  (mL/kg) 1937 7 (17 5) 1024 2 (9 3)     NG/GT 2513 620     IV Piggyback 500 550     Feedings  669     Total Intake(mL/kg) 4950 7 (44 6) 2863 2 (26)     Urine (mL/kg/hr) 4100 (1 5) 2040 (0 8)     Stool 0 0     Total Output 4100 2040     Net +850 7 +823 2            Unmeasured Stool Occurrence 2 x 4 x           Height and Weights   Height: 5' 8" (172 7 cm)  IBW (Ideal Body Weight): 68 4 kg  Body mass index is 36 87 kg/m²  Weight (last 2 days)     Date/Time   Weight    05/21/21 0600   110 (242 51)    05/21/21 0315   110 (243 17)    05/20/21 0600   111 (243 83)    05/19/21 0600   110 (241 84)                Nutrition       Diet Orders   (From admission, onward)             Start     Ordered    05/20/21 1057  Diet Enteral/Parenteral; Tube Feeding No Oral Diet; Jevity 1 2 Micheal; Continuous; 30; Prosource TF- Two Packets; 150;  Water; Every 4 hours  Diet effective now     Question Answer Comment   Diet Type Enteral/Parenteral    Enteral/Parenteral Tube Feeding No Oral Diet    Tube Feeding Formula: Jevity 1 2 Micheal    Bolus/Cyclic/Continuous Continuous    Tube Feeding Goal Rate (mL/hr): 30    Prosource Protein Liquid - No Carb Prosource TF- Two Packets    Tube Feeding flush (mL): 150    Water Flush type: Water    Water flush frequency: Every 4 hours    RD to adjust diet per protocol?  Yes        05/20/21 1057                  Active Medications  Scheduled Meds:  Current Facility-Administered Medications   Medication Dose Route Frequency Provider Last Rate    acetaminophen  650 mg Oral Q6H PRN JIHAN Ashley      atorvastatin  40 mg Oral HS [place] JIHAN Avendano      bisacodyl  10 mg Rectal Daily PRN JIHAN Shepard      cefazolin  1,000 mg Intravenous Q12H [place] JIHAN Montano 1,000 mg (05/20/21 2030)    chlorhexidine  15 mL Mouth/Throat Q12H [address], PA-C      cholecalciferol  2,000 Units Oral Daily JIHAN Ashley      dexamethasone  0 1 mg/kg Intravenous Q12H [facility] JIHAN Ashley 11 5 mg (05/20/21 2100)    epoprostenol (VELETRI) inhalation solution 50861 ng/mL  6 25-50 ng/kg/min (Ideal) Inhalation Titrated JIHAN Martinez 49 708 ng/kg/min (05/21/21 0559)    fentaNYL  75 mcg/hr Intravenous Continuous Iesha Gooden PA-C 75 mcg/hr (05/21/21 0453)    furosemide  10 mg/hr Intravenous Continuous JIHAN Shepard 10 mg/hr (05/20/21 1642)    gabapentin  100 mg Oral TID JIHAN Hamm      heparin (porcine)  5,000 Units Subcutaneous Q8H [address], PA-C      insulin regular (HumuLIN R,NovoLIN R) infusion  0 3-21 Units/hr Intravenous Titrated Garrison London PA-C 4 Units/hr (05/21/21 0536)    LORazepam  1 mg Intravenous Q4H PRN JIHAN Martinez      midodrine  5 mg Oral TID AC Garrison London PA-C      multivitamin-minerals  1 tablet Oral Daily JIHAN Hamm      norepinephrine  1-30 mcg/min Intravenous Titrated JIHAN Shepard Stopped (05/20/21 1200)    omeprazole (PRILOSEC) suspension 2 mg/mL  20 mg Oral Daily Iesha Gooden PA-C      ondansetron  4 mg Intravenous Q6H PRN JIHAN Mcdermott      polyethylene glycol  17 g Oral Daily JIHAN Tyson      propofol  5-50 mcg/kg/min Intravenous Titrated Basilia Ibanez PA-C 35 mcg/kg/min (05/21/21 3892)    senna-docusate sodium  2 tablet Oral BID Hallie Alex MD       Continuous Infusions:  epoprostenol (VELETRI) inhalation solution 75536 ng/mL, 6 25-50 ng/kg/min (Ideal), Last Rate: 49 708 ng/kg/min (05/21/21 0559)  fentaNYL, 75 mcg/hr, Last Rate: 75 mcg/hr (05/21/21 0453)  furosemide, 10 mg/hr, Last Rate: 10 mg/hr (05/20/21 1642)  insulin regular (HumuLIN R,NovoLIN R) infusion, 0 3-21 Units/hr, Last Rate: 4 Units/hr (05/21/21 0536)  norepinephrine, 1-30 mcg/min, Last Rate: Stopped (05/20/21 1200)  propofol, 5-50 mcg/kg/min, Last Rate: 35 mcg/kg/min (05/21/21 0626)      PRN Meds:   acetaminophen, 650 mg, Q6H PRN  bisacodyl, 10 mg, Daily PRN  LORazepam, 1 mg, Q4H PRN  ondansetron, 4 mg, Q6H PRN        Invasive Devices Review  Invasive Devices     Central Venous Catheter Line            CVC Central Lines 05/12/21 Double 8 days          Peripheral Intravenous Line            Peripheral IV 05/18/21 Right Arm 2 days          Arterial Line            Arterial Line 05/17/21 Radial 3 days          Line            Hemodialysis AV Fistula 09/06/19 Upper arm 623 days          Drain            NG/OG/Enteral Tube Orogastric 4 days    Urethral Catheter less than 1 day          Airway            ETT  4 days                  ---------------------------------------------------------------------------------------  Advance Directive and Living Will:      Power of :    POLST:    ---------------------------------------------------------------------------------------  Care Time Delivered:   Please, see attending's attestation      Hallie Alex MD      Portions of the record may have been created with voice recognition software  Occasional wrong word or "sound a like" substitutions may have occurred due to the inherent limitations of voice recognition software    Read the chart carefully and recognize, using context, where substitutions have occurred

## 2021-05-21 NOTE — ASSESSMENT & PLAN NOTE
Wt Readings from Last 3 Encounters:   05/21/21 110 kg (242 lb 8 1 oz)   03/19/21 118 kg (261 lb)   03/18/21 118 kg (261 lb)     · BNP 7000 on admission  · 4/21 ECHO: The ventricle was mildly to moderately dilated  Systolic function was mildly to moderately reduced  Ejection fraction was estimated to be 40 %  There was mild to moderate diffuse hypokinesis  · Monitor volume status  · He was placed on a continuous lasix infusion with good response  · Will need to watch his creatinine as it has slowly been climbing  · No longer requiring vasopressors

## 2021-05-21 NOTE — PROGRESS NOTES
NEPHROLOGY PROGRESS NOTE   Melissa Orozco Sr  80 y o  male MRN: [id number]  Unit/Bed#: ICU 03 Encounter: [id number]      ASSESSMENT & PLAN    1  GAGE (POA) on CKD  ? baseline creatinine 1 8-1 9-->2 4-->2 53-->2 64-->2 72  ? admission creatinine was 4 97-and the recent rise in serum creatinine is likely related to diuresis  ? etiology  Multifactorial- pre renal azotemia/ auto regulatory failure from ARB/ rhabdomyolysis with ATN   ? renal function did improve creatinine went to 1 7 2nd hit now creatinine with diuresis  ?  continue to monitor urine output  ? Lasix gtt restarted and being titrated he is continuing now  ? will monitor closely and discussed with ICU team     2  Electrolytes/Acid Base  ?  no anion gap  ?  current electrolytes are acceptable     3  Hypotension- in the setting of respiratory failure,  Acute kidney injury and COVID pneumonia  ? This has improved  ?  continue to keep maps above 65  ?  now on midodrine 5 mg t i d  seems like A-line blood pressures are elevated if these are accurate could consider discontinuing midodrine     4   Anemia of  Chronic illness  ?  continue to trend hemoglobin     5   acute hypoxemic respiratory failure due to COVID-19  ?  unfortunately intubated  ?  on going ICU treatment     6   chronic combined systolic and diastolic congestive heart failure  ?  EF is 40% with diffuse hypokinesis  ?  avoid BP lowering medication  ?  continue diuresis as tolerated       SUBJECTIVE:     patient remains critically ill   ventilated in the ICU    OBJECTIVE:  Current Weight: Weight - Scale: 110 kg (242 lb 8 1 oz)  Vitals:    05/21/21 0605 05/21/21 0608 05/21/21 0614 05/21/21 0616   BP:       Pulse: 90 98 92 84   Resp: (!) 26 (!) 35 (!) 25 (!) 25   Temp:       TempSrc:       SpO2: 97% (!) 84% (!) 82% 99%   Weight:       Height:           Intake/Output Summary (Last 24 hours) at 5/21/2021 1140  Last data filed at 5/21/2021 0827  Gross per 24 hour   Intake 2245 26 ml   Output 1830 ml   Net 415 26 ml     Weight (last 2 days)     Date/Time   Weight    05/21/21 0600   110 (242 51)    05/21/21 0315   110 (243 17)    05/20/21 0600   111 (243 83)    05/19/21 0600   110 (241 84)            Seen from glass window in ICU per covid protocol  General: critically ill  Eyes: pallor  ENT: lips and mucous membranes moist  Neck: supple, no JVD  Chest: course breath sounds  CVS: normal heart rate, no friction rub  Abdomen: soft non distended or tender  Extremities: no edema of both legs  Skin: no rash  Neuro: sedated      Medications:    Current Facility-Administered Medications:     acetaminophen (TYLENOL) tablet 650 mg, 650 mg, Oral, Q6H PRN, JIHAN Riley    atorvastatin (LIPITOR) tablet 40 mg, 40 mg, Oral, HS, JIHAN Riley, 40 mg at 05/20/21 2100    bisacodyl (DULCOLAX) rectal suppository 10 mg, 10 mg, Rectal, Daily PRN, JIHAN Clark, 10 mg at 05/14/21 1255    ceFAZolin (ANCEF) IVPB (premix in dextrose) 1,000 mg 50 mL, 1,000 mg, Intravenous, Q12H, JIHAN Foy, Last Rate: 100 mL/hr at 05/21/21 0829, 1,000 mg at 05/21/21 0829    chlorhexidine (PERIDEX) 0 12 % oral rinse 15 mL, 15 mL, Mouth/Throat, Q12H [facility], 15 mL at 05/21/21 2101    cholecalciferol (VITAMIN D3) tablet 2,000 Units, 2,000 Units, Oral, Daily, Blondell Goldberg Bilofsky, CRNP, 2,000 Units at 05/21/21 0828    [START ON 5/22/2021] dexamethasone (DECADRON) injection 6 mg, 6 mg, Intravenous, Daily, Sruthi Calvillo MD    epoprostenol (VELETRI) 30,000 ng/mL in sodium chloride 0 9 % 50 mL inhalation solution, 6 25-50 ng/kg/min (Ideal), Inhalation, Titrated, Cristina Fix, CRNP, Last Rate: 6 8 mL/hr at 05/21/21 0559, 49 708 ng/kg/min at 05/21/21 0559    fentaNYL 1000 mcg in sodium chloride 0 9% 100mL infusion, 75 mcg/hr, Intravenous, Continuous, Iesha Gooden PA-C, Last Rate: 7 5 mL/hr at 05/21/21 0453, 75 mcg/hr at 05/21/21 0453    furosemide (LASIX) 500 mg infusion 50 mL, 10 mg/hr, Intravenous, Continuous, JIHAN Michael, Last Rate: 1 mL/hr at 05/20/21 1642, 10 mg/hr at 05/20/21 1642    gabapentin (NEURONTIN) capsule 100 mg, 100 mg, Oral, TID, JIHAN Junior, 100 mg at 05/21/21 9217    heparin (porcine) subcutaneous injection 5,000 Units, 5,000 Units, Subcutaneous, Q8H [facility], Iesha Gooden PA-C, 5,000 Units at 05/21/21 0603    insulin regular (HumuLIN R,NovoLIN R) 1 Units/mL in sodium chloride 0 9 % 100 mL infusion, 0 3-21 Units/hr, Intravenous, Titrated, Iesha Gooden PA-C, Last Rate: 6 mL/hr at 05/21/21 1011, 6 Units/hr at 05/21/21 1011    LORazepam (ATIVAN) injection 1 mg, 1 mg, Intravenous, Q4H PRN, JIHAN Diamond, 1 mg at 05/18/21 1419    midodrine (PROAMATINE) tablet 5 mg, 5 mg, Oral, TID AC, Iesha Gooden PA-C, 5 mg at 05/20/21 1617    [COMPLETED] zinc sulfate (ZINCATE) capsule 220 mg, 220 mg, Oral, Daily, 220 mg at 05/14/21 0913 **FOLLOWED BY** multivitamin-minerals (CENTRUM ADULTS) tablet 1 tablet, 1 tablet, Oral, Daily, JIHAN Junior, 1 tablet at 05/21/21 0828    norepinephrine (LEVOPHED) 8 mg (DOUBLE CONCENTRATION) IV in sodium chloride 0 9% 250 mL, 1-30 mcg/min, Intravenous, Titrated, JIHAN Michael, Stopped at 05/20/21 1200    omeprazole (PRILOSEC) suspension 2 mg/mL, 20 mg, Oral, Daily, Iesha Gooden PA-C, 20 mg at 05/21/21 0828    ondansetron (ZOFRAN) injection 4 mg, 4 mg, Intravenous, Q6H PRN, JIHAN Junior, 4 mg at 05/10/21 0935    polyethylene glycol (MIRALAX) packet 17 g, 17 g, Oral, Daily, JIHAN Michael, 17 g at 05/21/21 0828    propofol (DIPRIVAN) 1000 mg in 100 mL infusion (premix), 5-50 mcg/kg/min, Intravenous, Titrated, Iesha Gooden PA-C, Last Rate: 25 4 mL/hr at 05/21/21 1014, 40 mcg/kg/min at 05/21/21 1014    senna-docusate sodium (SENOKOT S) 8 6-50 mg per tablet 2 tablet, 2 tablet, Oral, BID, Jagdish Villafana MD, 2 tablet at 05/21/21 3436    Invasive Devices:   Urethral Catheter (Active)   Amt returned on insertion(mL) 30 mL 05/21/21 0535   Reasons to continue Urinary Catheter  Accurate I&O assessment in critically ill patients (48 hr  max) 05/21/21 0535   Goal for Removal Remove after 48 hrs of I/O monitoring 05/21/21 0535   Site Assessment Edema;Clean;Patent 05/21/21 0535   Collection Container Standard drainage bag 05/21/21 0535   Securement Method Securing device (Describe) 05/21/21 0535   Output (mL) 225 mL 05/21/21 0827     Lab Results:   Results from last 7 days   Lab Units 05/21/21  0355 05/20/21  1817 05/20/21  0628 05/19/21  1810 05/19/21  0609  05/18/21  1151 05/18/21  1110  05/18/21  0442 05/17/21  0428  05/15/21  0359 05/15/21  0358   WBC Thousand/uL 15 98*  --  21 41*  --  28 84*  --   --   --   --  22 65* 19 38*   < >  --  10 09   HEMOGLOBIN g/dL 9 1*  --  9 0*  --  9 9*  --   --   --   --  9 7* 10 4*   < >  --  11 1*   HEMATOCRIT % 27 6*  --  26 4*  --  30 2*  --   --   --   --  29 4* 31 8*   < >  --  33 1*   PLATELETS Thousands/uL 85*  --  85*  --  104*  --   --   --   --  107* 112*   < >  --  173   POTASSIUM mmol/L 3 7 4 0 3 3* 3 3* 3 3*   < >  --   --    < > 4 1 4 7   < >  --  4 0   CHLORIDE mmol/L 104 104 102 103 103   < >  --   --    < > 107 110*   < >  --  107   CO2 mmol/L 26 25 27 25 24   < >  --   --    < > 24 28   < >  --  27   BUN mg/dL 99* 97* 85* 83* 79*   < >  --   --    < > 70* 65*   < >  --  54*   CREATININE mg/dL 2 73* 2 76* 2 64* 2 69* 2 53*   < >  --   --    < > 2 32* 1 93*   < >  --  1 80*   CALCIUM mg/dL 7 7* 7 6* 7 6* 7 7* 7 5*   < >  --   --    < > 7 5* 7 7*   < >  --  8 0*   MAGNESIUM mg/dL 2 5  --  2 5  --   --   --   --   --   --   --   --   --  2 6  --    PHOSPHORUS mg/dL 4 5*  --  4 5*  --   --   --   --   --   --   --   --   --   --   --    ALK PHOS U/L  --   --   --   --  150*  --   --   --   --  177* 253*  --   --  220*   ALT U/L  --   --   --   --  32  --   --   --   --  36 44  --   --  56   AST U/L  --   --   --   --  18  --   --   --   --  20 62*  --   -- 55*   BLOOD CULTURE   --   --   --   --   --   --   --  No Growth at 48 hrs  No Growth at 48 hrs   --   --   --   --   --   --    LEUKOCYTES UA   --   --   --   --   --   --  Trace*  --   --   --   --   --   --   --    BLOOD UA   --   --   --   --   --   --  Negative  --   --   --   --   --   --   --     < > = values in this interval not displayed  Portions of the record may have been created with voice recognition software  Occasional wrong word or "sound a like" substitutions may have occurred due to the inherent limitations of voice recognition software  Read the chart carefully and recognize, using context, where substitutions have occurred  If you have any questions, please contact the dictating provider

## 2021-05-21 NOTE — ASSESSMENT & PLAN NOTE
Lab Results   Component Value Date    HGBA1C 6 0 03/19/2021       Recent Labs     05/21/21  0011 05/21/21  0225 05/21/21  0353 05/21/21  0534   POCGLU 131 127 100 152*       Blood Sugar Average: Last 72 hrs:  (P) 175 [id number]     · On jardiance and insulin at home  · Continue Lantus 10 unit HS and SSI    · Our goal will be to maintain his blood glucose between 140 and 180  · Continue insulin infusion

## 2021-05-21 NOTE — NURSING NOTE
Patient experiencing multiple episodes of mucus plugging this AM  Patient's O2 drops as low as high 70s during these episodes  After in-line suctioning, patient's O2 improves as high as 100% but only temporarily until O2 would begin to decrease again into the low 80s requiring repeat in-line suctioning  Patient producing copious amounts of thick, tenacious tan & pink tinged sputum

## 2021-05-21 NOTE — ASSESSMENT & PLAN NOTE
Recent Labs     05/19/21  0609   AST 18   ALT 32   ALKPHOS 150*   TBILI 0 60       · Improving  · Right upper quadrant ultrasound with evidence of cholelithiasis without wall thickening or pericholecystic fluid    · Continue to monitor

## 2021-05-21 NOTE — ASSESSMENT & PLAN NOTE
Recent Labs     05/20/21  0628 05/20/21  1817 05/21/21  0355   CREATININE 2 64* 2 76* 2 73*   EGFR 27 26 26     Estimated Creatinine Clearance: 30 3 mL/min (A) (by C-G formula based on SCr of 2 73 mg/dL (H))  · Hx of CKD III, baseline sCr 1 8 - 1 9  · GAGE on presentation with sCr of 4 97  · Etiology multifactorial secondary to pre renal azotemia, ATN and rhabdomyolysis present on admission    · On lasix and metolazone at home  · Placed on a lasix infusion 10 mg/hr on 5/17 with good response  · Monitor I and O  · Need to monitor his renal indices closely as they have been slowly rising in the setting of the lasix infusion

## 2021-05-21 NOTE — ASSESSMENT & PLAN NOTE
· Patient originally with hypothermia, leukocytosis and hypotension requiring vasopressors  · Currently on Cefazolin  Total antibiotic day 5  · Leukocytosis improving  · Procalcitonin trending down  Peaked at 3 6  · Blood cultures with no growth to date     · Monitor CBC with differential  · Monitor blood cultures

## 2021-05-22 NOTE — ASSESSMENT & PLAN NOTE
Lab Results   Component Value Date    HGBA1C 6 0 03/19/2021       Recent Labs     05/21/21  2203 05/22/21  0006 05/22/21  0156 05/22/21  0507   POCGLU 161* 139 156* 172*       Blood Sugar Average: Last 72 hrs:  (P) 161 [id number]     · On jardiance and insulin at home  · Continue insulin infusion   · Our goal will be to maintain his blood glucose between 140 and 180  · Continue insulin infusion

## 2021-05-22 NOTE — ASSESSMENT & PLAN NOTE
· Patient originally with hypothermia, leukocytosis and hypotension requiring vasopressors  · Currently on Cefazolin  Total antibiotic day 6  · Leukocytosis improving  · Procalcitonin trending down  Peaked at 3 6  · Blood cultures with no growth to date     · Monitor CBC with differential  · Monitor blood cultures

## 2021-05-22 NOTE — PROGRESS NOTES
[address] [place] [address]  Progress Note Hay Curry Sr  1950, 80 y o  male MRN: [id number]  Unit/Bed#: ICU 03 Encounter: [id number]  Primary Care Provider: Deanna Guzman MD   Date and time admitted to hospital: 5/7/2021  4:33 PM    * Acute hypoxemic respiratory failure due to COVID-19 [facility])  Assessment & Plan  · Secondary to COVID-19 infection  · Initial positive 5/7 with unclear symptom onset  · Intubated on 5/17 secondary to progressive respiratory failure  · Proned 6PM on 5/17- supine 5/18 with maintenance of saturations > 90  · CXR with bilateral pulmonary opacities  · Decadron daily  · Continue vitamins  · Completed remdesivir  · Given Actemra 5/13 due to increasing Fio2 requirements and elevated CRP  · Completed initial course of antibiotics but procal was elevated on 5/17 with an increase in WBC and progressive respiratory failure  · Changed antibiotic to ancef from cefepime and flagyl  Vanco discontinued  · Continue Veletri   · Continue diuresis with lasix infusion        Bradycardia  Assessment & Plan  · With transient second degree heart blocks  · Cardiology following  · External pacemaker not beneficial at this point per cardiology  · Off vasopressors  · Single ICD with appropriate pacing at 40 bpm  · Consider increasing lower rate limit of ICD to 50-60 bpm per cardiology recommendations if heart block or arrhyhtmia develops again  Acute encephalopathy  Assessment & Plan  · Present on admission  · Repeat CT head 5/13: No significant interval change since prior examination  Stable advanced chronic microvascular ischemic change and chronic right cerebellar lacunar infarct  · Etiology unclear: covid 19/sepsis/hypoxia vs  delerium vs  Uremia less likely  · Baseline AO x 3  · Currently intubated and sedated with propofol and fentanyl  Unable to assess mental status         Acute renal failure superimposed on chronic kidney disease [facility])  Assessment & Plan  Recent Labs 05/21/21  0355 05/21/21  1738 05/22/21  0509   CREATININE 2 73* 2 74* 2 68*   EGFR 26 26 27     Estimated Creatinine Clearance: 30 8 mL/min (A) (by C-G formula based on SCr of 2 68 mg/dL (H))  · Hx of CKD III, baseline sCr 1 8 - 1 9  · GAGE on presentation with sCr of 4 97  · Etiology multifactorial secondary to pre renal azotemia, ATN and rhabdomyolysis present on admission  · On lasix and metolazone at home  · Placed on a lasix infusion 10 mg/hr on 5/17 with good response  · Monitor I and O  · Need to monitor his renal indices closely as they have been slowly rising in the setting of the lasix infusion     Chronic combined systolic and diastolic CHF, NYHA class 3 ([facility])  Assessment & Plan  Wt Readings from Last 3 Encounters:   05/21/21 110 kg (242 lb 8 1 oz)   03/19/21 118 kg (261 lb)   03/18/21 118 kg (261 lb)     · BNP 7000 on admission  · 4/21 ECHO: The ventricle was mildly to moderately dilated  Systolic function was mildly to moderately reduced  Ejection fraction was estimated to be 40 %  There was mild to moderate diffuse hypokinesis  · Monitor volume status  · He was placed on a continuous lasix infusion with good response  · Will need to watch his creatinine as it has slowly been climbing  · No longer requiring vasopressors  Thrombocytopenia [facility])  Assessment & Plan  Recent Labs     05/20/21  0628 05/21/21  0355 05/22/21  0509   PLT 85* 85* 76*       · Likely secondary to sepsis and antibiotic use  · No evidence of active bleeding  · Monitor CBC      Elevated liver function tests  Assessment & Plan  Recent Labs     05/19/21  0609   AST 18   ALT 32   ALKPHOS 150*   TBILI 0 60       · Improving  · Right upper quadrant ultrasound with evidence of cholelithiasis without wall thickening or pericholecystic fluid    · Continue to monitor     Type 2 diabetes mellitus with kidney complication, with long-term current use of insulin [facility])  Assessment & Plan  Lab Results   Component Value Date    HGBA1C 6 0 2021       Recent Labs     21  2203 21  0006 21  0156 21  0507   POCGLU 161* 139 156* 172*       Blood Sugar Average: Last 72 hrs:  (P) 161 [id number]     · On jardiance and insulin at home  · Continue insulin infusion   · Our goal will be to maintain his blood glucose between 140 and 180  · Continue insulin infusion    Obesity with body mass index 30 or greater  Assessment & Plan  · Nutrition consult when able  · Known risk factor for severity of COVID       Hyperlipidemia  Assessment & Plan  · Continue lipitor       Severe sepsis ([facility] [address] )  Assessment & Plan  · Patient originally with hypothermia, leukocytosis and hypotension requiring vasopressors  · Currently on Cefazolin  Total antibiotic day 6  · Leukocytosis improving  · Procalcitonin trending down  Peaked at 3 6  · Blood cultures with no growth to date  · Monitor CBC with differential  · Monitor blood cultures     ----------------------------------------------------------------------------------------  HPI/24hr events: No events overnight       Disposition: Continue Critical Care   Code Status: Level 1 - Full Code  ---------------------------------------------------------------------------------------  SUBJECTIVE  BROCK    Review of Systems  Review of systems was unable to be performed secondary to intubation, sedation  ---------------------------------------------------------------------------------------  OBJECTIVE    Vitals   Vitals:    21 0100 21 0200 21 0300 21 0543   BP:       Pulse: 102 84 90    Resp: (!) 25 (!) 25 (!) 25    Temp:       TempSrc:       SpO2: 97% 97% 96%    Weight:    110 kg (241 lb 6 5 oz)   Height:         Temp (24hrs), Av 7 °F (37 1 °C), Min:97 5 °F (36 4 °C), Max:99 7 °F (37 6 °C)  Current: Temperature: 97 5 °F (36 4 °C)  Arterial Line BP: 170/48  Arterial Line MAP (mmHg): 80 mmHg    Respiratory:  SpO2: SpO2: 96 %  Nasal Cannula O2 Flow Rate (L/min): 15 L/min    Invasive/non-invasive ventilation settings   Respiratory    Lab Data (Last 4 hours)    None         O2/Vent Data (Last 4 hours)    None                Physical Exam  Vitals signs and nursing note reviewed  Constitutional:       Comments: Intubated, sedated   HENT:      Head: Normocephalic and atraumatic  Right Ear: External ear normal       Left Ear: External ear normal       Nose: Nose normal       Mouth/Throat:      Mouth: Mucous membranes are moist    Eyes:      Extraocular Movements: Extraocular movements intact  Pupils: Pupils are equal, round, and reactive to light  Comments: scleredema   Neck:      Musculoskeletal: Normal range of motion and neck supple  Cardiovascular:      Rate and Rhythm: Regular rhythm  Tachycardia present  Pulses: Normal pulses  Heart sounds: Normal heart sounds  Pulmonary:      Comments: Mechanically assited  Diminished breath sounds  Abdominal:      General: Bowel sounds are normal       Palpations: Abdomen is soft  Musculoskeletal:      Comments: Minimal movement of all extremities   Skin:     General: Skin is warm and dry  Capillary Refill: Capillary refill takes less than 2 seconds     Neurological:      Comments: BROCK fully due to sedation   Psychiatric:      Comments: BROCK         Laboratory and Diagnostics:  Results from last 7 days   Lab Units 05/22/21  0509 05/21/21  0355 05/20/21  0628 05/19/21  0609 05/18/21  0442 05/17/21  0428 05/16/21  0337   WBC Thousand/uL 11 12* 15 98* 21 41* 28 84* 22 65* 19 38* 13 05*   HEMOGLOBIN g/dL 7 9* 9 1* 9 0* 9 9* 9 7* 10 4* 11 6*   HEMATOCRIT % 23 4* 27 6* 26 4* 30 2* 29 4* 31 8* 35 8*   PLATELETS Thousands/uL 76* 85* 85* 104* 107* 112* 183   NEUTROS PCT %  --  91*  --  94*  --   --  93*   BANDS PCT %  --   --   --   --  9*  --   --    MONOS PCT %  --  4  --  1*  --   --  4   MONO PCT %  --   --  4  --  2*  --   --      Results from last 7 days   Lab Units 05/22/21  0509 05/21/21  173 05/21/21  0355 05/20/21  1817 05/20/21  0628 05/19/21  1810 05/19/21  0609  05/18/21  0442 05/17/21  0428   SODIUM mmol/L 139 139 140 140 138 140 140   < > 143 143   POTASSIUM mmol/L 3 3* 4 1 3 7 4 0 3 3* 3 3* 3 3*   < > 4 1 4 7   CHLORIDE mmol/L 103 102 104 104 102 103 103   < > 107 110*   CO2 mmol/L 26 26 26 25 27 25 24   < > 24 28   ANION GAP mmol/L 10 11 10 11 9 12 13   < > 12 5   BUN mg/dL 107* 109* 99* 97* 85* 83* 79*   < > 70* 65*   CREATININE mg/dL 2 68* 2 74* 2 73* 2 76* 2 64* 2 69* 2 53*   < > 2 32* 1 93*   CALCIUM mg/dL 8 0* 7 9* 7 7* 7 6* 7 6* 7 7* 7 5*   < > 7 5* 7 7*   GLUCOSE RANDOM mg/dL 155* 172* 114 144* 176* 148* 192*   < > 290* 213*   ALT U/L  --   --   --   --   --   --  32  --  36 44   AST U/L  --   --   --   --   --   --  18  --  20 62*   ALK PHOS U/L  --   --   --   --   --   --  150*  --  177* 253*   ALBUMIN g/dL  --   --   --   --   --   --  2 4*  --  2 5* 2 2*   TOTAL BILIRUBIN mg/dL  --   --   --   --   --   --  0 60  --  0 65 0 82    < > = values in this interval not displayed  Results from last 7 days   Lab Units 05/21/21  0355 05/20/21  0628   MAGNESIUM mg/dL 2 5 2 5   PHOSPHORUS mg/dL 4 5* 4 5*      Results from last 7 days   Lab Units 05/20/21  1008 05/20/21  0409 05/19/21 2017 05/19/21  1331 05/19/21  0610 05/19/21  0028 05/18/21  1832  05/17/21  1344   INR   --   --   --   --   --   --   --   --  1 49*   PTT seconds 94* 67* 59* 101* 53* 62* 66*   < > 28    < > = values in this interval not displayed        Results from last 7 days   Lab Units 05/19/21  1112   TROPONIN I ng/mL 0 07*         ABG:  Results from last 7 days   Lab Units 05/19/21  0612   PH ART  7 400   PCO2 ART mm Hg 35 9*   PO2 ART mm Hg 105 1   HCO3 ART mmol/L 21 7*   BASE EXC ART mmol/L -2 6   ABG SOURCE  Line, Arterial     VBG:  Results from last 7 days   Lab Units 05/19/21  0612   ABG SOURCE  Line, Arterial     Results from last 7 days   Lab Units 05/21/21  0430 05/20/21  1418 05/19/21  4713 05/18/21  1569 05/17/21  1344   PROCALCITONIN ng/ml 0 71* 0 73* 0 97* 1 38* 3 60*       Micro  Results from last 7 days   Lab Units 05/18/21  1152 05/18/21  1110   BLOOD CULTURE   --  No Growth at 72 hrs  No Growth at 72 hrs  SPUTUM CULTURE  3+ Growth of   --    GRAM STAIN RESULT  No Epithelial cells seen  Rare Polys  No bacteria seen  --    MRSA CULTURE ONLY  No Methicillin Resistant Staphlyococcus aureus (MRSA) isolated  --        EKG: ST  Imaging: I have personally reviewed pertinent reports  Intake and Output  I/O       05/20 0701 - 05/21 0700 05/21 0701 - 05/22 0700    I V  (mL/kg) 1024 2 (9 3) 646 (5 9)    NG/ 450    IV Piggyback 550     Feedings 669 360    Total Intake(mL/kg) 2863 2 (26) 1456 (13 2)    Urine (mL/kg/hr) 2040 (0 8) 1810 (1 1)    Stool 0 0    Total Output 2040 1810    Net +823 2 -354          Unmeasured Stool Occurrence 4 x 1 x          Height and Weights   Height: 5' 8" (172 7 cm)  IBW (Ideal Body Weight): 68 4 kg  Body mass index is 36 71 kg/m²  Weight (last 2 days)     Date/Time   Weight    05/22/21 0543   110 (241 4)    05/21/21 0600   110 (242 51)    05/21/21 0315   110 (243 17)    05/20/21 0600   111 (243 83)                Nutrition       Diet Orders   (From admission, onward)             Start     Ordered    05/20/21 1057  Diet Enteral/Parenteral; Tube Feeding No Oral Diet; Jevity 1 2 Micheal; Continuous; 30; Prosource TF- Two Packets; 150; Water; Every 4 hours  Diet effective now     Question Answer Comment   Diet Type Enteral/Parenteral    Enteral/Parenteral Tube Feeding No Oral Diet    Tube Feeding Formula: Jevity 1 2 Micheal    Bolus/Cyclic/Continuous Continuous    Tube Feeding Goal Rate (mL/hr): 30    Prosource Protein Liquid - No Carb Prosource TF- Two Packets    Tube Feeding flush (mL): 150    Water Flush type: Water    Water flush frequency: Every 4 hours    RD to adjust diet per protocol?  Yes        05/20/21 1057                  Active Medications  Scheduled Meds:  Current Facility-Administered Medications   Medication Dose Route Frequency Provider Last Rate    acetaminophen  650 mg Oral Q6H PRN JIHAN Mcdermott      atorvastatin  40 mg Oral HS LaJIHAN Heredia      bisacodyl  10 mg Rectal Daily PRN JIHAN Tyson      cefazolin  1,000 mg Intravenous Q12H JIHAN Arroyo 1,000 mg (05/21/21 1830)    chlorhexidine  15 mL Mouth/Throat Q12H [facility] Basilia Ibanez PA-C      cholecalciferol  2,000 Units Oral Daily JIHAN Mcdermott      dexamethasone  6 mg Intravenous Daily Hallie Alex MD      epoprostenol (VELETRI) inhalation solution 43201 ng/mL  6 25-50 ng/kg/min (Ideal) Inhalation Titrated JIHAN Jarvis 49 708 ng/kg/min (05/22/21 0317)    fentaNYL  75 mcg/hr Intravenous Continuous NOEL BunchC 75 mcg/hr (05/22/21 0106)    furosemide  10 mg/hr Intravenous Continuous JIHAN Tyson 10 mg/hr (05/20/21 1642)    gabapentin  100 mg Oral TID JIHAN Mcdermott      heparin (porcine)  5,000 Units Subcutaneous Q8H [address] PA-C      insulin regular (HumuLIN R,NovoLIN R) infusion  0 3-21 Units/hr Intravenous Titrated Basilia Ibanez PA-C 4 Units/hr (05/22/21 0512)    LORazepam  1 mg Intravenous Q4H PRN JIHAN Jarvis      midodrine  5 mg Oral TID AC Basilia Ibanez PA-C      multivitamin-minerals  1 tablet Oral Daily JIHAN Hutson      omeprazole (PRILOSEC) suspension 2 mg/mL  20 mg Oral Daily Iesha Gooden PA-C      ondansetron  4 mg Intravenous Q6H PRN JIHAN Hutson      polyethylene glycol  17 g Oral Daily JIHAN Tyson      propofol  5-50 mcg/kg/min Intravenous Titrated NOEL BunchC 40 mcg/kg/min (05/22/21 0503)    senna-docusate sodium  2 tablet Oral BID Hallie Alex MD       Continuous Infusions:  epoprostenol (VELETRI) inhalation solution 88630 ng/mL, 6 25-50 ng/kg/min (Ideal), Last Rate: 49 708 ng/kg/min (05/22/21 0317)  fentaNYL, 75 mcg/hr, Last Rate: 75 mcg/hr (05/22/21 0106)  furosemide, 10 mg/hr, Last Rate: 10 mg/hr (05/20/21 1642)  insulin regular (HumuLIN R,NovoLIN R) infusion, 0 3-21 Units/hr, Last Rate: 4 Units/hr (05/22/21 0512)  propofol, 5-50 mcg/kg/min, Last Rate: 40 mcg/kg/min (05/22/21 0503)      PRN Meds:   acetaminophen, 650 mg, Q6H PRN  bisacodyl, 10 mg, Daily PRN  LORazepam, 1 mg, Q4H PRN  ondansetron, 4 mg, Q6H PRN        Invasive Devices Review  Invasive Devices     Central Venous Catheter Line            CVC Central Lines 05/12/21 Double 9 days          Peripheral Intravenous Line            Peripheral IV 05/18/21 Right Arm 3 days          Arterial Line            Arterial Line 05/17/21 Radial 4 days          Line            Hemodialysis AV Fistula 09/06/19 Upper arm 624 days          Drain            NG/OG/Enteral Tube Orogastric 4 days    Urethral Catheter 1 day          Airway            ETT  4 days                Rationale for remaining devices: medical necessity  ---------------------------------------------------------------------------------------  Advance Directive and Living Will:      Power of :    POLST:    ---------------------------------------------------------------------------------------  Care Time Delivered:   No Critical Care time spent       JIHAN Mccracken      Portions of the record may have been created with voice recognition software  Occasional wrong word or "sound a like" substitutions may have occurred due to the inherent limitations of voice recognition software    Read the chart carefully and recognize, using context, where substitutions have occurred

## 2021-05-22 NOTE — ASSESSMENT & PLAN NOTE
· Secondary to COVID-19 infection  · Initial positive 5/7 with unclear symptom onset  · Intubated on 5/17 secondary to progressive respiratory failure  · Proned 6PM on 5/17- supine 5/18 with maintenance of saturations > 90  · CXR with bilateral pulmonary opacities  · Decadron daily  · Continue vitamins  · Completed remdesivir  · Given Actemra 5/13 due to increasing Fio2 requirements and elevated CRP  · Completed initial course of antibiotics but procal was elevated on 5/17 with an increase in WBC and progressive respiratory failure  · Changed antibiotic to ancef from cefepime and flagyl   Vanco discontinued  · Continue Veletri   · Continue diuresis with lasix infusion

## 2021-05-22 NOTE — PROGRESS NOTES
NEPHROLOGY PROGRESS NOTE   Olga Benavides Sr  80 y o  male MRN: [id number]  Unit/Bed#: ICU 03 Encounter: [id number]      ASSESSMENT/PLAN:  1  Acute kidney injury, POA:  Multifactorial   Prerenal/ATN with autoregulatory failure from ARB and rhabdomyolysis  · Creatinine 4 97 on admission and did improve down to 1 76 on 05/16 but then had a secondary insult likely related to diuresis  · Creatinine today 2 68 slightly better than yesterday  · Currently on Lasix drip at 10 milligrams/hour and has good urine output  · Continue to hold ARB  · Yesterday I&Os were even  2  CKD IIIB: baseline creatinine around 1 8-1 9 at best but did have recent outpatient creatinines in the 2's  Follows with Dr Milly Grimm in the office  · CKD due to diabetic kidney disease, hypertensive nephrosclerosis, cardiorenal syndrome and age-related nephron loss  3  Acute hypoxic respiratory failure due to COVID-19:  Remains intubated  4  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%:  5  Hypokalemia:  Getting potassium chloride 40 mEq p o  X1 per primary team  6  Anemia:  Hemoglobin trending down to 7 9 today  Per primary team  7  Hypotension:  Continue midodrine 10 mg t i d   8  Thrombocytopenia:  Felt to be due to sepsis  9  DM II    Plan Summary:    Continue Lasix drip at 10 milligrams/hour t   Check a m  BMP    SUBJECTIVE:  Patient remains intubated and sedated  He has good urine output on the Lasix drip  No overnight events      OBJECTIVE:  Current Weight: Weight - Scale: 110 kg (241 lb 6 5 oz)  Vitals:    05/22/21 0600   BP:    Pulse: 88   Resp: (!) 29   Temp:    SpO2: 95%       Intake/Output Summary (Last 24 hours) at 5/22/2021 0908  Last data filed at 5/22/2021 9008  Gross per 24 hour   Intake 3117 5 ml   Output 3135 ml   Net -17 5 ml       To limit exposure to covid-19 exam was performed via window   General:  Ill appearing, intubated   Skin:  No rash  Eyes:  Closed   ENT: ET tube in place  Neck:  Trachea midline Chest:  No accessory muscle use   CVS:  Regular rate   Abdomen:  Obese  : carroll catheter in place   Extremities:  No edema in feet, R arm a little puffy   Neuro:  sedated      Medications:  Scheduled Meds:  Current Facility-Administered Medications   Medication Dose Route Frequency Provider Last Rate    acetaminophen  650 mg Oral Q6H PRN JIHAN Castro      atorvastatin  40 mg Oral HS JIHAN Riley      bisacodyl  10 mg Rectal Daily PRN JIHAN Porter      cefazolin  1,000 mg Intravenous Q12H JIHAN Khoury 1,000 mg (05/22/21 0759)    chlorhexidine  15 mL Mouth/Throat Q12H [address], PA-C      cholecalciferol  2,000 Units Oral Daily JIHAN Riggs      dexamethasone  6 mg Intravenous Daily Josef Torres MD      epoprostenol (VELETRI) inhalation solution 73809 ng/mL  6 25-50 ng/kg/min (Ideal) Inhalation Titrated JIHAN Keller 49 708 ng/kg/min (05/22/21 0317)    fentaNYL  75 mcg/hr Intravenous Continuous Joy Mata PA-C 75 mcg/hr (05/22/21 0106)    furosemide  10 mg/hr Intravenous Continuous JIHAN Porter 10 mg/hr (05/20/21 1642)    gabapentin  100 mg Oral TID JIHAN Riggs      heparin (porcine)  5,000 Units Subcutaneous Q8H [address], PA-C      insulin regular (HumuLIN R,NovoLIN R) infusion  0 3-21 Units/hr Intravenous Titrated Joy Mata PA-C 2 Units/hr (05/22/21 0818)    LORazepam  1 mg Intravenous Q4H PRN JIHAN Keller      midodrine  5 mg Oral TID AC JIHAN Porter      multivitamin-minerals  1 tablet Oral Daily JIHAN Riley      omeprazole (PRILOSEC) suspension 2 mg/mL  20 mg Oral Daily Iesha Gooden PA-C      ondansetron  4 mg Intravenous Q6H PRN JIHAN Castro      polyethylene glycol  17 g Oral Daily JIHAN Porter      propofol  5-50 mcg/kg/min Intravenous Titrated Joy Mata PA-C 40 mcg/kg/min (05/22/21 0800)    senna-docusate sodium  2 tablet Oral BID Jarrett Martins MD         PRN Meds:   acetaminophen    bisacodyl    LORazepam    ondansetron    Continuous Infusions:epoprostenol (VELETRI) inhalation solution 97250 ng/mL, 6 25-50 ng/kg/min (Ideal), Last Rate: 49 708 ng/kg/min (05/22/21 0317)  fentaNYL, 75 mcg/hr, Last Rate: 75 mcg/hr (05/22/21 0106)  furosemide, 10 mg/hr, Last Rate: 10 mg/hr (05/20/21 1642)  insulin regular (HumuLIN R,NovoLIN R) infusion, 0 3-21 Units/hr, Last Rate: 2 Units/hr (05/22/21 0818)  propofol, 5-50 mcg/kg/min, Last Rate: 40 mcg/kg/min (05/22/21 0800)        Laboratory Results:  Results from last 7 days   Lab Units 05/22/21  0509 05/21/21  1738 05/21/21  0355 05/20/21  1817 05/20/21  0628 05/19/21  1810 05/19/21  0609  05/18/21  0442 05/17/21  0428 05/16/21  0337   WBC Thousand/uL 11 12*  --  15 98*  --  21 41*  --  28 84*  --  22 65* 19 38* 13 05*   HEMOGLOBIN g/dL 7 9*  --  9 1*  --  9 0*  --  9 9*  --  9 7* 10 4* 11 6*   HEMATOCRIT % 23 4*  --  27 6*  --  26 4*  --  30 2*  --  29 4* 31 8* 35 8*   PLATELETS Thousands/uL 76*  --  85*  --  85*  --  104*  --  107* 112* 183   SODIUM mmol/L 139 139 140 140 138 140 140   < > 143 143 141   POTASSIUM mmol/L 3 3* 4 1 3 7 4 0 3 3* 3 3* 3 3*   < > 4 1 4 7 4 6   CHLORIDE mmol/L 103 102 104 104 102 103 103   < > 107 110* 106   CO2 mmol/L 26 26 26 25 27 25 24   < > 24 28 26   BUN mg/dL 107* 109* 99* 97* 85* 83* 79*   < > 70* 65* 53*   CREATININE mg/dL 2 68* 2 74* 2 73* 2 76* 2 64* 2 69* 2 53*   < > 2 32* 1 93* 1 76*   CALCIUM mg/dL 8 0* 7 9* 7 7* 7 6* 7 6* 7 7* 7 5*   < > 7 5* 7 7* 8 0*   MAGNESIUM mg/dL  --   --  2 5  --  2 5  --   --   --   --   --   --    PHOSPHORUS mg/dL  --   --  4 5*  --  4 5*  --   --   --   --   --   --     < > = values in this interval not displayed

## 2021-05-22 NOTE — ASSESSMENT & PLAN NOTE
Recent Labs     05/20/21  0628 05/21/21  0355 05/22/21  0509   PLT 85* 85* 76*       · Likely secondary to sepsis and antibiotic use  · No evidence of active bleeding    · Monitor CBC

## 2021-05-22 NOTE — PLAN OF CARE
Problem: Potential for Falls  Goal: Patient will remain free of falls  Description: INTERVENTIONS:  - Assess patient frequently for physical needs  -  Identify cognitive and physical deficits and behaviors that affect risk of falls    -  [place] fall precautions as indicated by assessment   - Educate patient/family on patient safety including physical limitations  - Instruct patient to call for assistance with activity based on assessment  - Modify environment to reduce risk of injury  - Consider OT/PT consult to assist with strengthening/mobility  Outcome: Progressing     Problem: Prexisting or High Potential for Compromised Skin Integrity  Goal: Skin integrity is maintained or improved  Description: INTERVENTIONS:  - Identify patients at risk for skin breakdown  - Assess and monitor skin integrity  - Assess and monitor nutrition and hydration status  - Monitor labs   - Assess for incontinence   - Turn and reposition patient  - Assist with mobility/ambulation  - Relieve pressure over bony prominences  - Avoid friction and shearing  - Provide appropriate hygiene as needed including keeping skin clean and dry  - Evaluate need for skin moisturizer/barrier cream  - Collaborate with interdisciplinary team   - Patient/family teaching  - Consider wound care consult   Outcome: Progressing     Problem: PAIN - ADULT  Goal: Verbalizes/displays adequate comfort level or baseline comfort level  Description: Interventions:  - Encourage patient to monitor pain and request assistance  - Assess pain using appropriate pain scale  - Administer analgesics based on type and severity of pain and evaluate response  - Implement non-pharmacological measures as appropriate and evaluate response  - Consider cultural and social influences on pain and pain management  - Notify physician/advanced practitioner if interventions unsuccessful or patient reports new pain  Outcome: Progressing     Problem: INFECTION - ADULT  Goal: Absence or prevention of progression during hospitalization  Description: INTERVENTIONS:  - Assess and monitor for signs and symptoms of infection  - Monitor lab/diagnostic results  - Monitor all insertion sites, i e  indwelling lines, tubes, and drains  - Monitor endotracheal if appropriate and nasal secretions for changes in amount and color  - [place] appropriate cooling/warming therapies per order  - Administer medications as ordered  - Instruct and encourage patient and family to use good hand hygiene technique  - Identify and instruct in appropriate isolation precautions for identified infection/condition  Outcome: Progressing     Problem: SAFETY ADULT  Goal: Patient will remain free of falls  Description: INTERVENTIONS:  - Assess patient frequently for physical needs  -  Identify cognitive and physical deficits and behaviors that affect risk of falls    -  [place] fall precautions as indicated by assessment   - Educate patient/family on patient safety including physical limitations  - Instruct patient to call for assistance with activity based on assessment  - Modify environment to reduce risk of injury  - Consider OT/PT consult to assist with strengthening/mobility  Outcome: Progressing  Goal: Maintain or return to baseline ADL function  Description: INTERVENTIONS:  -  Assess patient's ability to carry out ADLs; assess patient's baseline for ADL function and identify physical deficits which impact ability to perform ADLs (bathing, care of mouth/teeth, toileting, grooming, dressing, etc )  - Assess/evaluate cause of self-care deficits   - Assess range of motion  - Assess patient's mobility; develop plan if impaired  - Assess patient's need for assistive devices and provide as appropriate  - Encourage maximum independence but intervene and supervise when necessary  - Involve family in performance of ADLs  - Assess for home care needs following discharge   - Consider OT consult to assist with ADL evaluation and planning for discharge  - Provide patient education as appropriate  Outcome: Progressing  Goal: Maintain or return mobility status to optimal level  Description: INTERVENTIONS:  - Assess patient's baseline mobility status (ambulation, transfers, stairs, etc )    - Identify cognitive and physical deficits and behaviors that affect mobility  - Identify mobility aids required to assist with transfers and/or ambulation (gait belt, sit-to-stand, lift, walker, cane, etc )  - [place] fall precautions as indicated by assessment  - Record patient progress and toleration of activity level on Mobility SBAR; progress patient to next Phase/Stage  - Instruct patient to call for assistance with activity based on assessment  - Consider rehabilitation consult to assist with strengthening/weightbearing, etc   Outcome: Progressing     Problem: DISCHARGE PLANNING  Goal: Discharge to home or other facility with appropriate resources  Description: INTERVENTIONS:  - Identify barriers to discharge w/patient and caregiver  - Arrange for needed discharge resources and transportation as appropriate  - Identify discharge learning needs (meds, wound care, etc )  - Arrange for interpretive services to assist at discharge as needed  - Refer to Case Management Department for coordinating discharge planning if the patient needs post-hospital services based on physician/advanced practitioner order or complex needs related to functional status, cognitive ability, or social support system  Outcome: Progressing     Problem: Knowledge Deficit  Goal: Patient/family/caregiver demonstrates understanding of disease process, treatment plan, medications, and discharge instructions  Description: Complete learning assessment and assess knowledge base    Interventions:  - Provide teaching at level of understanding  - Provide teaching via preferred learning methods  Outcome: Progressing     Problem: Nutrition/Hydration-ADULT  Goal: Nutrient/Hydration intake appropriate for improving, restoring or maintaining nutritional needs  Description: Monitor and assess patient's nutrition/hydration status for malnutrition  Collaborate with interdisciplinary team and initiate plan and interventions as ordered  Monitor patient's weight and dietary intake as ordered or per policy  Utilize nutrition screening tool and intervene as necessary  Determine patient's food preferences and provide high-protein, high-caloric foods as appropriate       INTERVENTIONS:  - Monitor oral intake, urinary output, labs, and treatment plans  - Assess nutrition and hydration status and recommend course of action  - Evaluate amount of meals eaten  - Assist patient with eating if necessary   - Allow adequate time for meals  - Recommend/ encourage appropriate diets, oral nutritional supplements, and vitamin/mineral supplements  - Order, calculate, and assess calorie counts as needed  - Recommend, monitor, and adjust tube feedings and TPN/PPN based on assessed needs  - Assess need for intravenous fluids  - Provide specific nutrition/hydration education as appropriate  - Include patient/family/caregiver in decisions related to nutrition  Outcome: Progressing     Problem: SAFETY,RESTRAINT: NV/NON-SELF DESTRUCTIVE BEHAVIOR  Goal: Remains free of harm/injury (restraint for non violent/non self-detsructive behavior)  Description: INTERVENTIONS:  - Instruct patient/family regarding restraint use   - Assess and monitor physiologic and psychological status   - Provide interventions and comfort measures to meet assessed patient needs   - Identify and implement measures to help patient regain control  - Assess readiness for release of restraint   Outcome: Progressing  Goal: Returns to optimal restraint-free functioning  Description: INTERVENTIONS:  - Assess the patient's behavior and symptoms that indicate continued need for restraint  - Identify and implement measures to help patient regain control  - Assess readiness for release of restraint   Outcome: Progressing     Problem: RESPIRATORY - ADULT  Goal: Achieves optimal ventilation and oxygenation  Description: INTERVENTIONS:  - Assess for changes in respiratory status  - Assess for changes in mentation and behavior  - Position to facilitate oxygenation and minimize respiratory effort  - Oxygen administered by appropriate delivery if ordered  - Initiate smoking cessation education as indicated  - Encourage broncho-pulmonary hygiene including cough, deep breathe, Incentive Spirometry  - Assess the need for suctioning and aspirate as needed  - Assess and instruct to report SOB or any respiratory difficulty  - Respiratory Therapy support as indicated  Outcome: Progressing     Problem: GASTROINTESTINAL - ADULT  Goal: Maintains or returns to baseline bowel function  Description: INTERVENTIONS:  - Assess bowel function  - Encourage oral fluids to ensure adequate hydration  - Administer IV fluids if ordered to ensure adequate hydration  - Administer ordered medications as needed  - Encourage mobilization and activity  - Consider nutritional services referral to assist patient with adequate nutrition and appropriate food choices  Outcome: Progressing     Problem: GENITOURINARY - ADULT  Goal: Maintains or returns to baseline urinary function  Description: INTERVENTIONS:  - Assess urinary function  - Encourage oral fluids to ensure adequate hydration if ordered  - Administer IV fluids as ordered to ensure adequate hydration  - Administer ordered medications as needed  - Offer frequent toileting  - Follow urinary retention protocol if ordered  Outcome: Progressing     Problem: METABOLIC, FLUID AND ELECTROLYTES - ADULT  Goal: Electrolytes maintained within normal limits  Description: INTERVENTIONS:  - Monitor labs and assess patient for signs and symptoms of electrolyte imbalances  - Administer electrolyte replacement as ordered  - Monitor response to electrolyte replacements, including repeat lab results as appropriate  - Instruct patient on fluid and nutrition as appropriate  Outcome: Progressing  Goal: Glucose maintained within target range  Description: INTERVENTIONS:  - Monitor Blood Glucose as ordered  - Assess for signs and symptoms of hyperglycemia and hypoglycemia  - Administer ordered medications to maintain glucose within target range  - Assess nutritional intake and initiate nutrition service referral as needed  Outcome: Progressing     Problem: CARDIOVASCULAR - ADULT  Goal: Maintains optimal cardiac output and hemodynamic stability  Description: INTERVENTIONS:  - Monitor I/O, vital signs and rhythm  - Monitor for S/S and trends of decreased cardiac output  - Administer and titrate ordered vasoactive medications to optimize hemodynamic stability  - Assess quality of pulses, skin color and temperature  - Assess for signs of decreased coronary artery perfusion  - Instruct patient to report change in severity of symptoms  Outcome: Progressing  Goal: Absence of cardiac dysrhythmias or at baseline rhythm  Description: INTERVENTIONS:  - Continuous cardiac monitoring, vital signs, obtain 12 lead EKG if ordered  - Administer antiarrhythmic and heart rate control medications as ordered  - Monitor electrolytes and administer replacement therapy as ordered  Outcome: Progressing     Problem: HEMATOLOGIC - ADULT  Goal: Maintains hematologic stability  Description: INTERVENTIONS  - Assess for signs and symptoms of bleeding or hemorrhage  - Monitor labs  - Administer supportive blood products/factors as ordered and appropriate  Outcome: Progressing

## 2021-05-22 NOTE — ASSESSMENT & PLAN NOTE
Recent Labs     05/21/21  0355 05/21/21  1738 05/22/21  0509   CREATININE 2 73* 2 74* 2 68*   EGFR 26 26 27     Estimated Creatinine Clearance: 30 8 mL/min (A) (by C-G formula based on SCr of 2 68 mg/dL (H))  · Hx of CKD III, baseline sCr 1 8 - 1 9  · GAGE on presentation with sCr of 4 97  · Etiology multifactorial secondary to pre renal azotemia, ATN and rhabdomyolysis present on admission    · On lasix and metolazone at home  · Placed on a lasix infusion 10 mg/hr on 5/17 with good response  · Monitor I and O  · Need to monitor his renal indices closely as they have been slowly rising in the setting of the lasix infusion

## 2021-05-23 NOTE — ASSESSMENT & PLAN NOTE
· Improving  · Right upper quadrant ultrasound with evidence of cholelithiasis without wall thickening or pericholecystic fluid    · Continue to monitor

## 2021-05-23 NOTE — ASSESSMENT & PLAN NOTE
· Secondary to COVID-19 infection  · Initial positive 5/7 with unclear symptom onset  · Intubated on 5/17 secondary to progressive respiratory failure  · Proned 6PM on 5/17- supine 5/18 with maintenance of saturations > 90  · CXR with bilateral pulmonary opacities  · Decadron daily  · Continue vitamins  · Completed remdesivir  · Given Actemra 5/13 due to increasing Fio2 requirements and elevated CRP  · Completed initial course of antibiotics but procal was elevated on 5/17 with an increase in WBC and progressive respiratory failure  · Changed antibiotic to ancef from cefepime and flagyl  Vanco discontinued  · Continue to wean Veletri    · Wean FiO2 and PEEP as tolerated  · Continue diuresis with lasix infusion

## 2021-05-23 NOTE — ASSESSMENT & PLAN NOTE
Lab Results   Component Value Date    HGBA1C 6 0 03/19/2021       Recent Labs     05/22/21  2238 05/23/21  0000 05/23/21  0248 05/23/21  0615   POCGLU 127 130 165* 149*       Blood Sugar Average: Last 72 hrs:  (P) 150 [id number]     · On jardiance and insulin at home  · Continue insulin infusion   · Our goal will be to maintain his blood glucose between 140 and 180

## 2021-05-23 NOTE — ASSESSMENT & PLAN NOTE
· Patient originally with hypothermia, leukocytosis and hypotension requiring vasopressors  · Currently on Cefazolin  Total antibiotic day 7/7  · Leukocytosis improving  · Procalcitonin trending down  Peaked at 3 6  · Blood cultures with no growth to date     · Monitor CBC with differential  · Monitor blood cultures

## 2021-05-23 NOTE — ASSESSMENT & PLAN NOTE
· Patient originally with hypothermia, leukocytosis and hypotension requiring vasopressors  · Completed 7 days of Cefazolin  · Leukocytosis improving  · Procalcitonin trending down  Peaked at 3 6  · Blood cultures with no growth to date     · Monitor CBC with differential  · Monitor blood cultures

## 2021-05-23 NOTE — ASSESSMENT & PLAN NOTE
Recent Labs     05/22/21  0509 05/22/21  1701 05/23/21  0319   CREATININE 2 68* 2 65* 2 54*   EGFR 27 27 28     Estimated Creatinine Clearance: 32 4 mL/min (A) (by C-G formula based on SCr of 2 54 mg/dL (H))  · Hx of CKD III, baseline sCr 1 8 - 1 9  · GAGE on presentation with sCr of 4 97  · Etiology multifactorial secondary to pre renal azotemia, ATN and rhabdomyolysis present on admission    · On lasix and metolazone at home  · Placed on a lasix infusion 10 mg/hr on 5/17 with good response  · Monitor I and O  · Need to monitor his renal indices closely as they have been slowly rising in the setting of the lasix infusion

## 2021-05-23 NOTE — ASSESSMENT & PLAN NOTE
· Secondary to COVID-19 infection  · Initial positive 5/7 with unclear symptom onset  · Intubated on 5/17 secondary to progressive respiratory failure  · Proned 6PM on 5/17- supine 5/18 with maintenance of saturations > 90  · CXR with bilateral pulmonary opacities  · Completed Decadron  · Continue vitamins  · Completed remdesivir  · Given Actemra 5/13 due to increasing Fio2 requirements and elevated CRP  · Completed antibiotic therapy  · Off Veletri  · Wean FiO2 and PEEP as tolerated  · Currently on lasix gtt   Consider discontinuing

## 2021-05-23 NOTE — PLAN OF CARE
Problem: Potential for Falls  Goal: Patient will remain free of falls  Description: INTERVENTIONS:  - Assess patient frequently for physical needs  -  Identify cognitive and physical deficits and behaviors that affect risk of falls    -  [place] fall precautions as indicated by assessment   - Educate patient/family on patient safety including physical limitations  - Instruct patient to call for assistance with activity based on assessment  - Modify environment to reduce risk of injury  - Consider OT/PT consult to assist with strengthening/mobility  Outcome: Progressing     Problem: Prexisting or High Potential for Compromised Skin Integrity  Goal: Skin integrity is maintained or improved  Description: INTERVENTIONS:  - Identify patients at risk for skin breakdown  - Assess and monitor skin integrity  - Assess and monitor nutrition and hydration status  - Monitor labs   - Assess for incontinence   - Turn and reposition patient  - Assist with mobility/ambulation  - Relieve pressure over bony prominences  - Avoid friction and shearing  - Provide appropriate hygiene as needed including keeping skin clean and dry  - Evaluate need for skin moisturizer/barrier cream  - Collaborate with interdisciplinary team   - Patient/family teaching  - Consider wound care consult   Outcome: Progressing     Problem: PAIN - ADULT  Goal: Verbalizes/displays adequate comfort level or baseline comfort level  Description: Interventions:  - Encourage patient to monitor pain and request assistance  - Assess pain using appropriate pain scale  - Administer analgesics based on type and severity of pain and evaluate response  - Implement non-pharmacological measures as appropriate and evaluate response  - Consider cultural and social influences on pain and pain management  - Notify physician/advanced practitioner if interventions unsuccessful or patient reports new pain  Outcome: Progressing     Problem: INFECTION - ADULT  Goal: Absence or prevention of progression during hospitalization  Description: INTERVENTIONS:  - Assess and monitor for signs and symptoms of infection  - Monitor lab/diagnostic results  - Monitor all insertion sites, i e  indwelling lines, tubes, and drains  - Monitor endotracheal if appropriate and nasal secretions for changes in amount and color  - [place] appropriate cooling/warming therapies per order  - Administer medications as ordered  - Instruct and encourage patient and family to use good hand hygiene technique  - Identify and instruct in appropriate isolation precautions for identified infection/condition  Outcome: Progressing     Problem: SAFETY ADULT  Goal: Patient will remain free of falls  Description: INTERVENTIONS:  - Assess patient frequently for physical needs  -  Identify cognitive and physical deficits and behaviors that affect risk of falls    -  [place] fall precautions as indicated by assessment   - Educate patient/family on patient safety including physical limitations  - Instruct patient to call for assistance with activity based on assessment  - Modify environment to reduce risk of injury  - Consider OT/PT consult to assist with strengthening/mobility  Outcome: Progressing  Goal: Maintain or return to baseline ADL function  Description: INTERVENTIONS:  -  Assess patient's ability to carry out ADLs; assess patient's baseline for ADL function and identify physical deficits which impact ability to perform ADLs (bathing, care of mouth/teeth, toileting, grooming, dressing, etc )  - Assess/evaluate cause of self-care deficits   - Assess range of motion  - Assess patient's mobility; develop plan if impaired  - Assess patient's need for assistive devices and provide as appropriate  - Encourage maximum independence but intervene and supervise when necessary  - Involve family in performance of ADLs  - Assess for home care needs following discharge   - Consider OT consult to assist with ADL evaluation and planning for discharge  - Provide patient education as appropriate  Outcome: Progressing  Goal: Maintain or return mobility status to optimal level  Description: INTERVENTIONS:  - Assess patient's baseline mobility status (ambulation, transfers, stairs, etc )    - Identify cognitive and physical deficits and behaviors that affect mobility  - Identify mobility aids required to assist with transfers and/or ambulation (gait belt, sit-to-stand, lift, walker, cane, etc )  - [place] fall precautions as indicated by assessment  - Record patient progress and toleration of activity level on Mobility SBAR; progress patient to next Phase/Stage  - Instruct patient to call for assistance with activity based on assessment  - Consider rehabilitation consult to assist with strengthening/weightbearing, etc   Outcome: Progressing     Problem: DISCHARGE PLANNING  Goal: Discharge to home or other facility with appropriate resources  Description: INTERVENTIONS:  - Identify barriers to discharge w/patient and caregiver  - Arrange for needed discharge resources and transportation as appropriate  - Identify discharge learning needs (meds, wound care, etc )  - Arrange for interpretive services to assist at discharge as needed  - Refer to Case Management Department for coordinating discharge planning if the patient needs post-hospital services based on physician/advanced practitioner order or complex needs related to functional status, cognitive ability, or social support system  Outcome: Progressing     Problem: Knowledge Deficit  Goal: Patient/family/caregiver demonstrates understanding of disease process, treatment plan, medications, and discharge instructions  Description: Complete learning assessment and assess knowledge base    Interventions:  - Provide teaching at level of understanding  - Provide teaching via preferred learning methods  Outcome: Progressing     Problem: Nutrition/Hydration-ADULT  Goal: Nutrient/Hydration intake appropriate for improving, restoring or maintaining nutritional needs  Description: Monitor and assess patient's nutrition/hydration status for malnutrition  Collaborate with interdisciplinary team and initiate plan and interventions as ordered  Monitor patient's weight and dietary intake as ordered or per policy  Utilize nutrition screening tool and intervene as necessary  Determine patient's food preferences and provide high-protein, high-caloric foods as appropriate       INTERVENTIONS:  - Monitor oral intake, urinary output, labs, and treatment plans  - Assess nutrition and hydration status and recommend course of action  - Evaluate amount of meals eaten  - Assist patient with eating if necessary   - Allow adequate time for meals  - Recommend/ encourage appropriate diets, oral nutritional supplements, and vitamin/mineral supplements  - Order, calculate, and assess calorie counts as needed  - Recommend, monitor, and adjust tube feedings and TPN/PPN based on assessed needs  - Assess need for intravenous fluids  - Provide specific nutrition/hydration education as appropriate  - Include patient/family/caregiver in decisions related to nutrition  Outcome: Progressing     Problem: SAFETY,RESTRAINT: NV/NON-SELF DESTRUCTIVE BEHAVIOR  Goal: Remains free of harm/injury (restraint for non violent/non self-detsructive behavior)  Description: INTERVENTIONS:  - Instruct patient/family regarding restraint use   - Assess and monitor physiologic and psychological status   - Provide interventions and comfort measures to meet assessed patient needs   - Identify and implement measures to help patient regain control  - Assess readiness for release of restraint   Outcome: Progressing  Goal: Returns to optimal restraint-free functioning  Description: INTERVENTIONS:  - Assess the patient's behavior and symptoms that indicate continued need for restraint  - Identify and implement measures to help patient regain control  - Assess readiness for release of restraint   Outcome: Progressing     Problem: RESPIRATORY - ADULT  Goal: Achieves optimal ventilation and oxygenation  Description: INTERVENTIONS:  - Assess for changes in respiratory status  - Assess for changes in mentation and behavior  - Position to facilitate oxygenation and minimize respiratory effort  - Oxygen administered by appropriate delivery if ordered  - Initiate smoking cessation education as indicated  - Encourage broncho-pulmonary hygiene including cough, deep breathe, Incentive Spirometry  - Assess the need for suctioning and aspirate as needed  - Assess and instruct to report SOB or any respiratory difficulty  - Respiratory Therapy support as indicated  Outcome: Progressing     Problem: GASTROINTESTINAL - ADULT  Goal: Maintains or returns to baseline bowel function  Description: INTERVENTIONS:  - Assess bowel function  - Encourage oral fluids to ensure adequate hydration  - Administer IV fluids if ordered to ensure adequate hydration  - Administer ordered medications as needed  - Encourage mobilization and activity  - Consider nutritional services referral to assist patient with adequate nutrition and appropriate food choices  Outcome: Progressing     Problem: GENITOURINARY - ADULT  Goal: Maintains or returns to baseline urinary function  Description: INTERVENTIONS:  - Assess urinary function  - Encourage oral fluids to ensure adequate hydration if ordered  - Administer IV fluids as ordered to ensure adequate hydration  - Administer ordered medications as needed  - Offer frequent toileting  - Follow urinary retention protocol if ordered  Outcome: Progressing     Problem: METABOLIC, FLUID AND ELECTROLYTES - ADULT  Goal: Electrolytes maintained within normal limits  Description: INTERVENTIONS:  - Monitor labs and assess patient for signs and symptoms of electrolyte imbalances  - Administer electrolyte replacement as ordered  - Monitor response to electrolyte replacements, including repeat lab results as appropriate  - Instruct patient on fluid and nutrition as appropriate  Outcome: Progressing  Goal: Glucose maintained within target range  Description: INTERVENTIONS:  - Monitor Blood Glucose as ordered  - Assess for signs and symptoms of hyperglycemia and hypoglycemia  - Administer ordered medications to maintain glucose within target range  - Assess nutritional intake and initiate nutrition service referral as needed  Outcome: Progressing     Problem: CARDIOVASCULAR - ADULT  Goal: Maintains optimal cardiac output and hemodynamic stability  Description: INTERVENTIONS:  - Monitor I/O, vital signs and rhythm  - Monitor for S/S and trends of decreased cardiac output  - Administer and titrate ordered vasoactive medications to optimize hemodynamic stability  - Assess quality of pulses, skin color and temperature  - Assess for signs of decreased coronary artery perfusion  - Instruct patient to report change in severity of symptoms  Outcome: Progressing  Goal: Absence of cardiac dysrhythmias or at baseline rhythm  Description: INTERVENTIONS:  - Continuous cardiac monitoring, vital signs, obtain 12 lead EKG if ordered  - Administer antiarrhythmic and heart rate control medications as ordered  - Monitor electrolytes and administer replacement therapy as ordered  Outcome: Progressing     Problem: HEMATOLOGIC - ADULT  Goal: Maintains hematologic stability  Description: INTERVENTIONS  - Assess for signs and symptoms of bleeding or hemorrhage  - Monitor labs  - Administer supportive blood products/factors as ordered and appropriate  Outcome: Progressing

## 2021-05-23 NOTE — ASSESSMENT & PLAN NOTE
Lab Results   Component Value Date    HGBA1C 6 0 03/19/2021       Recent Labs     05/22/21  2238 05/23/21  0000 05/23/21  0248 05/23/21  0615   POCGLU 127 130 165* 149*       Blood Sugar Average: Last 72 hrs:  (P) 150 [id number]     · On jardiance and insulin at home  · Continue insulin infusion   Consider transitioning to Lantus AM   · Goal blood glucose between 140 and 180

## 2021-05-23 NOTE — PROGRESS NOTES
[address] [place] [address]  Progress Note Paula Salinas Sr  1950, 80 y o  male MRN: [id number]  Unit/Bed#: ICU 03 Encounter: [id number]  Primary Care Provider: Frederico Sacks, MD   Date and time admitted to hospital: 5/7/2021  4:33 PM      * Acute hypoxemic respiratory failure due to COVID-19 [facility])  Assessment & Plan  · Secondary to COVID-19 infection  · Initial positive 5/7 with unclear symptom onset  · Intubated on 5/17 secondary to progressive respiratory failure  · Proned 6PM on 5/17- supine 5/18 with maintenance of saturations > 90  · CXR with bilateral pulmonary opacities  · Decadron daily  · Continue vitamins  · Completed remdesivir  · Given Actemra 5/13 due to increasing Fio2 requirements and elevated CRP  · Completed initial course of antibiotics but procal was elevated on 5/17 with an increase in WBC and progressive respiratory failure  · Changed antibiotic to ancef from cefepime and flagyl  Vanco discontinued  · Continue to wean Veletri  · Wean FiO2 and PEEP as tolerated  · Continue diuresis with lasix infusion        Bradycardia  Assessment & Plan  · With transient second degree heart blocks  · Cardiology following  · External pacemaker not beneficial at this point per cardiology  · Off vasopressors  · Single ICD with appropriate pacing at 40 bpm  · Consider increasing lower rate limit of ICD to 50-60 bpm per cardiology recommendations if heart block or arrhyhtmia develops again  Severe sepsis ([facility] [address] )  Assessment & Plan  · Patient originally with hypothermia, leukocytosis and hypotension requiring vasopressors  · Currently on Cefazolin  Total antibiotic day 7/7  · Leukocytosis improving  · Procalcitonin trending down  Peaked at 3 6  · Blood cultures with no growth to date     · Monitor CBC with differential  · Monitor blood cultures     Elevated liver function tests  Assessment & Plan  · Improving  · Right upper quadrant ultrasound with evidence of cholelithiasis without wall thickening or pericholecystic fluid  · Continue to monitor     Acute encephalopathy  Assessment & Plan  · Present on admission  · Repeat CT head 5/13: No significant interval change since prior examination  Stable advanced chronic microvascular ischemic change and chronic right cerebellar lacunar infarct  · Etiology unclear: covid 19/sepsis/hypoxia vs  delerium vs  Uremia less likely  · Baseline AO x 3  · Currently intubated and sedated with propofol and fentanyl  Unable to assess mental status  Acute renal failure superimposed on chronic kidney disease [facility])  Assessment & Plan  Recent Labs     05/22/21  0509 05/22/21  1701 05/23/21  0319   CREATININE 2 68* 2 65* 2 54*   EGFR 27 27 28     Estimated Creatinine Clearance: 32 4 mL/min (A) (by C-G formula based on SCr of 2 54 mg/dL (H))  · Hx of CKD III, baseline sCr 1 8 - 1 9  · GAGE on presentation with sCr of 4 97  · Etiology multifactorial secondary to pre renal azotemia, ATN and rhabdomyolysis present on admission  · On lasix and metolazone at home  · Placed on a lasix infusion 10 mg/hr on 5/17 with good response  · Monitor I and O  · Need to monitor his renal indices closely as they have been slowly rising in the setting of the lasix infusion     Thrombocytopenia [facility])  Assessment & Plan  Recent Labs     05/21/21  0355 05/22/21  0509 05/23/21  0327   PLT 85* 76* 71*       · Likely secondary to sepsis and antibiotic use  Thrombocytopenia worsened after starting treatment with Cefepime, however platelet count continues to decline after discontinuing it  · No evidence of active bleeding  · Monitor CBC    · Check peripheral smear  · Consider checking HIV and HCV  · Check hemolysis panel      Hyperlipidemia  Assessment & Plan  · Continue lipitor       Obesity with body mass index 30 or greater  Assessment & Plan  · Nutrition consult when able  · Known risk factor for severity of COVID       Chronic combined systolic and diastolic CHF, NYHA class 3 [facility])  Assessment & Plan  Wt Readings from Last 3 Encounters:   05/23/21 109 kg (239 lb 6 7 oz)   03/19/21 118 kg (261 lb)   03/18/21 118 kg (261 lb)     · BNP 7000 on admission  · 4/21 ECHO: The ventricle was mildly to moderately dilated  Systolic function was mildly to moderately reduced  Ejection fraction was estimated to be 40 %  There was mild to moderate diffuse hypokinesis  · Monitor volume status  · He was placed on a continuous lasix infusion with good response  · Creatinine improving  · No longer requiring vasopressors  Type 2 diabetes mellitus with kidney complication, with long-term current use of insulin [facility])  Assessment & Plan  Lab Results   Component Value Date    HGBA1C 6 0 03/19/2021       Recent Labs     05/22/21  2238 05/23/21  0000 05/23/21  0248 05/23/21  0615   POCGLU 127 130 165* 149*       Blood Sugar Average: Last 72 hrs:  (P) 150 [id number]     · On jardiance and insulin at home  · Continue insulin infusion   · Our goal will be to maintain his blood glucose between 140 and 180        ----------------------------------------------------------------------------------------  HPI/24hr events: Hospital day 16  Vent day 7  Currently on ACVC 26/450/60/10 with SpO2 91%  Weaning off Veletri  Remains on Lasix gtt with UOP 3 9 L  No acute overnight events reported  Disposition: Continue Critical Care   Code Status: Level 1 - Full Code  ---------------------------------------------------------------------------------------  SUBJECTIVE  Intubated and sedated      Review of Systems   Unable to perform ROS: Intubated       ---------------------------------------------------------------------------------------  OBJECTIVE    Vitals   Vitals:    05/23/21 0630 05/23/21 0700 05/23/21 0730 05/23/21 0800   BP:       Pulse:  104  88   Resp:  (!) 25  (!) 25   Temp:  99 3 °F (37 4 °C) 99 3 °F (37 4 °C) 99 3 °F (37 4 °C)   TempSrc:       SpO2: 91% 93%  92%   Weight:       Height:         Temp (24hrs), Av 4 °F (37 4 °C), Min:98 2 °F (36 8 °C), Max:99 7 °F (37 6 °C)  Current: Temperature: 99 3 °F (37 4 °C)  Arterial Line BP: 112/38  Arterial Line MAP (mmHg): 58 mmHg    Respiratory:  SpO2: SpO2: 92 %, SpO2 Activity: SpO2 Activity: At Rest, SpO2 Device: O2 Device: Ventilator  Nasal Cannula O2 Flow Rate (L/min): 15 L/min    Invasive/non-invasive ventilation settings   Respiratory    Lab Data (Last 4 hours)    None         O2/Vent Data (Last 4 hours)       0700          Patient safety screen outcome: Failed                   Physical Exam  Vitals signs reviewed  Constitutional:       Appearance: He is obese  He is ill-appearing  Interventions: He is intubated  HENT:      Head: Normocephalic and atraumatic  Nose: Nose normal       Mouth/Throat:      Mouth: Mucous membranes are moist    Eyes:      Pupils: Pupils are equal, round, and reactive to light  Neck:      Musculoskeletal: Normal range of motion and neck supple  Cardiovascular:      Rate and Rhythm: Normal rate and regular rhythm  Pulses: Normal pulses  Heart sounds: Normal heart sounds  Pulmonary:      Effort: He is intubated  Breath sounds: Decreased breath sounds present  No wheezing  Abdominal:      General: Abdomen is flat  Bowel sounds are normal  There is no distension  Palpations: Abdomen is soft  Tenderness: There is no abdominal tenderness  Musculoskeletal: Normal range of motion  Skin:     General: Skin is warm and dry     Neurological:      Comments: Sedated   Psychiatric:      Comments: BROCK due to sedation            Laboratory and Diagnostics:  Results from last 7 days   Lab Units 21  0327 21  0509 21  0355 21  0628 21  0609 21  0442 21  0428   WBC Thousand/uL 10 29* 11 12* 15 98* 21 41* 28 84* 22 65* 19 38*   HEMOGLOBIN g/dL 7 1* 7 9* 9 1* 9 0* 9 9* 9 7* 10 4*   HEMATOCRIT % 22 1* 23 4* 27 6* 26 4* 30 2* 29 4* 31 8*   PLATELETS Thousands/uL 71* 76* 85* 85* 104* 107* 112*   NEUTROS PCT %  --   --  91*  --  94*  --   --    BANDS PCT %  --   --   --   --   --  9*  --    MONOS PCT %  --   --  4  --  1*  --   --    MONO PCT %  --   --   --  4  --  2*  --      Results from last 7 days   Lab Units 05/23/21  0319 05/22/21  1701 05/22/21  0509 05/21/21  1738 05/21/21  0355 05/20/21  1817 05/20/21  0628  05/19/21  0609  05/18/21  0442 05/17/21  0428   SODIUM mmol/L 143 141 139 139 140 140 138   < > 140   < > 143 143   POTASSIUM mmol/L 3 2* 4 0 3 3* 4 1 3 7 4 0 3 3*   < > 3 3*   < > 4 1 4 7   CHLORIDE mmol/L 106 105 103 102 104 104 102   < > 103   < > 107 110*   CO2 mmol/L 29 27 26 26 26 25 27   < > 24   < > 24 28   ANION GAP mmol/L 8 9 10 11 10 11 9   < > 13   < > 12 5   BUN mg/dL 100* 107* 107* 109* 99* 97* 85*   < > 79*   < > 70* 65*   CREATININE mg/dL 2 54* 2 65* 2 68* 2 74* 2 73* 2 76* 2 64*   < > 2 53*   < > 2 32* 1 93*   CALCIUM mg/dL 8 1* 8 3 8 0* 7 9* 7 7* 7 6* 7 6*   < > 7 5*   < > 7 5* 7 7*   GLUCOSE RANDOM mg/dL 167* 174* 155* 172* 114 144* 176*   < > 192*   < > 290* 213*   ALT U/L  --   --   --   --   --   --   --   --  32  --  36 44   AST U/L  --   --   --   --   --   --   --   --  18  --  20 62*   ALK PHOS U/L  --   --   --   --   --   --   --   --  150*  --  177* 253*   ALBUMIN g/dL  --   --   --   --   --   --   --   --  2 4*  --  2 5* 2 2*   TOTAL BILIRUBIN mg/dL  --   --   --   --   --   --   --   --  0 60  --  0 65 0 82    < > = values in this interval not displayed  Results from last 7 days   Lab Units 05/23/21  0327 05/21/21  0355 05/20/21  0628   MAGNESIUM mg/dL 2 3 2 5 2 5   PHOSPHORUS mg/dL  --  4 5* 4 5*      Results from last 7 days   Lab Units 05/20/21  1008 05/20/21  0409 05/19/21 2017 05/19/21  1331 05/19/21  0610 05/19/21  0028 05/18/21  1832  05/17/21  1344   INR   --   --   --   --   --   --   --   --  1 49*   PTT seconds 94* 67* 59* 101* 53* 62* 66*   < > 28    < > = values in this interval not displayed        Results from last 7 days   Lab Units 05/19/21  1112   TROPONIN I ng/mL 0 07*         ABG:  Results from last 7 days   Lab Units 05/19/21  0612   PH ART  7 400   PCO2 ART mm Hg 35 9*   PO2 ART mm Hg 105 1   HCO3 ART mmol/L 21 7*   BASE EXC ART mmol/L -2 6   ABG SOURCE  Line, Arterial     VBG:  Results from last 7 days   Lab Units 05/19/21  0612   ABG SOURCE  Line, Arterial     Results from last 7 days   Lab Units 05/21/21  0430 05/20/21  0628 05/19/21  0609 05/18/21  0442 05/17/21  1344   PROCALCITONIN ng/ml 0 71* 0 73* 0 97* 1 38* 3 60*       Micro  Results from last 7 days   Lab Units 05/18/21  1152 05/18/21  1110   BLOOD CULTURE   --  No Growth After 4 Days  No Growth After 4 Days  SPUTUM CULTURE  3+ Growth of   --    GRAM STAIN RESULT  No Epithelial cells seen  Rare Polys  No bacteria seen  --    MRSA CULTURE ONLY  No Methicillin Resistant Staphlyococcus aureus (MRSA) isolated  --          Imaging: I have personally reviewed pertinent reports  Intake and Output  I/O       05/21 0701 - 05/22 0700 05/22 0701 - 05/23 0700 05/23 0701 - 05/24 0700    I V  (mL/kg) 1044 5 (9 6) 619 8 (5 7)     NG/GT 1201 450     IV Piggyback       Feedings 872 360     Total Intake(mL/kg) 3117 5 (28 6) 1429 8 (13 1)     Urine (mL/kg/hr) 2960 (1 1) 3975 (1 5)     Stool 0      Total Output 2960 3975     Net +157 5 -2545  2            Unmeasured Stool Occurrence 2 x            Height and Weights   Height: 5' 8" (172 7 cm)  IBW (Ideal Body Weight): 68 4 kg  Body mass index is 36 4 kg/m²  Weight (last 2 days)     Date/Time   Weight    05/23/21 0545   109 (239 42)    05/23/21 0322   109 (239 42)    05/22/21 0543   110 (241 4)    05/21/21 0600   110 (242 51)    05/21/21 0315   110 (243 17)                Nutrition       Diet Orders   (From admission, onward)             Start     Ordered    05/20/21 1057  Diet Enteral/Parenteral; Tube Feeding No Oral Diet; Jevity 1 2 Micheal; Continuous; 30; Prosource TF- Two Packets; 150;  Water; Every 4 hours  Diet effective now     Question Answer Comment   Diet Type Enteral/Parenteral    Enteral/Parenteral Tube Feeding No Oral Diet    Tube Feeding Formula: Jevity 1 2 Micheal    Bolus/Cyclic/Continuous Continuous    Tube Feeding Goal Rate (mL/hr): 30    Prosource Protein Liquid - No Carb Prosource TF- Two Packets    Tube Feeding flush (mL): 150    Water Flush type: Water    Water flush frequency: Every 4 hours    RD to adjust diet per protocol?  Yes        05/20/21 1057                  Active Medications  Scheduled Meds:  Current Facility-Administered Medications   Medication Dose Route Frequency Provider Last Rate    acetaminophen  650 mg Oral Q6H PRN JIHAN Tijerina      atorvastatin  40 mg Oral HS [place] JIHAN Avendano      bisacodyl  10 mg Rectal Daily PRN JIHAN Rubio      cefazolin  1,000 mg Intravenous Q12H JIHAN Rubio 1,000 mg (05/22/21 1831)    chlorhexidine  15 mL Mouth/Throat Q12H [address], [place]      cholecalciferol  2,000 Units Oral Daily JIHAN Tijerina      dexamethasone  3 mg Intravenous Once JIHAN Rubio      epoprostenol (VELETRI) inhalation solution 11198 ng/mL  6 25-50 ng/kg/min (Ideal) Inhalation Titrated JIHAN Shelby 24 854 ng/kg/min (05/23/21 0630)    fentaNYL  75 mcg/hr Intravenous Continuous Iesha Gooden PA-C 75 mcg/hr (05/23/21 0419)    furosemide  10 mg/hr Intravenous Continuous JIHAN Rubio 10 mg/hr (05/22/21 1839)    gabapentin  100 mg Oral TID JIHAN Stoner      heparin (porcine)  5,000 Units Subcutaneous Q8H [address], [place]      insulin regular (HumuLIN R,NovoLIN R) infusion  0 3-21 Units/hr Intravenous Titrated Nella Pierce PA-C 4 Units/hr (05/23/21 9602)    LORazepam  1 mg Intravenous Q4H PRN JIHAN Shelby      midodrine  5 mg Oral TID AC JIHAN Rubio      multivitamin-minerals  1 tablet Oral Daily JIHAN Tijerina      omeprazole (PRILOSEC) suspension 2 mg/mL  20 mg Oral Daily Nolia NII Hutson      ondansetron  4 mg Intravenous Q6H PRN JIHAN Jenkins      polyethylene glycol  17 g Oral Daily JIHAN Riggs      potassium chloride  40 mEq Oral Q4H Haley Romero MD      propofol  5-50 mcg/kg/min Intravenous Titrated Nomauricioa NII Hutson 40 mcg/kg/min (05/22/21 2231)    senna-docusate sodium  2 tablet Oral BID Haley Romero MD       Continuous Infusions:  epoprostenol (VELETRI) inhalation solution 93245 ng/mL, 6 25-50 ng/kg/min (Ideal), Last Rate: 24 854 ng/kg/min (05/23/21 0630)  fentaNYL, 75 mcg/hr, Last Rate: 75 mcg/hr (05/23/21 0419)  furosemide, 10 mg/hr, Last Rate: 10 mg/hr (05/22/21 1839)  insulin regular (HumuLIN R,NovoLIN R) infusion, 0 3-21 Units/hr, Last Rate: 4 Units/hr (05/23/21 2349)  propofol, 5-50 mcg/kg/min, Last Rate: 40 mcg/kg/min (05/22/21 2231)      PRN Meds:   acetaminophen, 650 mg, Q6H PRN  bisacodyl, 10 mg, Daily PRN  LORazepam, 1 mg, Q4H PRN  ondansetron, 4 mg, Q6H PRN        Invasive Devices Review  Invasive Devices     Central Venous Catheter Line            CVC Central Lines 05/12/21 Double 10 days          Arterial Line            Arterial Line 05/17/21 Radial 5 days          Line            Hemodialysis AV Fistula 09/06/19 Upper arm 625 days          Drain            NG/OG/Enteral Tube Orogastric 6 days    Urethral Catheter 2 days          Airway            ETT  6 days                  ---------------------------------------------------------------------------------------  Advance Directive and Living Will:      Power of :    POLST:    ---------------------------------------------------------------------------------------  Care Time Delivered:   Please, see attending's attestation      Haley Romero MD      Portions of the record may have been created with voice recognition software    Occasional wrong word or "sound a like" substitutions may have occurred due to the inherent limitations of voice recognition software    Read the chart carefully and recognize, using context, where substitutions have occurred

## 2021-05-23 NOTE — ASSESSMENT & PLAN NOTE
Recent Labs     05/23/21  0319 05/23/21  1903 05/24/21  0424   CREATININE 2 54* 2 48* 2 38*   EGFR 28 29 31     Estimated Creatinine Clearance: 33 7 mL/min (A) (by C-G formula based on SCr of 2 38 mg/dL (H))  · Hx of CKD III, baseline sCr 1 8 - 1 9  · GAGE on presentation with sCr of 4 97  · Etiology multifactorial secondary to pre renal azotemia, ATN and rhabdomyolysis present on admission  · On lasix and metolazone at home  · Placed on a lasix infusion with good response  · Consider discontinuing lasix infusion    · Monitor I and O  · Need to monitor his renal indices closely as they have been slowly rising in the setting of the lasix infusion

## 2021-05-23 NOTE — ASSESSMENT & PLAN NOTE
Recent Labs     05/21/21  0355 05/22/21  0509 05/23/21  0327   PLT 85* 76* 71*       · Likely secondary to sepsis and antibiotic use  · No evidence of active bleeding    · Monitor CBC

## 2021-05-23 NOTE — PLAN OF CARE
Problem: Potential for Falls  Goal: Patient will remain free of falls  Description: INTERVENTIONS:  - Assess patient frequently for physical needs  -  Identify cognitive and physical deficits and behaviors that affect risk of falls    -  [place] fall precautions as indicated by assessment   - Educate patient/family on patient safety including physical limitations  - Instruct patient to call for assistance with activity based on assessment  - Modify environment to reduce risk of injury  - Consider OT/PT consult to assist with strengthening/mobility  Outcome: Progressing     Problem: Prexisting or High Potential for Compromised Skin Integrity  Goal: Skin integrity is maintained or improved  Description: INTERVENTIONS:  - Identify patients at risk for skin breakdown  - Assess and monitor skin integrity  - Assess and monitor nutrition and hydration status  - Monitor labs   - Assess for incontinence   - Turn and reposition patient  - Assist with mobility/ambulation  - Relieve pressure over bony prominences  - Avoid friction and shearing  - Provide appropriate hygiene as needed including keeping skin clean and dry  - Evaluate need for skin moisturizer/barrier cream  - Collaborate with interdisciplinary team   - Patient/family teaching  - Consider wound care consult   Outcome: Progressing     Problem: PAIN - ADULT  Goal: Verbalizes/displays adequate comfort level or baseline comfort level  Description: Interventions:  - Encourage patient to monitor pain and request assistance  - Assess pain using appropriate pain scale  - Administer analgesics based on type and severity of pain and evaluate response  - Implement non-pharmacological measures as appropriate and evaluate response  - Consider cultural and social influences on pain and pain management  - Notify physician/advanced practitioner if interventions unsuccessful or patient reports new pain  Outcome: Progressing     Problem: INFECTION - ADULT  Goal: Absence or prevention of progression during hospitalization  Description: INTERVENTIONS:  - Assess and monitor for signs and symptoms of infection  - Monitor lab/diagnostic results  - Monitor all insertion sites, i e  indwelling lines, tubes, and drains  - Monitor endotracheal if appropriate and nasal secretions for changes in amount and color  - [place] appropriate cooling/warming therapies per order  - Administer medications as ordered  - Instruct and encourage patient and family to use good hand hygiene technique  - Identify and instruct in appropriate isolation precautions for identified infection/condition  Outcome: Progressing     Problem: SAFETY ADULT  Goal: Patient will remain free of falls  Description: INTERVENTIONS:  - Assess patient frequently for physical needs  -  Identify cognitive and physical deficits and behaviors that affect risk of falls    -  [place] fall precautions as indicated by assessment   - Educate patient/family on patient safety including physical limitations  - Instruct patient to call for assistance with activity based on assessment  - Modify environment to reduce risk of injury  - Consider OT/PT consult to assist with strengthening/mobility  Outcome: Progressing  Goal: Maintain or return to baseline ADL function  Description: INTERVENTIONS:  -  Assess patient's ability to carry out ADLs; assess patient's baseline for ADL function and identify physical deficits which impact ability to perform ADLs (bathing, care of mouth/teeth, toileting, grooming, dressing, etc )  - Assess/evaluate cause of self-care deficits   - Assess range of motion  - Assess patient's mobility; develop plan if impaired  - Assess patient's need for assistive devices and provide as appropriate  - Encourage maximum independence but intervene and supervise when necessary  - Involve family in performance of ADLs  - Assess for home care needs following discharge   - Consider OT consult to assist with ADL evaluation and planning for discharge  - Provide patient education as appropriate  Outcome: Progressing  Goal: Maintain or return mobility status to optimal level  Description: INTERVENTIONS:  - Assess patient's baseline mobility status (ambulation, transfers, stairs, etc )    - Identify cognitive and physical deficits and behaviors that affect mobility  - Identify mobility aids required to assist with transfers and/or ambulation (gait belt, sit-to-stand, lift, walker, cane, etc )  - [place] fall precautions as indicated by assessment  - Record patient progress and toleration of activity level on Mobility SBAR; progress patient to next Phase/Stage  - Instruct patient to call for assistance with activity based on assessment  - Consider rehabilitation consult to assist with strengthening/weightbearing, etc   Outcome: Progressing     Problem: DISCHARGE PLANNING  Goal: Discharge to home or other facility with appropriate resources  Description: INTERVENTIONS:  - Identify barriers to discharge w/patient and caregiver  - Arrange for needed discharge resources and transportation as appropriate  - Identify discharge learning needs (meds, wound care, etc )  - Arrange for interpretive services to assist at discharge as needed  - Refer to Case Management Department for coordinating discharge planning if the patient needs post-hospital services based on physician/advanced practitioner order or complex needs related to functional status, cognitive ability, or social support system  Outcome: Progressing     Problem: Knowledge Deficit  Goal: Patient/family/caregiver demonstrates understanding of disease process, treatment plan, medications, and discharge instructions  Description: Complete learning assessment and assess knowledge base    Interventions:  - Provide teaching at level of understanding  - Provide teaching via preferred learning methods  Outcome: Progressing     Problem: Nutrition/Hydration-ADULT  Goal: Nutrient/Hydration intake appropriate for improving, restoring or maintaining nutritional needs  Description: Monitor and assess patient's nutrition/hydration status for malnutrition  Collaborate with interdisciplinary team and initiate plan and interventions as ordered  Monitor patient's weight and dietary intake as ordered or per policy  Utilize nutrition screening tool and intervene as necessary  Determine patient's food preferences and provide high-protein, high-caloric foods as appropriate       INTERVENTIONS:  - Monitor oral intake, urinary output, labs, and treatment plans  - Assess nutrition and hydration status and recommend course of action  - Evaluate amount of meals eaten  - Assist patient with eating if necessary   - Allow adequate time for meals  - Recommend/ encourage appropriate diets, oral nutritional supplements, and vitamin/mineral supplements  - Order, calculate, and assess calorie counts as needed  - Recommend, monitor, and adjust tube feedings and TPN/PPN based on assessed needs  - Assess need for intravenous fluids  - Provide specific nutrition/hydration education as appropriate  - Include patient/family/caregiver in decisions related to nutrition  Outcome: Progressing     Problem: SAFETY,RESTRAINT: NV/NON-SELF DESTRUCTIVE BEHAVIOR  Goal: Remains free of harm/injury (restraint for non violent/non self-detsructive behavior)  Description: INTERVENTIONS:  - Instruct patient/family regarding restraint use   - Assess and monitor physiologic and psychological status   - Provide interventions and comfort measures to meet assessed patient needs   - Identify and implement measures to help patient regain control  - Assess readiness for release of restraint   Outcome: Progressing  Goal: Returns to optimal restraint-free functioning  Description: INTERVENTIONS:  - Assess the patient's behavior and symptoms that indicate continued need for restraint  - Identify and implement measures to help patient regain control  - Assess readiness for release of restraint   Outcome: Progressing     Problem: RESPIRATORY - ADULT  Goal: Achieves optimal ventilation and oxygenation  Description: INTERVENTIONS:  - Assess for changes in respiratory status  - Assess for changes in mentation and behavior  - Position to facilitate oxygenation and minimize respiratory effort  - Oxygen administered by appropriate delivery if ordered  - Initiate smoking cessation education as indicated  - Encourage broncho-pulmonary hygiene including cough, deep breathe, Incentive Spirometry  - Assess the need for suctioning and aspirate as needed  - Assess and instruct to report SOB or any respiratory difficulty  - Respiratory Therapy support as indicated  Outcome: Progressing     Problem: GASTROINTESTINAL - ADULT  Goal: Maintains or returns to baseline bowel function  Description: INTERVENTIONS:  - Assess bowel function  - Encourage oral fluids to ensure adequate hydration  - Administer IV fluids if ordered to ensure adequate hydration  - Administer ordered medications as needed  - Encourage mobilization and activity  - Consider nutritional services referral to assist patient with adequate nutrition and appropriate food choices  Outcome: Progressing     Problem: GENITOURINARY - ADULT  Goal: Maintains or returns to baseline urinary function  Description: INTERVENTIONS:  - Assess urinary function  - Encourage oral fluids to ensure adequate hydration if ordered  - Administer IV fluids as ordered to ensure adequate hydration  - Administer ordered medications as needed  - Offer frequent toileting  - Follow urinary retention protocol if ordered  Outcome: Progressing     Problem: METABOLIC, FLUID AND ELECTROLYTES - ADULT  Goal: Electrolytes maintained within normal limits  Description: INTERVENTIONS:  - Monitor labs and assess patient for signs and symptoms of electrolyte imbalances  - Administer electrolyte replacement as ordered  - Monitor response to electrolyte replacements, including repeat lab results as appropriate  - Instruct patient on fluid and nutrition as appropriate  Outcome: Progressing  Goal: Glucose maintained within target range  Description: INTERVENTIONS:  - Monitor Blood Glucose as ordered  - Assess for signs and symptoms of hyperglycemia and hypoglycemia  - Administer ordered medications to maintain glucose within target range  - Assess nutritional intake and initiate nutrition service referral as needed  Outcome: Progressing     Problem: CARDIOVASCULAR - ADULT  Goal: Maintains optimal cardiac output and hemodynamic stability  Description: INTERVENTIONS:  - Monitor I/O, vital signs and rhythm  - Monitor for S/S and trends of decreased cardiac output  - Administer and titrate ordered vasoactive medications to optimize hemodynamic stability  - Assess quality of pulses, skin color and temperature  - Assess for signs of decreased coronary artery perfusion  - Instruct patient to report change in severity of symptoms  Outcome: Progressing  Goal: Absence of cardiac dysrhythmias or at baseline rhythm  Description: INTERVENTIONS:  - Continuous cardiac monitoring, vital signs, obtain 12 lead EKG if ordered  - Administer antiarrhythmic and heart rate control medications as ordered  - Monitor electrolytes and administer replacement therapy as ordered  Outcome: Progressing     Problem: HEMATOLOGIC - ADULT  Goal: Maintains hematologic stability  Description: INTERVENTIONS  - Assess for signs and symptoms of bleeding or hemorrhage  - Monitor labs  - Administer supportive blood products/factors as ordered and appropriate  Outcome: Progressing

## 2021-05-23 NOTE — RESPIRATORY THERAPY NOTE
Protocol followed by Dr Marina Camarillo, stopped Veletri on pt  SPO2 93%  Will continue to monitor patient

## 2021-05-23 NOTE — PROGRESS NOTES
NEPHROLOGY PROGRESS NOTE   Silvia Burgos Sr  80 y o  male MRN: [id number]  Unit/Bed#: ICU 03 Encounter: [id number]      ASSESSMENT/PLAN:  1  Acute kidney injury, POA:  Multifactorial   Prerenal/ATN with autoregulatory failure from ARB and rhabdomyolysis  · Creatinine 4 97 on admission and did improve down to 1 76 on 05/16 but then had a secondary insult likely related to diuresis  · Creatinine today 2 54 which is slightly better than yesterday  BUN stable at 100  · Currently on Lasix drip at 10 milligrams/hour  · Patient had significant urine output yesterday of 3 9 L and was 2 5 L negative  · Discussed with primary team and will be stopping Lasix drip today and trending urine output  · Continue to hold ARB  2  CKD IIIB: baseline creatinine around 1 8-1 9 at best but did have recent outpatient creatinines in the 2's  Follows with Dr Viviane Lutz in the office  · CKD due to diabetic kidney disease, hypertensive nephrosclerosis, cardiorenal syndrome and age-related nephron loss  3  Azotemia:  Likely due to renal failure and steroids  Fecal occult blood is pending to rule out GI bleed  4  Acute hypoxic respiratory failure due to COVID-19:  Remains intubated  5  Sepsis:  Will be completing cefazolin course today  6  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%:  Currently on Lasix drip as above  7  Hypokalemia:  Getting potassium chloride 40 mEq p o  X2 today per primary team  8  Anemia:  Hemoglobin trending down to 7 1 today  Transfuse as needed per primary team  9  Hypotension:  Continue midodrine 5 mg t i d   10  Thrombocytopenia:  Felt to be due to sepsis  11  DM II    Plan Summary:    Consider stopping lasix drip-- will defer to primary team    Check a m  BMP   K replaced by primary team    Discussed with critical care team     SUBJECTIVE:  No overnight events  Urine output significantly improving    Remains intubated    OBJECTIVE:  Current Weight: Weight - Scale: 109 kg (239 lb 6 7 oz)  Vitals:    05/23/21 0800   BP:    Pulse: 88   Resp: (!) 25   Temp: 99 3 °F (37 4 °C)   SpO2: 92%       Intake/Output Summary (Last 24 hours) at 5/23/2021 0646  Last data filed at 5/23/2021 0301  Gross per 24 hour   Intake 1429 82 ml   Output 3575 ml   Net -2145 18 ml       To limit exposure to covid-19 exam was performed via window   General:  No acute distress, ill appearing   Skin:  No rash  Eyes:  Closed  ENT:  ET tube in place  Neck:  Trachea midline   Chest:  No accessory muscle use  CVS:  Regular rate   Abdomen:  Obese  Extremities:  No edema  Neuro:  Sedated    Medications:  Scheduled Meds:  Current Facility-Administered Medications   Medication Dose Route Frequency Provider Last Rate    acetaminophen  650 mg Oral Q6H PRN Ratna JIHAN Medellin      atorvastatin  40 mg Oral HS La K JIHAN Avendano      bisacodyl  10 mg Rectal Daily PRN Amelia Brightly, CRNP      cefazolin  1,000 mg Intravenous Q12H Amelia Brightly, JOVANYNP 1,000 mg (05/22/21 1831)    chlorhexidine  15 mL Mouth/Throat Q12H [facility] Iesha Russell PA-C      cholecalciferol  2,000 Units Oral Daily JIHAN Lenz      dexamethasone  3 mg Intravenous Once Amelia Brightly, JOVANYNP      epoprostenol (VELETRI) inhalation solution 58515 ng/mL  6 25-50 ng/kg/min (Ideal) Inhalation Titrated JIHAN Pabon 24 854 ng/kg/min (05/23/21 0630)    fentaNYL  75 mcg/hr Intravenous Continuous Iesha Gooden PA-C 75 mcg/hr (05/23/21 0419)    furosemide  10 mg/hr Intravenous Continuous Amelia Brightly, CRNP 10 mg/hr (05/22/21 1839)    gabapentin  100 mg Oral TID Sherren Kinds, CRNP      heparin (porcine)  5,000 Units Subcutaneous Q8H [facility]      insulin regular (HumuLIN R,NovoLIN R) infusion  0 3-21 Units/hr Intravenous Titrated Herminia Schmidt PA-C 4 Units/hr (05/23/21 8173)    LORazepam  1 mg Intravenous Q4H PRN JIHAN Pabon      midodrine  5 mg Oral TID AC JIHAN Contreras      multivitamin-minerals  1 tablet Oral Daily Relda Comes JIHAN Avendano      omeprazole (PRILOSEC) suspension 2 mg/mL  20 mg Oral Daily Iesha Gooden PA-C      ondansetron  4 mg Intravenous Q6H PRN Relda Comes BilofJIHAN abdi      polyethylene glycol  17 g Oral Daily Oniel Fruits, CRNP      potassium chloride  40 mEq Oral Q4H Berny Ortiz MD      propofol  5-50 mcg/kg/min Intravenous Titrated Sidney Coles PA-C 40 mcg/kg/min (05/22/21 2231)    senna-docusate sodium  2 tablet Oral BID Berny Ortiz MD         PRN Meds:   acetaminophen    bisacodyl    LORazepam    ondansetron    Continuous Infusions:epoprostenol (VELETRI) inhalation solution 89909 ng/mL, 6 25-50 ng/kg/min (Ideal), Last Rate: 24 854 ng/kg/min (05/23/21 0630)  fentaNYL, 75 mcg/hr, Last Rate: 75 mcg/hr (05/23/21 0419)  furosemide, 10 mg/hr, Last Rate: 10 mg/hr (05/22/21 1839)  insulin regular (HumuLIN R,NovoLIN R) infusion, 0 3-21 Units/hr, Last Rate: 4 Units/hr (05/23/21 2164)  propofol, 5-50 mcg/kg/min, Last Rate: 40 mcg/kg/min (05/22/21 2231)        Laboratory Results:  Results from last 7 days   Lab Units 05/23/21  0327 05/23/21  0319 05/22/21  1701 05/22/21  0509 05/21/21  1738 05/21/21  0355 05/20/21  1817 05/20/21  0628  05/19/21  0609  05/18/21  0442 05/17/21  0428   WBC Thousand/uL 10 29*  --   --  11 12*  --  15 98*  --  21 41*  --  28 84*  --  22 65* 19 38*   HEMOGLOBIN g/dL 7 1*  --   --  7 9*  --  9 1*  --  9 0*  --  9 9*  --  9 7* 10 4*   HEMATOCRIT % 22 1*  --   --  23 4*  --  27 6*  --  26 4*  --  30 2*  --  29 4* 31 8*   PLATELETS Thousands/uL 71*  --   --  76*  --  85*  --  85*  --  104*  --  107* 112*   SODIUM mmol/L  --  143 141 139 139 140 140 138   < > 140   < > 143 143   POTASSIUM mmol/L  --  3 2* 4 0 3 3* 4 1 3 7 4 0 3 3*   < > 3 3*   < > 4 1 4 7   CHLORIDE mmol/L  --  106 105 103 102 104 104 102   < > 103   < > 107 110*   CO2 mmol/L  --  29 27 26 26 26 25 27   < > 24   < > 24 28   BUN mg/dL  --  100* 107* 107* 109* 99* 97* 85*   < > 79*   < > 70* 65*   CREATININE mg/dL  --  2 54* 2 65* 2 68* 2 74* 2 73* 2 76* 2 64*   < > 2 53*   < > 2 32* 1 93*   CALCIUM mg/dL  --  8 1* 8 3 8 0* 7 9* 7 7* 7 6* 7 6*   < > 7 5*   < > 7 5* 7 7*   MAGNESIUM mg/dL 2 3  --   --   --   --  2 5  --  2 5  --   --   --   --   --    PHOSPHORUS mg/dL  --   --   --   --   --  4 5*  --  4 5*  --   --   --   --   --     < > = values in this interval not displayed

## 2021-05-23 NOTE — ASSESSMENT & PLAN NOTE
Recent Labs     05/22/21  0509 05/23/21  0327 05/24/21  0409   PLT 76* 71* 75*       · Likely secondary to sepsis and antibiotic use  Thrombocytopenia worsened after starting treatment with Cefepime, however platelet count continues to decline after discontinuing it  · No evidence of active bleeding  · Monitor CBC    · Peripheral smear pending  · Consider checking HIV and HCV  · Hemolysis panel pending

## 2021-05-23 NOTE — QUICK NOTE
I attempted to call patient's son x2 with no reply  I left a voice message with instructions to call back

## 2021-05-23 NOTE — ASSESSMENT & PLAN NOTE
Wt Readings from Last 3 Encounters:   05/23/21 109 kg (239 lb 6 7 oz)   03/19/21 118 kg (261 lb)   03/18/21 118 kg (261 lb)     · BNP 7000 on admission  · 4/21 ECHO: The ventricle was mildly to moderately dilated  Systolic function was mildly to moderately reduced  Ejection fraction was estimated to be 40 %  There was mild to moderate diffuse hypokinesis  · Monitor volume status  · He was placed on a continuous lasix infusion with good response  · Creatinine improving  · No longer requiring vasopressors

## 2021-05-24 NOTE — PLAN OF CARE
Problem: Nutrition/Hydration-ADULT  Goal: Nutrient/Hydration intake appropriate for improving, restoring or maintaining nutritional needs  Description: Monitor and assess patient's nutrition/hydration status for malnutrition  Collaborate with interdisciplinary team and initiate plan and interventions as ordered  Monitor patient's weight and dietary intake as ordered or per policy  Utilize nutrition screening tool and intervene as necessary  Determine patient's food preferences and provide high-protein, high-caloric foods as appropriate  INTERVENTIONS:  - Monitor oral intake, urinary output, labs, and treatment plans  - Assess nutrition and hydration status and recommend course of action  - Evaluate amount of meals eaten  - Assist patient with eating if necessary   - Allow adequate time for meals  - Recommend/ encourage appropriate diets, oral nutritional supplements, and vitamin/mineral supplements  - Order, calculate, and assess calorie counts as needed  - Recommend, monitor, and adjust tube feedings and TPN/PPN based on assessed needs  - Assess need for intravenous fluids  - Provide specific nutrition/hydration education as appropriate  - Include patient/family/caregiver in decisions related to nutrition  Outcome: Not Progressing   Propofol decreased to 12 7mL/hr, currently PT not meeting estimated pro, nguyễn intake with current TF order, recommend adjusting TF Jevity 1 2 to 60mL/hr, no prosource, water flushes 125mL q 4hrs, with propofol provides total of 2063cal, 80g pro, 1916mL

## 2021-05-24 NOTE — PROGRESS NOTES
[address] [place] [address]  Progress Note Paula Salinas Sr  1950, 80 y o  male MRN: [id number]  Unit/Bed#: ICU 03 Encounter: [id number]  Primary Care Provider: Frederico Sacks, MD   Date and time admitted to hospital: 5/7/2021  4:33 PM      * Acute hypoxemic respiratory failure due to COVID-19 [facility])  Assessment & Plan  · Secondary to COVID-19 infection  · Initial positive 5/7 with unclear symptom onset  · Intubated on 5/17 secondary to progressive respiratory failure  · Proned 6PM on 5/17- supine 5/18 with maintenance of saturations > 90  · CXR with bilateral pulmonary opacities  · Completed Decadron  · Continue vitamins  · Completed remdesivir  · Given Actemra 5/13 due to increasing Fio2 requirements and elevated CRP  · Completed antibiotic therapy  · Off Veletri  · Wean FiO2 and PEEP as tolerated  · Currently on lasix gtt  Consider discontinuing         Bradycardia  Assessment & Plan  · With transient second degree heart blocks  · Cardiology following  · External pacemaker not beneficial at this point per cardiology  · Off vasopressors  · Single ICD with appropriate pacing at 40 bpm  · Consider increasing lower rate limit of ICD to 50-60 bpm per cardiology recommendations if heart block or arrhyhtmia develops again  Severe sepsis ([facility] [address] )  Assessment & Plan  · Patient originally with hypothermia, leukocytosis and hypotension requiring vasopressors  · Completed 7 days of Cefazolin  · Leukocytosis improving  · Procalcitonin trending down  Peaked at 3 6  · Blood cultures with no growth to date  · Monitor CBC with differential  · Monitor blood cultures     Elevated liver function tests  Assessment & Plan  · Improving  · Right upper quadrant ultrasound with evidence of cholelithiasis without wall thickening or pericholecystic fluid    · Continue to monitor     Acute encephalopathy  Assessment & Plan  · Present on admission  · Repeat CT head 5/13: No significant interval change since prior examination  Stable advanced chronic microvascular ischemic change and chronic right cerebellar lacunar infarct  · Etiology unclear: covid 19/sepsis/hypoxia vs  delerium vs  Uremia less likely  · Baseline AO x 3  · Currently intubated and sedated with propofol and fentanyl  Unable to assess mental status  Acute renal failure superimposed on chronic kidney disease [facility])  Assessment & Plan  Recent Labs     05/23/21  0319 05/23/21  1903 05/24/21  0424   CREATININE 2 54* 2 48* 2 38*   EGFR 28 29 31     Estimated Creatinine Clearance: 33 7 mL/min (A) (by C-G formula based on SCr of 2 38 mg/dL (H))  · Hx of CKD III, baseline sCr 1 8 - 1 9  · GAGE on presentation with sCr of 4 97  · Etiology multifactorial secondary to pre renal azotemia, ATN and rhabdomyolysis present on admission  · On lasix and metolazone at home  · Placed on a lasix infusion with good response  · Consider discontinuing lasix infusion  · Monitor I and O  · Need to monitor his renal indices closely as they have been slowly rising in the setting of the lasix infusion     Thrombocytopenia [facility])  Assessment & Plan  Recent Labs     05/22/21  0509 05/23/21  0327 05/24/21  0409   PLT 76* 71* 75*       · Likely secondary to sepsis and antibiotic use  Thrombocytopenia worsened after starting treatment with Cefepime, however platelet count continues to decline after discontinuing it  · No evidence of active bleeding  · Monitor CBC    · Peripheral smear pending  · Consider checking HIV and HCV  · Hemolysis panel pending      Hyperlipidemia  Assessment & Plan  · Continue lipitor       Obesity with body mass index 30 or greater  Assessment & Plan  · Nutrition consult when able  · Known risk factor for severity of COVID       Chronic combined systolic and diastolic CHF, NYHA class 3 (HCC)  Assessment & Plan  Wt Readings from Last 3 Encounters:   05/23/21 109 kg (239 lb 6 7 oz)   03/19/21 118 kg (261 lb)   03/18/21 118 kg (261 lb)     · BNP 7000 on admission  · 4/21 ECHO: The ventricle was mildly to moderately dilated  Systolic function was mildly to moderately reduced  Ejection fraction was estimated to be 40 %  There was mild to moderate diffuse hypokinesis  · Monitor volume status  · He was placed on a continuous lasix infusion with good response  · Creatinine improving  · No longer requiring vasopressors  Type 2 diabetes mellitus with kidney complication, with long-term current use of insulin [facility])  Assessment & Plan  Lab Results   Component Value Date    HGBA1C 6 0 03/19/2021       Recent Labs     05/22/21  2238 05/23/21  0000 05/23/21  0248 05/23/21  0615   POCGLU 127 130 165* 149*       Blood Sugar Average: Last 72 hrs:  (P) 150 [id number]     · On jardiance and insulin at home  · Continue insulin infusion  Consider transitioning to Lantus AM   · Goal blood glucose between 140 and 180          ----------------------------------------------------------------------------------------  HPI: Patient admitted on 05/07 to med surg due to acute encephalopathy and COVID-19 infection  Found to be in severe sepsis and started on COVID protocol  Patient has completed Remdesivir, decadron  Due to worsening hypoxemia, patient was transferred to the ICU on 5/12  He received Actemra on 05/13 due to elevated CRP  Patient was intubated on 05/17 due to hypoxia on HFNC + NRB  24hr events: Patient occasionally hypoxic overnight  FiO2 increased to 100%  He remains on lasix gtt at 10 mg/hr with good UOP  No other acute overnight events       Disposition: Continue Critical Care   Code Status: Level 1 - Full Code  ---------------------------------------------------------------------------------------  SUBJECTIVE      Review of Systems   Unable to perform ROS: Intubated       ---------------------------------------------------------------------------------------  OBJECTIVE    Vitals   Vitals:    05/24/21 0300 05/24/21 0410 05/24/21 0500 05/24/21 0600   BP: Pulse: 100  82    Resp: (!) 33  (!) 25    Temp: 99 3 °F (37 4 °C)  99 °F (37 2 °C)    TempSrc:       SpO2: (!) 85% 91% 90%    Weight:    104 kg (229 lb 8 oz)   Height:         Temp (24hrs), Av 2 °F (37 3 °C), Min:99 °F (37 2 °C), Max:99 3 °F (37 4 °C)  Current: Temperature: 99 °F (37 2 °C)  Arterial Line BP: 104/34  Arterial Line MAP (mmHg): 54 mmHg    Respiratory:  SpO2: SpO2: 90 %, SpO2 Activity: SpO2 Activity: At Rest, SpO2 Device: O2 Device: Ventilator  Nasal Cannula O2 Flow Rate (L/min): 15 L/min    Invasive/non-invasive ventilation settings   Respiratory    Lab Data (Last 4 hours)    None         O2/Vent Data (Last 4 hours)    None                Physical Exam  Vitals signs reviewed  Constitutional:       Appearance: He is obese  He is ill-appearing  Interventions: He is intubated  HENT:      Head: Normocephalic and atraumatic  Nose: Nose normal       Mouth/Throat:      Mouth: Mucous membranes are moist    Eyes:      Pupils: Pupils are equal, round, and reactive to light  Neck:      Musculoskeletal: Normal range of motion and neck supple  Cardiovascular:      Rate and Rhythm: Normal rate and regular rhythm  Pulses: Normal pulses  Heart sounds: Normal heart sounds  Pulmonary:      Effort: He is intubated  Breath sounds: Decreased breath sounds present  No wheezing  Abdominal:      General: Abdomen is flat  Bowel sounds are normal  There is no distension  Palpations: Abdomen is soft  Tenderness: There is no abdominal tenderness  Musculoskeletal: Normal range of motion  Skin:     General: Skin is warm and dry     Neurological:      Comments: Sedated   Psychiatric:      Comments: BROCK due to sedation            Laboratory and Diagnostics:  Results from last 7 days   Lab Units 21  0409 21  0327 21  0509 21  0355 21  0628 21  0609 21  0442   WBC Thousand/uL 9 90 10 29* 11 12* 15 98* 21 41* 28 84* 22 65*   HEMOGLOBIN g/dL 7 1* 7 1* 7 9* 9 1* 9 0* 9 9* 9 7*   HEMATOCRIT % 21 8* 22 1* 23 4* 27 6* 26 4* 30 2* 29 4*   PLATELETS Thousands/uL 75* 71* 76* 85* 85* 104* 107*   NEUTROS PCT %  --   --   --  91*  --  94*  --    BANDS PCT %  --   --   --   --   --   --  9*   MONOS PCT %  --   --   --  4  --  1*  --    MONO PCT %  --   --   --   --  4  --  2*     Results from last 7 days   Lab Units 05/24/21  0424 05/24/21  0409 05/23/21  1903 05/23/21  0319 05/22/21  1701 05/22/21  0509 05/21/21  1738 05/21/21  0355  05/19/21  0609  05/18/21  0442   SODIUM mmol/L 149*  --  146* 143 141 139 139 140   < > 140   < > 143   POTASSIUM mmol/L 3 5  --  3 9 3 2* 4 0 3 3* 4 1 3 7   < > 3 3*   < > 4 1   CHLORIDE mmol/L 108  --  107 106 105 103 102 104   < > 103   < > 107   CO2 mmol/L 32  --  30 29 27 26 26 26   < > 24   < > 24   ANION GAP mmol/L 9  --  9 8 9 10 11 10   < > 13   < > 12   BUN mg/dL 99*  --  99* 100* 107* 107* 109* 99*   < > 79*   < > 70*   CREATININE mg/dL 2 38*  --  2 48* 2 54* 2 65* 2 68* 2 74* 2 73*   < > 2 53*   < > 2 32*   CALCIUM mg/dL 8 7  --  8 6 8 1* 8 3 8 0* 7 9* 7 7*   < > 7 5*   < > 7 5*   GLUCOSE RANDOM mg/dL 151*  --  127 167* 174* 155* 172* 114   < > 192*   < > 290*   ALT U/L  --  37  --   --   --   --   --   --   --  32  --  36   AST U/L  --  40  --   --   --   --   --   --   --  18  --  20   ALK PHOS U/L  --  114  --   --   --   --   --   --   --  150*  --  177*   ALBUMIN g/dL  --  2 2*  --   --   --   --   --   --   --  2 4*  --  2 5*   TOTAL BILIRUBIN mg/dL  --  0 68  --   --   --   --   --   --   --  0 60  --  0 65    < > = values in this interval not displayed       Results from last 7 days   Lab Units 05/23/21  0327 05/21/21  0355 05/20/21  0628   MAGNESIUM mg/dL 2 3 2 5 2 5   PHOSPHORUS mg/dL  --  4 5* 4 5*      Results from last 7 days   Lab Units 05/20/21  1008 05/20/21  0409 05/19/21  2017 05/19/21  1331 05/19/21  0610 05/19/21  0028 05/18/21  1832  05/17/21  1344   INR   --   --   --   --   --   --   --   -- 1 49*   PTT seconds 94* 67* 59* 101* 53* 62* 66*   < > 28    < > = values in this interval not displayed  Results from last 7 days   Lab Units 05/19/21  1112   TROPONIN I ng/mL 0 07*         ABG:  Results from last 7 days   Lab Units 05/19/21  0612   PH ART  7 400   PCO2 ART mm Hg 35 9*   PO2 ART mm Hg 105 1   HCO3 ART mmol/L 21 7*   BASE EXC ART mmol/L -2 6   ABG SOURCE  Line, Arterial     VBG:  Results from last 7 days   Lab Units 05/19/21  0612   ABG SOURCE  Line, Arterial     Results from last 7 days   Lab Units 05/21/21  0430 05/20/21  0628 05/19/21  0609 05/18/21  0442 05/17/21  1344   PROCALCITONIN ng/ml 0 71* 0 73* 0 97* 1 38* 3 60*       Micro  Results from last 7 days   Lab Units 05/18/21  1152 05/18/21  1110   BLOOD CULTURE   --  No Growth After 5 Days  No Growth After 5 Days  SPUTUM CULTURE  3+ Growth of   --    GRAM STAIN RESULT  No Epithelial cells seen  Rare Polys  No bacteria seen  --    MRSA CULTURE ONLY  No Methicillin Resistant Staphlyococcus aureus (MRSA) isolated  --          Imaging: I have personally reviewed pertinent reports  Intake and Output  I/O       05/22 0701 - 05/23 0700 05/23 0701 - 05/24 0700 05/24 0701 - 05/25 0700    I V  (mL/kg) 619 8 (5 7) 1063 2 (10 2)     NG/ 930     Feedings 360      Total Intake(mL/kg) 1429 8 (13 1) 1993 2 (19 2)     Urine (mL/kg/hr) 3975 (1 5) 3925 (1 6)     Stool       Total Output 3975 3925     Net -2544 2 -1931 8                  Height and Weights   Height: 5' 8" (172 7 cm)  IBW (Ideal Body Weight): 68 4 kg  Body mass index is 34 9 kg/m²    Weight (last 2 days)     Date/Time   Weight    05/24/21 0600   104 (229 5)    05/23/21 0545   109 (239 42)    05/23/21 0322   109 (239 42)    05/22/21 0543   110 (241 4)                Nutrition       Diet Orders   (From admission, onward)             Start     Ordered    05/20/21 1057  Diet Enteral/Parenteral; Tube Feeding No Oral Diet; Jevity 1 2 Micheal; Continuous; 30; Prosource TF- Two Packets; 150; Water; Every 4 hours  Diet effective now     Question Answer Comment   Diet Type Enteral/Parenteral    Enteral/Parenteral Tube Feeding No Oral Diet    Tube Feeding Formula: Jevity 1 2 Micheal    Bolus/Cyclic/Continuous Continuous    Tube Feeding Goal Rate (mL/hr): 30    Prosource Protein Liquid - No Carb Prosource TF- Two Packets    Tube Feeding flush (mL): 150    Water Flush type: Water    Water flush frequency: Every 4 hours    RD to adjust diet per protocol?  Yes        05/20/21 1057                  Active Medications  Scheduled Meds:  Current Facility-Administered Medications   Medication Dose Route Frequency Provider Last Rate    acetaminophen  650 mg Oral Q6H PRN Koleen Every Bilofsky, JIHAN      atorvastatin  40 mg Oral HS La K BilJIHAN simon      bisacodyl  10 mg Rectal Daily PRN JIHAN Bishop      chlorhexidine  15 mL Mouth/Throat Q12H [address] Kiko Bond PA-C      cholecalciferol  2,000 Units Oral Daily Koleen Every BilofskyJIHAN      epoprostenol (VELETRI) inhalation solution [id number] ng/mL  6 25-50 ng/kg/min (Ideal) Inhalation Titrated JIHAN Daly 12 427 ng/kg/min (05/23/21 1315)    fentaNYL  75 mcg/hr Intravenous Continuous Iesha Gooden PA-C 75 mcg/hr (05/24/21 1445)    furosemide  10 mg/hr Intravenous Continuous JIHAN Bishop 10 mg/hr (05/24/21 0882)    gabapentin  100 mg Oral TID JIHAN Bennett      heparin (porcine)  5,000 Units Subcutaneous Q8H [address] [place]      insulin regular (HumuLIN R,NovoLIN R) infusion  0 3-21 Units/hr Intravenous Titrated Kiko Bond PA-C 4 Units/hr (05/24/21 2669)    LORazepam  1 mg Intravenous Q4H PRN JIHAN Daly      midodrine  5 mg Oral TID AC JIHAN Bishop      multivitamin-minerals  1 tablet Oral Daily Koleen Every Bilofsky, JIHAN      omeprazole (PRILOSEC) suspension 2 mg/mL  20 mg Oral Daily Ieshagerson Gooden PA-C      ondansetron  4 mg Intravenous Q6H PRN JIHAN Bennett      polyethylene glycol  17 g Oral Daily JIHAN Sneed      potassium chloride  40 mEq Oral Once Jarrett Martins MD      propofol  5-50 mcg/kg/min Intravenous Titrated April Robertson PA-C 26 73 mcg/kg/min (05/24/21 1652)    senna-docusate sodium  2 tablet Oral BID Jarrett Martins MD       Continuous Infusions:  epoprostenol (VELETRI) inhalation solution 57065 ng/mL, 6 25-50 ng/kg/min (Ideal), Last Rate: 12 427 ng/kg/min (05/23/21 1315)  fentaNYL, 75 mcg/hr, Last Rate: 75 mcg/hr (05/24/21 0634)  furosemide, 10 mg/hr, Last Rate: 10 mg/hr (05/24/21 0634)  insulin regular (HumuLIN R,NovoLIN R) infusion, 0 3-21 Units/hr, Last Rate: 4 Units/hr (05/24/21 1318)  propofol, 5-50 mcg/kg/min, Last Rate: 26 73 mcg/kg/min (05/24/21 0634)      PRN Meds:   acetaminophen, 650 mg, Q6H PRN  bisacodyl, 10 mg, Daily PRN  LORazepam, 1 mg, Q4H PRN  ondansetron, 4 mg, Q6H PRN        Invasive Devices Review  Invasive Devices     Central Venous Catheter Line            CVC Central Lines 05/12/21 Double 11 days          Arterial Line            Arterial Line 05/17/21 Radial 6 days          Line            Hemodialysis AV Fistula 09/06/19 Upper arm 626 days          Drain            NG/OG/Enteral Tube Orogastric 6 days    Urethral Catheter 3 days          Airway            ETT  7 days                  ---------------------------------------------------------------------------------------  Advance Directive and Living Will:      Power of :    POLST:    ---------------------------------------------------------------------------------------  Care Time Delivered:   Please, see attending's attestation      Jarrett Martins MD      Portions of the record may have been created with voice recognition software  Occasional wrong word or "sound a like" substitutions may have occurred due to the inherent limitations of voice recognition software    Read the chart carefully and recognize, using context, where substitutions have occurred

## 2021-05-24 NOTE — PROGRESS NOTES
Propofol decreased to 12 7mL/hr, currently PT not meeting estimated pro, nguyễn intake with current TF order, recommend adjusting TF Jevity 1 2 to 60mL/hr, no prosource, water flushes 125mL q 4hrs, with propofol provides total of 2063cal, 80g pro, 1916mL

## 2021-05-24 NOTE — PROGRESS NOTES
NEPHROLOGY PROGRESS NOTE   Arnol Johnson Sr  80 y o  male MRN: [id number]  Unit/Bed#: ICU 03 Encounter: [id number]      ASSESSMENT/PLAN:  1  Acute kidney injury (POA) on chronic kidney disease, stage IIIB:  - etiology suspect multifactorial prerenal azotemia, failure to auto regulate in the presence of ARB, rhabdomyolysis, progression to ATN  - underlying chronic kidney disease due to diabetic kidney disease, hypertensive nephrosclerosis, cardiorenal syndrome, age- related nephron loss  - upon review of medical records, baseline creatinine around 1 8-1 9 mg/dL, patient did have recent outpatient lab values were creatinine was in the 2s   - outpatient nephrologist, Dr Doloris Goldmann    - creatinine 4 97 mg/dL upon admission; improved to 1 76 on 5/16, however then had secondary insult likely related to diuresis  - most recent creatinine 2 38 mg/dL today  - continued on furosemide infusion at 10 mg/hr; per discussion with ICU team, agree to hold infusion today  - continue to hold ARB  - continue to monitor volume status closely with strict intake/output, daily weight  2  Azotemia, likely due to renal failure and steroid use  - follow-up FOBT to rule out GI bleed  3  Hypernatremia:  - most recent sodium 149, currently on free water flushes 150 mL every 4 hours  - recommend increase to 200 mL every 4 hours for now  - will continue monitor with repeat lab studies  4  Hypokalemia:  - most recent potassium 3 5, status post K-Dur 40 mEq  - will continue to monitor with repeat lab studies  5  Hypotension:  - continues on midodrine 5 mg 3 times daily  - optimize hemodynamics, avoid hypotension and fluctuations of blood pressure   - maintain MAP > 65      6  H/H, anemia:  - most recent hemoglobin 7 1 grams/deciliters  - goal hemoglobin 10 to 12 grams/deciliter    - recommend PRBC transfusion for hemoglobin less than 7   - transfuse per ICU team     7  Acute hypoxic respiratory failure, due to COVID-19 infection, remains intubated  8  Sepsis, status post 7 days of cefazolin  9  Chronic combined systolic and diastolic CHF:  - most recent EF 40%  - on furosemide infusion as above  - continue to monitor volume status closely with strict intake/output, daily weight  10  Thrombocytopenia, plan per ICU team     SUBJECTIVE:  Patient seen and examined via window to limit COVID-19 exposure  Patient remains intubated and critically ill in the ICU  OBJECTIVE:  Current Weight: Weight - Scale: 104 kg (229 lb 8 oz)  Vitals:    05/24/21 0800   BP:    Pulse: 78   Resp: (!) 25   Temp: 99 7 °F (37 6 °C)   SpO2: 97%       Intake/Output Summary (Last 24 hours) at 5/24/2021 0912  Last data filed at 5/24/2021 6132  Gross per 24 hour   Intake 1615 58 ml   Output 3225 ml   Net -1609 42 ml     Patient seen and examined via window to limit COVID-19 exposure  Patient remains on the ventilator, remains critically ill      General:  Intubated, critically ill  Skin:  Warm, no rash  ENT:  ET tube in place  Neck:  Supple, ET tube in place  Chest:  Mechanically ventilated, noted increase in oxygen requirement   CVS:  Regular rate regular rhythm  Abdomen:  Soft, nontender  Extremities:  Trace edema   Neuro:  Intubated        Medications:  Scheduled Meds:  Current Facility-Administered Medications   Medication Dose Route Frequency Provider Last Rate    acetaminophen  650 mg Oral Q6H PRN JIHAN Farley      atorvastatin  40 mg Oral HS La K JIHAN Avendano      bisacodyl  10 mg Rectal Daily PRN JIHAN Ortega      chlorhexidine  15 mL Mouth/Throat Q12H [address] NII Law      cholecalciferol  2,000 Units Oral Daily JIHAN Farley      epoprostenol (VELETRI) inhalation solution 15403 ng/mL  6 25-50 ng/kg/min (Ideal) Inhalation Titrated JIHAN Kenny 12 427 ng/kg/min (05/23/21 1315)    fentaNYL  75 mcg/hr Intravenous Continuous Iesha Gooden PA-C 75 mcg/hr (05/24/21 9858)    furosemide  10 mg/hr Intravenous Continuous JIHAN Bond 10 mg/hr (05/24/21 8918)    gabapentin  100 mg Oral TID JIHAN Pastrana      heparin (porcine)  5,000 Units Subcutaneous Q8H [address] [place]      insulin regular (HumuLIN R,NovoLIN R) infusion  0 3-21 Units/hr Intravenous Titrated Lena Dawson PA-C 6 Units/hr (05/24/21 0815)    LORazepam  1 mg Intravenous Q4H PRN Troy YungJIHAN      midodrine  5 mg Oral TID AC JIHAN Bond      multivitamin-minerals  1 tablet Oral Daily Ru JIHAN Amaral      omeprazole (PRILOSEC) suspension 2 mg/mL  20 mg Oral Daily Iesha Gooden PA-C      ondansetron  4 mg Intravenous Q6H PRN Mendy Dillard, JIHAN      polyethylene glycol  17 g Oral Daily JIHAN Bond      propofol  5-50 mcg/kg/min Intravenous Titrated Lena Dawson PA-C 26 73 mcg/kg/min (05/24/21 0925)    senna-docusate sodium  2 tablet Oral BID Paz Skinner MD         PRN Meds:   acetaminophen    bisacodyl    LORazepam    ondansetron    Continuous Infusions:epoprostenol (VELETRI) inhalation solution 94250 ng/mL, 6 25-50 ng/kg/min (Ideal), Last Rate: 12 427 ng/kg/min (05/23/21 1315)  fentaNYL, 75 mcg/hr, Last Rate: 75 mcg/hr (05/24/21 0634)  furosemide, 10 mg/hr, Last Rate: 10 mg/hr (05/24/21 0634)  insulin regular (HumuLIN R,NovoLIN R) infusion, 0 3-21 Units/hr, Last Rate: 6 Units/hr (05/24/21 0815)  propofol, 5-50 mcg/kg/min, Last Rate: 26 73 mcg/kg/min (05/24/21 4502)        Laboratory Results:  Results from last 7 days   Lab Units 05/24/21  0424 05/24/21  0409 05/23/21  1903 05/23/21  0327 05/23/21  0319 05/22/21  1701 05/22/21  0509 05/21/21  1738 05/21/21  0355  05/20/21  0628  05/19/21  0609  05/18/21  0442   WBC Thousand/uL  --  9 90  --  10 29*  --   --  11 12*  --  15 98*  --  21 41*  --  28 84*  --  22 65*   HEMOGLOBIN g/dL  --  7 1*  --  7 1*  --   --  7 9*  --  9 1*  --  9 0*  --  9 9*  --  9 7*   HEMATOCRIT %  --  21 8*  --  22 1*  --   -- 23 4*  --  27 6*  --  26 4*  --  30 2*  --  29 4*   PLATELETS Thousands/uL  --  75*  --  71*  --   --  76*  --  85*  --  85*  --  104*  --  107*   SODIUM mmol/L 149*  --  146*  --  143 141 139 139 140   < > 138   < > 140   < > 143   POTASSIUM mmol/L 3 5  --  3 9  --  3 2* 4 0 3 3* 4 1 3 7   < > 3 3*   < > 3 3*   < > 4 1   CHLORIDE mmol/L 108  --  107  --  106 105 103 102 104   < > 102   < > 103   < > 107   CO2 mmol/L 32  --  30  --  29 27 26 26 26   < > 27   < > 24   < > 24   BUN mg/dL 99*  --  99*  --  100* 107* 107* 109* 99*   < > 85*   < > 79*   < > 70*   CREATININE mg/dL 2 38*  --  2 48*  --  2 54* 2 65* 2 68* 2 74* 2 73*   < > 2 64*   < > 2 53*   < > 2 32*   CALCIUM mg/dL 8 7  --  8 6  --  8 1* 8 3 8 0* 7 9* 7 7*   < > 7 6*   < > 7 5*   < > 7 5*   MAGNESIUM mg/dL  --   --   --  2 3  --   --   --   --  2 5  --  2 5  --   --   --   --    PHOSPHORUS mg/dL  --   --   --   --   --   --   --   --  4 5*  --  4 5*  --   --   --   --     < > = values in this interval not displayed

## 2021-05-24 NOTE — PLAN OF CARE
Problem: Potential for Falls  Goal: Patient will remain free of falls  Description: INTERVENTIONS:  - Assess patient frequently for physical needs  -  Identify cognitive and physical deficits and behaviors that affect risk of falls    -  [place] fall precautions as indicated by assessment   - Educate patient/family on patient safety including physical limitations  - Instruct patient to call for assistance with activity based on assessment  - Modify environment to reduce risk of injury  - Consider OT/PT consult to assist with strengthening/mobility  Outcome: Progressing     Problem: Prexisting or High Potential for Compromised Skin Integrity  Goal: Skin integrity is maintained or improved  Description: INTERVENTIONS:  - Identify patients at risk for skin breakdown  - Assess and monitor skin integrity  - Assess and monitor nutrition and hydration status  - Monitor labs   - Assess for incontinence   - Turn and reposition patient  - Assist with mobility/ambulation  - Relieve pressure over bony prominences  - Avoid friction and shearing  - Provide appropriate hygiene as needed including keeping skin clean and dry  - Evaluate need for skin moisturizer/barrier cream  - Collaborate with interdisciplinary team   - Patient/family teaching  - Consider wound care consult   Outcome: Progressing     Problem: PAIN - ADULT  Goal: Verbalizes/displays adequate comfort level or baseline comfort level  Description: Interventions:  - Encourage patient to monitor pain and request assistance  - Assess pain using appropriate pain scale  - Administer analgesics based on type and severity of pain and evaluate response  - Implement non-pharmacological measures as appropriate and evaluate response  - Consider cultural and social influences on pain and pain management  - Notify physician/advanced practitioner if interventions unsuccessful or patient reports new pain  Outcome: Progressing     Problem: INFECTION - ADULT  Goal: Absence or prevention of progression during hospitalization  Description: INTERVENTIONS:  - Assess and monitor for signs and symptoms of infection  - Monitor lab/diagnostic results  - Monitor all insertion sites, i e  indwelling lines, tubes, and drains  - Monitor endotracheal if appropriate and nasal secretions for changes in amount and color  - [place] appropriate cooling/warming therapies per order  - Administer medications as ordered  - Instruct and encourage patient and family to use good hand hygiene technique  - Identify and instruct in appropriate isolation precautions for identified infection/condition  Outcome: Progressing     Problem: SAFETY ADULT  Goal: Patient will remain free of falls  Description: INTERVENTIONS:  - Assess patient frequently for physical needs  -  Identify cognitive and physical deficits and behaviors that affect risk of falls    -  [place] fall precautions as indicated by assessment   - Educate patient/family on patient safety including physical limitations  - Instruct patient to call for assistance with activity based on assessment  - Modify environment to reduce risk of injury  - Consider OT/PT consult to assist with strengthening/mobility  Outcome: Progressing  Goal: Maintain or return to baseline ADL function  Description: INTERVENTIONS:  -  Assess patient's ability to carry out ADLs; assess patient's baseline for ADL function and identify physical deficits which impact ability to perform ADLs (bathing, care of mouth/teeth, toileting, grooming, dressing, etc )  - Assess/evaluate cause of self-care deficits   - Assess range of motion  - Assess patient's mobility; develop plan if impaired  - Assess patient's need for assistive devices and provide as appropriate  - Encourage maximum independence but intervene and supervise when necessary  - Involve family in performance of ADLs  - Assess for home care needs following discharge   - Consider OT consult to assist with ADL evaluation and planning for discharge  - Provide patient education as appropriate  Outcome: Progressing  Goal: Maintain or return mobility status to optimal level  Description: INTERVENTIONS:  - Assess patient's baseline mobility status (ambulation, transfers, stairs, etc )    - Identify cognitive and physical deficits and behaviors that affect mobility  - Identify mobility aids required to assist with transfers and/or ambulation (gait belt, sit-to-stand, lift, walker, cane, etc )  - [place] fall precautions as indicated by assessment  - Record patient progress and toleration of activity level on Mobility SBAR; progress patient to next Phase/Stage  - Instruct patient to call for assistance with activity based on assessment  - Consider rehabilitation consult to assist with strengthening/weightbearing, etc   Outcome: Progressing     Problem: DISCHARGE PLANNING  Goal: Discharge to home or other facility with appropriate resources  Description: INTERVENTIONS:  - Identify barriers to discharge w/patient and caregiver  - Arrange for needed discharge resources and transportation as appropriate  - Identify discharge learning needs (meds, wound care, etc )  - Arrange for interpretive services to assist at discharge as needed  - Refer to Case Management Department for coordinating discharge planning if the patient needs post-hospital services based on physician/advanced practitioner order or complex needs related to functional status, cognitive ability, or social support system  Outcome: Progressing     Problem: Knowledge Deficit  Goal: Patient/family/caregiver demonstrates understanding of disease process, treatment plan, medications, and discharge instructions  Description: Complete learning assessment and assess knowledge base    Interventions:  - Provide teaching at level of understanding  - Provide teaching via preferred learning methods  Outcome: Progressing     Problem: Nutrition/Hydration-ADULT  Goal: Nutrient/Hydration intake appropriate for improving, restoring or maintaining nutritional needs  Description: Monitor and assess patient's nutrition/hydration status for malnutrition  Collaborate with interdisciplinary team and initiate plan and interventions as ordered  Monitor patient's weight and dietary intake as ordered or per policy  Utilize nutrition screening tool and intervene as necessary  Determine patient's food preferences and provide high-protein, high-caloric foods as appropriate       INTERVENTIONS:  - Monitor oral intake, urinary output, labs, and treatment plans  - Assess nutrition and hydration status and recommend course of action  - Evaluate amount of meals eaten  - Assist patient with eating if necessary   - Allow adequate time for meals  - Recommend/ encourage appropriate diets, oral nutritional supplements, and vitamin/mineral supplements  - Order, calculate, and assess calorie counts as needed  - Recommend, monitor, and adjust tube feedings and TPN/PPN based on assessed needs  - Assess need for intravenous fluids  - Provide specific nutrition/hydration education as appropriate  - Include patient/family/caregiver in decisions related to nutrition  Outcome: Progressing     Problem: SAFETY,RESTRAINT: NV/NON-SELF DESTRUCTIVE BEHAVIOR  Goal: Remains free of harm/injury (restraint for non violent/non self-detsructive behavior)  Description: INTERVENTIONS:  - Instruct patient/family regarding restraint use   - Assess and monitor physiologic and psychological status   - Provide interventions and comfort measures to meet assessed patient needs   - Identify and implement measures to help patient regain control  - Assess readiness for release of restraint   Outcome: Progressing  Goal: Returns to optimal restraint-free functioning  Description: INTERVENTIONS:  - Assess the patient's behavior and symptoms that indicate continued need for restraint  - Identify and implement measures to help patient regain control  - Assess readiness for release of restraint   Outcome: Progressing     Problem: RESPIRATORY - ADULT  Goal: Achieves optimal ventilation and oxygenation  Description: INTERVENTIONS:  - Assess for changes in respiratory status  - Assess for changes in mentation and behavior  - Position to facilitate oxygenation and minimize respiratory effort  - Oxygen administered by appropriate delivery if ordered  - Initiate smoking cessation education as indicated  - Encourage broncho-pulmonary hygiene including cough, deep breathe, Incentive Spirometry  - Assess the need for suctioning and aspirate as needed  - Assess and instruct to report SOB or any respiratory difficulty  - Respiratory Therapy support as indicated  Outcome: Progressing     Problem: GASTROINTESTINAL - ADULT  Goal: Maintains or returns to baseline bowel function  Description: INTERVENTIONS:  - Assess bowel function  - Encourage oral fluids to ensure adequate hydration  - Administer IV fluids if ordered to ensure adequate hydration  - Administer ordered medications as needed  - Encourage mobilization and activity  - Consider nutritional services referral to assist patient with adequate nutrition and appropriate food choices  Outcome: Progressing     Problem: GENITOURINARY - ADULT  Goal: Maintains or returns to baseline urinary function  Description: INTERVENTIONS:  - Assess urinary function  - Encourage oral fluids to ensure adequate hydration if ordered  - Administer IV fluids as ordered to ensure adequate hydration  - Administer ordered medications as needed  - Offer frequent toileting  - Follow urinary retention protocol if ordered  Outcome: Progressing     Problem: METABOLIC, FLUID AND ELECTROLYTES - ADULT  Goal: Electrolytes maintained within normal limits  Description: INTERVENTIONS:  - Monitor labs and assess patient for signs and symptoms of electrolyte imbalances  - Administer electrolyte replacement as ordered  - Monitor response to electrolyte replacements, including repeat lab results as appropriate  - Instruct patient on fluid and nutrition as appropriate  Outcome: Progressing  Goal: Glucose maintained within target range  Description: INTERVENTIONS:  - Monitor Blood Glucose as ordered  - Assess for signs and symptoms of hyperglycemia and hypoglycemia  - Administer ordered medications to maintain glucose within target range  - Assess nutritional intake and initiate nutrition service referral as needed  Outcome: Progressing     Problem: CARDIOVASCULAR - ADULT  Goal: Maintains optimal cardiac output and hemodynamic stability  Description: INTERVENTIONS:  - Monitor I/O, vital signs and rhythm  - Monitor for S/S and trends of decreased cardiac output  - Administer and titrate ordered vasoactive medications to optimize hemodynamic stability  - Assess quality of pulses, skin color and temperature  - Assess for signs of decreased coronary artery perfusion  - Instruct patient to report change in severity of symptoms  Outcome: Progressing  Goal: Absence of cardiac dysrhythmias or at baseline rhythm  Description: INTERVENTIONS:  - Continuous cardiac monitoring, vital signs, obtain 12 lead EKG if ordered  - Administer antiarrhythmic and heart rate control medications as ordered  - Monitor electrolytes and administer replacement therapy as ordered  Outcome: Progressing     Problem: HEMATOLOGIC - ADULT  Goal: Maintains hematologic stability  Description: INTERVENTIONS  - Assess for signs and symptoms of bleeding or hemorrhage  - Monitor labs  - Administer supportive blood products/factors as ordered and appropriate  Outcome: Progressing

## 2021-05-25 PROBLEM — R79.89 ELEVATED LIVER FUNCTION TESTS: Status: RESOLVED | Noted: 2021-01-01 | Resolved: 2021-01-01

## 2021-05-25 NOTE — ASSESSMENT & PLAN NOTE
Wt Readings from Last 3 Encounters:   05/25/21 107 kg (235 lb 3 7 oz)   03/19/21 118 kg (261 lb)   03/18/21 118 kg (261 lb)     · BNP 7000 on admission  · 4/21 ECHO: The ventricle was mildly to moderately dilated  Systolic function was mildly to moderately reduced  Ejection fraction was estimated to be 40 %  There was mild to moderate diffuse hypokinesis  · Monitor volume status  · He was placed on a continuous lasix infusion with good response  Now discontinued  · Creatinine improving  · No longer requiring vasopressors

## 2021-05-25 NOTE — PROGRESS NOTES
[address] [place] [address]  Progress Note Sherie Arciniega Sr  1950, 80 y o  male MRN: [id number]  Unit/Bed#: ICU 03 Encounter: [id number]  Primary Care Provider: Jagdish Arias MD   Date and time admitted to hospital: 5/7/2021  4:33 PM    * Acute hypoxemic respiratory failure due to COVID-19 [facility])  Assessment & Plan  · Secondary to COVID-19 infection  · Initial positive 5/7 with unclear symptom onset  · Intubated on 5/17 secondary to progressive respiratory failure  · Proned 6PM on 5/17- supine 5/18 with maintenance of saturations > 90  · CXR with bilateral pulmonary opacities  · Completed Decadron  · Continue vitamins  · Completed remdesivir  · Given Actemra 5/13 due to increasing Fio2 requirements and elevated CRP  · Completed antibiotic therapy  · Off Veletri  · Wean FiO2 and PEEP as tolerated          Bradycardia  Assessment & Plan  · With transient second degree heart blocks  · Cardiology following  · External pacemaker not beneficial at this point per cardiology  · Off vasopressors  · Single ICD with appropriate pacing at 40 bpm  · Consider increasing lower rate limit of ICD to 50-60 bpm per cardiology recommendations if heart block or arrhyhtmia develops again  Severe sepsis [facility])  Assessment & Plan  Recent Labs     05/23/21  0327 05/24/21  0409 05/25/21  0450   WBC 10 29* 9 90 10 71*       · Patient originally with hypothermia, leukocytosis and hypotension requiring vasopressors  · Completed 7 days of Cefazolin  · Leukocytosis improving  · Procalcitonin trending down  Peaked at 3 6  · Blood cultures with no growth to date  · Monitor CBC with differential  · Monitor blood cultures     Acute encephalopathy  Assessment & Plan  · Present on admission  · Repeat CT head 5/13: No significant interval change since prior examination  Stable advanced chronic microvascular ischemic change and chronic right cerebellar lacunar infarct    · Etiology unclear: covid 19/sepsis/hypoxia vs  delerium vs  Uremia less likely  · Baseline AO x 3  · Currently intubated and sedated with propofol and fentanyl  Unable to assess mental status  Acute renal failure superimposed on chronic kidney disease [facility])  Assessment & Plan  Recent Labs     05/24/21  0424 05/24/21  1712 05/25/21  0450   CREATININE 2 38* 2 34* 2 11*   EGFR 31 31 36     Estimated Creatinine Clearance: 38 6 mL/min (A) (by C-G formula based on SCr of 2 11 mg/dL (H))  · Hx of CKD III, baseline sCr 1 8 - 1 9  · GAGE on presentation with sCr of 4 97  · Etiology multifactorial secondary to pre renal azotemia, ATN and rhabdomyolysis present on admission  · On lasix and metolazone at home  · Placed on a lasix infusion with good response  Now discontinued  · Monitor I and O  · Consider starting standing dose of Lasix    Thrombocytopenia [facility])  Assessment & Plan  Recent Labs     05/23/21  0327 05/24/21  0409 05/25/21  0450   PLT 71* 75* 87*       · Likely secondary to sepsis and antibiotic use  Improving after discontinuing antibiotics  · No evidence of active bleeding  · Monitor CBC        Hyperlipidemia  Assessment & Plan  · Continue lipitor       Obesity with body mass index 30 or greater  Assessment & Plan  · Nutrition consult when able  · Known risk factor for severity of COVID       Chronic combined systolic and diastolic CHF, NYHA class 3 (HCC)  Assessment & Plan  Wt Readings from Last 3 Encounters:   05/25/21 107 kg (235 lb 3 7 oz)   03/19/21 118 kg (261 lb)   03/18/21 118 kg (261 lb)     · BNP 7000 on admission  · 4/21 ECHO: The ventricle was mildly to moderately dilated  Systolic function was mildly to moderately reduced  Ejection fraction was estimated to be 40 %  There was mild to moderate diffuse hypokinesis  · Monitor volume status  · He was placed on a continuous lasix infusion with good response  Now discontinued  · Creatinine improving  · No longer requiring vasopressors            Type 2 diabetes mellitus with kidney complication, with long-term current use of insulin [facility])  Assessment & Plan  Lab Results   Component Value Date    HGBA1C 6 0 2021       Recent Labs     21  0149 21  0414 21  0606 21  0836   POCGLU 228* 150* 163* 170*       Blood Sugar Average: Last 72 hrs:  (P) 161 [id number]     · On jardiance and insulin at home  · Continue insulin infusion  · Goal blood glucose between 140 and 180      Elevated liver function tests-resolved as of 2021  Assessment & Plan  Recent Labs     21  0409   AST 40   ALT 37   ALKPHOS 114   TBILI 0 68       · Resolved  · Right upper quadrant ultrasound with evidence of cholelithiasis without wall thickening or pericholecystic fluid            ----------------------------------------------------------------------------------------  HPI/24hr events: Hospital day 18  Vent day 9  Currently on [address] 26/450/70/10  Lasix gtt discontinued  No acute overnight events      Disposition: Continue Critical Care   Code Status: Level 1 - Full Code  ---------------------------------------------------------------------------------------  SUBJECTIVE      Review of Systems   Unable to perform ROS: Intubated       ---------------------------------------------------------------------------------------  OBJECTIVE    Vitals   Vitals:    21 0600 21 0630 21 0700 21 0800   BP:       Pulse: 86 84 82 78   Resp: (!) 25 (!) 25 (!) 25 (!) 25   Temp: 99 7 °F (37 6 °C) 99 7 °F (37 6 °C) 99 7 °F (37 6 °C) 99 3 °F (37 4 °C)   TempSrc:    Esophageal   SpO2: 94% 94% 94% 96%   Weight:       Height:         Temp (24hrs), Av 6 °F (37 6 °C), Min:98 6 °F (37 °C), Max:100 °F (37 8 °C)  Current: Temperature: 99 3 °F (37 4 °C)  Arterial Line BP: 118/44  Arterial Line MAP (mmHg): 64 mmHg    Respiratory:  SpO2: SpO2: 96 %, SpO2 Activity: SpO2 Activity: At Rest, SpO2 Device: O2 Device: Ventilator  Nasal Cannula O2 Flow Rate (L/min): 15 L/min    Invasive/non-invasive ventilation settings   Respiratory    Lab Data (Last 4 hours)    None         O2/Vent Data (Last 4 hours)    None                Physical Exam  Vitals signs reviewed  Constitutional:       Appearance: He is obese  He is ill-appearing  Interventions: He is intubated  HENT:      Head: Normocephalic and atraumatic  Nose: Nose normal       Mouth/Throat:      Mouth: Mucous membranes are moist    Eyes:      Pupils: Pupils are equal, round, and reactive to light  Neck:      Musculoskeletal: Normal range of motion and neck supple  Cardiovascular:      Rate and Rhythm: Normal rate and regular rhythm  Pulses: Normal pulses  Heart sounds: Normal heart sounds  Pulmonary:      Effort: He is intubated  Breath sounds: Decreased breath sounds present  No wheezing  Abdominal:      General: Abdomen is flat  Bowel sounds are normal  There is no distension  Palpations: Abdomen is soft  Tenderness: There is no abdominal tenderness  Musculoskeletal: Normal range of motion  Skin:     General: Skin is warm and dry     Neurological:      Comments: Sedated   Psychiatric:      Comments: BROCK due to sedation          Laboratory and Diagnostics:  Results from last 7 days   Lab Units 05/25/21  0450 05/24/21  0409 05/23/21  0327 05/22/21  0509 05/21/21  0355 05/20/21  0628 05/19/21  0609   WBC Thousand/uL 10 71* 9 90 10 29* 11 12* 15 98* 21 41* 28 84*   HEMOGLOBIN g/dL 8 6* 7 1* 7 1* 7 9* 9 1* 9 0* 9 9*   HEMATOCRIT % 26 3* 21 8* 22 1* 23 4* 27 6* 26 4* 30 2*   PLATELETS Thousands/uL 87* 75* 71* 76* 85* 85* 104*   NEUTROS PCT %  --   --   --   --  91*  --  94*   BANDS PCT %  --  13*  --   --   --   --   --    MONOS PCT %  --   --   --   --  4  --  1*   MONO PCT %  --  0*  --   --   --  4  --      Results from last 7 days   Lab Units 05/25/21  0450 05/24/21  1712 05/24/21  0424 05/24/21  0409 05/23/21  1903 05/23/21  0319 05/22/21  1701 05/22/21  0509  05/19/21  1519 SODIUM mmol/L 151* 148* 149*  --  146* 143 141 139   < > 140   POTASSIUM mmol/L 3 5 3 9 3 5  --  3 9 3 2* 4 0 3 3*   < > 3 3*   CHLORIDE mmol/L 110* 108 108  --  107 106 105 103   < > 103   CO2 mmol/L 32 31 32  --  30 29 27 26   < > 24   ANION GAP mmol/L 9 9 9  --  9 8 9 10   < > 13   BUN mg/dL 83* 88* 99*  --  99* 100* 107* 107*   < > 79*   CREATININE mg/dL 2 11* 2 34* 2 38*  --  2 48* 2 54* 2 65* 2 68*   < > 2 53*   CALCIUM mg/dL 8 6 8 6 8 7  --  8 6 8 1* 8 3 8 0*   < > 7 5*   GLUCOSE RANDOM mg/dL 139 194* 151*  --  127 167* 174* 155*   < > 192*   ALT U/L  --   --   --  37  --   --   --   --   --  32   AST U/L  --   --   --  40  --   --   --   --   --  18   ALK PHOS U/L  --   --   --  114  --   --   --   --   --  150*   ALBUMIN g/dL  --   --   --  2 2*  --   --   --   --   --  2 4*   TOTAL BILIRUBIN mg/dL  --   --   --  0 68  --   --   --   --   --  0 60    < > = values in this interval not displayed  Results from last 7 days   Lab Units 05/25/21  0450 05/23/21  0327 05/21/21  0355 05/20/21  0628   MAGNESIUM mg/dL 2 4 2 3 2 5 2 5   PHOSPHORUS mg/dL 3 7  --  4 5* 4 5*      Results from last 7 days   Lab Units 05/20/21  1008 05/20/21  0409 05/19/21  2017 05/19/21  1331 05/19/21  0610 05/19/21  0028 05/18/21  1832   PTT seconds 94* 67* 59* 101* 53* 62* 66*      Results from last 7 days   Lab Units 05/19/21  1112   TROPONIN I ng/mL 0 07*         ABG:  Results from last 7 days   Lab Units 05/19/21  0612   PH ART  7 400   PCO2 ART mm Hg 35 9*   PO2 ART mm Hg 105 1   HCO3 ART mmol/L 21 7*   BASE EXC ART mmol/L -2 6   ABG SOURCE  Line, Arterial     VBG:  Results from last 7 days   Lab Units 05/19/21  0612   ABG SOURCE  Line, Arterial     Results from last 7 days   Lab Units 05/21/21  0430 05/20/21  0628 05/19/21  0609   PROCALCITONIN ng/ml 0 71* 0 73* 0 97*       Micro  Results from last 7 days   Lab Units 05/18/21  1152 05/18/21  1110   BLOOD CULTURE   --  No Growth After 5 Days  No Growth After 5 Days     SPUTUM CULTURE  3+ Growth of   --    GRAM STAIN RESULT  No Epithelial cells seen  Rare Polys  No bacteria seen  --    MRSA CULTURE ONLY  No Methicillin Resistant Staphlyococcus aureus (MRSA) isolated  --          Imaging: I have personally reviewed pertinent reports  Intake and Output  I/O       05/23 0701 - 05/24 0700 05/24 0701 - 05/25 0700 05/25 0701 - 05/26 0700    I V  (mL/kg) 1063 2 (10 2) 765 6 (7 2)     Blood  380     NG/ 435 50    Feedings  720     Total Intake(mL/kg) 1993 2 (19 2) 2300 6 (21 5) 50 (0 5)    Urine (mL/kg/hr) 3925 (1 6) 3220 (1 3) 285 (1 4)    Total Output 3925 3220 285    Net -1931 8 -919 4 -235                 Height and Weights   Height: 5' 8" (172 7 cm)  IBW (Ideal Body Weight): 68 4 kg  Body mass index is 35 77 kg/m²  Weight (last 2 days)     Date/Time   Weight    05/25/21 0541   107 (235 23)    05/24/21 0600   104 (229 5)    05/23/21 0545   109 (239 42)    05/23/21 0322   109 (239 42)                Nutrition       Diet Orders   (From admission, onward)             Start     Ordered    05/25/21 0625  Diet Enteral/Parenteral; Tube Feeding No Oral Diet; Jevity 1 2 Micheal; Continuous; 60; 250; Water; Every 4 hours  Diet effective now     Question Answer Comment   Diet Type Enteral/Parenteral    Enteral/Parenteral Tube Feeding No Oral Diet    Tube Feeding Formula: Jevity 1 2 Micheal    Bolus/Cyclic/Continuous Continuous    Tube Feeding Goal Rate (mL/hr): 60    Tube Feeding flush (mL): 250    Water Flush type: Water    Water flush frequency: Every 4 hours    RD to adjust diet per protocol?  Yes        05/25/21 0626                  Active Medications  Scheduled Meds:  Current Facility-Administered Medications   Medication Dose Route Frequency Provider Last Rate    acetaminophen  650 mg Oral Q6H PRN Neomia Kessler JOVANY AvendanoNP      atorvastatin  40 mg Oral HS La K JIHAN Avendano      bisacodyl  10 mg Rectal Daily PRN Wilmon Pall, CRNP      chlorhexidine  15 mL Mouth/Throat Q12H [address] Peyton Gibbons PA-C      cholecalciferol  2,000 Units Oral Daily JIHAN Torres      fentaNYL  75 mcg/hr Intravenous Continuous Biju Bush 75 mcg/hr (05/24/21 1959)    gabapentin  100 mg Oral TID JIHAN Torres      heparin (porcine)  5,000 Units Subcutaneous [facility] Peyton Gibbons PA-C      insulin regular (HumuLIN R,NovoLIN R) infusion  0 3-21 Units/hr Intravenous Titrated Peyton Gibbons PA-C 6 Units/hr (05/25/21 0416)    LORazepam  1 mg Intravenous Q4H PRN JIHAN Falk      midodrine  5 mg Oral TID AC [place] PeltJIHAN      multivitamin-minerals  1 tablet Oral Daily JIHAN Sapp      omeprazole (PRILOSEC) suspension 2 mg/mL  20 mg Oral Daily Peyton Gibbons PA-C      ondansetron  4 mg Intravenous Q6H PRN JIHAN Torres      polyethylene glycol  17 g Oral Daily JIHAN Strong      propofol  5-50 mcg/kg/min Intravenous Titrated Peyton Gibbons PA-C 30 mcg/kg/min (05/25/21 0845)    senna-docusate sodium  2 tablet Oral BID Wendi Whitten MD       Continuous Infusions:  fentaNYL, 75 mcg/hr, Last Rate: 75 mcg/hr (05/24/21 1959)  insulin regular (HumuLIN R,NovoLIN R) infusion, 0 3-21 Units/hr, Last Rate: 6 Units/hr (05/25/21 0416)  propofol, 5-50 mcg/kg/min, Last Rate: 30 mcg/kg/min (05/25/21 0845)      PRN Meds:   acetaminophen, 650 mg, Q6H PRN  bisacodyl, 10 mg, Daily PRN  LORazepam, 1 mg, Q4H PRN  ondansetron, 4 mg, Q6H PRN        Invasive Devices Review  Invasive Devices     Central Venous Catheter Line            CVC Central Lines 05/12/21 Double 12 days          Arterial Line            Arterial Line 05/17/21 Radial 7 days          Line            Hemodialysis AV Fistula 09/06/19 Upper arm 627 days          Drain            NG/OG/Enteral Tube Orogastric 8 days    Urethral Catheter 4 days          Airway            ETT  8 days ---------------------------------------------------------------------------------------  Advance Directive and Living Will:      Power of :    POLST:    ---------------------------------------------------------------------------------------  Care Time Delivered:   Please, see attending's attestation      Sruthi Calvillo MD      Portions of the record may have been created with voice recognition software  Occasional wrong word or "sound a like" substitutions may have occurred due to the inherent limitations of voice recognition software    Read the chart carefully and recognize, using context, where substitutions have occurred

## 2021-05-25 NOTE — PROGRESS NOTES
NEPHROLOGY PROGRESS NOTE   Romie Ferrell Sr  80 y o  male MRN: [id number]  Unit/Bed#: ICU 03 Encounter: [id number]      ASSESSMENT & PLAN:  1  Acute kidney injury on top of CKD stage IIIB, baseline reported around 1 8-1 9  Acute kidney injury suspected multifactorial in the loss of autoregulation given prior ARB, rhabdo, prerenal azotemia that progressed to ATN  Chronic kidney disease suspected secondary to underlying diabetes nephropathy, hypertension, cardiorenal syndrome as well as age-related nephron loss  Patient follows with Dr Rosio Quintero as an outpatient  Renal function is slowly better today with creatinine down to 2  11  Good urine output, diuretics were discontinued yesterday  Avoid relative hypotension  Monitor ins and outs  Follow daily labs  2  Hypernatremia, agree with increasing free water flushes, follow labs  3  Acute hypoxemic respiratory failure in the setting of COVID-19 infection  Management per critical care team     4  Severe sepsis  5  Acute encephalopathy  6  Chronic combined systolic and diastolic heart failure, good urine output, Lasix drip was discontinued yesterday    7  Anemia, hemoglobin low but stable    My plan and recommendations were discussed with critical care team    SUBJECTIVE:  Patient seen through window given COVID-19 positive  Patient remains intubated, as discussed with patient's nurse, minimally interactive  Lasix drip was discontinued yesterday, continue with urine output    ROS unable to be obtained      OBJECTIVE:  Current Weight: Weight - Scale: 107 kg (235 lb 3 7 oz)  Vitals:    05/25/21 0900   BP:    Pulse: 100   Resp: (!) 29   Temp: 99 7 °F (37 6 °C)   SpO2: 97%       Intake/Output Summary (Last 24 hours) at 5/25/2021 3359  Last data filed at 5/25/2021 0901  Gross per 24 hour   Intake 2319 18 ml   Output 2980 ml   Net -660 82 ml     Patient seen through window given COVID-19 positive  General:  Intubated,, in not acute distress  Eyes:  Eyes are closed, no periorbital edema  ENT:  ET and OG tube in place  Chest:  No increased work of breathing, ventilator  CVS:  Telemetry reviewed, mildly tachycardic  Abdomen:  Nondistended  Extremities: no significant edema of both legs  Skin: no visible rash  Neuro:  Intubated, unresponsive  Genitourinary Hodge catheter in place    Medications:    Current Facility-Administered Medications:     acetaminophen (TYLENOL) tablet 650 mg, 650 mg, Oral, Q6H PRN, La K Bilofsky, CRNP    atorvastatin (LIPITOR) tablet 40 mg, 40 mg, Oral, HS, La K Bilofsky, CRNP, 40 mg at 05/24/21 2206    bisacodyl (DULCOLAX) rectal suppository 10 mg, 10 mg, Rectal, Daily PRN, Arvchauncey Gonzalezy, CRNP, 10 mg at 05/14/21 1255    chlorhexidine (PERIDEX) 0 12 % oral rinse 15 mL, 15 mL, Mouth/Throat, Q12H [address], MARU Gonzalez-C, 15 mL at 05/25/21 0816    cholecalciferol (VITAMIN D3) tablet 2,000 Units, 2,000 Units, Oral, Daily, Leveda JOVANY DuenasNP, 2,000 Units at 05/25/21 0816    fentaNYL 1000 mcg in sodium chloride 0 9% 100mL infusion, 75 mcg/hr, Intravenous, Continuous, Iesha Gooden PA-C, Last Rate: 7 5 mL/hr at 05/24/21 1959, 75 mcg/hr at 05/24/21 1959    gabapentin (NEURONTIN) capsule 100 mg, 100 mg, Oral, TID, Leveda JOVANY DuenasNP, 100 mg at 05/25/21 0816    heparin (porcine) subcutaneous injection 5,000 Units, 5,000 Units, Subcutaneous, Q8H [address], MARU Gonzalez-C, 5,000 Units at 05/25/21 0512    insulin regular (HumuLIN R,NovoLIN R) 1 Units/mL in sodium chloride 0 9 % 100 mL infusion, 0 3-21 Units/hr, Intravenous, Titrated, NOEL GonzalezC, Last Rate: 6 mL/hr at 05/25/21 0416, 6 Units/hr at 05/25/21 0416    LORazepam (ATIVAN) injection 1 mg, 1 mg, Intravenous, Q4H PRN, JIHAN Haider, 1 mg at 05/24/21 8511    midodrine (PROAMATINE) tablet 5 mg, 5 mg, Oral, TID AC, JIHAN Echeverria, 5 mg at 05/25/21 0816    [COMPLETED] zinc sulfate (ZINCATE) capsule 220 mg, 220 mg, Oral, Daily, 220 mg at 05/14/21 7869 **FOLLOWED BY** multivitamin-minerals (CENTRUM ADULTS) tablet 1 tablet, 1 tablet, Oral, Daily, JIHAN Hutson, 1 tablet at 05/25/21 0816    omeprazole (PRILOSEC) suspension 2 mg/mL, 20 mg, Oral, Daily, Iesha Gooden PA-C, 20 mg at 05/25/21 0817    ondansetron (ZOFRAN) injection 4 mg, 4 mg, Intravenous, Q6H PRN, JIHAN Hutson, 4 mg at 05/10/21 0935    polyethylene glycol (MIRALAX) packet 17 g, 17 g, Oral, Daily, JIHAN Tyson, 17 g at 05/24/21 0845    propofol (DIPRIVAN) 1000 mg in 100 mL infusion (premix), 5-50 mcg/kg/min, Intravenous, Titrated, Iesha Gooden PA-C, Last Rate: 19 1 mL/hr at 05/25/21 0845, 30 mcg/kg/min at 05/25/21 0845    senna-docusate sodium (SENOKOT S) 8 6-50 mg per tablet 2 tablet, 2 tablet, Oral, BID, Hallie Alex MD, 2 tablet at 05/24/21 1712    Invasive Devices:   Urethral Catheter (Active)   Amt returned on insertion(mL) 30 mL 05/21/21 0535   Reasons to continue Urinary Catheter  Accurate I&O assessment in critically ill patients (48 hr  max) 05/25/21 0801   Goal for Removal Voiding trial when ambulation improves 05/25/21 0801   Site Assessment Edema;Clean 05/25/21 0801   Collection Container Standard drainage bag 05/25/21 0801   Securement Method Leg strap 05/25/21 0801   Output (mL) 285 mL 05/25/21 0801       Lab Results:   Results from last 7 days   Lab Units 05/25/21  0450 05/24/21  1712 05/24/21  0424 05/24/21  0409  05/23/21  0327  05/21/21  0355  05/20/21  0628  05/19/21  0609   WBC Thousand/uL 10 71*  --   --  9 90  --  10 29*   < > 15 98*  --  21 41*  --  28 84*   HEMOGLOBIN g/dL 8 6*  --   --  7 1*  --  7 1*   < > 9 1*  --  9 0*  --  9 9*   HEMATOCRIT % 26 3*  --   --  21 8*  --  22 1*   < > 27 6*  --  26 4*  --  30 2*   PLATELETS Thousands/uL 87*  --   --  75*  --  71*   < > 85*  --  85*  --  104*   SODIUM mmol/L 151* 148* 149*  --    < >  --    < > 140   < > 138   < > 140   POTASSIUM mmol/L 3 5 3 9 3 5  --    < >  --    < > 3 7   < > 3 3*   < > 3 3*   CHLORIDE mmol/L 110* 108 108  --    < >  --    < > 104   < > 102   < > 103   CO2 mmol/L 32 31 32  --    < >  --    < > 26   < > 27   < > 24   BUN mg/dL 83* 88* 99*  --    < >  --    < > 99*   < > 85*   < > 79*   CREATININE mg/dL 2 11* 2 34* 2 38*  --    < >  --    < > 2 73*   < > 2 64*   < > 2 53*   CALCIUM mg/dL 8 6 8 6 8 7  --    < >  --    < > 7 7*   < > 7 6*   < > 7 5*   MAGNESIUM mg/dL 2 4  --   --   --   --  2 3  --  2 5  --  2 5   < >  --    PHOSPHORUS mg/dL 3 7  --   --   --   --   --   --  4 5*  --  4 5*  --   --    ALK PHOS U/L  --   --   --  114  --   --   --   --   --   --   --  150*   ALT U/L  --   --   --  37  --   --   --   --   --   --   --  32   AST U/L  --   --   --  40  --   --   --   --   --   --   --  18    < > = values in this interval not displayed  Previous work up:  See previous notes      Portions of the record may have been created with voice recognition software  Occasional wrong word or "sound a like" substitutions may have occurred due to the inherent limitations of voice recognition software  Read the chart carefully and recognize, using context, where substitutions have occurred  If you have any questions, please contact the dictating provider

## 2021-05-25 NOTE — ASSESSMENT & PLAN NOTE
Recent Labs     05/24/21  0409   AST 40   ALT 37   ALKPHOS 114   TBILI 0 68       · Resolved  · Right upper quadrant ultrasound with evidence of cholelithiasis without wall thickening or pericholecystic fluid

## 2021-05-25 NOTE — UTILIZATION REVIEW
Continued Stay Review    Date:    5/25/21                          Current Patient Class:    Inpatient  Current Level of Care:    ICU    HPI:71 y o  male initially admitted on 5/7   With  Severe sepsis D/T  COVID  19 pneumonia     5/17  Intubated  Due to progressive respiratory failure        Assessment/Plan:   5/25    Now on vent  Day  # 9  Lasix  Drip/veletri   Discontinued   5/24  Continue  Insulin drip  Continue to monitor  Labs/I & O  Monitor heart rate, watch for   Bradycardia  Replace electrolytes as  Needed      Vital Signs:   Temp  Pulse  Resp  BP  Arterial Line BP  MAP  SpO2  FiO2 (%)  O2 Device     05/25/21 0900  99 7 °F (37 6 °C)  100  29Abnormal   --  206/60  98 mmHg  97 %  --  --   05/25/21 0800  99 3 °F (37 4 °C)  78  25Abnormal   --  118/44  64 mmHg  96 %  70  Ventilator   05/25/21 0700  99 7 °F (37 6 °C)  82  25Abnormal   --  150/46  72 mmHg  94 %  --  --   05/25/21 0630  99 7 °F (37 6 °C)  84  25Abnormal   --  148/44  70 mmHg  94 %  --  --   05/25/21 0600  99 7 °F (37 6 °C)  86  25Abnormal   --  162/46  72 mmHg  94 %  --  --   05/25/21 0530  99 7 °F (37 6 °C)  92  26Abnormal   --  176/50  80 mmHg  95 %  --  --   05/25/21 0500  99 3 °F (37 4 °C)  102  25Abnormal   --  242/66  110 mmHg  94 %  --  --   05/25/21 0430  99 °F (37 2 °C)  102  27Abnormal   --  196/56  100 mmHg  89 %Abnormal   --  --   05/25/21 0400  99 °F (37 2 °C)  86  25Abnormal   --  126/48  70 mmHg  95 %  --  --   05/25/21 0330  99 °F (37 2 °C)  84  25Abnormal   --  124/48  70 mmHg  94 %  --  --   05/25/21 0300  99 3 °F (37 4 °C)  86  25Abnormal   --  122/48  70 mmHg  94 %  --  --   05/25/21 0230  99 3 °F (37 4 °C)  88  25Abnormal   --  122/46  68 mmHg  94 %  --  --   05/25/21 0200  99 7 °F (37 6 °C)  88  25Abnormal   --  148/50  76 mmHg  94 %  --  --   05/25/21 0130  99 7 °F (37 6 °C)  86  25Abnormal   --  152/48  76 mmHg  93 %  --  --   05/25/21 0100  99 3 °F (37 4 °C)  86  25Abnormal   --  156/48  78 mmHg  94 %  --  -- 05/25/21 0030  99 7 °F (37 6 °C)  94  25Abnormal   --  174/54  90 mmHg  92 %  --  --   05/25/21 0000  99 7 °F (37 6 °C)  90  23Abnormal   --  164/50  82 mmHg  96 %  --         Pertinent Labs/Diagnostic Results:       Results from last 7 days   Lab Units 05/25/21  0450 05/24/21  0409 05/23/21  0327 05/22/21  0509 05/21/21  0355   WBC Thousand/uL 10 71* 9 90 10 29* 11 12* 15 98*   HEMOGLOBIN g/dL 8 6* 7 1* 7 1* 7 9* 9 1*   HEMATOCRIT % 26 3* 21 8* 22 1* 23 4* 27 6*   PLATELETS Thousands/uL 87* 75* 71* 76* 85*   BANDS PCT %  --  13*  --   --   --          Results from last 7 days   Lab Units 05/25/21  0450 05/24/21  1712 05/24/21  0424 05/23/21  1903 05/23/21  0327 05/23/21  0319  05/21/21  0355  05/20/21  0628   SODIUM mmol/L 151* 148* 149* 146*  --  143   < > 140   < > 138   POTASSIUM mmol/L 3 5 3 9 3 5 3 9  --  3 2*   < > 3 7   < > 3 3*   CHLORIDE mmol/L 110* 108 108 107  --  106   < > 104   < > 102   CO2 mmol/L 32 31 32 30  --  29   < > 26   < > 27   ANION GAP mmol/L 9 9 9 9  --  8   < > 10   < > 9   BUN mg/dL 83* 88* 99* 99*  --  100*   < > 99*   < > 85*   CREATININE mg/dL 2 11* 2 34* 2 38* 2 48*  --  2 54*   < > 2 73*   < > 2 64*   EGFR ml/min/1 73sq m 36 31 31 29  --  28   < > 26   < > 27   CALCIUM mg/dL 8 6 8 6 8 7 8 6  --  8 1*   < > 7 7*   < > 7 6*   MAGNESIUM mg/dL 2 4  --   --   --  2 3  --   --  2 5  --  2 5   PHOSPHORUS mg/dL 3 7  --   --   --   --   --   --  4 5*  --  4 5*    < > = values in this interval not displayed       Results from last 7 days   Lab Units 05/24/21  0409 05/19/21  0609   AST U/L 40 18   ALT U/L 37 32   ALK PHOS U/L 114 150*   TOTAL PROTEIN g/dL 5 7* 5 6*   ALBUMIN g/dL 2 2* 2 4*   TOTAL BILIRUBIN mg/dL 0 68 0 60   BILIRUBIN DIRECT mg/dL 0 35*  --      Results from last 7 days   Lab Units 05/25/21  0836 05/25/21  0606 05/25/21  0414 05/25/21  0149 05/25/21  0038 05/24/21  2210 05/24/21  2009 05/24/21  1802 05/24/21  1615 05/24/21  1406 05/24/21  1225 05/24/21  1004   POC GLUCOSE mg/dl 170* 163* 150* 228* 282* 130 150* 245* 178* 135 126 214*     Results from last 7 days   Lab Units 05/25/21  0450 05/24/21  1712 05/24/21  0424 05/23/21  1903 05/23/21  0319 05/22/21  1701 05/22/21  0509 05/21/21  1738 05/21/21  0355 05/20/21  1817 05/20/21  0628 05/19/21  1810   GLUCOSE RANDOM mg/dL 139 194* 151* 127 167* 174* 155* 172* 114 144* 176* 148*              Results from last 7 days   Lab Units 05/20/21  1008 05/20/21  0409 05/19/21  2017   PTT seconds 94* 67* 59*     Results from last 7 days   Lab Units 05/19/21  0609   TSH 3RD GENERATON uIU/mL 0 160*     Results from last 7 days   Lab Units 05/21/21  0430 05/20/21  0628 05/19/21  0609   PROCALCITONIN ng/ml 0 71* 0 73* 0 97*           Medications:   Scheduled Medications:  atorvastatin, 40 mg, Oral, HS  chlorhexidine, 15 mL, Mouth/Throat, Q12H ANGEL  cholecalciferol, 2,000 Units, Oral, Daily  gabapentin, 100 mg, Oral, TID  heparin (porcine), 5,000 Units, Subcutaneous, Q8H ANGEL  midodrine, 5 mg, Oral, TID AC  multivitamin-minerals, 1 tablet, Oral, Daily  omeprazole (PRILOSEC) suspension 2 mg/mL, 20 mg, Oral, Daily  polyethylene glycol, 17 g, Oral, Daily  senna-docusate sodium, 2 tablet, Oral, BID      Continuous IV Infusions:  fentaNYL, 75 mcg/hr, Intravenous, Continuous  insulin regular (HumuLIN R,NovoLIN R) infusion, 0 3-21 Units/hr, Intravenous, Titrated  propofol, 5-50 mcg/kg/min, Intravenous, Titrated  Veletri    D/C    5/24  Lasix  Drip d/c  5/24      PRN Meds:  acetaminophen, 650 mg, Oral, Q6H PRN  bisacodyl, 10 mg, Rectal, Daily PRN  LORazepam, 1 mg, Intravenous, Q4H PRN  ondansetron, 4 mg, Intravenous, Q6H PRN        Discharge Plan:    TBD    Network Utilization Review Department  ATTENTION: Please call with any questions or concerns to [phone] and carefully listen to the prompts so that you are directed to the right person   All voicemails are confidential   Nori Skipper all requests for admission clinical reviews, approved or denied determinations and any other requests to dedicated fax number below belonging to the campus where the patient is receiving treatment   List of dedicated fax numbers for the Facilities:  [address] [place] [address] DENIALS (Administrative/Medical Necessity) [phone]   [address] (Maternity/NICU/Pediatrics) [phone] [address] [place] [address] [id number] [place] [address] [address]idi Caruso [address] [place] [address] [phone]

## 2021-05-25 NOTE — ASSESSMENT & PLAN NOTE
· Secondary to COVID-19 infection  · Initial positive 5/7 with unclear symptom onset  · Intubated on 5/17 secondary to progressive respiratory failure  · Proned 6PM on 5/17- supine 5/18 with maintenance of saturations > 90  · CXR with bilateral pulmonary opacities  · Completed Decadron  · Continue vitamins  · Completed remdesivir  · Given Actemra 5/13 due to increasing Fio2 requirements and elevated CRP  · Completed antibiotic therapy  · Off Veletri    · Wean FiO2 and PEEP as tolerated

## 2021-05-25 NOTE — ASSESSMENT & PLAN NOTE
Recent Labs     05/23/21  0327 05/24/21  0409 05/25/21  0450   PLT 71* 75* 87*       · Likely secondary to sepsis and antibiotic use  Improving after discontinuing antibiotics  · No evidence of active bleeding    · Monitor CBC

## 2021-05-25 NOTE — PROGRESS NOTES
Call placed to the patients son, Bronson Schulte  He was updated on his fathers medical status  I discussed with him the likelihood of needing a tracheostomy  He is aware and understands there will be more conversations  All questions answered

## 2021-05-25 NOTE — ASSESSMENT & PLAN NOTE
Recent Labs     05/23/21  0327 05/24/21  0409 05/25/21  0450   WBC 10 29* 9 90 10 71*       · Patient originally with hypothermia, leukocytosis and hypotension requiring vasopressors  · Completed 7 days of Cefazolin  · Leukocytosis improving  · Procalcitonin trending down  Peaked at 3 6  · Blood cultures with no growth to date     · Monitor CBC with differential  · Monitor blood cultures

## 2021-05-25 NOTE — PLAN OF CARE
Problem: Potential for Falls  Goal: Patient will remain free of falls  Description: INTERVENTIONS:  - Assess patient frequently for physical needs  -  Identify cognitive and physical deficits and behaviors that affect risk of falls    -  [place] fall precautions as indicated by assessment   - Educate patient/family on patient safety including physical limitations  - Instruct patient to call for assistance with activity based on assessment  - Modify environment to reduce risk of injury  - Consider OT/PT consult to assist with strengthening/mobility  Outcome: Progressing     Problem: Prexisting or High Potential for Compromised Skin Integrity  Goal: Skin integrity is maintained or improved  Description: INTERVENTIONS:  - Identify patients at risk for skin breakdown  - Assess and monitor skin integrity  - Assess and monitor nutrition and hydration status  - Monitor labs   - Assess for incontinence   - Turn and reposition patient  - Assist with mobility/ambulation  - Relieve pressure over bony prominences  - Avoid friction and shearing  - Provide appropriate hygiene as needed including keeping skin clean and dry  - Evaluate need for skin moisturizer/barrier cream  - Collaborate with interdisciplinary team   - Patient/family teaching  - Consider wound care consult   Outcome: Progressing     Problem: PAIN - ADULT  Goal: Verbalizes/displays adequate comfort level or baseline comfort level  Description: Interventions:  - Encourage patient to monitor pain and request assistance  - Assess pain using appropriate pain scale  - Administer analgesics based on type and severity of pain and evaluate response  - Implement non-pharmacological measures as appropriate and evaluate response  - Consider cultural and social influences on pain and pain management  - Notify physician/advanced practitioner if interventions unsuccessful or patient reports new pain  Outcome: Progressing     Problem: INFECTION - ADULT  Goal: Absence or prevention of progression during hospitalization  Description: INTERVENTIONS:  - Assess and monitor for signs and symptoms of infection  - Monitor lab/diagnostic results  - Monitor all insertion sites, i e  indwelling lines, tubes, and drains  - Monitor endotracheal if appropriate and nasal secretions for changes in amount and color  - [place] appropriate cooling/warming therapies per order  - Administer medications as ordered  - Instruct and encourage patient and family to use good hand hygiene technique  - Identify and instruct in appropriate isolation precautions for identified infection/condition  Outcome: Progressing     Problem: SAFETY ADULT  Goal: Patient will remain free of falls  Description: INTERVENTIONS:  - Assess patient frequently for physical needs  -  Identify cognitive and physical deficits and behaviors that affect risk of falls    -  [place] fall precautions as indicated by assessment   - Educate patient/family on patient safety including physical limitations  - Instruct patient to call for assistance with activity based on assessment  - Modify environment to reduce risk of injury  - Consider OT/PT consult to assist with strengthening/mobility  Outcome: Progressing  Goal: Maintain or return to baseline ADL function  Description: INTERVENTIONS:  -  Assess patient's ability to carry out ADLs; assess patient's baseline for ADL function and identify physical deficits which impact ability to perform ADLs (bathing, care of mouth/teeth, toileting, grooming, dressing, etc )  - Assess/evaluate cause of self-care deficits   - Assess range of motion  - Assess patient's mobility; develop plan if impaired  - Assess patient's need for assistive devices and provide as appropriate  - Encourage maximum independence but intervene and supervise when necessary  - Involve family in performance of ADLs  - Assess for home care needs following discharge   - Consider OT consult to assist with ADL evaluation and planning for discharge  - Provide patient education as appropriate  Outcome: Not Progressing  Goal: Maintain or return mobility status to optimal level  Description: INTERVENTIONS:  - Assess patient's baseline mobility status (ambulation, transfers, stairs, etc )    - Identify cognitive and physical deficits and behaviors that affect mobility  - Identify mobility aids required to assist with transfers and/or ambulation (gait belt, sit-to-stand, lift, walker, cane, etc )  - [place] fall precautions as indicated by assessment  - Record patient progress and toleration of activity level on Mobility SBAR; progress patient to next Phase/Stage  - Instruct patient to call for assistance with activity based on assessment  - Consider rehabilitation consult to assist with strengthening/weightbearing, etc   Outcome: Progressing     Problem: DISCHARGE PLANNING  Goal: Discharge to home or other facility with appropriate resources  Description: INTERVENTIONS:  - Identify barriers to discharge w/patient and caregiver  - Arrange for needed discharge resources and transportation as appropriate  - Identify discharge learning needs (meds, wound care, etc )  - Arrange for interpretive services to assist at discharge as needed  - Refer to Case Management Department for coordinating discharge planning if the patient needs post-hospital services based on physician/advanced practitioner order or complex needs related to functional status, cognitive ability, or social support system  Outcome: Progressing     Problem: Knowledge Deficit  Goal: Patient/family/caregiver demonstrates understanding of disease process, treatment plan, medications, and discharge instructions  Description: Complete learning assessment and assess knowledge base    Interventions:  - Provide teaching at level of understanding  - Provide teaching via preferred learning methods  Outcome: Progressing     Problem: Nutrition/Hydration-ADULT  Goal: Nutrient/Hydration intake appropriate for improving, restoring or maintaining nutritional needs  Description: Monitor and assess patient's nutrition/hydration status for malnutrition  Collaborate with interdisciplinary team and initiate plan and interventions as ordered  Monitor patient's weight and dietary intake as ordered or per policy  Utilize nutrition screening tool and intervene as necessary  Determine patient's food preferences and provide high-protein, high-caloric foods as appropriate       INTERVENTIONS:  - Monitor oral intake, urinary output, labs, and treatment plans  - Assess nutrition and hydration status and recommend course of action  - Evaluate amount of meals eaten  - Assist patient with eating if necessary   - Allow adequate time for meals  - Recommend/ encourage appropriate diets, oral nutritional supplements, and vitamin/mineral supplements  - Order, calculate, and assess calorie counts as needed  - Recommend, monitor, and adjust tube feedings and TPN/PPN based on assessed needs  - Assess need for intravenous fluids  - Provide specific nutrition/hydration education as appropriate  - Include patient/family/caregiver in decisions related to nutrition  Outcome: Progressing     Problem: SAFETY,RESTRAINT: NV/NON-SELF DESTRUCTIVE BEHAVIOR  Goal: Remains free of harm/injury (restraint for non violent/non self-detsructive behavior)  Description: INTERVENTIONS:  - Instruct patient/family regarding restraint use   - Assess and monitor physiologic and psychological status   - Provide interventions and comfort measures to meet assessed patient needs   - Identify and implement measures to help patient regain control  - Assess readiness for release of restraint   Outcome: Progressing  Goal: Returns to optimal restraint-free functioning  Description: INTERVENTIONS:  - Assess the patient's behavior and symptoms that indicate continued need for restraint  - Identify and implement measures to help patient regain control  - Assess readiness for release of restraint   Outcome: Progressing     Problem: RESPIRATORY - ADULT  Goal: Achieves optimal ventilation and oxygenation  Description: INTERVENTIONS:  - Assess for changes in respiratory status  - Assess for changes in mentation and behavior  - Position to facilitate oxygenation and minimize respiratory effort  - Oxygen administered by appropriate delivery if ordered  - Initiate smoking cessation education as indicated  - Encourage broncho-pulmonary hygiene including cough, deep breathe, Incentive Spirometry  - Assess the need for suctioning and aspirate as needed  - Assess and instruct to report SOB or any respiratory difficulty  - Respiratory Therapy support as indicated  Outcome: Progressing     Problem: GASTROINTESTINAL - ADULT  Goal: Maintains or returns to baseline bowel function  Description: INTERVENTIONS:  - Assess bowel function  - Encourage oral fluids to ensure adequate hydration  - Administer IV fluids if ordered to ensure adequate hydration  - Administer ordered medications as needed  - Encourage mobilization and activity  - Consider nutritional services referral to assist patient with adequate nutrition and appropriate food choices  Outcome: Progressing     Problem: GENITOURINARY - ADULT  Goal: Maintains or returns to baseline urinary function  Description: INTERVENTIONS:  - Assess urinary function  - Encourage oral fluids to ensure adequate hydration if ordered  - Administer IV fluids as ordered to ensure adequate hydration  - Administer ordered medications as needed  - Offer frequent toileting  - Follow urinary retention protocol if ordered  Outcome: Progressing     Problem: METABOLIC, FLUID AND ELECTROLYTES - ADULT  Goal: Electrolytes maintained within normal limits  Description: INTERVENTIONS:  - Monitor labs and assess patient for signs and symptoms of electrolyte imbalances  - Administer electrolyte replacement as ordered  - Monitor response to electrolyte replacements, including repeat lab results as appropriate  - Instruct patient on fluid and nutrition as appropriate  Outcome: Progressing  Goal: Glucose maintained within target range  Description: INTERVENTIONS:  - Monitor Blood Glucose as ordered  - Assess for signs and symptoms of hyperglycemia and hypoglycemia  - Administer ordered medications to maintain glucose within target range  - Assess nutritional intake and initiate nutrition service referral as needed  Outcome: Progressing     Problem: CARDIOVASCULAR - ADULT  Goal: Maintains optimal cardiac output and hemodynamic stability  Description: INTERVENTIONS:  - Monitor I/O, vital signs and rhythm  - Monitor for S/S and trends of decreased cardiac output  - Administer and titrate ordered vasoactive medications to optimize hemodynamic stability  - Assess quality of pulses, skin color and temperature  - Assess for signs of decreased coronary artery perfusion  - Instruct patient to report change in severity of symptoms  Outcome: Progressing  Goal: Absence of cardiac dysrhythmias or at baseline rhythm  Description: INTERVENTIONS:  - Continuous cardiac monitoring, vital signs, obtain 12 lead EKG if ordered  - Administer antiarrhythmic and heart rate control medications as ordered  - Monitor electrolytes and administer replacement therapy as ordered  Outcome: Progressing     Problem: HEMATOLOGIC - ADULT  Goal: Maintains hematologic stability  Description: INTERVENTIONS  - Assess for signs and symptoms of bleeding or hemorrhage  - Monitor labs  - Administer supportive blood products/factors as ordered and appropriate  Outcome: Progressing

## 2021-05-25 NOTE — ASSESSMENT & PLAN NOTE
Recent Labs     05/24/21  0424 05/24/21  1712 05/25/21  0450   CREATININE 2 38* 2 34* 2 11*   EGFR 31 31 36     Estimated Creatinine Clearance: 38 6 mL/min (A) (by C-G formula based on SCr of 2 11 mg/dL (H))  · Hx of CKD III, baseline sCr 1 8 - 1 9  · GAGE on presentation with sCr of 4 97  · Etiology multifactorial secondary to pre renal azotemia, ATN and rhabdomyolysis present on admission  · On lasix and metolazone at home  · Placed on a lasix infusion with good response   Now discontinued  · Monitor I and O  · Consider starting standing dose of Lasix

## 2021-05-25 NOTE — PLAN OF CARE
Problem: Potential for Falls  Goal: Patient will remain free of falls  Description: INTERVENTIONS:  - Assess patient frequently for physical needs  -  Identify cognitive and physical deficits and behaviors that affect risk of falls    -  [place] fall precautions as indicated by assessment   - Educate patient/family on patient safety including physical limitations  - Instruct patient to call for assistance with activity based on assessment  - Modify environment to reduce risk of injury  - Consider OT/PT consult to assist with strengthening/mobility  Outcome: Progressing     Problem: Prexisting or High Potential for Compromised Skin Integrity  Goal: Skin integrity is maintained or improved  Description: INTERVENTIONS:  - Identify patients at risk for skin breakdown  - Assess and monitor skin integrity  - Assess and monitor nutrition and hydration status  - Monitor labs   - Assess for incontinence   - Turn and reposition patient  - Assist with mobility/ambulation  - Relieve pressure over bony prominences  - Avoid friction and shearing  - Provide appropriate hygiene as needed including keeping skin clean and dry  - Evaluate need for skin moisturizer/barrier cream  - Collaborate with interdisciplinary team   - Patient/family teaching  - Consider wound care consult   Outcome: Progressing     Problem: PAIN - ADULT  Goal: Verbalizes/displays adequate comfort level or baseline comfort level  Description: Interventions:  - Encourage patient to monitor pain and request assistance  - Assess pain using appropriate pain scale  - Administer analgesics based on type and severity of pain and evaluate response  - Implement non-pharmacological measures as appropriate and evaluate response  - Consider cultural and social influences on pain and pain management  - Notify physician/advanced practitioner if interventions unsuccessful or patient reports new pain  Outcome: Progressing     Problem: INFECTION - ADULT  Goal: Absence or prevention of progression during hospitalization  Description: INTERVENTIONS:  - Assess and monitor for signs and symptoms of infection  - Monitor lab/diagnostic results  - Monitor all insertion sites, i e  indwelling lines, tubes, and drains  - Monitor endotracheal if appropriate and nasal secretions for changes in amount and color  - [place] appropriate cooling/warming therapies per order  - Administer medications as ordered  - Instruct and encourage patient and family to use good hand hygiene technique  - Identify and instruct in appropriate isolation precautions for identified infection/condition  Outcome: Progressing     Problem: SAFETY ADULT  Goal: Patient will remain free of falls  Description: INTERVENTIONS:  - Assess patient frequently for physical needs  -  Identify cognitive and physical deficits and behaviors that affect risk of falls    -  [place] fall precautions as indicated by assessment   - Educate patient/family on patient safety including physical limitations  - Instruct patient to call for assistance with activity based on assessment  - Modify environment to reduce risk of injury  - Consider OT/PT consult to assist with strengthening/mobility  Outcome: Progressing  Goal: Maintain or return to baseline ADL function  Description: INTERVENTIONS:  -  Assess patient's ability to carry out ADLs; assess patient's baseline for ADL function and identify physical deficits which impact ability to perform ADLs (bathing, care of mouth/teeth, toileting, grooming, dressing, etc )  - Assess/evaluate cause of self-care deficits   - Assess range of motion  - Assess patient's mobility; develop plan if impaired  - Assess patient's need for assistive devices and provide as appropriate  - Encourage maximum independence but intervene and supervise when necessary  - Involve family in performance of ADLs  - Assess for home care needs following discharge   - Consider OT consult to assist with ADL evaluation and planning for discharge  - Provide patient education as appropriate  Outcome: Progressing  Goal: Maintain or return mobility status to optimal level  Description: INTERVENTIONS:  - Assess patient's baseline mobility status (ambulation, transfers, stairs, etc )    - Identify cognitive and physical deficits and behaviors that affect mobility  - Identify mobility aids required to assist with transfers and/or ambulation (gait belt, sit-to-stand, lift, walker, cane, etc )  - [place] fall precautions as indicated by assessment  - Record patient progress and toleration of activity level on Mobility SBAR; progress patient to next Phase/Stage  - Instruct patient to call for assistance with activity based on assessment  - Consider rehabilitation consult to assist with strengthening/weightbearing, etc   Outcome: Progressing     Problem: DISCHARGE PLANNING  Goal: Discharge to home or other facility with appropriate resources  Description: INTERVENTIONS:  - Identify barriers to discharge w/patient and caregiver  - Arrange for needed discharge resources and transportation as appropriate  - Identify discharge learning needs (meds, wound care, etc )  - Arrange for interpretive services to assist at discharge as needed  - Refer to Case Management Department for coordinating discharge planning if the patient needs post-hospital services based on physician/advanced practitioner order or complex needs related to functional status, cognitive ability, or social support system  Outcome: Progressing     Problem: Knowledge Deficit  Goal: Patient/family/caregiver demonstrates understanding of disease process, treatment plan, medications, and discharge instructions  Description: Complete learning assessment and assess knowledge base    Interventions:  - Provide teaching at level of understanding  - Provide teaching via preferred learning methods  Outcome: Progressing     Problem: Nutrition/Hydration-ADULT  Goal: Nutrient/Hydration intake appropriate for improving, restoring or maintaining nutritional needs  Description: Monitor and assess patient's nutrition/hydration status for malnutrition  Collaborate with interdisciplinary team and initiate plan and interventions as ordered  Monitor patient's weight and dietary intake as ordered or per policy  Utilize nutrition screening tool and intervene as necessary  Determine patient's food preferences and provide high-protein, high-caloric foods as appropriate       INTERVENTIONS:  - Monitor oral intake, urinary output, labs, and treatment plans  - Assess nutrition and hydration status and recommend course of action  - Evaluate amount of meals eaten  - Assist patient with eating if necessary   - Allow adequate time for meals  - Recommend/ encourage appropriate diets, oral nutritional supplements, and vitamin/mineral supplements  - Order, calculate, and assess calorie counts as needed  - Recommend, monitor, and adjust tube feedings and TPN/PPN based on assessed needs  - Assess need for intravenous fluids  - Provide specific nutrition/hydration education as appropriate  - Include patient/family/caregiver in decisions related to nutrition  Outcome: Progressing     Problem: SAFETY,RESTRAINT: NV/NON-SELF DESTRUCTIVE BEHAVIOR  Goal: Remains free of harm/injury (restraint for non violent/non self-detsructive behavior)  Description: INTERVENTIONS:  - Instruct patient/family regarding restraint use   - Assess and monitor physiologic and psychological status   - Provide interventions and comfort measures to meet assessed patient needs   - Identify and implement measures to help patient regain control  - Assess readiness for release of restraint   Outcome: Progressing  Goal: Returns to optimal restraint-free functioning  Description: INTERVENTIONS:  - Assess the patient's behavior and symptoms that indicate continued need for restraint  - Identify and implement measures to help patient regain control  - Assess readiness for release of restraint   Outcome: Progressing     Problem: RESPIRATORY - ADULT  Goal: Achieves optimal ventilation and oxygenation  Description: INTERVENTIONS:  - Assess for changes in respiratory status  - Assess for changes in mentation and behavior  - Position to facilitate oxygenation and minimize respiratory effort  - Oxygen administered by appropriate delivery if ordered  - Initiate smoking cessation education as indicated  - Encourage broncho-pulmonary hygiene including cough, deep breathe, Incentive Spirometry  - Assess the need for suctioning and aspirate as needed  - Assess and instruct to report SOB or any respiratory difficulty  - Respiratory Therapy support as indicated  Outcome: Progressing     Problem: GASTROINTESTINAL - ADULT  Goal: Maintains or returns to baseline bowel function  Description: INTERVENTIONS:  - Assess bowel function  - Encourage oral fluids to ensure adequate hydration  - Administer IV fluids if ordered to ensure adequate hydration  - Administer ordered medications as needed  - Encourage mobilization and activity  - Consider nutritional services referral to assist patient with adequate nutrition and appropriate food choices  Outcome: Progressing     Problem: GENITOURINARY - ADULT  Goal: Maintains or returns to baseline urinary function  Description: INTERVENTIONS:  - Assess urinary function  - Encourage oral fluids to ensure adequate hydration if ordered  - Administer IV fluids as ordered to ensure adequate hydration  - Administer ordered medications as needed  - Offer frequent toileting  - Follow urinary retention protocol if ordered  Outcome: Progressing     Problem: METABOLIC, FLUID AND ELECTROLYTES - ADULT  Goal: Electrolytes maintained within normal limits  Description: INTERVENTIONS:  - Monitor labs and assess patient for signs and symptoms of electrolyte imbalances  - Administer electrolyte replacement as ordered  - Monitor response to electrolyte replacements, including repeat lab results as appropriate  - Instruct patient on fluid and nutrition as appropriate  Outcome: Progressing  Goal: Glucose maintained within target range  Description: INTERVENTIONS:  - Monitor Blood Glucose as ordered  - Assess for signs and symptoms of hyperglycemia and hypoglycemia  - Administer ordered medications to maintain glucose within target range  - Assess nutritional intake and initiate nutrition service referral as needed  Outcome: Progressing     Problem: CARDIOVASCULAR - ADULT  Goal: Maintains optimal cardiac output and hemodynamic stability  Description: INTERVENTIONS:  - Monitor I/O, vital signs and rhythm  - Monitor for S/S and trends of decreased cardiac output  - Administer and titrate ordered vasoactive medications to optimize hemodynamic stability  - Assess quality of pulses, skin color and temperature  - Assess for signs of decreased coronary artery perfusion  - Instruct patient to report change in severity of symptoms  Outcome: Progressing  Goal: Absence of cardiac dysrhythmias or at baseline rhythm  Description: INTERVENTIONS:  - Continuous cardiac monitoring, vital signs, obtain 12 lead EKG if ordered  - Administer antiarrhythmic and heart rate control medications as ordered  - Monitor electrolytes and administer replacement therapy as ordered  Outcome: Progressing     Problem: HEMATOLOGIC - ADULT  Goal: Maintains hematologic stability  Description: INTERVENTIONS  - Assess for signs and symptoms of bleeding or hemorrhage  - Monitor labs  - Administer supportive blood products/factors as ordered and appropriate  Outcome: Progressing

## 2021-05-25 NOTE — PROGRESS NOTES
05/25/21 1200   Clinical Encounter Type   Visited With Health care provider   Routine Visit Follow-up   Continue Visiting Yes

## 2021-05-25 NOTE — ASSESSMENT & PLAN NOTE
Lab Results   Component Value Date    HGBA1C 6 0 03/19/2021       Recent Labs     05/25/21  0149 05/25/21  0414 05/25/21  0606 05/25/21  0836   POCGLU 228* 150* 163* 170*       Blood Sugar Average: Last 72 hrs:  (P) 161 [id number]     · On jardiance and insulin at home  · Continue insulin infusion     · Goal blood glucose between 140 and 180

## 2021-05-26 NOTE — ASSESSMENT & PLAN NOTE
Recent Labs     05/24/21  1712 05/25/21  0450 05/26/21  0550   SODIUM 148* 151* 149*       · F/U BMP  · Free water deficits 3 5 L  · Increase water flushes to 400 mL q4H

## 2021-05-26 NOTE — PROGRESS NOTES
[address] 49  Progress Note Locoemperatriz Beebe Sr  1950, 80 y o  male MRN: [id number]  Unit/Bed#: ICU 03 Encounter: [id number]  Primary Care Provider: Dov Bardales MD   Date and time admitted to hospital: 5/7/2021  4:33 PM      * Acute hypoxemic respiratory failure due to COVID-19 [facility])  Assessment & Plan  · Secondary to COVID-19 infection  · Initial positive 5/7 with unclear symptom onset  · Intubated on 5/17 secondary to progressive respiratory failure  · Proned 6PM on 5/17- supine 5/18 with maintenance of saturations > 90  · CXR with bilateral pulmonary opacities  · Completed Decadron  · Continue vitamins  · Completed remdesivir  · Given Actemra 5/13 due to increasing Fio2 requirements and elevated CRP  · Completed antibiotic therapy  · Off Veletri  · Wean FiO2 and PEEP as tolerated          Bradycardia  Assessment & Plan  · With transient second degree heart blocks  · Cardiology following  · External pacemaker not beneficial at this point per cardiology  · Off vasopressors  · Single ICD with appropriate pacing at 40 bpm  · Consider increasing lower rate limit of ICD to 50-60 bpm per cardiology recommendations if heart block or arrhyhtmia develops again  Severe sepsis [facility])  Assessment & Plan  Recent Labs     05/24/21  0409 05/25/21  0450 05/26/21  0550   WBC 9 90 10 71* 7 87       · Patient originally with hypothermia, leukocytosis and hypotension requiring vasopressors  · Completed 7 days of Cefazolin  · Leukocytosis improving  · Procalcitonin trending down  Peaked at 3 6  · Blood cultures with no growth to date  · Monitor CBC with differential  · Monitor blood cultures     Hypernatremia  Assessment & Plan  Recent Labs     05/24/21  1712 05/25/21  0450 05/26/21  0550   SODIUM 148* 151* 149*       · F/U BMP  · Free water deficits 3 5 L  · Continue water flushes        Acute encephalopathy  Assessment & Plan  · Present on admission  · Repeat CT head 5/13: No significant interval change since prior examination  Stable advanced chronic microvascular ischemic change and chronic right cerebellar lacunar infarct  · Etiology unclear: covid 19/sepsis/hypoxia vs  delerium vs  Uremia less likely  · Baseline AO x 3  · Currently intubated and sedated with propofol and fentanyl  Unable to assess mental status  Acute renal failure superimposed on chronic kidney disease [facility])  Assessment & Plan  Recent Labs     05/24/21  1712 05/25/21  0450 05/26/21  0550   CREATININE 2 34* 2 11* 1 83*   EGFR 31 36 42     Estimated Creatinine Clearance: 44 9 mL/min (A) (by C-G formula based on SCr of 1 83 mg/dL (H))  · Hx of CKD III, baseline sCr 1 8 - 1 9  · GAGE on presentation with sCr of 4 97  · Etiology multifactorial secondary to pre renal azotemia, ATN and rhabdomyolysis present on admission  · On lasix and metolazone at home  · Placed on a lasix infusion with good response  Now discontinued  · Monitor I and O  · Consider starting standing dose of Lasix     Thrombocytopenia [facility])  Assessment & Plan  Recent Labs     05/24/21  0409 05/25/21  0450 05/26/21  0550   PLT 75* 87* 88*       · Likely secondary to sepsis and antibiotic use  Improving after discontinuing antibiotics  · No evidence of active bleeding  · Monitor CBC        Hyperlipidemia  Assessment & Plan  · Continue lipitor       Obesity with body mass index 30 or greater  Assessment & Plan  · Nutrition consult when able  · Known risk factor for severity of COVID       Chronic combined systolic and diastolic CHF, NYHA class 3 (HCC)  Assessment & Plan  Wt Readings from Last 3 Encounters:   05/26/21 109 kg (239 lb 13 8 oz)   03/19/21 118 kg (261 lb)   03/18/21 118 kg (261 lb)     · BNP 7000 on admission  · 4/21 ECHO: The ventricle was mildly to moderately dilated  Systolic function was mildly to moderately reduced  Ejection fraction was estimated to be 40 %  There was mild to moderate diffuse hypokinesis    · Monitor volume status  · He was placed on a continuous lasix infusion with good response  Now discontinued  · Creatinine improving  · No longer requiring vasopressors  Type 2 diabetes mellitus with kidney complication, with long-term current use of insulin [facility])  Assessment & Plan  Lab Results   Component Value Date    HGBA1C 6 0 2021       Recent Labs     21  2356 21  0159 21  0355 21  0601   POCGLU 178* 183* 148* 145*       Blood Sugar Average: Last 72 hrs:  (P) 298 [id number]     · On jardiance and insulin at home  · Continue insulin infusion  · Goal blood glucose between 140 and 180          ----------------------------------------------------------------------------------------  HPI/24hr events: Vent day 10  Current settings //10  No acute overnight events       Disposition: Continue Critical Care   Code Status: Level 1 - Full Code  ---------------------------------------------------------------------------------------  SUBJECTIVE      Review of Systems   Unable to perform ROS: Intubated       ---------------------------------------------------------------------------------------  OBJECTIVE    Vitals   Vitals:    21 0500 21 0530 21 0549 21 0600   BP:       Pulse: 90 86  86   Resp: (!) 25 (!) 25  (!) 25   Temp: 98 6 °F (37 °C) 98 2 °F (36 8 °C)  98 2 °F (36 8 °C)   TempSrc:       SpO2: 91% (!) 89%  92%   Weight:   109 kg (239 lb 13 8 oz)    Height:         Temp (24hrs), Av 8 °F (37 1 °C), Min:98 2 °F (36 8 °C), Max:99 7 °F (37 6 °C)  Current: Temperature: 98 2 °F (36 8 °C)  Arterial Line BP: 138/46  Arterial Line MAP (mmHg): 70 mmHg    Respiratory:  SpO2: SpO2: 92 %, SpO2 Activity: SpO2 Activity: At Rest, SpO2 Device: O2 Device: Ventilator  Nasal Cannula O2 Flow Rate (L/min): 15 L/min    Invasive/non-invasive ventilation settings   Respiratory    Lab Data (Last 4 hours)    None         O2/Vent Data (Last 4 hours)    None                Physical Exam  Vitals signs reviewed  Constitutional:       Appearance: He is obese  He is ill-appearing  Interventions: He is intubated  HENT:      Head: Normocephalic and atraumatic  Nose: Nose normal       Mouth/Throat:      Mouth: Mucous membranes are moist    Eyes:      Pupils: Pupils are equal, round, and reactive to light  Neck:      Musculoskeletal: Normal range of motion and neck supple  Cardiovascular:      Rate and Rhythm: Normal rate and regular rhythm  Pulses: Normal pulses  Heart sounds: Normal heart sounds  Pulmonary:      Effort: He is intubated  Breath sounds: Decreased breath sounds present  No wheezing  Abdominal:      General: Abdomen is flat  Bowel sounds are normal  There is no distension  Palpations: Abdomen is soft  Tenderness: There is no abdominal tenderness  Musculoskeletal: Normal range of motion  Skin:     General: Skin is warm and dry     Neurological:      Comments: Sedated   Psychiatric:      Comments: BROCK due to sedation            Laboratory and Diagnostics:  Results from last 7 days   Lab Units 05/26/21  0550 05/25/21  0450 05/24/21  0409 05/23/21  0327 05/22/21  0509 05/21/21  0355 05/20/21  0628   WBC Thousand/uL 7 87 10 71* 9 90 10 29* 11 12* 15 98* 21 41*   HEMOGLOBIN g/dL 7 7* 8 6* 7 1* 7 1* 7 9* 9 1* 9 0*   HEMATOCRIT % 24 4* 26 3* 21 8* 22 1* 23 4* 27 6* 26 4*   PLATELETS Thousands/uL 88* 87* 75* 71* 76* 85* 85*   NEUTROS PCT %  --   --   --   --   --  91*  --    BANDS PCT %  --   --  13*  --   --   --   --    MONOS PCT %  --   --   --   --   --  4  --    MONO PCT %  --   --  0*  --   --   --  4     Results from last 7 days   Lab Units 05/26/21  0550 05/25/21  0450 05/24/21  1712 05/24/21  0424 05/24/21  0409 05/23/21  1903 05/23/21  0319 05/22/21  1701   SODIUM mmol/L 149* 151* 148* 149*  --  146* 143 141   POTASSIUM mmol/L 3 6 3 5 3 9 3 5  --  3 9 3 2* 4 0   CHLORIDE mmol/L 111* 110* 108 108  --  107 106 105   CO2 mmol/L 31 32 31 32 --  30 29 27   ANION GAP mmol/L 7 9 9 9  --  9 8 9   BUN mg/dL 65* 83* 88* 99*  --  99* 100* 107*   CREATININE mg/dL 1 83* 2 11* 2 34* 2 38*  --  2 48* 2 54* 2 65*   CALCIUM mg/dL 8 6 8 6 8 6 8 7  --  8 6 8 1* 8 3   GLUCOSE RANDOM mg/dL 132 139 194* 151*  --  127 167* 174*   ALT U/L  --   --   --   --  37  --   --   --    AST U/L  --   --   --   --  40  --   --   --    ALK PHOS U/L  --   --   --   --  114  --   --   --    ALBUMIN g/dL  --   --   --   --  2 2*  --   --   --    TOTAL BILIRUBIN mg/dL  --   --   --   --  0 68  --   --   --      Results from last 7 days   Lab Units 05/25/21  0450 05/23/21  0327 05/21/21  0355 05/20/21  0628   MAGNESIUM mg/dL 2 4 2 3 2 5 2 5   PHOSPHORUS mg/dL 3 7  --  4 5* 4 5*      Results from last 7 days   Lab Units 05/20/21  1008 05/20/21  0409 05/19/21 2017 05/19/21  1331   PTT seconds 94* 67* 59* 101*      Results from last 7 days   Lab Units 05/19/21  1112   TROPONIN I ng/mL 0 07*         ABG:    VBG:    Results from last 7 days   Lab Units 05/21/21  0430 05/20/21  0628   PROCALCITONIN ng/ml 0 71* 0 73*       Micro        Imaging: I have personally reviewed pertinent reports  Intake and Output  I/O       05/24 0701 - 05/25 0700 05/25 0701 - 05/26 0700 05/26 0701 - 05/27 0700    I V  (mL/kg) 765 6 (7 2) 1033 4 (9 5)     Blood 380      NG/ 1050     Feedings 720 1262     Total Intake(mL/kg) 2300 6 (21 5) 3345 4 (30 7)     Urine (mL/kg/hr) 3220 (1 3) 1910 (0 7)     Total Output 3220 1910     Net -919 4 +1435 4                  Height and Weights   Height: 5' 8" (172 7 cm)  IBW (Ideal Body Weight): 68 4 kg  Body mass index is 36 47 kg/m²    Weight (last 2 days)     Date/Time   Weight    05/26/21 0549   109 (239 86)    05/25/21 0541   107 (235 23)    05/24/21 0600   104 (229 5)                Nutrition       Diet Orders   (From admission, onward)             Start     Ordered    05/25/21 0625  Diet Enteral/Parenteral; Tube Feeding No Oral Diet; Jevity 1 2 Micheal; Continuous; 60; 250; Water; Every 4 hours  Diet effective now     Question Answer Comment   Diet Type Enteral/Parenteral    Enteral/Parenteral Tube Feeding No Oral Diet    Tube Feeding Formula: Jevity 1 2 Micheal    Bolus/Cyclic/Continuous Continuous    Tube Feeding Goal Rate (mL/hr): 60    Tube Feeding flush (mL): 250    Water Flush type: Water    Water flush frequency: Every 4 hours    RD to adjust diet per protocol?  Yes        05/25/21 0626                  Active Medications  Scheduled Meds:  Current Facility-Administered Medications   Medication Dose Route Frequency Provider Last Rate    acetaminophen  650 mg Oral Q6H PRN Sorinda JIHAN Rivas      atorvastatin  40 mg Oral HS La K JIHAN Avendano      chlorhexidine  15 mL Mouth/Throat Q12H [address] [facility]      cholecalciferol  2,000 Units Oral Daily JIHAN Barrios      fentaNYL  75 mcg/hr Intravenous Continuous [facility] 75 mcg/hr (05/25/21 2305)    gabapentin  100 mg Oral TID JIHAN Barrios      heparin (porcine)  5,000 Units Subcutaneous Q8H [address], [place]      insulin regular (HumuLIN R,NovoLIN R) infusion  0 3-21 Units/hr Intravenous Titrated [facility] 6 Units/hr (05/26/21 0356)    LORazepam  1 mg Intravenous Q4H PRN JIHAN Hoang      multivitamin-minerals  1 tablet Oral Daily JIHAN Nicholas      ondansetron  4 mg Intravenous Q6H PRN JIHAN Nicholas      polyethylene glycol  17 g Oral Daily PRN Berny Ortiz MD      propofol  5-50 mcg/kg/min Intravenous Titrated [facility] 50 mcg/kg/min (05/26/21 0555)    senna-docusate sodium  2 tablet Oral BID Berny Ortiz MD       Continuous Infusions:  fentaNYL, 75 mcg/hr, Last Rate: 75 mcg/hr (05/25/21 2305)  insulin regular (HumuLIN R,NovoLIN R) infusion, 0 3-21 Units/hr, Last Rate: 6 Units/hr (05/26/21 0356)  propofol, 5-50 mcg/kg/min, Last Rate: 50 mcg/kg/min (05/26/21 0593)      PRN Meds:   acetaminophen, 650 mg, Q6H PRN  LORazepam, 1 mg, Q4H PRN  ondansetron, 4 mg, Q6H PRN  polyethylene glycol, 17 g, Daily PRN        Invasive Devices Review  Invasive Devices     Central Venous Catheter Line            CVC Central Lines 05/12/21 Double 13 days          Arterial Line            Arterial Line 05/17/21 Radial 8 days          Line            Hemodialysis AV Fistula 09/06/19 Upper arm 628 days          Drain            NG/OG/Enteral Tube Orogastric 8 days    Urethral Catheter 5 days          Airway            ETT  9 days                  ---------------------------------------------------------------------------------------  Advance Directive and Living Will:      Power of :    POLST:    ---------------------------------------------------------------------------------------  Care Time Delivered:   Please, see attending's attestation  Angelito Hopson MD      Portions of the record may have been created with voice recognition software  Occasional wrong word or "sound a like" substitutions may have occurred due to the inherent limitations of voice recognition software    Read the chart carefully and recognize, using context, where substitutions have occurred

## 2021-05-26 NOTE — ASSESSMENT & PLAN NOTE
Recent Labs     05/24/21  0409 05/25/21  0450 05/26/21  0550   WBC 9 90 10 71* 7 87       · Patient originally with hypothermia, leukocytosis and hypotension requiring vasopressors  · Completed 7 days of Cefazolin  · Leukocytosis improving  · Procalcitonin trending down  Peaked at 3 6  · Blood cultures with no growth to date     · Monitor CBC with differential  · Monitor blood cultures

## 2021-05-26 NOTE — ASSESSMENT & PLAN NOTE
Recent Labs     05/24/21  1712 05/25/21  0450 05/26/21  0550   CREATININE 2 34* 2 11* 1 83*   EGFR 31 36 42     Estimated Creatinine Clearance: 44 9 mL/min (A) (by C-G formula based on SCr of 1 83 mg/dL (H))  · Hx of CKD III, baseline sCr 1 8 - 1 9  · GAGE on presentation with sCr of 4 97  · Etiology multifactorial secondary to pre renal azotemia, ATN and rhabdomyolysis present on admission  · On lasix and metolazone at home  · Placed on a lasix infusion with good response   Now discontinued  · Monitor I and O  · Consider starting standing dose of Lasix

## 2021-05-26 NOTE — ASSESSMENT & PLAN NOTE
Lab Results   Component Value Date    HGBA1C 6 0 03/19/2021       Recent Labs     05/25/21  2356 05/26/21  0159 05/26/21  0355 05/26/21  0601   POCGLU 178* 183* 148* 145*       Blood Sugar Average: Last 72 hrs:  (P) 982 [id number]     · On jardiance and insulin at home  · Continue insulin infusion     · Goal blood glucose between 140 and 180

## 2021-05-26 NOTE — PROGRESS NOTES
NEPHROLOGY PROGRESS NOTE   Kristen Part Sr  80 y o  male MRN: [id number]  Unit/Bed#: ICU 03 Encounter: [id number]      ASSESSMENT & PLAN:  1  Acute kidney injury on top of CKD stage IIIB, baseline reported around 1 8-1 9  Acute kidney injury suspected multifactorial in the loss of autoregulation given prior ARB, rhabdo, prerenal azotemia that progressed to ATN  Chronic kidney disease suspected secondary to underlying diabetes nephropathy, hypertension, cardiorenal syndrome as well as age-related nephron loss  Patient follows with Dr Blanca Jensen as an outpatient  Renal function improving and back to baseline with a creatinine down to 1 83 today  Patient with good urine output but a net positive of 1 4 L over the last 24 hours  Recommend to give 1 dose of Lasix 40 mg intravenously, goal to try to keep a net negative of 0 5-1 L daily  Avoid relative hypotension  Monitor ins and outs  Follow daily labs  2  Hypernatremia, improving, continue with free water flushes and try to change intravenously fluids to hypotonic as much as possible  3  Acute hypoxemic respiratory failure in the setting of COVID-19 infection  Management per critical care team     4  Severe sepsis  5  Acute encephalopathy  6  Chronic combined systolic and diastolic heart failure, good urine output, Lasix drip was discontinued on 05/24  Consider intermittent Lasix 40 mg intravenously to keep a goal net negative of 0 5-1 L daily    7  Anemia, hemoglobin low but stable    My plan and recommendations were discussed with critical care team      SUBJECTIVE:  Patient seen through window given COVID-19 positive  Remains intubated, unresponsive  ROS unable to be obtained    No acute issues overnight per nurse      OBJECTIVE:  Current Weight: Weight - Scale: 109 kg (239 lb 13 8 oz)  Vitals:    05/26/21 0800   BP:    Pulse: 94   Resp: (!) 25   Temp: 98 2 °F (36 8 °C)   SpO2: 94%       Intake/Output Summary (Last 24 hours) at 5/26/2021 2436  Last data filed at 5/26/2021 0800  Gross per 24 hour   Intake 3320 29 ml   Output 1790 ml   Net 1530 29 ml     Patient seen through window given COVID-19 positive  General:  Intubated, unresponsive, in not acute distress  Eyes:  Eyes are closed, no periorbital edema  ENT:  ET and OG tube in place  Chest:  Coarse breath sounds bilateral, on ventilator  CVS:  Telemetry reviewed  Abdomen:  Nondistended  Extremities:  No significant edema of both legs  Skin: no visible rash  Neuro:  Intubated and unresponsive  Genitourinary Hdoge catheter in place      Medications:    Current Facility-Administered Medications:     acetaminophen (TYLENOL) tablet 650 mg, 650 mg, Oral, Q6H PRN, La DELBERT Avendano CRNP    atorvastatin (LIPITOR) tablet 40 mg, 40 mg, Oral, HS, La K Osvaldoofskskyler CRNP, 40 mg at 05/25/21 2148    chlorhexidine (PERIDEX) 0 12 % oral rinse 15 mL, 15 mL, Mouth/Throat, Q12H [address], MARU Gonzalez-C, 15 mL at 05/26/21 4139    cholecalciferol (VITAMIN D3) tablet 2,000 Units, 2,000 Units, Oral, Daily, JOVANY SantillanNP, 2,000 Units at 05/26/21 0818    fentaNYL 1000 mcg in sodium chloride 0 9% 100mL infusion, 75 mcg/hr, Intravenous, Continuous, MARU Gonzalez-C, Last Rate: 7 5 mL/hr at 05/25/21 2305, 75 mcg/hr at 05/25/21 2305    gabapentin (NEURONTIN) capsule 100 mg, 100 mg, Oral, TID, JOVANY SantillanNP, 100 mg at 05/26/21 0818    heparin (porcine) subcutaneous injection 5,000 Units, 5,000 Units, Subcutaneous, Q8H [address], MARU Gonzalez-C, 5,000 Units at 05/26/21 0555    insulin regular (HumuLIN R,NovoLIN R) 1 Units/mL in sodium chloride 0 9 % 100 mL infusion, 0 3-21 Units/hr, Intravenous, Titrated, MARU Gonzalez-C, Last Rate: 3 mL/hr at 05/26/21 0814, 3 Units/hr at 05/26/21 0814    LORazepam (ATIVAN) injection 1 mg, 1 mg, Intravenous, Q4H PRN, Merlin Ricker, CRNP, 1 mg at 05/26/21 0413    [COMPLETED] zinc sulfate (ZINCATE) capsule 220 mg, 220 mg, Oral, Daily, 220 mg at 05/14/21 1143 **FOLLOWED BY** multivitamin-minerals (CENTRUM ADULTS) tablet 1 tablet, 1 tablet, Oral, Daily, JIHAN Miller, 1 tablet at 05/26/21 0814    ondansetron (ZOFRAN) injection 4 mg, 4 mg, Intravenous, Q6H PRN, JIHAN Miller, 4 mg at 05/10/21 0935    polyethylene glycol (MIRALAX) packet 17 g, 17 g, Oral, Daily PRN, Ila Low MD    potassium chloride oral solution 40 mEq, 40 mEq, Oral, Once, Ila Low MD    propofol (DIPRIVAN) 1000 mg in 100 mL infusion (premix), 5-50 mcg/kg/min, Intravenous, Titrated, Iesha Gooden PA-C, Last Rate: 31 8 mL/hr at 05/26/21 0555, 50 mcg/kg/min at 05/26/21 0555    senna-docusate sodium (SENOKOT S) 8 6-50 mg per tablet 2 tablet, 2 tablet, Oral, BID, Ila Low MD, 2 tablet at 05/26/21 0818    Invasive Devices:   Urethral Catheter (Active)   Amt returned on insertion(mL) 30 mL 05/21/21 0535   Reasons to continue Urinary Catheter  Accurate I&O assessment in critically ill patients (48 hr  max) 05/25/21 0801   Goal for Removal Voiding trial when ambulation improves 05/25/21 0801   Site Assessment Edema;Clean 05/25/21 0801   Collection Container Standard drainage bag 05/25/21 0801   Securement Method Leg strap 05/25/21 0801   Output (mL) 285 mL 05/25/21 0801       Lab Results:   Results from last 7 days   Lab Units 05/26/21  0550 05/25/21  0450 05/24/21  1712  05/24/21  0409  05/23/21  0327  05/21/21  0355  05/20/21  0628   WBC Thousand/uL 7 87 10 71*  --   --  9 90  --  10 29*   < > 15 98*  --  21 41*   HEMOGLOBIN g/dL 7 7* 8 6*  --   --  7 1*  --  7 1*   < > 9 1*  --  9 0*   HEMATOCRIT % 24 4* 26 3*  --   --  21 8*  --  22 1*   < > 27 6*  --  26 4*   PLATELETS Thousands/uL 88* 87*  --   --  75*  --  71*   < > 85*  --  85*   SODIUM mmol/L 149* 151* 148*   < >  --    < >  --    < > 140   < > 138   POTASSIUM mmol/L 3 6 3 5 3 9   < >  --    < >  --    < > 3 7   < > 3 3*   CHLORIDE mmol/L 111* 110* 108   < >  --    < >  --    < > 104   < > 102   CO2 mmol/L 31 32 31   < >  --    < >  --    < > 26   < > 27   BUN mg/dL 65* 83* 88*   < >  --    < >  --    < > 99*   < > 85*   CREATININE mg/dL 1 83* 2 11* 2 34*   < >  --    < >  --    < > 2 73*   < > 2 64*   CALCIUM mg/dL 8 6 8 6 8 6   < >  --    < >  --    < > 7 7*   < > 7 6*   MAGNESIUM mg/dL  --  2 4  --   --   --   --  2 3  --  2 5  --  2 5   PHOSPHORUS mg/dL  --  3 7  --   --   --   --   --   --  4 5*  --  4 5*   ALK PHOS U/L  --   --   --   --  114  --   --   --   --   --   --    ALT U/L  --   --   --   --  37  --   --   --   --   --   --    AST U/L  --   --   --   --  40  --   --   --   --   --   --     < > = values in this interval not displayed  Previous work up:  See previous notes      Portions of the record may have been created with voice recognition software  Occasional wrong word or "sound a like" substitutions may have occurred due to the inherent limitations of voice recognition software  Read the chart carefully and recognize, using context, where substitutions have occurred  If you have any questions, please contact the dictating provider

## 2021-05-26 NOTE — ASSESSMENT & PLAN NOTE
Wt Readings from Last 3 Encounters:   05/26/21 109 kg (239 lb 13 8 oz)   03/19/21 118 kg (261 lb)   03/18/21 118 kg (261 lb)     · BNP 7000 on admission  · 4/21 ECHO: The ventricle was mildly to moderately dilated  Systolic function was mildly to moderately reduced  Ejection fraction was estimated to be 40 %  There was mild to moderate diffuse hypokinesis  · Monitor volume status  · He was placed on a continuous lasix infusion with good response  Now discontinued  · Creatinine improving  · No longer requiring vasopressors

## 2021-05-26 NOTE — PLAN OF CARE
Problem: Potential for Falls  Goal: Patient will remain free of falls  Description: INTERVENTIONS:  - Assess patient frequently for physical needs  -  Identify cognitive and physical deficits and behaviors that affect risk of falls    -  [place] fall precautions as indicated by assessment   - Educate patient/family on patient safety including physical limitations  - Instruct patient to call for assistance with activity based on assessment  - Modify environment to reduce risk of injury  - Consider OT/PT consult to assist with strengthening/mobility  5/25/2021 2028 by Marina Cook RN  Outcome: Progressing  5/25/2021 2027 by Marina Cook RN  Outcome: Progressing     Problem: Prexisting or High Potential for Compromised Skin Integrity  Goal: Skin integrity is maintained or improved  Description: INTERVENTIONS:  - Identify patients at risk for skin breakdown  - Assess and monitor skin integrity  - Assess and monitor nutrition and hydration status  - Monitor labs   - Assess for incontinence   - Turn and reposition patient  - Assist with mobility/ambulation  - Relieve pressure over bony prominences  - Avoid friction and shearing  - Provide appropriate hygiene as needed including keeping skin clean and dry  - Evaluate need for skin moisturizer/barrier cream  - Collaborate with interdisciplinary team   - Patient/family teaching  - Consider wound care consult   5/25/2021 2028 by Marina Cook RN  Outcome: Progressing  5/25/2021 2027 by Marina Cook RN  Outcome: Progressing     Problem: PAIN - ADULT  Goal: Verbalizes/displays adequate comfort level or baseline comfort level  Description: Interventions:  - Encourage patient to monitor pain and request assistance  - Assess pain using appropriate pain scale  - Administer analgesics based on type and severity of pain and evaluate response  - Implement non-pharmacological measures as appropriate and evaluate response  - Consider cultural and social influences on pain and pain management  - Notify physician/advanced practitioner if interventions unsuccessful or patient reports new pain  5/25/2021 2028 by Adam Joya RN  Outcome: Progressing  5/25/2021 2027 by Adam Joya RN  Outcome: Progressing     Problem: INFECTION - ADULT  Goal: Absence or prevention of progression during hospitalization  Description: INTERVENTIONS:  - Assess and monitor for signs and symptoms of infection  - Monitor lab/diagnostic results  - Monitor all insertion sites, i e  indwelling lines, tubes, and drains  - Monitor endotracheal if appropriate and nasal secretions for changes in amount and color  - [place] appropriate cooling/warming therapies per order  - Administer medications as ordered  - Instruct and encourage patient and family to use good hand hygiene technique  - Identify and instruct in appropriate isolation precautions for identified infection/condition  5/25/2021 2028 by Adam Joya RN  Outcome: Progressing  5/25/2021 2027 by Adam Joya RN  Outcome: Progressing     Problem: SAFETY ADULT  Goal: Patient will remain free of falls  Description: INTERVENTIONS:  - Assess patient frequently for physical needs  -  Identify cognitive and physical deficits and behaviors that affect risk of falls    -  [place] fall precautions as indicated by assessment   - Educate patient/family on patient safety including physical limitations  - Instruct patient to call for assistance with activity based on assessment  - Modify environment to reduce risk of injury  - Consider OT/PT consult to assist with strengthening/mobility  5/25/2021 2028 by Adam Joya RN  Outcome: Progressing  5/25/2021 2027 by Adam Joya RN  Outcome: Progressing     Problem: Knowledge Deficit  Goal: Patient/family/caregiver demonstrates understanding of disease process, treatment plan, medications, and discharge instructions  Description: Complete learning assessment and assess knowledge base   Interventions:  - Provide teaching at level of understanding  - Provide teaching via preferred learning methods  5/25/2021 2028 by Blaise Quinn RN  Outcome: Progressing  5/25/2021 2027 by Blaise Quinn RN  Outcome: Progressing     Problem: Nutrition/Hydration-ADULT  Goal: Nutrient/Hydration intake appropriate for improving, restoring or maintaining nutritional needs  Description: Monitor and assess patient's nutrition/hydration status for malnutrition  Collaborate with interdisciplinary team and initiate plan and interventions as ordered  Monitor patient's weight and dietary intake as ordered or per policy  Utilize nutrition screening tool and intervene as necessary  Determine patient's food preferences and provide high-protein, high-caloric foods as appropriate       INTERVENTIONS:  - Monitor oral intake, urinary output, labs, and treatment plans  - Assess nutrition and hydration status and recommend course of action  - Evaluate amount of meals eaten  - Assist patient with eating if necessary   - Allow adequate time for meals  - Recommend/ encourage appropriate diets, oral nutritional supplements, and vitamin/mineral supplements  - Order, calculate, and assess calorie counts as needed  - Recommend, monitor, and adjust tube feedings and TPN/PPN based on assessed needs  - Assess need for intravenous fluids  - Provide specific nutrition/hydration education as appropriate  - Include patient/family/caregiver in decisions related to nutrition  5/25/2021 2028 by Blaise Quinn RN  Outcome: Progressing  5/25/2021 2027 by Blaise Quinn RN  Outcome: Progressing     Problem: SAFETY,RESTRAINT: NV/NON-SELF DESTRUCTIVE BEHAVIOR  Goal: Remains free of harm/injury (restraint for non violent/non self-detsructive behavior)  Description: INTERVENTIONS:  - Instruct patient/family regarding restraint use   - Assess and monitor physiologic and psychological status   - Provide interventions and comfort measures to meet assessed patient needs   - Identify and implement measures to help patient regain control  - Assess readiness for release of restraint   5/25/2021 2028 by Reese Mehta RN  Outcome: Progressing  5/25/2021 2027 by Reese Mehta RN  Outcome: Progressing  Goal: Returns to optimal restraint-free functioning  Description: INTERVENTIONS:  - Assess the patient's behavior and symptoms that indicate continued need for restraint  - Identify and implement measures to help patient regain control  - Assess readiness for release of restraint   5/25/2021 2028 by Reese Mehta RN  Outcome: Progressing  5/25/2021 2027 by Reese Mehta RN  Outcome: Progressing     Problem: RESPIRATORY - ADULT  Goal: Achieves optimal ventilation and oxygenation  Description: INTERVENTIONS:  - Assess for changes in respiratory status  - Assess for changes in mentation and behavior  - Position to facilitate oxygenation and minimize respiratory effort  - Oxygen administered by appropriate delivery if ordered  - Initiate smoking cessation education as indicated  - Encourage broncho-pulmonary hygiene including cough, deep breathe, Incentive Spirometry  - Assess the need for suctioning and aspirate as needed  - Assess and instruct to report SOB or any respiratory difficulty  - Respiratory Therapy support as indicated  5/25/2021 2028 by Reese Mehta RN  Outcome: Progressing  5/25/2021 2027 by Reese Mehta RN  Outcome: Progressing     Problem: GASTROINTESTINAL - ADULT  Goal: Maintains or returns to baseline bowel function  Description: INTERVENTIONS:  - Assess bowel function  - Encourage oral fluids to ensure adequate hydration  - Administer IV fluids if ordered to ensure adequate hydration  - Administer ordered medications as needed  - Encourage mobilization and activity  - Consider nutritional services referral to assist patient with adequate nutrition and appropriate food choices  5/25/2021 2028 by Reese Mehta RN  Outcome: Progressing  5/25/2021 2027 by Ken Grey RN  Outcome: Progressing     Problem: GENITOURINARY - ADULT  Goal: Maintains or returns to baseline urinary function  Description: INTERVENTIONS:  - Assess urinary function  - Encourage oral fluids to ensure adequate hydration if ordered  - Administer IV fluids as ordered to ensure adequate hydration  - Administer ordered medications as needed  - Offer frequent toileting  - Follow urinary retention protocol if ordered  5/25/2021 2028 by Ken Grey RN  Outcome: Progressing  5/25/2021 2027 by Ken Grey RN  Outcome: Progressing     Problem: METABOLIC, FLUID AND ELECTROLYTES - ADULT  Goal: Electrolytes maintained within normal limits  Description: INTERVENTIONS:  - Monitor labs and assess patient for signs and symptoms of electrolyte imbalances  - Administer electrolyte replacement as ordered  - Monitor response to electrolyte replacements, including repeat lab results as appropriate  - Instruct patient on fluid and nutrition as appropriate  5/25/2021 2028 by Ken Grey RN  Outcome: Progressing  5/25/2021 2027 by Ken Grey RN  Outcome: Progressing  Goal: Glucose maintained within target range  Description: INTERVENTIONS:  - Monitor Blood Glucose as ordered  - Assess for signs and symptoms of hyperglycemia and hypoglycemia  - Administer ordered medications to maintain glucose within target range  - Assess nutritional intake and initiate nutrition service referral as needed  Outcome: Progressing     Problem: CARDIOVASCULAR - ADULT  Goal: Maintains optimal cardiac output and hemodynamic stability  Description: INTERVENTIONS:  - Monitor I/O, vital signs and rhythm  - Monitor for S/S and trends of decreased cardiac output  - Administer and titrate ordered vasoactive medications to optimize hemodynamic stability  - Assess quality of pulses, skin color and temperature  - Assess for signs of decreased coronary artery perfusion  - Instruct patient to report change in severity of symptoms  Outcome: Progressing  Goal: Absence of cardiac dysrhythmias or at baseline rhythm  Description: INTERVENTIONS:  - Continuous cardiac monitoring, vital signs, obtain 12 lead EKG if ordered  - Administer antiarrhythmic and heart rate control medications as ordered  - Monitor electrolytes and administer replacement therapy as ordered  Outcome: Progressing     Problem: HEMATOLOGIC - ADULT  Goal: Maintains hematologic stability  Description: INTERVENTIONS  - Assess for signs and symptoms of bleeding or hemorrhage  - Monitor labs  - Administer supportive blood products/factors as ordered and appropriate  Outcome: Progressing

## 2021-05-26 NOTE — PLAN OF CARE
Problem: Potential for Falls  Goal: Patient will remain free of falls  Description: INTERVENTIONS:  - Assess patient frequently for physical needs  -  Identify cognitive and physical deficits and behaviors that affect risk of falls    -  [place] fall precautions as indicated by assessment   - Educate patient/family on patient safety including physical limitations  - Instruct patient to call for assistance with activity based on assessment  - Modify environment to reduce risk of injury  - Consider OT/PT consult to assist with strengthening/mobility  Outcome: Progressing     Problem: Prexisting or High Potential for Compromised Skin Integrity  Goal: Skin integrity is maintained or improved  Description: INTERVENTIONS:  - Identify patients at risk for skin breakdown  - Assess and monitor skin integrity  - Assess and monitor nutrition and hydration status  - Monitor labs   - Assess for incontinence   - Turn and reposition patient  - Assist with mobility/ambulation  - Relieve pressure over bony prominences  - Avoid friction and shearing  - Provide appropriate hygiene as needed including keeping skin clean and dry  - Evaluate need for skin moisturizer/barrier cream  - Collaborate with interdisciplinary team   - Patient/family teaching  - Consider wound care consult   Outcome: Progressing     Problem: PAIN - ADULT  Goal: Verbalizes/displays adequate comfort level or baseline comfort level  Description: Interventions:  - Encourage patient to monitor pain and request assistance  - Assess pain using appropriate pain scale  - Administer analgesics based on type and severity of pain and evaluate response  - Implement non-pharmacological measures as appropriate and evaluate response  - Consider cultural and social influences on pain and pain management  - Notify physician/advanced practitioner if interventions unsuccessful or patient reports new pain  Outcome: Progressing     Problem: INFECTION - ADULT  Goal: Absence or prevention of progression during hospitalization  Description: INTERVENTIONS:  - Assess and monitor for signs and symptoms of infection  - Monitor lab/diagnostic results  - Monitor all insertion sites, i e  indwelling lines, tubes, and drains  - Monitor endotracheal if appropriate and nasal secretions for changes in amount and color  - [place] appropriate cooling/warming therapies per order  - Administer medications as ordered  - Instruct and encourage patient and family to use good hand hygiene technique  - Identify and instruct in appropriate isolation precautions for identified infection/condition  Outcome: Progressing     Problem: SAFETY ADULT  Goal: Patient will remain free of falls  Description: INTERVENTIONS:  - Assess patient frequently for physical needs  -  Identify cognitive and physical deficits and behaviors that affect risk of falls    -  [place] fall precautions as indicated by assessment   - Educate patient/family on patient safety including physical limitations  - Instruct patient to call for assistance with activity based on assessment  - Modify environment to reduce risk of injury  - Consider OT/PT consult to assist with strengthening/mobility  Outcome: Progressing  Goal: Maintain or return to baseline ADL function  Description: INTERVENTIONS:  -  Assess patient's ability to carry out ADLs; assess patient's baseline for ADL function and identify physical deficits which impact ability to perform ADLs (bathing, care of mouth/teeth, toileting, grooming, dressing, etc )  - Assess/evaluate cause of self-care deficits   - Assess range of motion  - Assess patient's mobility; develop plan if impaired  - Assess patient's need for assistive devices and provide as appropriate  - Encourage maximum independence but intervene and supervise when necessary  - Involve family in performance of ADLs  - Assess for home care needs following discharge   - Consider OT consult to assist with ADL evaluation and planning for discharge  - Provide patient education as appropriate  Outcome: Progressing  Goal: Maintain or return mobility status to optimal level  Description: INTERVENTIONS:  - Assess patient's baseline mobility status (ambulation, transfers, stairs, etc )    - Identify cognitive and physical deficits and behaviors that affect mobility  - Identify mobility aids required to assist with transfers and/or ambulation (gait belt, sit-to-stand, lift, walker, cane, etc )  - [place] fall precautions as indicated by assessment  - Record patient progress and toleration of activity level on Mobility SBAR; progress patient to next Phase/Stage  - Instruct patient to call for assistance with activity based on assessment  - Consider rehabilitation consult to assist with strengthening/weightbearing, etc   Outcome: Progressing     Problem: DISCHARGE PLANNING  Goal: Discharge to home or other facility with appropriate resources  Description: INTERVENTIONS:  - Identify barriers to discharge w/patient and caregiver  - Arrange for needed discharge resources and transportation as appropriate  - Identify discharge learning needs (meds, wound care, etc )  - Arrange for interpretive services to assist at discharge as needed  - Refer to Case Management Department for coordinating discharge planning if the patient needs post-hospital services based on physician/advanced practitioner order or complex needs related to functional status, cognitive ability, or social support system  Outcome: Progressing     Problem: Knowledge Deficit  Goal: Patient/family/caregiver demonstrates understanding of disease process, treatment plan, medications, and discharge instructions  Description: Complete learning assessment and assess knowledge base    Interventions:  - Provide teaching at level of understanding  - Provide teaching via preferred learning methods  Outcome: Progressing     Problem: Nutrition/Hydration-ADULT  Goal: Nutrient/Hydration intake appropriate for improving, restoring or maintaining nutritional needs  Description: Monitor and assess patient's nutrition/hydration status for malnutrition  Collaborate with interdisciplinary team and initiate plan and interventions as ordered  Monitor patient's weight and dietary intake as ordered or per policy  Utilize nutrition screening tool and intervene as necessary  Determine patient's food preferences and provide high-protein, high-caloric foods as appropriate       INTERVENTIONS:  - Monitor oral intake, urinary output, labs, and treatment plans  - Assess nutrition and hydration status and recommend course of action  - Evaluate amount of meals eaten  - Assist patient with eating if necessary   - Allow adequate time for meals  - Recommend/ encourage appropriate diets, oral nutritional supplements, and vitamin/mineral supplements  - Order, calculate, and assess calorie counts as needed  - Recommend, monitor, and adjust tube feedings and TPN/PPN based on assessed needs  - Assess need for intravenous fluids  - Provide specific nutrition/hydration education as appropriate  - Include patient/family/caregiver in decisions related to nutrition  Outcome: Progressing     Problem: SAFETY,RESTRAINT: NV/NON-SELF DESTRUCTIVE BEHAVIOR  Goal: Remains free of harm/injury (restraint for non violent/non self-detsructive behavior)  Description: INTERVENTIONS:  - Instruct patient/family regarding restraint use   - Assess and monitor physiologic and psychological status   - Provide interventions and comfort measures to meet assessed patient needs   - Identify and implement measures to help patient regain control  - Assess readiness for release of restraint   Outcome: Progressing  Goal: Returns to optimal restraint-free functioning  Description: INTERVENTIONS:  - Assess the patient's behavior and symptoms that indicate continued need for restraint  - Identify and implement measures to help patient regain control  - Assess readiness for release of restraint   Outcome: Progressing     Problem: RESPIRATORY - ADULT  Goal: Achieves optimal ventilation and oxygenation  Description: INTERVENTIONS:  - Assess for changes in respiratory status  - Assess for changes in mentation and behavior  - Position to facilitate oxygenation and minimize respiratory effort  - Oxygen administered by appropriate delivery if ordered  - Initiate smoking cessation education as indicated  - Encourage broncho-pulmonary hygiene including cough, deep breathe, Incentive Spirometry  - Assess the need for suctioning and aspirate as needed  - Assess and instruct to report SOB or any respiratory difficulty  - Respiratory Therapy support as indicated  Outcome: Not Progressing     Problem: GASTROINTESTINAL - ADULT  Goal: Maintains or returns to baseline bowel function  Description: INTERVENTIONS:  - Assess bowel function  - Encourage oral fluids to ensure adequate hydration  - Administer IV fluids if ordered to ensure adequate hydration  - Administer ordered medications as needed  - Encourage mobilization and activity  - Consider nutritional services referral to assist patient with adequate nutrition and appropriate food choices  Outcome: Progressing     Problem: GENITOURINARY - ADULT  Goal: Maintains or returns to baseline urinary function  Description: INTERVENTIONS:  - Assess urinary function  - Encourage oral fluids to ensure adequate hydration if ordered  - Administer IV fluids as ordered to ensure adequate hydration  - Administer ordered medications as needed  - Offer frequent toileting  - Follow urinary retention protocol if ordered  Outcome: Not Progressing     Problem: METABOLIC, FLUID AND ELECTROLYTES - ADULT  Goal: Electrolytes maintained within normal limits  Description: INTERVENTIONS:  - Monitor labs and assess patient for signs and symptoms of electrolyte imbalances  - Administer electrolyte replacement as ordered  - Monitor response to electrolyte replacements, including repeat lab results as appropriate  - Instruct patient on fluid and nutrition as appropriate  Outcome: Progressing  Goal: Glucose maintained within target range  Description: INTERVENTIONS:  - Monitor Blood Glucose as ordered  - Assess for signs and symptoms of hyperglycemia and hypoglycemia  - Administer ordered medications to maintain glucose within target range  - Assess nutritional intake and initiate nutrition service referral as needed  Outcome: Progressing     Problem: CARDIOVASCULAR - ADULT  Goal: Maintains optimal cardiac output and hemodynamic stability  Description: INTERVENTIONS:  - Monitor I/O, vital signs and rhythm  - Monitor for S/S and trends of decreased cardiac output  - Administer and titrate ordered vasoactive medications to optimize hemodynamic stability  - Assess quality of pulses, skin color and temperature  - Assess for signs of decreased coronary artery perfusion  - Instruct patient to report change in severity of symptoms  Outcome: Progressing  Goal: Absence of cardiac dysrhythmias or at baseline rhythm  Description: INTERVENTIONS:  - Continuous cardiac monitoring, vital signs, obtain 12 lead EKG if ordered  - Administer antiarrhythmic and heart rate control medications as ordered  - Monitor electrolytes and administer replacement therapy as ordered  Outcome: Progressing     Problem: HEMATOLOGIC - ADULT  Goal: Maintains hematologic stability  Description: INTERVENTIONS  - Assess for signs and symptoms of bleeding or hemorrhage  - Monitor labs  - Administer supportive blood products/factors as ordered and appropriate  Outcome: Progressing

## 2021-05-26 NOTE — ASSESSMENT & PLAN NOTE
Recent Labs     05/24/21  0409 05/25/21  0450 05/26/21  0550   PLT 75* 87* 88*       · Likely secondary to sepsis and antibiotic use  Improving after discontinuing antibiotics  · No evidence of active bleeding    · Monitor CBC

## 2021-05-27 NOTE — CASE MANAGEMENT
Plan for trach placement on either Monday or Tuesday  Sent referral in [facility] to [facility]  Per admissions liaison, Cheng Lockhrat, patient is LTAC appropriate  They will continue to follow  Called patient's son, Franki Moulton, and provided an update regarding d/c plans  Franki Moulton agrees with potential placement to [address] [place] [address] in [address] and will update his mother, patient's wife  Told Bentley that this will not happen until after trach placement and when patient is medically stable  He understands  CM Department will continue to follow

## 2021-05-27 NOTE — PROGRESS NOTES
[address] [place] [address]  Progress Note Bam Dias Sr  1950, 80 y o  male MRN: [id number]  Unit/Bed#: ICU 03 Encounter: [id number]  Primary Care Provider: Kam Lindquist MD   Date and time admitted to hospital: 5/7/2021  4:33 PM      * Acute hypoxemic respiratory failure due to COVID-19 [facility])  Assessment & Plan  · Secondary to COVID-19 infection  · Initial positive 5/7 with unclear symptom onset  · Intubated on 5/17 secondary to progressive respiratory failure  · Proned 6PM on 5/17- supine 5/18 with maintenance of saturations > 90  · CXR with bilateral pulmonary opacities  · Completed Decadron  · Continue vitamins  · Completed remdesivir  · Given Actemra 5/13 due to increasing Fio2 requirements and elevated CRP  · Completed antibiotic therapy  · Off Veletri  · Wean FiO2 and PEEP as tolerated          Bradycardia  Assessment & Plan  · With transient second degree heart blocks  Now resolved  · Cardiology following  · External pacemaker not beneficial at this point per cardiology  · Off vasopressors  · Single ICD with appropriate pacing at 40 bpm  · Consider increasing lower rate limit of ICD to 50-60 bpm per cardiology recommendations if heart block or arrhyhtmia develops again  Severe sepsis [facility])  Assessment & Plan  Recent Labs     05/25/21  0450 05/26/21  0550 05/27/21  0443   WBC 10 71* 7 87 9 08       · Patient originally with hypothermia, leukocytosis and hypotension requiring vasopressors  Currently off pressors, however he did develop a fever overnight  · Completed 7 days of Cefazolin  · Leukocytosis improving  · Blood cultures with no growth to date  · Monitor CBC with differential  · Consider redrawing blood cultures  · Follow up procalcitonin results  · Unclear if fever is secondary to infection vs drug-induced      Hypernatremia  Assessment & Plan  Recent Labs     05/25/21  0450 05/26/21  0550 05/27/21  0443   SODIUM 151* 149* 153*       · F/U BMP  · Free water deficits 5 L  · Continue water flushes  · Hold lasix  · Will await nephrology recommendations on hypernatremia  Acute encephalopathy  Assessment & Plan  · Present on admission  · Repeat CT head 5/13: No significant interval change since prior examination  Stable advanced chronic microvascular ischemic change and chronic right cerebellar lacunar infarct  · Etiology unclear: covid 19/sepsis/hypoxia vs  delerium vs  Uremia less likely  · Baseline AO x 3  · Currently intubated and sedated with midazolam and fentanyl  Unable to assess mental status  Acute renal failure superimposed on chronic kidney disease [facility])  Assessment & Plan  Recent Labs     05/25/21  0450 05/26/21  0550 05/27/21  0443   CREATININE 2 11* 1 83* 1 25   EGFR 36 42 67     Estimated Creatinine Clearance: 65 2 mL/min (by C-G formula based on SCr of 1 25 mg/dL)  · Hx of CKD III, baseline sCr 1 8 - 1 9  · GAGE on presentation with sCr of 4 97  · Etiology multifactorial secondary to pre renal azotemia, ATN and rhabdomyolysis present on admission  · On lasix and metolazone at home  · Placed on a lasix infusion with good response  Now discontinued  · Monitor I and O      Thrombocytopenia [facility])  Assessment & Plan  Recent Labs     05/25/21  0450 05/26/21  0550 05/27/21  0443   PLT 87* 88* 92*       · Likely secondary to sepsis and antibiotic use  Improving after discontinuing antibiotics  · No evidence of active bleeding  · Monitor CBC        Hyperlipidemia  Assessment & Plan  · Continue lipitor       Obesity with body mass index 30 or greater  Assessment & Plan  · Known risk factor for severity of COVID       Chronic combined systolic and diastolic CHF, NYHA class 3 (HCC)  Assessment & Plan  Wt Readings from Last 3 Encounters:   05/27/21 107 kg (236 lb 5 3 oz)   03/19/21 118 kg (261 lb)   03/18/21 118 kg (261 lb)     · BNP 7000 on admission  · 4/21 ECHO: The ventricle was mildly to moderately dilated    Systolic function was mildly to moderately reduced  Ejection fraction was estimated to be 40 %  There was mild to moderate diffuse hypokinesis  · Monitor volume status  · He was placed on a continuous lasix infusion with good response  Now discontinued  · Creatinine improving  · No longer requiring vasopressors  Type 2 diabetes mellitus with kidney complication, with long-term current use of insulin [facility])  Assessment & Plan  Lab Results   Component Value Date    HGBA1C 6 0 2021       Recent Labs     21  0007 21  0201 21  0413 21  0600   POCGLU 173* 182* 191* 106       Blood Sugar Average: Last 72 hrs:  (P) 466 [id number]     · On jardiance and insulin at home  · Continue insulin infusion  · Goal blood glucose between 140 and 180          ----------------------------------------------------------------------------------------  HPI/24hr events: Sodium level worsened overnight  Patient has also developed a fever  No other acute events overnight       Disposition: Continue Critical Care   Code Status: Level 1 - Full Code  ---------------------------------------------------------------------------------------  SUBJECTIVE      Review of Systems   Unable to perform ROS: Intubated       ---------------------------------------------------------------------------------------  OBJECTIVE    Vitals   Vitals:    21 0530 21 0600 21 0630 21 0723   BP:       Pulse: (!) 110 (!) 118 (!) 114    Resp: (!) 34 (!) 44 (!) 30    Temp: 100 °F (37 8 °C) 100 °F (37 8 °C) 100 °F (37 8 °C)    TempSrc:       SpO2: 93% 94% 95% 95%   Weight:  107 kg (236 lb 5 3 oz)     Height:         Temp (24hrs), Av °F (37 8 °C), Min:98 2 °F (36 8 °C), Max:101 1 °F (38 4 °C)  Current: Temperature: 100 °F (37 8 °C)  Arterial Line BP: 174/56  Arterial Line MAP (mmHg): 90 mmHg    Respiratory:  SpO2: SpO2: 95 %, SpO2 Activity: SpO2 Activity: At Rest, SpO2 Device: O2 Device: Ventilator  Nasal Cannula O2 Flow Rate (L/min): 15 L/min    Invasive/non-invasive ventilation settings   Respiratory    Lab Data (Last 4 hours)    None         O2/Vent Data (Last 4 hours)    None                Physical Exam  Vitals signs reviewed  Constitutional:       Appearance: He is obese  He is ill-appearing  Interventions: He is intubated  HENT:      Head: Normocephalic and atraumatic  Nose: Nose normal       Mouth/Throat:      Mouth: Mucous membranes are moist    Eyes:      Pupils: Pupils are equal, round, and reactive to light  Neck:      Musculoskeletal: Normal range of motion and neck supple  Cardiovascular:      Rate and Rhythm: Normal rate and regular rhythm  Pulses: Normal pulses  Heart sounds: Normal heart sounds  Pulmonary:      Effort: He is intubated  Breath sounds: Decreased breath sounds present  No wheezing  Abdominal:      General: Abdomen is flat  Bowel sounds are normal  There is no distension  Palpations: Abdomen is soft  Tenderness: There is no abdominal tenderness  Musculoskeletal: Normal range of motion  Skin:     General: Skin is warm and dry     Neurological:      Comments: Sedated   Psychiatric:      Comments: BROCK due to sedation            Laboratory and Diagnostics:  Results from last 7 days   Lab Units 05/27/21 0443 05/26/21  0550 05/25/21  0450 05/24/21  0409 05/23/21  0327 05/22/21  0509 05/21/21  0355   WBC Thousand/uL 9 08 7 87 10 71* 9 90 10 29* 11 12* 15 98*   HEMOGLOBIN g/dL 8 4* 7 7* 8 6* 7 1* 7 1* 7 9* 9 1*   HEMATOCRIT % 27 2* 24 4* 26 3* 21 8* 22 1* 23 4* 27 6*   PLATELETS Thousands/uL 92* 88* 87* 75* 71* 76* 85*   NEUTROS PCT %  --   --   --   --   --   --  91*   BANDS PCT %  --   --   --  13*  --   --   --    MONOS PCT %  --   --   --   --   --   --  4   MONO PCT %  --   --   --  0*  --   --   --      Results from last 7 days   Lab Units 05/27/21 0443 05/26/21  0550 05/25/21  0450 05/24/21  1712 05/24/21  0424 05/24/21  0409 05/23/21  1903 05/23/21  0319   SODIUM mmol/L 153* 149* 151* 148* 149*  --  146* 143   POTASSIUM mmol/L 3 2* 3 6 3 5 3 9 3 5  --  3 9 3 2*   CHLORIDE mmol/L 120* 111* 110* 108 108  --  107 106   CO2 mmol/L 23 31 32 31 32  --  30 29   ANION GAP mmol/L 10 7 9 9 9  --  9 8   BUN mg/dL 43* 65* 83* 88* 99*  --  99* 100*   CREATININE mg/dL 1 25 1 83* 2 11* 2 34* 2 38*  --  2 48* 2 54*   CALCIUM mg/dL 6 3* 8 6 8 6 8 6 8 7  --  8 6 8 1*   GLUCOSE RANDOM mg/dL 106 132 139 194* 151*  --  127 167*   ALT U/L  --   --   --   --   --  37  --   --    AST U/L  --   --   --   --   --  40  --   --    ALK PHOS U/L  --   --   --   --   --  114  --   --    ALBUMIN g/dL  --   --   --   --   --  2 2*  --   --    TOTAL BILIRUBIN mg/dL  --   --   --   --   --  0 68  --   --      Results from last 7 days   Lab Units 05/25/21  0450 05/23/21  0327 05/21/21  0355   MAGNESIUM mg/dL 2 4 2 3 2 5   PHOSPHORUS mg/dL 3 7  --  4 5*      Results from last 7 days   Lab Units 05/20/21  1008   PTT seconds 94*              ABG:    VBG:    Results from last 7 days   Lab Units 05/21/21  0430   PROCALCITONIN ng/ml 0 71*       Micro          Imaging: I have personally reviewed pertinent reports  Intake and Output  I/O       05/25 0701 - 05/26 0700 05/26 0701 - 05/27 0700 05/27 0701 - 05/28 0700    I V  (mL/kg) 1033 4 (9 5) 551 3 (5 2)     Blood       NG/GT 1050 1250     Feedings 1262 1345     Total Intake(mL/kg) 3345 4 (30 7) 3146 3 (29 4)     Urine (mL/kg/hr) 1910 (0 7) 2165 (0 8)     Stool  0     Total Output 1910 2165     Net +1435 4 +981  3            Unmeasured Stool Occurrence  2 x           Height and Weights   Height: 5' 8" (172 7 cm)  IBW (Ideal Body Weight): 68 4 kg  Body mass index is 35 93 kg/m²    Weight (last 2 days)     Date/Time   Weight    05/27/21 0600   107 (236 33)    05/26/21 0549   109 (239 86)    05/25/21 0541   107 (235 23)                Nutrition       Diet Orders   (From admission, onward)             Start     Ordered    05/26/21 1009  Diet Enteral/Parenteral; Tube Feeding No Oral Diet; Jevity 1 2 Micheal; Continuous; 60; 300; Water; Every 4 hours  Diet effective now     Question Answer Comment   Diet Type Enteral/Parenteral    Enteral/Parenteral Tube Feeding No Oral Diet    Tube Feeding Formula: Jevity 1 2 Micheal    Bolus/Cyclic/Continuous Continuous    Tube Feeding Goal Rate (mL/hr): 60    Tube Feeding flush (mL): 300    Water Flush type: Water    Water flush frequency: Every 4 hours    RD to adjust diet per protocol?  Yes        05/26/21 1008                  Active Medications  Scheduled Meds:  Current Facility-Administered Medications   Medication Dose Route Frequency Provider Last Rate    acetaminophen  650 mg Oral [place] NII Chen      atorvastatin  40 mg Oral HS [place] JIHAN Avendano      chlorhexidine  15 mL [facility]outh[facility]Throat [facility]12H [facility]      cholecalciferol  2,000 Units Oral Daily JIHAN Smith      IV infusion builder   Intravenous Continuous Chelo Villanueva MD 5 mL/hr at 05/26/21 1208    gabapentin  100 mg Oral TID JIHAN Smith      heparin (porcine)  5,000 Units Subcutaneous Q8H [facility] [address] [place], [place]      hydrALAZINE  20 mg Intravenous Q4H PRN [facility]      insulin regular (HumuLIN R,NovoLIN R) infusion  0 3-21 Units/hr Intravenous Titrated [facility] 3 Units/hr (05/27/21 0601)    Labetalol HCl  20 mg Intravenous Q4H [facility]N [facility]      IV infusion builder   Intravenous Continuous Chelo Villanueva MD 3 mL/hr at 05/26/21 1414    midazolam  2 mg Intravenous Q2H PRN Chelo Villanueva MD      multivitamin-minerals  1 tablet Oral Daily JIHAN Palafox      ondansetron  4 mg Intravenous Q6H PRN JIHAN Palafox      polyethylene glycol  17 g Oral Daily PRN Chelo Villanueva MD      potassium chloride  40 mEq Oral Q4H Chelo Villanueva MD      senna-docusate sodium  2 tablet Oral BID Chelo Villanueva MD       Continuous Infusions:  IV infusion builder, , Last Rate: 5 mL/hr at 05/26/21 1208  insulin regular (HumuLIN R,NovoLIN R) infusion, 0 3-21 Units/hr, Last Rate: 3 Units/hr (05/27/21 0601)  IV infusion builder, , Last Rate: 3 mL/hr at 05/26/21 1414      PRN Meds:   hydrALAZINE, 20 mg, Q4H PRN  Labetalol HCl, 20 mg, Q4H PRN  midazolam, 2 mg, Q2H PRN  ondansetron, 4 mg, Q6H PRN  polyethylene glycol, 17 g, Daily PRN        Invasive Devices Review  Invasive Devices     Central Venous Catheter Line            CVC Central Lines 05/12/21 Double 14 days          Arterial Line            Arterial Line 05/17/21 Radial 9 days          Line            Hemodialysis AV Fistula 09/06/19 Upper arm 629 days          Drain            NG/OG/Enteral Tube Orogastric 9 days    Urethral Catheter 6 days          Airway            ETT  10 days                ---------------------------------------------------------------------------------------  Advance Directive and Living Will:      Power of :    POLST:    ---------------------------------------------------------------------------------------  Care Time Delivered:   Please, see attending's attestation      Nish Green MD      Portions of the record may have been created with voice recognition software  Occasional wrong word or "sound a like" substitutions may have occurred due to the inherent limitations of voice recognition software    Read the chart carefully and recognize, using context, where substitutions have occurred

## 2021-05-27 NOTE — ASSESSMENT & PLAN NOTE
Lab Results   Component Value Date    HGBA1C 6 0 03/19/2021       Recent Labs     05/27/21  0007 05/27/21  0201 05/27/21  0413 05/27/21  0600   POCGLU 173* 182* 191* 106       Blood Sugar Average: Last 72 hrs:  (P) 939 [id number]     · On jardiance and insulin at home  · Continue insulin infusion     · Goal blood glucose between 140 and 180

## 2021-05-27 NOTE — PLAN OF CARE
Problem: Potential for Falls  Goal: Patient will remain free of falls  Description: INTERVENTIONS:  - Assess patient frequently for physical needs  -  Identify cognitive and physical deficits and behaviors that affect risk of falls    -  [place] fall precautions as indicated by assessment   - Educate patient/family on patient safety including physical limitations  - Instruct patient to call for assistance with activity based on assessment  - Modify environment to reduce risk of injury  - Consider OT/PT consult to assist with strengthening/mobility  Outcome: Progressing     Problem: Prexisting or High Potential for Compromised Skin Integrity  Goal: Skin integrity is maintained or improved  Description: INTERVENTIONS:  - Identify patients at risk for skin breakdown  - Assess and monitor skin integrity  - Assess and monitor nutrition and hydration status  - Monitor labs   - Assess for incontinence   - Turn and reposition patient  - Assist with mobility/ambulation  - Relieve pressure over bony prominences  - Avoid friction and shearing  - Provide appropriate hygiene as needed including keeping skin clean and dry  - Evaluate need for skin moisturizer/barrier cream  - Collaborate with interdisciplinary team   - Patient/family teaching  - Consider wound care consult   Outcome: Progressing     Problem: PAIN - ADULT  Goal: Verbalizes/displays adequate comfort level or baseline comfort level  Description: Interventions:  - Encourage patient to monitor pain and request assistance  - Assess pain using appropriate pain scale  - Administer analgesics based on type and severity of pain and evaluate response  - Implement non-pharmacological measures as appropriate and evaluate response  - Consider cultural and social influences on pain and pain management  - Notify physician/advanced practitioner if interventions unsuccessful or patient reports new pain  Outcome: Progressing     Problem: INFECTION - ADULT  Goal: Absence or prevention of progression during hospitalization  Description: INTERVENTIONS:  - Assess and monitor for signs and symptoms of infection  - Monitor lab/diagnostic results  - Monitor all insertion sites, i e  indwelling lines, tubes, and drains  - Monitor endotracheal if appropriate and nasal secretions for changes in amount and color  - [place] appropriate cooling/warming therapies per order  - Administer medications as ordered  - Instruct and encourage patient and family to use good hand hygiene technique  - Identify and instruct in appropriate isolation precautions for identified infection/condition  Outcome: Progressing     Problem: SAFETY ADULT  Goal: Patient will remain free of falls  Description: INTERVENTIONS:  - Assess patient frequently for physical needs  -  Identify cognitive and physical deficits and behaviors that affect risk of falls    -  [place] fall precautions as indicated by assessment   - Educate patient/family on patient safety including physical limitations  - Instruct patient to call for assistance with activity based on assessment  - Modify environment to reduce risk of injury  - Consider OT/PT consult to assist with strengthening/mobility  Outcome: Progressing  Goal: Maintain or return to baseline ADL function  Description: INTERVENTIONS:  -  Assess patient's ability to carry out ADLs; assess patient's baseline for ADL function and identify physical deficits which impact ability to perform ADLs (bathing, care of mouth/teeth, toileting, grooming, dressing, etc )  - Assess/evaluate cause of self-care deficits   - Assess range of motion  - Assess patient's mobility; develop plan if impaired  - Assess patient's need for assistive devices and provide as appropriate  - Encourage maximum independence but intervene and supervise when necessary  - Involve family in performance of ADLs  - Assess for home care needs following discharge   - Consider OT consult to assist with ADL evaluation and planning for discharge  - Provide patient education as appropriate  Outcome: Progressing  Goal: Maintain or return mobility status to optimal level  Description: INTERVENTIONS:  - Assess patient's baseline mobility status (ambulation, transfers, stairs, etc )    - Identify cognitive and physical deficits and behaviors that affect mobility  - Identify mobility aids required to assist with transfers and/or ambulation (gait belt, sit-to-stand, lift, walker, cane, etc )  - [place] fall precautions as indicated by assessment  - Record patient progress and toleration of activity level on Mobility SBAR; progress patient to next Phase/Stage  - Instruct patient to call for assistance with activity based on assessment  - Consider rehabilitation consult to assist with strengthening/weightbearing, etc   Outcome: Progressing     Problem: DISCHARGE PLANNING  Goal: Discharge to home or other facility with appropriate resources  Description: INTERVENTIONS:  - Identify barriers to discharge w/patient and caregiver  - Arrange for needed discharge resources and transportation as appropriate  - Identify discharge learning needs (meds, wound care, etc )  - Arrange for interpretive services to assist at discharge as needed  - Refer to Case Management Department for coordinating discharge planning if the patient needs post-hospital services based on physician/advanced practitioner order or complex needs related to functional status, cognitive ability, or social support system  Outcome: Progressing     Problem: Knowledge Deficit  Goal: Patient/family/caregiver demonstrates understanding of disease process, treatment plan, medications, and discharge instructions  Description: Complete learning assessment and assess knowledge base    Interventions:  - Provide teaching at level of understanding  - Provide teaching via preferred learning methods  Outcome: Progressing     Problem: Nutrition/Hydration-ADULT  Goal: Nutrient/Hydration intake appropriate for improving, restoring or maintaining nutritional needs  Description: Monitor and assess patient's nutrition/hydration status for malnutrition  Collaborate with interdisciplinary team and initiate plan and interventions as ordered  Monitor patient's weight and dietary intake as ordered or per policy  Utilize nutrition screening tool and intervene as necessary  Determine patient's food preferences and provide high-protein, high-caloric foods as appropriate       INTERVENTIONS:  - Monitor oral intake, urinary output, labs, and treatment plans  - Assess nutrition and hydration status and recommend course of action  - Evaluate amount of meals eaten  - Assist patient with eating if necessary   - Allow adequate time for meals  - Recommend/ encourage appropriate diets, oral nutritional supplements, and vitamin/mineral supplements  - Order, calculate, and assess calorie counts as needed  - Recommend, monitor, and adjust tube feedings and TPN/PPN based on assessed needs  - Assess need for intravenous fluids  - Provide specific nutrition/hydration education as appropriate  - Include patient/family/caregiver in decisions related to nutrition  Outcome: Progressing     Problem: SAFETY,RESTRAINT: NV/NON-SELF DESTRUCTIVE BEHAVIOR  Goal: Remains free of harm/injury (restraint for non violent/non self-detsructive behavior)  Description: INTERVENTIONS:  - Instruct patient/family regarding restraint use   - Assess and monitor physiologic and psychological status   - Provide interventions and comfort measures to meet assessed patient needs   - Identify and implement measures to help patient regain control  - Assess readiness for release of restraint   Outcome: Progressing  Goal: Returns to optimal restraint-free functioning  Description: INTERVENTIONS:  - Assess the patient's behavior and symptoms that indicate continued need for restraint  - Identify and implement measures to help patient regain control  - Assess readiness for release of restraint   Outcome: Progressing     Problem: RESPIRATORY - ADULT  Goal: Achieves optimal ventilation and oxygenation  Description: INTERVENTIONS:  - Assess for changes in respiratory status  - Assess for changes in mentation and behavior  - Position to facilitate oxygenation and minimize respiratory effort  - Oxygen administered by appropriate delivery if ordered  - Initiate smoking cessation education as indicated  - Encourage broncho-pulmonary hygiene including cough, deep breathe, Incentive Spirometry  - Assess the need for suctioning and aspirate as needed  - Assess and instruct to report SOB or any respiratory difficulty  - Respiratory Therapy support as indicated  Outcome: Progressing     Problem: GASTROINTESTINAL - ADULT  Goal: Maintains or returns to baseline bowel function  Description: INTERVENTIONS:  - Assess bowel function  - Encourage oral fluids to ensure adequate hydration  - Administer IV fluids if ordered to ensure adequate hydration  - Administer ordered medications as needed  - Encourage mobilization and activity  - Consider nutritional services referral to assist patient with adequate nutrition and appropriate food choices  Outcome: Progressing     Problem: GENITOURINARY - ADULT  Goal: Maintains or returns to baseline urinary function  Description: INTERVENTIONS:  - Assess urinary function  - Encourage oral fluids to ensure adequate hydration if ordered  - Administer IV fluids as ordered to ensure adequate hydration  - Administer ordered medications as needed  - Offer frequent toileting  - Follow urinary retention protocol if ordered  Outcome: Progressing     Problem: METABOLIC, FLUID AND ELECTROLYTES - ADULT  Goal: Electrolytes maintained within normal limits  Description: INTERVENTIONS:  - Monitor labs and assess patient for signs and symptoms of electrolyte imbalances  - Administer electrolyte replacement as ordered  - Monitor response to electrolyte replacements, including repeat lab results as appropriate  - Instruct patient on fluid and nutrition as appropriate  Outcome: Progressing  Goal: Glucose maintained within target range  Description: INTERVENTIONS:  - Monitor Blood Glucose as ordered  - Assess for signs and symptoms of hyperglycemia and hypoglycemia  - Administer ordered medications to maintain glucose within target range  - Assess nutritional intake and initiate nutrition service referral as needed  Outcome: Progressing     Problem: CARDIOVASCULAR - ADULT  Goal: Maintains optimal cardiac output and hemodynamic stability  Description: INTERVENTIONS:  - Monitor I/O, vital signs and rhythm  - Monitor for S/S and trends of decreased cardiac output  - Administer and titrate ordered vasoactive medications to optimize hemodynamic stability  - Assess quality of pulses, skin color and temperature  - Assess for signs of decreased coronary artery perfusion  - Instruct patient to report change in severity of symptoms  Outcome: Progressing  Goal: Absence of cardiac dysrhythmias or at baseline rhythm  Description: INTERVENTIONS:  - Continuous cardiac monitoring, vital signs, obtain 12 lead EKG if ordered  - Administer antiarrhythmic and heart rate control medications as ordered  - Monitor electrolytes and administer replacement therapy as ordered  Outcome: Progressing     Problem: HEMATOLOGIC - ADULT  Goal: Maintains hematologic stability  Description: INTERVENTIONS  - Assess for signs and symptoms of bleeding or hemorrhage  - Monitor labs  - Administer supportive blood products/factors as ordered and appropriate  Outcome: Progressing

## 2021-05-27 NOTE — ASSESSMENT & PLAN NOTE
· Present on admission  · Repeat CT head 5/13: No significant interval change since prior examination  Stable advanced chronic microvascular ischemic change and chronic right cerebellar lacunar infarct  · Etiology unclear: covid 19/sepsis/hypoxia vs  delerium vs  Uremia less likely  · Baseline AO x 3  · Currently intubated and sedated with midazolam and fentanyl  Unable to assess mental status

## 2021-05-27 NOTE — QUICK NOTE
Patient's son, Gennaro Horton, updated via phone call  He was informed on patient's current status and treatment plan in detail  All questions were answered

## 2021-05-27 NOTE — PROGRESS NOTES
NEPHROLOGY PROGRESS NOTE   Savannah Michaud Sr  80 y o  male MRN: [id number]  Unit/Bed#: ICU 03 Encounter: [id number]      ASSESSMENT & PLAN:  1  Acute kidney injury on top of CKD stage IIIB, baseline reported around 1 8-1 9  Acute kidney injury suspected multifactorial in the loss of autoregulation given prior ARB, rhabdo, prerenal azotemia that progressed to ATN  Chronic kidney disease suspected secondary to underlying diabetes nephropathy, hypertension, cardiorenal syndrome as well as age-related nephron loss  Patient follows with Dr Anuradha Joe as an outpatient  Lab results this morning reviewed, suspected sample contaminated with saline  Recommend to repeat BMP  Continue with diuretics p r n  To try to keep a net negative of 0 5-1 L daily  Continue with free water flushes  Avoid relative hypotension  Monitor ins and outs  Follow daily labs  2  Hypernatremia, continue with free water flushes, sodium today is slightly increased suspected sample contaminated, continue with diuresis for respiratory status trying to keep a net negative of 0 5-1 L daily, if hypernatremia worsens then diuretic should be stopped and then will consider gentle hypotonic intravenously fluids  3  Acute hypoxemic respiratory failure in the setting of COVID-19 infection  Management per critical care team     4  Severe sepsis  5  Acute encephalopathy  6  Chronic combined systolic and diastolic heart failure, good urine output, Lasix drip was discontinued on 05/24  Continue with Lasix 40 mg intravenously p r n  to keep a goal net negative of 0 5-1 L daily    7  Anemia, hemoglobin low but stable    My plan and recommendations were discussed with critical care team      SUBJECTIVE:  Patient seen through window given COVID-19 positive  Remains intubated and unresponsive  No acute events overnight  Good urine output after 1 dose of Lasix yesterday    Remains off inotropics/pressor support      OBJECTIVE:  Current Weight: Weight - Scale: 107 kg (236 lb 5 3 oz)  Vitals:    05/27/21 0830   BP:    Pulse: (!) 116   Resp: (!) 36   Temp: 100 °F (37 8 °C)   SpO2: 92%       Intake/Output Summary (Last 24 hours) at 5/27/2021 0932  Last data filed at 5/27/2021 0901  Gross per 24 hour   Intake 3048 71 ml   Output 2180 ml   Net 868 71 ml     Patient seen through window given COVID-19 positive  General:  Intubated, unresponsive, in not acute distress  Eyes:  Eyes are closed, no periorbital edema  ENT:  ET and OG tube in place  Chest:  No increased work of breathing, ventilator  CVS:  Telemetry reviewed, tachycardic  Abdomen:  Nondistended  Extremities: no visible edema of both legs  Skin: no visible rash  Neuro:  Intubated, unresponsive  Genitourinary Hodge catheter in place        Medications:    Current Facility-Administered Medications:     acetaminophen (TYLENOL) oral suspension 650 mg, 650 mg, Oral, Q8H, Iesha Gooden PA-C, 650 mg at 05/27/21 0552    atorvastatin (LIPITOR) tablet 40 mg, 40 mg, Oral, HS, La K JIHAN Avendano, 40 mg at 05/26/21 2121    chlorhexidine (PERIDEX) 0 12 % oral rinse 15 mL, 15 mL, Mouth/Throat, Q12H [address], Iesha Gooden PA-C, 15 mL at 05/27/21 0809    cholecalciferol (VITAMIN D3) tablet 2,000 Units, 2,000 Units, Oral, Daily, JIHAN Farley, 2,000 Units at 05/27/21 0835    fentanyl citrate (PF) 2,000 mcg in dextrose 5 % 60 mL infusion, , Intravenous, Continuous, Isaias Patterson MD, Last Rate: 5 mL/hr at 05/26/21 1208, New Bag at 05/26/21 1208    gabapentin (NEURONTIN) capsule 100 mg, 100 mg, Oral, TID, JIHAN Farley, 100 mg at 05/27/21 0835    heparin (porcine) subcutaneous injection 5,000 Units, 5,000 Units, Subcutaneous, Q8H [address], Iesha Gooden PA-C, 5,000 Units at 05/27/21 0552    hydrALAZINE (APRESOLINE) injection 20 mg, 20 mg, Intravenous, Q4H PRN, Tito Galan PA-C    HYDROmorphone (DILAUDID) injection 1 mg, 1 mg, Intravenous, Q4H PRN, Isaias Patterson MD    insulin regular (HumuLIN R,NovoLIN R) 1 Units/mL in sodium chloride 0 9 % 100 mL infusion, 0 3-21 Units/hr, Intravenous, Titrated, Iesha Gooden PA-C, Last Rate: 6 mL/hr at 05/27/21 0808, 6 Units/hr at 05/27/21 0808    Labetalol HCl (NORMODYNE) injection 20 mg, 20 mg, Intravenous, Q4H PRN, Iesha Gooden PA-C, 20 mg at 05/26/21 1247    midazolam (VERSED) 100 mg in dextrose 5 % 80 mL infusion, , Intravenous, Continuous, Jimi Sigala MD, Last Rate: 3 mL/hr at 05/26/21 1414, Rate Change at 05/26/21 1414    midazolam (VERSED) injection 2 mg, 2 mg, Intravenous, Q2H PRN, Jimi Sigala MD, 2 mg at 05/27/21 0553    [COMPLETED] zinc sulfate (ZINCATE) capsule 220 mg, 220 mg, Oral, Daily, 220 mg at 05/14/21 0913 **FOLLOWED BY** multivitamin-minerals (CENTRUM ADULTS) tablet 1 tablet, 1 tablet, Oral, Daily, Margarite Gabriel JIHAN Avendano, 1 tablet at 05/27/21 0809    ondansetron (ZOFRAN) injection 4 mg, 4 mg, Intravenous, Q6H PRN, La K JIHAN Avendano, 4 mg at 05/10/21 0935    polyethylene glycol (MIRALAX) packet 17 g, 17 g, Oral, Daily PRN, Jimi Sigala MD    potassium chloride oral solution 40 mEq, 40 mEq, Oral, Q4H, Jimi Sigala MD, 40 mEq at 05/27/21 0809    senna-docusate sodium (SENOKOT S) 8 6-50 mg per tablet 2 tablet, 2 tablet, Oral, BID, Jimi Sigala MD, 2 tablet at 05/27/21 6518    Invasive Devices:   Urethral Catheter (Active)   Amt returned on insertion(mL) 30 mL 05/21/21 0535   Reasons to continue Urinary Catheter  Accurate I&O assessment in critically ill patients (48 hr  max) 05/25/21 0801   Goal for Removal Voiding trial when ambulation improves 05/25/21 0801   Site Assessment Edema;Clean 05/25/21 0801   Collection Container Standard drainage bag 05/25/21 0801   Securement Method Leg strap 05/25/21 0801   Output (mL) 285 mL 05/25/21 0801       Lab Results:   Results from last 7 days   Lab Units 05/27/21  0443 05/26/21  0550 05/25/21  0450  05/24/21  0409  05/23/21  0327  05/21/21  0355   WBC Thousand/uL 9 08 7 87 10 71*  --  9 90  --  10 29*   < > 15 98*   HEMOGLOBIN g/dL 8 4* 7 7* 8 6*  --  7 1*  --  7 1*   < > 9 1*   HEMATOCRIT % 27 2* 24 4* 26 3*  --  21 8*  --  22 1*   < > 27 6*   PLATELETS Thousands/uL 92* 88* 87*  --  75*  --  71*   < > 85*   SODIUM mmol/L 153* 149* 151*   < >  --    < >  --    < > 140   POTASSIUM mmol/L 3 2* 3 6 3 5   < >  --    < >  --    < > 3 7   CHLORIDE mmol/L 120* 111* 110*   < >  --    < >  --    < > 104   CO2 mmol/L 23 31 32   < >  --    < >  --    < > 26   BUN mg/dL 43* 65* 83*   < >  --    < >  --    < > 99*   CREATININE mg/dL 1 25 1 83* 2 11*   < >  --    < >  --    < > 2 73*   CALCIUM mg/dL 6 3* 8 6 8 6   < >  --    < >  --    < > 7 7*   MAGNESIUM mg/dL  --   --  2 4  --   --   --  2 3  --  2 5   PHOSPHORUS mg/dL  --   --  3 7  --   --   --   --   --  4 5*   ALK PHOS U/L  --   --   --   --  114  --   --   --   --    ALT U/L  --   --   --   --  37  --   --   --   --    AST U/L  --   --   --   --  40  --   --   --   --     < > = values in this interval not displayed  Previous work up:  See previous notes      Portions of the record may have been created with voice recognition software  Occasional wrong word or "sound a like" substitutions may have occurred due to the inherent limitations of voice recognition software  Read the chart carefully and recognize, using context, where substitutions have occurred  If you have any questions, please contact the dictating provider

## 2021-05-27 NOTE — ASSESSMENT & PLAN NOTE
· With transient second degree heart blocks  Now resolved  · Cardiology following  · External pacemaker not beneficial at this point per cardiology  · Off vasopressors  · Single ICD with appropriate pacing at 40 bpm  · Consider increasing lower rate limit of ICD to 50-60 bpm per cardiology recommendations if heart block or arrhyhtmia develops again

## 2021-05-27 NOTE — ASSESSMENT & PLAN NOTE
Wt Readings from Last 3 Encounters:   05/27/21 107 kg (236 lb 5 3 oz)   03/19/21 118 kg (261 lb)   03/18/21 118 kg (261 lb)     · BNP 7000 on admission  · 4/21 ECHO: The ventricle was mildly to moderately dilated  Systolic function was mildly to moderately reduced  Ejection fraction was estimated to be 40 %  There was mild to moderate diffuse hypokinesis  · Monitor volume status  · He was placed on a continuous lasix infusion with good response  Now discontinued  · Creatinine improving  · No longer requiring vasopressors

## 2021-05-27 NOTE — PLAN OF CARE
Problem: Potential for Falls  Goal: Patient will remain free of falls  Description: INTERVENTIONS:  - Assess patient frequently for physical needs  -  Identify cognitive and physical deficits and behaviors that affect risk of falls    -  [place] fall precautions as indicated by assessment   - Educate patient/family on patient safety including physical limitations  - Instruct patient to call for assistance with activity based on assessment  - Modify environment to reduce risk of injury  - Consider OT/PT consult to assist with strengthening/mobility  Outcome: Progressing     Problem: Prexisting or High Potential for Compromised Skin Integrity  Goal: Skin integrity is maintained or improved  Description: INTERVENTIONS:  - Identify patients at risk for skin breakdown  - Assess and monitor skin integrity  - Assess and monitor nutrition and hydration status  - Monitor labs   - Assess for incontinence   - Turn and reposition patient  - Assist with mobility/ambulation  - Relieve pressure over bony prominences  - Avoid friction and shearing  - Provide appropriate hygiene as needed including keeping skin clean and dry  - Evaluate need for skin moisturizer/barrier cream  - Collaborate with interdisciplinary team   - Patient/family teaching  - Consider wound care consult   Outcome: Progressing     Problem: PAIN - ADULT  Goal: Verbalizes/displays adequate comfort level or baseline comfort level  Description: Interventions:  - Encourage patient to monitor pain and request assistance  - Assess pain using appropriate pain scale  - Administer analgesics based on type and severity of pain and evaluate response  - Implement non-pharmacological measures as appropriate and evaluate response  - Consider cultural and social influences on pain and pain management  - Notify physician/advanced practitioner if interventions unsuccessful or patient reports new pain  Outcome: Progressing     Problem: INFECTION - ADULT  Goal: Absence or prevention of progression during hospitalization  Description: INTERVENTIONS:  - Assess and monitor for signs and symptoms of infection  - Monitor lab/diagnostic results  - Monitor all insertion sites, i e  indwelling lines, tubes, and drains  - Monitor endotracheal if appropriate and nasal secretions for changes in amount and color  - [place] appropriate cooling/warming therapies per order  - Administer medications as ordered  - Instruct and encourage patient and family to use good hand hygiene technique  - Identify and instruct in appropriate isolation precautions for identified infection/condition  Outcome: Progressing     Problem: SAFETY ADULT  Goal: Patient will remain free of falls  Description: INTERVENTIONS:  - Assess patient frequently for physical needs  -  Identify cognitive and physical deficits and behaviors that affect risk of falls    -  [place] fall precautions as indicated by assessment   - Educate patient/family on patient safety including physical limitations  - Instruct patient to call for assistance with activity based on assessment  - Modify environment to reduce risk of injury  - Consider OT/PT consult to assist with strengthening/mobility  Outcome: Progressing  Goal: Maintain or return to baseline ADL function  Description: INTERVENTIONS:  -  Assess patient's ability to carry out ADLs; assess patient's baseline for ADL function and identify physical deficits which impact ability to perform ADLs (bathing, care of mouth/teeth, toileting, grooming, dressing, etc )  - Assess/evaluate cause of self-care deficits   - Assess range of motion  - Assess patient's mobility; develop plan if impaired  - Assess patient's need for assistive devices and provide as appropriate  - Encourage maximum independence but intervene and supervise when necessary  - Involve family in performance of ADLs  - Assess for home care needs following discharge   - Consider OT consult to assist with ADL evaluation and planning for discharge  - Provide patient education as appropriate  Outcome: Not Progressing  Goal: Maintain or return mobility status to optimal level  Description: INTERVENTIONS:  - Assess patient's baseline mobility status (ambulation, transfers, stairs, etc )    - Identify cognitive and physical deficits and behaviors that affect mobility  - Identify mobility aids required to assist with transfers and/or ambulation (gait belt, sit-to-stand, lift, walker, cane, etc )  - [place] fall precautions as indicated by assessment  - Record patient progress and toleration of activity level on Mobility SBAR; progress patient to next Phase/Stage  - Instruct patient to call for assistance with activity based on assessment  - Consider rehabilitation consult to assist with strengthening/weightbearing, etc   Outcome: Not Progressing     Problem: DISCHARGE PLANNING  Goal: Discharge to home or other facility with appropriate resources  Description: INTERVENTIONS:  - Identify barriers to discharge w/patient and caregiver  - Arrange for needed discharge resources and transportation as appropriate  - Identify discharge learning needs (meds, wound care, etc )  - Arrange for interpretive services to assist at discharge as needed  - Refer to Case Management Department for coordinating discharge planning if the patient needs post-hospital services based on physician/advanced practitioner order or complex needs related to functional status, cognitive ability, or social support system  Outcome: Progressing     Problem: Knowledge Deficit  Goal: Patient/family/caregiver demonstrates understanding of disease process, treatment plan, medications, and discharge instructions  Description: Complete learning assessment and assess knowledge base    Interventions:  - Provide teaching at level of understanding  - Provide teaching via preferred learning methods  Outcome: Not Progressing     Problem: Nutrition/Hydration-ADULT  Goal: Nutrient/Hydration intake appropriate for improving, restoring or maintaining nutritional needs  Description: Monitor and assess patient's nutrition/hydration status for malnutrition  Collaborate with interdisciplinary team and initiate plan and interventions as ordered  Monitor patient's weight and dietary intake as ordered or per policy  Utilize nutrition screening tool and intervene as necessary  Determine patient's food preferences and provide high-protein, high-caloric foods as appropriate       INTERVENTIONS:  - Monitor oral intake, urinary output, labs, and treatment plans  - Assess nutrition and hydration status and recommend course of action  - Evaluate amount of meals eaten  - Assist patient with eating if necessary   - Allow adequate time for meals  - Recommend/ encourage appropriate diets, oral nutritional supplements, and vitamin/mineral supplements  - Order, calculate, and assess calorie counts as needed  - Recommend, monitor, and adjust tube feedings and TPN/PPN based on assessed needs  - Assess need for intravenous fluids  - Provide specific nutrition/hydration education as appropriate  - Include patient/family/caregiver in decisions related to nutrition  Outcome: Progressing     Problem: SAFETY,RESTRAINT: NV/NON-SELF DESTRUCTIVE BEHAVIOR  Goal: Remains free of harm/injury (restraint for non violent/non self-detsructive behavior)  Description: INTERVENTIONS:  - Instruct patient/family regarding restraint use   - Assess and monitor physiologic and psychological status   - Provide interventions and comfort measures to meet assessed patient needs   - Identify and implement measures to help patient regain control  - Assess readiness for release of restraint   Outcome: Progressing  Goal: Returns to optimal restraint-free functioning  Description: INTERVENTIONS:  - Assess the patient's behavior and symptoms that indicate continued need for restraint  - Identify and implement measures to help patient regain control  - Assess readiness for release of restraint   Outcome: Progressing     Problem: RESPIRATORY - ADULT  Goal: Achieves optimal ventilation and oxygenation  Description: INTERVENTIONS:  - Assess for changes in respiratory status  - Assess for changes in mentation and behavior  - Position to facilitate oxygenation and minimize respiratory effort  - Oxygen administered by appropriate delivery if ordered  - Initiate smoking cessation education as indicated  - Encourage broncho-pulmonary hygiene including cough, deep breathe, Incentive Spirometry  - Assess the need for suctioning and aspirate as needed  - Assess and instruct to report SOB or any respiratory difficulty  - Respiratory Therapy support as indicated  Outcome: Not Progressing     Problem: GASTROINTESTINAL - ADULT  Goal: Maintains or returns to baseline bowel function  Description: INTERVENTIONS:  - Assess bowel function  - Encourage oral fluids to ensure adequate hydration  - Administer IV fluids if ordered to ensure adequate hydration  - Administer ordered medications as needed  - Encourage mobilization and activity  - Consider nutritional services referral to assist patient with adequate nutrition and appropriate food choices  Outcome: Progressing     Problem: GENITOURINARY - ADULT  Goal: Maintains or returns to baseline urinary function  Description: INTERVENTIONS:  - Assess urinary function  - Encourage oral fluids to ensure adequate hydration if ordered  - Administer IV fluids as ordered to ensure adequate hydration  - Administer ordered medications as needed  - Offer frequent toileting  - Follow urinary retention protocol if ordered  Outcome: Progressing     Problem: METABOLIC, FLUID AND ELECTROLYTES - ADULT  Goal: Electrolytes maintained within normal limits  Description: INTERVENTIONS:  - Monitor labs and assess patient for signs and symptoms of electrolyte imbalances  - Administer electrolyte replacement as ordered  - Monitor response to electrolyte replacements, including repeat lab results as appropriate  - Instruct patient on fluid and nutrition as appropriate  Outcome: Progressing  Goal: Glucose maintained within target range  Description: INTERVENTIONS:  - Monitor Blood Glucose as ordered  - Assess for signs and symptoms of hyperglycemia and hypoglycemia  - Administer ordered medications to maintain glucose within target range  - Assess nutritional intake and initiate nutrition service referral as needed  Outcome: Progressing     Problem: CARDIOVASCULAR - ADULT  Goal: Maintains optimal cardiac output and hemodynamic stability  Description: INTERVENTIONS:  - Monitor I/O, vital signs and rhythm  - Monitor for S/S and trends of decreased cardiac output  - Administer and titrate ordered vasoactive medications to optimize hemodynamic stability  - Assess quality of pulses, skin color and temperature  - Assess for signs of decreased coronary artery perfusion  - Instruct patient to report change in severity of symptoms  Outcome: Progressing  Goal: Absence of cardiac dysrhythmias or at baseline rhythm  Description: INTERVENTIONS:  - Continuous cardiac monitoring, vital signs, obtain 12 lead EKG if ordered  - Administer antiarrhythmic and heart rate control medications as ordered  - Monitor electrolytes and administer replacement therapy as ordered  Outcome: Progressing     Problem: HEMATOLOGIC - ADULT  Goal: Maintains hematologic stability  Description: INTERVENTIONS  - Assess for signs and symptoms of bleeding or hemorrhage  - Monitor labs  - Administer supportive blood products/factors as ordered and appropriate  Outcome: Progressing

## 2021-05-27 NOTE — ASSESSMENT & PLAN NOTE
Recent Labs     05/25/21  0450 05/26/21  0550 05/27/21  0443   PLT 87* 88* 92*       · Likely secondary to sepsis and antibiotic use  Improving after discontinuing antibiotics  · No evidence of active bleeding    · Monitor CBC

## 2021-05-27 NOTE — ASSESSMENT & PLAN NOTE
Recent Labs     05/25/21  0450 05/26/21  0550 05/27/21  0443   CREATININE 2 11* 1 83* 1 25   EGFR 36 42 67     Estimated Creatinine Clearance: 65 2 mL/min (by C-G formula based on SCr of 1 25 mg/dL)  · Hx of CKD III, baseline sCr 1 8 - 1 9  · GAGE on presentation with sCr of 4 97  · Etiology multifactorial secondary to pre renal azotemia, ATN and rhabdomyolysis present on admission  · On lasix and metolazone at home  · Placed on a lasix infusion with good response   Now discontinued  · Monitor I and O

## 2021-05-27 NOTE — PLAN OF CARE
Problem: Potential for Falls  Goal: Patient will remain free of falls  Description: INTERVENTIONS:  - Assess patient frequently for physical needs  -  Identify cognitive and physical deficits and behaviors that affect risk of falls    -  [place] fall precautions as indicated by assessment   - Educate patient/family on patient safety including physical limitations  - Instruct patient to call for assistance with activity based on assessment  - Modify environment to reduce risk of injury  - Consider OT/PT consult to assist with strengthening/mobility  Outcome: Progressing     Problem: Prexisting or High Potential for Compromised Skin Integrity  Goal: Skin integrity is maintained or improved  Description: INTERVENTIONS:  - Identify patients at risk for skin breakdown  - Assess and monitor skin integrity  - Assess and monitor nutrition and hydration status  - Monitor labs   - Assess for incontinence   - Turn and reposition patient  - Assist with mobility/ambulation  - Relieve pressure over bony prominences  - Avoid friction and shearing  - Provide appropriate hygiene as needed including keeping skin clean and dry  - Evaluate need for skin moisturizer/barrier cream  - Collaborate with interdisciplinary team   - Patient/family teaching  - Consider wound care consult   Outcome: Progressing     Problem: PAIN - ADULT  Goal: Verbalizes/displays adequate comfort level or baseline comfort level  Description: Interventions:  - Encourage patient to monitor pain and request assistance  - Assess pain using appropriate pain scale  - Administer analgesics based on type and severity of pain and evaluate response  - Implement non-pharmacological measures as appropriate and evaluate response  - Consider cultural and social influences on pain and pain management  - Notify physician/advanced practitioner if interventions unsuccessful or patient reports new pain  Outcome: Progressing     Problem: INFECTION - ADULT  Goal: Absence or prevention of progression during hospitalization  Description: INTERVENTIONS:  - Assess and monitor for signs and symptoms of infection  - Monitor lab/diagnostic results  - Monitor all insertion sites, i e  indwelling lines, tubes, and drains  - Monitor endotracheal if appropriate and nasal secretions for changes in amount and color  - [place] appropriate cooling/warming therapies per order  - Administer medications as ordered  - Instruct and encourage patient and family to use good hand hygiene technique  - Identify and instruct in appropriate isolation precautions for identified infection/condition  Outcome: Progressing     Problem: SAFETY ADULT  Goal: Patient will remain free of falls  Description: INTERVENTIONS:  - Assess patient frequently for physical needs  -  Identify cognitive and physical deficits and behaviors that affect risk of falls  -  [place] fall precautions as indicated by assessment   - Educate patient/family on patient safety including physical limitations  - Instruct patient to call for assistance with activity based on assessment  - Modify environment to reduce risk of injury  - Consider OT/PT consult to assist with strengthening/mobility  Outcome: Progressing     Problem: Knowledge Deficit  Goal: Patient/family/caregiver demonstrates understanding of disease process, treatment plan, medications, and discharge instructions  Description: Complete learning assessment and assess knowledge base  Interventions:  - Provide teaching at level of understanding  - Provide teaching via preferred learning methods  Outcome: Progressing     Problem: Nutrition/Hydration-ADULT  Goal: Nutrient/Hydration intake appropriate for improving, restoring or maintaining nutritional needs  Description: Monitor and assess patient's nutrition/hydration status for malnutrition  Collaborate with interdisciplinary team and initiate plan and interventions as ordered    Monitor patient's weight and dietary intake as ordered or per policy  Utilize nutrition screening tool and intervene as necessary  Determine patient's food preferences and provide high-protein, high-caloric foods as appropriate       INTERVENTIONS:  - Monitor oral intake, urinary output, labs, and treatment plans  - Assess nutrition and hydration status and recommend course of action  - Evaluate amount of meals eaten  - Assist patient with eating if necessary   - Allow adequate time for meals  - Recommend/ encourage appropriate diets, oral nutritional supplements, and vitamin/mineral supplements  - Order, calculate, and assess calorie counts as needed  - Recommend, monitor, and adjust tube feedings and TPN/PPN based on assessed needs  - Assess need for intravenous fluids  - Provide specific nutrition/hydration education as appropriate  - Include patient/family/caregiver in decisions related to nutrition  Outcome: Progressing     Problem: SAFETY,RESTRAINT: NV/NON-SELF DESTRUCTIVE BEHAVIOR  Goal: Remains free of harm/injury (restraint for non violent/non self-detsructive behavior)  Description: INTERVENTIONS:  - Instruct patient/family regarding restraint use   - Assess and monitor physiologic and psychological status   - Provide interventions and comfort measures to meet assessed patient needs   - Identify and implement measures to help patient regain control  - Assess readiness for release of restraint   Outcome: Progressing     Problem: RESPIRATORY - ADULT  Goal: Achieves optimal ventilation and oxygenation  Description: INTERVENTIONS:  - Assess for changes in respiratory status  - Assess for changes in mentation and behavior  - Position to facilitate oxygenation and minimize respiratory effort  - Oxygen administered by appropriate delivery if ordered  - Initiate smoking cessation education as indicated  - Encourage broncho-pulmonary hygiene including cough, deep breathe, Incentive Spirometry  - Assess the need for suctioning and aspirate as needed  - Assess and instruct to report SOB or any respiratory difficulty  - Respiratory Therapy support as indicated  Outcome: Progressing     Problem: GENITOURINARY - ADULT  Goal: Maintains or returns to baseline urinary function  Description: INTERVENTIONS:  - Assess urinary function  - Encourage oral fluids to ensure adequate hydration if ordered  - Administer IV fluids as ordered to ensure adequate hydration  - Administer ordered medications as needed  - Offer frequent toileting  - Follow urinary retention protocol if ordered  Outcome: Progressing     Problem: METABOLIC, FLUID AND ELECTROLYTES - ADULT  Goal: Electrolytes maintained within normal limits  Description: INTERVENTIONS:  - Monitor labs and assess patient for signs and symptoms of electrolyte imbalances  - Administer electrolyte replacement as ordered  - Monitor response to electrolyte replacements, including repeat lab results as appropriate  - Instruct patient on fluid and nutrition as appropriate  Outcome: Progressing     Problem: CARDIOVASCULAR - ADULT  Goal: Maintains optimal cardiac output and hemodynamic stability  Description: INTERVENTIONS:  - Monitor I/O, vital signs and rhythm  - Monitor for S/S and trends of decreased cardiac output  - Administer and titrate ordered vasoactive medications to optimize hemodynamic stability  - Assess quality of pulses, skin color and temperature  - Assess for signs of decreased coronary artery perfusion  - Instruct patient to report change in severity of symptoms  Outcome: Progressing     Problem: HEMATOLOGIC - ADULT  Goal: Maintains hematologic stability  Description: INTERVENTIONS  - Assess for signs and symptoms of bleeding or hemorrhage  - Monitor labs  - Administer supportive blood products/factors as ordered and appropriate  Outcome: Progressing

## 2021-05-27 NOTE — ASSESSMENT & PLAN NOTE
Recent Labs     05/25/21  0450 05/26/21  0550 05/27/21  0443   SODIUM 151* 149* 153*       · F/U BMP  · Free water deficits 5 L  · Continue water flushes  · Hold lasix  · Will await nephrology recommendations on hypernatremia

## 2021-05-27 NOTE — PLAN OF CARE
Problem: Nutrition/Hydration-ADULT  Goal: Nutrient/Hydration intake appropriate for improving, restoring or maintaining nutritional needs  Description: Monitor and assess patient's nutrition/hydration status for malnutrition  Collaborate with interdisciplinary team and initiate plan and interventions as ordered  Monitor patient's weight and dietary intake as ordered or per policy  Utilize nutrition screening tool and intervene as necessary  Determine patient's food preferences and provide high-protein, high-caloric foods as appropriate  INTERVENTIONS:  - Monitor oral intake, urinary output, labs, and treatment plans  - Assess nutrition and hydration status and recommend course of action  - Evaluate amount of meals eaten  - Assist patient with eating if necessary   - Allow adequate time for meals  - Recommend/ encourage appropriate diets, oral nutritional supplements, and vitamin/mineral supplements  - Order, calculate, and assess calorie counts as needed  - Recommend, monitor, and adjust tube feedings and TPN/PPN based on assessed needs  - Assess need for intravenous fluids  - Provide specific nutrition/hydration education as appropriate  - Include patient/family/caregiver in decisions related to nutrition  Outcome: Progressing   Propofol d/marisol can increase Jevity 1 2 to 68mL/hr, water flushes per MD, provides total of of 1958cal, 90g pro, 1321mL

## 2021-05-27 NOTE — ASSESSMENT & PLAN NOTE
Recent Labs     05/25/21  0450 05/26/21  0550 05/27/21  0443   WBC 10 71* 7 87 9 08       · Patient originally with hypothermia, leukocytosis and hypotension requiring vasopressors  Currently off pressors, however he did develop a fever overnight  · Completed 7 days of Cefazolin  · Leukocytosis improving  · Blood cultures with no growth to date  · Monitor CBC with differential  · Consider redrawing blood cultures  · Follow up procalcitonin results  · Unclear if fever is secondary to infection vs drug-induced

## 2021-05-28 NOTE — ASSESSMENT & PLAN NOTE
· Secondary to COVID-19 infection  · Initial positive 5/7 with unclear symptom onset  · Intubated on 5/17 secondary to progressive respiratory failure  · Proned 6PM on 5/17- supine 5/18 with maintenance of saturations > 90  · CXR with bilateral pulmonary opacities  · Completed Decadron  · Continue vitamins  · Completed remdesivir  · Given Actemra 5/13 due to increasing Fio2 requirements and elevated CRP  · Completed antibiotic therapy  · Off Veletri  · Wean FiO2 and PEEP as tolerated  · Vent day 12  Patient will likely need tracheostomy early next week     · Patient not stable for discharge

## 2021-05-28 NOTE — ASSESSMENT & PLAN NOTE
Recent Labs     05/27/21  0443 05/27/21  1001 05/28/21  0423   SODIUM 153* 149* 149*       · F/U BMP  · Free water deficit 3 4 L  · Continue water flushes     · Intermittent Lasix  · If hypernatremia worsens, discontinue diuretic and start gentle hypotonic IVF

## 2021-05-28 NOTE — ASSESSMENT & PLAN NOTE
Wt Readings from Last 3 Encounters:   05/28/21 107 kg (235 lb 7 2 oz)   03/19/21 118 kg (261 lb)   03/18/21 118 kg (261 lb)     · BNP 7000 on admission  · 4/21 ECHO: The ventricle was mildly to moderately dilated  Systolic function was mildly to moderately reduced  Ejection fraction was estimated to be 40 %  There was mild to moderate diffuse hypokinesis  · Monitor volume status  · He was placed on a continuous lasix infusion with good response  Now discontinued  · Creatinine improving  · No longer requiring vasopressors

## 2021-05-28 NOTE — PROGRESS NOTES
NEPHROLOGY PROGRESS NOTE   Lotus Francisco Sr  80 y o  male MRN: [id number]  Unit/Bed#: ICU 03 Encounter: [id number]      ASSESSMENT & PLAN:  1  Acute kidney injury on top of CKD stage IIIB, baseline reported around 1 8-1 9  Acute kidney injury suspected multifactorial in the loss of autoregulation given prior ARB, rhabdo, prerenal azotemia that progressed to ATN  Chronic kidney disease suspected secondary to underlying diabetes nephropathy, hypertension, cardiorenal syndrome as well as age-related nephron loss  Patient follows with Dr Shiv Barnard as an outpatient  Renal function is stable with a creatinine down at 1 8 today that his baseline  Continue with intravenously diuretics as needed to keep a net negative balance of 0 5-1 L daily  Continue with free water flushes  Avoid relative hypotension  Follow labs and monitor ins and outs    2  Hypernatremia, serum sodium is stable at 149, continue with free water flushes    3  Acute hypoxemic respiratory failure in the setting of COVID-19 infection  Management per critical care team     4  Severe sepsis  5  Acute encephalopathy, currently sedated  6  Chronic combined systolic and diastolic heart failure, good urine output, Lasix drip was discontinued on 05/24  Continue with Lasix 40 mg intravenously p r n  to keep a goal net negative of 0 5-1 L daily    7  Anemia, hemoglobin low but stable, hemoglobin 8 3 today    My plan and recommendations were discussed with critical care team      SUBJECTIVE:  Patient seen through window given COVID-19 positive  Patient remains intubated and sedated  ROS unable to be obtained  Discussed with nurse taking care of patient, requiring significant sedation due to agitation, good urine output  Received Lasix this morning    Blood pressure is stable remains off inotropics/pressor support      OBJECTIVE:  Current Weight: Weight - Scale: 107 kg (235 lb 7 2 oz)  Vitals:    05/28/21 0853   BP:    Pulse:    Resp:    Temp:    SpO2: (!) 83%       Intake/Output Summary (Last 24 hours) at 5/28/2021 0931  Last data filed at 5/28/2021 0801  Gross per 24 hour   Intake 3113 4 ml   Output 1540 ml   Net 1573 4 ml     Patient seen through window given COVID-19 positive  General:  Intubated and sedated, in not acute distress  Eyes:  Eyes are closed, no periorbital edema  ENT:  ET and OG tube in place  Chest:  On ventilator, no increased work of breathing  CVS:  Telemetry reviewed, tachycardic  Abdomen:  Nondistended  Extremities: no significant edema of both legs  Skin: no visible rash  Neuro:  Intubated and sedated    Genitourinary Hodge catheter in place      Medications:    Current Facility-Administered Medications:     acetaminophen (TYLENOL) oral suspension 650 mg, 650 mg, Oral, Q8H, Iesha Gooden PA-C, 650 mg at 05/28/21 0545    amLODIPine (NORVASC) tablet 5 mg, 5 mg, Oral, Daily, Talia Combs MD, 5 mg at 05/28/21 0819    atorvastatin (LIPITOR) tablet 40 mg, 40 mg, Oral, HS, La JIHAN Zapata, 40 mg at 05/27/21 2218    chlorhexidine (PERIDEX) 0 12 % oral rinse 15 mL, 15 mL, Mouth/Throat, Q12H [address], Iesha Gooden PA-C, 15 mL at 05/27/21 2005    cholecalciferol (VITAMIN D3) tablet 2,000 Units, 2,000 Units, Oral, Daily, JIHAN Farley, 2,000 Units at 05/28/21 0818    fentanyl citrate (PF) 2,000 mcg in dextrose 5 % 60 mL infusion, , Intravenous, Continuous, Isaias Patterson MD, Last Rate: 5 mL/hr at 05/27/21 1712, New Bag at 05/27/21 1712    gabapentin (NEURONTIN) capsule 100 mg, 100 mg, Oral, TID, JIHAN Farley, 100 mg at 05/28/21 0819    heparin (porcine) subcutaneous injection 5,000 Units, 5,000 Units, Subcutaneous, Q8H [address], Iesha Gooden PA-C, 5,000 Units at 05/28/21 0544    hydrALAZINE (APRESOLINE) injection 20 mg, 20 mg, Intravenous, Q4H PRN, Tito Galan PA-C    HYDROmorphone (DILAUDID) injection 1 mg, 1 mg, Intravenous, Q2H PRN, Isaias Patterson MD    insulin regular (HumuLIN R,NovoLIN R) 1 Units/mL in sodium chloride 0 9 % 100 mL infusion, 0 3-21 Units/hr, Intravenous, Titrated, Iesha Gooden PA-C, Last Rate: 8 mL/hr at 05/28/21 0557, 8 Units/hr at 05/28/21 0557    Labetalol HCl (NORMODYNE) injection 20 mg, 20 mg, Intravenous, Q4H PRN, Iesha Gooden PA-C, 20 mg at 05/26/21 1247    midazolam (VERSED) 100 mg in dextrose 5 % 80 mL infusion, , Intravenous, Continuous, Lisa Maya MD, Last Rate: 3 mL/hr at 05/27/21 1329, Rate Change at 05/27/21 1329    midazolam (VERSED) injection 2 mg, 2 mg, Intravenous, Q2H PRN, Lisa Maya MD, 2 mg at 05/28/21 0739    [COMPLETED] zinc sulfate (ZINCATE) capsule 220 mg, 220 mg, Oral, Daily, 220 mg at 05/14/21 0913 **FOLLOWED BY** multivitamin-minerals (CENTRUM ADULTS) tablet 1 tablet, 1 tablet, Oral, Daily, TkJIHAN Saha, 1 tablet at 05/28/21 0819    ondansetron (ZOFRAN) injection 4 mg, 4 mg, Intravenous, Q6H PRN, La K JIHAN Avendano, 4 mg at 05/10/21 0935    polyethylene glycol (MIRALAX) packet 17 g, 17 g, Oral, Daily PRN, Lisa Maya MD, 17 g at 05/28/21 0739    senna-docusate sodium (SENOKOT S) 8 6-50 mg per tablet 2 tablet, 2 tablet, Oral, BID, Lisa Maya MD, 2 tablet at 05/28/21 8285    Invasive Devices:   Urethral Catheter (Active)   Amt returned on insertion(mL) 30 mL 05/21/21 0535   Reasons to continue Urinary Catheter  Accurate I&O assessment in critically ill patients (48 hr  max) 05/25/21 0801   Goal for Removal Voiding trial when ambulation improves 05/25/21 0801   Site Assessment Edema;Clean 05/25/21 0801   Collection Container Standard drainage bag 05/25/21 0801   Securement Method Leg strap 05/25/21 0801   Output (mL) 285 mL 05/25/21 0801       Lab Results:   Results from last 7 days   Lab Units 05/28/21  0423 05/28/21  0328 05/27/21  1001 05/27/21  0443 05/26/21  0550 05/25/21  0450  05/24/21  0409  05/23/21  0327   WBC Thousand/uL  --  5 46  --  9 08 7 87 10 71*  --  9 90  --  10 29*   HEMOGLOBIN g/dL  --  8 3*  --  8 4* 7 7* 8 6* --  7 1*  --  7 1*   HEMATOCRIT %  --  26 9*  --  27 2* 24 4* 26 3*  --  21 8*  --  22 1*   PLATELETS Thousands/uL  --  81*  --  92* 88* 87*  --  75*  --  71*   SODIUM mmol/L 149*  --  149* 153* 149* 151*   < >  --    < >  --    POTASSIUM mmol/L 4 8  --  5 1 3 2* 3 6 3 5   < >  --    < >  --    CHLORIDE mmol/L 111*  --  110* 120* 111* 110*   < >  --    < >  --    CO2 mmol/L 32  --  29 23 31 32   < >  --    < >  --    BUN mg/dL 53*  --  59* 43* 65* 83*   < >  --    < >  --    CREATININE mg/dL 1 83*  --  1 92* 1 25 1 83* 2 11*   < >  --    < >  --    CALCIUM mg/dL 8 6  --  8 9 6 3* 8 6 8 6   < >  --    < >  --    MAGNESIUM mg/dL  --   --   --   --   --  2 4  --   --   --  2 3   PHOSPHORUS mg/dL  --   --   --   --   --  3 7  --   --   --   --    ALK PHOS U/L  --   --   --   --   --   --   --  114  --   --    ALT U/L  --   --   --   --   --   --   --  37  --   --    AST U/L  --   --   --   --   --   --   --  40  --   --     < > = values in this interval not displayed  Portions of the record may have been created with voice recognition software  Occasional wrong word or "sound a like" substitutions may have occurred due to the inherent limitations of voice recognition software  Read the chart carefully and recognize, using context, where substitutions have occurred  If you have any questions, please contact the dictating provider

## 2021-05-28 NOTE — ASSESSMENT & PLAN NOTE
Recent Labs     05/26/21  0550 05/27/21  0443 05/28/21  0328   PLT 88* 92* 81*       · Likely secondary to sepsis and antibiotic use  Improving after discontinuing antibiotics  · No evidence of active bleeding    · Monitor CBC

## 2021-05-28 NOTE — ASSESSMENT & PLAN NOTE
Lab Results   Component Value Date    HGBA1C 6 0 03/19/2021       Recent Labs     05/28/21  0000 05/28/21  0144 05/28/21  0358 05/28/21  0551   POCGLU 245* 181* 129 144*       Blood Sugar Average: Last 72 hrs:  (P) 171 125     · On jardiance and insulin at home  · Continue insulin infusion     · Goal blood glucose between 140 and 180

## 2021-05-28 NOTE — ASSESSMENT & PLAN NOTE
Recent Labs     05/26/21  0550 05/27/21  0443 05/28/21  0328   WBC 7 87 9 08 5 46       · Patient originally with hypothermia, leukocytosis and hypotension requiring vasopressors  Currently off pressors, however he did develop a fever overnight  · Completed 7 days of Cefazolin  · Leukocytosis improving  · Blood cultures with no growth to date  · Monitor CBC with differential  · Procalcitonin negative x2  Leukocytosis resolved  · Fever likely not related to infectious source  · Continue to monitor off antibiotics

## 2021-05-28 NOTE — PROGRESS NOTES
[address] [address]  Progress Note Elio Wellington Sr  1950, 80 y o  male MRN: [id number]  Unit/Bed#: ICU 03 Encounter: [id number]  Primary Care Provider: Irene Jay MD   Date and time admitted to hospital: 5/7/2021  4:33 PM      * Acute hypoxemic respiratory failure due to COVID-19 [facility])  Assessment & Plan  · Secondary to COVID-19 infection  · Initial positive 5/7 with unclear symptom onset  · Intubated on 5/17 secondary to progressive respiratory failure  · Proned 6PM on 5/17- supine 5/18 with maintenance of saturations > 90  · CXR with bilateral pulmonary opacities  · Completed Decadron  · Continue vitamins  · Completed remdesivir  · Given Actemra 5/13 due to increasing Fio2 requirements and elevated CRP  · Completed antibiotic therapy  · Off Veletri  · Wean FiO2 and PEEP as tolerated  · Vent day 12  Patient will likely need tracheostomy early next week  · Patient not stable for discharge           Bradycardia  Assessment & Plan  · With transient second degree heart blocks  Now resolved  · Cardiology following  · External pacemaker not beneficial at this point per cardiology  · Off vasopressors  · Single ICD with appropriate pacing at 40 bpm  · Consider increasing lower rate limit of ICD to 50-60 bpm per cardiology recommendations if heart block or arrhyhtmia develops again  Severe sepsis [facility])  Assessment & Plan  Recent Labs     05/26/21  0550 05/27/21  0443 05/28/21  0328   WBC 7 87 9 08 5 46       · Patient originally with hypothermia, leukocytosis and hypotension requiring vasopressors  Currently off pressors, however he did develop a fever overnight  · Completed 7 days of Cefazolin  · Leukocytosis improving  · Blood cultures with no growth to date  · Monitor CBC with differential  · Procalcitonin negative x2  Leukocytosis resolved  · Fever likely not related to infectious source  · Continue to monitor off antibiotics      Hypernatremia  Assessment & Plan  Recent Labs     05/27/21  0443 05/27/21  1001 05/28/21  0423   SODIUM 153* 149* 149*       · F/U BMP  · Free water deficit 3 4 L  · Continue water flushes  · Intermittent Lasix  · If hypernatremia worsens, discontinue diuretic and start gentle hypotonic IVF    Acute encephalopathy  Assessment & Plan  · Present on admission  · Repeat CT head 5/13: No significant interval change since prior examination  Stable advanced chronic microvascular ischemic change and chronic right cerebellar lacunar infarct  · Etiology unclear: covid 19/sepsis/hypoxia vs  delerium vs  Uremia less likely  · Baseline AO x 3  · Currently intubated and sedated with midazolam and fentanyl  Unable to assess mental status  Acute renal failure superimposed on chronic kidney disease [facility])  Assessment & Plan  Recent Labs     05/27/21  0443 05/27/21  1001 05/28/21  0423   CREATININE 1 25 1 92* 1 83*   EGFR 67 40 42     Estimated Creatinine Clearance: 44 5 mL/min (A) (by C-G formula based on SCr of 1 83 mg/dL (H))  · Hx of CKD III, baseline sCr 1 8 - 1 9  · GAGE on presentation with sCr of 4 97  · Etiology multifactorial secondary to pre renal azotemia, ATN and rhabdomyolysis present on admission  · On lasix and metolazone at home  · Continue intermittent lasix for goal -1L  · Monitor I and O      Thrombocytopenia [facility])  Assessment & Plan  Recent Labs     05/26/21  0550 05/27/21  0443 05/28/21  0328   PLT 88* 92* 81*       · Likely secondary to sepsis and antibiotic use  Improving after discontinuing antibiotics  · No evidence of active bleeding    · Monitor CBC        Hyperlipidemia  Assessment & Plan  · Continue lipitor       Obesity with body mass index 30 or greater  Assessment & Plan  · Known risk factor for severity of COVID       Chronic combined systolic and diastolic CHF, NYHA class 3 (HCC)  Assessment & Plan  Wt Readings from Last 3 Encounters:   05/28/21 107 kg (235 lb 7 2 oz)   03/19/21 118 kg (261 lb)   03/18/21 118 kg (261 lb)     · BNP 7000 on admission  · 4/21 ECHO: The ventricle was mildly to moderately dilated  Systolic function was mildly to moderately reduced  Ejection fraction was estimated to be 40 %  There was mild to moderate diffuse hypokinesis  · Monitor volume status  · He was placed on a continuous lasix infusion with good response  Now discontinued  · Creatinine improving  · No longer requiring vasopressors  Hypertension  Assessment & Plan  · Continue Norvasc  · Continue hydralazine prn  · Monitor BP  · Optimize sedation    Type 2 diabetes mellitus with kidney complication, with long-term current use of insulin [facility])  Assessment & Plan  Lab Results   Component Value Date    HGBA1C 6 0 03/19/2021       Recent Labs     05/28/21  0000 05/28/21  0144 05/28/21  0358 05/28/21  0551   POCGLU 245* 181* 129 144*       Blood Sugar Average: Last 72 hrs:  (P) 171 125     · On jardiance and insulin at home  · Continue insulin infusion  · Goal blood glucose between 140 and 180        ----------------------------------------------------------------------------------------  HPI/24hr events: Vent day 12  Currently on [address] 26/450/60/10  Patient continues to become hypertensive and agitated upon entering the room with SBP >250  Requiring multiple doses of Versed prn and dilaudid prn  No other events reported      Disposition: Continue Critical Care   Code Status: Level 1 - Full Code  ---------------------------------------------------------------------------------------  SUBJECTIVE      Review of Systems   Unable to perform ROS: Intubated       ---------------------------------------------------------------------------------------  OBJECTIVE    Vitals   Vitals:    05/28/21 0400 05/28/21 0500 05/28/21 0535 05/28/21 0600   BP:       Pulse: 92 90  (!) 108   Resp: (!) 25 (!) 26  (!) 30   Temp: 99 7 °F (37 6 °C) 99 3 °F (37 4 °C)  99 7 °F (37 6 °C)   TempSrc: Esophageal Esophageal  Esophageal   SpO2: 93% 95%  94%   Weight: 107 kg (235 lb 7 2 oz)    Height:         Temp (24hrs), Av 5 °F (37 5 °C), Min:99 °F (37 2 °C), Max:100 °F (37 8 °C)  Current: Temperature: 99 7 °F (37 6 °C)  Arterial Line BP: 234/62  Arterial Line MAP (mmHg): 114 mmHg    Respiratory:  SpO2: SpO2: 94 %, SpO2 Activity: SpO2 Activity: At Rest, SpO2 Device: O2 Device: Ventilator  Nasal Cannula O2 Flow Rate (L/min): 15 L/min    Invasive/non-invasive ventilation settings   Respiratory    Lab Data (Last 4 hours)    None         O2/Vent Data (Last 4 hours)    None                Physical Exam  Vitals signs reviewed  Constitutional:       Appearance: He is obese  He is ill-appearing  Interventions: He is intubated  HENT:      Head: Normocephalic and atraumatic  Nose: Nose normal       Mouth/Throat:      Mouth: Mucous membranes are moist    Eyes:      Pupils: Pupils are equal, round, and reactive to light  Neck:      Musculoskeletal: Normal range of motion and neck supple  Cardiovascular:      Rate and Rhythm: Normal rate and regular rhythm  Pulses: Normal pulses  Heart sounds: Normal heart sounds  Pulmonary:      Effort: He is intubated  Breath sounds: Decreased breath sounds present  No wheezing  Comments: Coarse breath sounds bilaterally  Abdominal:      General: Abdomen is flat  Bowel sounds are normal  There is no distension  Palpations: Abdomen is soft  Tenderness: There is no abdominal tenderness  Musculoskeletal: Normal range of motion  Skin:     General: Skin is warm and dry  Neurological:      Comments: Sedated  Easily agitated  Withdraws to pain  Does not follow commands     Psychiatric:      Comments: BROCK due to sedation              Laboratory and Diagnostics:  Results from last 7 days   Lab Units 21  0328 21  0443 21  0550 21  0450 21  0409 21  0327 21  0509   WBC Thousand/uL 5 46 9 08 7 87 10 71* 9 90 10 29* 11 12*   HEMOGLOBIN g/dL 8 3* 8 4* 7 7* 8 6* 7 1* 7  1* 7 9*   HEMATOCRIT % 26 9* 27 2* 24 4* 26 3* 21 8* 22 1* 23 4*   PLATELETS Thousands/uL 81* 92* 88* 87* 75* 71* 76*   NEUTROS PCT % 75  --   --   --   --   --   --    BANDS PCT %  --   --   --   --  13*  --   --    MONOS PCT % 5  --   --   --   --   --   --    MONO PCT %  --   --   --   --  0*  --   --      Results from last 7 days   Lab Units 05/28/21  0423 05/27/21  1001 05/27/21  0443 05/26/21  0550 05/25/21  0450 05/24/21  1712 05/24/21  0424 05/24/21  0409   SODIUM mmol/L 149* 149* 153* 149* 151* 148* 149*  --    POTASSIUM mmol/L 4 8 5 1 3 2* 3 6 3 5 3 9 3 5  --    CHLORIDE mmol/L 111* 110* 120* 111* 110* 108 108  --    CO2 mmol/L 32 29 23 31 32 31 32  --    ANION GAP mmol/L 6 10 10 7 9 9 9  --    BUN mg/dL 53* 59* 43* 65* 83* 88* 99*  --    CREATININE mg/dL 1 83* 1 92* 1 25 1 83* 2 11* 2 34* 2 38*  --    CALCIUM mg/dL 8 6 8 9 6 3* 8 6 8 6 8 6 8 7  --    GLUCOSE RANDOM mg/dL 144* 171* 106 132 139 194* 151*  --    ALT U/L  --   --   --   --   --   --   --  37   AST U/L  --   --   --   --   --   --   --  40   ALK PHOS U/L  --   --   --   --   --   --   --  114   ALBUMIN g/dL  --   --   --   --   --   --   --  2 2*   TOTAL BILIRUBIN mg/dL  --   --   --   --   --   --   --  0 68     Results from last 7 days   Lab Units 05/25/21  0450 05/23/21  0327   MAGNESIUM mg/dL 2 4 2 3   PHOSPHORUS mg/dL 3 7  --                    ABG:    VBG:    Results from last 7 days   Lab Units 05/28/21  0618 05/27/21  0801   PROCALCITONIN ng/ml 0 22 0 19       Micro          Imaging: I have personally reviewed pertinent reports        Intake and Output  I/O       05/26 0701 - 05/27 0700 05/27 0701 - 05/28 0700 05/28 0701 - 05/29 0700    I V  (mL/kg) 551 3 (5 2) 344 1 (3 2)     NG/GT 1250 800     Feedings 1345 1288     Total Intake(mL/kg) 3146 3 (29 4) 2432 1 (22 7)     Urine (mL/kg/hr) 2165 (0 8) 1570 (0 6)     Stool 0      Total Output 2165 1570     Net +981 3 +862 1            Unmeasured Stool Occurrence 2 x            Height and Weights   Height: 5' 8" (172 7 cm)  IBW (Ideal Body Weight): 68 4 kg  Body mass index is 35 8 kg/m²  Weight (last 2 days)     Date/Time   Weight    05/28/21 0535   107 (235 45)    05/27/21 0600   107 (236 33)    05/26/21 0549   109 (239 86)                Nutrition       Diet Orders   (From admission, onward)             Start     Ordered    05/27/21 1526  Diet Enteral/Parenteral; Tube Feeding No Oral Diet; Jevity 1 2 Micheal; Continuous; 68; 300; Water; Every 4 hours  Diet effective now     Question Answer Comment   Diet Type Enteral/Parenteral    Enteral/Parenteral Tube Feeding No Oral Diet    Tube Feeding Formula: Jevity 1 2 Micheal    Bolus/Cyclic/Continuous Continuous    Tube Feeding Goal Rate (mL/hr): 68    Tube Feeding flush (mL): 300    Water Flush type: Water    Water flush frequency: Every 4 hours    RD to adjust diet per protocol?  Yes        05/27/21 1525                  Active Medications  Scheduled Meds:  Current Facility-Administered Medications   Medication Dose Route Frequency Provider Last Rate    acetaminophen  650 mg Oral Q8H Iesha Gooden PA-C      amLODIPine  5 mg Oral Daily Meghna Gonzalez MD      atorvastatin  40 mg Oral HS JIHAN Riley      chlorhexidine  15 mL Mouth/Throat Q12H [address] Tamia Nance PA-C      cholecalciferol  2,000 Units Oral Daily JIHAN Rankin      IV infusion builder   Intravenous Continuous Violet Harry MD 5 mL/hr at 05/27/21 1712    furosemide  40 mg Intravenous Once Violet Harry MD      gabapentin  100 mg Oral TID JIHAN Rankin      heparin (porcine)  5,000 Units Subcutaneous [facility] Tamia Nance [place]      hydrALAZINE  20 mg Intravenous Q4H PRN Tamia Nance PA-C      HYDROmorphone  1 mg Intravenous Q4H PRN Violet Harry MD      insulin regular (HumuLIN R,NovoLIN R) infusion  0 3-21 Units/hr Intravenous Titrated Tamia Nance PA-C 8 Units/hr (05/28/21 0557)    Labetalol HCl  20 mg Intravenous Q4H PRN Tamia Nance NII      IV infusion builder   Intravenous Continuous Joya Guzman MD 3 mL/hr at 05/27/21 1329    midazolam  2 mg Intravenous Q2H PRN Joya Guzman MD      multivitamin-minerals  1 tablet Oral Daily JIHAN Dobson      ondansetron  4 mg Intravenous Q6H PRN JIHAN Medley      polyethylene glycol  17 g Oral Daily PRN Joya Guzman MD      senna-docusate sodium  2 tablet Oral BID Joya Guzman MD       Continuous Infusions:  IV infusion builder, , Last Rate: 5 mL/hr at 05/27/21 1712  insulin regular (HumuLIN R,NovoLIN R) infusion, 0 3-21 Units/hr, Last Rate: 8 Units/hr (05/28/21 0557)  IV infusion builder, , Last Rate: 3 mL/hr at 05/27/21 1329      PRN Meds:   hydrALAZINE, 20 mg, Q4H PRN  HYDROmorphone, 1 mg, Q4H PRN  Labetalol HCl, 20 mg, Q4H PRN  midazolam, 2 mg, Q2H PRN  ondansetron, 4 mg, Q6H PRN  polyethylene glycol, 17 g, Daily PRN        Invasive Devices Review  Invasive Devices     Central Venous Catheter Line            CVC Central Lines 05/12/21 Double 15 days          Arterial Line            Arterial Line 05/17/21 Radial 10 days          Line            Hemodialysis AV Fistula 09/06/19 Upper arm 630 days          Drain            NG/OG/Enteral Tube Orogastric 10 days    Urethral Catheter 7 days          Airway            ETT  11 days                  ---------------------------------------------------------------------------------------  Advance Directive and Living Will:      Power of :    POLST:    ---------------------------------------------------------------------------------------  Care Time Delivered:   Please, see attending's attestation      Joya Guzman MD      Portions of the record may have been created with voice recognition software  Occasional wrong word or "sound a like" substitutions may have occurred due to the inherent limitations of voice recognition software    Read the chart carefully and recognize, using context, where substitutions have occurred

## 2021-05-28 NOTE — ASSESSMENT & PLAN NOTE
Recent Labs     05/27/21  0443 05/27/21  1001 05/28/21  0423   CREATININE 1 25 1 92* 1 83*   EGFR 67 40 42     Estimated Creatinine Clearance: 44 5 mL/min (A) (by C-G formula based on SCr of 1 83 mg/dL (H))  · Hx of CKD III, baseline sCr 1 8 - 1 9  · GAGE on presentation with sCr of 4 97  · Etiology multifactorial secondary to pre renal azotemia, ATN and rhabdomyolysis present on admission    · On lasix and metolazone at home  · Continue intermittent lasix for goal -1L  · Monitor I and O

## 2021-05-29 NOTE — PROGRESS NOTES
Vancomycin Assessment    Bita Gross Sr  is a 80 y o  male who is currently ordered vancomycin 1000mg IV q12h for Pneumonia  Relevant clinical data and objective history reviewed:  Creatinine   Date Value Ref Range Status   05/29/2021 2 29 (H) 0 60 - 1 30 mg/dL Final     Comment:     Standardized to IDMS reference method   05/28/2021 1 83 (H) 0 60 - 1 30 mg/dL Final     Comment:     Standardized to IDMS reference method   05/27/2021 1 92 (H) 0 60 - 1 30 mg/dL Final     Comment:     Standardized to IDMS reference method     Vancomycin Rm   Date Value Ref Range Status   05/20/2021 16 0 ug/mL Final     Comment:     Specimen Lipemic; Results May be Affected     /58   Pulse (!) 138   Temp (!) 101 5 °F (38 6 °C) (Esophageal)   Resp (!) 36   Ht 5' 8" (1 727 m)   Wt 109 kg (239 lb 6 7 oz)   SpO2 (!) 86%   BMI 36 40 kg/m²   I/O last 3 completed shifts: In: 5944 1 [I V :909 1; NG/GT:2880; Feedings:2155]  Out: 2300 [Urine:2300]  Lab Results   Component Value Date/Time    BUN 57 (H) 05/29/2021 03:26 AM    BUN 23 04/24/2018 09:46 AM    WBC 6 19 05/29/2021 03:26 AM    WBC 4 6 04/24/2018 09:46 AM    HGB 8 1 (L) 05/29/2021 03:26 AM    HGB 13 1 (L) 04/24/2018 09:46 AM    HCT 26 5 (L) 05/29/2021 03:26 AM    HCT 40 1 (L) 04/24/2018 09:46 AM    MCV 95 05/29/2021 03:26 AM    MCV 88 04/24/2018 09:46 AM    PLT 85 (L) 05/29/2021 03:26 AM     04/24/2018 09:46 AM     Temp Readings from Last 3 Encounters:   05/29/21 (!) 101 5 °F (38 6 °C) (Esophageal)   03/19/21 97 8 °F (36 6 °C)   11/20/20 (!) 97 4 °F (36 3 °C)     Vancomycin Days of Therapy: 1    Assessment/Plan  The patient is currently ordered vancomycin utilizing scheduled dosing based on adjusted body weight (due to obesity)  Baseline risks associated with therapy include: pre-existing renal impairment, concomitant nephrotoxic medications, and advanced age    The patient is currently ordered 1000mg IV q12h and after clinical evaluation will be changed to 1750mg IV once, then 1250mg IV when trough level < 20  Pharmacy will also follow closely for s/sx of nephrotoxicity, infusion reactions, and appropriateness of therapy  BMP and CBC will be ordered per protocol  Plan for random level at approximately 0600 on 5/30/21  Due to infection severity, will target a trough of 15-20 (appropriate for most indications)   Pharmacy will continue to follow the patients culture results and clinical progress daily      Kevin Cho, Pharmacist

## 2021-05-29 NOTE — DEATH NOTE
INPATIENT DEATH NOTE  Rush Bright Sr  80 y o  male MRN: [id number]  Unit/Bed#: ICU 03 Encounter: [id number]         Patient's Information  Date of Death: 21  Time of Death: 1438  Pronounced by: dr Dong Molina  Did the patient's death occur in the ED?: No  Did the patient's death occur in the OR?: No  Did the patient's death occur less than 10 days post-op?: No  Did the patient's death occur within 24 hours of admission?: No  Was code status DNR at the time of death?: No    PHYSICAL EXAM:  Unresponsive to noxious stimuli, Spontaneous respirations absent, Breath sounds absent, Carotid pulse absent, Heart sounds absent, Pupillary light reflex absent and Corneal blink reflex absent    Medical Examiner notification criteria:  NONE APPLICABLE   Medical Examiner's office notified?:  Yes   Medical Examiner accepted case?:  No  Name of Medical Examiner:     Family Notification  Was the family notified?: Yes  Date Notified: 21  Time Notified: 4656  Notified by: dr Dong Molina  Name of Family Notified of Death: Sarah Sapp   Relationship to Patient: Spouse, Son  Family Notification Route: Telephone  Was the family told to contact a  home?: Yes    Autopsy Options:  Post-mortem examination declined by next of kin    Primary Service Attending Physician notified?:  yes - Attending:  Johnna Valencia MD    Physician/Resident responsible for completing Discharge Summary:  Sinai Neff

## 2021-05-29 NOTE — ASSESSMENT & PLAN NOTE
Recent Labs     05/26/21  0550 05/27/21  0443 05/28/21  0328   WBC 7 87 9 08 5 46       · Patient originally with hypothermia, leukocytosis and hypotension requiring vasopressors  Currently off pressors, however he did develop a fever overnight  · Completed 7 days of Cefazolin  On 5/23  · 5/18 Blood cultures with no growth to date     · Monitor CBC with differential  · 5/29 Persistent Fever overnight  · Blood cultures x2 and UA pending  · CXR pending  · Procalcitonin negative 5/28  · WBC WNL  · Continue to monitor off ABX

## 2021-05-29 NOTE — ASSESSMENT & PLAN NOTE
Lab Results   Component Value Date    HGBA1C 6 0 03/19/2021   · Blood glucose was controlled on insulin drip    Recent Labs     05/29/21  0826 05/29/21  1011 05/29/21  1206 05/29/21  1300   POCGLU 135 120 240* 277*       Blood Sugar Average: Last 72 hrs:  (P) 166 [id number]     · On jardiance and insulin at home  · Continue insulin infusion     · Goal blood glucose between 140 and 180

## 2021-05-29 NOTE — ASSESSMENT & PLAN NOTE
Wt Readings from Last 3 Encounters:   05/29/21 109 kg (239 lb 6 7 oz)   03/19/21 118 kg (261 lb)   03/18/21 118 kg (261 lb)     · BNP 7000 on admission  · 4/21 ECHO: The ventricle was mildly to moderately dilated  Systolic function was mildly to moderately reduced  Ejection fraction was estimated to be 40 %  There was mild to moderate diffuse hypokinesis  · Monitor volume status  · He was placed on a continuous lasix infusion with good response  Now discontinued  Continue intermittent lasix  · Creatinine improving  · No longer requiring vasopressors

## 2021-05-29 NOTE — ASSESSMENT & PLAN NOTE
Lab Results   Component Value Date    HGBA1C 6 0 03/19/2021       Recent Labs     05/28/21  1001 05/28/21  1217 05/28/21  1355 05/28/21  1627   POCGLU 140 156* 120 187*       Blood Sugar Average: Last 72 hrs:  (P) 741 [id number]     · On jardiance and insulin at home  · Continue insulin infusion     · Goal blood glucose between 140 and 180

## 2021-05-29 NOTE — ASSESSMENT & PLAN NOTE
Recent Labs     05/29/21  0326 05/29/21  1027 05/29/21  1315   CREATININE 2 29* 2 70* 3 31*   EGFR 32 26 21     Estimated Creatinine Clearance: 24 8 mL/min (A) (by C-G formula based on SCr of 3 31 mg/dL (H))  · Hx of CKD III, baseline sCr 1 8 - 1 9  · GAGE on presentation with sCr of 4 97  · Etiology multifactorial secondary to pre renal azotemia, ATN and rhabdomyolysis present on admission    · On lasix and metolazone at home  · Continue intermittent lasix for goal -1L  · Monitor I and O

## 2021-05-29 NOTE — ASSESSMENT & PLAN NOTE
Recent Labs     05/27/21  0443 05/28/21  0328 05/29/21  0326   PLT 92* 81* 85*       · Likely secondary to sepsis and antibiotic use  Improving after discontinuing antibiotics  · No evidence of active bleeding    · Monitor CBC

## 2021-05-29 NOTE — ASSESSMENT & PLAN NOTE
Recent Labs     05/29/21  1027   AST 47*   ALT 28   ALKPHOS 135*   TBILI 0 50       · Resolved  · Right upper quadrant ultrasound with evidence of cholelithiasis without wall thickening or pericholecystic fluid

## 2021-05-29 NOTE — PLAN OF CARE
Problem: Potential for Falls  Goal: Patient will remain free of falls  Description: INTERVENTIONS:  - Assess patient frequently for physical needs  -  Identify cognitive and physical deficits and behaviors that affect risk of falls    -  [place] fall precautions as indicated by assessment   - Educate patient/family on patient safety including physical limitations  - Instruct patient to call for assistance with activity based on assessment  - Modify environment to reduce risk of injury  - Consider OT/PT consult to assist with strengthening/mobility  Outcome: Progressing     Problem: Prexisting or High Potential for Compromised Skin Integrity  Goal: Skin integrity is maintained or improved  Description: INTERVENTIONS:  - Identify patients at risk for skin breakdown  - Assess and monitor skin integrity  - Assess and monitor nutrition and hydration status  - Monitor labs   - Assess for incontinence   - Turn and reposition patient  - Assist with mobility/ambulation  - Relieve pressure over bony prominences  - Avoid friction and shearing  - Provide appropriate hygiene as needed including keeping skin clean and dry  - Evaluate need for skin moisturizer/barrier cream  - Collaborate with interdisciplinary team   - Patient/family teaching  - Consider wound care consult   Outcome: Progressing     Problem: PAIN - ADULT  Goal: Verbalizes/displays adequate comfort level or baseline comfort level  Description: Interventions:  - Encourage patient to monitor pain and request assistance  - Assess pain using appropriate pain scale  - Administer analgesics based on type and severity of pain and evaluate response  - Implement non-pharmacological measures as appropriate and evaluate response  - Consider cultural and social influences on pain and pain management  - Notify physician/advanced practitioner if interventions unsuccessful or patient reports new pain  Outcome: Progressing     Problem: INFECTION - ADULT  Goal: Absence or prevention of progression during hospitalization  Description: INTERVENTIONS:  - Assess and monitor for signs and symptoms of infection  - Monitor lab/diagnostic results  - Monitor all insertion sites, i e  indwelling lines, tubes, and drains  - Monitor endotracheal if appropriate and nasal secretions for changes in amount and color  - [place] appropriate cooling/warming therapies per order  - Administer medications as ordered  - Instruct and encourage patient and family to use good hand hygiene technique  - Identify and instruct in appropriate isolation precautions for identified infection/condition  Outcome: Progressing     Problem: SAFETY ADULT  Goal: Patient will remain free of falls  Description: INTERVENTIONS:  - Assess patient frequently for physical needs  -  Identify cognitive and physical deficits and behaviors that affect risk of falls    -  [place] fall precautions as indicated by assessment   - Educate patient/family on patient safety including physical limitations  - Instruct patient to call for assistance with activity based on assessment  - Modify environment to reduce risk of injury  - Consider OT/PT consult to assist with strengthening/mobility  Outcome: Progressing  Goal: Maintain or return to baseline ADL function  Description: INTERVENTIONS:  -  Assess patient's ability to carry out ADLs; assess patient's baseline for ADL function and identify physical deficits which impact ability to perform ADLs (bathing, care of mouth/teeth, toileting, grooming, dressing, etc )  - Assess/evaluate cause of self-care deficits   - Assess range of motion  - Assess patient's mobility; develop plan if impaired  - Assess patient's need for assistive devices and provide as appropriate  - Encourage maximum independence but intervene and supervise when necessary  - Involve family in performance of ADLs  - Assess for home care needs following discharge   - Consider OT consult to assist with ADL evaluation and planning for discharge  - Provide patient education as appropriate  Outcome: Progressing  Goal: Maintain or return mobility status to optimal level  Description: INTERVENTIONS:  - Assess patient's baseline mobility status (ambulation, transfers, stairs, etc )    - Identify cognitive and physical deficits and behaviors that affect mobility  - Identify mobility aids required to assist with transfers and/or ambulation (gait belt, sit-to-stand, lift, walker, cane, etc )  - [place] fall precautions as indicated by assessment  - Record patient progress and toleration of activity level on Mobility SBAR; progress patient to next Phase/Stage  - Instruct patient to call for assistance with activity based on assessment  - Consider rehabilitation consult to assist with strengthening/weightbearing, etc   Outcome: Progressing     Problem: DISCHARGE PLANNING  Goal: Discharge to home or other facility with appropriate resources  Description: INTERVENTIONS:  - Identify barriers to discharge w/patient and caregiver  - Arrange for needed discharge resources and transportation as appropriate  - Identify discharge learning needs (meds, wound care, etc )  - Arrange for interpretive services to assist at discharge as needed  - Refer to Case Management Department for coordinating discharge planning if the patient needs post-hospital services based on physician/advanced practitioner order or complex needs related to functional status, cognitive ability, or social support system  Outcome: Progressing     Problem: Knowledge Deficit  Goal: Patient/family/caregiver demonstrates understanding of disease process, treatment plan, medications, and discharge instructions  Description: Complete learning assessment and assess knowledge base    Interventions:  - Provide teaching at level of understanding  - Provide teaching via preferred learning methods  Outcome: Progressing     Problem: Nutrition/Hydration-ADULT  Goal: Nutrient/Hydration intake appropriate for improving, restoring or maintaining nutritional needs  Description: Monitor and assess patient's nutrition/hydration status for malnutrition  Collaborate with interdisciplinary team and initiate plan and interventions as ordered  Monitor patient's weight and dietary intake as ordered or per policy  Utilize nutrition screening tool and intervene as necessary  Determine patient's food preferences and provide high-protein, high-caloric foods as appropriate       INTERVENTIONS:  - Monitor oral intake, urinary output, labs, and treatment plans  - Assess nutrition and hydration status and recommend course of action  - Evaluate amount of meals eaten  - Assist patient with eating if necessary   - Allow adequate time for meals  - Recommend/ encourage appropriate diets, oral nutritional supplements, and vitamin/mineral supplements  - Order, calculate, and assess calorie counts as needed  - Recommend, monitor, and adjust tube feedings and TPN/PPN based on assessed needs  - Assess need for intravenous fluids  - Provide specific nutrition/hydration education as appropriate  - Include patient/family/caregiver in decisions related to nutrition  Outcome: Progressing     Problem: SAFETY,RESTRAINT: NV/NON-SELF DESTRUCTIVE BEHAVIOR  Goal: Remains free of harm/injury (restraint for non violent/non self-detsructive behavior)  Description: INTERVENTIONS:  - Instruct patient/family regarding restraint use   - Assess and monitor physiologic and psychological status   - Provide interventions and comfort measures to meet assessed patient needs   - Identify and implement measures to help patient regain control  - Assess readiness for release of restraint   Outcome: Progressing  Goal: Returns to optimal restraint-free functioning  Description: INTERVENTIONS:  - Assess the patient's behavior and symptoms that indicate continued need for restraint  - Identify and implement measures to help patient regain control  - Assess readiness for release of restraint   Outcome: Progressing     Problem: RESPIRATORY - ADULT  Goal: Achieves optimal ventilation and oxygenation  Description: INTERVENTIONS:  - Assess for changes in respiratory status  - Assess for changes in mentation and behavior  - Position to facilitate oxygenation and minimize respiratory effort  - Oxygen administered by appropriate delivery if ordered  - Initiate smoking cessation education as indicated  - Encourage broncho-pulmonary hygiene including cough, deep breathe, Incentive Spirometry  - Assess the need for suctioning and aspirate as needed  - Assess and instruct to report SOB or any respiratory difficulty  - Respiratory Therapy support as indicated  Outcome: Progressing     Problem: GASTROINTESTINAL - ADULT  Goal: Maintains or returns to baseline bowel function  Description: INTERVENTIONS:  - Assess bowel function  - Encourage oral fluids to ensure adequate hydration  - Administer IV fluids if ordered to ensure adequate hydration  - Administer ordered medications as needed  - Encourage mobilization and activity  - Consider nutritional services referral to assist patient with adequate nutrition and appropriate food choices  Outcome: Progressing     Problem: GENITOURINARY - ADULT  Goal: Maintains or returns to baseline urinary function  Description: INTERVENTIONS:  - Assess urinary function  - Encourage oral fluids to ensure adequate hydration if ordered  - Administer IV fluids as ordered to ensure adequate hydration  - Administer ordered medications as needed  - Offer frequent toileting  - Follow urinary retention protocol if ordered  Outcome: Progressing     Problem: METABOLIC, FLUID AND ELECTROLYTES - ADULT  Goal: Electrolytes maintained within normal limits  Description: INTERVENTIONS:  - Monitor labs and assess patient for signs and symptoms of electrolyte imbalances  - Administer electrolyte replacement as ordered  - Monitor response to electrolyte replacements, including repeat lab results as appropriate  - Instruct patient on fluid and nutrition as appropriate  Outcome: Progressing  Goal: Glucose maintained within target range  Description: INTERVENTIONS:  - Monitor Blood Glucose as ordered  - Assess for signs and symptoms of hyperglycemia and hypoglycemia  - Administer ordered medications to maintain glucose within target range  - Assess nutritional intake and initiate nutrition service referral as needed  Outcome: Progressing     Problem: CARDIOVASCULAR - ADULT  Goal: Maintains optimal cardiac output and hemodynamic stability  Description: INTERVENTIONS:  - Monitor I/O, vital signs and rhythm  - Monitor for S/S and trends of decreased cardiac output  - Administer and titrate ordered vasoactive medications to optimize hemodynamic stability  - Assess quality of pulses, skin color and temperature  - Assess for signs of decreased coronary artery perfusion  - Instruct patient to report change in severity of symptoms  Outcome: Progressing  Goal: Absence of cardiac dysrhythmias or at baseline rhythm  Description: INTERVENTIONS:  - Continuous cardiac monitoring, vital signs, obtain 12 lead EKG if ordered  - Administer antiarrhythmic and heart rate control medications as ordered  - Monitor electrolytes and administer replacement therapy as ordered  Outcome: Progressing     Problem: HEMATOLOGIC - ADULT  Goal: Maintains hematologic stability  Description: INTERVENTIONS  - Assess for signs and symptoms of bleeding or hemorrhage  - Monitor labs  - Administer supportive blood products/factors as ordered and appropriate  Outcome: Progressing

## 2021-05-29 NOTE — PROGRESS NOTES
NEPHROLOGY PROGRESS NOTE   Krupa Murphy Sr  80 y o  male MRN: [id number]  Unit/Bed#: ICU 03 Encounter: [id number]      ASSESSMENT/PLAN:  1  Acute kidney injury (POA) on chronic kidney disease, stage IIIB:  - etiology suspect multifactorial prerenal azotemia, failure to auto regulate in the presence of ARB, rhabdomyolysis, progression to ATN  - underlying chronic kidney disease due to diabetic kidney disease, hypertensive nephrosclerosis, cardiorenal syndrome, age- related nephron loss  - upon review of medical records, baseline creatinine around 1 8-1 9 mg/dL, patient did have recent outpatient lab values where creatinine was in the 2s   - outpatient nephrologist, Dr Yoel Lpoez    - creatinine 4 97 mg/dL upon admission; improved to 1 76 on 5/16, however then had secondary insult likely related to diuresis  - most recent creatinine 2 29 mg/dL today (1 83 yesterday)  - per discussion with ICU team, considering furosemide drip given volume overload  Goal to maintain net negative fluid balance  - continue to hold ARB  - continue to monitor volume status closely with strict intake/output, daily weight  - continue to monitor renal indices with repeat lab studies      2  Hypernatremia:  - most recent sodium 146, continue free water flushes, monitor with diuresis  - will continue monitor with repeat lab studies      3  Hyperkalemia:  - most recent potassium 5 5, monitor with diuresis  - will continue to monitor with repeat lab studies      4  Hypertension:  - currently on amlodipine 5 mg daily, p r n  IV hydralazine  - optimize hemodynamics, avoid hypotension and fluctuations of blood pressure   - maintain MAP > 65       5  H/H, anemia:  - most recent hemoglobin 8 1 grams/deciliters  - goal hemoglobin 10 to 12 grams/deciliter    - recommend PRBC transfusion for hemoglobin less than 7   - transfuse per ICU team      6  Acute hypoxic respiratory failure, due to COVID-19 infection, remains intubated, will likely need tracheostomy next week  - chest x-ray completed today revealed worsening bilateral consolidation due to COVID-19  No effusion or pneumothorax      7  Severe sepsis, monitoring off antibiotics, with persistent fever overnight  Follow-up blood and sputum culture      8  Chronic combined systolic and diastolic CHF:  - most recent EF 40%  - continue net negative fluid balance as above  - continue to monitor volume status closely with strict intake/output, daily weight      SUBJECTIVE:  Overnight events reviewed with ICU team   Patient seen examined via window given COVID-19  Patient remains intubated, sedated, ROS unable to be obtained  OBJECTIVE:  Current Weight: Weight - Scale: 109 kg (239 lb 6 7 oz)  Vitals:    05/29/21 0600   BP:    Pulse: (!) 138   Resp: (!) 36   Temp: (!) 101 5 °F (38 6 °C)   SpO2: (!) 86%       Intake/Output Summary (Last 24 hours) at 5/29/2021 0803  Last data filed at 5/29/2021 0731  Gross per 24 hour   Intake 4318 51 ml   Output 1325 ml   Net 2993 51 ml     Patient seen and examined via window given COVID-19 infection    Assessment completed via window in with assistance of ICU team     General:  Intubated, sedated, in no acute distress  Eyes:  Eyes closed  ENT:  ET tube in place  Chest:  Remains mechanically ventilated, noted decrease in oxygen saturations   CVS:  Tachycardic  Abdomen:  Nondistended  Extremities:  Generalized edema  Neuro:  Intubated, sedated    Medications:  Scheduled Meds:  Current Facility-Administered Medications   Medication Dose Route Frequency Provider Last Rate    Acetaminophen  650 mg Oral Q4H PRN Ila Low MD      amLODIPine  5 mg Oral Daily Shasta Merlos MD      atorvastatin  40 mg Oral HS JIHAN Riley      cefepime  2,000 mg Intravenous Q12H JIHAN Woodson Stopped (05/29/21 0730)    chlorhexidine  15 mL Mouth/Throat Q12H [address], [place]      cholecalciferol  2,000 Units Oral Daily Rebekah Sifuentes JIHAN Avendano      IV infusion builder   Intravenous Continuous Jarrett Martins MD 5 mL/hr at 05/29/21 0519    gabapentin  100 mg Oral TID JIHAN Ceballos      heparin (porcine)  3-20 Units/kg/hr (Order-Specific) Intravenous Titrated Jarrett Martins MD 9 1 Units/kg/hr (05/28/21 9036)    heparin (porcine)  2,000 Units Intravenous Q1H PRN Jarrett Martins MD      heparin (porcine)  4,000 Units Intravenous Q1H PRN Jarrett Martins MD      hydrALAZINE  20 mg Intravenous Q4H PRN April Robertson PA-C      HYDROmorphone  1 mg Intravenous Q2H PRN Jarrett Martins MD      insulin regular (HumuLIN R,NovoLIN R) infusion  0 3-21 Units/hr Intravenous Titrated April Robertson PA-C 14 Units/hr (05/29/21 7281)    Labetalol HCl  20 mg Intravenous Q4H PRN April Robertson PA-C      IV infusion builder   Intravenous Continuous Jarrett Martins MD 3 mL/hr at 05/29/21 0248    midazolam  2 mg Intravenous Q2H PRN Jarrett Martins MD      multivitamin-minerals  1 tablet Oral Daily JIHAN Ceballos      ondansetron  4 mg Intravenous Q6H PRN JIHAN Ceballos      polyethylene glycol  17 g Oral Daily PRN Jarrett Martins MD      senna-docusate sodium  2 tablet Oral BID Jarrett Martins MD      [START ON 5/30/2021] vancomycin  15 mg/kg (Adjusted) Intravenous Daily PRN Lona Davies MD      vancomycin  20 mg/kg (Adjusted) Intravenous Once JIHAN Ramey 1,750 mg (05/29/21 0731)       PRN Meds:   Acetaminophen    heparin (porcine)    heparin (porcine)    hydrALAZINE    HYDROmorphone    Labetalol HCl    midazolam    ondansetron    polyethylene glycol    [START ON 5/30/2021] vancomycin    Continuous Infusions:IV infusion builder, , Last Rate: 5 mL/hr at 05/29/21 0519  heparin (porcine), 3-20 Units/kg/hr (Order-Specific), Last Rate: 9 1 Units/kg/hr (05/28/21 0229)  insulin regular (HumuLIN R,NovoLIN R) infusion, 0 3-21 Units/hr, Last Rate: 14 Units/hr (05/29/21 2941)  IV infusion builder, , Last Rate: 3 mL/hr at 05/29/21 0248        Laboratory Results:  Results from last 7 days   Lab Units 05/29/21  0326 05/28/21  0423 05/28/21  0328 05/27/21  1001 05/27/21  0443 05/26/21  0550 05/25/21  0450 05/24/21  1712  05/24/21  0409  05/23/21  0327   WBC Thousand/uL 6 19  --  5 46  --  9 08 7 87 10 71*  --   --  9 90  --  10 29*   HEMOGLOBIN g/dL 8 1*  --  8 3*  --  8 4* 7 7* 8 6*  --   --  7 1*  --  7 1*   HEMATOCRIT % 26 5*  --  26 9*  --  27 2* 24 4* 26 3*  --   --  21 8*  --  22 1*   PLATELETS Thousands/uL 85*  --  81*  --  92* 88* 87*  --   --  75*  --  71*   SODIUM mmol/L 146* 149*  --  149* 153* 149* 151* 148*   < >  --    < >  --    POTASSIUM mmol/L 5 5* 4 8  --  5 1 3 2* 3 6 3 5 3 9   < >  --    < >  --    CHLORIDE mmol/L 108 111*  --  110* 120* 111* 110* 108   < >  --    < >  --    CO2 mmol/L 33* 32  --  29 23 31 32 31   < >  --    < >  --    BUN mg/dL 57* 53*  --  59* 43* 65* 83* 88*   < >  --    < >  --    CREATININE mg/dL 2 29* 1 83*  --  1 92* 1 25 1 83* 2 11* 2 34*   < >  --    < >  --    CALCIUM mg/dL 8 5 8 6  --  8 9 6 3* 8 6 8 6 8 6   < >  --    < >  --    MAGNESIUM mg/dL  --   --   --   --   --   --  2 4  --   --   --   --  2 3   PHOSPHORUS mg/dL  --   --   --   --   --   --  3 7  --   --   --   --   --     < > = values in this interval not displayed

## 2021-05-29 NOTE — ASSESSMENT & PLAN NOTE
· Present on admission  · Repeat CT head 5/13: No significant interval change since prior examination  Stable advanced chronic microvascular ischemic change and chronic right cerebellar lacunar infarct  · Etiology unclear: covid 19/sepsis/hypoxia vs  delerium vs  Uremia less likely  · Baseline AO x 3  · Currently intubated and sedated with midazolam and fentanyl  Unable to assess mental status      · Unable to wean sedation due to hypoxia

## 2021-05-29 NOTE — DISCHARGE SUMMARY
[address] [facility]  Discharge- Kristen Part Sr  1950, 80 y o  male MRN: [id number]  Unit/Bed#: ICU 03 Encounter: [id number]  Primary Care Provider: Bree Sullivan MD   Date and time admitted to hospital: 5/7/2021  4:33 PM    * Acute hypoxemic respiratory failure due to COVID-19 [facility])  Assessment & Plan  · Secondary to COVID-19 infection  · Initial positive 5/7 with unclear symptom onset  · Intubated on 5/17 secondary to progressive respiratory failure  · Proned 6PM on 5/17- supine 5/18 with maintenance of saturations > 90  · CXR with bilateral pulmonary opacities  · Completed Decadron  · Continue vitamins  · Completed remdesivir  · Given Actemra 5/13 due to increasing Fio2 requirements and elevated CRP  · Completed antibiotic therapy  · Off Veletri  · Wean FiO2 and PEEP as tolerated  · Vent day 13  Patient will likely need tracheostomy early next week  Bradycardia  Assessment & Plan  · With transient second degree heart blocks  Now resolved  · Cardiology following  · External pacemaker not beneficial at this point per cardiology  · Off vasopressors  · Single ICD with appropriate pacing at 40 bpm  · Consider increasing lower rate limit of ICD to 50-60 bpm per cardiology recommendations if heart block or arrhyhtmia develops again  Acute encephalopathy  Assessment & Plan  · Present on admission  · Repeat CT head 5/13: No significant interval change since prior examination  Stable advanced chronic microvascular ischemic change and chronic right cerebellar lacunar infarct  · Etiology unclear: covid 19/sepsis/hypoxia vs  delerium vs  Uremia less likely  · Baseline AO x 3  · Currently intubated and sedated with midazolam and fentanyl  Unable to assess mental status      · Unable to wean sedation due to hypoxia     Acute renal failure superimposed on chronic kidney disease [facility])  Assessment & Plan  Recent Labs     05/29/21  0326 05/29/21  1027 05/29/21  1315   CREATININE 2 29* 2 70* 3 31*   EGFR 32 26 21     Estimated Creatinine Clearance: 24 8 mL/min (A) (by C-G formula based on SCr of 3 31 mg/dL (H))  · Hx of CKD III, baseline sCr 1 8 - 1 9  · GAGE on presentation with sCr of 4 97  · Etiology multifactorial secondary to pre renal azotemia, ATN and rhabdomyolysis present on admission  · On lasix and metolazone at home  · Continue intermittent lasix for goal -1L  · Monitor I and O      Chronic combined systolic and diastolic CHF, NYHA class 3 ([facility])  Assessment & Plan  Wt Readings from Last 3 Encounters:   05/29/21 109 kg (239 lb 6 7 oz)   03/19/21 118 kg (261 lb)   03/18/21 118 kg (261 lb)     · BNP 7000 on admission  · 4/21 ECHO: The ventricle was mildly to moderately dilated  Systolic function was mildly to moderately reduced  Ejection fraction was estimated to be 40 %  There was mild to moderate diffuse hypokinesis  · Monitor volume status  · He was placed on a continuous lasix infusion with good response  Now discontinued  Continue intermittent lasix  · Creatinine improving  · No longer requiring vasopressors  Thrombocytopenia [facility])  Assessment & Plan  Recent Labs     05/27/21  0443 05/28/21  0328 05/29/21  0326   PLT 92* 81* 85*       · Likely secondary to sepsis and antibiotic use  Improving after discontinuing antibiotics  · No evidence of active bleeding  · Monitor CBC        Elevated liver function tests-resolved as of 5/25/2021  Assessment & Plan  Recent Labs     05/29/21  1027   AST 47*   ALT 28   ALKPHOS 135*   TBILI 0 50       · Resolved  · Right upper quadrant ultrasound with evidence of cholelithiasis without wall thickening or pericholecystic fluid         Type 2 diabetes mellitus with kidney complication, with long-term current use of insulin [facility])  Assessment & Plan  Lab Results   Component Value Date    HGBA1C 6 0 03/19/2021   · Blood glucose was controlled on insulin drip    Recent Labs     05/29/21  0826 05/29/21  1011 05/29/21  1206 05/29/21  1300   POCGLU 135 120 240* 277*       Blood Sugar Average: Last 72 hrs:  (P) 166 [id number]     · On jardiance and insulin at home  · Continue insulin infusion  · Goal blood glucose between 140 and 180      Obesity with body mass index 30 or greater  Assessment & Plan  · Known risk factor for severity of COVID       Hyperlipidemia  Assessment & Plan  · Continue lipitor       Hyperkalemia  Assessment & Plan  · Given insulin and dextrose  · Bicarb  · Calcium    Acute kidney injury [facility])  Assessment & Plan  · There was consideration for starting hemodialysis  · A trial Lasix strip was started    Severe sepsis [facility])  Assessment & Plan  Recent Labs     05/27/21  0443 05/28/21  0328 05/29/21  0326   WBC 9 08 5 46 6 19       · Patient originally with hypothermia, leukocytosis and hypotension requiring vasopressors  Currently off pressors, however he did develop a fever overnight  · Completed 7 days of Cefazolin  On 5/23  · 5/18 Blood cultures with no growth to date  · Monitor CBC with differential  · 5/29 Persistent Fever overnight  · Blood cultures x2 and UA pending  · CXR pending  · Procalcitonin negative 5/28  · WBC WNL  · Continue to monitor off ABX    Hypernatremia  Assessment & Plan  Recent Labs     05/29/21  0326 05/29/21  1027 05/29/21  1315   SODIUM 146* 145 144       · F/U BMP  · Free water deficit 3 4 L  · Continue water flushes  · Intermittent Lasix  · If hypernatremia worsens, discontinue diuretic and start gentle hypotonic IVF    Hypertension  Assessment & Plan  · Continue Norvasc    · Continue hydralazine prn  · Monitor BP  · Optimize sedation      Admission Date: 5/7/2021     Discharge Date: 5/29/21    Procedures Performed:   Orders Placed This Encounter   Procedures   [address] [facility] ED ECG Documentation Only    [address] Course:   67-year-old male with a known past medical history significant chronic kidney disease chronic, JOE, combined systolic and diastolic heart failure, diabetes mellitus type 2 was admitted secondary to acute respiratory failure with hypoxia and severe sepsis secondary to COVID-19 pneumonia  He was initially admitted to the Internal Medicine Service with a pulmonary consult  Cardiology and Nephrology were consulted as well  Patient remained on the medical-surgical unit where he was treated under the COVID-19 treatment pathway  Secondary to increased oxygen requirements patient was transferred to the intensive care unit on 13 May 2021  At that time the patient was given Actemra  Patient's encephalopathy appear to be increasing in severity  He was initiated on a Precedex drip  And maintain on a non-rebreather mask  He appeared to be volume overloaded and was given IV Lasix  During this time he was hypernatremic and followed by Nephrology  He was placed on a D5W drip at 50 cc an hour  He remained on high-dose steroids  He was maintained during the day on high-flow nasal cannula and BiPAP at night during this time  On 17 May 2021 the patient suffered acute decompensation in terms of his respiratory function  He required emergent intubation  He had episodes of hypotension requiring pressor therapy  An A-line was placed on the 18th May 2021 to monitor blood pressure  Patient continued on this way until 29 May 2021 where he began to decompensate quickly  Patient was given a epinephrine injection IV secondary to hypotension and bradycardia  This briefly improved his rhythm and blood pressure  Unfortunately, he began to code as the epinephrine wore off  ACLS was initiated  This was carried on for over an hour, as the patient's family wished us to continue  Eventually these efforts became futile and the patient was pronounced at 14:38 on May 29  Patient's family was permitted to come in and see the patient post code                    Significant Findings, Care, Treatment and Services Provided:   OPEFE-80 pneumonia and sepsis  ACLS  Complications:   Respiratory and cardiac arrest secondary to COVID-19    Condition at Discharge:      Discharge instructions/Information to patient and family:   See after visit summary for information provided to patient and family  Provisions for Follow-Up Care:  See after visit summary for information related to follow-up care and any pertinent home health orders  Disposition:     Discharge Statement   I spent 45 minutes discharging the patient  This time was spent on the day of discharge  I had direct contact with the patient on the day of discharge  Additional documentation is required if more than 30 minutes were spent on discharge  Discharge Medications:  See after visit summary for reconciled discharge medications provided to patient and family

## 2021-05-29 NOTE — ASSESSMENT & PLAN NOTE
Recent Labs     05/27/21  0443 05/28/21  0328 05/29/21  0326   WBC 9 08 5 46 6 19       · Patient originally with hypothermia, leukocytosis and hypotension requiring vasopressors  Currently off pressors, however he did develop a fever overnight  · Completed 7 days of Cefazolin  On 5/23  · 5/18 Blood cultures with no growth to date     · Monitor CBC with differential  · 5/29 Persistent Fever overnight  · Blood cultures x2 and UA pending  · CXR pending  · Procalcitonin negative 5/28  · WBC WNL  · Continue to monitor off ABX

## 2021-05-29 NOTE — ASSESSMENT & PLAN NOTE
Recent Labs     05/29/21  0326 05/29/21  1027 05/29/21  1315   SODIUM 146* 145 144       · F/U BMP  · Free water deficit 3 4 L  · Continue water flushes     · Intermittent Lasix  · If hypernatremia worsens, discontinue diuretic and start gentle hypotonic IVF

## 2021-05-29 NOTE — ASSESSMENT & PLAN NOTE
· Secondary to COVID-19 infection  · Initial positive 5/7 with unclear symptom onset  · Intubated on 5/17 secondary to progressive respiratory failure  · Proned 6PM on 5/17- supine 5/18 with maintenance of saturations > 90  · CXR with bilateral pulmonary opacities  · Completed Decadron  · Continue vitamins  · Completed remdesivir  · Given Actemra 5/13 due to increasing Fio2 requirements and elevated CRP  · Completed antibiotic therapy  · Off Veletri  · Wean FiO2 and PEEP as tolerated  · Vent day 13  Patient will likely need tracheostomy early next week

## 2021-05-29 NOTE — PROGRESS NOTES
[facility] 49  Progress Note Cheryl Hinojosa Sr  1950, 80 y o  male MRN: [id number]  Unit/Bed#: ICU 03 Encounter: [id number]  Primary Care Provider: Raleigh Colón MD   Date and time admitted to hospital: 5/7/2021  4:33 PM    * Acute hypoxemic respiratory failure due to COVID-19 [facility])  Assessment & Plan  · Secondary to COVID-19 infection  · Initial positive 5/7 with unclear symptom onset  · Intubated on 5/17 secondary to progressive respiratory failure  · Proned 6PM on 5/17- supine 5/18 with maintenance of saturations > 90  · CXR with bilateral pulmonary opacities  · Completed Decadron  · Continue vitamins  · Completed remdesivir  · Given Actemra 5/13 due to increasing Fio2 requirements and elevated CRP  · Completed antibiotic therapy  · Off Veletri  · Wean FiO2 and PEEP as tolerated  · Vent day 13  Patient will likely need tracheostomy early next week  Bradycardia  Assessment & Plan  · With transient second degree heart blocks  Now resolved  · Cardiology following  · External pacemaker not beneficial at this point per cardiology  · Off vasopressors  · Single ICD with appropriate pacing at 40 bpm  · Consider increasing lower rate limit of ICD to 50-60 bpm per cardiology recommendations if heart block or arrhyhtmia develops again  Acute encephalopathy  Assessment & Plan  · Present on admission  · Repeat CT head 5/13: No significant interval change since prior examination  Stable advanced chronic microvascular ischemic change and chronic right cerebellar lacunar infarct  · Etiology unclear: covid 19/sepsis/hypoxia vs  delerium vs  Uremia less likely  · Baseline AO x 3  · Currently intubated and sedated with midazolam and fentanyl  Unable to assess mental status      · Unable to wean sedation due to hypoxia     Acute renal failure superimposed on chronic kidney disease [facility])  Assessment & Plan  Recent Labs     05/27/21  0443 05/27/21  1001 05/28/21  0423 CREATININE 1 25 1 92* 1 83*   EGFR 67 40 42     Estimated Creatinine Clearance: 44 5 mL/min (A) (by C-G formula based on SCr of 1 83 mg/dL (H))  · Hx of CKD III, baseline sCr 1 8 - 1 9  · GAGE on presentation with sCr of 4 97  · Etiology multifactorial secondary to pre renal azotemia, ATN and rhabdomyolysis present on admission  · On lasix and metolazone at home  · Continue intermittent lasix for goal -1L  · Monitor I and O      Chronic combined systolic and diastolic CHF, NYHA class 3 ([facility])  Assessment & Plan  Wt Readings from Last 3 Encounters:   05/28/21 107 kg (235 lb 7 2 oz)   03/19/21 118 kg (261 lb)   03/18/21 118 kg (261 lb)     · BNP 7000 on admission  · 4/21 ECHO: The ventricle was mildly to moderately dilated  Systolic function was mildly to moderately reduced  Ejection fraction was estimated to be 40 %  There was mild to moderate diffuse hypokinesis  · Monitor volume status  · He was placed on a continuous lasix infusion with good response  Now discontinued  Continue intermittent lasix  · Creatinine improving  · No longer requiring vasopressors  Thrombocytopenia [facility])  Assessment & Plan  Recent Labs     05/26/21  0550 05/27/21  0443 05/28/21  0328   PLT 88* 92* 81*       · Likely secondary to sepsis and antibiotic use  Improving after discontinuing antibiotics  · No evidence of active bleeding  · Monitor CBC        Type 2 diabetes mellitus with kidney complication, with long-term current use of insulin [facility])  Assessment & Plan  Lab Results   Component Value Date    HGBA1C 6 0 03/19/2021       Recent Labs     05/28/21  1001 05/28/21  1217 05/28/21  1355 05/28/21  1627   POCGLU 140 156* 120 187*       Blood Sugar Average: Last 72 hrs:  (P) 993 [id number]     · On jardiance and insulin at home  · Continue insulin infusion     · Goal blood glucose between 140 and 180      Obesity with body mass index 30 or greater  Assessment & Plan  · Known risk factor for severity of COVID Hyperlipidemia  Assessment & Plan  · Continue lipitor       Severe sepsis [facility])  Assessment & Plan  Recent Labs     05/26/21  0550 05/27/21  0443 05/28/21  0328   WBC 7 87 9 08 5 46       · Patient originally with hypothermia, leukocytosis and hypotension requiring vasopressors  Currently off pressors, however he did develop a fever overnight  · Completed 7 days of Cefazolin  On 5/23  · 5/18 Blood cultures with no growth to date  · Monitor CBC with differential  · 5/29 Persistent Fever overnight  · Blood cultures x2 and UA pending  · CXR pending  · Procalcitonin negative 5/28  · WBC WNL  · Continue to monitor off ABX    Hypernatremia  Assessment & Plan  Recent Labs     05/27/21  0443 05/27/21  1001 05/28/21  0423   SODIUM 153* 149* 149*       · F/U BMP  · Free water deficit 3 4 L  · Continue water flushes  · Intermittent Lasix  · If hypernatremia worsens, discontinue diuretic and start gentle hypotonic IVF    Hypertension  Assessment & Plan  · Continue Norvasc  · Continue hydralazine prn  · Monitor BP  · Optimize sedation      ----------------------------------------------------------------------------------------  HPI/24hr events: Overnight patient hypoxic and febrile  Blood cultures, UA, and CXR pending  Patient received dilaudid, Fent, and versed for hypoxia and dysschrony from the ventilator  Patient creatinine trending up       Disposition: Continue Critical Care   Code Status: Level 1 - Full Code  ---------------------------------------------------------------------------------------  SUBJECTIVE      Review of Systems   Unable to perform ROS: Intubated     Review of systems was reviewed and negative unless stated above in HPI/24-hour events   ---------------------------------------------------------------------------------------  OBJECTIVE    Vitals   Vitals:    05/29/21 0200 05/29/21 0245 05/29/21 0300 05/29/21 0400   BP:       Pulse: (!) 132  (!) 132 (!) 132   Resp: (!) 33  (!) 26 (!) 31   Temp: (!) 102 2 °F (39 °C)  99 7 °F (37 6 °C) (!) 101 8 °F (38 8 °C)   TempSrc: Esophageal  Esophageal Esophageal   SpO2: (!) 85% 93% (!) 82% (!) 82%   Weight:       Height:         Temp (24hrs), Av 7 °F (37 6 °C), Min:96 8 °F (36 °C), Max:102 4 °F (39 1 °C)  Current: Temperature: (!) 101 8 °F (38 8 °C)  Arterial Line BP: 112/46  Arterial Line MAP (mmHg): 62 mmHg    Respiratory:  SpO2: SpO2: (!) 82 %    Nasal Cannula O2 Flow Rate (L/min): 15 L/min    Invasive/non-invasive ventilation settings   Respiratory    Lab Data (Last 4 hours)    None         O2/Vent Data (Last 4 hours)       0245          Insp Time (sec) (sec) 0 7                   Physical Exam  Vitals signs reviewed  Constitutional:       Appearance: He is obese  He is ill-appearing  Interventions: He is sedated, intubated and restrained  HENT:      Head: Normocephalic and atraumatic  Nose: Nose normal       Mouth/Throat:      Mouth: Mucous membranes are dry  Eyes:      Extraocular Movements: Extraocular movements intact  Pupils: Pupils are equal, round, and reactive to light  Neck:      Musculoskeletal: Normal range of motion and neck supple  Cardiovascular:      Rate and Rhythm: Regular rhythm  Tachycardia present  Pulses: Normal pulses  Heart sounds: Normal heart sounds  Pulmonary:      Effort: He is intubated  Breath sounds: Decreased breath sounds present  Abdominal:      General: Abdomen is flat  Bowel sounds are normal  There is no distension  Palpations: Abdomen is soft  Tenderness: There is no abdominal tenderness  Musculoskeletal: Normal range of motion  General: No swelling  Skin:     General: Skin is warm and dry  Neurological:      General: No focal deficit present           Laboratory and Diagnostics:  Results from last 7 days   Lab Units 21  0328 21  0443 21  0550 21  0450 21  0409 21  0327 21  0509   WBC Thousand/uL 5 46 9 08 7 87 10 71* 9 90 10 29* 11 12*   HEMOGLOBIN g/dL 8 3* 8 4* 7 7* 8 6* 7 1* 7 1* 7 9*   HEMATOCRIT % 26 9* 27 2* 24 4* 26 3* 21 8* 22 1* 23 4*   PLATELETS Thousands/uL 81* 92* 88* 87* 75* 71* 76*   NEUTROS PCT % 75  --   --   --   --   --   --    BANDS PCT %  --   --   --   --  13*  --   --    MONOS PCT % 5  --   --   --   --   --   --    MONO PCT %  --   --   --   --  0*  --   --      Results from last 7 days   Lab Units 05/29/21  0326 05/28/21  0423 05/27/21  1001 05/27/21  0443 05/26/21  0550 05/25/21  0450 05/24/21  1712  05/24/21  0409   SODIUM mmol/L 146* 149* 149* 153* 149* 151* 148*   < >  --    POTASSIUM mmol/L 5 5* 4 8 5 1 3 2* 3 6 3 5 3 9   < >  --    CHLORIDE mmol/L 108 111* 110* 120* 111* 110* 108   < >  --    CO2 mmol/L 33* 32 29 23 31 32 31   < >  --    ANION GAP mmol/L 5 6 10 10 7 9 9   < >  --    BUN mg/dL 57* 53* 59* 43* 65* 83* 88*   < >  --    CREATININE mg/dL 2 29* 1 83* 1 92* 1 25 1 83* 2 11* 2 34*   < >  --    CALCIUM mg/dL 8 5 8 6 8 9 6 3* 8 6 8 6 8 6   < >  --    GLUCOSE RANDOM mg/dL 151* 144* 171* 106 132 139 194*   < >  --    ALT U/L  --   --   --   --   --   --   --   --  37   AST U/L  --   --   --   --   --   --   --   --  40   ALK PHOS U/L  --   --   --   --   --   --   --   --  114   ALBUMIN g/dL  --   --   --   --   --   --   --   --  2 2*   TOTAL BILIRUBIN mg/dL  --   --   --   --   --   --   --   --  0 68    < > = values in this interval not displayed  Results from last 7 days   Lab Units 05/25/21  0450 05/23/21  0327   MAGNESIUM mg/dL 2 4 2 3   PHOSPHORUS mg/dL 3 7  --       Results from last 7 days   Lab Units 05/28/21  2216 05/28/21  1545   INR   --  1 17   PTT seconds 126* 35              ABG:    VBG:    Results from last 7 days   Lab Units 05/28/21  0618 05/27/21  0801   PROCALCITONIN ng/ml 0 22 0 19       Micro        EKG: ST  Imaging: I have personally reviewed pertinent reports        Intake and Output  I/O       05/27 0701 - 05/28 0700 05/28 0701 - 05/29 0700    I V  (mL/kg) 344 1 (3 2) 669 6 (6 3)    NG/ 2220    Feedings 1288 1339    Total Intake(mL/kg) 2432 1 (22 7) 4228 6 (39 5)    Urine (mL/kg/hr) 1570 (0 6) 1425 (0 6)    Total Output 1570 1425    Net +862 1 +2803 6                Height and Weights   Height: 5' 8" (172 7 cm)  IBW (Ideal Body Weight): 68 4 kg  Body mass index is 35 8 kg/m²  Weight (last 2 days)     Date/Time   Weight    05/28/21 0535   107 (235 45)    05/27/21 0600   107 (236 33)                Nutrition       Diet Orders   (From admission, onward)             Start     Ordered    05/27/21 1526  Diet Enteral/Parenteral; Tube Feeding No Oral Diet; Jevity 1 2 Micheal; Continuous; 68; 300; Water; Every 4 hours  Diet effective now     Question Answer Comment   Diet Type Enteral/Parenteral    Enteral/Parenteral Tube Feeding No Oral Diet    Tube Feeding Formula: Jevity 1 2 Micheal    Bolus/Cyclic/Continuous Continuous    Tube Feeding Goal Rate (mL/hr): 68    Tube Feeding flush (mL): 300    Water Flush type: Water    Water flush frequency: Every 4 hours    RD to adjust diet per protocol?  Yes        05/27/21 1525                  Active Medications  Scheduled Meds:  Current Facility-Administered Medications   Medication Dose Route Frequency Provider Last Rate    Acetaminophen  650 mg Oral Q4H PRN Hallie Alex MD      amLODIPine  5 mg Oral Daily Francisco Javier English MD      atorvastatin  40 mg Oral HS La K JIHAN Avendano      chlorhexidine  15 mL Mouth/Throat Q12H [address] Gaylibrii Ibanez PA-C      cholecalciferol  2,000 Units Oral Daily JIHAN Mcdermott      IV infusion builder   Intravenous Continuous Hallie Alex MD 5 mL/hr at 05/28/21 1440    gabapentin  100 mg Oral TID Pearly JIHAN Hair      heparin (porcine)  3-20 Units/kg/hr (Order-Specific) Intravenous Titrated Hallie Alex MD 9 1 Units/kg/hr (05/28/21 2357)    heparin (porcine)  2,000 Units Intravenous Q1H PRN Hallie Alex MD      heparin (porcine)  4,000 Units Intravenous Q1H PRN Hallie Alex MD      hydrALAZINE  20 mg Intravenous Q4H PRN Reva Hackett PA-C      HYDROmorphone  1 mg Intravenous Q2H PRN Nish Green MD      insulin regular (HumuLIN R,NovoLIN R) infusion  0 3-21 Units/hr Intravenous Titrated Reva Hackett PA-C 12 Units/hr (05/29/21 0355)    Labetalol HCl  20 mg Intravenous Q4H PRN Reva Hackett PA-C      IV infusion builder   Intravenous Continuous Nish Green MD 3 mL/hr at 05/29/21 0248    midazolam  2 mg Intravenous Q2H PRN Nish Green MD      multivitamin-minerals  1 tablet Oral Daily JIHAN Guan      ondansetron  4 mg Intravenous Q6H PRN Meryle Jacquet Bilofsky, CRNP      polyethylene glycol  17 g Oral Daily PRN Nish Green MD      senna-docusate sodium  2 tablet Oral BID Nish Green MD       Continuous Infusions:  IV infusion builder, , Last Rate: 5 mL/hr at 05/28/21 1440  heparin (porcine), 3-20 Units/kg/hr (Order-Specific), Last Rate: 9 1 Units/kg/hr (05/28/21 2357)  insulin regular (HumuLIN R,NovoLIN R) infusion, 0 3-21 Units/hr, Last Rate: 12 Units/hr (05/29/21 0355)  IV infusion builder, , Last Rate: 3 mL/hr at 05/29/21 0248      PRN Meds:   Acetaminophen, 650 mg, Q4H PRN  heparin (porcine), 2,000 Units, Q1H PRN  heparin (porcine), 4,000 Units, Q1H PRN  hydrALAZINE, 20 mg, Q4H PRN  HYDROmorphone, 1 mg, Q2H PRN  Labetalol HCl, 20 mg, Q4H PRN  midazolam, 2 mg, Q2H PRN  ondansetron, 4 mg, Q6H PRN  polyethylene glycol, 17 g, Daily PRN        Invasive Devices Review  Invasive Devices     Central Venous Catheter Line            CVC Central Lines 05/12/21 Double 16 days          Arterial Line            Arterial Line 05/17/21 Radial 11 days          Line            Hemodialysis AV Fistula 09/06/19 Upper arm 631 days          Drain            NG/OG/Enteral Tube Orogastric 11 days    Urethral Catheter 7 days          Airway            ETT  11 days                Rationale for remaining devices: ---------------------------------------------------------------------------------------  Advance Directive and Living Will:      Power of :    POLST:    ---------------------------------------------------------------------------------------  Care Time Delivered:   Upon my evaluation, this patient had a high probability of imminent or life-threatening deterioration due to COVID pneumonia, which required my direct attention, intervention, and personal management  I have personally provided 35 minutes (0001 to 0430) of critical care time, exclusive of procedures, teaching, family meetings, and any prior time recorded by providers other than myself  JIHAN Taylor      Portions of the record may have been created with voice recognition software  Occasional wrong word or "sound a like" substitutions may have occurred due to the inherent limitations of voice recognition software    Read the chart carefully and recognize, using context, where substitutions have occurred

## 2021-05-29 NOTE — ASSESSMENT & PLAN NOTE
Wt Readings from Last 3 Encounters:   05/28/21 107 kg (235 lb 7 2 oz)   03/19/21 118 kg (261 lb)   03/18/21 118 kg (261 lb)     · BNP 7000 on admission  · 4/21 ECHO: The ventricle was mildly to moderately dilated  Systolic function was mildly to moderately reduced  Ejection fraction was estimated to be 40 %  There was mild to moderate diffuse hypokinesis  · Monitor volume status  · He was placed on a continuous lasix infusion with good response  Now discontinued  Continue intermittent lasix  · Creatinine improving  · No longer requiring vasopressors

## 2021-05-30 LAB — MRSA NOSE QL CULT: NORMAL

## 2021-06-01 NOTE — CLINICAL RISK MANAGEMENT
Progress Note - Death in Restraints   Romie Ferrell Sr  80 y o  male MRN: [id number]  Unit/Bed#: ICU 03 Encounter: [id number]      Patient  within 24 hours of restraint  Soft restraint right wrist and soft restraint left wrist removal  Death unrelated to use of restraints  This situation was tracked internally  CMS and PA-KHALIF  notification not required

## 2021-06-01 NOTE — PROGRESS NOTES
Unfortunately patient clinically declined throughout the day  Throughout most of his hospital stay on the ventilator patient was having episodes of agitation, no purposeful responses, significant hypertension when sedation was weaned  Also developed episodes of worsening oxygen saturation despite ventilatory support  This afternoon  Develops of bradycardia  Also developed hypotension  He required multiple  vasopressors  Developed a wide complex bradycardic arrhythmia  Developed V-tach  He was initially treated with lidocaine  However had recurrence of the arrhythmia  Required multiple pressors  Based upon presentation I was very concerned the patient was going to undergo cardiac arrest   I contacted the patient's wife and son  Ultimately patient did have cardiac arrest and received cardiopulmonary resuscitation for nearly 1 hour  Family wished for us to continue until they could arrive to the hospital   When the family was able to arrive the decision was made to stop resuscitative efforts  60 minutes additional critical care time spent resuscitating patient with ACLS protocol, discussing plan of care with ICU staff, patient's family

## 2021-06-03 LAB
BACTERIA BLD CULT: NORMAL
BACTERIA BLD CULT: NORMAL

## 2022-01-01 NOTE — ASSESSMENT & PLAN NOTE
· Mild AST and alk phos elevations  · In setting of covid 19  · Trend Review med chart  Examine baby

## 2022-09-13 NOTE — ASSESSMENT & PLAN NOTE
I discussed with them that the diagnosis will be TIA since it does not appear that she had a seizure.  Nonetheless he is to follow-up with his cardiologist to make sure that he did not have a cardiac arrhythmia producing presyncope.  He is to follow-up with neurosurgery and I will see him in the future as needed.   · Patient presented to the ED for shortness of breath  · Per EMS patient had an SpO2 in the 70s upon presentation  · Requiring simple mask in ED, weaned to 5 liters nasal cannula at time of admission  · Treatment per moderate COVID pathway  · IV steroids and IV remdesivir  · Continue supplemental oxygen to maintain saturations above 90%  · pulm consult

## 2022-10-17 NOTE — PROCEDURES
Arterial Line Insertion    Date/Time: 5/17/2021 6:04 PM  Performed by: Gill Bustillos MD  Authorized by: Estephania Snell MD     Patient location:  ICU and bedside  Other Assisting Provider: Yes (comment) Zuly Brunner Ace    [place] protocol:     Patient identity confirmed:  Arm band and hospital-assigned identification number  Indications:     Indications: hemodynamic monitoring, multiple ABGs, continuous blood pressure monitoring and frequent labs / infusion    Pre-procedure details:     Skin preparation:  Chlorhexidine    Preparation: Patient was prepped and draped in sterile fashion    Sedation:     Sedation type: Moderate (conscious) sedation  Anesthesia (see MAR for exact dosages): Anesthesia method:  None  Procedure details:     Location / Tip of Catheter:  Radial    Laterality:  Right    Needle gauge:  18 G    Placement technique:  Ultrasound guided and Seldinger    Sterile ultrasound techniques: Sterile gel and sterile probe covers were used      Number of attempts:  2    Successful placement: yes      Transducer: waveform confirmed    Post-procedure details:     Post-procedure:  Biopatch applied    CMS:  Unable to assess    Patient tolerance of procedure:   Tolerated well, no immediate complications Isotretinoin Pregnancy And Lactation Text: This medication is Pregnancy Category X and is considered extremely dangerous during pregnancy. It is unknown if it is excreted in breast milk.

## 2023-06-20 NOTE — MISCELLANEOUS
Provider Comments  Provider Comments:   Pt was a no-show for todays 920am appt  L/m to call us and resched        Signatures   Electronically signed by : Emmie Drummond, ; Steven 10 2017 10:17AM EST                       (Administrative) Orbicularis Oris Muscle Flap Text: The defect edges were debeveled with a #15 scalpel blade.  Given that the defect affected the competency of the oral sphincter an orbicularis oris muscle flap was deemed most appropriate to restore this competency and normal muscle function.  Using a sterile surgical marker, an appropriate flap was drawn incorporating the defect. The area thus outlined was incised with a #15 scalpel blade. Following this, the designed flap was carried over into the primary defect and sutured into place.

## 2024-05-21 NOTE — ASSESSMENT & PLAN NOTE
Patient with vomiting episode - KUB unremarkable for any obstructive pathology  Advanced diet as tolerated  Consider nasogastric tube for persistent vomiting  Suspect GI symptoms related to COVID-19 infection  Continue acid suppression no stemi

## (undated) DEVICE — CHLORAPREP HI-LITE 26ML ORANGE

## (undated) DEVICE — COVER ROLL 8 IN X 2 YD STRETCH TAPE

## (undated) DEVICE — SPONGE SCRUB 4 PCT CHLORHEXIDINE

## (undated) DEVICE — 3M™ STERI-STRIP™ COMPOUND BENZOIN TINCTURE 40 BAGS/CARTON 4 CARTONS/CASE C1544: Brand: 3M™ STERI-STRIP™

## (undated) DEVICE — STRL UNIVERSAL MINOR GENERAL: Brand: CARDINAL HEALTH

## (undated) DEVICE — INSULATED BLADE ELECTRODE;CAUTION: FOR MANUFACTURING, PROCESSING, OR REPACKING.: Brand: EDGE

## (undated) DEVICE — SYRINGE 10ML LL

## (undated) DEVICE — SUT CHROMIC 3-0 SH 27 IN G122H

## (undated) DEVICE — MEDI-VAC YANKAUER SUCTION HANDLE W/BULBOUS AND CONTROL VENT: Brand: CARDINAL HEALTH

## (undated) DEVICE — GLOVE INDICATOR PI UNDERGLOVE SZ 7 BLUE

## (undated) DEVICE — 2000CC GUARDIAN II: Brand: GUARDIAN

## (undated) DEVICE — WET SKIN PREP TRAY: Brand: MEDLINE INDUSTRIES, INC.

## (undated) DEVICE — TUBING SUCTION 5MM X 12 FT

## (undated) DEVICE — 3M™ STERI-DRAPE™ U-DRAPE 1015: Brand: STERI-DRAPE™

## (undated) DEVICE — CATH FOLEY 14FR 5ML 2 WAY SILICONE ELASTIMER

## (undated) DEVICE — SCD SEQUENTIAL COMPRESSION COMFORT SLEEVE MEDIUM KNEE LENGTH: Brand: KENDALL SCD

## (undated) DEVICE — JACKSON-PRATT 100CC BULB RESERVOIR: Brand: CARDINAL HEALTH

## (undated) DEVICE — PETROLATUM GAUZE TUBE FOIL,OVERWRAP: Brand: VASELINE

## (undated) DEVICE — GLOVE INDICATOR PI UNDERGLOVE SZ 8 BLUE

## (undated) DEVICE — REM POLYHESIVE ADULT PATIENT RETURN ELECTRODE: Brand: VALLEYLAB

## (undated) DEVICE — SUT VICRYL 2-0 SH 27 IN UNDYED J417H

## (undated) DEVICE — INFLATABLE PENILE PROSTHESIS ACCESSORY KIT WITH MS PUMP: Brand: AMS 700 ACCESSORY KIT

## (undated) DEVICE — CURITY STRETCH BANDAGE: Brand: CURITY

## (undated) DEVICE — BAG URINE DRAINAGE 2000ML ANTI RFLX LF

## (undated) DEVICE — AMS 700 PENILE PROSTHESIS, 1 LOW PROFILE RESERVOIR, UP TO 100 ML, INHIBIZONE TREATED
Type: IMPLANTABLE DEVICE | Site: ABDOMEN | Status: NON-FUNCTIONAL
Brand: CONCEAL
Removed: 2018-01-19

## (undated) DEVICE — CATH FOLEY 16FR 5ML 2WAY LUBRICATH

## (undated) DEVICE — GAUZE SPONGES,16 PLY: Brand: CURITY

## (undated) DEVICE — HOOK ELASTIC STAY 12MM BLUNT DUAL LEAD

## (undated) DEVICE — SYRINGE 50ML LL

## (undated) DEVICE — JP CHAN DRN SIL HUBLESS 15FR W/TRO: Brand: CARDINAL HEALTH

## (undated) DEVICE — INTENDED FOR TISSUE SEPARATION, AND OTHER PROCEDURES THAT REQUIRE A SHARP SURGICAL BLADE TO PUNCTURE OR CUT.: Brand: BARD-PARKER SAFETY BLADES SIZE 15, STERILE

## (undated) DEVICE — STAYS ELASTIC 5MM SHARP HOOK LONE STAR

## (undated) DEVICE — INSULATED BLADE ELECTRODE: Brand: EDGE

## (undated) DEVICE — SPONGE LAP 18 X 18 IN

## (undated) DEVICE — STRETCH BANDAGE: Brand: CURITY

## (undated) DEVICE — SUT MONOCRYL 4-0 PS-2 27 IN Y426H

## (undated) DEVICE — GLOVE SRG BIOGEL 7

## (undated) DEVICE — BAG DECANTER

## (undated) DEVICE — GLOVE SRG BIOGEL 8

## (undated) DEVICE — SYRINGE BULB 2 OZ